# Patient Record
Sex: FEMALE | Race: WHITE | NOT HISPANIC OR LATINO | Employment: OTHER | URBAN - METROPOLITAN AREA
[De-identification: names, ages, dates, MRNs, and addresses within clinical notes are randomized per-mention and may not be internally consistent; named-entity substitution may affect disease eponyms.]

---

## 2017-04-13 ENCOUNTER — TELEPHONE (OUTPATIENT)
Dept: FAMILY MEDICINE | Facility: CLINIC | Age: 67
End: 2017-04-13

## 2017-04-13 RX ORDER — INSULIN GLARGINE 100 [IU]/ML
INJECTION, SOLUTION SUBCUTANEOUS
Qty: 15 ML | Refills: 1
Start: 2017-04-13 | End: 2017-06-30

## 2017-04-13 NOTE — TELEPHONE ENCOUNTER
Please confirm with patient that she is taking 12 units qhs and call pharmacy   Sejal Rutledge MD

## 2017-04-13 NOTE — TELEPHONE ENCOUNTER
RN spoke with patient and states she is on 11 units on Lantus.    RN called Juan with [facility] pharmacy and informed of the units patient reported.  Juan agrees and verbalized understanding.  Medications list has been updated to 11 units as well.      Signed Prescriptions:                        Disp   Refills    LANTUS SOLOSTAR 100 UNIT/ML soln           15 mL  1        Sig: Inject 11 units at bedtime daily.  Authorizing Provider: TUTU WHITTAKER  Ordering User: ARMINDA TABOR, RN, BSN

## 2017-04-13 NOTE — TELEPHONE ENCOUNTER
Reason for Call:  Other call back     Detailed comments: Pharmacist called to clarify the directions of the Lantus.     Phone Number Patient can be reached at: Home number on file [phone] (home)    Best Time: any    Can we leave a detailed message on this number? YES    Call taken on 4/13/2017 at 10:21 AM by Desiree Schmidt

## 2017-06-20 DIAGNOSIS — E03.9 HYPOTHYROIDISM: ICD-10-CM

## 2017-06-20 RX ORDER — LEVOTHYROXINE SODIUM 100 UG/1
TABLET ORAL
Qty: 90 TABLET | Refills: 0 | Status: SHIPPED | OUTPATIENT
Start: 2017-06-20 | End: 2017-06-30

## 2017-06-20 NOTE — TELEPHONE ENCOUNTER
Prescription approved per [facility] Refill Protocol.  Patient due for labs for further refills.  Etta Leos RN - BC

## 2017-06-22 DIAGNOSIS — I10 HYPERTENSION GOAL BP (BLOOD PRESSURE) < 140/90: ICD-10-CM

## 2017-06-22 RX ORDER — LEVOTHYROXINE SODIUM 100 UG/1
TABLET ORAL
Qty: 90 TABLET | Refills: 2
Start: 2017-06-22

## 2017-06-22 RX ORDER — LISINOPRIL/HYDROCHLOROTHIAZIDE 10-12.5 MG
1 TABLET ORAL DAILY
Qty: 90 TABLET | Refills: 0 | Status: SHIPPED | OUTPATIENT
Start: 2017-06-22 | End: 2017-06-30

## 2017-06-22 NOTE — TELEPHONE ENCOUNTER
Medication is being filled for 1 time refill only due to:  labs not current. See below. Needs a 6 month diabetic f/u  Jamel Turner RN

## 2017-06-22 NOTE — TELEPHONE ENCOUNTER
lisinopril-hydrochlorothiazide (PRINZIDE,ZESTORETIC) 10-12.5 MG per tablet      Last Written Prescription Date: 5/11/16  Last Fill Quantity: 90, # refills: 3  Last Office Visit with [facility], [facility] or [facility] prescribing provider: 12/20/16       Potassium   Date Value Ref Range Status   05/06/2016 4.9 3.4 - 5.3 mmol/L Final     Creatinine   Date Value Ref Range Status   05/06/2016 0.73 0.52 - 1.04 mg/dL Final     BP Readings from Last 3 Encounters:   12/20/16 120/56   11/28/16 127/88   05/11/16 132/64

## 2017-06-23 DIAGNOSIS — F33.0 MAJOR DEPRESSIVE DISORDER, RECURRENT, MILD (H): ICD-10-CM

## 2017-06-23 NOTE — TELEPHONE ENCOUNTER
MA - please call and complete a PHQ-9 with patient and reroute to rn refill pool.    Etta Leos RN - BC

## 2017-06-23 NOTE — TELEPHONE ENCOUNTER
Called patient and left VM to call clinic. Team number left.  Gwendolyn HERNANDEZ CMA ([facility])

## 2017-06-23 NOTE — TELEPHONE ENCOUNTER
Citalopram     Last Written Prescription Date: 12/20/16  Last Fill Quantity: 90, # refills: 1  Last Office Visit with G primary care provider:  12/20/16        Last PHQ-9 score on record=   PHQ-9 SCORE 12/20/2016   Total Score -   Total Score 8

## 2017-06-27 ENCOUNTER — RADIANT APPOINTMENT (OUTPATIENT)
Dept: MAMMOGRAPHY | Facility: CLINIC | Age: 67
End: 2017-06-27
Payer: COMMERCIAL

## 2017-06-27 DIAGNOSIS — Z12.31 VISIT FOR SCREENING MAMMOGRAM: ICD-10-CM

## 2017-06-27 PROCEDURE — G0202 SCR MAMMO BI INCL CAD: HCPCS | Mod: TC

## 2017-06-28 RX ORDER — CITALOPRAM HYDROBROMIDE 20 MG/1
TABLET ORAL
Qty: 30 TABLET | Refills: 0 | Status: SHIPPED | OUTPATIENT
Start: 2017-06-28 | End: 2017-06-30

## 2017-06-28 NOTE — TELEPHONE ENCOUNTER
Attempt 2, called patient and left VM to call clinic. Patient does have appointment scheduled 7/11/2017 with provider.  Gwendolyn HERNANDEZ CMA ([facility])

## 2017-06-28 NOTE — TELEPHONE ENCOUNTER
Routing refill request to provider for review/approval because:  Drug interaction warning    High Drug-Drug: citalopram and nortriptyline  Additive QT interval prolongation may occur during coadministration of Citalopram and QT Prolonging Agents 2. Coadministration of Citalopram and QT Prolonging Agents 2 is not recommended.   Details           Etta Leos RN - BC

## 2017-06-28 NOTE — TELEPHONE ENCOUNTER
Signed Prescriptions:                        Disp   Refills    citalopram (CELEXA) 20 MG tablet           30 tab*0        Sig: Take 1 tablet by mouth once daily.  Authorizing Provider: TUTU WHITTAKER    No further refills until follow-up appointment

## 2017-06-29 DIAGNOSIS — I10 HYPERTENSION GOAL BP (BLOOD PRESSURE) < 140/90: ICD-10-CM

## 2017-06-29 DIAGNOSIS — E03.9 ACQUIRED HYPOTHYROIDISM: ICD-10-CM

## 2017-06-29 LAB
ANION GAP SERPL CALCULATED.3IONS-SCNC: 9 MMOL/L (ref 3–14)
BUN SERPL-MCNC: 11 MG/DL (ref 7–30)
CALCIUM SERPL-MCNC: 8.6 MG/DL (ref 8.5–10.1)
CHLORIDE SERPL-SCNC: 102 MMOL/L (ref 94–109)
CO2 SERPL-SCNC: 28 MMOL/L (ref 20–32)
CREAT SERPL-MCNC: 0.91 MG/DL (ref 0.52–1.04)
GFR SERPL CREATININE-BSD FRML MDRD: 61 ML/MIN/1.7M2
GLUCOSE SERPL-MCNC: 283 MG/DL (ref 70–99)
HBA1C MFR BLD: 9.3 % (ref 4.3–6)
POTASSIUM SERPL-SCNC: 4.9 MMOL/L (ref 3.4–5.3)
SODIUM SERPL-SCNC: 139 MMOL/L (ref 133–144)
T4 FREE SERPL-MCNC: 1.2 NG/DL (ref 0.76–1.46)
TSH SERPL DL<=0.005 MIU/L-ACNC: 4.45 MU/L (ref 0.4–4)

## 2017-06-29 PROCEDURE — 80048 BASIC METABOLIC PNL TOTAL CA: CPT | Performed by: FAMILY MEDICINE

## 2017-06-29 PROCEDURE — 84443 ASSAY THYROID STIM HORMONE: CPT | Performed by: FAMILY MEDICINE

## 2017-06-29 PROCEDURE — 83036 HEMOGLOBIN GLYCOSYLATED A1C: CPT | Performed by: FAMILY MEDICINE

## 2017-06-29 PROCEDURE — 36415 COLL VENOUS BLD VENIPUNCTURE: CPT | Performed by: FAMILY MEDICINE

## 2017-06-29 PROCEDURE — 84439 ASSAY OF FREE THYROXINE: CPT | Performed by: FAMILY MEDICINE

## 2017-06-29 ASSESSMENT — PATIENT HEALTH QUESTIONNAIRE - PHQ9: SUM OF ALL RESPONSES TO PHQ QUESTIONS 1-9: 0

## 2017-06-29 NOTE — PROGRESS NOTES
Kelly,    Your diabetes is uncontrolled. Eat healthy foods like fruits, vegetables, chicken, fish, nuts, and low fat yogurt. Drink water and milk instead of pop and juice. Avoid sweets. Exercise regularly. See you at your upcoming appointment.  Let's discuss these results then.    Sejal Rutledge MD

## 2017-06-30 NOTE — PROGRESS NOTES
"  SUBJECTIVE:   Kelly Vegas is a 66 year old female who presents for Preventive Visit.  {PVP to remind patient that this is not necessarily a physical exam; physical exam may or may not be done:896787::\"click delete button to remove this line now\"}  {PVP to inform patient that additional E&M charge may apply, if additional problems addressed:969410::\"click delete button to remove this line now\"}  Are you in the first 12 months of your Medicare Part B coverage?  {No Yes:665590::\"No\"}    Healthy Habits:    Do you get at least three servings of calcium containing foods daily (dairy, green leafy vegetables, etc.)? {YES/NO, DAIRY INTAKE:090092::\"yes\"}    Amount of exercise or daily activities, outside of work: {AMOUNT EXERCISE:503193}    Problems taking medications regularly {Yes /No default:706606::\"No\"}    Medication side effects: {Yes /No default.:871901::\"No\"}    Have you had an eye exam in the past two years? {YESNOBLANK:578945}    Do you see a dentist twice per year? {YESNOBLANK:907790}    Do you have sleep apnea, excessive snoring or daytime drowsiness?{YESNOBLANK:748323}    {AWV Cognitive Screenin}    {Outside tests to abstract? :716741}    {additional problems to add (Optional):932437}    Reviewed and updated as needed this visit by clinical staff         Reviewed and updated as needed this visit by Provider        Social History   Substance Use Topics     Smoking status: Former Smoker     Packs/day: 0.50     Years: 20.00     Types: Cigarettes     Start date: 1967     Quit date: 1989     Smokeless tobacco: Never Used     Alcohol use No       {ETOH AUDIT:297130}    Today's PHQ-2 Score:   PHQ-2 (  [employer]) 2016 12/10/2015   Q1: Little interest or pleasure in doing things 0 1   Q2: Feeling down, depressed or hopeless 3 1   PHQ-2 Score 3 2     {PHQ-2 LOOK IN ASSESSMENTS (Optional) :510663}  Do you feel safe in your environment - {YES/NO/NA:953357}    Do you have a Health Care Directive?: " "{HEALTHCARE DIRECTIVE STATUS:143448}    Current providers sharing in care for this patient include:   Patient Care Team:  Sejal Rutledge MD as PCP - General      Hearing impairment: {NO/YES:093360}    Ability to successfully perform activities of daily living: {YES/NO (MEDICARE):550654::\"Yes, no assistance needed\"}     Fall risk:  {Document Fall Risk in the Assessments Section of the Navigator:219809}    Home safety:  {IPPE SAFETY CONCERNS:796256::\"none identified\"}  {If any of the above assessments are answered yes, consider ordering appropriate referrals (Optional):984716::\"click delete button to remove this line now\"}    The following health maintenance items are reviewed in Epic and correct as of today:  Health Maintenance   Topic Date Due     DEXA SCAN SCREENING (SYSTEM ASSIGNED)  07/08/2014     FALL RISK ASSESSMENT  05/11/2017     DEPRESSION ACTION PLAN Q1 YR  05/11/2017     INFLUENZA VACCINE (SYSTEM ASSIGNED)  09/01/2017     A1C Q3 MO  09/29/2017     EYE EXAM Q1 YEAR  12/13/2017     FOOT EXAM Q1 YEAR  12/20/2017     LIPID MONITORING Q1 YEAR  12/20/2017     MICROALBUMIN Q1 YEAR  12/20/2017     PHQ-9 Q6 MONTHS  12/23/2017     TSH W/ FREE T4 REFLEX Q1 YEAR  06/29/2018     BMP Q1 YR  06/29/2018     MAMMO SCREEN Q2 YR (SYSTEM ASSIGNED)  06/27/2019     ADVANCE DIRECTIVE PLANNING Q5 YRS  05/11/2021     COLONOSCOPY Q5 YR  11/28/2021     TETANUS IMMUNIZATION (SYSTEM ASSIGNED)  08/07/2022     PNEUMOCOCCAL  Completed     HEPATITIS C SCREENING  Completed     {Chronicprobdata (Optional):283377}    {Decision Support (Optional):434958}    ROS:  {ROS COMP:809267}    OBJECTIVE:   There were no vitals taken for this visit. Estimated body mass index is 28.05 kg/(m^2) as calculated from the following:    Height as of 12/20/16: 5' 4.5\" (1.638 m).    Weight as of 12/20/16: 166 lb (75.3 kg).  EXAM:   {Exam :819128}    ASSESSMENT / PLAN:   {Diag Picklist:486140}    End of Life Planning:  Patient currently has an advanced " "directive: { :019678}    COUNSELING:  {Medicare Counselin}    {BP Counseling- Complete if BP >= 120/80  (Optional):557681}    Estimated body mass index is 28.05 kg/(m^2) as calculated from the following:    Height as of 16: 5' 4.5\" (1.638 m).    Weight as of 16: 166 lb (75.3 kg).  {Weight Management Plan -- Complete if patient has an abnormal BMI (Optional):136541}   reports that she quit smoking about 28 years ago. Her smoking use included Cigarettes. She started smoking about 50 years ago. She has a 10.00 pack-year smoking history. She has never used smokeless tobacco.  {Tobacco Cessation -- Complete if patient is a smoker (Optional):821754}    Appropriate preventive services were discussed with this patient, including applicable screening as appropriate for cardiovascular disease, diabetes, osteopenia/osteoporosis, and glaucoma.  As appropriate for age/gender, discussed screening for colorectal cancer, prostate cancer, breast cancer, and cervical cancer. Checklist reviewing preventive services available has been given to the patient.    Reviewed patients plan of care and provided an AVS. The {CarePlan:348191} for Kelly meets the Care Plan requirement. This Care Plan has been established and reviewed with the {PATIENT, FAMILY MEMBER, CAREGIVER:028558}.    Counseling Resources:  ATP IV Guidelines  Pooled Cohorts Equation Calculator  Breast Cancer Risk Calculator  FRAX Risk Assessment  ICSI Preventive Guidelines  Dietary Guidelines for Americans,   USDA's MyPlate  ASA Prophylaxis  Lung CA Screening    Sejal Rutledge MD  [facility]  "

## 2017-06-30 NOTE — PATIENT INSTRUCTIONS
See me in 1 month if your morning blood glucose is not less than 130 or your evening blood glucose is not less than 180.    Schedule a DEXA bone scan for osteoporosis testing at your convenience.  Call our appointment line at [phone] or go to [email or web address].    Sejal Rutledge MD      Preventive Health Recommendations  Female Ages 65 +    Yearly exam:     See your health care provider every year in order to  o Review health changes.   o Discuss preventive care.    o Review your medicines if your doctor has prescribed any.      You no longer need a yearly Pap test unless you've had an abnormal Pap test in the past 10 years. If you have vaginal symptoms, such as bleeding or discharge, be sure to talk with your provider about a Pap test.      Every 1 to 2 years, have a mammogram.  If you are over 69, talk with your health care provider about whether or not you want to continue having screening mammograms.      Every 10 years, have a colonoscopy. Or, have a yearly FIT test (stool test). These exams will check for colon cancer.       Have a cholesterol test every 5 years, or more often if your doctor advises it.       Have a diabetes test (fasting glucose) every three years. If you are at risk for diabetes, you should have this test more often.       At age 65, have a bone density scan (DEXA) to check for osteoporosis (brittle bone disease).    Shots:    Get a flu shot each year.    Get a tetanus shot every 10 years.    Talk to your doctor about your pneumonia vaccines. There are now two you should receive - Pneumovax (PPSV 23) and Prevnar (PCV 13).    Talk to your doctor about the shingles vaccine.    Talk to your doctor about the hepatitis B vaccine.    Nutrition:     Eat at least 5 servings of fruits and vegetables each day.      Eat whole-grain bread, whole-wheat pasta and brown rice instead of white grains and rice.      Talk to your provider about Calcium and Vitamin D.     Lifestyle    Exercise at least 150  minutes a week (30 minutes a day, 5 days a week). This will help you control your weight and prevent disease.      Limit alcohol to one drink per day.      No smoking.       Wear sunscreen to prevent skin cancer.       See your dentist twice a year for an exam and cleaning.      See your eye doctor every 1 to 2 years to screen for conditions such as glaucoma, macular degeneration, cataracts, etc     [facility]    If you have any questions regarding to your visit please contact your care team:       Team Red:   Clinic Hours Telephone Number   Dr. Sejal Perez, NP   7am-7pm  Monday - Thursday   7am-5pm  Fridays  [phone]  (Appointment scheduling available 24/7)    Questions about your visit?   Team Line  [phone]   Urgent Care - [place] and [facility] - 11am-9pm Monday-Friday Saturday-Sunday- 9am-5pm   [place] - 5pm-9pm Monday-Friday Saturday-Sunday- 9am-5pm  [phone] - [facility]  [phone] - [place]       What options do I have for visits at the clinic other than the traditional office visit?  To expand how we care for you, many of our providers are utilizing electronic visits (e-visits) and telephone visits, when medically appropriate, for interactions with their patients rather than a visit in the clinic.   We also offer nurse visits for many medical concerns. Just like any other service, we will bill your insurance company for this type of visit based on time spent on the phone with your provider. Not all insurance companies cover these visits. Please check with your medical insurance if this type of visit is covered. You will be responsible for any charges that are not paid by your insurance.      E-visits via [employer]:  generally incur a $35.00 fee.  Telephone visits:  Time spent on the phone: *charged based on time that is spent on the phone in increments of 10 minutes. Estimated cost:   5-10 mins $30.00    11-20 mins. $59.00   21-30 mins. $85.00     Use [employer] (secure email communication and access to your chart) to send your primary care provider a message or make an appointment. Ask someone on your Team how to sign up for [employer].  For a Price Quote for your services, please call our Consumer Price Line at [phone].      As always, Thank you for trusting us with your health care needs!  Junie PATEL MA

## 2017-07-11 ENCOUNTER — RADIANT APPOINTMENT (OUTPATIENT)
Dept: GENERAL RADIOLOGY | Facility: CLINIC | Age: 67
End: 2017-07-11
Attending: FAMILY MEDICINE
Payer: COMMERCIAL

## 2017-07-11 ENCOUNTER — OFFICE VISIT (OUTPATIENT)
Dept: FAMILY MEDICINE | Facility: CLINIC | Age: 67
End: 2017-07-11
Payer: COMMERCIAL

## 2017-07-11 VITALS
SYSTOLIC BLOOD PRESSURE: 106 MMHG | BODY MASS INDEX: 28.82 KG/M2 | WEIGHT: 173 LBS | OXYGEN SATURATION: 98 % | DIASTOLIC BLOOD PRESSURE: 66 MMHG | HEIGHT: 65 IN | TEMPERATURE: 99.1 F | HEART RATE: 82 BPM

## 2017-07-11 DIAGNOSIS — F33.0 MAJOR DEPRESSIVE DISORDER, RECURRENT, MILD (H): ICD-10-CM

## 2017-07-11 DIAGNOSIS — M25.561 PAIN IN BOTH KNEES, UNSPECIFIED CHRONICITY: ICD-10-CM

## 2017-07-11 DIAGNOSIS — Z12.4 SCREENING FOR MALIGNANT NEOPLASM OF CERVIX: ICD-10-CM

## 2017-07-11 DIAGNOSIS — Z71.89 ADVANCED DIRECTIVES, COUNSELING/DISCUSSION: ICD-10-CM

## 2017-07-11 DIAGNOSIS — E03.9 ACQUIRED HYPOTHYROIDISM: ICD-10-CM

## 2017-07-11 DIAGNOSIS — M25.562 PAIN IN BOTH KNEES, UNSPECIFIED CHRONICITY: ICD-10-CM

## 2017-07-11 DIAGNOSIS — I10 HYPERTENSION GOAL BP (BLOOD PRESSURE) < 140/90: ICD-10-CM

## 2017-07-11 DIAGNOSIS — M79.644 PAIN OF RIGHT THUMB: ICD-10-CM

## 2017-07-11 DIAGNOSIS — M54.50 RIGHT-SIDED LOW BACK PAIN WITHOUT SCIATICA, UNSPECIFIED CHRONICITY: ICD-10-CM

## 2017-07-11 DIAGNOSIS — E78.5 HYPERLIPIDEMIA WITH TARGET LDL LESS THAN 100: ICD-10-CM

## 2017-07-11 DIAGNOSIS — Z00.00 WELLNESS EXAMINATION: Primary | ICD-10-CM

## 2017-07-11 DIAGNOSIS — Z85.41 HISTORY OF CERVICAL CANCER: ICD-10-CM

## 2017-07-11 DIAGNOSIS — Z78.0 ASYMPTOMATIC POSTMENOPAUSAL STATUS: ICD-10-CM

## 2017-07-11 PROCEDURE — 73130 X-RAY EXAM OF HAND: CPT | Mod: RT

## 2017-07-11 PROCEDURE — 73562 X-RAY EXAM OF KNEE 3: CPT | Mod: LT

## 2017-07-11 PROCEDURE — 73562 X-RAY EXAM OF KNEE 3: CPT | Mod: RT

## 2017-07-11 PROCEDURE — G0145 SCR C/V CYTO,THINLAYER,RESCR: HCPCS | Performed by: FAMILY MEDICINE

## 2017-07-11 PROCEDURE — 99397 PER PM REEVAL EST PAT 65+ YR: CPT | Performed by: FAMILY MEDICINE

## 2017-07-11 PROCEDURE — 99213 OFFICE O/P EST LOW 20 MIN: CPT | Mod: 25 | Performed by: FAMILY MEDICINE

## 2017-07-11 PROCEDURE — 72100 X-RAY EXAM L-S SPINE 2/3 VWS: CPT

## 2017-07-11 RX ORDER — LEVOTHYROXINE SODIUM 100 UG/1
TABLET ORAL
Qty: 90 TABLET | Refills: 3 | Status: SHIPPED | OUTPATIENT
Start: 2017-07-11 | End: 2018-04-10

## 2017-07-11 RX ORDER — LISINOPRIL/HYDROCHLOROTHIAZIDE 10-12.5 MG
1 TABLET ORAL DAILY
Qty: 90 TABLET | Refills: 3 | Status: SHIPPED | OUTPATIENT
Start: 2017-07-11 | End: 2018-04-10

## 2017-07-11 RX ORDER — CITALOPRAM HYDROBROMIDE 20 MG/1
TABLET ORAL
Qty: 90 TABLET | Refills: 1 | Status: SHIPPED | OUTPATIENT
Start: 2017-07-11 | End: 2018-02-19

## 2017-07-11 RX ORDER — ATORVASTATIN CALCIUM 20 MG/1
20 TABLET, FILM COATED ORAL DAILY
Qty: 90 TABLET | Refills: 1 | Status: SHIPPED | OUTPATIENT
Start: 2017-07-11 | End: 2018-04-10

## 2017-07-11 NOTE — PROGRESS NOTES
SUBJECTIVE:   Kelly Vegas is a 67 year old female  who presents for Preventive Visit.    Patient also presents to follow-up diabetes. Diabetic Review of Systems - Medication compliance:  noncompliant much of the time, Diabetic diet compliance:  noncompliant much of the time, Home glucose monitoring:  blood glucose record WAS NOT brought in today, are performed sporadically, Diabetic ROS: no polyuria or polydipsia, no chest pain, dyspnea or TIA's, no numbness, tingling or pain in extremities, no unusual visual symptoms, no hypoglycemia, no medication side effects noted, Last eye exam approximately 6 months ago. Patient has depression, mild, with no medication side effects and no anhedonia or low mood, without suicidal ideation.  Hypertension well controlled on current medications without side effects, chest pain, or dyspnea. Hypercholesterolemia well controlled with current treatment plan without side effects. Patient also presents for follow-up of hypothyroidism, controlled on current medication.  Denies symptoms of hypo- or hyperthyroidism.     Patient also complains of mild right sided low back pain pain off and on for months. Patient also complains of bilateral knee pain for months, R > L. Patient also complains of right thumb pain for months, poorly responsive to a splint.     Physical   Annual:     Getting at least 3 servings of Calcium per day::  Yes    Bi-annual eye exam::  Yes    Dental care twice a year::  Yes    Sleep apnea or symptoms of sleep apnea::  None    Diet::  Diabetic    Frequency of exercise::  2-3 days/week    Duration of exercise::  Greater than 60 minutes    Taking medications regularly::  No    Barriers to taking medications::  None    Additional concerns today::  YES (right hip / back pain)      COGNITIVE SCREEN  1) Repeat 3 items (Banana, Sunrise, Chair)    2) Clock draw: NORMAL  3) 3 item recall: Recalls 2 objects   Results: NORMAL clock, 1-2 items recalled: COGNITIVE  IMPAIRMENT LESS LIKELY    Mini-CogTM Copyright KAT [employer]. Licensed by the author for use in [facility]; reprinted with permission ([email or web address]). All rights reserved.        Reviewed and updated as needed this visit by clinical staff  Tobacco  Allergies  Meds  Problems  Med Hx  Surg Hx  Fam Hx  Soc Hx          Reviewed and updated as needed this visit by Provider  Tobacco  Allergies  Meds  Problems  Med Hx  Surg Hx  Fam Hx  Soc Hx         Social History   Substance Use Topics     Smoking status: Former Smoker     Packs/day: 0.50     Years: 20.00     Types: Cigarettes     Start date: 7/8/1967     Quit date: 1/1/1989     Smokeless tobacco: Never Used     Alcohol use No       The patient does not drink >3 drinks per day nor >7 drinks per week.    Today's PHQ-2 Score:   PHQ-2 ( 1999 [employer]) 7/11/2017   Q1: Little interest or pleasure in doing things 0   Q2: Feeling down, depressed or hopeless 0   PHQ-2 Score 0       Do you feel safe in your environment - Yes    Do you have a Health Care Directive?: No: Advance care planning reviewed with patient; information given to patient to review.    Current providers sharing in care for this patient include:   Patient Care Team:  Sejal Rutledge MD as PCP - General      Hearing impairment: No    Ability to successfully perform activities of daily living: Yes, no assistance needed     Fall risk:  Fallen 2 or more times in the past year?: No  Any fall with injury in the past year?: No    Home safety:  none identified      The following health maintenance items are reviewed in Epic and correct as of today:  Health Maintenance   Topic Date Due     DEXA SCAN SCREENING (SYSTEM ASSIGNED)  07/08/2014     FALL RISK ASSESSMENT  05/11/2017     PAP Q1 YR  05/11/2017     INFLUENZA VACCINE (SYSTEM ASSIGNED)  09/01/2017     A1C Q3 MO  09/29/2017     EYE EXAM Q1 YEAR  12/13/2017     FOOT EXAM Q1 YEAR  12/20/2017     LIPID MONITORING Q1 YEAR  12/20/2017     MICROALBUMIN  Q1 YEAR  12/20/2017     PHQ-9 Q6 MONTHS  12/23/2017     TSH W/ FREE T4 REFLEX Q1 YEAR  06/29/2018     BMP Q1 YR  06/29/2018     DEPRESSION ACTION PLAN Q1 YR  07/11/2018     MAMMO SCREEN Q2 YR (SYSTEM ASSIGNED)  06/27/2019     COLONOSCOPY Q5 YR  11/28/2021     ADVANCE DIRECTIVE PLANNING Q5 YRS  07/11/2022     TETANUS IMMUNIZATION (SYSTEM ASSIGNED)  08/07/2022     PNEUMOCOCCAL  Completed     HEPATITIS C SCREENING  Completed     Patient Active Problem List   Diagnosis     Hyperlipidemia with target LDL less than 100     Hypertension goal BP (blood pressure) < 140/90     History of cervical cancer     Advanced directives, counseling/discussion     Major depressive disorder, recurrent, mild (H)     Acquired hypothyroidism     Uncontrolled type 2 diabetes mellitus without complication, with long-term current use of insulin (H)     Past Surgical History:   Procedure Laterality Date     BIOPSY  before thyroid removal     C BSO, OMENTECTOMY W/JUAN DANIEL  2/2006    cervical cancer     C THYROIDECTOMY  12/2004    goiter     COLONOSCOPY  nov 2016     COLONOSCOPY WITH CO2 INSUFFLATION N/A 11/28/2016    Procedure: COLONOSCOPY WITH CO2 INSUFFLATION;  Surgeon: Migue Giraldo MD;  Location: [facility] OR     CYSTECTOMY OVARIAN BENIGN  1974     EYE SURGERY  12-14-16       Social History   Substance Use Topics     Smoking status: Former Smoker     Packs/day: 0.50     Years: 20.00     Types: Cigarettes     Start date: 7/8/1967     Quit date: 1/1/1989     Smokeless tobacco: Never Used     Alcohol use No     Family History   Problem Relation Age of Onset     Thyroid Disease Mother      Dementia Mother      OSTEOPOROSIS Mother      Alcohol/Drug Father      C.A.D. Father      CABG      DIABETES Father      Coronary Artery Disease Father      Hypertension Father      Hyperlipidemia Father      CEREBROVASCULAR DISEASE Father      CANCER Maternal Grandmother      stomach, colon     Breast Cancer Maternal Grandmother      Thyroid Disease Maternal  "Grandmother      Alzheimer Disease Maternal Grandfather      Breast Cancer Paternal Grandmother      Alcohol/Drug Brother      Prostate Cancer Brother      Alcohol/Drug Son      Depression Brother      Depression Brother      d. suicide     Breast Cancer Cousin      Breast Cancer Other          Current Outpatient Prescriptions   Medication Sig Dispense Refill     citalopram (CELEXA) 20 MG tablet Take 1 tablet by mouth once daily 90 tablet 1     lisinopril-hydrochlorothiazide (PRINZIDE/ZESTORETIC) 10-12.5 MG per tablet Take 1 tablet by mouth daily 90 tablet 3     levothyroxine (SYNTHROID/LEVOTHROID) 100 MCG tablet TAKE 1 TABLET (100 MCG) BY MOUTH DAILY 90 tablet 3     insulin glargine (LANTUS SOLOSTAR) 100 UNIT/ML injection Inject 11 units at bedtime daily. 15 mL 0     atorvastatin (LIPITOR) 20 MG tablet Take 1 tablet (20 mg) by mouth daily 90 tablet 1     metFORMIN (GLUCOPHAGE) 1000 MG tablet Take 1 tablet (1,000 mg) by mouth 2 times daily (with meals) 180 tablet 3     insulin lispro (HUMALOG KWIKPEN) 100 UNIT/ML injection 5 units before lunch and 3 units before supper daily 15 mL 0     blood glucose monitoring (NO BRAND SPECIFIED) test strip Use to test blood sugars 3 times daily or as directed 300 strip 3     Insulin Pen Needle (PEN NEEDLES 3/16\") 31G X 5 MM MISC Use three daily with insulin doses 200 each 3     order for DME Blood glucose monitor per insurance formulary; blood glucose test strips One Touch Verio or per formulary for use 3 time daily; lancets per formulary for use 3 time daily 300 each 3     ammonium lactate (LAC-HYDRIN) 12 % cream Apply topically 2 times daily as needed for dry skin 385 g 3     nortriptyline (PAMELOR) 25 MG capsule Take 2-3 capsules (50-75 mg) by mouth nightly as needed 270 capsule 3     B Complex Vitamins (B COMPLEX PO)        Cholecalciferol (VITAMIN D3 PO) Take by mouth daily       ASPIRIN 81 MG OR TABS Take 1 tablet by mouth daily.       Allergies   Allergen Reactions     " "Glipizide      tremulous     Ibuprofen Hives     Penicillins Itching     Percocet [Acetaminophen] Nausea and Vomiting     Vicodin [Hydrocodone-Acetaminophen] Nausea       History of abnormal Pap smear:   YES - other categories - see link Cervical Cytology Screening Guidelines Last 3 Pap Results:   PAP (no units)   Date Value   05/11/2016 NIL   03/24/2015 NIL   02/18/2014 NIL    Status post hysterectomy. Pap still indicated. Cervical cancer.     ROS:  C: NEGATIVE for fever, chills, change in weight  I: NEGATIVE for worrisome rashes, moles or lesions  E: NEGATIVE for vision changes or irritation  E/M: NEGATIVE for ear, mouth and throat problems  R: NEGATIVE for significant cough or SOB  B: NEGATIVE for masses, tenderness or discharge  CV: NEGATIVE for chest pain, palpitations or peripheral edema  GI: NEGATIVE for nausea, abdominal pain, heartburn, or change in bowel habits  : NEGATIVE for frequency, dysuria, or hematuria  MUSCULOSKELETAL:arthralgias    N: NEGATIVE for weakness, dizziness or paresthesias  E: NEGATIVE for temperature intolerance, skin/hair changes  H: NEGATIVE for bleeding problems  P: NEGATIVE for changes in mood or affect    OBJECTIVE:   /66 (BP Location: Left arm, Patient Position: Chair, Cuff Size: Adult Regular)  Pulse 82  Temp 99.1  F (37.3  C) (Oral)  Ht 5' 5.4\" (1.661 m)  Wt 173 lb (78.5 kg)  SpO2 98%  BMI 28.44 kg/m2 Estimated body mass index is 28.44 kg/(m^2) as calculated from the following:    Height as of this encounter: 5' 5.4\" (1.661 m).    Weight as of this encounter: 173 lb (78.5 kg).  EXAM:   GENERAL: healthy, alert and no distress  EYES: Eyes grossly normal to inspection, PERRL and conjunctivae and sclerae normal  HENT: ear canals and TM's normal, nose and mouth without ulcers or lesions  NECK: no adenopathy, no asymmetry, masses, or scars and thyroid normal to palpation  RESP: lungs clear to auscultation - no rales, rhonchi or wheezes  BREAST: normal without masses, " tenderness or nipple discharge and no palpable axillary masses or adenopathy  CV: regular rate and rhythm, normal S1 S2, no S3 or S4, no murmur, click or rub, no peripheral edema and peripheral pulses strong  ABDOMEN: soft, nontender, no hepatosplenomegaly, no masses and bowel sounds normal   (female): normal female external genitalia, normal urethral meatus  and normal post-hysterectomy exam without masses.   MS: no gross musculoskeletal defects noted, no edema  SKIN: no suspicious lesions or rashes  NEURO: Normal strength and tone, mentation intact and speech normal  PSYCH: mentation appears normal, affect normal/bright    ASSESSMENT / PLAN:   (Z00.00) Wellness examination  (primary encounter diagnosis)  Plan: Pap imaged thin layer screen reflex to HPV if         ASCUS - recommend age 25 - 29          (E11.65,  Z79.4) Uncontrolled type 2 diabetes mellitus without complication, with long-term current use of insulin (H)  Comment: medication and dietary non compliance   Plan: insulin glargine (LANTUS SOLOSTAR) 100 UNIT/ML         injection, metFORMIN (GLUCOPHAGE) 1000 MG         tablet, insulin lispro (HUMALOG KWIKPEN) 100         UNIT/ML injection, blood glucose monitoring (NO        BRAND SPECIFIED) test strip        Counseled to make better food choices, exercise as tolerated, and lose weight. Follow up in one month if blood glucose not at goal <130 AM and <180 PM. Follow-up for labs in 3 months.     (F33.0) Major depressive disorder, recurrent, mild (H)  Comment: Well controlled with medications without side effects.   Plan: citalopram (CELEXA) 20 MG tablet          (I10) Hypertension goal BP (blood pressure) < 140/90  Comment: Well controlled with medications without side effects.   Plan: lisinopril-hydrochlorothiazide         (PRINZIDE/ZESTORETIC) 10-12.5 MG per tablet          (E78.5) Hyperlipidemia with target LDL less than 100  Comment: Well controlled with medications without side effects.   Plan:  atorvastatin (LIPITOR) 20 MG tablet          (E03.9) Acquired hypothyroidism  Comment: euthyroid on replacement   Plan: levothyroxine (SYNTHROID/LEVOTHROID) 100 MCG         tablet          (M54.5) Right-sided low back pain without sciatica, unspecified chronicity  Comment: suspect musculoligamentous cause   Plan: XR Lumbar Spine 2/3 Views        Over the counter pain medication as needed, ice or heat as she finds helpful, avoidance of aggravating activities, and return for persistence for consideration of further imaging or referral.     (M25.561,  M25.562) Pain in both knees, unspecified chronicity  Plan: XR Knee Right 3 Views, XR Knee Left 3 Views        Symptomatic care.  Return to clinic for persistence, recurrence or new symptoms.     (M79.644) Pain of right thumb  Comment: Differential diagnoses would include: degenerative joint disease, tendonitis, carpel tunnel syndrome   Plan: XR Hand Right G/E 3 Views        Offered EMG/referral        End of Life Planning:  Patient currently has an advanced directive: No.  I have verified the patient's ablity to prepare an advanced directive/make health care decisions.  Literature was provided to assist patient in preparing an advanced directive.    COUNSELING:  Reviewed preventive health counseling, as reflected in patient instructions  Special attention given to:       Regular exercise       Healthy diet/nutrition       Vision screening       Aspirin Prophylaxsis       Osteoporosis Prevention/Bone Health       Colon cancer screening       Hepatitis C screening       The 10-year ASCVD risk score (Hurricane ANY Jr, et al., 2013) is: 13%    Values used to calculate the score:      Age: 67 years      Sex: Female      Is Non- : No      Diabetic: Yes      Tobacco smoker: No      Systolic Blood Pressure: 106 mmHg      Is BP treated: Yes      HDL Cholesterol: 43 mg/dL      Total Cholesterol: 157 mg/dL       Advanced Planning         Estimated body mass index  "is 28.44 kg/(m^2) as calculated from the following:    Height as of this encounter: 5' 5.4\" (1.661 m).    Weight as of this encounter: 173 lb (78.5 kg).     reports that she quit smoking about 28 years ago. Her smoking use included Cigarettes. She started smoking about 50 years ago. She has a 10.00 pack-year smoking history. She has never used smokeless tobacco.      Appropriate preventive services were discussed with this patient, including applicable screening as appropriate for cardiovascular disease, diabetes, osteopenia/osteoporosis, and glaucoma.  As appropriate for age/gender, discussed screening for colorectal cancer, prostate cancer, breast cancer, and cervical cancer. Checklist reviewing preventive services available has been given to the patient.    Reviewed patients plan of care and provided an AVS. The Basic Care Plan (routine screening as documented in Health Maintenance) for Kelly meets the Care Plan requirement. This Care Plan has been established and reviewed with the Patient.    Counseling Resources:  ATP IV Guidelines  Pooled Cohorts Equation Calculator  Breast Cancer Risk Calculator  FRAX Risk Assessment  ICSI Preventive Guidelines  Dietary Guidelines for Americans, 2010  USDA's MyPlate  ASA Prophylaxis  Lung CA Screening    Sejal Rutledge MD  [facility]  "

## 2017-07-11 NOTE — MR AVS SNAPSHOT
After Visit Summary   7/11/2017    Kelly Vegas    MRN: [id number]           Patient Information     Date Of Birth          1950        Visit Information        Provider Department      7/11/2017 3:20 PM Sejal Rutledge MD [facility]        Today's Diagnoses     Wellness examination    -  1    Uncontrolled type 2 diabetes mellitus without complication, with long-term current use of insulin (H)        Major depressive disorder, recurrent, mild (H)        Hypertension goal BP (blood pressure) < 140/90        Hyperlipidemia with target LDL less than 100        Acquired hypothyroidism        Right-sided low back pain without sciatica, unspecified chronicity        Pain in both knees, unspecified chronicity        Pain of right thumb        History of cervical cancer        Asymptomatic postmenopausal status        Advanced directives, counseling/discussion        Screening for malignant neoplasm of cervix          Care Instructions    See me in 1 month if your morning blood glucose is not less than 130 or your evening blood glucose is not less than 180.    Schedule a DEXA bone scan for osteoporosis testing at your convenience.  Call our appointment line at [phone] or go to [email or web address].    Sejal Rutledge MD      Preventive Health Recommendations  Female Ages 65 +    Yearly exam:     See your health care provider every year in order to  o Review health changes.   o Discuss preventive care.    o Review your medicines if your doctor has prescribed any.      You no longer need a yearly Pap test unless you've had an abnormal Pap test in the past 10 years. If you have vaginal symptoms, such as bleeding or discharge, be sure to talk with your provider about a Pap test.      Every 1 to 2 years, have a mammogram.  If you are over 69, talk with your health care provider about whether or not you want to continue having screening mammograms.      Every 10 years, have a colonoscopy. Or,  have a yearly FIT test (stool test). These exams will check for colon cancer.       Have a cholesterol test every 5 years, or more often if your doctor advises it.       Have a diabetes test (fasting glucose) every three years. If you are at risk for diabetes, you should have this test more often.       At age 65, have a bone density scan (DEXA) to check for osteoporosis (brittle bone disease).    Shots:    Get a flu shot each year.    Get a tetanus shot every 10 years.    Talk to your doctor about your pneumonia vaccines. There are now two you should receive - Pneumovax (PPSV 23) and Prevnar (PCV 13).    Talk to your doctor about the shingles vaccine.    Talk to your doctor about the hepatitis B vaccine.    Nutrition:     Eat at least 5 servings of fruits and vegetables each day.      Eat whole-grain bread, whole-wheat pasta and brown rice instead of white grains and rice.      Talk to your provider about Calcium and Vitamin D.     Lifestyle    Exercise at least 150 minutes a week (30 minutes a day, 5 days a week). This will help you control your weight and prevent disease.      Limit alcohol to one drink per day.      No smoking.       Wear sunscreen to prevent skin cancer.       See your dentist twice a year for an exam and cleaning.      See your eye doctor every 1 to 2 years to screen for conditions such as glaucoma, macular degeneration, cataracts, etc     [facility]    If you have any questions regarding to your visit please contact your care team:       Team Red:   Clinic Hours Telephone Number   Dr. Sejal Perez, NP   7am-7pm  Monday - Thursday   7am-5pm  Fridays  [phone]  (Appointment scheduling available 24/7)    Questions about your visit?   Team Line  [phone]   Urgent [facility] - [facility] - 11am-9pm Monday-Friday Saturday-Sunday- 9am-5pm   [place] - 5pm-9pm Monday-Friday Saturday-Sunday- 9am-5pm   [phone] - [place]   [phone] - [place]       What options do I have for visits at the clinic other than the traditional office visit?  To expand how we care for you, many of our providers are utilizing electronic visits (e-visits) and telephone visits, when medically appropriate, for interactions with their patients rather than a visit in the clinic.   We also offer nurse visits for many medical concerns. Just like any other service, we will bill your insurance company for this type of visit based on time spent on the phone with your provider. Not all insurance companies cover these visits. Please check with your medical insurance if this type of visit is covered. You will be responsible for any charges that are not paid by your insurance.      E-visits via [employer]:  generally incur a $35.00 fee.  Telephone visits:  Time spent on the phone: *charged based on time that is spent on the phone in increments of 10 minutes. Estimated cost:   5-10 mins $30.00   11-20 mins. $59.00   21-30 mins. $85.00     Use [employer] (secure email communication and access to your chart) to send your primary care provider a message or make an appointment. Ask someone on your Team how to sign up for [employer].  For a Price Quote for your services, please call our Consumer Price Line at [phone].      As always, Thank you for trusting us with your health care needs!  Junie PATEL MA            Follow-ups after your visit        Follow-up notes from your care team     Return in about 1 month (around 8/11/2017) for diabetes .      Future tests that were ordered for you today     Open Future Orders        Priority Expected Expires Ordered    XR Knee Right 3 Views Routine 7/11/2017 7/11/2018 7/11/2017    XR Lumbar Spine 2/3 Views Routine 7/11/2017 7/11/2018 7/11/2017    XR Knee Left 3 Views Routine 7/11/2017 7/11/2018 7/11/2017    XR Hand Right G/E 3 Views Routine 7/11/2017 7/11/2018 7/11/2017    DEXA HIP/PELVIS/SPINE - Future Routine   "6/30/2018 7/11/2017            Who to contact     If you have questions or need follow up information about today's clinic visit or your schedule please contact [facility] directly at [phone].  Normal or non-critical lab and imaging results will be communicated to you by MyChart, letter or phone within 4 business days after the clinic has received the results. If you do not hear from us within 7 days, please contact the clinic through [employer] or phone. If you have a critical or abnormal lab result, we will notify you by phone as soon as possible.  Submit refill requests through [facility] or call your pharmacy and they will forward the refill request to us. Please allow 3 business days for your refill to be completed.          Additional Information About Your Visit        [employer] Information     [facility] gives you secure access to your electronic health record. If you see a primary care provider, you can also send messages to your care team and make appointments. If you have questions, please call your primary care clinic.  If you do not have a primary care provider, please call [phone] and they will assist you.        Care EveryWhere ID     This is your Care EveryWhere ID. This could be used by other organizations to access your [place] medical records  [id number]        Your Vitals Were     Pulse Temperature Height Pulse Oximetry BMI (Body Mass Index)       82 99.1  F (37.3  C) (Oral) 5' 5.4\" (1.661 m) 98% 28.44 kg/m2        Blood Pressure from Last 3 Encounters:   07/11/17 106/66   12/20/16 120/56   11/28/16 127/88    Weight from Last 3 Encounters:   07/11/17 173 lb (78.5 kg)   12/20/16 166 lb (75.3 kg)   10/21/16 169 lb (76.7 kg)              We Performed the Following     DEPRESSION ACTION PLAN (DAP)     Pap imaged thin layer screen reflex to HPV if ASCUS - recommend age 25 - 29          Today's Medication Changes          These changes are accurate as of: 7/11/17  4:05 PM.  If you have " any questions, ask your nurse or doctor.               Start taking these medicines.        Dose/Directions    blood glucose monitoring test strip   Commonly known as:  no brand specified   Used for:  Uncontrolled type 2 diabetes mellitus without complication, with long-term current use of insulin (H)   Started by:  Sejal Rutledge MD        Use to test blood sugars 3 times daily or as directed   Quantity:  300 strip   Refills:  3         These medicines have changed or have updated prescriptions.        Dose/Directions    citalopram 20 MG tablet   Commonly known as:  celeXA   This may have changed:  See the new instructions.   Used for:  Major depressive disorder, recurrent, mild (H)   Changed by:  Sejal Rutledge MD        Take 1 tablet by mouth once daily   Quantity:  90 tablet   Refills:  1       levothyroxine 100 MCG tablet   Commonly known as:  SYNTHROID/LEVOTHROID   This may have changed:  See the new instructions.   Used for:  Acquired hypothyroidism   Changed by:  Sejal Rutledge MD        TAKE 1 TABLET (100 MCG) BY MOUTH DAILY   Quantity:  90 tablet   Refills:  3            Where to get your medicines      These medications were sent to [id number] - [place] [[facility] [place] - [address] [place]  [place] [address]     Phone:  [phone]     atorvastatin 20 MG tablet    blood glucose monitoring test strip    citalopram 20 MG tablet    insulin glargine 100 UNIT/ML injection    insulin lispro 100 UNIT/ML injection    levothyroxine 100 MCG tablet    lisinopril-hydrochlorothiazide 10-12.5 MG per tablet    metFORMIN 1000 MG tablet                Primary Care Provider Office Phone # Fax #    Sejal Rutledge [phone]       [address] [address]        Equal Access to Services     KENDAL VELASQUEZ AH: Hadii latasha garnett hadasho Soomaali, waaxda luqadaha, qaybta kaalmajuly andrea, noel espinosa. So  [facility] [phone].    ATENCIÓN: Si steven fritz, tiene a sauceda disposición servicios gratuitos de asistencia lingüística. Christopher barros [phone].    We comply with applicable federal civil rights laws and [place] laws. We do not discriminate on the basis of race, color, national origin, age, disability sex, sexual orientation or gender identity.            Thank you!     Thank you for choosing [facility]  for your care. Our goal is always to provide you with excellent care. Hearing back from our patients is one way we can continue to improve our services. Please take a few minutes to complete the written survey that you may receive in the mail after your visit with us. Thank you!             Your Updated Medication List - Protect others around you: Learn how to safely use, store and throw away your medicines at www.disposemymeds.org.          This list is accurate as of: 7/11/17  4:05 PM.  Always use your most recent med list.                   Brand Name Dispense Instructions for use Diagnosis    ammonium lactate 12 % cream    LAC-HYDRIN    385 g    Apply topically 2 times daily as needed for dry skin    Xerosis cutis       aspirin 81 MG tablet      Take 1 tablet by mouth daily.        atorvastatin 20 MG tablet    LIPITOR    90 tablet    Take 1 tablet (20 mg) by mouth daily    Hyperlipidemia with target LDL less than 100       B COMPLEX PO           blood glucose monitoring test strip    no brand specified    300 strip    Use to test blood sugars 3 times daily or as directed    Uncontrolled type 2 diabetes mellitus without complication, with long-term current use of insulin (H)       citalopram 20 MG tablet    celeXA    90 tablet    Take 1 tablet by mouth once daily    Major depressive disorder, recurrent, mild (H)       insulin glargine 100 UNIT/ML injection    LANTUS SOLOSTAR    15 mL    Inject 11 units at bedtime daily.    Uncontrolled type 2 diabetes mellitus without complication, with long-term  "current use of insulin (H)       insulin lispro 100 UNIT/ML injection    HumaLOG KWIKpen    15 mL    5 units before lunch and 3 units before supper daily    Uncontrolled type 2 diabetes mellitus without complication, with long-term current use of insulin (H)       levothyroxine 100 MCG tablet    SYNTHROID/LEVOTHROID    90 tablet    TAKE 1 TABLET (100 MCG) BY MOUTH DAILY    Acquired hypothyroidism       lisinopril-hydrochlorothiazide 10-12.5 MG per tablet    PRINZIDE/ZESTORETIC    90 tablet    Take 1 tablet by mouth daily    Hypertension goal BP (blood pressure) < 140/90       metFORMIN 1000 MG tablet    GLUCOPHAGE    180 tablet    Take 1 tablet (1,000 mg) by mouth 2 times daily (with meals)    Uncontrolled type 2 diabetes mellitus without complication, with long-term current use of insulin (H)       nortriptyline 25 MG capsule    PAMELOR    270 capsule    Take 2-3 capsules (50-75 mg) by mouth nightly as needed    Major depressive disorder, recurrent, mild (H)       order for DME     300 each    Blood glucose monitor per insurance formulary; blood glucose test strips One Touch Verio or per formulary for use 3 time daily; lancets per formulary for use 3 time daily    Diabetes type 2, uncontrolled (H), Encounter for long-term (current) use of insulin (H)       Pen Needles 3/16\" 31G X 5 MM Misc     200 each    Use three daily with insulin doses    Diabetes type 2, uncontrolled (H), Encounter for long-term (current) use of insulin (H)       VITAMIN D3 PO      Take by mouth daily          "

## 2017-07-11 NOTE — NURSING NOTE
"Chief Complaint   Patient presents with     Physical       Initial /66 (BP Location: Left arm, Patient Position: Chair, Cuff Size: Adult Regular)  Pulse 82  Temp 99.1  F (37.3  C) (Oral)  Ht 5' 5.4\" (1.661 m)  Wt 173 lb (78.5 kg)  SpO2 98%  BMI 28.44 kg/m2 Estimated body mass index is 28.44 kg/(m^2) as calculated from the following:    Height as of this encounter: 5' 5.4\" (1.661 m).    Weight as of this encounter: 173 lb (78.5 kg).  Medication Reconciliation: complete    "

## 2017-07-11 NOTE — LETTER
July 25, 2017      Kelly Vegas  [address] [place] [id number]    Dear ,      I am happy to inform you that your recent Vaginal pap screening test was normal.      Preventative screenings such as this help to ensure your health for years to come. You should repeat a pap smear in 3 years, unless otherwise directed.      You will still need to return to the clinic every year for your annual exam and other preventive tests.     Please contact the clinic at [phone] if you have further questions.       Sincerely,      Sejal Rutledge MD/meera

## 2017-07-11 NOTE — LETTER
My Depression Action Plan  Name: Kelly Vegas   Date of Birth 1950  Date: 7/11/2017    My doctor: Sejal Rutledge   My clinic: [address]  [place] [address]  [phone]          GREEN    ZONE   Good Control    What it looks like:     Things are going generally well. You have normal up s and down s. You may even feel depressed from time to time, but bad moods usually last less than a day.   What you need to do:  1. Continue to care for yourself (see self care plan)  2. Check your depression survival kit and update it as needed  3. Follow your physician s recommendations including any medication.  4. Do not stop taking medication unless you consult with your physician first.           YELLOW         ZONE Getting Worse    What it looks like:     Depression is starting to interfere with your life.     It may be hard to get out of bed; you may be starting to isolate yourself from others.    Symptoms of depression are starting to last most all day and this has happened for several days.     You may have suicidal thoughts but they are not constant.   What you need to do:     1. Call your care team, your response to treatment will improve if you keep your care team informed of your progress. Yellow periods are signs an adjustment may need to be made.     2. Continue your self-care, even if you have to fake it!    3. Talk to someone in your support network    4. Open up your depression survival kit           RED    ZONE Medical Alert - Get Help    What it looks like:     Depression is seriously interfering with your life.     You may experience these or other symptoms: You can t get out of bed most days, can t work or engage in other necessary activities, you have trouble taking care of basic hygiene, or basic responsibilities, thoughts of suicide or death that will not go away, self-injurious behavior.     What you need to do:  1. Call your care team and request  a same-day appointment. If they are not available (weekends or after hours) call your local crisis line, emergency room or 911.      Electronically signed by: Sejal Rutledge, July 11, 2017    Depression Self Care Plan / Survival Kit    Self-Care for Depression  Here s the deal. Your body and mind are really not as separate as most people think.  What you do and think affects how you feel and how you feel influences what you do and think. This means if you do things that people who feel good do, it will help you feel better.  Sometimes this is all it takes.  There is also a place for medication and therapy depending on how severe your depression is, so be sure to consult with your medical provider and/ or Behavioral Health Consultant if your symptoms are worsening or not improving.     In order to better manage my stress, I will:    Exercise  Get some form of exercise, every day. This will help reduce pain and release endorphins, the  feel good  chemicals in your brain. This is almost as good as taking antidepressants!  This is not the same as joining a gym and then never going! (they count on that by the way ) It can be as simple as just going for a walk or doing some gardening, anything that will get you moving.      Hygiene   Maintain good hygiene (Get out of bed in the morning, Make your bed, Brush your teeth, Take a shower, and Get dressed like you were going to work, even if you are unemployed).  If your clothes don't fit try to get ones that do.    Diet  I will strive to eat foods that are good for me, drink plenty of water, and avoid excessive sugar, caffeine, alcohol, and other mood-altering substances.  Some foods that are helpful in depression are: complex carbohydrates, B vitamins, flaxseed, fish or fish oil, fresh fruits and vegetables.    Psychotherapy  I agree to participate in Individual Therapy (if recommended).    Medication  If prescribed medications, I agree to take them.  Missing doses can result  in serious side effects.  I understand that drinking alcohol, or other illicit drug use, may cause potential side effects.  I will not stop my medication abruptly without first discussing it with my provider.    Staying Connected With Others  I will stay in touch with my friends, family members, and my primary care provider/team.    Use your imagination  Be creative.  We all have a creative side; it doesn t matter if it s oil painting, sand castles, or mud pies! This will also kick up the endorphins.    Witness Beauty  (AKA stop and smell the roses) Take a look outside, even in mid-winter. Notice colors, textures. Watch the squirrels and birds.     Service to others  Be of service to others.  There is always someone else in need.  By helping others we can  get out of ourselves  and remember the really important things.  This also provides opportunities for practicing all the other parts of the program.    Humor  Laugh and be silly!  Adjust your TV habits for less news and crime-drama and more comedy.    Control your stress  Try breathing deep, massage therapy, biofeedback, and meditation. Find time to relax each day.     My support system    Clinic Contact:  Phone number:    Contact 1:  Phone number:    Contact 2:  Phone number:    Sikh/:  Phone number:    Therapist:  Phone number:    Local crisis center:    Phone number:    Other community support:  Phone number:

## 2017-07-12 ASSESSMENT — PATIENT HEALTH QUESTIONNAIRE - PHQ9: SUM OF ALL RESPONSES TO PHQ QUESTIONS 1-9: 1

## 2017-07-12 NOTE — PROGRESS NOTES
Kelly,    You have degenerative changes in your spine called degenerative disc disease. You can take tylenol as needed, use a heating or ice pack as needed, and consider physical therapy. Call or return to clinic as needed if these symptoms worsen or are uncontrolled.     Sejal Rutledge MD

## 2017-07-12 NOTE — PROGRESS NOTES
"Kelly,    You do have mild hand degenerative joint disease (arthritis, or \"osteoarthritis\"). You can safely take tylenol as needed for pain. If your pain is uncontrolled, follow-up with me to consider splints or a referral.     Sejal Rutledge MD  "

## 2017-07-12 NOTE — PROGRESS NOTES
Kelly,    You do not have significant knee arthritis. In some cases, leg strengthening with physical therapy can be helpful. Call or return to clinic as needed if these symptoms worsen or fail to improve.    Sejal Rutledge MD

## 2017-07-14 LAB
COPATH REPORT: NORMAL
PAP: NORMAL

## 2017-09-01 ENCOUNTER — TELEPHONE (OUTPATIENT)
Dept: FAMILY MEDICINE | Facility: CLINIC | Age: 67
End: 2017-09-01

## 2017-09-01 DIAGNOSIS — F33.0 MAJOR DEPRESSIVE DISORDER, RECURRENT, MILD (H): ICD-10-CM

## 2017-09-01 RX ORDER — NORTRIPTYLINE HCL 25 MG
CAPSULE ORAL
Qty: 60 CAPSULE | Refills: 0 | Status: SHIPPED | OUTPATIENT
Start: 2017-09-01 | End: 2017-09-26

## 2017-09-01 NOTE — TELEPHONE ENCOUNTER
Please call patient: is she taking this medication? Schedule diabetes follow-up then provide #60 only

## 2017-09-01 NOTE — TELEPHONE ENCOUNTER
Routing refill request to provider for review/approval because:  A break in medication      Etta Leos RN - BC

## 2017-09-01 NOTE — LETTER
[facility]  [address] [place] [address]    September 11, 2017    Kelly Vegas  [address] [address]      Dear Kelly,    We have been unsuccessful reaching you by telephone. You are due for an office visit regarding your diabetes mellitus with me. You can schedule this one of a few ways. First using My Chart Scheduling, second is calling our Main number [phone] this line is open for scheduling 24 hours 7 days a week. Please let us know if you are getting care elsewhere.      Sincerely,        Sejal Rutledge MD/dt

## 2017-09-01 NOTE — TELEPHONE ENCOUNTER
Notriptyline    25mg  Last Written Prescription Date: 12/10/15  Last Fill Quantity: 270; # refills: 3  Last Office Visit with [facility], P or [facility] prescribing provider:  7/11/17        Last PHQ-9 score on record=   PHQ-9 SCORE 7/11/2017   Total Score -   Total Score 1       Lab Results   Component Value Date    AST 38 09/08/2009     Lab Results   Component Value Date    ALT 40 08/01/2011     Roverto Ham. MA

## 2017-09-01 NOTE — TELEPHONE ENCOUNTER
"Per patient, she has still been taking Nortriptyline 25mg, take 2-3 cap (50mg-75mg) nightly PRN.  She stated that sometime in the past, dose was adjusted and she had the supply at home but also got the new prescription filled for the current dose that she is on.  She ended up with extra and has \"been ahead of her refills\" up until now.    She agreed to schedule a f/u but stated that she is currently heading out the door to go out of town and will call back next week to schedule.    Per Dr. Boswell: ok to refill. F/u next week to make sure she gets scheduled for a diabetic check    Refill sent    Jamel Turner RN    "

## 2017-09-06 NOTE — TELEPHONE ENCOUNTER
Left message to call the clinic to schedule a appointment. Please help the patient schedule for Diabetes appointment. Jessica Villagomez,

## 2017-09-11 NOTE — TELEPHONE ENCOUNTER
Left message to call the clinic to schedule a appointment. Please help the patient schedule for Diabetes appointment. Jessica Villagomez,     Letter sent

## 2017-09-20 ENCOUNTER — RADIANT APPOINTMENT (OUTPATIENT)
Dept: BONE DENSITY | Facility: CLINIC | Age: 67
End: 2017-09-20
Attending: FAMILY MEDICINE
Payer: COMMERCIAL

## 2017-09-20 DIAGNOSIS — Z78.0 ASYMPTOMATIC POSTMENOPAUSAL STATUS: ICD-10-CM

## 2017-09-20 PROCEDURE — 77080 DXA BONE DENSITY AXIAL: CPT | Performed by: INTERNAL MEDICINE

## 2017-09-25 NOTE — PROGRESS NOTES
"  SUBJECTIVE:   Kelly Vegas is a 67 year old female with chronic depression who presents to clinic today for the following health issues:      Diabetes Follow-up      Patient is checking blood sugars: rarely.  Results range from varies to varies 158-198    Diabetic concerns: None     Symptoms of hypoglycemia (low blood sugar): none     Paresthesias (numbness or burning in feet) or sores: Yes right leg numbness     Date of last diabetic eye exam: 10/2016    Hyperlipidemia Follow-Up      Rate your low fat/cholesterol diet?: good    Taking statin?  Yes, no muscle aches from statin    Other lipid medications/supplements?:  none    Hypertension Follow-up      Outpatient blood pressures are not being checked.    Low Salt Diet: no added salt        Amount of exercise or physical activity: walking at work    Problems taking medications regularly: No    Medication side effects: see above    Diet: diabetic    She has family stressors as her x-  and he mother's estate is gone.     I have reviewed the patient's medical history in detail and updated the computerized patient record.     ROS:  C: NEGATIVE for fever, chills, change in weight  I: NEGATIVE for worrisome rashes, moles or lesions  E: NEGATIVE for vision changes or irritation  R: NEGATIVE for significant cough or SOB  CV: NEGATIVE for chest pain, palpitations or peripheral edema  M: NEGATIVE for significant arthralgias or myalgia  NEURO: dysesthesias  ENDOCRINE: Hx diabetes  PSYCHIATRIC: stress     OBJECTIVE:     Pulse 88  Temp 98.5  F (36.9  C)  Resp 14  Ht 5' 5.4\" (1.661 m)  Wt 172 lb (78 kg)  SpO2 95%  BMI 28.27 kg/m2  Body mass index is 28.27 kg/(m^2).  GENERAL: alert, no distress and obese  EYES: Eyes grossly normal to inspection, PERRL and conjunctivae and sclerae normal  NECK: no adenopathy, no asymmetry, masses, or scars and thyroid normal to palpation  RESP: lungs clear to auscultation - no rales, rhonchi or wheezes  CV: regular rate and " rhythm, normal S1 S2, no S3 or S4, no murmur, click or rub, no peripheral edema and peripheral pulses strong  MS: no gross musculoskeletal defects noted, no edema  NEURO: Normal strength and tone, mentation intact and speech normal  PSYCH: mentation appears normal, affect normal/bright    Diagnostic Test Results:  Results for orders placed or performed in visit on 09/26/17   HEMOGLOBIN A1C   Result Value Ref Range    Hemoglobin A1C 8.8 (H) 4.3 - 6.0 %       ASSESSMENT/PLAN:   (E11.42,  Z79.4) Type 2 diabetes mellitus with diabetic polyneuropathy, with long-term current use of insulin (H)  (primary encounter diagnosis)  (E11.42) Diabetic polyneuropathy associated with type 2 diabetes mellitus (H)  Plan: insulin lispro (HUMALOG KWIKPEN) 100 UNIT/ML         injection        Increase lantus insulin by 2 units every 3 days until AM blood glucose is less than 120. See result note.     (F33.0) Major depressive disorder, recurrent, mild (H)  Comment: Well controlled with medications without side effects.   Plan: nortriptyline (PAMELOR) 25 MG capsule         (M51.36) DDD (degenerative disc disease), lumbar  Plan: offered physical therapy and/or MRI       See Patient Instructions    Sejal Rutledge MD  [facility]

## 2017-09-25 NOTE — PROGRESS NOTES
Kelly,    Your bone mass is mildly low, called osteopenia. We can continue to follow this by repeating the DEXA test in 3 to 5 years.  Get several servings of dairy daily (or calcium 1000 - 1200 mg daily split into 2 doses), take vitamin D 1000 units daily over-the-counter, exercise regularly, and avoid falls. Let's check your vitamin D level at a future visit.     Sejal Rutledge MD

## 2017-09-26 ENCOUNTER — TELEPHONE (OUTPATIENT)
Dept: FAMILY MEDICINE | Facility: CLINIC | Age: 67
End: 2017-09-26

## 2017-09-26 ENCOUNTER — OFFICE VISIT (OUTPATIENT)
Dept: FAMILY MEDICINE | Facility: CLINIC | Age: 67
End: 2017-09-26
Payer: COMMERCIAL

## 2017-09-26 VITALS
HEART RATE: 88 BPM | TEMPERATURE: 98.5 F | RESPIRATION RATE: 14 BRPM | WEIGHT: 172 LBS | OXYGEN SATURATION: 95 % | BODY MASS INDEX: 28.66 KG/M2 | HEIGHT: 65 IN

## 2017-09-26 DIAGNOSIS — E11.42 TYPE 2 DIABETES MELLITUS WITH DIABETIC POLYNEUROPATHY, WITH LONG-TERM CURRENT USE OF INSULIN (H): Primary | ICD-10-CM

## 2017-09-26 DIAGNOSIS — M51.369 DDD (DEGENERATIVE DISC DISEASE), LUMBAR: ICD-10-CM

## 2017-09-26 DIAGNOSIS — E11.42 DIABETIC POLYNEUROPATHY ASSOCIATED WITH TYPE 2 DIABETES MELLITUS (H): ICD-10-CM

## 2017-09-26 DIAGNOSIS — F33.0 MAJOR DEPRESSIVE DISORDER, RECURRENT, MILD (H): ICD-10-CM

## 2017-09-26 DIAGNOSIS — Z79.4 TYPE 2 DIABETES MELLITUS WITH DIABETIC POLYNEUROPATHY, WITH LONG-TERM CURRENT USE OF INSULIN (H): Primary | ICD-10-CM

## 2017-09-26 DIAGNOSIS — Z79.4 ENCOUNTER FOR LONG-TERM (CURRENT) USE OF INSULIN (H): ICD-10-CM

## 2017-09-26 LAB — HBA1C MFR BLD: 8.8 % (ref 4.3–6)

## 2017-09-26 PROCEDURE — 83036 HEMOGLOBIN GLYCOSYLATED A1C: CPT | Performed by: FAMILY MEDICINE

## 2017-09-26 PROCEDURE — 36415 COLL VENOUS BLD VENIPUNCTURE: CPT | Performed by: FAMILY MEDICINE

## 2017-09-26 PROCEDURE — 99214 OFFICE O/P EST MOD 30 MIN: CPT | Mod: 25 | Performed by: FAMILY MEDICINE

## 2017-09-26 PROCEDURE — 90471 IMMUNIZATION ADMIN: CPT | Performed by: FAMILY MEDICINE

## 2017-09-26 PROCEDURE — 90662 IIV NO PRSV INCREASED AG IM: CPT | Performed by: FAMILY MEDICINE

## 2017-09-26 RX ORDER — FLURBIPROFEN SODIUM 0.3 MG/ML
SOLUTION/ DROPS OPHTHALMIC
Qty: 100 EACH | Refills: 2 | Status: SHIPPED | OUTPATIENT
Start: 2017-09-26 | End: 2018-09-24

## 2017-09-26 RX ORDER — NORTRIPTYLINE HCL 25 MG
CAPSULE ORAL
Qty: 180 CAPSULE | Refills: 3 | Status: SHIPPED | OUTPATIENT
Start: 2017-09-26 | End: 2018-11-27

## 2017-09-26 ASSESSMENT — PATIENT HEALTH QUESTIONNAIRE - PHQ9
SUM OF ALL RESPONSES TO PHQ QUESTIONS 1-9: 7
5. POOR APPETITE OR OVEREATING: NOT AT ALL

## 2017-09-26 ASSESSMENT — ANXIETY QUESTIONNAIRES
2. NOT BEING ABLE TO STOP OR CONTROL WORRYING: SEVERAL DAYS
GAD7 TOTAL SCORE: 6
1. FEELING NERVOUS, ANXIOUS, OR ON EDGE: SEVERAL DAYS
IF YOU CHECKED OFF ANY PROBLEMS ON THIS QUESTIONNAIRE, HOW DIFFICULT HAVE THESE PROBLEMS MADE IT FOR YOU TO DO YOUR WORK, TAKE CARE OF THINGS AT HOME, OR GET ALONG WITH OTHER PEOPLE: SOMEWHAT DIFFICULT
5. BEING SO RESTLESS THAT IT IS HARD TO SIT STILL: SEVERAL DAYS
6. BECOMING EASILY ANNOYED OR IRRITABLE: SEVERAL DAYS
7. FEELING AFRAID AS IF SOMETHING AWFUL MIGHT HAPPEN: SEVERAL DAYS
3. WORRYING TOO MUCH ABOUT DIFFERENT THINGS: SEVERAL DAYS

## 2017-09-26 NOTE — TELEPHONE ENCOUNTER
BD Pen Needle Mini U/F Miscellaneous 31G X 5 MM]  Last Written Prescription Date: 9/9/16  Last Fill Quantity: 200,  # refills: 3   Last Office Visit with [facility], [facility] or [facility] prescribing provider: 7/11/17  CANDACE/MA                                           Next 5 appointments (look out 90 days)     Sep 26, 2017  2:20 PM CDT   Office Visit with Sejal Rutledge MD   [facility] ([facility])    [address]  [place] [id number] [phone]

## 2017-09-26 NOTE — MR AVS SNAPSHOT
After Visit Summary   9/26/2017    Kelly Vegas    MRN: [id number]           Patient Information     Date Of Birth          1950        Visit Information        Provider Department      9/26/2017 2:20 PM Sejal Rutledge MD [facility]        Today's Diagnoses     Type 2 diabetes mellitus with diabetic polyneuropathy, with long-term current use of insulin (H)    -  1    Diabetic polyneuropathy associated with type 2 diabetes mellitus (H)        Major depressive disorder, recurrent, mild (H)        DDD (degenerative disc disease), lumbar          Care Instructions    Your blood glucose is higher. Increase Lantus to 13 units. Increase insulin again by 2 units every 3 days until AM blood glucose is less than 120. Eat healthy foods like fruits, vegetables, chicken, fish, nuts, and low fat yogurt. Drink water and milk instead of pop and juice. Avoid sweets. Exercise regularly. See our diabetes educator within 1 month for help with getting your blood glucose to goal, AM <120 and PM <180.         [facility]    If you have any questions regarding to your visit please contact your care team:       Team Red:   Clinic Hours Telephone Number   Dr. Sejal Perez, NP   7am-7pm  Monday - Thursday   7am-5pm  Fridays  [phone]  (Appointment scheduling available 24/7)    Questions about your visit?   Team Line  [phone]   Urgent Care - [place] and [facility] - 11am-9pm Monday-Friday Saturday-Sunday- 9am-5pm   [place] - 5pm-9pm Monday-Friday Saturday-Sunday- 9am-5pm  [phone] - [place]   [phone] - [place]       What options do I have for visits at the clinic other than the traditional office visit?  To expand how we care for you, many of our providers are utilizing electronic visits (e-visits) and telephone visits, when medically appropriate, for interactions with their patients rather than a visit  in the clinic.   We also offer nurse visits for many medical concerns. Just like any other service, we will bill your insurance company for this type of visit based on time spent on the phone with your provider. Not all insurance companies cover these visits. Please check with your medical insurance if this type of visit is covered. You will be responsible for any charges that are not paid by your insurance.      E-visits via [employer]:  generally incur a $35.00 fee.  Telephone visits:  Time spent on the phone: *charged based on time that is spent on the phone in increments of 10 minutes. Estimated cost:   5-10 mins $30.00   11-20 mins. $59.00   21-30 mins. $85.00     Use [employer] [place] (secure email communication and access to your chart) to send your primary care provider a message or make an appointment. Ask someone on your Team how to sign up for [employer] [place].  For a Price Quote for your services, please call our [email or web address] Line at [phone].      As always, Thank you for trusting us with your health care needs!            Follow-ups after your visit        Additional Services     DIABETES EDUCATOR REFERRAL       DIABETES SELF MANAGEMENT TRAINING (DSMT)      Your provider has referred you to Diabetes Education: [facility]: Diabetes Education - All [facility] [phone]   [email or web address]    Type of training and number of hours: Previous Diagnosis: Follow-up DSMT - 2 hours.    Medicare covers: 10 hours of initial DSMT in 12 month period from the time of first visit, plus 2 hours of follow-up DSMT annually, and additional hours as requested for insulin training.    Diabetes Type: Type 2 - On Insulin             Diabetes Co-Morbidities: none               A1C Goal:  <7.0       A1C is: Lab Results       Component                Value               Date                       A1C                      8.8                 09/26/2017              If an urgent visit is needed  or A1C is above 12, Care Team to call the Diabetes Education Team at [phone] or send an In Basket message to the Diabetes Education Pool (P DIAB ED-PATIENT CARE).    Diabetes Education Topics: Comprehensive Knowledge Assessment and Instruction    Special Educational Needs Requiring Individual DSMT: None       MEDICAL NUTRITION THERAPY (MNT) for Diabetes    Medical Nutrition Therapy with a Registered Dietitian can be provided in coordination with Diabetes Self-Management Training to assist in achieving optimal diabetes management.    MNT Type and Hours: Previous diagnosis: Annual follow-up MNT - 2 hours                       Medicare will cover: 3 hours initial MNT in 12 month period after first visit, plus 2 hours of follow-up MNT annually    Please be aware that coverage of these services is subject to the terms and limitations of your health insurance plan.  Call member services at your health plan to determine Diabetes Self-Management Training benefits and ask which blood glucose monitor brands are covered by your plan.      Please bring the following with you to your appointment:    (1)  List of current medications   (2)  List of Blood Glucose Monitor brands that are covered by your insurance plan  (3)  Blood Glucose Monitor and log book  (4)   Food records for the 3 days prior to your visit    The Certified Diabetes Educator may make diabetes medication adjustments per the CDE Protocol and Collaborative Practice Agreement.                  Follow-up notes from your care team     Return in about 3 months (around 12/26/2017) for diabetes (fasting labs up to one week prior).      Who to contact     If you have questions or need follow up information about today's clinic visit or your schedule please contact [facility] directly at [phone].  Normal or non-critical lab and imaging results will be communicated to you by MyChart, letter or phone within 4 business days after the clinic has  "received the results. If you do not hear from us within 7 days, please contact the clinic through Better[employer] or phone. If you have a critical or abnormal lab result, we will notify you by phone as soon as possible.  Submit refill requests through Better[employer] or call your pharmacy and they will forward the refill request to us. Please allow 3 business days for your refill to be completed.          Additional Information About Your Visit        [facility] Information     Better[employer] gives you secure access to your electronic health record. If you see a primary care provider, you can also send messages to your care team and make appointments. If you have questions, please call your primary care clinic.  If you do not have a primary care provider, please call [phone] and they will assist you.        Care EveryWhere ID     This is your Care EveryWhere ID. This could be used by other organizations to access your [place] medical records  [id number]        Your Vitals Were     Pulse Temperature Respirations Height Pulse Oximetry BMI (Body Mass Index)    88 98.5  F (36.9  C) 14 5' 5.4\" (1.661 m) 95% 28.27 kg/m2       Blood Pressure from Last 3 Encounters:   07/11/17 106/66   12/20/16 120/56   11/28/16 127/88    Weight from Last 3 Encounters:   09/26/17 172 lb (78 kg)   07/11/17 173 lb (78.5 kg)   12/20/16 166 lb (75.3 kg)              We Performed the Following     DIABETES EDUCATOR REFERRAL     FLU VACCINE, INCREASED ANTIGEN, PRESV FREE, AGE 65+ [79128]     HEMOGLOBIN A1C     Vaccine Administration, Initial [97530]          Today's Medication Changes          These changes are accurate as of: 9/26/17  3:11 PM.  If you have any questions, ask your nurse or doctor.               These medicines have changed or have updated prescriptions.        Dose/Directions    B-D U/F 31G X 5 MM   This may have changed:  See the new instructions.   Used for:  Diabetes type 2, uncontrolled (H), Encounter for long-term (current) use of insulin (H) "   Generic drug:  insulin pen needle   Changed by:  Sejal Rutledge MD        USE THREE DAILY WITH INSULIN DOSES   Quantity:  100 each   Refills:  2       insulin glargine 100 UNIT/ML injection   Commonly known as:  LANTUS SOLOSTAR   This may have changed:  additional instructions   Used for:  Uncontrolled type 2 diabetes mellitus without complication, with long-term current use of insulin (H)   Changed by:  Sejal Rutledge MD        Inject 13 units at bedtime daily. Increase insulin by 2 units every 3 days until AM blood glucose is less than 120.   Quantity:  15 mL   Refills:  0       nortriptyline 25 MG capsule   Commonly known as:  PAMELOR   This may have changed:  See the new instructions.   Used for:  Major depressive disorder, recurrent, mild (H)   Changed by:  Sejal Rutledge MD        TAKE 2-3 CAPSULES (50-75 MG) BY MOUTH NIGHTLY AS NEEDED   Quantity:  180 capsule   Refills:  3            Where to get your medicines      These medications were sent to [id number] - [place] [[facility] [place] - [address] [id number] [facility] [address]     Phone:  [phone]     B-D U/F 31G X 5 MM    insulin glargine 100 UNIT/ML injection    insulin lispro 100 UNIT/ML injection    nortriptyline 25 MG capsule                Primary Care Provider Office Phone # Fax #    Sejal Rutledge [phone]       [address] [place] [address] [address]        Equal Access to Services     [facility] AH: Hadii aad ku hadasho Soomaali, waaxda luqadaha, qaybta kaalmada adeegyada, noel kumari hayherman bateman . So [facility] [phone].    ATENCIÓN: Si habla español, tiene a sauceda disposición servicios gratuitos de asistencia lingüística. Llame al [phone].    We comply with applicable federal civil rights laws and [place] laws. We do not discriminate on the basis of race, color, national origin, age, disability sex, sexual orientation or gender identity.            Thank you!      Thank you for choosing [facility]  for your care. Our goal is always to provide you with excellent care. Hearing back from our patients is one way we can continue to improve our services. Please take a few minutes to complete the written survey that you may receive in the mail after your visit with us. Thank you!             Your Updated Medication List - Protect others around you: Learn how to safely use, store and throw away your medicines at www.disposemymeds.org.          This list is accurate as of: 9/26/17  3:11 PM.  Always use your most recent med list.                   Brand Name Dispense Instructions for use Diagnosis    ammonium lactate 12 % cream    LAC-HYDRIN    385 g    Apply topically 2 times daily as needed for dry skin    Xerosis cutis       aspirin 81 MG tablet      Take 1 tablet by mouth daily.        atorvastatin 20 MG tablet    LIPITOR    90 tablet    Take 1 tablet (20 mg) by mouth daily    Hyperlipidemia with target LDL less than 100       B COMPLEX PO           B-D U/F 31G X 5 MM   Generic drug:  insulin pen needle     100 each    USE THREE DAILY WITH INSULIN DOSES    Diabetes type 2, uncontrolled (H), Encounter for long-term (current) use of insulin (H)       blood glucose monitoring test strip    no brand specified    300 strip    Use to test blood sugars 3 times daily or as directed    Uncontrolled type 2 diabetes mellitus without complication, with long-term current use of insulin (H)       citalopram 20 MG tablet    celeXA    90 tablet    Take 1 tablet by mouth once daily    Major depressive disorder, recurrent, mild (H)       insulin glargine 100 UNIT/ML injection    LANTUS SOLOSTAR    15 mL    Inject 13 units at bedtime daily. Increase insulin by 2 units every 3 days until AM blood glucose is less than 120.    Uncontrolled type 2 diabetes mellitus without complication, with long-term current use of insulin (H)       insulin lispro 100 UNIT/ML injection    HumaLOG KWIKpen     15 mL    5 units before lunch and 3 units before supper daily    Type 2 diabetes mellitus with diabetic polyneuropathy, with long-term current use of insulin (H), Diabetic polyneuropathy associated with type 2 diabetes mellitus (H)       levothyroxine 100 MCG tablet    SYNTHROID/LEVOTHROID    90 tablet    TAKE 1 TABLET (100 MCG) BY MOUTH DAILY    Acquired hypothyroidism       lisinopril-hydrochlorothiazide 10-12.5 MG per tablet    PRINZIDE/ZESTORETIC    90 tablet    Take 1 tablet by mouth daily    Hypertension goal BP (blood pressure) < 140/90       metFORMIN 1000 MG tablet    GLUCOPHAGE    180 tablet    Take 1 tablet (1,000 mg) by mouth 2 times daily (with meals)    Uncontrolled type 2 diabetes mellitus without complication, with long-term current use of insulin (H)       nortriptyline 25 MG capsule    PAMELOR    180 capsule    TAKE 2-3 CAPSULES (50-75 MG) BY MOUTH NIGHTLY AS NEEDED    Major depressive disorder, recurrent, mild (H)       order for DME     300 each    Blood glucose monitor per insurance formulary; blood glucose test strips One Touch Verio or per formulary for use 3 time daily; lancets per formulary for use 3 time daily    Diabetes type 2, uncontrolled (H), Encounter for long-term (current) use of insulin (H)       VITAMIN D3 PO      Take by mouth daily

## 2017-09-26 NOTE — PROGRESS NOTES
Injectable Influenza Immunization Documentation    1.  Is the person to be vaccinated sick today?   No    2. Does the person to be vaccinated have an allergy to a component   of the vaccine?   No    3. Has the person to be vaccinated ever had a serious reaction   to influenza vaccine in the past?   No    4. Has the person to be vaccinated ever had Guillain-Barré syndrome?   No    Form completed by Roverto Ham. MA

## 2017-09-26 NOTE — PROGRESS NOTES
Please call patient:  Your blood glucose is higher. Increase Lantus to 13 units. Increase insulin again by 2 units every 3 days until AM blood glucose is less than 120. Eat healthy foods like fruits, vegetables, chicken, fish, nuts, and low fat yogurt. Drink water and milk instead of pop and juice. Avoid sweets. Exercise regularly. See our diabetes educator within 1 month for help with getting your blood glucose to goal, AM <120 and PM <180.     Sejal Rutledge MD

## 2017-09-26 NOTE — TELEPHONE ENCOUNTER
Prescription approved per [facility] Refill Protocol.    Signed Prescriptions:                        Disp   Refills    B-D U/F insulin pen needle                 100 ea*2        Sig: USE THREE DAILY WITH INSULIN DOSES  Authorizing Provider: TUTU WHITTAKER  Ordering User: RIZWANA CRUZ, [place]

## 2017-09-26 NOTE — PATIENT INSTRUCTIONS
Your blood glucose is higher. Increase Lantus to 13 units. Increase insulin again by 2 units every 3 days until AM blood glucose is less than 120. Eat healthy foods like fruits, vegetables, chicken, fish, nuts, and low fat yogurt. Drink water and milk instead of pop and juice. Avoid sweets. Exercise regularly. See our diabetes educator within 1 month for help with getting your blood glucose to goal, AM <120 and PM <180.         [facility]    If you have any questions regarding to your visit please contact your care team:       Team Red:   Clinic Hours Telephone Number   Dr. Sejal Perez, NP   7am-7pm  Monday - Thursday   7am-5pm  Fridays  [phone]  (Appointment scheduling available 24/7)    Questions about your visit?   Team Line  [phone]   Urgent Care - [place] and [facility] - 11am-9pm Monday-Friday Saturday-Sunday- 9am-5pm   [place] - 5pm-9pm Monday-Friday Saturday-Sunday- 9am-5pm  [phone] - [facility]  [phone] - [place]       What options do I have for visits at the clinic other than the traditional office visit?  To expand how we care for you, many of our providers are utilizing electronic visits (e-visits) and telephone visits, when medically appropriate, for interactions with their patients rather than a visit in the clinic.   We also offer nurse visits for many medical concerns. Just like any other service, we will bill your insurance company for this type of visit based on time spent on the phone with your provider. Not all insurance companies cover these visits. Please check with your medical insurance if this type of visit is covered. You will be responsible for any charges that are not paid by your insurance.      E-visits via [employer]:  generally incur a $35.00 fee.  Telephone visits:  Time spent on the phone: *charged based on time that is spent on the phone in increments of 10 minutes. Estimated  cost:   5-10 mins $30.00   11-20 mins. $59.00   21-30 mins. $85.00     Use [employer] (secure email communication and access to your chart) to send your primary care provider a message or make an appointment. Ask someone on your Team how to sign up for "[employer].  For a Price Quote for your services, please call our [employer] Line at [phone].      As always, Thank you for trusting us with your health care needs!

## 2017-09-27 ASSESSMENT — ANXIETY QUESTIONNAIRES: GAD7 TOTAL SCORE: 6

## 2017-10-11 ENCOUNTER — TELEPHONE (OUTPATIENT)
Dept: FAMILY MEDICINE | Facility: CLINIC | Age: 67
End: 2017-10-11

## 2017-10-11 NOTE — TELEPHONE ENCOUNTER
Panel Management Review      Patient has the following on her problem list:     Diabetes    ASA: Passed    Last A1C  Lab Results   Component Value Date    A1C 8.8 09/26/2017    A1C 9.3 06/29/2017    A1C 7.1 12/20/2016    A1C 11.1 05/06/2016    A1C 9.1 12/03/2015     A1C tested: FAILED    Last LDL:    Lab Results   Component Value Date    CHOL 157 12/03/2015     Lab Results   Component Value Date    HDL 43 12/03/2015     Lab Results   Component Value Date    LDL 49 12/20/2016    LDL 76 12/03/2015     Lab Results   Component Value Date    TRIG 191 12/03/2015     Lab Results   Component Value Date    CHOLHDLRATIO 2.8 10/02/2014     Lab Results   Component Value Date    NHDL 114 12/03/2015       Is the patient on a Statin? YES             Is the patient on Aspirin? YES    Medications     HMG CoA Reductase Inhibitors    atorvastatin (LIPITOR) 20 MG tablet    Salicylates    ASPIRIN 81 MG OR TABS          Last three blood pressure readings:  BP Readings from Last 3 Encounters:   07/11/17 106/66   12/20/16 120/56   11/28/16 127/88       Date of last diabetes office visit: 9/26/17     Tobacco History:     History   Smoking Status     Former Smoker     Packs/day: 0.50     Years: 20.00     Types: Cigarettes     Start date: 7/8/1967     Quit date: 1/1/1989   Smokeless Tobacco     Never Used             Composite cancer screening  Chart review shows that this patient is due/due soon for the following None  Summary:    Patient is due/failing the following:   A1C    Action needed:   none    Type of outreach:    none, diabetes outreach is trying to contact patient    Questions for provider review:    None                                                                                                                                    Haily Pelayo CMA ([facility])       Chart routed to none .

## 2017-12-12 ENCOUNTER — TELEPHONE (OUTPATIENT)
Dept: FAMILY MEDICINE | Facility: CLINIC | Age: 67
End: 2017-12-12

## 2017-12-12 DIAGNOSIS — E78.5 HYPERLIPIDEMIA WITH TARGET LDL LESS THAN 100: ICD-10-CM

## 2017-12-12 DIAGNOSIS — Z79.4 TYPE 2 DIABETES MELLITUS WITH DIABETIC POLYNEUROPATHY, WITH LONG-TERM CURRENT USE OF INSULIN (H): Primary | ICD-10-CM

## 2017-12-12 DIAGNOSIS — E11.42 TYPE 2 DIABETES MELLITUS WITH DIABETIC POLYNEUROPATHY, WITH LONG-TERM CURRENT USE OF INSULIN (H): Primary | ICD-10-CM

## 2017-12-12 NOTE — LETTER
[facility]  [address] [place] [address]    December 19, 2017    Kelly Vegas  [address] [place] [id number]      Dear Kelly,      Monitoring and managing your preventative and chronic health conditions are very important to us. Our records indicate that you have not scheduled for appointment with provider and fasting labs which was recommended by Dr.Elise Rutledge.      If you have received your health care elsewhere, please call the clinic so the information can be documented in your chart.    Please call [phone] or message us through your [email or web address] account to schedule an appointment or provide information for your chart.     Feel free to contact us if you have any questions or concerns!    I look forward to seeing you and working with you on your health care needs.     Sincerely,         Sejal Rutledge MD/dt        *If you have already scheduled an appointment, please disregard this reminder

## 2017-12-15 NOTE — TELEPHONE ENCOUNTER
Left message to call the clinic to schedule a appointment. Please help the patient schedule for Diabetes & Fasting lab appointment. Jessica Villagomez,     Lab order are in EPIC can do Lab only prior to appointment with Sejal Rutledge MD

## 2017-12-19 NOTE — TELEPHONE ENCOUNTER
Left message to call the clinic to schedule a appointment. Please help the patient schedule for Diabetes & Fasting lab appointment. Jessica Villagomez,      Lab order are in EPIC can do Lab only prior to appointment with Sejal Rutledge MD      Letter sent.

## 2018-02-19 ENCOUNTER — TELEPHONE (OUTPATIENT)
Dept: FAMILY MEDICINE | Facility: CLINIC | Age: 68
End: 2018-02-19

## 2018-02-19 NOTE — TELEPHONE ENCOUNTER
Reason for Call:  Other prescription    Detailed comments: pharmacy received a prescription for Citalopram today. Patient has Nortriptyline in her chart as well. Pharmacy asking if patient is to discontinue the Nortriptyline . Please contact pharmacy.    Phone Number Patient can be reached at: Other phone number:  [phone]    Best Time: any time    Can we leave a detailed message on this number? YES    Call taken on 2/19/2018 at 4:48 PM by Marialuisa Mohan

## 2018-04-03 ENCOUNTER — TELEPHONE (OUTPATIENT)
Dept: FAMILY MEDICINE | Facility: CLINIC | Age: 68
End: 2018-04-03

## 2018-04-03 NOTE — LETTER
April 3, 2018          Kelly Vegas,  [address] [address]        Dear Kelly Vegas      Monitoring and managing your preventative and chronic health conditions are very important to us. Our records indicate that you have not scheduled for Appointment with your provider  which was recommended by Dr. Rutledge.  At your last appointment on 9/26/17.  Dr Rutledge requested that you return to the clinic in about 3 months (around 12/26/17) for a diabetic check.      If you have received your health care elsewhere, please call the clinic so the information can be documented in your chart.    Please call [phone] or message us through your [facility] account to schedule an appointment or provide information for your chart.     Feel free to contact us if you have any questions or concerns!    I look forward to seeing you and working with you on your health care needs.     Sincerely,         Sejal Rutledge / Nely Norman MA

## 2018-04-03 NOTE — TELEPHONE ENCOUNTER
Panel Management Review      Patient has the following on her problem list:     Depression / Dysthymia review    Measure:  Needs PHQ-9 score of 4 or less during index window.  Administer PHQ-9 and if score is 5 or more, send encounter to provider for next steps.    5 - 7 month window range: 1/26/2018-4/26/18    PHQ-9 SCORE 6/28/2017 7/11/2017 9/26/2017   Total Score - - -   Total Score 0 1 7       If PHQ-9 recheck is 5 or more, route to provider for next steps.    Patient is due for:  PHQ9    Diabetes    ASA: Passed    Last A1C  Lab Results   Component Value Date    A1C 8.8 09/26/2017    A1C 9.3 06/29/2017    A1C 7.1 12/20/2016    A1C 11.1 05/06/2016    A1C 9.1 12/03/2015     A1C tested: FAILED    Last LDL:    Lab Results   Component Value Date    CHOL 157 12/03/2015     Lab Results   Component Value Date    HDL 43 12/03/2015     Lab Results   Component Value Date    LDL 49 12/20/2016    LDL 76 12/03/2015     Lab Results   Component Value Date    TRIG 191 12/03/2015     Lab Results   Component Value Date    CHOLHDLRATIO 2.8 10/02/2014     Lab Results   Component Value Date    NHDL 114 12/03/2015       Is the patient on a Statin? YES             Is the patient on Aspirin? YES    Medications     HMG CoA Reductase Inhibitors    atorvastatin (LIPITOR) 20 MG tablet    Salicylates    ASPIRIN 81 MG OR TABS          Last three blood pressure readings:  BP Readings from Last 3 Encounters:   07/11/17 106/66   12/20/16 120/56   11/28/16 127/88       Date of last diabetes office visit: 9/26/17     Tobacco History:     History   Smoking Status     Former Smoker     Packs/day: 0.50     Years: 20.00     Types: Cigarettes     Start date: 7/8/1967     Quit date: 1/1/1989   Smokeless Tobacco     Never Used         Hypertension   Last three blood pressure readings:  BP Readings from Last 3 Encounters:   07/11/17 106/66   12/20/16 120/56   11/28/16 127/88     Blood pressure: Passed    HTN Guidelines:  Age 18-59 BP range:  Less than  140/90  Age 60-85 with Diabetes:  Less than 140/90  Age 60-85 without Diabetes:  less than 150/90      Composite cancer screening  Chart review shows that this patient is due/due soon for the following None  Summary:    Patient is due/failing the following:   A1C, LDL and PHQ9    Action needed:   Patient needs office visit for Diabetic check  Per office note on 9/26/17 .Return in about 3 months (around 12/26/2017) for diabetes (fasting labs up to one week prior)    Type of outreach: Sent letter.     Questions for provider review:    None                                                                                                                                    Nely Norman MA       Chart routed to  .

## 2018-04-10 NOTE — PROGRESS NOTES
SUBJECTIVE:   Kelly Vegas is a 67 year old female with depression and family stressors who presents to clinic today for the following health issues:    Diabetes Follow-up      Patient is checking blood sugars: not at all    Diabetic concerns: None     Symptoms of hypoglycemia (low blood sugar): none     Paresthesias (numbness or burning in feet) or sores: No     Date of last diabetic eye exam: 10/2017    Hyperlipidemia Follow-Up      Rate your low fat/cholesterol diet?: not monitoring fat    Taking statin?  Yes, no muscle aches from statin    Other lipid medications/supplements?:  none    Hypertension Follow-up      Outpatient blood pressures are not being checked.    Low Salt Diet: not monitoring salt    BP Readings from Last 2 Encounters:   04/11/18 120/60   07/11/17 106/66     Hemoglobin A1C (%)   Date Value   04/11/2018 11.2 (H)   09/26/2017 8.8 (H)     LDL Cholesterol Calculated (mg/dL)   Date Value   12/03/2015 76   10/02/2014 58     LDL Cholesterol Direct (mg/dL)   Date Value   12/20/2016 49       Amount of exercise or physical activity: active    Problems taking medications regularly: yes - went off all insulin 2 months ago     Medication side effects: none    Diet: low salt, low fat/cholesterol and diabetic    Patient also presents for follow-up of hypothyroidism, controlled on current medication.  Denies symptoms of hypo- or hyperthyroidism.     I have reviewed the patient's medical history in detail and updated the computerized patient record.     ROS:  CONSTITUTIONAL: NEGATIVE for fever, chills, change in weight  INTEGUMENTARY/SKIN: NEGATIVE for worrisome rashes, moles or lesions  EYES: NEGATIVE for vision changes or irritation  RESP: NEGATIVE for significant cough or SOB  CV: NEGATIVE for chest pain, palpitations or peripheral edema  MUSCULOSKELETAL: NEGATIVE for significant arthralgias or myalgia  NEURO: NEGATIVE for weakness, dizziness or paresthesias  ENDOCRINE: dry mouth   PSYCHIATRIC: stress  "    OBJECTIVE:     /60  Pulse 95  Temp 98.8  F (37.1  C) (Oral)  Resp 12  Ht 5' 4.75\" (1.645 m)  Wt 170 lb (77.1 kg)  SpO2 97%  Breastfeeding? No  BMI 28.51 kg/m2  Body mass index is 28.51 kg/(m^2).  GENERAL: healthy, alert and no distress  NECK: no adenopathy, no asymmetry, masses, or scars and thyroid normal to palpation  RESP: lungs clear to auscultation - no rales, rhonchi or wheezes  CV: regular rate and rhythm, normal S1 S2, no S3 or S4, no murmur, click or rub, no peripheral edema   MS: no gross musculoskeletal defects noted, no edema  SKIN: no suspicious lesions or rashes  PSYCH: mentation appears normal, affect normal/bright  Diabetic foot exam: no trophic changes or ulcerative lesions and normal monofilament exam    Diagnostic Test Results:  Results for orders placed or performed in visit on 04/11/18   Hemoglobin A1c   Result Value Ref Range    Hemoglobin A1C 11.2 (H) 0 - 6.4 %       ASSESSMENT/PLAN:   (E11.42,  Z79.4) Type 2 diabetes mellitus with diabetic polyneuropathy, with long-term current use of insulin (H)  (primary encounter diagnosis)  Comment: uncontrolled due to medication non compliance   Plan: Albumin Random Urine Quantitative with Creat         Ratio, insulin glargine (LANTUS SOLOSTAR) 100         UNIT/ML injection, metFORMIN (GLUCOPHAGE) 1000         MG tablet, FOOT EXAM  NO CHARGE [07026.114],         Lipid panel reflex to direct LDL Fasting, TSH         with free T4 reflex, DIABETES EDUCATOR         REFERRAL, Comprehensive metabolic panel,         insulin lispro (HUMALOG KWIKPEN) 100 UNIT/ML         injection        Discussed risks of uncontrolled diabetes and need for immediate restart of insulin today. Follow-up in 1 week with blood glucose diary with our educator. See AVS for goal blood glucose.     (F33.0) Major depressive disorder, recurrent, mild (H)  Plan: citalopram (CELEXA) 20 MG tablet         (I10) Hypertension goal BP (blood pressure) < 140/90  Plan: " lisinopril-hydrochlorothiazide         (PRINZIDE/ZESTORETIC) 10-12.5 MG per tablet,         Comprehensive metabolic panel          (E78.5) Hyperlipidemia with target LDL less than 100  Plan: atorvastatin (LIPITOR) 20 MG tablet, Lipid         panel reflex to direct LDL Fasting,         Comprehensive metabolic panel          (E03.9) Acquired hypothyroidism  Plan: levothyroxine (SYNTHROID/LEVOTHROID) 100 MCG         tablet, TSH with free T4 reflex            See Patient Instructions    Sejal Rutledge MD  [facility]

## 2018-04-10 NOTE — PATIENT INSTRUCTIONS
Check your blood glucose in the morning and in the evening. The goal is less than 120 in the morning while fasting and less than 180 in the evening, after a meal.    It is recommended that you get a vaccination for shingles called Shingrix (given as 2 shots, 1 to 6 months apart), even if you have already had the Zostavax vaccine. Discuss getting the Shingix vaccine from your pharmacist, or schedule an ancillary shot visit here. Some insurances do not cover the cost of these vaccines.        [facility]    If you have any questions regarding to your visit please contact your care team:       Team Red:   Clinic Hours Telephone Number   Dr. Sejal Perez, NP   7am-7pm  Monday - Thursday   7am-5pm  Fridays  [phone]  (Appointment scheduling available 24/7)    Questions about your visit?   Team Line  [phone]   Urgent Care - [place] and [facility] - 11am-9pm Monday-Friday Saturday-Sunday- 9am-5pm   [place] - 5pm-9pm Monday-Friday Saturday-Sunday- 9am-5pm  [phone] - [facility]   [phone] - [place]       What options do I have for visits at the clinic other than the traditional office visit?  To expand how we care for you, many of our providers are utilizing electronic visits (e-visits) and telephone visits, when medically appropriate, for interactions with their patients rather than a visit in the clinic.   We also offer nurse visits for many medical concerns. Just like any other service, we will bill your insurance company for this type of visit based on time spent on the phone with your provider. Not all insurance companies cover these visits. Please check with your medical insurance if this type of visit is covered. You will be responsible for any charges that are not paid by your insurance.      E-visits via [employer]:  generally incur a $35.00 fee.  Telephone visits:  Time spent on the phone: *charged based on time that  is spent on the phone in increments of 10 minutes. Estimated cost:   5-10 mins $30.00   11-20 mins. $59.00   21-30 mins. $85.00     Use [employer] (secure email communication and access to your chart) to send your primary care provider a message or make an appointment. Ask someone on your Team how to sign up for [employer].  For a Price Quote for your services, please call our Consumer Price Line at [phone].      As always, Thank you for trusting us with your health care needs!  Junie PATEL MA

## 2018-04-11 ENCOUNTER — TELEPHONE (OUTPATIENT)
Dept: FAMILY MEDICINE | Facility: CLINIC | Age: 68
End: 2018-04-11

## 2018-04-11 ENCOUNTER — OFFICE VISIT (OUTPATIENT)
Dept: FAMILY MEDICINE | Facility: CLINIC | Age: 68
End: 2018-04-11
Payer: COMMERCIAL

## 2018-04-11 VITALS
WEIGHT: 170 LBS | HEART RATE: 95 BPM | RESPIRATION RATE: 12 BRPM | TEMPERATURE: 98.8 F | BODY MASS INDEX: 28.32 KG/M2 | DIASTOLIC BLOOD PRESSURE: 60 MMHG | OXYGEN SATURATION: 97 % | SYSTOLIC BLOOD PRESSURE: 120 MMHG | HEIGHT: 65 IN

## 2018-04-11 DIAGNOSIS — F33.0 MAJOR DEPRESSIVE DISORDER, RECURRENT, MILD (H): ICD-10-CM

## 2018-04-11 DIAGNOSIS — E78.5 HYPERLIPIDEMIA WITH TARGET LDL LESS THAN 100: ICD-10-CM

## 2018-04-11 DIAGNOSIS — E03.9 ACQUIRED HYPOTHYROIDISM: ICD-10-CM

## 2018-04-11 DIAGNOSIS — Z79.4 TYPE 2 DIABETES MELLITUS WITH DIABETIC POLYNEUROPATHY, WITH LONG-TERM CURRENT USE OF INSULIN (H): Primary | ICD-10-CM

## 2018-04-11 DIAGNOSIS — I10 HYPERTENSION GOAL BP (BLOOD PRESSURE) < 140/90: ICD-10-CM

## 2018-04-11 DIAGNOSIS — E11.42 TYPE 2 DIABETES MELLITUS WITH DIABETIC POLYNEUROPATHY, WITH LONG-TERM CURRENT USE OF INSULIN (H): Primary | ICD-10-CM

## 2018-04-11 LAB
ALBUMIN SERPL-MCNC: 3.5 G/DL (ref 3.4–5)
ALP SERPL-CCNC: 92 U/L (ref 40–150)
ALT SERPL W P-5'-P-CCNC: 19 U/L (ref 0–50)
ANION GAP SERPL CALCULATED.3IONS-SCNC: 7 MMOL/L (ref 3–14)
AST SERPL W P-5'-P-CCNC: 17 U/L (ref 0–45)
BILIRUB SERPL-MCNC: 0.6 MG/DL (ref 0.2–1.3)
BUN SERPL-MCNC: 19 MG/DL (ref 7–30)
CALCIUM SERPL-MCNC: 8.7 MG/DL (ref 8.5–10.1)
CHLORIDE SERPL-SCNC: 103 MMOL/L (ref 94–109)
CHOLEST SERPL-MCNC: 85 MG/DL
CO2 SERPL-SCNC: 26 MMOL/L (ref 20–32)
CREAT SERPL-MCNC: 0.79 MG/DL (ref 0.52–1.04)
CREAT UR-MCNC: 68 MG/DL
GFR SERPL CREATININE-BSD FRML MDRD: 72 ML/MIN/1.7M2
GLUCOSE SERPL-MCNC: 357 MG/DL (ref 70–99)
HBA1C MFR BLD: 11.2 % (ref 0–6.4)
HDLC SERPL-MCNC: 41 MG/DL
LDLC SERPL CALC-MCNC: 27 MG/DL
MICROALBUMIN UR-MCNC: 9 MG/L
MICROALBUMIN/CREAT UR: 13.34 MG/G CR (ref 0–25)
NONHDLC SERPL-MCNC: 44 MG/DL
POTASSIUM SERPL-SCNC: 4.4 MMOL/L (ref 3.4–5.3)
PROT SERPL-MCNC: 6.7 G/DL (ref 6.8–8.8)
SODIUM SERPL-SCNC: 136 MMOL/L (ref 133–144)
TRIGL SERPL-MCNC: 87 MG/DL
TSH SERPL DL<=0.005 MIU/L-ACNC: 2.33 MU/L (ref 0.4–4)

## 2018-04-11 PROCEDURE — 36415 COLL VENOUS BLD VENIPUNCTURE: CPT | Performed by: FAMILY MEDICINE

## 2018-04-11 PROCEDURE — 80053 COMPREHEN METABOLIC PANEL: CPT | Performed by: FAMILY MEDICINE

## 2018-04-11 PROCEDURE — 82043 UR ALBUMIN QUANTITATIVE: CPT | Performed by: FAMILY MEDICINE

## 2018-04-11 PROCEDURE — 99214 OFFICE O/P EST MOD 30 MIN: CPT | Performed by: FAMILY MEDICINE

## 2018-04-11 PROCEDURE — 80061 LIPID PANEL: CPT | Performed by: FAMILY MEDICINE

## 2018-04-11 PROCEDURE — 84443 ASSAY THYROID STIM HORMONE: CPT | Performed by: FAMILY MEDICINE

## 2018-04-11 PROCEDURE — 99207 C FOOT EXAM  NO CHARGE: CPT | Performed by: FAMILY MEDICINE

## 2018-04-11 PROCEDURE — 83036 HEMOGLOBIN GLYCOSYLATED A1C: CPT | Performed by: FAMILY MEDICINE

## 2018-04-11 RX ORDER — CITALOPRAM HYDROBROMIDE 20 MG/1
20 TABLET ORAL DAILY
Qty: 90 TABLET | Refills: 1 | Status: SHIPPED | OUTPATIENT
Start: 2018-04-11 | End: 2018-09-24

## 2018-04-11 RX ORDER — LISINOPRIL/HYDROCHLOROTHIAZIDE 10-12.5 MG
1 TABLET ORAL DAILY
Qty: 90 TABLET | Refills: 3 | Status: SHIPPED | OUTPATIENT
Start: 2018-04-11 | End: 2019-03-26

## 2018-04-11 RX ORDER — LEVOTHYROXINE SODIUM 100 UG/1
TABLET ORAL
Qty: 90 TABLET | Refills: 3 | Status: SHIPPED | OUTPATIENT
Start: 2018-04-11 | End: 2019-03-26

## 2018-04-11 RX ORDER — ATORVASTATIN CALCIUM 20 MG/1
20 TABLET, FILM COATED ORAL DAILY
Qty: 90 TABLET | Refills: 3 | Status: SHIPPED | OUTPATIENT
Start: 2018-04-11 | End: 2019-03-26

## 2018-04-11 NOTE — MR AVS SNAPSHOT
After Visit Summary   4/11/2018    Kelly Vegas    MRN: [id number]           Patient Information     Date Of Birth          1950        Visit Information        Provider Department      4/11/2018 7:00 AM Sejal Rutledge MD [facility]        Today's Diagnoses     Type 2 diabetes mellitus with diabetic polyneuropathy, with long-term current use of insulin (H)    -  1    Major depressive disorder, recurrent, mild (H)        Hypertension goal BP (blood pressure) < 140/90        Hyperlipidemia with target LDL less than 100        Acquired hypothyroidism          Care Instructions    Check your blood glucose in the morning and in the evening. The goal is less than 120 in the morning while fasting and less than 180 in the evening, after a meal.    It is recommended that you get a vaccination for shingles called Shingrix (given as 2 shots, 1 to 6 months apart), even if you have already had the Zostavax vaccine. Discuss getting the Shingix vaccine from your pharmacist, or schedule an ancillary shot visit here. Some insurances do not cover the cost of these vaccines.        [facility]    If you have any questions regarding to your visit please contact your care team:       Team Red:   Clinic Hours Telephone Number   Dr. Sejal Perez, NP   7am-7pm  Monday - Thursday   7am-5pm  Fridays  [phone]  (Appointment scheduling available 24/7)    Questions about your visit?   Team Line  [phone]   Urgent Care - [place] - 11am-9pm Monday-Friday Saturday-Sunday- 9am-5pm   [place] - 5pm-9pm Monday-Friday Saturday-Sunday- 9am-5pm  [phone] - [place]   [phone] - [place]       What options do I have for visits at the clinic other than the traditional office visit?  To expand how we care for you, many of our providers are utilizing electronic visits (e-visits) and telephone visits, when  medically appropriate, for interactions with their patients rather than a visit in the clinic.   We also offer nurse visits for many medical concerns. Just like any other service, we will bill your insurance company for this type of visit based on time spent on the phone with your provider. Not all insurance companies cover these visits. Please check with your medical insurance if this type of visit is covered. You will be responsible for any charges that are not paid by your insurance.      E-visits via [employer]:  generally incur a $35.00 fee.  Telephone visits:  Time spent on the phone: *charged based on time that is spent on the phone in increments of 10 minutes. Estimated cost:   5-10 mins $30.00   11-20 mins. $59.00   21-30 mins. $85.00     Use [employer] (secure email communication and access to your chart) to send your primary care provider a message or make an appointment. Ask someone on your Team how to sign up for [employer].  For a Price Quote for your services, please call our [employer] Line at [phone].      As always, Thank you for trusting us with your health care needs!  Junie PATEL MA            Follow-ups after your visit        Additional Services     DIABETES EDUCATOR REFERRAL       DIABETES SELF MANAGEMENT TRAINING (DSMT)      Your provider has referred you to Diabetes Education: FMG: Diabetes Education - All [facility] [phone]   [email or web address]     If an urgent visit is needed or A1C is above 12, Care Team to call the Diabetes  Education Team at [phone] or send an In Basket message to the Diabetes Education Pool (P DIAB ED-PATIENT CARE).    A  will call you to make your appointment. If it has been more than 3 business days since your referral was placed, please call the above phone number to schedule.    Type of training and number of hours: Previous Diagnosis: Follow-up DSMT - 2 hours.    Diabetes Type: Type 2 - On  Insulin   Medicare covers: 10 hours of initial DSMT in 12 month period from the time of first visit, plus 2 hours of follow-up DSMT annually, and additional hours as requested for insulin training.         Diabetes Co-Morbidities: dyslipidemia and hypertension               A1C Goal:  <7.0       A1C is: Lab Results       Component                Value               Date                       A1C                      11.2                04/11/2018              MEDICAL NUTRITION THERAPY (MNT) for Diabetes    Medical Nutrition Therapy with a Registered Dietitian can be provided in coordination with Diabetes Self-Management Training to assist in achieving optimal diabetes management.    MNT Type and Hours: Previous diagnosis: Annual follow-up MNT - 2 hours                       Medicare will cover: 3 hours initial MNT in 12 month period after first visit, plus 2 hours of follow-up MNT annually        Diabetes Education Topics: Comprehensive Knowledge Assessment and Instruction    Special Educational Needs Requiring Individual DSMT: Additional Insulin Training      Please be aware that coverage of these services is subject to the terms and limitations of your health insurance plan.  Call member services at your health plan to determine Diabetes Self-Management Training (Codes  and ) and Medical Nutrition Therapy (Codes 73625 and 51118) benefits and ask which blood glucose monitor brands are covered by your plan.  Please bring the following with you to your appointment:    (1)  List of current medications   (2)  List of Blood Glucose Monitor brands that are covered by your insurance plan  (3)  Blood Glucose Monitor and log book  (4)   Food records for the 3 days prior to your visit    The Certified Diabetes Educator may make diabetes medication adjustments per the CDE Protocol and Collaborative Practice Agreement.                  Follow-up notes from your care team     Return in about 1 week (around 4/18/2018).  "     Who to contact     If you have questions or need follow up information about today's clinic visit or your schedule please contact [facility] directly at [phone].  Normal or non-critical lab and imaging results will be communicated to you by MyChart, letter or phone within 4 business days after the clinic has received the results. If you do not hear from us within 7 days, please contact the clinic through [employer] or phone. If you have a critical or abnormal lab result, we will notify you by phone as soon as possible.  Submit refill requests through [employer] or call your pharmacy and they will forward the refill request to us. Please allow 3 business days for your refill to be completed.          Additional Information About Your Visit        [employer] Information     [employer] gives you secure access to your electronic health record. If you see a primary care provider, you can also send messages to your care team and make appointments. If you have questions, please call your primary care clinic.  If you do not have a primary care provider, please call [phone] and they will assist you.        Care EveryWhere ID     This is your Care EveryWhere ID. This could be used by other organizations to access your [facility] medical records  [id number]        Your Vitals Were     Pulse Temperature Respirations Height Pulse Oximetry Breastfeeding?    95 98.8  F (37.1  C) (Oral) 12 5' 4.75\" (1.645 m) 97% No    BMI (Body Mass Index)                   28.51 kg/m2            Blood Pressure from Last 3 Encounters:   04/11/18 120/60   07/11/17 106/66   12/20/16 120/56    Weight from Last 3 Encounters:   04/11/18 170 lb (77.1 kg)   09/26/17 172 lb (78 kg)   07/11/17 173 lb (78.5 kg)              We Performed the Following     Albumin Random Urine Quantitative with Creat Ratio     Comprehensive metabolic panel     DIABETES EDUCATOR REFERRAL     FOOT EXAM  NO CHARGE [[id number]]     Hemoglobin A1c     Lipid panel " reflex to direct LDL Fasting     TSH with free T4 reflex          Today's Medication Changes          These changes are accurate as of 4/11/18  7:54 AM.  If you have any questions, ask your nurse or doctor.               These medicines have changed or have updated prescriptions.        Dose/Directions    citalopram 20 MG tablet   Commonly known as:  celeXA   This may have changed:  See the new instructions.   Used for:  Major depressive disorder, recurrent, mild (H)   Changed by:  Sejal Rutledge MD        Dose:  20 mg   Take 1 tablet (20 mg) by mouth daily   Quantity:  90 tablet   Refills:  1       insulin lispro 100 UNIT/ML injection   Commonly known as:  HumaLOG KWIKpen   This may have changed:  additional instructions   Used for:  Type 2 diabetes mellitus with diabetic polyneuropathy, with long-term current use of insulin (H)   Changed by:  Sejal Rutledge MD        5 units before lunch   Quantity:  15 mL   Refills:  0            Where to get your medicines      These medications were sent to [id number] - [place] [[facility] [place] - [address]  [address] [facility] [address]     Phone:  [phone]     atorvastatin 20 MG tablet    citalopram 20 MG tablet    insulin glargine 100 UNIT/ML injection    insulin lispro 100 UNIT/ML injection    levothyroxine 100 MCG tablet    lisinopril-hydrochlorothiazide 10-12.5 MG per tablet    metFORMIN 1000 MG tablet                Primary Care Provider Office Phone # Fax #    Sejal Rutledge [phone]       [address] [place] [address]        Equal Access to Services     [facility] EVA AH: Hadii latasha garnett hadasho Soomaali, waaxda luqadaha, qaybta kaalmada adeegyada, noel bateman . So [facility] [place] [phone].    ATENCIÓN: Si habla español, tiene a sauceda disposición servicios gratuitos de asistencia lingüística. Llame al [phone].    We comply with applicable federal civil rights laws and [place] laws.  We do not discriminate on the basis of race, color, national origin, age, disability, sex, sexual orientation, or gender identity.            Thank you!     Thank you for choosing [facility]  for your care. Our goal is always to provide you with excellent care. Hearing back from our patients is one way we can continue to improve our services. Please take a few minutes to complete the written survey that you may receive in the mail after your visit with us. Thank you!             Your Updated Medication List - Protect others around you: Learn how to safely use, store and throw away your medicines at www.disposemymeds.org.          This list is accurate as of 4/11/18  7:54 AM.  Always use your most recent med list.                   Brand Name Dispense Instructions for use Diagnosis    ammonium lactate 12 % cream    LAC-HYDRIN    385 g    Apply topically 2 times daily as needed for dry skin    Xerosis cutis       aspirin 81 MG tablet      Take 1 tablet by mouth daily.        atorvastatin 20 MG tablet    LIPITOR    90 tablet    Take 1 tablet (20 mg) by mouth daily    Hyperlipidemia with target LDL less than 100       B COMPLEX PO           B-D U/F 31G X 5 MM   Generic drug:  insulin pen needle     100 each    USE THREE DAILY WITH INSULIN DOSES    Diabetes type 2, uncontrolled (H), Encounter for long-term (current) use of insulin (H)       blood glucose monitoring test strip    no brand specified    300 strip    Use to test blood sugars 3 times daily or as directed    Uncontrolled type 2 diabetes mellitus without complication, with long-term current use of insulin (H)       citalopram 20 MG tablet    celeXA    90 tablet    Take 1 tablet (20 mg) by mouth daily    Major depressive disorder, recurrent, mild (H)       insulin glargine 100 UNIT/ML injection    LANTUS SOLOSTAR    15 mL    Inject 13 units at bedtime daily. Increase insulin by 2 units every 3 days until AM blood glucose is less than 120.    Type 2  diabetes mellitus with diabetic polyneuropathy, with long-term current use of insulin (H)       insulin lispro 100 UNIT/ML injection    HumaLOG KWIKpen    15 mL    5 units before lunch    Type 2 diabetes mellitus with diabetic polyneuropathy, with long-term current use of insulin (H)       levothyroxine 100 MCG tablet    SYNTHROID/LEVOTHROID    90 tablet    TAKE 1 TABLET (100 MCG) BY MOUTH DAILY    Acquired hypothyroidism       lisinopril-hydrochlorothiazide 10-12.5 MG per tablet    PRINZIDE/ZESTORETIC    90 tablet    Take 1 tablet by mouth daily    Hypertension goal BP (blood pressure) < 140/90       metFORMIN 1000 MG tablet    GLUCOPHAGE    180 tablet    Take 1 tablet (1,000 mg) by mouth 2 times daily (with meals)    Type 2 diabetes mellitus with diabetic polyneuropathy, with long-term current use of insulin (H)       nortriptyline 25 MG capsule    PAMELOR    180 capsule    TAKE 2-3 CAPSULES (50-75 MG) BY MOUTH NIGHTLY AS NEEDED    Major depressive disorder, recurrent, mild (H)       order for DME     300 each    Blood glucose monitor per insurance formulary; blood glucose test strips One Touch Verio or per formulary for use 3 time daily; lancets per formulary for use 3 time daily    Diabetes type 2, uncontrolled (H), Encounter for long-term (current) use of insulin (H)       VITAMIN D3 PO      Take by mouth daily

## 2018-04-11 NOTE — PROGRESS NOTES
Kelly,    Your other results are just fine. Follow-up with the educator soon with your blood glucose diary.     Sejal Rutledge MD

## 2018-04-11 NOTE — TELEPHONE ENCOUNTER
Spoke with pharmacy and confirmed below directions for Lantus.  No questions at this time.     insulin glargine (LANTUS SOLOSTAR) 100   UNIT/ML injection 15 mL 0 4/11/2018  No   Sig: Inject 13 units at bedtime daily. Increase insulin by 2 units every 3 days until AM blood glucose is less than 120.   Class: E-Prescribe   Order: [id number]   E-Prescribing Status: Receipt confirmed by pharmacy (4/11/2018  7:52 AM CDT)     Aruna Rojas RN

## 2018-04-11 NOTE — NURSING NOTE
"Chief Complaint   Patient presents with     Diabetes     Hypertension     Lipids     Depression     PHQ9     Thyroid Disease       Initial /60  Pulse 95  Temp 98.8  F (37.1  C) (Oral)  Resp 12  Ht 5' 4.75\" (1.645 m)  Wt 170 lb (77.1 kg)  SpO2 97%  Breastfeeding? No  BMI 28.51 kg/m2 Estimated body mass index is 28.51 kg/(m^2) as calculated from the following:    Height as of this encounter: 5' 4.75\" (1.645 m).    Weight as of this encounter: 170 lb (77.1 kg).  Medication Reconciliation: complete   Junie PATEL MA      "

## 2018-04-11 NOTE — TELEPHONE ENCOUNTER
Reason for call: med question  Patient called regarding (reason for call): prescription-Lantus solostar-Has questions on the directions. How much is patient is supposed to be taking?  Additional comments: Please call to clarify    Phone number to reach patient: [phone]    Best Time: anytime-9-9    Can we leave a detailed message on this number?  NO

## 2018-04-12 ASSESSMENT — PATIENT HEALTH QUESTIONNAIRE - PHQ9: SUM OF ALL RESPONSES TO PHQ QUESTIONS 1-9: 12

## 2018-04-13 ENCOUNTER — TELEPHONE (OUTPATIENT)
Dept: FAMILY MEDICINE | Facility: CLINIC | Age: 68
End: 2018-04-13

## 2018-04-13 NOTE — TELEPHONE ENCOUNTER
Diabetes Education Scheduling Outreach #1:    Call to patient to schedule. Left message with phone number to call to schedule.    Plan for 2nd outreach attempt within 1 week.    Zulay Cantu OnCall  Diabetes and Nutrition Scheduling

## 2018-04-21 DIAGNOSIS — F33.0 MAJOR DEPRESSIVE DISORDER, RECURRENT, MILD (H): ICD-10-CM

## 2018-04-23 RX ORDER — CITALOPRAM HYDROBROMIDE 20 MG/1
TABLET ORAL
Qty: 90 TABLET | Refills: 0 | Status: SHIPPED | OUTPATIENT
Start: 2018-04-23 | End: 2018-04-24

## 2018-04-23 NOTE — TELEPHONE ENCOUNTER
Signed Prescriptions:                        Disp   Refills    citalopram (CELEXA) 20 MG tablet           90 tab*0        Sig: TAKE ONE TABLET BY MOUTH ONE TIME DAILY  Authorizing Provider: TUTU WHITTAKER  Ordering User: KATIA CELAYA RN refilled medication per [facility] Refill Protocol.     Katia Celaya RN

## 2018-04-23 NOTE — TELEPHONE ENCOUNTER
Called pharmacy they stated they do not have refills on the citalopram (CELEXA).   Please resend refills?   Thank you    Elaine.

## 2018-05-24 NOTE — TELEPHONE ENCOUNTER
Diabetes Education Scheduling Outreach #2:    Call to patient to schedule. Left message with phone number to call to schedule.    Letter sent to patient requesting to call to schedule.    Zulay Cantu OnCall  Diabetes and Nutrition Scheduling

## 2018-05-24 NOTE — TELEPHONE ENCOUNTER
[employer] message sent to patient:  Dr. Aamir Mckee would like you to follow up with the diabetes education team since re-starting the insulin.  We usually review blood sugars reading discuss some lifestyle and activities to see how they are impacting your blood sugars and help get your insulin doses to match your day to day lifestyle.  We're here to help.  Please call the scheduling line to set up an appointment or if you have further questions.    [phone]    Thanks and hope to see you soon!    Chani Miller MS, RD, LD, CDE

## 2018-08-21 ENCOUNTER — TELEPHONE (OUTPATIENT)
Dept: FAMILY MEDICINE | Facility: CLINIC | Age: 68
End: 2018-08-21

## 2018-08-21 NOTE — LETTER
August 22, 2018          Kelly Vegas,  [address] [address]        Dear Kelly Vegas      Monitoring and managing your preventative and chronic health conditions are very important to us. Our records indicate that you have not scheduled for Annual Female Exam and Diabetic Check  which was recommended by Dr. Rutledge      If you have received your health care elsewhere, please call the clinic so the information can be documented in your chart.    Please call [phone] or message us through your [employer] account to schedule an appointment or provide information for your chart.     Feel free to contact us if you have any questions or concerns!    I look forward to seeing you and working with you on your health care needs.     Sincerely,         Sejal Rutledge / michelle

## 2018-09-11 ENCOUNTER — TELEPHONE (OUTPATIENT)
Dept: FAMILY MEDICINE | Facility: CLINIC | Age: 68
End: 2018-09-11

## 2018-09-11 ENCOUNTER — RADIANT APPOINTMENT (OUTPATIENT)
Dept: MAMMOGRAPHY | Facility: CLINIC | Age: 68
End: 2018-09-11
Payer: COMMERCIAL

## 2018-09-11 DIAGNOSIS — Z12.31 VISIT FOR SCREENING MAMMOGRAM: ICD-10-CM

## 2018-09-11 PROCEDURE — 77067 SCR MAMMO BI INCL CAD: CPT | Mod: TC

## 2018-09-11 NOTE — PATIENT INSTRUCTIONS
[facility]    If you have any questions regarding to your visit please contact your care team:       Team Red:   Clinic Hours Telephone Number   Dr. Sejal Perez, NP 7am-7pm  Monday - Thursday   7am-5pm  Fridays  [phone]  (Appointment scheduling available 24/7)   [facility] [place] [facility] - 11am-9pm Monday-Friday Saturday-Sunday- 9am-5pm   [place] - 5pm-9pm Monday-Friday Saturday-Sunday- 9am-5pm  [phone] - [place]  [phone] - [place]       What options do I have for a visit other than an office visit? We offer electronic visits (e-visits) and telephone visits, when medically appropriate.  Please check with your medical insurance to see if these types of visits are covered, as you will be responsible for any charges that are not paid by your insurance.      You can use [employer] (secure electronic communication) to access to your chart, send your primary care provider a message, or make an appointment. Ask a team member how to get started.     For a price quote for your services, please call our Consumer Price Line at [phone] or our Imaging Cost estimation line at [phone] (for imaging tests).    Junie PATEL MA      Preventive Health Recommendations  Female Ages 65 +    Yearly exam:     See your health care provider every year in order to  o Review health changes.   o Discuss preventive care.    o Review your medicines if your doctor has prescribed any.      You no longer need a yearly Pap test unless you've had an abnormal Pap test in the past 10 years. If you have vaginal symptoms, such as bleeding or discharge, be sure to talk with your provider about a Pap test.      Every 1 to 2 years, have a mammogram.  If you are over 69, talk with your health care provider about whether or not you want to continue having screening mammograms.      Every 10 years, have a colonoscopy. Or, have a yearly FIT  test (stool test). These exams will check for colon cancer.       Have a cholesterol test every 5 years, or more often if your doctor advises it.       Have a diabetes test (fasting glucose) every three years. If you are at risk for diabetes, you should have this test more often.       At age 65, have a bone density scan (DEXA) to check for osteoporosis (brittle bone disease).    Shots:    Get a flu shot each year.    Get a tetanus shot every 10 years.    Talk to your doctor about your pneumonia vaccines. There are now two you should receive - Pneumovax (PPSV 23) and Prevnar (PCV 13).    Talk to your pharmacist about the shingles vaccine.    Talk to your doctor about the hepatitis B vaccine.    Nutrition:     Eat at least 5 servings of fruits and vegetables each day.      Eat whole-grain bread, whole-wheat pasta and brown rice instead of white grains and rice.      Get adequate Calcium and Vitamin D.     Lifestyle    Exercise at least 150 minutes a week (30 minutes a day, 5 days a week). This will help you control your weight and prevent disease.      Limit alcohol to one drink per day.      No smoking.       Wear sunscreen to prevent skin cancer.       See your dentist twice a year for an exam and cleaning.      See your eye doctor every year to screen for conditions such as glaucoma, macular degeneration and cataracts.      It is recommended that you get a vaccination for shingles called Shingrix (given as 2 shots, 2 to 6 months apart), even if you have already had the Zostavax vaccine. Discuss getting the Shingix vaccine from your pharmacist, or schedule an ancillary shot visit here. Some insurances do not cover the cost of these vaccines.

## 2018-09-17 ENCOUNTER — HEALTH MAINTENANCE LETTER (OUTPATIENT)
Age: 68
End: 2018-09-17

## 2018-09-24 ENCOUNTER — OFFICE VISIT (OUTPATIENT)
Dept: FAMILY MEDICINE | Facility: CLINIC | Age: 68
End: 2018-09-24
Payer: COMMERCIAL

## 2018-09-24 ENCOUNTER — TELEPHONE (OUTPATIENT)
Dept: FAMILY MEDICINE | Facility: CLINIC | Age: 68
End: 2018-09-24

## 2018-09-24 VITALS
RESPIRATION RATE: 16 BRPM | OXYGEN SATURATION: 99 % | HEIGHT: 65 IN | BODY MASS INDEX: 28.32 KG/M2 | HEART RATE: 85 BPM | WEIGHT: 170 LBS | TEMPERATURE: 98 F | DIASTOLIC BLOOD PRESSURE: 62 MMHG | SYSTOLIC BLOOD PRESSURE: 128 MMHG

## 2018-09-24 DIAGNOSIS — Z00.00 WELLNESS EXAMINATION: Primary | ICD-10-CM

## 2018-09-24 DIAGNOSIS — Z79.4 TYPE 2 DIABETES MELLITUS WITH DIABETIC POLYNEUROPATHY, WITH LONG-TERM CURRENT USE OF INSULIN (H): ICD-10-CM

## 2018-09-24 DIAGNOSIS — D64.9 ANEMIA, UNSPECIFIED TYPE: ICD-10-CM

## 2018-09-24 DIAGNOSIS — F33.0 MAJOR DEPRESSIVE DISORDER, RECURRENT, MILD (H): ICD-10-CM

## 2018-09-24 DIAGNOSIS — D72.828 NEUTROPHILIA: ICD-10-CM

## 2018-09-24 DIAGNOSIS — Z23 NEED FOR PROPHYLACTIC VACCINATION AND INOCULATION AGAINST INFLUENZA: ICD-10-CM

## 2018-09-24 DIAGNOSIS — Z71.89 ADVANCED DIRECTIVES, COUNSELING/DISCUSSION: ICD-10-CM

## 2018-09-24 DIAGNOSIS — E11.42 TYPE 2 DIABETES MELLITUS WITH DIABETIC POLYNEUROPATHY, WITH LONG-TERM CURRENT USE OF INSULIN (H): ICD-10-CM

## 2018-09-24 DIAGNOSIS — R41.3 MEMORY PROBLEM: ICD-10-CM

## 2018-09-24 DIAGNOSIS — Z12.4 SCREENING FOR MALIGNANT NEOPLASM OF CERVIX: ICD-10-CM

## 2018-09-24 LAB
DIFFERENTIAL METHOD BLD: ABNORMAL
EOSINOPHIL # BLD AUTO: 0.4 10E9/L (ref 0–0.7)
EOSINOPHIL NFR BLD AUTO: 2 %
ERYTHROCYTE [DISTWIDTH] IN BLOOD BY AUTOMATED COUNT: 14.3 % (ref 10–15)
HBA1C MFR BLD: 10.1 % (ref 0–5.6)
HCT VFR BLD AUTO: 35.4 % (ref 35–47)
HGB BLD-MCNC: 11.1 G/DL (ref 11.7–15.7)
LYMPHOCYTES # BLD AUTO: 15.2 10E9/L (ref 0.8–5.3)
LYMPHOCYTES NFR BLD AUTO: 70 %
MCH RBC QN AUTO: 28.8 PG (ref 26.5–33)
MCHC RBC AUTO-ENTMCNC: 31.4 G/DL (ref 31.5–36.5)
MCV RBC AUTO: 92 FL (ref 78–100)
MONOCYTES # BLD AUTO: 0.4 10E9/L (ref 0–1.3)
MONOCYTES NFR BLD AUTO: 2 %
NEUTROPHILS # BLD AUTO: 5.6 10E9/L (ref 1.6–8.3)
NEUTROPHILS NFR BLD AUTO: 26 %
PLATELET # BLD AUTO: 303 10E9/L (ref 150–450)
PLATELET # BLD EST: ABNORMAL 10*3/UL
RBC # BLD AUTO: 3.86 10E12/L (ref 3.8–5.2)
TSH SERPL DL<=0.005 MIU/L-ACNC: 0.93 MU/L (ref 0.4–4)
VARIANT LYMPHS BLD QL SMEAR: PRESENT
VIT B12 SERPL-MCNC: 434 PG/ML (ref 193–986)
WBC # BLD AUTO: 21.6 10E9/L (ref 4–11)

## 2018-09-24 PROCEDURE — 85025 COMPLETE CBC W/AUTO DIFF WBC: CPT | Performed by: FAMILY MEDICINE

## 2018-09-24 PROCEDURE — 83036 HEMOGLOBIN GLYCOSYLATED A1C: CPT | Performed by: FAMILY MEDICINE

## 2018-09-24 PROCEDURE — 36415 COLL VENOUS BLD VENIPUNCTURE: CPT | Performed by: FAMILY MEDICINE

## 2018-09-24 PROCEDURE — G0145 SCR C/V CYTO,THINLAYER,RESCR: HCPCS | Performed by: FAMILY MEDICINE

## 2018-09-24 PROCEDURE — 99397 PER PM REEVAL EST PAT 65+ YR: CPT | Mod: 25 | Performed by: FAMILY MEDICINE

## 2018-09-24 PROCEDURE — 82607 VITAMIN B-12: CPT | Performed by: FAMILY MEDICINE

## 2018-09-24 PROCEDURE — 84443 ASSAY THYROID STIM HORMONE: CPT | Performed by: FAMILY MEDICINE

## 2018-09-24 PROCEDURE — G0008 ADMIN INFLUENZA VIRUS VAC: HCPCS | Performed by: FAMILY MEDICINE

## 2018-09-24 PROCEDURE — 90662 IIV NO PRSV INCREASED AG IM: CPT | Performed by: FAMILY MEDICINE

## 2018-09-24 RX ORDER — CITALOPRAM HYDROBROMIDE 20 MG/1
20 TABLET ORAL DAILY
Qty: 90 TABLET | Refills: 0 | Status: SHIPPED | OUTPATIENT
Start: 2018-09-24 | End: 2018-12-17

## 2018-09-24 NOTE — MR AVS SNAPSHOT
After Visit Summary   9/24/2018    Kelly Vegas    MRN: [id number]           Patient Information     Date Of Birth          1950        Visit Information        Provider Department      9/24/2018 2:20 PM Sejal Rutledge MD [facility]        Today's Diagnoses     Wellness examination    -  1    Type 2 diabetes mellitus with diabetic polyneuropathy, with long-term current use of insulin (H)        Major depressive disorder, recurrent, mild (H)        Memory problem        Screening for malignant neoplasm of cervix        Need for prophylactic vaccination and inoculation against influenza        Advanced directives, counseling/discussion          Care Instructions    [facility]    If you have any questions regarding to your visit please contact your care team:       Team Red:   Clinic Hours Telephone Number   Dr. Sejal Perez, NP 7am-7pm  Monday - Thursday   7am-5pm  Fridays  [phone]  (Appointment scheduling available 24/7)   Urgent Care - [place] and Telephone [place] - 11am-9pm Monday-Friday Saturday-Sunday- 9am-5pm   Telephone - 5pm-9pm Monday-Friday Saturday-Sunday- 9am-5pm  [phone] - [place]  [phone] - Telephone       What options do I have for a visit other than an office visit? We offer electronic visits (e-visits) and telephone visits, when medically appropriate.  Please check with your medical insurance to see if these types of visits are covered, as you will be responsible for any charges that are not paid by your insurance.      You can use PÃºbliKo (secure electronic communication) to access to your chart, send your primary care provider a message, or make an appointment. Ask a team member how to get started.     For a price quote for your services, please call our Consumer Price Line at [phone] or our Imaging Cost estimation line at [phone] (for imaging  tests).    Junie PATEL MA      Preventive Health Recommendations  Female Ages 65 +    Yearly exam:     See your health care provider every year in order to  o Review health changes.   o Discuss preventive care.    o Review your medicines if your doctor has prescribed any.      You no longer need a yearly Pap test unless you've had an abnormal Pap test in the past 10 years. If you have vaginal symptoms, such as bleeding or discharge, be sure to talk with your provider about a Pap test.      Every 1 to 2 years, have a mammogram.  If you are over 69, talk with your health care provider about whether or not you want to continue having screening mammograms.      Every 10 years, have a colonoscopy. Or, have a yearly FIT test (stool test). These exams will check for colon cancer.       Have a cholesterol test every 5 years, or more often if your doctor advises it.       Have a diabetes test (fasting glucose) every three years. If you are at risk for diabetes, you should have this test more often.       At age 65, have a bone density scan (DEXA) to check for osteoporosis (brittle bone disease).    Shots:    Get a flu shot each year.    Get a tetanus shot every 10 years.    Talk to your doctor about your pneumonia vaccines. There are now two you should receive - Pneumovax (PPSV 23) and Prevnar (PCV 13).    Talk to your pharmacist about the shingles vaccine.    Talk to your doctor about the hepatitis B vaccine.    Nutrition:     Eat at least 5 servings of fruits and vegetables each day.      Eat whole-grain bread, whole-wheat pasta and brown rice instead of white grains and rice.      Get adequate Calcium and Vitamin D.     Lifestyle    Exercise at least 150 minutes a week (30 minutes a day, 5 days a week). This will help you control your weight and prevent disease.      Limit alcohol to one drink per day.      No smoking.       Wear sunscreen to prevent skin cancer.       See your dentist twice a year for an exam and  cleaning.      See your eye doctor every year to screen for conditions such as glaucoma, macular degeneration and cataracts.      It is recommended that you get a vaccination for shingles called Shingrix (given as 2 shots, 2 to 6 months apart), even if you have already had the Zostavax vaccine. Discuss getting the Shingix vaccine from your pharmacist, or schedule an ancillary shot visit here. Some insurances do not cover the cost of these vaccines.              Follow-ups after your visit        Follow-up notes from your care team     Return in about 3 months (around 12/24/2018) for diabetes (fasting labs up to one week prior).      Who to contact     If you have questions or need follow up information about today's clinic visit or your schedule please contact [facility] directly at [phone].  Normal or non-critical lab and imaging results will be communicated to you by Veeboxhart, letter or phone within 4 business days after the clinic has received the results. If you do not hear from us within 7 days, please contact the clinic through Veeboxhart or phone. If you have a critical or abnormal lab result, we will notify you by phone as soon as possible.  Submit refill requests through [employer] Software or call your pharmacy and they will forward the refill request to us. Please allow 3 business days for your refill to be completed.          Additional Information About Your Visit        VeeboxhareBooks in Motion Information     [employer] Software gives you secure access to your electronic health record. If you see a primary care provider, you can also send messages to your care team and make appointments. If you have questions, please call your primary care clinic.  If you do not have a primary care provider, please call [phone] and they will assist you.        Care EveryWhere ID     This is your Care EveryWhere ID. This could be used by other organizations to access your [place] medical records  [id number]        Your Vitals Were      "Pulse Temperature Respirations Height Pulse Oximetry Breastfeeding?    85 98  F (36.7  C) (Oral) 16 5' 4.75\" (1.645 m) 99% No    BMI (Body Mass Index)                   28.51 kg/m2            Blood Pressure from Last 3 Encounters:   09/24/18 128/62   04/11/18 120/60   07/11/17 106/66    Weight from Last 3 Encounters:   09/24/18 170 lb (77.1 kg)   04/11/18 170 lb (77.1 kg)   09/26/17 172 lb (78 kg)              We Performed the Following     CBC with platelets differential     FLU VACCINE, INCREASED ANTIGEN, PRESV FREE, AGE 65+ [12852]     HEMOGLOBIN A1C     Pap imaged thin layer screen reflex to HPV if ASCUS - recommend age 25 - 29     TSH with free T4 reflex     Vaccine Administration, Initial [59729]     Vitamin B12          Today's Medication Changes          These changes are accurate as of 9/24/18  3:07 PM.  If you have any questions, ask your nurse or doctor.               These medicines have changed or have updated prescriptions.        Dose/Directions    insulin glargine 100 UNIT/ML injection   Commonly known as:  LANTUS SOLOSTAR   This may have changed:  additional instructions   Used for:  Type 2 diabetes mellitus with diabetic polyneuropathy, with long-term current use of insulin (H)   Changed by:  Sejal Rutledge MD        Inject 15 units at bedtime daily. Increase insulin by 2 units every 3 days until AM blood glucose is less than 120.   Quantity:  15 mL   Refills:  0            Where to get your medicines      These medications were sent to [id number] - [place] [[facility] [place] - [address]  [address]  [place] [address]     Phone:  [phone]     citalopram 20 MG tablet    insulin glargine 100 UNIT/ML injection    insulin lispro 100 UNIT/ML injection    insulin pen needle 31G X 5 MM                Primary Care Provider Office Phone # Fax #    Sejal Rutledge [phone] [address] [facility] [place] [address]        Equal Access to Services     " KENDAL VELASQUEZ : Hadii aad ku rekha Stewart, waaxda luqadaha, qaybta kaalmada adelatonya, noel quoc hayherman zaldivarisabelcarla bateman . So [facility] [phone].    ATENCIÓN: Si habla español, tiene a sauceda disposición servicios gratuitos de asistencia lingüística. Llame al [phone].    We comply with applicable federal civil rights laws and [place] laws. We do not discriminate on the basis of race, color, national origin, age, disability, sex, sexual orientation, or gender identity.            Thank you!     Thank you for choosing [facility]  for your care. Our goal is always to provide you with excellent care. Hearing back from our patients is one way we can continue to improve our services. Please take a few minutes to complete the written survey that you may receive in the mail after your visit with us. Thank you!             Your Updated Medication List - Protect others around you: Learn how to safely use, store and throw away your medicines at www.disposemymeds.org.          This list is accurate as of 9/24/18  3:07 PM.  Always use your most recent med list.                   Brand Name Dispense Instructions for use Diagnosis    aspirin 81 MG tablet      Take 1 tablet by mouth daily.        atorvastatin 20 MG tablet    LIPITOR    90 tablet    Take 1 tablet (20 mg) by mouth daily    Hyperlipidemia with target LDL less than 100       B COMPLEX PO           blood glucose monitoring test strip    no brand specified    300 strip    Use to test blood sugars 3 times daily or as directed    Uncontrolled type 2 diabetes mellitus without complication, with long-term current use of insulin (H)       citalopram 20 MG tablet    celeXA    90 tablet    Take 1 tablet (20 mg) by mouth daily    Major depressive disorder, recurrent, mild (H)       GOLD BOND EX           insulin glargine 100 UNIT/ML injection    LANTUS SOLOSTAR    15 mL    Inject 15 units at bedtime daily. Increase insulin by 2 units every 3 days until  AM blood glucose is less than 120.    Type 2 diabetes mellitus with diabetic polyneuropathy, with long-term current use of insulin (H)       insulin lispro 100 UNIT/ML injection    HumaLOG KWIKpen    15 mL    5 units before lunch    Type 2 diabetes mellitus with diabetic polyneuropathy, with long-term current use of insulin (H)       insulin pen needle 31G X 5 MM    B-D U/F    100 each    USE THREE DAILY WITH INSULIN DOSES    Type 2 diabetes mellitus with diabetic polyneuropathy, with long-term current use of insulin (H)       levothyroxine 100 MCG tablet    SYNTHROID/LEVOTHROID    90 tablet    TAKE 1 TABLET (100 MCG) BY MOUTH DAILY    Acquired hypothyroidism       lisinopril-hydrochlorothiazide 10-12.5 MG per tablet    PRINZIDE/ZESTORETIC    90 tablet    Take 1 tablet by mouth daily    Hypertension goal BP (blood pressure) < 140/90       metFORMIN 1000 MG tablet    GLUCOPHAGE    180 tablet    Take 1 tablet (1,000 mg) by mouth 2 times daily (with meals)    Type 2 diabetes mellitus with diabetic polyneuropathy, with long-term current use of insulin (H)       nortriptyline 25 MG capsule    PAMELOR    180 capsule    TAKE 2-3 CAPSULES (50-75 MG) BY MOUTH NIGHTLY AS NEEDED    Major depressive disorder, recurrent, mild (H)       order for DME     300 each    Blood glucose monitor per insurance formulary; blood glucose test strips One Touch Verio or per formulary for use 3 time daily; lancets per formulary for use 3 time daily    Diabetes type 2, uncontrolled (H), Encounter for long-term (current) use of insulin (H)       VITAMIN D3 PO      Take by mouth daily

## 2018-09-24 NOTE — LETTER
My Depression Action Plan  Name: Kelly Vegas   Date of Birth 1950  Date: 9/24/2018    My doctor: Sejal Rutledge   My clinic: [address]  [place] [address]  [phone]          GREEN    ZONE   Good Control    What it looks like:     Things are going generally well. You have normal up s and down s. You may even feel depressed from time to time, but bad moods usually last less than a day.   What you need to do:  1. Continue to care for yourself (see self care plan)  2. Check your depression survival kit and update it as needed  3. Follow your physician s recommendations including any medication.  4. Do not stop taking medication unless you consult with your physician first.           YELLOW         ZONE Getting Worse    What it looks like:     Depression is starting to interfere with your life.     It may be hard to get out of bed; you may be starting to isolate yourself from others.    Symptoms of depression are starting to last most all day and this has happened for several days.     You may have suicidal thoughts but they are not constant.   What you need to do:     1. Call your care team, your response to treatment will improve if you keep your care team informed of your progress. Yellow periods are signs an adjustment may need to be made.     2. Continue your self-care, even if you have to fake it!    3. Talk to someone in your support network    4. Open up your depression survival kit           RED    ZONE Medical Alert - Get Help    What it looks like:     Depression is seriously interfering with your life.     You may experience these or other symptoms: You can t get out of bed most days, can t work or engage in other necessary activities, you have trouble taking care of basic hygiene, or basic responsibilities, thoughts of suicide or death that will not go away, self-injurious behavior.     What you need to do:  1. Call your care team and request  a same-day appointment. If they are not available (weekends or after hours) call your local crisis line, emergency room or 911.            Depression Self Care Plan / Survival Kit    Self-Care for Depression  Here s the deal. Your body and mind are really not as separate as most people think.  What you do and think affects how you feel and how you feel influences what you do and think. This means if you do things that people who feel good do, it will help you feel better.  Sometimes this is all it takes.  There is also a place for medication and therapy depending on how severe your depression is, so be sure to consult with your medical provider and/ or Behavioral Health Consultant if your symptoms are worsening or not improving.     In order to better manage my stress, I will:    Exercise  Get some form of exercise, every day. This will help reduce pain and release endorphins, the  feel good  chemicals in your brain. This is almost as good as taking antidepressants!  This is not the same as joining a gym and then never going! (they count on that by the way ) It can be as simple as just going for a walk or doing some gardening, anything that will get you moving.      Hygiene   Maintain good hygiene (Get out of bed in the morning, Make your bed, Brush your teeth, Take a shower, and Get dressed like you were going to work, even if you are unemployed).  If your clothes don't fit try to get ones that do.    Diet  I will strive to eat foods that are good for me, drink plenty of water, and avoid excessive sugar, caffeine, alcohol, and other mood-altering substances.  Some foods that are helpful in depression are: complex carbohydrates, B vitamins, flaxseed, fish or fish oil, fresh fruits and vegetables.    Psychotherapy  I agree to participate in Individual Therapy (if recommended).    Medication  If prescribed medications, I agree to take them.  Missing doses can result in serious side effects.  I understand that drinking  alcohol, or other illicit drug use, may cause potential side effects.  I will not stop my medication abruptly without first discussing it with my provider.    Staying Connected With Others  I will stay in touch with my friends, family members, and my primary care provider/team.    Use your imagination  Be creative.  We all have a creative side; it doesn t matter if it s oil painting, sand castles, or mud pies! This will also kick up the endorphins.    Witness Beauty  (AKA stop and smell the roses) Take a look outside, even in mid-winter. Notice colors, textures. Watch the squirrels and birds.     Service to others  Be of service to others.  There is always someone else in need.  By helping others we can  get out of ourselves  and remember the really important things.  This also provides opportunities for practicing all the other parts of the program.    Humor  Laugh and be silly!  Adjust your TV habits for less news and crime-drama and more comedy.    Control your stress  Try breathing deep, massage therapy, biofeedback, and meditation. Find time to relax each day.     My support system    Clinic Contact:  Phone number:    Contact 1:  Phone number:    Contact 2:  Phone number:    Denominational/:  Phone number:    Therapist:  Phone number:    Local crisis center:    Phone number:    Other community support:  Phone number:

## 2018-09-24 NOTE — TELEPHONE ENCOUNTER
Reason for Call:  Other prescription    Detailed comments:  Pharmacy calling. They received a Rx for the lantus and pen needles.     lantus- what is the maximum units she will be using in a day.     Pen needles - the 4 mm and 6 mm? Please call and advise.     Phone Number Patient can be reached at: Other phone number:  [phone]    Best Time:  Any     Can we leave a detailed message on this number? YES    Call taken on 9/24/2018 at 3:26 PM by Kelly Espinal

## 2018-09-24 NOTE — TELEPHONE ENCOUNTER
Confirmed prescription faxed to Pharmacy. Jessica Villagomez,     [id number] - [place] [[facility] [place] - [address]

## 2018-09-24 NOTE — TELEPHONE ENCOUNTER
Fax prescription for insulin   Signed Prescriptions:                        Disp   Refills    insulin glargine (LANTUS SOLOSTAR) 100 UNI*15 mL  0        Sig: Inject 15 units at bedtime daily. Increase insulin by           2 units every 3 days until AM blood glucose is           less than 120. Maximum dose 50 units daily.  Authorizing Provider: TUTU WHITTAKER    insulin pen needle 31G X 6 MM              300 ea*3        Sig: Use twice daily or as directed.  Authorizing Provider: TUTU WHITTAKER

## 2018-09-24 NOTE — LETTER
October 2, 2018      Kelly Vegas  [address] [place] [address]    Dear ,      I am happy to inform you that your recent cervical cancer screening test (PAP smear) was normal.      Preventative screenings such as this help to ensure your health for years to come. You should repeat a pap smear in 1 year, unless otherwise directed.      You will still need to return to the clinic every year for your annual exam and other preventive tests.     If you have additional questions regarding this result, please call our registered nurse, Gwendolyn at [phone].      Sincerely,      Sejal Rutledge MD/meera

## 2018-09-24 NOTE — PROGRESS NOTES
SUBJECTIVE:   Kelly Vegas is a 68 year old female who presents for Preventive Visit. Are you in the first 12 months of your Medicare Part B coverage? no      Healthy Habits:    Do you get at least three servings of calcium containing foods daily (dairy, green leafy vegetables, etc.)? yes    Amount of exercise or daily activities, outside of work: 0 day(s) per week    Problems taking medications regularly No    Medication side effects: No    Have you had an eye exam in the past two years? yes    Do you see a dentist twice per year? yes    Do you have sleep apnea, excessive snoring or daytime drowsiness?no      Ability to successfully perform activities of daily living: Yes, no assistance needed    Home safety:  none identified     Hearing impairment: Yes, ringing in ears    Fall risk:  Fallen 2 or more times in the past year?: No  Any fall with injury in the past year?: No        COGNITIVE SCREEN  1) Repeat 3 items (Leader, Season, Table)    2) Clock draw: NORMAL  3) 3 item recall: Recalls 3 objects  Results: NORMAL clock, 1-2 items recalled: COGNITIVE IMPAIRMENT LESS LIKELY    Mini-CogTM Copyright S Yaritza. Licensed by the author for use in [facility]; reprinted with permission (kunal@[facility]). All rights reserved.            Screening for dementia, Balance issues with head movement    Reviewed and updated as needed this visit by clinical staff  Tobacco  Allergies  Meds  Problems  Med Hx  Surg Hx  Fam Hx  Soc Hx          Reviewed and updated as needed this visit by Provider  Allergies  Meds  Problems  Med Hx  Surg Hx  Fam Hx        Social History   Substance Use Topics     Smoking status: Former Smoker     Packs/day: 0.50     Years: 20.00     Types: Cigarettes     Start date: 7/8/1967     Quit date: 1/1/1989     Smokeless tobacco: Never Used     Alcohol use No       If you drink alcohol do you typically have >3 drinks per day or >7 drinks per week? No                        Today's  PHQ-2 Score:   PHQ-2 ( 1999 [employer]) 9/24/2018 4/11/2018   Q1: Little interest or pleasure in doing things 3 1   Q2: Feeling down, depressed or hopeless 1 2   PHQ-2 Score 4 3       Do you feel safe in your environment - Yes    Do you have a Health Care Directive?: No: Advance care planning reviewed with patient; information given to patient to review.    Current providers sharing in care for this patient include:   Patient Care Team:  Sejal Rutledge MD as PCP - General    The following health maintenance items are reviewed in Epic and correct as of today:  Health Maintenance   Topic Date Due     PAP Q1 YR  07/11/2018     A1C Q3 MO  07/11/2018     DEPRESSION ACTION PLAN Q1 YR  07/11/2018     EYE EXAM Q1 YEAR  10/01/2018     PHQ-9 Q6 MONTHS  10/11/2018     FOOT EXAM Q1 YEAR  04/11/2019     TSH W/ FREE T4 REFLEX Q1 YEAR  04/11/2019     BMP Q1 YR  04/11/2019     FALL RISK ASSESSMENT  04/11/2019     LIPID MONITORING Q1 YEAR  04/11/2019     MICROALBUMIN Q1 YEAR  04/11/2019     MEDICARE ANNUAL WELLNESS VISIT  09/24/2019     MAMMO SCREEN Q2 YR (SYSTEM ASSIGNED)  09/11/2020     COLONOSCOPY Q5 YR  11/28/2021     TETANUS IMMUNIZATION (SYSTEM ASSIGNED)  08/07/2022     DEXA Q5 YR  09/20/2022     ADVANCE DIRECTIVE PLANNING Q5 YRS  09/24/2023     PNEUMOCOCCAL  Completed     INFLUENZA VACCINE  Completed     HEPATITIS C SCREENING  Completed     Patient Active Problem List   Diagnosis     Hyperlipidemia with target LDL less than 100     Hypertension goal BP (blood pressure) < 140/90     History of cervical cancer     Advanced directives, counseling/discussion     Major depressive disorder, recurrent, mild (H)     Acquired hypothyroidism     Type 2 diabetes mellitus with diabetic polyneuropathy, with long-term current use of insulin (H)     Degenerative joint disease of hand     DDD (degenerative disc disease), lumbar     Osteopenia     Memory problem     Past Surgical History:   Procedure Laterality Date     BIOPSY  before thyroid  removal     C BSO, OMENTECTOMY W/JUAN DANIEL  2/2006    cervical cancer     C THYROIDECTOMY  12/2004    goiter     COLONOSCOPY  nov 2016     COLONOSCOPY WITH CO2 INSUFFLATION N/A 11/28/2016    Procedure: COLONOSCOPY WITH CO2 INSUFFLATION;  Surgeon: Migue Giraldo MD;  Location: [facility] OR     CYSTECTOMY OVARIAN BENIGN  1974     EYE SURGERY  12-14-16       Social History   Substance Use Topics     Smoking status: Former Smoker     Packs/day: 0.50     Years: 20.00     Types: Cigarettes     Start date: 7/8/1967     Quit date: 1/1/1989     Smokeless tobacco: Never Used     Alcohol use No     Family History   Problem Relation Age of Onset     Thyroid Disease Mother      Dementia Mother      Osteoporosis Mother      Alcohol/Drug Father      C.A.D. Father      CABG      Diabetes Father      Coronary Artery Disease Father      Hypertension Father      Hyperlipidemia Father      Cerebrovascular Disease Father      Cancer Maternal Grandmother      stomach, colon     Breast Cancer Maternal Grandmother      Thyroid Disease Maternal Grandmother      Alzheimer Disease Maternal Grandfather      Breast Cancer Paternal Grandmother      Alcohol/Drug Brother      Prostate Cancer Brother      Alcohol/Drug Son      Depression Brother      Depression Brother      d. suicide     Breast Cancer Cousin      Breast Cancer Other          Current Outpatient Prescriptions   Medication Sig Dispense Refill     ASPIRIN 81 MG OR TABS Take 1 tablet by mouth daily.       atorvastatin (LIPITOR) 20 MG tablet Take 1 tablet (20 mg) by mouth daily 90 tablet 3     B Complex Vitamins (B COMPLEX PO)        blood glucose monitoring (NO BRAND SPECIFIED) test strip Use to test blood sugars 3 times daily or as directed 300 strip 3     Cholecalciferol (VITAMIN D3 PO) Take by mouth daily       citalopram (CELEXA) 20 MG tablet Take 1 tablet (20 mg) by mouth daily 90 tablet 0     insulin glargine (LANTUS SOLOSTAR) 100 UNIT/ML pen Inject 15 units at bedtime daily.  Increase insulin by 2 units every 3 days until AM blood glucose is less than 120. 15 mL 0     insulin lispro (HUMALOG KWIKPEN) 100 UNIT/ML injection 5 units before lunch 15 mL 0     insulin pen needle (B-D U/F) 31G X 5 MM USE THREE DAILY WITH INSULIN DOSES 100 each 2     levothyroxine (SYNTHROID/LEVOTHROID) 100 MCG tablet TAKE 1 TABLET (100 MCG) BY MOUTH DAILY 90 tablet 3     lisinopril-hydrochlorothiazide (PRINZIDE/ZESTORETIC) 10-12.5 MG per tablet Take 1 tablet by mouth daily 90 tablet 3     Menthol-Zinc Oxide (GOLD BOND EX)        metFORMIN (GLUCOPHAGE) 1000 MG tablet Take 1 tablet (1,000 mg) by mouth 2 times daily (with meals) 180 tablet 3     nortriptyline (PAMELOR) 25 MG capsule TAKE 2-3 CAPSULES (50-75 MG) BY MOUTH NIGHTLY AS NEEDED 180 capsule 3     order for DME Blood glucose monitor per insurance formulary; blood glucose test strips One Touch Verio or per formulary for use 3 time daily; lancets per formulary for use 3 time daily 300 each 3     [DISCONTINUED] citalopram (CELEXA) 20 MG tablet Take 1 tablet (20 mg) by mouth daily Need to see MD for further refills 30 tablet 0     [DISCONTINUED] citalopram (CELEXA) 20 MG tablet Take 1 tablet (20 mg) by mouth daily 90 tablet 1     [DISCONTINUED] insulin glargine (LANTUS SOLOSTAR) 100 UNIT/ML injection Inject 13 units at bedtime daily. Increase insulin by 2 units every 3 days until AM blood glucose is less than 120. 15 mL 0     [DISCONTINUED] insulin lispro (HUMALOG KWIKPEN) 100 UNIT/ML injection 5 units before lunch 15 mL 0     Allergies   Allergen Reactions     Glipizide      tremulous     Ibuprofen Hives     Penicillins Itching     Percocet [Acetaminophen] Nausea and Vomiting     Vicodin [Hydrocodone-Acetaminophen] Nausea     ROS:  CONSTITUTIONAL: NEGATIVE for fever, chills, change in weight  INTEGUMENTARY/SKIN: NEGATIVE for worrisome rashes, moles or lesions  EYES: NEGATIVE for vision changes or irritation  ENT/MOUTH: NEGATIVE for ear, mouth and throat  "problems  RESP: NEGATIVE for significant cough or SOB  BREAST: NEGATIVE for masses, tenderness or discharge  CV: NEGATIVE for chest pain, palpitations or peripheral edema  GI: NEGATIVE for nausea, abdominal pain, heartburn, or change in bowel habits  : NEGATIVE for frequency, dysuria, or hematuria  MUSCULOSKELETAL: NEGATIVE for significant arthralgias or myalgia  NEURO: memory concerns; NEGATIVE for weakness, dizziness or paresthesias  ENDOCRINE: diabetes   HEME: NEGATIVE for bleeding problems  PSYCHIATRIC: family and work stressors     OBJECTIVE:   /62  Pulse 85  Temp 98  F (36.7  C) (Oral)  Resp 16  Ht 5' 4.75\" (1.645 m)  Wt 170 lb (77.1 kg)  SpO2 99%  Breastfeeding? No  BMI 28.51 kg/m2 Estimated body mass index is 28.51 kg/(m^2) as calculated from the following:    Height as of this encounter: 5' 4.75\" (1.645 m).    Weight as of this encounter: 170 lb (77.1 kg).  EXAM:   GENERAL: alert, no distress and over weight  EYES: Eyes grossly normal to inspection, PERRL and conjunctivae and sclerae normal  HENT: ear canals and TM's normal, nose and mouth without ulcers or lesions  NECK: no adenopathy, no asymmetry, masses, or scars and thyroid normal to palpation  RESP: lungs clear to auscultation - no rales, rhonchi or wheezes  BREAST: normal without masses, tenderness or nipple discharge and no palpable axillary masses or adenopathy  CV: regular rate and rhythm, normal S1 S2, no S3 or S4, no murmur, click or rub, no peripheral edema    ABDOMEN: soft, nontender, no hepatosplenomegaly, no masses and bowel sounds normal   (female): normal female external genitalia, normal urethral meatus  and normal post-hysterectomy exam without masses.   MS: no gross musculoskeletal defects noted, no edema  SKIN: no suspicious lesions or rashes  NEURO: Normal strength and tone, mentation intact and speech normal  PSYCH: mentation appears normal, affect normal/bright    Diagnostic Test Results:  Results for orders placed " or performed in visit on 09/11/18   MA Screening Digital Bilateral    Narrative    SCREENING MAMMOGRAM, BILATERAL, DIGITAL w/CAD - 9/11/2018 2:48 PM.    BREAST SYMPTOMS: No current breast complaints.     COMPARISON:  06/27/2017, 05/06/2016, 03/25/2015, 03/10/2014.    BREAST DENSITY: Scattered fibroglandular densities.    COMMENTS: No findings of suspicion for malignancy.       Impression    IMPRESSION: BI-RADS CATEGORY: 1 - Negative.    RECOMMENDED FOLLOW-UP: Annual Mammography.  Recommend routine annual screening mammography.    Exam results letter mailed to patient.                MANDIE BENAVIDES MD       ASSESSMENT / PLAN:   (Z00.00) Wellness examination  (primary encounter diagnosis)  Plan: Pap imaged thin layer screen reflex to HPV if         ASCUS - recommend age 25 - 29          (E11.42,  Z79.4) Type 2 diabetes mellitus with diabetic polyneuropathy, with long-term current use of insulin (H)  Plan: HEMOGLOBIN A1C, insulin pen needle (B-D U/F)         31G X 5 MM, insulin lispro (HUMALOG KWIKPEN)         100 UNIT/ML injection, insulin glargine (LANTUS        SOLOSTAR) 100 UNIT/ML pen, TSH with free T4         reflex          (F33.0) Major depressive disorder, recurrent, mild (H)  Plan: citalopram (CELEXA) 20 MG tablet         (R41.3) Memory problem  Plan: CBC with platelets differential, Vitamin B12,         TSH with free T4 reflex          (Z12.4) Screening for malignant neoplasm of cervix  Comment: Hx of cervical Ca   Plan: Pap imaged thin layer screen reflex to HPV if         ASCUS - recommend age 25 - 29            End of Life Planning:  Patient currently has an advanced directive: No.  I have verified the patient's ablity to prepare an advanced directive/make health care decisions.  Literature was provided to assist patient in preparing an advanced directive.    COUNSELING:  Reviewed preventive health counseling, as reflected in patient instructions  Special attention given to:       Regular exercise        "Healthy diet/nutrition       Vision screening       Aspirin Prophylaxsis       Osteoporosis Prevention/Bone Health       Colon cancer screening       Hepatitis C screening       The ASCVD Risk score ([place]ass[place]oxsushant AGARWAL Jr, et al., 2013) failed to calculate for the following reasons:    The valid total cholesterol range is 130 to 320 mg/dL       Advanced Planning     BP Readings from Last 1 Encounters:   09/24/18 128/62     Estimated body mass index is 28.51 kg/(m^2) as calculated from the following:    Height as of this encounter: 5' 4.75\" (1.645 m).    Weight as of this encounter: 170 lb (77.1 kg).      Weight management plan: Discussed healthy diet and exercise guidelines and patient will follow up in 3 months in clinic to re-evaluate.     reports that she quit smoking about 29 years ago. Her smoking use included Cigarettes. She started smoking about 51 years ago. She has a 10.00 pack-year smoking history. She has never used smokeless tobacco.      Appropriate preventive services were discussed with this patient, including applicable screening as appropriate for cardiovascular disease, diabetes, osteopenia/osteoporosis, and glaucoma.  As appropriate for age/gender, discussed screening for colorectal cancer, prostate cancer, breast cancer, and cervical cancer. Checklist reviewing preventive services available has been given to the patient.    Reviewed patients plan of care and provided an AVS. The Basic Care Plan (routine screening as documented in Health Maintenance) for Kelly meets the Care Plan requirement. This Care Plan has been established and reviewed with the Patient.    Counseling Resources:  ATP IV Guidelines  Pooled Cohorts Equation Calculator  Breast Cancer Risk Calculator  FRAX Risk Assessment  ICSI Preventive Guidelines  Dietary Guidelines for Americans, 2010  USDA's MyPlate  ASA Prophylaxis  Lung CA Screening    Sejal Rutledge MD  [facility]    Injectable Influenza Immunization " Documentation    1.  Is the person to be vaccinated sick today?   No    2. Does the person to be vaccinated have an allergy to a component   of the vaccine?   No  Egg Allergy Algorithm Link    3. Has the person to be vaccinated ever had a serious reaction   to influenza vaccine in the past?   No    4. Has the person to be vaccinated ever had Guillain-Barré syndrome?   No    Form completed by Junie PATEL MA

## 2018-09-25 ENCOUNTER — TELEPHONE (OUTPATIENT)
Dept: FAMILY MEDICINE | Facility: CLINIC | Age: 68
End: 2018-09-25

## 2018-09-25 DIAGNOSIS — E11.42 TYPE 2 DIABETES MELLITUS WITH DIABETIC POLYNEUROPATHY, WITH LONG-TERM CURRENT USE OF INSULIN (H): ICD-10-CM

## 2018-09-25 DIAGNOSIS — Z79.4 TYPE 2 DIABETES MELLITUS WITH DIABETIC POLYNEUROPATHY, WITH LONG-TERM CURRENT USE OF INSULIN (H): ICD-10-CM

## 2018-09-25 PROBLEM — D64.9 ANEMIA, UNSPECIFIED TYPE: Status: ACTIVE | Noted: 2018-09-25

## 2018-09-25 PROBLEM — D72.828 NEUTROPHILIA: Status: ACTIVE | Noted: 2018-09-25

## 2018-09-25 ASSESSMENT — PATIENT HEALTH QUESTIONNAIRE - PHQ9: SUM OF ALL RESPONSES TO PHQ QUESTIONS 1-9: 8

## 2018-09-25 NOTE — PROGRESS NOTES
Please call patient:  Your white blood cell count is high and you have mild anemia. Please return for lab only recheck of this within 2 weeks.  Sejal Rutledge MD

## 2018-09-25 NOTE — TELEPHONE ENCOUNTER
Left message on machine to call back RN hotline.  Codie Andre RN    Notes Recorded by Sejal Rutledge MD on 9/25/2018 at 8:05 AM  Please call patient:  Your blood glucose is still high. Increase your Lantus insulin as we discussed by 2 units, and again by 2 units every 3 days up to 50 units, until your blood glucose is less than 120 in the morning. Follow-up with the diabetes educator within 1 to 2 weeks to check this. Please use your mealtime insulin before every meal.    Your white blood cell count is high and you have mild anemia. Return for lab only recheck of this within 2 weeks.     Sejal Rutledge MD

## 2018-09-25 NOTE — PROGRESS NOTES
Please call patient:  Your blood glucose is still high. Increase your Lantus insulin as we discussed by 2 units, and again by 2 units every 3 days up to 50 units, until your blood glucose is less than 120 in the morning. Follow-up with the diabetes educator within 1 to 2 weeks to check this. Please use your mealtime insulin before every meal.    Your white blood cell count is high and you have mild anemia. Return for lab only recheck of this within 2 weeks.     Sejal Rutledge MD

## 2018-09-26 LAB
COPATH REPORT: NORMAL
PAP: NORMAL

## 2018-09-26 NOTE — TELEPHONE ENCOUNTER
Patient is scheduled for labs  On 12-13-18 and physical with Dr. Rutledge on 12-17-18.  Junie PATEL MA

## 2018-09-26 NOTE — TELEPHONE ENCOUNTER
Patient notified of Provider's message as written.  Patient verbalized understanding.    Jamel Turner RN

## 2018-10-09 DIAGNOSIS — D72.9 WHITE BLOOD CELL ABNORMALITY: Primary | ICD-10-CM

## 2018-10-09 DIAGNOSIS — D72.828 NEUTROPHILIA: ICD-10-CM

## 2018-10-09 DIAGNOSIS — D50.8 OTHER IRON DEFICIENCY ANEMIA: ICD-10-CM

## 2018-10-09 DIAGNOSIS — D64.9 ANEMIA, UNSPECIFIED TYPE: ICD-10-CM

## 2018-10-09 LAB
FERRITIN SERPL-MCNC: 7 NG/ML (ref 8–252)
IRON SATN MFR SERPL: 10 % (ref 15–46)
IRON SERPL-MCNC: 40 UG/DL (ref 35–180)
RETICS # AUTO: 81.2 10E9/L (ref 25–95)
RETICS/RBC NFR AUTO: 2.1 % (ref 0.5–2)
TIBC SERPL-MCNC: 402 UG/DL (ref 240–430)

## 2018-10-09 PROCEDURE — 82746 ASSAY OF FOLIC ACID SERUM: CPT | Performed by: FAMILY MEDICINE

## 2018-10-09 PROCEDURE — 85060 BLOOD SMEAR INTERPRETATION: CPT | Performed by: FAMILY MEDICINE

## 2018-10-09 PROCEDURE — 85025 COMPLETE CBC W/AUTO DIFF WBC: CPT | Performed by: FAMILY MEDICINE

## 2018-10-09 PROCEDURE — 83550 IRON BINDING TEST: CPT | Performed by: FAMILY MEDICINE

## 2018-10-09 PROCEDURE — 83540 ASSAY OF IRON: CPT | Performed by: FAMILY MEDICINE

## 2018-10-09 PROCEDURE — 85045 AUTOMATED RETICULOCYTE COUNT: CPT | Performed by: FAMILY MEDICINE

## 2018-10-09 PROCEDURE — 82728 ASSAY OF FERRITIN: CPT | Performed by: FAMILY MEDICINE

## 2018-10-09 PROCEDURE — 36415 COLL VENOUS BLD VENIPUNCTURE: CPT | Performed by: FAMILY MEDICINE

## 2018-10-10 LAB — FOLATE SERPL-MCNC: 76.4 NG/ML

## 2018-10-11 PROBLEM — D72.828 NEUTROPHILIA: Status: RESOLVED | Noted: 2018-09-25 | Resolved: 2018-10-11

## 2018-10-11 PROBLEM — D50.9 ANEMIA, IRON DEFICIENCY: Status: ACTIVE | Noted: 2018-09-25

## 2018-10-11 PROBLEM — D47.9 LYMPHOPROLIFERATIVE DISORDER (H): Status: ACTIVE | Noted: 2018-10-11

## 2018-10-11 LAB — COPATH REPORT: NORMAL

## 2018-10-11 RX ORDER — FERROUS SULFATE 325(65) MG
325 TABLET ORAL 2 TIMES DAILY
Qty: 60 TABLET | Refills: 2 | Status: SHIPPED | OUTPATIENT
Start: 2018-10-11 | End: 2019-02-12

## 2018-10-11 NOTE — PROGRESS NOTES
Please call patient and assist with scheduling (I am unable to reach her by phone):  Your blood counts are still abnormal. This is suspicious for a problem with your bone marrow, such as leukemia. It is important that you see an oncologist within a week for more evaluation. Call [facility]: Cancer Care/Hematology (All Cancer Related Services) - [address] [phone]   [email or web address].    I would also like you to take an iron supplement twice daily, as sent to your pharmacy.     Sejal Rultedge MD

## 2018-10-15 ENCOUNTER — HOSPITAL ENCOUNTER (OUTPATIENT)
Dept: LAB | Facility: CLINIC | Age: 68
Discharge: HOME | End: 2018-10-15
Attending: INTERNAL MEDICINE | Admitting: INTERNAL MEDICINE
Payer: COMMERCIAL

## 2018-10-15 ENCOUNTER — ONCOLOGY VISIT (OUTPATIENT)
Dept: ONCOLOGY | Facility: CLINIC | Age: 68
End: 2018-10-15
Attending: INTERNAL MEDICINE
Payer: COMMERCIAL

## 2018-10-15 VITALS
SYSTOLIC BLOOD PRESSURE: 126 MMHG | OXYGEN SATURATION: 93 % | HEIGHT: 64 IN | TEMPERATURE: 99.1 F | BODY MASS INDEX: 28.48 KG/M2 | DIASTOLIC BLOOD PRESSURE: 57 MMHG | WEIGHT: 166.8 LBS | RESPIRATION RATE: 16 BRPM | HEART RATE: 103 BPM

## 2018-10-15 DIAGNOSIS — D50.9 IRON DEFICIENCY ANEMIA, UNSPECIFIED IRON DEFICIENCY ANEMIA TYPE: Primary | ICD-10-CM

## 2018-10-15 DIAGNOSIS — D72.829 LEUKOCYTOSIS, UNSPECIFIED TYPE: ICD-10-CM

## 2018-10-15 LAB
DIFFERENTIAL METHOD BLD: ABNORMAL
EOSINOPHIL # BLD AUTO: 0.2 10E9/L (ref 0–0.7)
EOSINOPHIL NFR BLD AUTO: 1 %
ERYTHROCYTE [DISTWIDTH] IN BLOOD BY AUTOMATED COUNT: 14.5 % (ref 10–15)
ERYTHROCYTE [SEDIMENTATION RATE] IN BLOOD BY WESTERGREN METHOD: 13 MM/H (ref 0–30)
HCT VFR BLD AUTO: 34.6 % (ref 35–47)
HGB BLD-MCNC: 10.8 G/DL (ref 11.7–15.7)
LDH SERPL L TO P-CCNC: 164 U/L (ref 81–234)
LYMPHOCYTES # BLD AUTO: 16.1 10E9/L (ref 0.8–5.3)
LYMPHOCYTES NFR BLD AUTO: 79 %
MCH RBC QN AUTO: 28.6 PG (ref 26.5–33)
MCHC RBC AUTO-ENTMCNC: 31.2 G/DL (ref 31.5–36.5)
MCV RBC AUTO: 92 FL (ref 78–100)
MONOCYTES # BLD AUTO: 0.2 10E9/L (ref 0–1.3)
MONOCYTES NFR BLD AUTO: 1 %
NEUTROPHILS # BLD AUTO: 3.9 10E9/L (ref 1.6–8.3)
NEUTROPHILS NFR BLD AUTO: 19 %
PLATELET # BLD AUTO: 325 10E9/L (ref 150–450)
PLATELET # BLD EST: ABNORMAL 10*3/UL
RBC # BLD AUTO: 3.77 10E12/L (ref 3.8–5.2)
URATE SERPL-MCNC: 5.3 MG/DL (ref 2.6–6)
WBC # BLD AUTO: 20.4 10E9/L (ref 4–11)

## 2018-10-15 PROCEDURE — 88185 FLOWCYTOMETRY/TC ADD-ON: CPT | Performed by: INTERNAL MEDICINE

## 2018-10-15 PROCEDURE — 40001005 ZZHCL STATISTIC FLOW >15 ABY TC 88189: Performed by: INTERNAL MEDICINE

## 2018-10-15 PROCEDURE — 83615 LACTATE (LD) (LDH) ENZYME: CPT | Performed by: INTERNAL MEDICINE

## 2018-10-15 PROCEDURE — G0463 HOSPITAL OUTPT CLINIC VISIT: HCPCS

## 2018-10-15 PROCEDURE — 88184 FLOWCYTOMETRY/ TC 1 MARKER: CPT | Performed by: INTERNAL MEDICINE

## 2018-10-15 PROCEDURE — 84550 ASSAY OF BLOOD/URIC ACID: CPT | Performed by: INTERNAL MEDICINE

## 2018-10-15 PROCEDURE — 85652 RBC SED RATE AUTOMATED: CPT | Performed by: INTERNAL MEDICINE

## 2018-10-15 PROCEDURE — 81206 BCR/ABL1 GENE MAJOR BP: CPT | Performed by: INTERNAL MEDICINE

## 2018-10-15 PROCEDURE — 36415 COLL VENOUS BLD VENIPUNCTURE: CPT | Performed by: INTERNAL MEDICINE

## 2018-10-15 PROCEDURE — G0463 HOSPITAL OUTPT CLINIC VISIT: HCPCS | Performed by: INTERNAL MEDICINE

## 2018-10-15 ASSESSMENT — PAIN SCALES - GENERAL: PAINLEVEL: MODERATE PAIN (4)

## 2018-10-15 NOTE — LETTER
10/15/2018         RE: Kelly Vegas  [address] [place] [id number]        Dear Colleague,    Thank you for referring your patient, Kelly Vegas, to the [facility]. Please see a copy of my visit note below.    Visit Date:   10/15/2018      HEMATOLOGY CONSULTATION      REQUESTING PHYSICIAN:  Sejal Rutledge MD      CHIEF COMPLAINT AND REASON FOR VISIT:  Normocytic normochromic iron-deficiency anemia and leukocytosis/lymphocytosis new in 09/2018.      HISTORY OF PRESENT ILLNESS:  She presented to us at age 68 due to new onset of moderate lymphocytosis/leukocytosis, white count of 21 and absolute lymphocyte count of 15-16 with new onset of normocytic anemia, hemoglobin around 11, MCV normal.  She has normal B12, folic acid.  Ferritin is low at 7 with iron saturation at 10%.  She was started on oral iron supplementation, iron sulfate 325.  She just took her first dose.      Data review indicating she had touch of leukocytosis no diff back in 2006.  She was not anemic back in 2006 with hemoglobin of 12.5.      In 10/2018, she presented with Hematology office for consultation on new moderate leukocytosis/lymphocytosis and moderate normocytic normochromic anemia, found to have low ferritin and iron saturation, which is suggestive of iron-deficiency anemia.      She claims she is in her usual state of health.  She has her diabetes now well controlled.  She is not a red meat eater.  She denied any bleeding episode.  She has no weight loss.  No swollen glands.  No frequent infections.      PAST MEDICAL HISTORY:  History of cervical cancer, major depression, hypertension, hyperlipidemia, osteopenia, degenerative disk problem, type 2 diabetes, acquired hypothyroidism.      MEDICATIONS:  Reviewed in Epic system.      SOCIAL HISTORY:  She lives in [place].  Denies smoking, denies alcohol abuse.      FAMILY HISTORY:  Denied any family history of blood disease.  One maternal aunt had colon cancer.  "     REVIEW OF SYSTEMS:   GENERAL:  She is in her usual state of health, no B symptoms.  No weight loss.  NEUROLOGIC:   No headache, double vision, or focal weakness.  No neuropathy.   SKIN:  No chronic skin rash or skin infection.   CARDIOVASCULAR:  Hypertension, hyperlipidemia.     PULMONARY:  No shortness of breath, no pleurisy, no cough, no hemoptysis.   GASTROINTESTINAL:  No abdominal pain, nausea, vomiting, constipation.  No diarrhea.  No bright red blood per rectum.   GENITOURINARY:  No urgency, frequency.  No recurrent urinary tract infection.  No kidney problems.   RHEUMATOLOGY AND MUSCULOSKELETAL SYSTEM:  DJD lumbar spine.     ENDOCRINE:  Acquired hypothyroidism, type 2 diabetes, osteopenia.     HEMATOLOGY AND ONCOLOGY:  Leukocytosis/lymphocytosis, new, moderate anemia 09/2018.   IMMUNOLOGY:  No recurrent fever or chills episode.  No recurrent infectious episode.   BREASTS AND GYNECOLOGIC:  No history of vaginal bleeding/discharge/dryness.  No breast pain or nipple discharge.   PSYCHIATRIC:  Major depression disorder, recurrent, mild.        PHYSICAL EXAMINATION:   VITAL SIGNS:  Blood pressure 126/57, pulse 103, temperature 99.1  F (37.3  C), temperature source Tympanic, resp. rate 16, height 1.619 m (5' 3.75\"), weight 75.7 kg (166 lb 12.8 oz), SpO2 93 %, not currently breastfeeding.    ECOG 0    GENERAL APPEARANCE:  Elderly, looks much younger than her stated age, very pleasant, not in acute distress.     HEENT: The patient is normocephalic, atraumatic. Pupils are equally reactive to light.  Sclerae are anicteric.  Moist oral mucosa.  Negative pharynx.  No oral thrush.   NECK:  Supple.  No jugular venous distention.  Thyroid is not palpable.   LYMPH NODES:  Superficial lymphadenopathy is not appreciable in the bilateral cervical, supraclavicular, axillary or inguinal areas.   CARDIOVASCULAR:  S1, S2 regular with no murmurs or gallops.  No carotid or abdominal bruits.   PULMONARY:  Lungs are clear to " auscultation and percussion bilaterally.  There is no wheezing or rhonchi.   GASTROINTESTINAL:  Abdomen is soft, nontender.  No hepatosplenomegaly.  No signs of ascites.  No mass appreciable.   MUSCULOSKELETAL AND EXTREMITIES:  No edema.  No cyanotic changes.  No signs of joint deformity.  No lymphedema.  No spinal or paraspinal tenderness.  No CVA tenderness.   NEUROLOGIC:  Cranial nerves II-XII are grossly intact.  Sensation intact.  Muscle strength and muscle tone symmetrical, 5/5 throughout.   SKIN:  No petechiae.  No rash.  No signs of cellulitis.       CURRENT LAB DATA REVIEWED:      2018 white count of 21 and absolute lymphocyte count of 15-16 with new onset of normocytic anemia, hemoglobin around 11, MCV normal.    Normal B12, folic acid.  Ferritin is low at 7 with iron saturation at 10%.       OLD DATA REVIEWED TODAY WITH SUMMARY:     touch of leukocytosis no diff back in .  She was not anemic back in  with hemoglobin of 12.5.        ASSESSMENT AND PLAN:   1.  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  Recommend further workup with labs and ultrasound of the abdomen.     2.  Relatively new onset of moderate iron-deficiency anemia, normocytic, normochromic. She has DEBORA component.      We will proceed with anemia workup.      Final hematologic recommendation will be based on above further workup.         FIDEL BOLES MD             D: 10/15/2018   T: 10/15/2018   MT: JOSE      Name:     COLLEEN PASCAL   MRN:      [id number]        Account:      [id number]   :      1950           Visit Date:   10/15/2018      Document: [id number]        Again, thank you for allowing me to participate in the care of your patient.        Sincerely,        Ngozi Boles MD, MD

## 2018-10-15 NOTE — NURSING NOTE
"Oncology Rooming Note    October 15, 2018 1:24 PM   Kelly Vegas is a 68 year old female who presents for:    Chief Complaint   Patient presents with     Hematology     NEW, white blood cell abnormality neutrophilia anemia.      Initial Vitals: /57 (BP Location: Right arm, Patient Position: Sitting, Cuff Size: Adult Large)  Pulse 103  Temp 99.1  F (37.3  C) (Tympanic)  Resp 16  Ht 1.619 m (5' 3.75\")  Wt 75.7 kg (166 lb 12.8 oz)  SpO2 93%  Breastfeeding? No  BMI 28.86 kg/m2 Estimated body mass index is 28.86 kg/(m^2) as calculated from the following:    Height as of this encounter: 1.619 m (5' 3.75\").    Weight as of this encounter: 75.7 kg (166 lb 12.8 oz). Body surface area is 1.85 meters squared.  Moderate Pain (4) Comment: left underarm discomfort into breast intermittent.    No LMP recorded. Patient has had a hysterectomy.  Allergies reviewed: Yes  Medications reviewed: Yes    Medications: Medication refills not needed today.  Pharmacy name entered into [facility]: [id number] - [place] [[facility] [place] - [address] [facility]    Clinical concerns: NEW, white blood cell abnormality neutrophilia anemia.     8 minutes for nursing intake (face to face time)     Lexis Jacobs CMA            "

## 2018-10-15 NOTE — MR AVS SNAPSHOT
After Visit Summary   10/15/2018    Kelly Vegas    MRN: [id number]           Patient Information     Date Of Birth          1950        Visit Information        Provider Department      10/15/2018 1:15 PM Ngozi De Los Santos MD [facility]        Today's Diagnoses     Iron deficiency anemia, unspecified iron deficiency anemia type    -  1    Leukocytosis, unspecified type          Care Instructions    Dr. De Los Santos would like you to get blood work today. Occult cards given to Kelly and reviewed instructions on how to collect specimen. Kelly will return occult cards back to the clinic.     We would like to see you back in follow up for a follow up appointment with labs prior.     When you are in need of a refill, please call your pharmacy and they will send us a request.      Copy of appointments, and after visit summary (AVS) given to patient.      If you have any questions please call Chantell Chamberlain RN, BSN Oncology Hematology  [facility] [phone]. For questions after business hours, or on holidays/weekends, please call our after hours Nurse Triage line [phone]. Thank you.   Stool, labs, UA as work up. F/u after.           Follow-ups after your visit        Your next 10 appointments already scheduled     Oct 16, 2018  8:15 AM CDT   US ABDOMEN LIMITED with [address] Ultrasound ([facility])    [address] [facility] [address] [phone]           How do I prepare for my exam? (Food and drink instructions) Adults: No eating, smoking, gum chewing or drinking for 8 hours before the exam. You may take medicine with a small sip of water.  Children: * Infants, breast-fed: may have breast milk up to 2 hours before exam. * Infants, formula: may have bottle until 4 hours before exam. * Children 1-5 years: No food or drink for 4 hours before exam. * Children 6 -12 years: No food or drink for 6 hours before exam. *  Children over 12 years: No food or drink for 8 hours before exam.  * J Tube Fed: No need to stop feedings.  What should I wear: Wear comfortable clothes.  How long does the exam take: Most ultrasounds take 30 to 60 minutes.  What should I bring: Bring a list of your medicines, including vitamins, minerals and over-the-counter drugs. It is safest to leave personal items at home.  Do I need a :  No  is needed.  What do I need to tell my doctor: Tell your doctor about any allergies you may have.  What should I do after the exam: No restrictions, You may resume normal activities.  What is this test: An ultrasound uses sound waves to make pictures of the body. Sound waves do not cause pain. The only discomfort may be the pressure of the wand against your skin or full bladder.  Who should I call with questions: If you have any questions, please call the Imaging Department where you will have your exam. Directions, parking instructions, and other information is available on our website, [email or web address].            Nov 12, 2018  3:30 PM CST   Return Visit with Ngozi De Los Santos MD   [facility] ([facility])    [facility]  [address] [facility] [address]  [facility] [address]   [phone]            Dec 13, 2018  7:00 AM CST   LAB with  LAB   [facility]    [address] [place] [id number] [phone]           Please do not eat 10-12 hours before your appointment if you are coming in fasting for labs on lipids, cholesterol, or glucose (sugar). This does not apply to pregnant women. Water, hot tea and black coffee (with nothing added) are okay. Do not drink other fluids, diet soda or chew gum.            Dec 17, 2018  2:20 PM CST   SHORT with Sejal Rutledge MD   [facility]3][place] ([address] [address] [phone]              Future tests that were  "ordered for you today     Open Future Orders        Priority Expected Expires Ordered    US Abdomen Limited Routine  10/15/2019 10/15/2018    US Abdomen Limited Routine  10/15/2019 10/15/2018    Occult blood stool 1-3 spec Routine  10/15/2019 10/15/2018            Who to contact     If you have questions or need follow up information about today's clinic visit or your schedule please contact [facility] directly at [phone].  Normal or non-critical lab and imaging results will be communicated to you by Handshart, letter or phone within 4 business days after the clinic has received the results. If you do not hear from us within 7 days, please contact the clinic through Mobile System 7 or phone. If you have a critical or abnormal lab result, we will notify you by phone as soon as possible.  Submit refill requests through Mobile System 7 or call your pharmacy and they will forward the refill request to us. Please allow 3 business days for your refill to be completed.          Additional Information About Your Visit        Mobile System 7 Information     Mobile System 7 gives you secure access to your electronic health record. If you see a primary care provider, you can also send messages to your care team and make appointments. If you have questions, please call your primary care clinic.  If you do not have a primary care provider, please call [phone] and they will assist you.        Care EveryWhere ID     This is your Care EveryWhere ID. This could be used by other organizations to access your [place] medical records  [id number]        Your Vitals Were     Pulse Temperature Respirations Height Pulse Oximetry Breastfeeding?    103 99.1  F (37.3  C) (Tympanic) 16 1.619 m (5' 3.75\") 93% No    BMI (Body Mass Index)                   28.86 kg/m2            Blood Pressure from Last 3 Encounters:   10/15/18 126/57   09/24/18 128/62   04/11/18 120/60    Weight from Last 3 Encounters:   10/15/18 75.7 kg (166 lb 12.8 oz)   09/24/18 77.1 kg " (170 lb)   04/11/18 77.1 kg (170 lb)              We Performed the Following     BCR ABL1 Major Breakpoint Quant p210     Erythrocyte sedimentation rate auto     Lactate Dehydrogenase     Leukemia Lymphoma Evaluation (Flow Cytometry)     Uric acid        Primary Care Provider Office Phone # Fax #    Sejal Rutledge [phone] [address] [facility] [place] [address]        Equal Access to Services     [facility]: Hadii aad ku hadasho Soomaali, waaxda luqadaha, qaybta kaalmada adeegyada, waxay idiin hayaan adeeg kharash la'aan . So [facility] [phone].    ATENCIÓN: Si habla español, tiene a sauceda disposición servicios gratuitos de asistencia lingüística. Llame al [phone].    We comply with applicable federal civil rights laws and [place] laws. We do not discriminate on the basis of race, color, national origin, age, disability, sex, sexual orientation, or gender identity.            Thank you!     Thank you for choosing [facility]  for your care. Our goal is always to provide you with excellent care. Hearing back from our patients is one way we can continue to improve our services. Please take a few minutes to complete the written survey that you may receive in the mail after your visit with us. Thank you!             Your Updated Medication List - Protect others around you: Learn how to safely use, store and throw away your medicines at www.disposemymeds.org.          This list is accurate as of 10/15/18  2:09 PM.  Always use your most recent med list.                   Brand Name Dispense Instructions for use Diagnosis    aspirin 81 MG tablet      Take 1 tablet by mouth daily.        atorvastatin 20 MG tablet    LIPITOR    90 tablet    Take 1 tablet (20 mg) by mouth daily    Hyperlipidemia with target LDL less than 100       B COMPLEX PO           blood glucose monitoring test strip    no brand specified    300 strip    Use to test blood sugars 3 times daily or as directed     Uncontrolled type 2 diabetes mellitus without complication, with long-term current use of insulin (H)       citalopram 20 MG tablet    celeXA    90 tablet    Take 1 tablet (20 mg) by mouth daily    Major depressive disorder, recurrent, mild (H)       ferrous sulfate 325 (65 Fe) MG tablet    IRON    60 tablet    Take 1 tablet (325 mg) by mouth 2 times daily    Other iron deficiency anemia       insulin glargine 100 UNIT/ML injection    LANTUS SOLOSTAR    15 mL    Inject 15 units at bedtime daily. Increase insulin by 2 units every 3 days until AM blood glucose is less than 120. Maximum dose 50 units daily.    Type 2 diabetes mellitus with diabetic polyneuropathy, with long-term current use of insulin (H)       insulin lispro 100 UNIT/ML injection    HumaLOG KWIKpen    15 mL    5 units three times daily before meals    Type 2 diabetes mellitus with diabetic polyneuropathy, with long-term current use of insulin (H)       insulin pen needle 31G X 6 MM     300 each    Use twice daily or as directed.    Type 2 diabetes mellitus with diabetic polyneuropathy, with long-term current use of insulin (H)       levothyroxine 100 MCG tablet    SYNTHROID/LEVOTHROID    90 tablet    TAKE 1 TABLET (100 MCG) BY MOUTH DAILY    Acquired hypothyroidism       lisinopril-hydrochlorothiazide 10-12.5 MG per tablet    PRINZIDE/ZESTORETIC    90 tablet    Take 1 tablet by mouth daily    Hypertension goal BP (blood pressure) < 140/90       metFORMIN 1000 MG tablet    GLUCOPHAGE    180 tablet    Take 1 tablet (1,000 mg) by mouth 2 times daily (with meals)    Type 2 diabetes mellitus with diabetic polyneuropathy, with long-term current use of insulin (H)       nortriptyline 25 MG capsule    PAMELOR    180 capsule    TAKE 2-3 CAPSULES (50-75 MG) BY MOUTH NIGHTLY AS NEEDED    Major depressive disorder, recurrent, mild (H)       order for DME     300 each    Blood glucose monitor per insurance formulary; blood glucose test strips One Touch Verio or  per formulary for use 3 time daily; lancets per formulary for use 3 time daily    Diabetes type 2, uncontrolled (H), Encounter for long-term (current) use of insulin (H)       VITAMIN D3 PO      Take by mouth daily

## 2018-10-16 ENCOUNTER — HOSPITAL ENCOUNTER (OUTPATIENT)
Dept: ULTRASOUND IMAGING | Facility: CLINIC | Age: 68
Discharge: HOME | End: 2018-10-16
Attending: INTERNAL MEDICINE | Admitting: INTERNAL MEDICINE
Payer: COMMERCIAL

## 2018-10-16 DIAGNOSIS — D72.829 LEUKOCYTOSIS, UNSPECIFIED TYPE: ICD-10-CM

## 2018-10-16 PROCEDURE — 76700 US EXAM ABDOM COMPLETE: CPT

## 2018-10-16 NOTE — PROGRESS NOTES
Visit Date:   10/15/2018      HEMATOLOGY CONSULTATION      REQUESTING PHYSICIAN:  Sejal Rutledge MD      CHIEF COMPLAINT AND REASON FOR VISIT:  Normocytic normochromic iron-deficiency anemia and leukocytosis/lymphocytosis new in 09/2018.      HISTORY OF PRESENT ILLNESS:  She presented to us at age 68 due to new onset of moderate lymphocytosis/leukocytosis, white count of 21 and absolute lymphocyte count of 15-16 with new onset of normocytic anemia, hemoglobin around 11, MCV normal.  She has normal B12, folic acid.  Ferritin is low at 7 with iron saturation at 10%.  She was started on oral iron supplementation, iron sulfate 325.  She just took her first dose.      Data review indicating she had touch of leukocytosis no diff back in 2006.  She was not anemic back in 2006 with hemoglobin of 12.5.      In 10/2018, she presented with Hematology office for consultation on new moderate leukocytosis/lymphocytosis and moderate normocytic normochromic anemia, found to have low ferritin and iron saturation, which is suggestive of iron-deficiency anemia.      She claims she is in her usual state of health.  She has her diabetes now well controlled.  She is not a red meat eater.  She denied any bleeding episode.  She has no weight loss.  No swollen glands.  No frequent infections.      PAST MEDICAL HISTORY:  History of cervical cancer, major depression, hypertension, hyperlipidemia, osteopenia, degenerative disk problem, type 2 diabetes, acquired hypothyroidism.      MEDICATIONS:  Reviewed in Epic system.      SOCIAL HISTORY:  She lives in [place].  Denies smoking, denies alcohol abuse.      FAMILY HISTORY:  Denied any family history of blood disease.  One maternal aunt had colon cancer.      REVIEW OF SYSTEMS:   GENERAL:  She is in her usual state of health, no B symptoms.  No weight loss.  NEUROLOGIC:   No headache, double vision, or focal weakness.  No neuropathy.   SKIN:  No chronic skin rash or skin infection.  "  CARDIOVASCULAR:  Hypertension, hyperlipidemia.     PULMONARY:  No shortness of breath, no pleurisy, no cough, no hemoptysis.   GASTROINTESTINAL:  No abdominal pain, nausea, vomiting, constipation.  No diarrhea.  No bright red blood per rectum.   GENITOURINARY:  No urgency, frequency.  No recurrent urinary tract infection.  No kidney problems.   RHEUMATOLOGY AND MUSCULOSKELETAL SYSTEM:  DJD lumbar spine.     ENDOCRINE:  Acquired hypothyroidism, type 2 diabetes, osteopenia.     HEMATOLOGY AND ONCOLOGY:  Leukocytosis/lymphocytosis, new, moderate anemia 09/2018.   IMMUNOLOGY:  No recurrent fever or chills episode.  No recurrent infectious episode.   BREASTS AND GYNECOLOGIC:  No history of vaginal bleeding/discharge/dryness.  No breast pain or nipple discharge.   PSYCHIATRIC:  Major depression disorder, recurrent, mild.        PHYSICAL EXAMINATION:   VITAL SIGNS:  Blood pressure 126/57, pulse 103, temperature 99.1  F (37.3  C), temperature source Tympanic, resp. rate 16, height 1.619 m (5' 3.75\"), weight 75.7 kg (166 lb 12.8 oz), SpO2 93 %, not currently breastfeeding.    ECOG 0    GENERAL APPEARANCE:  Elderly, looks much younger than her stated age, very pleasant, not in acute distress.     HEENT: The patient is normocephalic, atraumatic. Pupils are equally reactive to light.  Sclerae are anicteric.  Moist oral mucosa.  Negative pharynx.  No oral thrush.   NECK:  Supple.  No jugular venous distention.  Thyroid is not palpable.   LYMPH NODES:  Superficial lymphadenopathy is not appreciable in the bilateral cervical, supraclavicular, axillary or inguinal areas.   CARDIOVASCULAR:  S1, S2 regular with no murmurs or gallops.  No carotid or abdominal bruits.   PULMONARY:  Lungs are clear to auscultation and percussion bilaterally.  There is no wheezing or rhonchi.   GASTROINTESTINAL:  Abdomen is soft, nontender.  No hepatosplenomegaly.  No signs of ascites.  No mass appreciable.   MUSCULOSKELETAL AND EXTREMITIES:  No edema. "  No cyanotic changes.  No signs of joint deformity.  No lymphedema.  No spinal or paraspinal tenderness.  No CVA tenderness.   NEUROLOGIC:  Cranial nerves II-XII are grossly intact.  Sensation intact.  Muscle strength and muscle tone symmetrical, 5/5 throughout.   SKIN:  No petechiae.  No rash.  No signs of cellulitis.       CURRENT LAB DATA REVIEWED:      2018 white count of 21 and absolute lymphocyte count of 15-16 with new onset of normocytic anemia, hemoglobin around 11, MCV normal.    Normal B12, folic acid.  Ferritin is low at 7 with iron saturation at 10%.       OLD DATA REVIEWED TODAY WITH SUMMARY:     touch of leukocytosis no diff back in .  She was not anemic back in  with hemoglobin of 12.5.        ASSESSMENT AND PLAN:   1.  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  Recommend further workup with labs and ultrasound of the abdomen.     2.  Relatively new onset of moderate iron-deficiency anemia, normocytic, normochromic. She has DEBORA component.      We will proceed with anemia workup.      Final hematologic recommendation will be based on above further workup.         FIDEL BOLES MD             D: 10/15/2018   T: 10/15/2018   MT: JOSE      Name:     COLLEEN PASCAL   MRN:      [id number]        Account:      [id number]   :      1950           Visit Date:   10/15/2018      Document: [id number]

## 2018-10-17 DIAGNOSIS — Z85.41 HISTORY OF CERVICAL CANCER: Primary | ICD-10-CM

## 2018-10-17 LAB — COPATH REPORT: NORMAL

## 2018-10-18 LAB — COPATH REPORT: NORMAL

## 2018-10-23 ENCOUNTER — HOSPITAL ENCOUNTER (OUTPATIENT)
Dept: LAB | Facility: CLINIC | Age: 68
End: 2018-10-23
Attending: INTERNAL MEDICINE
Payer: COMMERCIAL

## 2018-10-23 ENCOUNTER — HOSPITAL ENCOUNTER (OUTPATIENT)
Dept: MRI IMAGING | Facility: CLINIC | Age: 68
Discharge: HOME | End: 2018-10-23
Attending: INTERNAL MEDICINE | Admitting: INTERNAL MEDICINE
Payer: COMMERCIAL

## 2018-10-23 DIAGNOSIS — Z85.41 HISTORY OF CERVICAL CANCER: ICD-10-CM

## 2018-10-23 DIAGNOSIS — D50.9 IRON DEFICIENCY ANEMIA, UNSPECIFIED IRON DEFICIENCY ANEMIA TYPE: ICD-10-CM

## 2018-10-23 LAB
CREAT BLD-MCNC: 0.9 MG/DL (ref 0.52–1.04)
GFR SERPL CREATININE-BSD FRML MDRD: 62 ML/MIN/1.7M2
HEMOCCULT STL QL: NEGATIVE
HEMOCCULT STL QL: NEGATIVE

## 2018-10-23 PROCEDURE — 74183 MRI ABD W/O CNTR FLWD CNTR: CPT

## 2018-10-23 PROCEDURE — 25500064 ZZH RX 255 OP 636: Performed by: INTERNAL MEDICINE

## 2018-10-23 PROCEDURE — 82272 OCCULT BLD FECES 1-3 TESTS: CPT | Performed by: INTERNAL MEDICINE

## 2018-10-23 PROCEDURE — 82565 ASSAY OF CREATININE: CPT

## 2018-10-23 PROCEDURE — A9585 GADOBUTROL INJECTION: HCPCS | Performed by: INTERNAL MEDICINE

## 2018-10-23 RX ORDER — GADOBUTROL 604.72 MG/ML
8 INJECTION INTRAVENOUS ONCE
Status: COMPLETED | OUTPATIENT
Start: 2018-10-23 | End: 2018-10-23

## 2018-10-23 RX ADMIN — GADOBUTROL 8 ML: 604.72 INJECTION INTRAVENOUS at 08:56

## 2018-10-26 ENCOUNTER — TELEPHONE (OUTPATIENT)
Dept: ONCOLOGY | Facility: CLINIC | Age: 68
End: 2018-10-26

## 2018-10-26 DIAGNOSIS — R93.5 ABNORMAL MAGNETIC RESONANCE IMAGING OF ABDOMEN: ICD-10-CM

## 2018-10-26 DIAGNOSIS — D50.8 OTHER IRON DEFICIENCY ANEMIA: Primary | ICD-10-CM

## 2018-10-26 NOTE — TELEPHONE ENCOUNTER
"Patient is a 68 year old female patient of Dr. 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 De Los Santos dictation on 10.15.18.    Abdominal MRI completed 10.23.18. Reviewed by Dr. Rao on Dr. De Los Santos's behalf as she is out of the office. Recommends surgical consult for gall bladder sludge, stones, and wall thickening.    Call placed to patient. Message left requesting call back for results and recommendations. Direct line provided.    Patient returned call to clinic. Reviewed results and recommendations. Denies questions or concerns at this time. Is in agreement with surgical consult. Our 's will call patient with date and time of consult. Advised patient to call back if questions/concerns arise. Direct line provided.    In-basket message sent to scheduling with the above information.  "

## 2018-10-30 ENCOUNTER — OFFICE VISIT (OUTPATIENT)
Dept: SURGERY | Facility: CLINIC | Age: 68
End: 2018-10-30
Payer: COMMERCIAL

## 2018-10-30 VITALS
BODY MASS INDEX: 28.34 KG/M2 | TEMPERATURE: 98.8 F | DIASTOLIC BLOOD PRESSURE: 70 MMHG | HEART RATE: 90 BPM | SYSTOLIC BLOOD PRESSURE: 134 MMHG | WEIGHT: 166 LBS | HEIGHT: 64 IN

## 2018-10-30 DIAGNOSIS — K80.20 CALCULUS OF GALLBLADDER WITHOUT CHOLECYSTITIS WITHOUT OBSTRUCTION: Primary | ICD-10-CM

## 2018-10-30 LAB
BASOPHILS # BLD AUTO: 0.1 10E9/L (ref 0–0.2)
BASOPHILS NFR BLD AUTO: 0.3 %
DIFFERENTIAL METHOD BLD: ABNORMAL
EOSINOPHIL # BLD AUTO: 0.2 10E9/L (ref 0–0.7)
EOSINOPHIL NFR BLD AUTO: 1.3 %
ERYTHROCYTE [DISTWIDTH] IN BLOOD BY AUTOMATED COUNT: 13.9 % (ref 10–15)
HCT VFR BLD AUTO: 35.5 % (ref 35–47)
HGB BLD-MCNC: 10.7 G/DL (ref 11.7–15.7)
IMM GRANULOCYTES # BLD: 0 10E9/L (ref 0–0.4)
IMM GRANULOCYTES NFR BLD: 0.2 %
LYMPHOCYTES # BLD AUTO: 11.7 10E9/L (ref 0.8–5.3)
LYMPHOCYTES NFR BLD AUTO: 71.7 %
MCH RBC QN AUTO: 28.1 PG (ref 26.5–33)
MCHC RBC AUTO-ENTMCNC: 30.1 G/DL (ref 31.5–36.5)
MCV RBC AUTO: 93 FL (ref 78–100)
MONOCYTES # BLD AUTO: 0.4 10E9/L (ref 0–1.3)
MONOCYTES NFR BLD AUTO: 2.2 %
NEUTROPHILS # BLD AUTO: 4 10E9/L (ref 1.6–8.3)
NEUTROPHILS NFR BLD AUTO: 24.3 %
NRBC # BLD AUTO: 0 10*3/UL
NRBC BLD AUTO-RTO: 0 /100
PLATELET # BLD AUTO: 315 10E9/L (ref 150–450)
PLATELET # BLD EST: ABNORMAL 10*3/UL
RBC # BLD AUTO: 3.81 10E12/L (ref 3.8–5.2)
RBC MORPH BLD: NORMAL
WBC # BLD AUTO: 16.3 10E9/L (ref 4–11)

## 2018-10-30 PROCEDURE — 36415 COLL VENOUS BLD VENIPUNCTURE: CPT | Performed by: SURGERY

## 2018-10-30 PROCEDURE — 85025 COMPLETE CBC W/AUTO DIFF WBC: CPT | Performed by: SURGERY

## 2018-10-30 PROCEDURE — 99243 OFF/OP CNSLTJ NEW/EST LOW 30: CPT | Performed by: SURGERY

## 2018-10-30 NOTE — LETTER
10/30/2018         RE: Kelly Vegas  [address] [place] [id number]        Dear Colleague,    Thank you for referring your patient, Kelly Vegas, to the [facility]. Please see a copy of my visit note below.    Surgical Consultation/History and Physical  [facility]    Kelly is seen in consultation for Cholelithiasis, at the request of Sejal Rutledge MD.    Chief Complaint:  Cholelithiasis    History of Present Illness: Kelly Vegas is a 68 year old female presents with ultrasound abnormalities showing cholelithiasis.  Patient was having routine ultrasound to follow her history of cervical cancer.  In process, wall thickening was found within the gallbladder.  Subsequent MRI was performed showing symmetric wall thickening, sludge, and gallstones.     She admits to no abdominal pain, nausea, vomiting, jaundice, scleral icterus, fevers, chills, history of pancreatitis, post-prandial abdominal pain.  She does admit to some occasional abdominal bloating which is transient and resolves spontaneously.  She has had no acholic stools.      Patient Active Problem List   Diagnosis     Hyperlipidemia with target LDL less than 100     Hypertension goal BP (blood pressure) < 140/90     History of cervical cancer     Advanced directives, counseling/discussion     Major depressive disorder, recurrent, mild (H)     Acquired hypothyroidism     Type 2 diabetes mellitus with diabetic polyneuropathy, with long-term current use of insulin (H)     Degenerative joint disease of hand     DDD (degenerative disc disease), lumbar     Osteopenia     Memory problem     Anemia, iron deficiency     Lymphoproliferative disorder (H)       Past Medical History:   Diagnosis Date     Arthritis check it     Cervical cancer (H) 3/2006    [facility], [facility] with gland involvement     Colon adenoma      DDD (degenerative disc disease), lumbar      Degenerative joint disease of hand      Depressive disorder       Diabetes mellitus type II      Diabetic polyneuropathy associated with type 2 diabetes mellitus (H) 9/26/2017     HTN (hypertension)      Hyperlipidemia LDL goal < 100      Hypothyroid 12/2004     Osteopenia      Recurrent major depression (H)        Past Surgical History:   Procedure Laterality Date     BIOPSY  before thyroid removal     C BSO, OMENTECTOMY W/JUAN DANIEL  2/2006    cervical cancer     C THYROIDECTOMY  12/2004    goiter     COLONOSCOPY  nov 2016     COLONOSCOPY WITH CO2 INSUFFLATION N/A 11/28/2016    Procedure: COLONOSCOPY WITH CO2 INSUFFLATION;  Surgeon: Migue Giraldo MD;  Location: [facility] OR     CYSTECTOMY OVARIAN BENIGN  1974     EYE SURGERY  12-14-16       Family History   Problem Relation Age of Onset     Thyroid Disease Mother      Dementia Mother      Osteoporosis Mother      Alcohol/Drug Father      C.A.D. Father      CABG      Diabetes Father      Coronary Artery Disease Father      Hypertension Father      Hyperlipidemia Father      Cerebrovascular Disease Father      Cancer Maternal Grandmother      stomach, colon     Breast Cancer Maternal Grandmother      Thyroid Disease Maternal Grandmother      Alzheimer Disease Maternal Grandfather      Breast Cancer Paternal Grandmother      Alcohol/Drug Brother      Prostate Cancer Brother      Alcohol/Drug Son      Depression Brother      Depression Brother      d. suicide     Breast Cancer Cousin      Breast Cancer Other        Social History   Substance Use Topics     Smoking status: Former Smoker     Packs/day: 0.50     Years: 20.00     Types: Cigarettes     Start date: 7/8/1967     Quit date: 1/1/1989     Smokeless tobacco: Never Used     Alcohol use No        History   Drug Use No       Current Outpatient Prescriptions   Medication Sig Dispense Refill     ASPIRIN 81 MG OR TABS Take 1 tablet by mouth daily.       atorvastatin (LIPITOR) 20 MG tablet Take 1 tablet (20 mg) by mouth daily 90 tablet 3     B Complex Vitamins (B COMPLEX PO)         blood glucose monitoring (NO BRAND SPECIFIED) test strip Use to test blood sugars 3 times daily or as directed 300 strip 3     Cholecalciferol (VITAMIN D3 PO) Take by mouth daily       citalopram (CELEXA) 20 MG tablet Take 1 tablet (20 mg) by mouth daily 90 tablet 0     ferrous sulfate (IRON) 325 (65 Fe) MG tablet Take 1 tablet (325 mg) by mouth 2 times daily 60 tablet 2     insulin glargine (LANTUS SOLOSTAR) 100 UNIT/ML pen Inject 15 units at bedtime daily. Increase insulin by 2 units every 3 days until AM blood glucose is less than 120. Maximum dose 50 units daily. 15 mL 0     insulin lispro (HUMALOG KWIKPEN) 100 UNIT/ML injection 5 units three times daily before meals 15 mL 0     insulin pen needle 31G X 6 MM Use twice daily or as directed. 300 each 3     levothyroxine (SYNTHROID/LEVOTHROID) 100 MCG tablet TAKE 1 TABLET (100 MCG) BY MOUTH DAILY 90 tablet 3     lisinopril-hydrochlorothiazide (PRINZIDE/ZESTORETIC) 10-12.5 MG per tablet Take 1 tablet by mouth daily 90 tablet 3     metFORMIN (GLUCOPHAGE) 1000 MG tablet Take 1 tablet (1,000 mg) by mouth 2 times daily (with meals) 180 tablet 3     nortriptyline (PAMELOR) 25 MG capsule TAKE 2-3 CAPSULES (50-75 MG) BY MOUTH NIGHTLY AS NEEDED 180 capsule 3     order for DME Blood glucose monitor per insurance formulary; blood glucose test strips One Touch Verio or per formulary for use 3 time daily; lancets per formulary for use 3 time daily 300 each 3       Allergies   Allergen Reactions     Glipizide      tremulous     Ibuprofen Hives     Penicillins Itching     Percocet [Acetaminophen] Nausea and Vomiting     Vicodin [Hydrocodone-Acetaminophen] Nausea       Review of Systems:   Constitutional - Denies fevers, weight loss, malaise, lethargy  Neuro - Denies tremors or seizures  Pulmon - Denies SOB, dyspnea, hemoptysis, chronic cough or use of an inhaler  CV - Denies CP, SOB, lower extremity edema, difficulty w/ stairs, has never used NTG  GI - Denies hematemesis, BRBPR,  "melena, chronic diarrhea or epigastric pain   - Denies hematuria, difficulty voiding, + history of cervical cancer  Hematology - Denies blood clotting disorders, + anemia  Dermatology - No melanomas or skin cancers  Rheumatology - No h/o RA  Pysch - Denies depression, bipolar d/o or schizophrenia    Physical Exam:  /70 (BP Location: Right arm, Patient Position: Sitting, Cuff Size: Adult Regular)  Pulse 90  Temp 98.8  F (37.1  C) (Oral)  Ht 1.619 m (5' 3.75\")  Wt 75.3 kg (166 lb)  BMI 28.72 kg/m2    Constitutional- No acute distress, well nourished, non-toxic  Eyes: Anicteric, no injection.  PERRL  ENT:  Normocephalic, atraumatic, Nose midline, moist mucus membranes  Neck - supple, no LAD, Thyroid smooth, symmetric, Carotids without bruits  Respiratory- Clear to auscultation bilaterally, good inspiratory effort  Cardiovascular - Heart RRR, no lift's, thrills, murmurs, rubs, or gallop.  No peripheral edema.  No clubbing.  Abdomen - Soft, non-tender, +BS, no hepatosplenomegaly, no palpable masses.  Well healed low midline laparotomy incision.  No hernia.  Neuro - No focal neuro deficits, Alert and oriented x 3  Psych: Appropriate mood and affect  Musculoskeletal: Normal gait, symmetric strength.  FROM upper and lower extremities.  Skin: Warm, Dry    Lab Results   Component Value Date    WBC 20.4 10/09/2018     Lab Results   Component Value Date    RBC 3.77 10/09/2018     Lab Results   Component Value Date    HGB 10.8 10/09/2018     Lab Results   Component Value Date    HCT 34.6 10/09/2018     Lab Results   Component Value Date    MCV 92 10/09/2018     Lab Results   Component Value Date    MCH 28.6 10/09/2018     Lab Results   Component Value Date    MCHC 31.2 10/09/2018     Lab Results   Component Value Date    RDW 14.5 10/09/2018     Lab Results   Component Value Date     10/09/2018     US Abdomen: IMPRESSION:    1. Liver and spleen sizes appear normal.  2. Indeterminate 2 regions of mildly " increased echogenicity within the  central gallbladder and gallbladder fundus. These could represent  areas of adherent sludge and potentially stones at the fundus region.  However, a gallbladder neoplastic etiology cannot be excluded.  Recommend MRI with contrast for further characterization.    MRI Abdomen: IMPRESSION:   1. No convincing evidence for an enhancing gallbladder mass.   2. There is sludge and stone material within a mildly distended  gallbladder, with mild diffuse gallbladder wall thickening. Please  correlate for clinical evidence of cholecystitis.     Assessment:  1. Calculus of gallbladder without cholecystitis without obstruction      Plan:   Mrs. Vegas presents for evaluation of cholelithiasis and abnormal ultrasound.  She appears asymptomatic from her cholelithiasis.  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  At this point, there is no convincing evidence that she requires cholecystectomy for symptoms and MRCP showed symmetric wall thickening without enhancing lesions or wall.  I discussed options of observation versus operative intervention with Mrs. Vegas.  After extensive discussion, she would prefer to not have surgery unless absolutely required.  Despite her lack of symptoms, she did have leukocytosis of unknown origin at her last visit.  As such I will repeat her CBC and CMP today.  I reviewed her imaging personally and with Dr. Colón.          Jorge Martinez DO on 10/30/2018 at 10:50 AM      Again, thank you for allowing me to participate in the care of your patient.        Sincerely,        Jorge Martinez DO

## 2018-10-30 NOTE — NURSING NOTE
"Initial /70 (BP Location: Right arm, Patient Position: Sitting, Cuff Size: Adult Regular)  Pulse 90  Temp 98.8  F (37.1  C) (Oral)  Ht 1.619 m (5' 3.75\")  Wt 75.3 kg (166 lb)  BMI 28.72 kg/m2 Estimated body mass index is 28.72 kg/(m^2) as calculated from the following:    Height as of this encounter: 1.619 m (5' 3.75\").    Weight as of this encounter: 75.3 kg (166 lb). .    Mary Grace Gonzalez MA    "

## 2018-10-30 NOTE — PROGRESS NOTES
Surgical Consultation/History and Physical  [facility]    Kelly is seen in consultation for Cholelithiasis, at the request of Sejal Rutledge MD.    Chief Complaint:  Cholelithiasis    History of Present Illness: Kelly Vegas is a 68 year old female presents with ultrasound abnormalities showing cholelithiasis.  Patient was having routine ultrasound to follow her history of cervical cancer.  In process, wall thickening was found within the gallbladder.  Subsequent MRI was performed showing symmetric wall thickening, sludge, and gallstones.     She admits to no abdominal pain, nausea, vomiting, jaundice, scleral icterus, fevers, chills, history of pancreatitis, post-prandial abdominal pain.  She does admit to some occasional abdominal bloating which is transient and resolves spontaneously.  She has had no acholic stools.      Patient Active Problem List   Diagnosis     Hyperlipidemia with target LDL less than 100     Hypertension goal BP (blood pressure) < 140/90     History of cervical cancer     Advanced directives, counseling/discussion     Major depressive disorder, recurrent, mild (H)     Acquired hypothyroidism     Type 2 diabetes mellitus with diabetic polyneuropathy, with long-term current use of insulin (H)     Degenerative joint disease of hand     DDD (degenerative disc disease), lumbar     Osteopenia     Memory problem     Anemia, iron deficiency     Lymphoproliferative disorder (H)       Past Medical History:   Diagnosis Date     Arthritis check it     Cervical cancer (H) 3/2006    [facility], [facility] with gland involvement     Colon adenoma      DDD (degenerative disc disease), lumbar      Degenerative joint disease of hand      Depressive disorder      Diabetes mellitus type II      Diabetic polyneuropathy associated with type 2 diabetes mellitus (H) 9/26/2017     HTN (hypertension)      Hyperlipidemia LDL goal < 100      Hypothyroid 12/2004     Osteopenia      Recurrent major  depression (H)        Past Surgical History:   Procedure Laterality Date     BIOPSY  before thyroid removal     C BSO, OMENTECTOMY W/JUAN DANIEL  2/2006    cervical cancer     C THYROIDECTOMY  12/2004    goiter     COLONOSCOPY  nov 2016     COLONOSCOPY WITH CO2 INSUFFLATION N/A 11/28/2016    Procedure: COLONOSCOPY WITH CO2 INSUFFLATION;  Surgeon: Migue Giraldo MD;  Location: [facility] OR     CYSTECTOMY OVARIAN BENIGN  1974     EYE SURGERY  12-14-16       Family History   Problem Relation Age of Onset     Thyroid Disease Mother      Dementia Mother      Osteoporosis Mother      Alcohol/Drug Father      C.A.D. Father      CABG      Diabetes Father      Coronary Artery Disease Father      Hypertension Father      Hyperlipidemia Father      Cerebrovascular Disease Father      Cancer Maternal Grandmother      stomach, colon     Breast Cancer Maternal Grandmother      Thyroid Disease Maternal Grandmother      Alzheimer Disease Maternal Grandfather      Breast Cancer Paternal Grandmother      Alcohol/Drug Brother      Prostate Cancer Brother      Alcohol/Drug Son      Depression Brother      Depression Brother      d. suicide     Breast Cancer Cousin      Breast Cancer Other        Social History   Substance Use Topics     Smoking status: Former Smoker     Packs/day: 0.50     Years: 20.00     Types: Cigarettes     Start date: 7/8/1967     Quit date: 1/1/1989     Smokeless tobacco: Never Used     Alcohol use No        History   Drug Use No       Current Outpatient Prescriptions   Medication Sig Dispense Refill     ASPIRIN 81 MG OR TABS Take 1 tablet by mouth daily.       atorvastatin (LIPITOR) 20 MG tablet Take 1 tablet (20 mg) by mouth daily 90 tablet 3     B Complex Vitamins (B COMPLEX PO)        blood glucose monitoring (NO BRAND SPECIFIED) test strip Use to test blood sugars 3 times daily or as directed 300 strip 3     Cholecalciferol (VITAMIN D3 PO) Take by mouth daily       citalopram (CELEXA) 20 MG tablet Take 1 tablet  (20 mg) by mouth daily 90 tablet 0     ferrous sulfate (IRON) 325 (65 Fe) MG tablet Take 1 tablet (325 mg) by mouth 2 times daily 60 tablet 2     insulin glargine (LANTUS SOLOSTAR) 100 UNIT/ML pen Inject 15 units at bedtime daily. Increase insulin by 2 units every 3 days until AM blood glucose is less than 120. Maximum dose 50 units daily. 15 mL 0     insulin lispro (HUMALOG KWIKPEN) 100 UNIT/ML injection 5 units three times daily before meals 15 mL 0     insulin pen needle 31G X 6 MM Use twice daily or as directed. 300 each 3     levothyroxine (SYNTHROID/LEVOTHROID) 100 MCG tablet TAKE 1 TABLET (100 MCG) BY MOUTH DAILY 90 tablet 3     lisinopril-hydrochlorothiazide (PRINZIDE/ZESTORETIC) 10-12.5 MG per tablet Take 1 tablet by mouth daily 90 tablet 3     metFORMIN (GLUCOPHAGE) 1000 MG tablet Take 1 tablet (1,000 mg) by mouth 2 times daily (with meals) 180 tablet 3     nortriptyline (PAMELOR) 25 MG capsule TAKE 2-3 CAPSULES (50-75 MG) BY MOUTH NIGHTLY AS NEEDED 180 capsule 3     order for DME Blood glucose monitor per insurance formulary; blood glucose test strips One Touch Verio or per formulary for use 3 time daily; lancets per formulary for use 3 time daily 300 each 3       Allergies   Allergen Reactions     Glipizide      tremulous     Ibuprofen Hives     Penicillins Itching     Percocet [Acetaminophen] Nausea and Vomiting     Vicodin [Hydrocodone-Acetaminophen] Nausea       Review of Systems:   Constitutional - Denies fevers, weight loss, malaise, lethargy  Neuro - Denies tremors or seizures  Pulmon - Denies SOB, dyspnea, hemoptysis, chronic cough or use of an inhaler  CV - Denies CP, SOB, lower extremity edema, difficulty w/ stairs, has never used NTG  GI - Denies hematemesis, BRBPR, melena, chronic diarrhea or epigastric pain   - Denies hematuria, difficulty voiding, + history of cervical cancer  Hematology - Denies blood clotting disorders, + anemia  Dermatology - No melanomas or skin cancers  Rheumatology -  "No h/o RA  Pysch - Denies depression, bipolar d/o or schizophrenia    Physical Exam:  /70 (BP Location: Right arm, Patient Position: Sitting, Cuff Size: Adult Regular)  Pulse 90  Temp 98.8  F (37.1  C) (Oral)  Ht 1.619 m (5' 3.75\")  Wt 75.3 kg (166 lb)  BMI 28.72 kg/m2    Constitutional- No acute distress, well nourished, non-toxic  Eyes: Anicteric, no injection.  PERRL  ENT:  Normocephalic, atraumatic, Nose midline, moist mucus membranes  Neck - supple, no LAD, Thyroid smooth, symmetric, Carotids without bruits  Respiratory- Clear to auscultation bilaterally, good inspiratory effort  Cardiovascular - Heart RRR, no lift's, thrills, murmurs, rubs, or gallop.  No peripheral edema.  No clubbing.  Abdomen - Soft, non-tender, +BS, no hepatosplenomegaly, no palpable masses.  Well healed low midline laparotomy incision.  No hernia.  Neuro - No focal neuro deficits, Alert and oriented x 3  Psych: Appropriate mood and affect  Musculoskeletal: Normal gait, symmetric strength.  FROM upper and lower extremities.  Skin: Warm, Dry    Lab Results   Component Value Date    WBC 20.4 10/09/2018     Lab Results   Component Value Date    RBC 3.77 10/09/2018     Lab Results   Component Value Date    HGB 10.8 10/09/2018     Lab Results   Component Value Date    HCT 34.6 10/09/2018     Lab Results   Component Value Date    MCV 92 10/09/2018     Lab Results   Component Value Date    MCH 28.6 10/09/2018     Lab Results   Component Value Date    MCHC 31.2 10/09/2018     Lab Results   Component Value Date    RDW 14.5 10/09/2018     Lab Results   Component Value Date     10/09/2018     US Abdomen: IMPRESSION:    1. Liver and spleen sizes appear normal.  2. Indeterminate 2 regions of mildly increased echogenicity within the  central gallbladder and gallbladder fundus. These could represent  areas of adherent sludge and potentially stones at the fundus region.  However, a gallbladder neoplastic etiology cannot be " excluded.  Recommend MRI with contrast for further characterization.    MRI Abdomen: IMPRESSION:   1. No convincing evidence for an enhancing gallbladder mass.   2. There is sludge and stone material within a mildly distended  gallbladder, with mild diffuse gallbladder wall thickening. Please  correlate for clinical evidence of cholecystitis.     Assessment:  1. Calculus of gallbladder without cholecystitis without obstruction      Plan:   Mrs. Vegas presents for evaluation of cholelithiasis and abnormal ultrasound.  She appears asymptomatic from her cholelithiasis.  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  At this point, there is no convincing evidence that she requires cholecystectomy for symptoms and MRCP showed symmetric wall thickening without enhancing lesions or wall.  I discussed options of observation versus operative intervention with Mrs. Vegas.  After extensive discussion, she would prefer to not have surgery unless absolutely required.  Despite her lack of symptoms, she did have leukocytosis of unknown origin at her last visit.  As such I will repeat her CBC and CMP today.  I reviewed her imaging personally and with Dr. Colón.          Jorge Martinez,  on 10/30/2018 at 10:50 AM

## 2018-10-30 NOTE — MR AVS SNAPSHOT
After Visit Summary   10/30/2018    Kelly Vegas    MRN: [id number]           Patient Information     Date Of Birth          1950        Visit Information        Provider Department      10/30/2018 11:00 AM Jorge Martinez,  [facility]        Today's Diagnoses     Calculus of gallbladder without cholecystitis without obstruction    -  1      Care Instructions    1. Obtain labs  2. Monitor for symptoms  3. Please call with questions or concerns          Follow-ups after your visit        Follow-up notes from your care team     Return if symptoms worsen or fail to improve.      Your next 10 appointments already scheduled     Nov 12, 2018  3:30 PM CST   Return Visit with Ngozi De Los Santos MD   [facility] ([facility])    [facility]  [address] [facility] [address]  [facility] [address]   [phone]            Dec 13, 2018  7:00 AM CST   LAB with [facility]   [facility] ([facility]    [address] [facility] [address]   [phone]           Please do not eat 10-12 hours before your appointment if you are coming in fasting for labs on lipids, cholesterol, or glucose (sugar). This does not apply to pregnant women. Water, hot tea and black coffee (with nothing added) are okay. Do not drink other fluids, diet soda or chew gum.            Dec 17, 2018  2:20 PM CST   SHORT with Sejal Rutledge MD   [facility] ([facility])    [address] [facility] [address]   [phone]              Future tests that were ordered for you today     Open Future Orders        Priority Expected Expires Ordered    **Comprehensive metabolic panel FUTURE anytime Routine 10/30/2018 10/30/2019 10/30/2018            Who to contact     If you have questions or need follow up information about today's clinic visit or your schedule please contact [facility] directly at  "[phone].  Normal or non-critical lab and imaging results will be communicated to you by MyChart, letter or phone within 4 business days after the clinic has received the results. If you do not hear from us within 7 days, please contact the clinic through [employer] or phone. If you have a critical or abnormal lab result, we will notify you by phone as soon as possible.  Submit refill requests through [employer] or call your pharmacy and they will forward the refill request to us. Please allow 3 business days for your refill to be completed.          Additional Information About Your Visit        [employer] Information     [employer] gives you secure access to your electronic health record. If you see a primary care provider, you can also send messages to your care team and make appointments. If you have questions, please call your primary care clinic.  If you do not have a primary care provider, please call [phone] and they will assist you.        Care EveryWhere ID     This is your Care EveryWhere ID. This could be used by other organizations to access your [place] medical records  [id number]        Your Vitals Were     Pulse Temperature Height BMI (Body Mass Index)          90 98.8  F (37.1  C) (Oral) 1.619 m (5' 3.75\") 28.72 kg/m2         Blood Pressure from Last 3 Encounters:   10/30/18 134/70   10/15/18 126/57   09/24/18 128/62    Weight from Last 3 Encounters:   10/30/18 75.3 kg (166 lb)   10/15/18 75.7 kg (166 lb 12.8 oz)   09/24/18 77.1 kg (170 lb)              We Performed the Following     CBC with platelets and differential        Primary Care Provider Office Phone # Fax #    Sejal Rutledge [phone]       [address] [place] [address] [place] [address]        Equal Access to Services     [facility]: Hadii latasha escalanteo Soshea, waaxda luqadaha, qaybta kaalmada emre, noel espinosa. So [facility] [phone].    ATENCIÓN: Si habla español, tiene a sauceda " disposición servicios gratuitos de asistencia lingüística. Christopher barros [phone].    We comply with applicable federal civil rights laws and [place] laws. We do not discriminate on the basis of race, color, national origin, age, disability, sex, sexual orientation, or gender identity.            Thank you!     Thank you for choosing [facility]  for your care. Our goal is always to provide you with excellent care. Hearing back from our patients is one way we can continue to improve our services. Please take a few minutes to complete the written survey that you may receive in the mail after your visit with us. Thank you!             Your Updated Medication List - Protect others around you: Learn how to safely use, store and throw away your medicines at www.disposemymeds.org.          This list is accurate as of 10/30/18 11:20 AM.  Always use your most recent med list.                   Brand Name Dispense Instructions for use Diagnosis    aspirin 81 MG tablet      Take 1 tablet by mouth daily.        atorvastatin 20 MG tablet    LIPITOR    90 tablet    Take 1 tablet (20 mg) by mouth daily    Hyperlipidemia with target LDL less than 100       B COMPLEX PO           blood glucose monitoring test strip    no brand specified    300 strip    Use to test blood sugars 3 times daily or as directed    Uncontrolled type 2 diabetes mellitus without complication, with long-term current use of insulin (H)       citalopram 20 MG tablet    celeXA    90 tablet    Take 1 tablet (20 mg) by mouth daily    Major depressive disorder, recurrent, mild (H)       ferrous sulfate 325 (65 Fe) MG tablet    IRON    60 tablet    Take 1 tablet (325 mg) by mouth 2 times daily    Other iron deficiency anemia       insulin glargine 100 UNIT/ML injection    LANTUS SOLOSTAR    15 mL    Inject 15 units at bedtime daily. Increase insulin by 2 units every 3 days until AM blood glucose is less than 120. Maximum dose 50 units daily.    Type 2  diabetes mellitus with diabetic polyneuropathy, with long-term current use of insulin (H)       insulin lispro 100 UNIT/ML injection    HumaLOG KWIKpen    15 mL    5 units three times daily before meals    Type 2 diabetes mellitus with diabetic polyneuropathy, with long-term current use of insulin (H)       insulin pen needle 31G X 6 MM     300 each    Use twice daily or as directed.    Type 2 diabetes mellitus with diabetic polyneuropathy, with long-term current use of insulin (H)       levothyroxine 100 MCG tablet    SYNTHROID/LEVOTHROID    90 tablet    TAKE 1 TABLET (100 MCG) BY MOUTH DAILY    Acquired hypothyroidism       lisinopril-hydrochlorothiazide 10-12.5 MG per tablet    PRINZIDE/ZESTORETIC    90 tablet    Take 1 tablet by mouth daily    Hypertension goal BP (blood pressure) < 140/90       metFORMIN 1000 MG tablet    GLUCOPHAGE    180 tablet    Take 1 tablet (1,000 mg) by mouth 2 times daily (with meals)    Type 2 diabetes mellitus with diabetic polyneuropathy, with long-term current use of insulin (H)       nortriptyline 25 MG capsule    PAMELOR    180 capsule    TAKE 2-3 CAPSULES (50-75 MG) BY MOUTH NIGHTLY AS NEEDED    Major depressive disorder, recurrent, mild (H)       order for DME     300 each    Blood glucose monitor per insurance formulary; blood glucose test strips One Touch Verio or per formulary for use 3 time daily; lancets per formulary for use 3 time daily    Diabetes type 2, uncontrolled (H), Encounter for long-term (current) use of insulin (H)       VITAMIN D3 PO      Take by mouth daily

## 2018-10-31 NOTE — TELEPHONE ENCOUNTER
"Patient was seen in consult with Dr. Martinez for gallbladder issues. Dr. Martinez recommended   \"Plan: Mrs. Vegas presents for evaluation of cholelithiasis and abnormal ultrasound.  She appears asymptomatic from her cholelithiasis.  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  At this point, there is no convincing evidence that she requires cholecystectomy for symptoms and MRCP showed symmetric wall thickening without enhancing lesions or wall.  I discussed options of observation versus operative intervention with Mrs. Vegas.  After extensive discussion, she would prefer to not have surgery unless absolutely required.  Despite her lack of symptoms, she did have leukocytosis of unknown origin at her last visit.  As such I will repeat her CBC and CMP today.  I reviewed her imaging personally and with Dr. Colón.\"    Patient has opted to not proceed and will monitor her symptoms at this time.  "

## 2018-11-01 ENCOUNTER — TELEPHONE (OUTPATIENT)
Dept: SURGERY | Facility: CLINIC | Age: 68
End: 2018-11-01

## 2018-11-01 DIAGNOSIS — K80.20 CALCULUS OF GALLBLADDER WITHOUT CHOLECYSTITIS WITHOUT OBSTRUCTION: ICD-10-CM

## 2018-11-01 LAB
ALBUMIN SERPL-MCNC: 3.8 G/DL (ref 3.4–5)
ALP SERPL-CCNC: 96 U/L (ref 40–150)
ALT SERPL W P-5'-P-CCNC: 20 U/L (ref 0–50)
ANION GAP SERPL CALCULATED.3IONS-SCNC: 7 MMOL/L (ref 3–14)
AST SERPL W P-5'-P-CCNC: 18 U/L (ref 0–45)
BILIRUB SERPL-MCNC: 0.5 MG/DL (ref 0.2–1.3)
BUN SERPL-MCNC: 22 MG/DL (ref 7–30)
CALCIUM SERPL-MCNC: 8.9 MG/DL (ref 8.5–10.1)
CHLORIDE SERPL-SCNC: 101 MMOL/L (ref 94–109)
CO2 SERPL-SCNC: 27 MMOL/L (ref 20–32)
CREAT SERPL-MCNC: 0.97 MG/DL (ref 0.52–1.04)
GFR SERPL CREATININE-BSD FRML MDRD: 57 ML/MIN/1.7M2
GLUCOSE SERPL-MCNC: 242 MG/DL (ref 70–99)
POTASSIUM SERPL-SCNC: 3.6 MMOL/L (ref 3.4–5.3)
PROT SERPL-MCNC: 7.1 G/DL (ref 6.8–8.8)
SODIUM SERPL-SCNC: 135 MMOL/L (ref 133–144)

## 2018-11-01 PROCEDURE — 36415 COLL VENOUS BLD VENIPUNCTURE: CPT | Performed by: SURGERY

## 2018-11-01 PROCEDURE — 80053 COMPREHEN METABOLIC PANEL: CPT | Performed by: SURGERY

## 2018-11-01 NOTE — TELEPHONE ENCOUNTER
Jorge Martinez, DO  P Fl Ashley Mosher Rn Pool                   Can you please call Mrs. Vegas and have her go to any [facility] lab for blood draw, they did not perform one of the labs I ordered.  Thank you.     Brian              I left a message for Kelly to return our call for a message from Dr Martinez. Lenora MORENO RN

## 2018-11-11 NOTE — PROGRESS NOTES
HEMATOLOGY FOLLOW UP VISIT     PRIMARY PHYSICIAN:  Sejal Rutledge MD      CHIEF COMPLAINT AND REASON FOR VISIT:  Normocytic normochromic iron-deficiency anemia and leukocytosis/lymphocytosis new in 09/2018.      HISTORY OF PRESENT ILLNESS:  She presented to us 10/2018 at age 68 due to new onset of moderate lymphocytosis/leukocytosis, white count of 21 and absolute lymphocyte count of 15-16 with new onset of normocytic anemia, hemoglobin around 11, MCV normal.  She has normal B12, folic acid.  Ferritin is low at 7 with iron saturation at 10%.  She was started on oral iron supplementation iron sulfate 325 prior to seeing us.      Data review indicating she had touch of leukocytosis no diff back in 2006.  She was not anemic back in 2006 with hemoglobin of 12.5.      In 10/2018, she presented with Hematology office for consultation on new moderate leukocytosis/lymphocytosis and moderate normocytic normochromic anemia, found to have low ferritin and iron saturation, which is suggestive of iron-deficiency anemia.      She claims she is in her usual state of health.  She has her diabetes now well controlled.  She is not a red meat eater.  She denied any bleeding episode.  She has no weight loss.  No swollen glands.  No frequent infections.     Hem work up in 10/2018 is most consistent with CLL and DEBORA. She is on oral iron supplement.      PAST MEDICAL HISTORY:  History of cervical cancer, major depression, hypertension, hyperlipidemia, osteopenia, degenerative disk problem, type 2 diabetes, acquired hypothyroidism.      MEDICATIONS:  Reviewed in Epic system.      SOCIAL HISTORY:  She lives in [place].  Denies smoking, denies alcohol abuse.      FAMILY HISTORY:  Denied any family history of blood disease.  One maternal aunt had colon cancer.        REVIEW OF SYSTEMS:   She feels fine, tolerating oral iron fine so far.      PHYSICAL EXAMINATION:   VITAL SIGNS:  Blood pressure 128/40, pulse 84, temperature 97.8  F (36.6  " C), temperature source Tympanic, resp. rate 16, height 1.619 m (5' 3.75\"), weight 76.6 kg (168 lb 12.8 oz), SpO2 98 %, not currently breastfeeding.      ECOG 0    GENERAL APPEARANCE:  Elderly, looks much younger than her stated age, very pleasant, not in acute distress.     HEENT: The patient is normocephalic, atraumatic. Pupils are equally reactive to light.  Sclerae are anicteric.  Moist oral mucosa.  Negative pharynx.  No oral thrush.   NECK:  Supple.  No jugular venous distention.  Thyroid is not palpable.   LYMPH NODES:  Superficial lymphadenopathy is not appreciable in the bilateral cervical, supraclavicular, axillary or inguinal areas.   CARDIOVASCULAR:  S1, S2 regular with no murmurs or gallops.  No carotid or abdominal bruits.   PULMONARY:  Lungs are clear to auscultation and percussion bilaterally.  There is no wheezing or rhonchi.   GASTROINTESTINAL:  Abdomen is soft, nontender.  No hepatosplenomegaly.  No signs of ascites.  No mass appreciable.   MUSCULOSKELETAL AND EXTREMITIES:  No edema.  No cyanotic changes.  No signs of joint deformity.  No lymphedema.  No spinal or paraspinal tenderness.  No CVA tenderness.   NEUROLOGIC:  Cranial nerves II-XII are grossly intact.  Sensation intact.  Muscle strength and muscle tone symmetrical, 5/5 throughout.   SKIN:  No petechiae.  No rash.  No signs of cellulitis.       CURRENT LAB DATA REVIEWED:   Flow 10/2018 - CD5 postiive kappa monotypric B cell 56% with immunophenotype of CLL/SLL  ESR/LDH/Uric acid nl,     09/2018 white count of 21 and absolute lymphocyte count of 15-16 with new onset of normocytic anemia, hemoglobin around 11, MCV normal.    Smear 10/2018 - Peripheral Smear Morphology:   - Leukocytosis with absolute lymphocytosis and \"basket\" cells - see   comment   - Mild normocytic normochromic anemia; no morphologic evidence of   hemolysis.     Normal B12, folic acid.   Ferritin is low at 7 with iron saturation at 10%.     Current Image data reviewed  US " abs 10/16/2018 - 1. Liver and spleen sizes appear normal.  2. Indeterminate 2 regions of mildly increased echogenicity within the  central gallbladder and gallbladder fundus. These could represent  areas of adherent sludge and potentially stones at the fundus region.  MRI was negative later.         OLD DATA REVIEWED TODAY WITH SUMMARY:    touch of leukocytosis no diff back in 2006.  She was not anemic back in 2006 with hemoglobin of 12.5.        ASSESSMENT AND PLAN:   1.  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   Work up is most suggestive of CLL.   Recommend FISH study to subtype it.       We discussed the nature of CLL, and follow up plan.     2.  Relatively new onset of moderate iron-deficiency anemia, normocytic, normochromic. She has DEBORA component.     She has started on oral iron. Will see how she respond to this.     The reason of DEBORA also needs to be investigated.     Will see her in 1 month with further tx and work up plan.     Total time is 25 minutes, more than 15 minutes spent in counseling regarding her work up results, new dx, tx and follow up plan.

## 2018-11-12 ENCOUNTER — ONCOLOGY VISIT (OUTPATIENT)
Dept: ONCOLOGY | Facility: CLINIC | Age: 68
End: 2018-11-12
Attending: INTERNAL MEDICINE
Payer: COMMERCIAL

## 2018-11-12 VITALS
BODY MASS INDEX: 28.82 KG/M2 | DIASTOLIC BLOOD PRESSURE: 40 MMHG | OXYGEN SATURATION: 98 % | WEIGHT: 168.8 LBS | RESPIRATION RATE: 16 BRPM | HEIGHT: 64 IN | TEMPERATURE: 97.8 F | SYSTOLIC BLOOD PRESSURE: 128 MMHG | HEART RATE: 84 BPM

## 2018-11-12 DIAGNOSIS — D64.9 ANEMIA, UNSPECIFIED TYPE: Primary | ICD-10-CM

## 2018-11-12 DIAGNOSIS — C91.10 CLL (CHRONIC LYMPHOCYTIC LEUKEMIA) (H): ICD-10-CM

## 2018-11-12 PROCEDURE — G0463 HOSPITAL OUTPT CLINIC VISIT: HCPCS

## 2018-11-12 PROCEDURE — 99214 OFFICE O/P EST MOD 30 MIN: CPT | Performed by: INTERNAL MEDICINE

## 2018-11-12 ASSESSMENT — PAIN SCALES - GENERAL: PAINLEVEL: NO PAIN (0)

## 2018-11-12 NOTE — PATIENT INSTRUCTIONS
We would like to see you back in clinic with Dr. De Los Santos in 1 month with labs prior. Please continue to take your oral iron.      When you are in need of a refill, please call your pharmacy and they will send us a request.      Copy of appointments, and after visit summary (AVS) given to patient.      If you have any questions during business hours (M-F 8 AM- 4PM), please call Glenys Norman RN, BSN, OCN Oncology Hematology /Breast Cancer Navigator at [facility] [phone].       For questions after business hours, or on holidays/weekends, please call our after hours Nurse Triage line [phone]. Thank you.        Continue oral iron supplement and recheck in 1 month with labs.

## 2018-11-12 NOTE — LETTER
11/12/2018         RE: Kelly Vegas  [address] [place] [id number]        Dear Colleague,    Thank you for referring your patient, Kelly Vegas, to the [facility]. Please see a copy of my visit note below.    HEMATOLOGY FOLLOW UP VISIT     PRIMARY PHYSICIAN:  Sejal Rutledge MD      CHIEF COMPLAINT AND REASON FOR VISIT:  Normocytic normochromic iron-deficiency anemia and leukocytosis/lymphocytosis new in 09/2018.      HISTORY OF PRESENT ILLNESS:  She presented to us 10/2018 at age 68 due to new onset of moderate lymphocytosis/leukocytosis, white count of 21 and absolute lymphocyte count of 15-16 with new onset of normocytic anemia, hemoglobin around 11, MCV normal.  She has normal B12, folic acid.  Ferritin is low at 7 with iron saturation at 10%.  She was started on oral iron supplementation iron sulfate 325 prior to seeing us.      Data review indicating she had touch of leukocytosis no diff back in 2006.  She was not anemic back in 2006 with hemoglobin of 12.5.      In 10/2018, she presented with Hematology office for consultation on new moderate leukocytosis/lymphocytosis and moderate normocytic normochromic anemia, found to have low ferritin and iron saturation, which is suggestive of iron-deficiency anemia.      She claims she is in her usual state of health.  She has her diabetes now well controlled.  She is not a red meat eater.  She denied any bleeding episode.  She has no weight loss.  No swollen glands.  No frequent infections.     Hem work up in 10/2018 is most consistent with CLL and DEBORA. She is on oral iron supplement.      PAST MEDICAL HISTORY:  History of cervical cancer, major depression, hypertension, hyperlipidemia, osteopenia, degenerative disk problem, type 2 diabetes, acquired hypothyroidism.      MEDICATIONS:  Reviewed in Epic system.      SOCIAL HISTORY:  She lives in [place].  Denies smoking, denies alcohol abuse.      FAMILY HISTORY:  Denied any family  "history of blood disease.  One maternal aunt had colon cancer.        REVIEW OF SYSTEMS:   She feels fine, tolerating oral iron fine so far.      PHYSICAL EXAMINATION:   VITAL SIGNS:  Blood pressure 128/40, pulse 84, temperature 97.8  F (36.6  C), temperature source Tympanic, resp. rate 16, height 1.619 m (5' 3.75\"), weight 76.6 kg (168 lb 12.8 oz), SpO2 98 %, not currently breastfeeding.      ECOG 0    GENERAL APPEARANCE:  Elderly, looks much younger than her stated age, very pleasant, not in acute distress.     HEENT: The patient is normocephalic, atraumatic. Pupils are equally reactive to light.  Sclerae are anicteric.  Moist oral mucosa.  Negative pharynx.  No oral thrush.   NECK:  Supple.  No jugular venous distention.  Thyroid is not palpable.   LYMPH NODES:  Superficial lymphadenopathy is not appreciable in the bilateral cervical, supraclavicular, axillary or inguinal areas.   CARDIOVASCULAR:  S1, S2 regular with no murmurs or gallops.  No carotid or abdominal bruits.   PULMONARY:  Lungs are clear to auscultation and percussion bilaterally.  There is no wheezing or rhonchi.   GASTROINTESTINAL:  Abdomen is soft, nontender.  No hepatosplenomegaly.  No signs of ascites.  No mass appreciable.   MUSCULOSKELETAL AND EXTREMITIES:  No edema.  No cyanotic changes.  No signs of joint deformity.  No lymphedema.  No spinal or paraspinal tenderness.  No CVA tenderness.   NEUROLOGIC:  Cranial nerves II-XII are grossly intact.  Sensation intact.  Muscle strength and muscle tone symmetrical, 5/5 throughout.   SKIN:  No petechiae.  No rash.  No signs of cellulitis.       CURRENT LAB DATA REVIEWED:   Flow 10/2018 - CD5 postiive kappa monotypric B cell 56% with immunophenotype of CLL/SLL  ESR/LDH/Uric acid nl,     09/2018 white count of 21 and absolute lymphocyte count of 15-16 with new onset of normocytic anemia, hemoglobin around 11, MCV normal.    Smear 10/2018 - Peripheral Smear Morphology:   - Leukocytosis with absolute " "lymphocytosis and \"basket\" cells - see   comment   - Mild normocytic normochromic anemia; no morphologic evidence of   hemolysis.     Normal B12, folic acid.   Ferritin is low at 7 with iron saturation at 10%.     Current Image data reviewed  US abs 10/16/2018 - 1. Liver and spleen sizes appear normal.  2. Indeterminate 2 regions of mildly increased echogenicity within the  central gallbladder and gallbladder fundus. These could represent  areas of adherent sludge and potentially stones at the fundus region.  MRI was negative later.         OLD DATA REVIEWED TODAY WITH SUMMARY:    touch of leukocytosis no diff back in 2006.  She was not anemic back in 2006 with hemoglobin of 12.5.        ASSESSMENT AND PLAN:   1.  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   Work up is most suggestive of CLL.   Recommend FISH study to subtype it.       We discussed the nature of CLL, and follow up plan.     2.  Relatively new onset of moderate iron-deficiency anemia, normocytic, normochromic. She has DEBORA component.     She has started on oral iron. Will see how she respond to this.     The reason of DEBORA also needs to be investigated.     Will see her in 1 month with further tx and work up plan.     Total time is 25 minutes, more than 15 minutes spent in counseling regarding her work up results, new dx, tx and follow up plan.                   Again, thank you for allowing me to participate in the care of your patient.        Sincerely,        Ngozi De Los Santos MD, MD    "

## 2018-11-12 NOTE — MR AVS SNAPSHOT
After Visit Summary   11/12/2018    Kelly Vegas    MRN: [id number]           Patient Information     Date Of Birth          1950        Visit Information        Provider Department      11/12/2018 3:30 PM Ngozi De Los Santos MD [facility]        Today's Diagnoses     Anemia, unspecified type    -  1    CLL (chronic lymphocytic leukemia) (H)          Care Instructions    Continue oral iron supplement and recheck in 1 month with labs.           Follow-ups after your visit        Your next 10 appointments already scheduled     Dec 05, 2018  3:30 PM CST   LAB with LL LAB   [facility] ([facility])    [address] [address]   [phone]           Please do not eat 10-12 hours before your appointment if you are coming in fasting for labs on lipids, cholesterol, or glucose (sugar). This does not apply to pregnant women. Water, hot tea and black coffee (with nothing added) are okay. Do not drink other fluids, diet soda or chew gum.            Dec 10, 2018  3:15 PM CST   Return Visit with Ngozi De Los Santos MD   [facility] ([facility])    [facility]  [address]  [facility] [place] [address]   [phone]            Dec 13, 2018  7:00 AM CST   LAB with FZ LAB   [facility]    [address] [address]   [phone]           Please do not eat 10-12 hours before your appointment if you are coming in fasting for labs on lipids, cholesterol, or glucose (sugar). This does not apply to pregnant women. Water, hot tea and black coffee (with nothing added) are okay. Do not drink other fluids, diet soda or chew gum.            Dec 17, 2018  2:20 PM CST   SHORT with Sejal Rutledge MD   [facility]    [address] [address]   [phone]              Future tests that were ordered for you  "today     Open Future Orders        Priority Expected Expires Ordered    CBC with platelets differential Routine 12/1/2018 12/31/2018 11/12/2018    FISH With Professional Interpretation Routine 12/1/2018 12/31/2018 11/12/2018    Ferritin Routine 12/1/2018 12/31/2018 11/12/2018            Who to contact     If you have questions or need follow up information about today's clinic visit or your schedule please contact [facility] directly at [phone].  Normal or non-critical lab and imaging results will be communicated to you by Smartsheethart, letter or phone within 4 business days after the clinic has received the results. If you do not hear from us within 7 days, please contact the clinic through TouchBistrot or phone. If you have a critical or abnormal lab result, we will notify you by phone as soon as possible.  Submit refill requests through Splash.FM or call your pharmacy and they will forward the refill request to us. Please allow 3 business days for your refill to be completed.          Additional Information About Your Visit        Splash.FM Information     Splash.FM gives you secure access to your electronic health record. If you see a primary care provider, you can also send messages to your care team and make appointments. If you have questions, please call your primary care clinic.  If you do not have a primary care provider, please call [phone] and they will assist you.        Care EveryWhere ID     This is your Care EveryWhere ID. This could be used by other organizations to access your [place] medical records  [id number]        Your Vitals Were     Pulse Temperature Respirations Height Pulse Oximetry Breastfeeding?    84 97.8  F (36.6  C) (Tympanic) 16 1.619 m (5' 3.75\") 98% No    BMI (Body Mass Index)                   29.2 kg/m2            Blood Pressure from Last 3 Encounters:   11/12/18 128/40   10/30/18 134/70   10/15/18 126/57    Weight from Last 3 Encounters:   11/12/18 76.6 kg (168 lb 12.8 " oz)   10/30/18 75.3 kg (166 lb)   10/15/18 75.7 kg (166 lb 12.8 oz)               Primary Care Provider Office Phone # Fax #    Sejal Rutledge [phone] [address]  [place] [address]        Equal Access to Services     ELISSA VELASQUEZ : Hadii aad ku hadasho Soomaali, waaxda luqadaha, qaybta kaalmada adeegyada, waxay idiin hayaan adeeg kharash la'aan . So [facility] [phone].    ATENCIÓN: Si habla español, tiene a sauceda disposición servicios gratuitos de asistencia lingüística. Llame al [phone].    We comply with applicable federal civil rights laws and [place] laws. We do not discriminate on the basis of race, color, national origin, age, disability, sex, sexual orientation, or gender identity.            Thank you!     Thank you for choosing [facility]  for your care. Our goal is always to provide you with excellent care. Hearing back from our patients is one way we can continue to improve our services. Please take a few minutes to complete the written survey that you may receive in the mail after your visit with us. Thank you!             Your Updated Medication List - Protect others around you: Learn how to safely use, store and throw away your medicines at www.disposemymeds.org.          This list is accurate as of 11/12/18  4:14 PM.  Always use your most recent med list.                   Brand Name Dispense Instructions for use Diagnosis    aspirin 81 MG tablet      Take 1 tablet by mouth daily.        atorvastatin 20 MG tablet    LIPITOR    90 tablet    Take 1 tablet (20 mg) by mouth daily    Hyperlipidemia with target LDL less than 100       B COMPLEX PO           blood glucose monitoring test strip    no brand specified    300 strip    Use to test blood sugars 3 times daily or as directed    Uncontrolled type 2 diabetes mellitus without complication, with long-term current use of insulin (H)       citalopram 20 MG tablet    celeXA    90 tablet    Take 1 tablet  (20 mg) by mouth daily    Major depressive disorder, recurrent, mild (H)       ferrous sulfate 325 (65 Fe) MG tablet    IRON    60 tablet    Take 1 tablet (325 mg) by mouth 2 times daily    Other iron deficiency anemia       insulin glargine 100 UNIT/ML injection    LANTUS SOLOSTAR    15 mL    Inject 15 units at bedtime daily. Increase insulin by 2 units every 3 days until AM blood glucose is less than 120. Maximum dose 50 units daily.    Type 2 diabetes mellitus with diabetic polyneuropathy, with long-term current use of insulin (H)       insulin lispro 100 UNIT/ML injection    HumaLOG KWIKpen    15 mL    5 units three times daily before meals    Type 2 diabetes mellitus with diabetic polyneuropathy, with long-term current use of insulin (H)       insulin pen needle 31G X 6 MM     300 each    Use twice daily or as directed.    Type 2 diabetes mellitus with diabetic polyneuropathy, with long-term current use of insulin (H)       levothyroxine 100 MCG tablet    SYNTHROID/LEVOTHROID    90 tablet    TAKE 1 TABLET (100 MCG) BY MOUTH DAILY    Acquired hypothyroidism       lisinopril-hydrochlorothiazide 10-12.5 MG per tablet    PRINZIDE/ZESTORETIC    90 tablet    Take 1 tablet by mouth daily    Hypertension goal BP (blood pressure) < 140/90       metFORMIN 1000 MG tablet    GLUCOPHAGE    180 tablet    Take 1 tablet (1,000 mg) by mouth 2 times daily (with meals)    Type 2 diabetes mellitus with diabetic polyneuropathy, with long-term current use of insulin (H)       nortriptyline 25 MG capsule    PAMELOR    180 capsule    TAKE 2-3 CAPSULES (50-75 MG) BY MOUTH NIGHTLY AS NEEDED    Major depressive disorder, recurrent, mild (H)       order for [facility]     300 each    Blood glucose monitor per insurance formulary; blood glucose test strips One Touch Verio or per formulary for use 3 time daily; lancets per formulary for use 3 time daily    Diabetes type 2, uncontrolled (H), Encounter for long-term (current) use of insulin (H)        VITAMIN D3 PO      Take by mouth daily

## 2018-11-12 NOTE — NURSING NOTE
"Oncology Rooming Note    November 12, 2018 3:29 PM   Kelly Vegas is a 68 year old female who presents for:    Chief Complaint   Patient presents with     Hematology     WBC abnormality, neutrophilia, anemia.      Initial Vitals: /40 (BP Location: Right arm, Patient Position: Sitting, Cuff Size: Adult Large)  Pulse 84  Temp 97.8  F (36.6  C) (Tympanic)  Resp 16  Ht 1.619 m (5' 3.75\")  Wt 76.6 kg (168 lb 12.8 oz)  SpO2 98%  Breastfeeding? No  BMI 29.2 kg/m2 Estimated body mass index is 29.2 kg/(m^2) as calculated from the following:    Height as of this encounter: 1.619 m (5' 3.75\").    Weight as of this encounter: 76.6 kg (168 lb 12.8 oz). Body surface area is 1.86 meters squared.  No Pain (0) Comment: Data Unavailable   No LMP recorded. Patient has had a hysterectomy.  Allergies reviewed: Yes  Medications reviewed: Yes    Medications: Medication refills not needed today.  Pharmacy name entered into [employer]: [id number] - [place] - [address] [place]    Clinical concerns: Follow up WBC abnormality, neutrophilia, anemia.     8 minutes for nursing intake (face to face time)     Lexis Jacobs CMA            "

## 2018-11-20 ENCOUNTER — TRANSFERRED RECORDS (OUTPATIENT)
Dept: HEALTH INFORMATION MANAGEMENT | Facility: CLINIC | Age: 68
End: 2018-11-20

## 2018-11-27 DIAGNOSIS — F33.0 MAJOR DEPRESSIVE DISORDER, RECURRENT, MILD (H): ICD-10-CM

## 2018-11-28 RX ORDER — NORTRIPTYLINE HCL 25 MG
CAPSULE ORAL
Qty: 90 CAPSULE | Refills: 0 | Status: SHIPPED | OUTPATIENT
Start: 2018-11-28 | End: 2019-03-26

## 2018-11-28 NOTE — TELEPHONE ENCOUNTER
Routing refill request to provider for review/approval because:  Labs out of range:  PHQ-9 was 8 on 9/24/2018    Ceci Teran RN

## 2018-12-05 DIAGNOSIS — C91.10 CLL (CHRONIC LYMPHOCYTIC LEUKEMIA) (H): ICD-10-CM

## 2018-12-05 DIAGNOSIS — D64.9 ANEMIA, UNSPECIFIED TYPE: ICD-10-CM

## 2018-12-05 LAB
DIFFERENTIAL METHOD BLD: ABNORMAL
EOSINOPHIL # BLD AUTO: 0.2 10E9/L (ref 0–0.7)
EOSINOPHIL NFR BLD AUTO: 1 %
ERYTHROCYTE [DISTWIDTH] IN BLOOD BY AUTOMATED COUNT: 52.2 % (ref 10–15)
FERRITIN SERPL-MCNC: 19 NG/ML (ref 8–252)
HCT VFR BLD AUTO: 38.8 % (ref 35–47)
HGB BLD-MCNC: 12 G/DL (ref 11.7–15.7)
LYMPHOCYTES # BLD AUTO: 13.4 10E9/L (ref 0.8–5.3)
LYMPHOCYTES NFR BLD AUTO: 60 %
MCH RBC QN AUTO: 29.1 PG (ref 26.5–33)
MCHC RBC AUTO-ENTMCNC: 30.9 G/DL (ref 31.5–36.5)
MCV RBC AUTO: 93 FL (ref 78–100)
MONOCYTES # BLD AUTO: 0.4 10E9/L (ref 0–1.3)
MONOCYTES NFR BLD AUTO: 2 %
NEUTROPHILS # BLD AUTO: 8.2 10E9/L (ref 1.6–8.3)
NEUTROPHILS NFR BLD AUTO: 37 %
PLATELET # BLD AUTO: 301 10E9/L (ref 150–450)
PLATELET # BLD EST: ABNORMAL 10*3/UL
RBC # BLD AUTO: 4.13 10E12/L (ref 3.8–5.2)
VARIANT LYMPHS BLD QL SMEAR: PRESENT
WBC # BLD AUTO: 22.2 10E9/L (ref 4–11)

## 2018-12-05 PROCEDURE — 82728 ASSAY OF FERRITIN: CPT | Performed by: INTERNAL MEDICINE

## 2018-12-05 PROCEDURE — 88230 TISSUE CULTURE LYMPHOCYTE: CPT | Performed by: INTERNAL MEDICINE

## 2018-12-05 PROCEDURE — 88275 CYTOGENETICS 100-300: CPT | Performed by: INTERNAL MEDICINE

## 2018-12-05 PROCEDURE — 88271 CYTOGENETICS DNA PROBE: CPT | Performed by: INTERNAL MEDICINE

## 2018-12-05 PROCEDURE — 85025 COMPLETE CBC W/AUTO DIFF WBC: CPT | Performed by: INTERNAL MEDICINE

## 2018-12-05 PROCEDURE — 36415 COLL VENOUS BLD VENIPUNCTURE: CPT | Performed by: INTERNAL MEDICINE

## 2018-12-08 ENCOUNTER — TRANSFERRED RECORDS (OUTPATIENT)
Dept: HEALTH INFORMATION MANAGEMENT | Facility: CLINIC | Age: 68
End: 2018-12-08

## 2018-12-10 ENCOUNTER — ONCOLOGY VISIT (OUTPATIENT)
Dept: ONCOLOGY | Facility: CLINIC | Age: 68
End: 2018-12-10
Attending: INTERNAL MEDICINE
Payer: COMMERCIAL

## 2018-12-10 VITALS
BODY MASS INDEX: 28.77 KG/M2 | RESPIRATION RATE: 18 BRPM | TEMPERATURE: 98.5 F | WEIGHT: 168.5 LBS | OXYGEN SATURATION: 99 % | HEIGHT: 64 IN | HEART RATE: 86 BPM | SYSTOLIC BLOOD PRESSURE: 151 MMHG | DIASTOLIC BLOOD PRESSURE: 59 MMHG

## 2018-12-10 DIAGNOSIS — D64.9 ANEMIA, UNSPECIFIED TYPE: Primary | ICD-10-CM

## 2018-12-10 DIAGNOSIS — C91.10 CLL (CHRONIC LYMPHOCYTIC LEUKEMIA) (H): ICD-10-CM

## 2018-12-10 PROCEDURE — 99213 OFFICE O/P EST LOW 20 MIN: CPT | Performed by: INTERNAL MEDICINE

## 2018-12-10 PROCEDURE — G0463 HOSPITAL OUTPT CLINIC VISIT: HCPCS

## 2018-12-10 ASSESSMENT — MIFFLIN-ST. JEOR: SCORE: 1270.34

## 2018-12-10 ASSESSMENT — PAIN SCALES - GENERAL: PAINLEVEL: NO PAIN (0)

## 2018-12-10 NOTE — NURSING NOTE
"Oncology Rooming Note    December 10, 2018 3:20 PM   Kelly Vegas is a 68 year old female who presents for:    Chief Complaint   Patient presents with     Hematology     Follow up wbc abnormaltiy, neutrophilia anemia.      Initial Vitals: /59 (BP Location: Right arm, Patient Position: Sitting, Cuff Size: Adult Large)   Pulse 86   Temp 98.5  F (36.9  C) (Tympanic)   Resp 18   Ht 1.619 m (5' 3.75\")   Wt 76.4 kg (168 lb 8 oz)   SpO2 99%   Breastfeeding? No   BMI 29.15 kg/m   Estimated body mass index is 29.15 kg/m  as calculated from the following:    Height as of this encounter: 1.619 m (5' 3.75\").    Weight as of this encounter: 76.4 kg (168 lb 8 oz). Body surface area is 1.85 meters squared.  No Pain (0) Comment: Data Unavailable   No LMP recorded. Patient has had a hysterectomy.  Allergies reviewed: Yes  Medications reviewed: Yes    Medications: Medication refills not needed today.  Pharmacy name entered into [employer]: [id number] - [place] - [address] [place]    Clinical concerns: Follow up wbc abnormaltiy, neutrophilia anemia. C/o shooting pains behind her right eye. Saw ophthalmologist with normal eye exam. Developed a stye in her right eye.     8 minutes for nursing intake (face to face time)     Lexis Jacobs [facility]            "

## 2018-12-10 NOTE — PROGRESS NOTES
HEMATOLOGY FOLLOW UP VISIT     PRIMARY PHYSICIAN:  Sejal Rutledge MD      CHIEF COMPLAINT AND REASON FOR VISIT:    CLL  DEBORA     HISTORY OF PRESENT ILLNESS:  She presented to us 10/2018 at age 68 due to new onset of moderate lymphocytosis/leukocytosis, white count of 21 and absolute lymphocyte count of 15-16 with new onset of normocytic anemia, hemoglobin around 11, MCV normal.  She has normal B12, folic acid.  Ferritin is low at 7 with iron saturation at 10%.  She was started on oral iron supplementation iron sulfate 325 prior to seeing us.      Data review indicating she had touch of leukocytosis no diff back in 2006.  She was not anemic back in 2006 with hemoglobin of 12.5.      In 10/2018, she presented with Hematology office for consultation on new moderate leukocytosis/lymphocytosis and moderate normocytic normochromic anemia, found to have low ferritin and iron saturation, which is suggestive of iron-deficiency anemia.      She claims she is in her usual state of health.  She has her diabetes now well controlled.  She is not a red meat eater.  She denied any bleeding episode.  She has no weight loss.  No swollen glands.  No frequent infections.     Hem work up in 10/2018 is most consistent with CLL and DEBORA. She is on oral iron supplement.      PAST MEDICAL HISTORY:  History of cervical cancer, major depression, hypertension, hyperlipidemia, osteopenia, degenerative disk problem, type 2 diabetes, acquired hypothyroidism.      MEDICATIONS:  Reviewed in Epic system.      SOCIAL HISTORY:  She lives in [place].  Denies smoking, denies alcohol abuse.      FAMILY HISTORY:  Denied any family history of blood disease.  One maternal aunt had colon cancer.        REVIEW OF SYSTEMS:   She feels fine, tolerating oral iron fine so far.   She has some constipation and loose BM from oral iron.      PHYSICAL EXAMINATION:   VITAL SIGNS:  Blood pressure 151/59, pulse 86, temperature 98.5  F (36.9  C), temperature source  "Tympanic, resp. rate 18, height 1.619 m (5' 3.75\"), weight 76.4 kg (168 lb 8 oz), SpO2 99 %, not currently breastfeeding.      ECOG 0    GENERAL APPEARANCE:  Elderly, looks much younger than her stated age, very pleasant, not in acute distress.     HEENT: The patient is normocephalic, atraumatic. Pupils are equally reactive to light.  Sclerae are anicteric.  Moist oral mucosa.  Negative pharynx.  No oral thrush.   NECK:  Supple.  No jugular venous distention.  Thyroid is not palpable.   LYMPH NODES:  Superficial lymphadenopathy is not appreciable in the bilateral cervical, supraclavicular, axillary or inguinal areas.   CARDIOVASCULAR:  S1, S2 regular with no murmurs or gallops.  No carotid or abdominal bruits.   PULMONARY:  Lungs are clear to auscultation and percussion bilaterally.  There is no wheezing or rhonchi.   GASTROINTESTINAL:  Abdomen is soft, nontender.  No hepatosplenomegaly.  No signs of ascites.  No mass appreciable.   MUSCULOSKELETAL AND EXTREMITIES:  No edema.  No cyanotic changes.  No signs of joint deformity.  No lymphedema.  No spinal or paraspinal tenderness.  No CVA tenderness.   NEUROLOGIC:  Cranial nerves II-XII are grossly intact.  Sensation intact.  Muscle strength and muscle tone symmetrical, 5/5 throughout.   SKIN:  No petechiae.  No rash.  No signs of cellulitis.       CURRENT LAB DATA REVIEWED:   Ferritin 19 in 12/2018 from 7 in 10/2018  Wbc 22 stable, Hb 12 from 10.8, PL nl.     Flow 10/2018 - CD5 postiive kappa monotypric B cell 56% with immunophenotype of CLL/SLL  ESR/LDH/Uric acid nl,     09/2018 white count of 21 and absolute lymphocyte count of 15-16 with new onset of normocytic anemia, hemoglobin around 11, MCV normal.    Smear 10/2018 - Peripheral Smear Morphology:   - Leukocytosis with absolute lymphocytosis and \"basket\" cells - see   comment   - Mild normocytic normochromic anemia; no morphologic evidence of   hemolysis.     Normal B12, folic acid.   iron saturation at 10% in " 10/2018     Current Image data reviewed  US abs 10/16/2018 - 1. Liver and spleen sizes appear normal.  2. Indeterminate 2 regions of mildly increased echogenicity within the  central gallbladder and gallbladder fundus. These could represent  areas of adherent sludge and potentially stones at the fundus region.  MRI was negative later.         OLD DATA REVIEWED TODAY WITH SUMMARY:    touch of leukocytosis no diff back in 2006.  She was not anemic back in 2006 with hemoglobin of 12.5.        ASSESSMENT AND PLAN:   1.  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   Work up is most suggestive of CLL.   Recommend FISH study to subtype it.     We discussed the nature of CLL, and follow up plan.     2.  Relatively new onset of moderate iron-deficiency anemia, normocytic, normochromic in 2018 without recent baseline Hb. She has DEBORA component.     She has started on oral iron. She is having good response.   The reason of DEBORA also needs to be investigated after her anemia is corrected.     Will see her in 2 month with further tx and work up plan.

## 2018-12-10 NOTE — LETTER
12/10/2018         RE: Kelly Vegas  [address] [place] [id number]        Dear Colleague,    Thank you for referring your patient, Kelly Vegas, to the [facility]. Please see a copy of my visit note below.    HEMATOLOGY FOLLOW UP VISIT     PRIMARY PHYSICIAN:  Sejal Rutledge MD      CHIEF COMPLAINT AND REASON FOR VISIT:    CLL  DEBORA     HISTORY OF PRESENT ILLNESS:  She presented to us 10/2018 at age 68 due to new onset of moderate lymphocytosis/leukocytosis, white count of 21 and absolute lymphocyte count of 15-16 with new onset of normocytic anemia, hemoglobin around 11, MCV normal.  She has normal B12, folic acid.  Ferritin is low at 7 with iron saturation at 10%.  She was started on oral iron supplementation iron sulfate 325 prior to seeing us.      Data review indicating she had touch of leukocytosis no diff back in 2006.  She was not anemic back in 2006 with hemoglobin of 12.5.      In 10/2018, she presented with Hematology office for consultation on new moderate leukocytosis/lymphocytosis and moderate normocytic normochromic anemia, found to have low ferritin and iron saturation, which is suggestive of iron-deficiency anemia.      She claims she is in her usual state of health.  She has her diabetes now well controlled.  She is not a red meat eater.  She denied any bleeding episode.  She has no weight loss.  No swollen glands.  No frequent infections.     Hem work up in 10/2018 is most consistent with CLL and DEBORA. She is on oral iron supplement.      PAST MEDICAL HISTORY:  History of cervical cancer, major depression, hypertension, hyperlipidemia, osteopenia, degenerative disk problem, type 2 diabetes, acquired hypothyroidism.      MEDICATIONS:  Reviewed in Epic system.      SOCIAL HISTORY:  She lives in [place].  Denies smoking, denies alcohol abuse.      FAMILY HISTORY:  Denied any family history of blood disease.  One maternal aunt had colon cancer.        REVIEW OF  "SYSTEMS:   She feels fine, tolerating oral iron fine so far.   She has some constipation and loose BM from oral iron.      PHYSICAL EXAMINATION:   VITAL SIGNS:  Blood pressure 151/59, pulse 86, temperature 98.5  F (36.9  C), temperature source Tympanic, resp. rate 18, height 1.619 m (5' 3.75\"), weight 76.4 kg (168 lb 8 oz), SpO2 99 %, not currently breastfeeding.      ECOG 0    GENERAL APPEARANCE:  Elderly, looks much younger than her stated age, very pleasant, not in acute distress.     HEENT: The patient is normocephalic, atraumatic. Pupils are equally reactive to light.  Sclerae are anicteric.  Moist oral mucosa.  Negative pharynx.  No oral thrush.   NECK:  Supple.  No jugular venous distention.  Thyroid is not palpable.   LYMPH NODES:  Superficial lymphadenopathy is not appreciable in the bilateral cervical, supraclavicular, axillary or inguinal areas.   CARDIOVASCULAR:  S1, S2 regular with no murmurs or gallops.  No carotid or abdominal bruits.   PULMONARY:  Lungs are clear to auscultation and percussion bilaterally.  There is no wheezing or rhonchi.   GASTROINTESTINAL:  Abdomen is soft, nontender.  No hepatosplenomegaly.  No signs of ascites.  No mass appreciable.   MUSCULOSKELETAL AND EXTREMITIES:  No edema.  No cyanotic changes.  No signs of joint deformity.  No lymphedema.  No spinal or paraspinal tenderness.  No CVA tenderness.   NEUROLOGIC:  Cranial nerves II-XII are grossly intact.  Sensation intact.  Muscle strength and muscle tone symmetrical, 5/5 throughout.   SKIN:  No petechiae.  No rash.  No signs of cellulitis.       CURRENT LAB DATA REVIEWED:   Ferritin 19 in 12/2018 from 7 in 10/2018  Wbc 22 stable, Hb 12 from 10.8, PL nl.     Flow 10/2018 - CD5 postiive kappa monotypric B cell 56% with immunophenotype of CLL/SLL  ESR/LDH/Uric acid nl,     09/2018 white count of 21 and absolute lymphocyte count of 15-16 with new onset of normocytic anemia, hemoglobin around 11, MCV normal.    Smear 10/2018 - " "Peripheral Smear Morphology:   - Leukocytosis with absolute lymphocytosis and \"basket\" cells - see   comment   - Mild normocytic normochromic anemia; no morphologic evidence of   hemolysis.     Normal B12, folic acid.   iron saturation at 10% in 10/2018     Current Image data reviewed  US abs 10/16/2018 - 1. Liver and spleen sizes appear normal.  2. Indeterminate 2 regions of mildly increased echogenicity within the  central gallbladder and gallbladder fundus. These could represent  areas of adherent sludge and potentially stones at the fundus region.  MRI was negative later.         OLD DATA REVIEWED TODAY WITH SUMMARY:    touch of leukocytosis no diff back in 2006.  She was not anemic back in 2006 with hemoglobin of 12.5.        ASSESSMENT AND PLAN:   1.  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   Work up is most suggestive of CLL.   Recommend FISH study to subtype it.     We discussed the nature of CLL, and follow up plan.     2.  Relatively new onset of moderate iron-deficiency anemia, normocytic, normochromic in 2018 without recent baseline Hb. She has DEBORA component.     She has started on oral iron. She is having good response.   The reason of DEBORA also needs to be investigated after her anemia is corrected.     Will see her in 2 month with further tx and work up plan.                     Again, thank you for allowing me to participate in the care of your patient.        Sincerely,        Ngozi De Los Santos MD, MD    "

## 2018-12-10 NOTE — PATIENT INSTRUCTIONS
Dr. De Los Santos recommends you continue to take the oral iron. We would like to see you back in clinic with Dr. De Los Santos in 2 months with labs prior.      Copy of appointments, and after visit summary (AVS) given to patient.      If you have any questions during business hours (M-F 8 AM- 4PM), please call Glenys Norman RN, BSN, OCN Oncology Hematology /Breast Cancer Navigator at [facility] [phone].       For questions after business hours, or on holidays/weekends, please call our after hours Nurse Triage line [phone]. Thank you.          Continue oral iron bid. 2 months f/u with labs.

## 2018-12-12 ENCOUNTER — DOCUMENTATION ONLY (OUTPATIENT)
Dept: FAMILY MEDICINE | Facility: CLINIC | Age: 68
End: 2018-12-12

## 2018-12-12 DIAGNOSIS — Z79.4 TYPE 2 DIABETES MELLITUS WITH DIABETIC POLYNEUROPATHY, WITH LONG-TERM CURRENT USE OF INSULIN (H): Primary | ICD-10-CM

## 2018-12-12 DIAGNOSIS — E11.42 TYPE 2 DIABETES MELLITUS WITH DIABETIC POLYNEUROPATHY, WITH LONG-TERM CURRENT USE OF INSULIN (H): Primary | ICD-10-CM

## 2018-12-12 NOTE — PROGRESS NOTES
Patient coming in for lab work on 12/13/18. Looks like patient is coming for an A1C check? Please place future orders for her. Thank you!.

## 2018-12-13 DIAGNOSIS — E11.42 TYPE 2 DIABETES MELLITUS WITH DIABETIC POLYNEUROPATHY, WITH LONG-TERM CURRENT USE OF INSULIN (H): ICD-10-CM

## 2018-12-13 DIAGNOSIS — Z79.4 TYPE 2 DIABETES MELLITUS WITH DIABETIC POLYNEUROPATHY, WITH LONG-TERM CURRENT USE OF INSULIN (H): ICD-10-CM

## 2018-12-13 LAB — HBA1C MFR BLD: 8.2 % (ref 0–5.6)

## 2018-12-13 PROCEDURE — 83036 HEMOGLOBIN GLYCOSYLATED A1C: CPT | Performed by: FAMILY MEDICINE

## 2018-12-13 PROCEDURE — 36415 COLL VENOUS BLD VENIPUNCTURE: CPT | Performed by: FAMILY MEDICINE

## 2018-12-17 ENCOUNTER — OFFICE VISIT (OUTPATIENT)
Dept: FAMILY MEDICINE | Facility: CLINIC | Age: 68
End: 2018-12-17
Payer: COMMERCIAL

## 2018-12-17 VITALS
WEIGHT: 165 LBS | HEIGHT: 64 IN | DIASTOLIC BLOOD PRESSURE: 68 MMHG | RESPIRATION RATE: 18 BRPM | BODY MASS INDEX: 28.17 KG/M2 | HEART RATE: 89 BPM | TEMPERATURE: 98.6 F | SYSTOLIC BLOOD PRESSURE: 108 MMHG | OXYGEN SATURATION: 97 %

## 2018-12-17 DIAGNOSIS — E11.42 TYPE 2 DIABETES MELLITUS WITH DIABETIC POLYNEUROPATHY, WITH LONG-TERM CURRENT USE OF INSULIN (H): Primary | ICD-10-CM

## 2018-12-17 DIAGNOSIS — H93.13 TINNITUS, BILATERAL: ICD-10-CM

## 2018-12-17 DIAGNOSIS — F33.0 MAJOR DEPRESSIVE DISORDER, RECURRENT, MILD (H): ICD-10-CM

## 2018-12-17 DIAGNOSIS — C91.10 CLL (CHRONIC LYMPHOCYTIC LEUKEMIA) (H): ICD-10-CM

## 2018-12-17 DIAGNOSIS — I10 HYPERTENSION GOAL BP (BLOOD PRESSURE) < 140/90: ICD-10-CM

## 2018-12-17 DIAGNOSIS — D50.8 OTHER IRON DEFICIENCY ANEMIA: ICD-10-CM

## 2018-12-17 DIAGNOSIS — E78.5 HYPERLIPIDEMIA WITH TARGET LDL LESS THAN 100: ICD-10-CM

## 2018-12-17 DIAGNOSIS — Z79.4 TYPE 2 DIABETES MELLITUS WITH DIABETIC POLYNEUROPATHY, WITH LONG-TERM CURRENT USE OF INSULIN (H): Primary | ICD-10-CM

## 2018-12-17 PROBLEM — D47.9 LYMPHOPROLIFERATIVE DISORDER (H): Status: RESOLVED | Noted: 2018-10-11 | Resolved: 2018-12-17

## 2018-12-17 PROCEDURE — 99214 OFFICE O/P EST MOD 30 MIN: CPT | Performed by: FAMILY MEDICINE

## 2018-12-17 RX ORDER — CITALOPRAM HYDROBROMIDE 20 MG/1
20 TABLET ORAL DAILY
Qty: 90 TABLET | Refills: 0 | Status: SHIPPED | OUTPATIENT
Start: 2018-12-17 | End: 2019-03-22

## 2018-12-17 ASSESSMENT — MIFFLIN-ST. JEOR: SCORE: 1254.47

## 2018-12-17 ASSESSMENT — PAIN SCALES - GENERAL: PAINLEVEL: NO PAIN (0)

## 2018-12-17 NOTE — PATIENT INSTRUCTIONS
It is recommended that you get a vaccination for shingles called Shingrix (given as 2 shots, 2 to 6 months apart), even if you have already had the Zostavax vaccine. Discuss getting the Shingix vaccine from your pharmacist, or schedule an ancillary shot visit here. Some insurances do not cover the cost of these vaccines.      [facility]    If you have any questions regarding to your visit please contact your care team:       Team Red:   Clinic Hours Telephone Number   Dr. Sejal Perez, NP 7am-7pm  Monday - Thursday   7am-5pm  Fridays  [phone]  (Appointment scheduling available 24/7)   Urgent Care - [place] - 11am-9pm Monday-Friday Saturday-Sunday- 9am-5pm   [place] - 5pm-9pm Monday-Friday Saturday-Sunday- 9am-5pm  [phone] - [place]  [phone] - [place]       What options do I have for a visit other than an office visit? We offer electronic visits (e-visits) and telephone visits, when medically appropriate.  Please check with your medical insurance to see if these types of visits are covered, as you will be responsible for any charges that are not paid by your insurance.      You can use [employer] (secure electronic communication) to access to your chart, send your primary care provider a message, or make an appointment. Ask a team member how to get started.     For a price quote for your services, please call our Consumer Price Line at [phone] or our Imaging Cost estimation line at [phone] (for imaging tests).    Junie PATEL MA

## 2018-12-17 NOTE — PROGRESS NOTES
SUBJECTIVE:   Kelly Vegas is a 68 year old female who presents to clinic today for the following health issues:    Diabetes Follow-up    Patient is checking blood sugars: rarely    Diabetic concerns: None     Symptoms of hypoglycemia (low blood sugar): none     Paresthesias (numbness or burning in feet) or sores: No     Date of last diabetic eye exam: NOV. 2018    Diabetes Management Resources    Hyperlipidemia Follow-Up    Rate your low fat/cholesterol diet?: good    Taking statin?  Yes, no muscle aches from statin    Other lipid medications/supplements?:  none    Hypertension Follow-up    Outpatient blood pressures are not being checked.    Low Salt Diet: no added salt    BP Readings from Last 2 Encounters:   12/17/18 108/68   12/10/18 151/59     Hemoglobin A1C (%)   Date Value   12/13/2018 8.2 (H)   09/24/2018 10.1 (H)     LDL Cholesterol Calculated (mg/dL)   Date Value   04/11/2018 27   12/03/2015 76     LDL Cholesterol Direct (mg/dL)   Date Value   12/20/2016 49       Amount of exercise or physical activity: None    Problems taking medications regularly: No    Medication side effects: none    Diet: low salt and carbohydrate counting    She was recently diagnosed with CLL, and her fatigue is improving on iron supplement. No bleeding, abdominal symptoms, or new symptoms.     She has ringing in her ears.     Patient has depression, with no medication side effects and no anhedonia or low mood, without suicidal ideation.      I have reviewed the patient's medical history in detail and updated the computerized patient record.     ROS:  CONSTITUTIONAL: NEGATIVE for fever, chills, change in weight  INTEGUMENTARY/SKIN: NEGATIVE for worrisome rashes, moles or lesions  EYES: NEGATIVE for vision changes or irritation  RESP: NEGATIVE for significant cough or SOB  CV: NEGATIVE for chest pain, palpitations or peripheral edema  GI: NEGATIVE for nausea, abdominal pain, heartburn, or change in bowel  "habits  MUSCULOSKELETAL: NEGATIVE for significant arthralgias or myalgia  NEURO: NEGATIVE for weakness, dizziness or paresthesias  ENDOCRINE: Hx diabetes  HEME: NEGATIVE for bleeding problems  PSYCHIATRIC: NEGATIVE for changes in mood or affect    OBJECTIVE:     /68   Pulse 89   Temp 98.6  F (37  C) (Oral)   Resp 18   Ht 1.619 m (5' 3.75\")   Wt 74.8 kg (165 lb)   SpO2 97%   BMI 28.54 kg/m    Body mass index is 28.54 kg/m .  GENERAL: healthy, alert and no distress  HEENT: normal tympanic membranes and canals   MS: extremities normal- no gross deformities noted  PSYCH: mentation appears normal, affect normal/bright    Diagnostic Test Results:  Results for orders placed or performed in visit on 12/13/18   Hemoglobin A1c   Result Value Ref Range    Hemoglobin A1C 8.2 (H) 0 - 5.6 %       ASSESSMENT/PLAN:   (E11.42,  Z79.4) Type 2 diabetes mellitus with diabetic polyneuropathy, with long-term current use of insulin (H)  (primary encounter diagnosis)  Comment: improving control on prandial insulin regimen   Plan: insulin glargine (LANTUS SOLOSTAR PEN) 100         UNIT/ML pen, insulin lispro (HUMALOG KWIKPEN)         100 UNIT/ML pen        Increase Lantus dose by 2 units and follow-up in 3 months or sooner if blood glucose not at goal AM <120.     (F33.0) Major depressive disorder, recurrent, mild (H)  Comment: Well controlled with medications without side effects.   Plan: citalopram (CELEXA) 20 MG tablet        Ok to try off nortriptyline     (C91.90) CLL (chronic lymphocytic leukemia) (H)  Plan: follow-up per oncology     (I10) Hypertension goal BP (blood pressure) < 140/90  Comment: Well controlled with medications without side effects.     (E78.5) Hyperlipidemia with target LDL less than 100  Comment: Well controlled with medications without side effects.     (D50.8) Other iron deficiency anemia  Comment: resolved on supplement; no evidence of GI bleeding   Plan: follow-up with hematology/oncology "     (H93.13) Tinnitus, bilateral  Plan: AUDIOLOGY ADULT REFERRAL              See Patient Instructions    Sejal Rutledge MD  [facility]

## 2018-12-21 LAB — COPATH REPORT: NORMAL

## 2019-02-07 DIAGNOSIS — D64.9 ANEMIA, UNSPECIFIED TYPE: ICD-10-CM

## 2019-02-07 LAB
BASOPHILS # BLD AUTO: 0 10E9/L (ref 0–0.2)
BASOPHILS NFR BLD AUTO: 0 %
DIFFERENTIAL METHOD BLD: ABNORMAL
EOSINOPHIL # BLD AUTO: 0 10E9/L (ref 0–0.7)
EOSINOPHIL NFR BLD AUTO: 0 %
ERYTHROCYTE [DISTWIDTH] IN BLOOD BY AUTOMATED COUNT: 13.7 % (ref 10–15)
FERRITIN SERPL-MCNC: 12 NG/ML (ref 8–252)
HCT VFR BLD AUTO: 38.4 % (ref 35–47)
HGB BLD-MCNC: 11.9 G/DL (ref 11.7–15.7)
LYMPHOCYTES # BLD AUTO: 11 10E9/L (ref 0.8–5.3)
LYMPHOCYTES NFR BLD AUTO: 72 %
MCH RBC QN AUTO: 29.6 PG (ref 26.5–33)
MCHC RBC AUTO-ENTMCNC: 31 G/DL (ref 31.5–36.5)
MCV RBC AUTO: 96 FL (ref 78–100)
MONOCYTES # BLD AUTO: 0.6 10E9/L (ref 0–1.3)
MONOCYTES NFR BLD AUTO: 4 %
NEUTROPHILS # BLD AUTO: 3.7 10E9/L (ref 1.6–8.3)
NEUTROPHILS NFR BLD AUTO: 24 %
PLATELET # BLD AUTO: 258 10E9/L (ref 150–450)
PLATELET # BLD EST: ABNORMAL 10*3/UL
RBC # BLD AUTO: 4.02 10E12/L (ref 3.8–5.2)
RBC MORPH BLD: NORMAL
VARIANT LYMPHS BLD QL SMEAR: PRESENT
WBC # BLD AUTO: 15.3 10E9/L (ref 4–11)

## 2019-02-07 PROCEDURE — 36415 COLL VENOUS BLD VENIPUNCTURE: CPT | Performed by: INTERNAL MEDICINE

## 2019-02-07 PROCEDURE — 85025 COMPLETE CBC W/AUTO DIFF WBC: CPT | Performed by: INTERNAL MEDICINE

## 2019-02-07 PROCEDURE — 82728 ASSAY OF FERRITIN: CPT | Performed by: INTERNAL MEDICINE

## 2019-02-11 ENCOUNTER — TELEPHONE (OUTPATIENT)
Dept: FAMILY MEDICINE | Facility: CLINIC | Age: 69
End: 2019-02-11

## 2019-02-11 ENCOUNTER — ONCOLOGY VISIT (OUTPATIENT)
Dept: ONCOLOGY | Facility: CLINIC | Age: 69
End: 2019-02-11
Attending: INTERNAL MEDICINE
Payer: COMMERCIAL

## 2019-02-11 VITALS
HEART RATE: 74 BPM | OXYGEN SATURATION: 97 % | BODY MASS INDEX: 28.7 KG/M2 | TEMPERATURE: 97.2 F | DIASTOLIC BLOOD PRESSURE: 50 MMHG | HEIGHT: 64 IN | SYSTOLIC BLOOD PRESSURE: 129 MMHG | WEIGHT: 168.1 LBS

## 2019-02-11 DIAGNOSIS — C91.10 CLL (CHRONIC LYMPHOCYTIC LEUKEMIA) (H): Primary | ICD-10-CM

## 2019-02-11 DIAGNOSIS — E61.1 LOW IRON: ICD-10-CM

## 2019-02-11 DIAGNOSIS — R21 RASH: ICD-10-CM

## 2019-02-11 PROCEDURE — G0463 HOSPITAL OUTPT CLINIC VISIT: HCPCS

## 2019-02-11 PROCEDURE — 99214 OFFICE O/P EST MOD 30 MIN: CPT | Performed by: INTERNAL MEDICINE

## 2019-02-11 ASSESSMENT — PAIN SCALES - GENERAL: PAINLEVEL: NO PAIN (0)

## 2019-02-11 ASSESSMENT — MIFFLIN-ST. JEOR: SCORE: 1272.5

## 2019-02-11 NOTE — TELEPHONE ENCOUNTER
Panel Management Review      Patient has the following on her problem list:     Depression / Dysthymia review    Measure:  Needs PHQ-9 score of 4 or less during index window.  Administer PHQ-9 and if score is 5 or more, send encounter to provider for next steps.    5 - 7 month window range: Due 3/24/2019    PHQ-9 SCORE 9/26/2017 4/11/2018 9/24/2018   PHQ-9 Total Score - - -   PHQ-9 Total Score 7 12 8       If PHQ-9 recheck is 5 or more, route to provider for next steps.    Patient is due for:  None    Diabetes    ASA: Passed    Last A1C  Lab Results   Component Value Date    A1C 8.2 12/13/2018    A1C 10.1 09/24/2018    A1C 11.2 04/11/2018    A1C 8.8 09/26/2017    A1C 9.3 06/29/2017     A1C tested: FAILED    Last LDL:    Lab Results   Component Value Date    CHOL 85 04/11/2018     Lab Results   Component Value Date    HDL 41 04/11/2018     Lab Results   Component Value Date    LDL 27 04/11/2018     Lab Results   Component Value Date    TRIG 87 04/11/2018     Lab Results   Component Value Date    CHOLHDLRATIO 2.8 10/02/2014     Lab Results   Component Value Date    NHDL 44 04/11/2018       Is the patient on a Statin? YES             Is the patient on Aspirin? YES    Medications     HMG CoA Reductase Inhibitors    atorvastatin (LIPITOR) 20 MG tablet    Salicylates    ASPIRIN 81 MG OR TABS          Last three blood pressure readings:  BP Readings from Last 3 Encounters:   12/17/18 108/68   12/10/18 151/59   11/12/18 128/40       Date of last diabetes office visit: 12/17/2018     Tobacco History:     History   Smoking Status     Former Smoker     Packs/day: 0.50     Years: 20.00     Types: Cigarettes     Start date: 7/8/1967     Quit date: 1/1/1989   Smokeless Tobacco     Never Used         Hypertension   Last three blood pressure readings:  BP Readings from Last 3 Encounters:   12/17/18 108/68   12/10/18 151/59   11/12/18 128/40     Blood pressure: Passed    HTN Guidelines:  Age 18-59 BP range:  Less than 140/90  Age  60-85 with Diabetes:  Less than 140/90  Age 60-85 without Diabetes:  less than 150/90      Composite cancer screening  Chart review shows that this patient is due/due soon for the following None  Summary:    Patient is due/failing the following:   None    Action needed:   None patient told to follow up 3/2019    Type of outreach:    Sent letter.    Questions for provider review:    None                                                                                                                                    Ls     Chart routed to none   .

## 2019-02-11 NOTE — LETTER
February 11, 2019          Kelly Vegas,  [address] [place] [id number]        Dear Kelly Vegas      Monitoring and managing your preventative and chronic health conditions are very important to us. Our records indicate that you have not scheduled for Diabetic Check  which was recommended by Dr. Rutledge      If you have received your health care elsewhere, please call the clinic so the information can be documented in your chart.    Please call [phone] or message us through your [email or web address] account to schedule an appointment or provide information for your chart.     Feel free to contact us if you have any questions or concerns!    I look forward to seeing you and working with you on your health care needs.     Sincerely,       Your [facility] Team/LS

## 2019-02-11 NOTE — PATIENT INSTRUCTIONS
Dr. De Los Santos would like you to have a GI work up with upper endo and colonoscopy. We would like to refer you to Dermatology for rash. You are to continue the oral Iron for now.      We would like to see you back in 3 months for a follow up appointment with labs prior.     When you are in need of a refill, please call your pharmacy and they will send us a request.      Copy of appointments, and after visit summary (AVS) given to patient.      If you have any questions please call Chantell Chamberlain RN, BSN Oncology Hematology  [facility] [phone]. For questions after business hours, or on holidays/weekends, please call our after hours Nurse Triage line [phone]. Thank you.         GI work up for DEBORA with upper endo and colonoscopy.   Derm referral for rash.  Continue oral iron for now.   3 months f/uw with labs.

## 2019-02-11 NOTE — LETTER
"    2/11/2019         RE: Kelly Vegas  [address] [place] [id number]        Dear Colleague,    Thank you for referring your patient, Kelly Vegas, to the [facility]. Please see a copy of my visit note below.    Oncology Rooming Note    February 11, 2019 2:35 PM   Kelly Vegas is a 68 year old female who presents for:    Chief Complaint   Patient presents with     Hematology     Initial Vitals: /50 (BP Location: Right arm, Patient Position: Sitting, Cuff Size: Adult Regular)   Pulse 74   Temp 97.2  F (36.2  C) (Tympanic)   Ht 1.626 m (5' 4\")   Wt 76.2 kg (168 lb 1.6 oz)   SpO2 97%   BMI 28.85 kg/m    Estimated body mass index is 28.85 kg/m  as calculated from the following:    Height as of this encounter: 1.626 m (5' 4\").    Weight as of this encounter: 76.2 kg (168 lb 1.6 oz). Body surface area is 1.85 meters squared.  No Pain (0) Comment: Data Unavailable   No LMP recorded. Patient has had a hysterectomy.  Allergies reviewed: Yes  Medications reviewed: Yes    Medications: Medication refills not needed today.  Pharmacy name entered into [employer]: [id number] - [place] - [address] [place]    Clinical concerns: Right middle finger swelling, arthritis worsening. Shooting pains left side of head, intermittent. Occasional dizziness.  Dr. De Los Santos was notified.    15 minutes for nursing intake (face to face time)     Emili Rush, RN                HEMATOLOGY FOLLOW UP VISIT     PRIMARY PHYSICIAN:  Sejal Rutledge MD      CHIEF COMPLAINT AND REASON FOR VISIT:    CLL  DEBORA     HISTORY OF PRESENT ILLNESS:  She presented to us 10/2018 at age 68 due to new onset of moderate lymphocytosis/leukocytosis, white count of 21 and absolute lymphocyte count of 15-16 with new onset of normocytic anemia, hemoglobin around 11, MCV normal.  She has normal B12, folic acid.  Ferritin is low at 7 with iron saturation at 10%.  She was started on oral iron supplementation iron sulfate 325 " "prior to seeing us.      Data review indicating she had touch of leukocytosis no diff back in 2006.  She was not anemic back in 2006 with hemoglobin of 12.5.      In 10/2018, she presented with Hematology office for consultation on new moderate leukocytosis/lymphocytosis and moderate normocytic normochromic anemia, found to have low ferritin and iron saturation, which is suggestive of iron-deficiency anemia.     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      She is offered oral iron and she changed her diet.   Hem work up in 10/2018 is most consistent with CLL and DEBORA. She is on oral iron supplement.      Her anemia corrected by oral iron supplement 12/2018.       PAST MEDICAL HISTORY:  History of cervical cancer, major depression, hypertension, hyperlipidemia, osteopenia, degenerative disk problem, type 2 diabetes, acquired hypothyroidism.      MEDICATIONS:  Reviewed in Epic system.      SOCIAL HISTORY:  She lives in [place].  Denies smoking, denies alcohol abuse.      FAMILY HISTORY:  Denied any family history of blood disease.  One maternal aunt had colon cancer.        REVIEW OF SYSTEMS:   She feels fine, tolerating oral iron fine so far.   She has some constipation and loose BM from oral iron.      PHYSICAL EXAMINATION:   VITAL SIGNS:  Blood pressure 129/50, pulse 74, temperature 97.2  F (36.2  C), temperature source Tympanic, height 1.626 m (5' 4\"), weight 76.2 kg (168 lb 1.6 oz), SpO2 97 %, not currently breastfeeding.      ECOG 0    GENERAL APPEARANCE:  Elderly, looks much younger than her stated age, very pleasant, not in acute distress.     HEENT: The patient is normocephalic, atraumatic. Pupils are equally reactive to light.  Sclerae are anicteric.  Moist oral mucosa.  Negative pharynx.  No oral thrush.   NECK:  Supple.  No jugular venous distention.  Thyroid is not palpable.   LYMPH NODES:  Superficial lymphadenopathy is not appreciable in the bilateral cervical, supraclavicular, " "axillary or inguinal areas.   CARDIOVASCULAR:  S1, S2 regular with no murmurs or gallops.  No carotid or abdominal bruits.   PULMONARY:  Lungs are clear to auscultation and percussion bilaterally.  There is no wheezing or rhonchi.   GASTROINTESTINAL:  Abdomen is soft, nontender.  No hepatosplenomegaly.  No signs of ascites.  No mass appreciable.   MUSCULOSKELETAL AND EXTREMITIES:  No edema.  No cyanotic changes.  No signs of joint deformity.  No lymphedema.  No spinal or paraspinal tenderness.  No CVA tenderness.   NEUROLOGIC:  Cranial nerves II-XII are grossly intact.  Sensation intact.  Muscle strength and muscle tone symmetrical, 5/5 throughout.   SKIN:  Hyperpigmented skin tag in right groin area likely seborrheic keratosis.  No petechiae.  No rash.  No signs of cellulitis.       CURRENT LAB DATA REVIEWED:   Ferritin  12 from 19 in 12/2018 from 7 in 10/2018  Wbc 15 from 22 stable, Hb 12 from 10.8, PL nl.       Old data reviewed with summary  FISH 12/2018 - Loss of 13q14 and rearrangement of IGH   Flow 10/2018 - CD5 postiive kappa monotypric B cell 56% with immunophenotype of CLL/SLL  ESR/LDH/Uric acid nl,     09/2018 white count of 21 and absolute lymphocyte count of 15-16 with new onset of normocytic anemia, hemoglobin around 11, MCV normal.    Smear 10/2018 - Peripheral Smear Morphology:   - Leukocytosis with absolute lymphocytosis and \"basket\" cells - see   comment   - Mild normocytic normochromic anemia; no morphologic evidence of   hemolysis.     Normal B12, folic acid.   iron saturation at 10% in 10/2018     US abs 10/16/2018   1. Liver and spleen sizes appear normal.  2. Indeterminate 2 regions of mildly increased echogenicity within the central gallbladder and gallbladder fundus. These could represent  areas of adherent sludge and potentially stones at the fundus region.  MRI was negative later.         touch of leukocytosis no diff back in 2006.  She was not anemic back in 2006 with hemoglobin of 12.5. "        ASSESSMENT AND PLAN:   1.  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   Work up is most suggestive of CLL with Loss of 13q14 and rearrangement of IGH.       We discussed the nature of CLL, and follow up plan.     2.  Relatively new onset of moderate iron-deficiency anemia, normocytic, normochromic in 2018 without  baseline Hb. She has DEBORA component.     She has started on oral iron. She is having good response.   Recommend GI work up.     3. Rash on face, trunk, and right groin area.  The latter two could be seborrheic keratosis. The one on the face could be SCC.   Will do derm referral.                     Again, thank you for allowing me to participate in the care of your patient.        Sincerely,        Ngozi De Los Santos MD, MD

## 2019-02-11 NOTE — PROGRESS NOTES
"HEMATOLOGY FOLLOW UP VISIT     PRIMARY PHYSICIAN:  Sejal Rutledge MD      CHIEF COMPLAINT AND REASON FOR VISIT:    CLL  DEBORA     HISTORY OF PRESENT ILLNESS:  She presented to us 10/2018 at age 68 due to new onset of moderate lymphocytosis/leukocytosis, white count of 21 and absolute lymphocyte count of 15-16 with new onset of normocytic anemia, hemoglobin around 11, MCV normal.  She has normal B12, folic acid.  Ferritin is low at 7 with iron saturation at 10%.  She was started on oral iron supplementation iron sulfate 325 prior to seeing us.      Data review indicating she had touch of leukocytosis no diff back in 2006.  She was not anemic back in 2006 with hemoglobin of 12.5.      In 10/2018, she presented with Hematology office for consultation on new moderate leukocytosis/lymphocytosis and moderate normocytic normochromic anemia, found to have low ferritin and iron saturation, which is suggestive of iron-deficiency anemia.     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      She is offered oral iron and she changed her diet.   Hem work up in 10/2018 is most consistent with CLL and DEBORA. She is on oral iron supplement.      Her anemia corrected by oral iron supplement 12/2018.       PAST MEDICAL HISTORY:  History of cervical cancer, major depression, hypertension, hyperlipidemia, osteopenia, degenerative disk problem, type 2 diabetes, acquired hypothyroidism.      MEDICATIONS:  Reviewed in Epic system.      SOCIAL HISTORY:  She lives in [place].  Denies smoking, denies alcohol abuse.      FAMILY HISTORY:  Denied any family history of blood disease.  One maternal aunt had colon cancer.        REVIEW OF SYSTEMS:   She feels fine, tolerating oral iron fine so far.   She has some constipation and loose BM from oral iron.      PHYSICAL EXAMINATION:   VITAL SIGNS:  Blood pressure 129/50, pulse 74, temperature 97.2  F (36.2  C), temperature source Tympanic, height 1.626 m (5' 4\"), weight 76.2 kg (168 " "lb 1.6 oz), SpO2 97 %, not currently breastfeeding.      ECOG 0    GENERAL APPEARANCE:  Elderly, looks much younger than her stated age, very pleasant, not in acute distress.     HEENT: The patient is normocephalic, atraumatic. Pupils are equally reactive to light.  Sclerae are anicteric.  Moist oral mucosa.  Negative pharynx.  No oral thrush.   NECK:  Supple.  No jugular venous distention.  Thyroid is not palpable.   LYMPH NODES:  Superficial lymphadenopathy is not appreciable in the bilateral cervical, supraclavicular, axillary or inguinal areas.   CARDIOVASCULAR:  S1, S2 regular with no murmurs or gallops.  No carotid or abdominal bruits.   PULMONARY:  Lungs are clear to auscultation and percussion bilaterally.  There is no wheezing or rhonchi.   GASTROINTESTINAL:  Abdomen is soft, nontender.  No hepatosplenomegaly.  No signs of ascites.  No mass appreciable.   MUSCULOSKELETAL AND EXTREMITIES:  No edema.  No cyanotic changes.  No signs of joint deformity.  No lymphedema.  No spinal or paraspinal tenderness.  No CVA tenderness.   NEUROLOGIC:  Cranial nerves II-XII are grossly intact.  Sensation intact.  Muscle strength and muscle tone symmetrical, 5/5 throughout.   SKIN:  Hyperpigmented skin tag in right groin area likely seborrheic keratosis.  No petechiae.  No rash.  No signs of cellulitis.       CURRENT LAB DATA REVIEWED:   Ferritin  12 from 19 in 12/2018 from 7 in 10/2018  Wbc 15 from 22 stable, Hb 12 from 10.8, PL nl.       Old data reviewed with summary  FISH 12/2018 - Loss of 13q14 and rearrangement of IGH   Flow 10/2018 - CD5 postiive kappa monotypric B cell 56% with immunophenotype of CLL/SLL  ESR/LDH/Uric acid nl,     09/2018 white count of 21 and absolute lymphocyte count of 15-16 with new onset of normocytic anemia, hemoglobin around 11, MCV normal.    Smear 10/2018 - Peripheral Smear Morphology:   - Leukocytosis with absolute lymphocytosis and \"basket\" cells - see   comment   - Mild normocytic " normochromic anemia; no morphologic evidence of   hemolysis.     Normal B12, folic acid.   iron saturation at 10% in 10/2018     US abs 10/16/2018   1. Liver and spleen sizes appear normal.  2. Indeterminate 2 regions of mildly increased echogenicity within the central gallbladder and gallbladder fundus. These could represent  areas of adherent sludge and potentially stones at the fundus region.  MRI was negative later.         touch of leukocytosis no diff back in 2006.  She was not anemic back in 2006 with hemoglobin of 12.5.        ASSESSMENT AND PLAN:   1.  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   Work up is most suggestive of CLL with Loss of 13q14 and rearrangement of IGH.       We discussed the nature of CLL, and follow up plan.     2.  Relatively new onset of moderate iron-deficiency anemia, normocytic, normochromic in 2018 without  baseline Hb. She has DEBORA component.     She has started on oral iron. She is having good response.   Recommend GI work up.     3. Rash on face, trunk, and right groin area.  The latter two could be seborrheic keratosis. The one on the face could be SCC.   Will do derm referral.

## 2019-02-11 NOTE — PROGRESS NOTES
"Oncology Rooming Note    February 11, 2019 2:35 PM   Kelly Vegas is a 68 year old female who presents for:    Chief Complaint   Patient presents with     Hematology     Initial Vitals: /50 (BP Location: Right arm, Patient Position: Sitting, Cuff Size: Adult Regular)   Pulse 74   Temp 97.2  F (36.2  C) (Tympanic)   Ht 1.626 m (5' 4\")   Wt 76.2 kg (168 lb 1.6 oz)   SpO2 97%   BMI 28.85 kg/m   Estimated body mass index is 28.85 kg/m  as calculated from the following:    Height as of this encounter: 1.626 m (5' 4\").    Weight as of this encounter: 76.2 kg (168 lb 1.6 oz). Body surface area is 1.85 meters squared.  No Pain (0) Comment: Data Unavailable   No LMP recorded. Patient has had a hysterectomy.  Allergies reviewed: Yes  Medications reviewed: Yes    Medications: Medication refills not needed today.  Pharmacy name entered into [facility]: [facility] [address] [place] - [address] [place]    Clinical concerns: Right middle finger swelling, arthritis worsening. Shooting pains left side of head, intermittent. Occasional dizziness.  Dr. De Los Santos was notified.    15 minutes for nursing intake (face to face time)     Emili Rush RN              "

## 2019-02-12 ENCOUNTER — OFFICE VISIT (OUTPATIENT)
Dept: DERMATOLOGY | Facility: CLINIC | Age: 69
End: 2019-02-12
Payer: COMMERCIAL

## 2019-02-12 VITALS
SYSTOLIC BLOOD PRESSURE: 149 MMHG | HEIGHT: 64 IN | HEART RATE: 74 BPM | BODY MASS INDEX: 28.85 KG/M2 | DIASTOLIC BLOOD PRESSURE: 84 MMHG

## 2019-02-12 DIAGNOSIS — L82.1 SEBORRHEIC KERATOSIS: ICD-10-CM

## 2019-02-12 DIAGNOSIS — L81.4 LENTIGO: ICD-10-CM

## 2019-02-12 DIAGNOSIS — D18.01 CHERRY ANGIOMA: ICD-10-CM

## 2019-02-12 DIAGNOSIS — D48.5 NEOPLASM OF UNCERTAIN BEHAVIOR OF SKIN: Primary | ICD-10-CM

## 2019-02-12 DIAGNOSIS — D50.8 OTHER IRON DEFICIENCY ANEMIA: ICD-10-CM

## 2019-02-12 DIAGNOSIS — L82.0 INFLAMED SEBORRHEIC KERATOSIS: ICD-10-CM

## 2019-02-12 PROCEDURE — 11102 TANGNTL BX SKIN SINGLE LES: CPT | Mod: 59 | Performed by: PHYSICIAN ASSISTANT

## 2019-02-12 PROCEDURE — 17110 DESTRUCTION B9 LES UP TO 14: CPT | Performed by: PHYSICIAN ASSISTANT

## 2019-02-12 PROCEDURE — 88305 TISSUE EXAM BY PATHOLOGIST: CPT | Mod: TC | Performed by: PHYSICIAN ASSISTANT

## 2019-02-12 PROCEDURE — 11103 TANGNTL BX SKIN EA SEP/ADDL: CPT | Performed by: PHYSICIAN ASSISTANT

## 2019-02-12 PROCEDURE — 99203 OFFICE O/P NEW LOW 30 MIN: CPT | Mod: 25 | Performed by: PHYSICIAN ASSISTANT

## 2019-02-12 RX ORDER — PYRITHIONE ZINC 1 ML/100ML
SHAMPOO TOPICAL
Qty: 60 TABLET | Refills: 5 | Status: SHIPPED | OUTPATIENT
Start: 2019-02-12 | End: 2019-08-08

## 2019-02-12 NOTE — LETTER
2/12/2019         RE: Kelly Vegas  [address] [place] [id number]        Dear Colleague,    Thank you for referring your patient, Kelly Vegas, to the [facility]. Please see a copy of my visit note below.    Kelly Vegas is a 68 year old year old female patient here today for spot on left cheek. She reports she noticed area a few months ago, growing in size. She also notes brown spot on left lateral cheek that was treated (?) went away and recently starting to come back. She denies blistering sunburns.  Patient has no other skin complaints today.  Remainder of the HPI, Meds, PMH, Allergies, FH, and SH was reviewed in chart.    Pertinent Hx:  No personal history of skin cancer.   Past Medical History:   Diagnosis Date     Arthritis check it     Asymptomatic gallstones      Cervical cancer (H) 3/2006    [facility], [facility] with gland involvement     CLL (chronic lymphocytic leukemia) (H)      Colon adenoma      DDD (degenerative disc disease), lumbar      Degenerative joint disease of hand      Depressive disorder      Diabetes mellitus type II      Diabetic polyneuropathy associated with type 2 diabetes mellitus (H) 9/26/2017     HTN (hypertension)      Hyperlipidemia LDL goal < 100      Hypothyroid 12/2004     Osteopenia      Recurrent major depression (H)        Past Surgical History:   Procedure Laterality Date     BIOPSY  before thyroid removal     C BSO, OMENTECTOMY W/JUAN DANIEL  2/2006    cervical cancer     C THYROIDECTOMY  12/2004    goiter     COLONOSCOPY  nov 2016     COLONOSCOPY WITH CO2 INSUFFLATION N/A 11/28/2016    Procedure: COLONOSCOPY WITH CO2 INSUFFLATION;  Surgeon: Migue Giraldo MD;  Location: [facility] OR     CYSTECTOMY OVARIAN BENIGN  1974     EYE SURGERY  12-14-16        Family History   Problem Relation Age of Onset     Thyroid Disease Mother      Dementia Mother      Osteoporosis Mother      Alcohol/Drug Father      C.A.D. Father         CABG      Diabetes Father       Coronary Artery Disease Father      Hypertension Father      Hyperlipidemia Father      Cerebrovascular Disease Father      Cancer Maternal Grandmother         stomach, colon     Breast Cancer Maternal Grandmother      Thyroid Disease Maternal Grandmother      Alzheimer Disease Maternal Grandfather      Breast Cancer Paternal Grandmother      Alcohol/Drug Brother      Prostate Cancer Brother      Alcohol/Drug Son      Depression Brother      Depression Brother         d. suicide     Breast Cancer Cousin      Breast Cancer Other        Social History     Socioeconomic History     Marital status:      Spouse name: Lauri     Number of children: 4     Years of education: 12     Highest education level: Not on file   Social Needs     Financial resource strain: Not on file     Food insecurity - worry: Not on file     Food insecurity - inability: Not on file     Transportation needs - medical: Not on file     Transportation needs - non-medical: Not on file   Occupational History     Occupation:      Employer: [employer],[address]    Tobacco Use     Smoking status: Former Smoker     Packs/day: 0.50     Years: 20.00     Pack years: 10.00     Types: Cigarettes     Start date: 1967     Last attempt to quit: 1989     Years since quittin.1     Smokeless tobacco: Never Used   Substance and Sexual Activity     Alcohol use: No     Drug use: No     Sexual activity: Not Currently     Partners: Male     Birth control/protection: Post-menopausal   Other Topics Concern     Parent/sibling w/ CABG, MI or angioplasty before 65F 55M? No      Service No     Blood Transfusions No     Caffeine Concern No     Occupational Exposure No     Hobby Hazards No     Sleep Concern No     Stress Concern No     Weight Concern Yes     Special Diet No     Back Care No     Exercise Yes     Bike Helmet No     Seat Belt Yes     Self-Exams Yes     Comment: When remember   Social History Narrative      Not on file       Outpatient Encounter Medications as of 2/12/2019   Medication Sig Dispense Refill     ASPIRIN 81 MG OR TABS Take 1 tablet by mouth daily.       atorvastatin (LIPITOR) 20 MG tablet Take 1 tablet (20 mg) by mouth daily 90 tablet 3     blood glucose monitoring (NO BRAND SPECIFIED) test strip Use to test blood sugars 3 times daily or as directed 300 strip 3     Cholecalciferol (VITAMIN D3) 1000 units CAPS Take by mouth daily       citalopram (CELEXA) 20 MG tablet Take 1 tablet (20 mg) by mouth daily 90 tablet 0     EQL IRON SUPPLEMENT THERAPY 325 MG tablet Take 1 tablet (325 mg) by mouth 2 times daily 60 tablet 5     insulin glargine (LANTUS SOLOSTAR PEN) 100 UNIT/ML pen Inject 18 units at bedtime daily. Increase insulin by 2 units every 3 days until AM blood glucose is less than 120. Maximum dose 50 units daily. 15 mL 0     insulin lispro (HUMALOG KWIKPEN) 100 UNIT/ML pen 6 units three times daily before meals 15 mL 0     insulin pen needle 31G X 6 MM Use twice daily or as directed. 300 each 3     levothyroxine (SYNTHROID/LEVOTHROID) 100 MCG tablet TAKE 1 TABLET (100 MCG) BY MOUTH DAILY 90 tablet 3     lisinopril-hydrochlorothiazide (PRINZIDE/ZESTORETIC) 10-12.5 MG per tablet Take 1 tablet by mouth daily 90 tablet 3     metFORMIN (GLUCOPHAGE) 1000 MG tablet Take 1 tablet (1,000 mg) by mouth 2 times daily (with meals) 180 tablet 3     nortriptyline (PAMELOR) 25 MG capsule TAKE 2-3 CAPSULES (50-75 MG) BY MOUTH NIGHTLY AS NEEDED (Patient not taking: Reported on 2/11/2019) 90 capsule 0     order for DME Blood glucose monitor per insurance formulary; blood glucose test strips One Touch Verio or per formulary for use 3 time daily; lancets per formulary for use 3 time daily 300 each 3     No facility-administered encounter medications on file as of 2/12/2019.              Review Of Systems  Skin: As above  Eyes: negative  Ears/Nose/Throat: negative  Respiratory: No shortness of breath, dyspnea on exertion,  "cough, or hemoptysis  Cardiovascular: negative  Gastrointestinal: negative  Genitourinary: negative  Musculoskeletal: negative  Neurologic: negative  Psychiatric: negative  Hematologic/Lymphatic/Immunologic: negative  Endocrine: negative      O:   NAD, WDWN, Alert & Oriented, Mood & Affect wnl, Vitals stable   Here today alone   /84   Pulse 74   Ht 1.626 m (5' 4\")   BMI 28.85 kg/m      General appearance normal   Vitals stable   Alert, oriented and in no acute distress     1.5 cm brown patch on left lateral cheek   1.0 cm pinkish brown macule on left zygoma   Stuck on papules and brown macules on trunk and ext   Red papules on trunk    The remainder of the detailed exam was unremarkable; the following areas were examined:  scalp/hair, conjunctiva/lids, face, neck, lips/teeth, oral mucosa/gingiva, chest, back, abdomen, buttocks, digits/nails, RUE, LUE, RLE, LLE.      Eyes: Conjunctivae/lids:Normal     ENT: Lips: normal    MSK:Normal    Cardiovascular: peripheral edema none    Pulm: Breathing Normal    Neuro/Psych: Orientation:Normal; Mood/Affect:Normal  A/P:  1. R/O lentigo vs mis on left lateral cheek  TANGENTIAL BIOPSY SENT OUT:  After consent, anesthesia with LEC and prep, tangential excision performed and specimen sent out for permanent section histology.  No complications and routine wound care. Patient told to call our office in 1-2 weeks for result.      2. R/O pigmented ak vs lentigo on left zygoma  TANGENTIAL BIOPSY SENT OUT:  After consent, anesthesia with LEC and prep, tangential excision performed and specimen sent out for permanent section histology.  No complications and routine wound care. Patient told to call our office in 1-2 weeks for result.      3. Inflamed seborrheic keratosis x 1 right groin  LN2:  Treated with LN2 for 5s for 1-2 cycles. Warned risks of blistering, pain, pigment change, scarring, and incomplete resolution.  Advised patient to return if lesions do not completely resolve. "  Wound care sheet given.  3. Seborrheic keratosis, lentigo, angioma  BENIGN LESIONS DISCUSSED WITH PATIENT:  I discussed the specifics of tumor, prognosis, and genetics of benign lesions.  I explained that treatment of these lesions would be purely cosmetic and not medically neccessary.  I discussed with patient different removal options including excision, cautery and /or laser.      Nature and genetics of benign skin lesions dicussed with patient.  Signs and Symptoms of skin cancer discussed with patient.  ABCDEs of melanoma reviewed with patient.  Patient encouraged to perform monthly skin exams.  UV precautions reviewed with patient.  Risks of non-melanoma skin cancer discussed with patient   Return to clinic pending biopsy results.     Kelly to follow up with Primary Care provider regarding elevated blood pressure.      Again, thank you for allowing me to participate in the care of your patient.        Sincerely,        Bernarda Vivas PA-C

## 2019-02-12 NOTE — PATIENT INSTRUCTIONS
Wound Care Instructions     FOR SUPERFICIAL WOUNDS     [facility] [phone]    [facility] [phone]    Face x 2                   AFTER 24 HOURS YOU SHOULD REMOVE THE BANDAGE AND BEGIN DAILY DRESSING CHANGES AS FOLLOWS:     1) Remove Dressing.     2) Clean and dry the area with tap water using a Q-tip or sterile gauze pad.     3) Apply Vaseline, Aquaphor, Polysporin ointment or Bacitracin ointment over entire wound.  Do NOT use Neosporin ointment.     4) Cover the wound with a band-aid, or a sterile non-stick gauze pad and micropore paper tape      REPEAT THESE INSTRUCTIONS AT LEAST ONCE A DAY UNTIL THE WOUND HAS COMPLETELY HEALED.    It is an old wives tale that a wound heals better when it is exposed to air and allowed to dry out. The wound will heal faster with a better cosmetic result if it is kept moist with ointment and covered with a bandage.    **Do not let the wound dry out.**      Supplies Needed:      *Cotton tipped applicators (Q-tips)    *Polysporin Ointment or Bacitracin Ointment (NOT NEOSPORIN)    *Band-aids or non-stick gauze pads and micropore paper tape.      PATIENT INFORMATION:    During the healing process you will notice a number of changes. All wounds develop a small halo of redness surrounding the wound.  This means healing is occurring. Severe itching with extensive redness usually indicates sensitivity to the ointment or bandage tape used to dress the wound.  You should call our office if this develops.      Swelling  and/or discoloration around your surgical site is common, particularly when performed around the eye.    All wounds normally drain.  The larger the wound the more drainage there will be.  After 7-10 days, you will notice the wound beginning to shrink and new skin will begin to grow.  The wound is healed when you can see skin has formed over the entire area.  A healed wound has a healthy, shiny look to the surface and is red to dark pink  in color to normalize.  Wounds may take approximately 4-6 weeks to heal.  Larger wounds may take 6-8 weeks.  After the wound is healed you may discontinue dressing changes.    You may experience a sensation of tightness as your wound heals. This is normal and will gradually subside.    Your healed wound may be sensitive to temperature changes. This sensitivity improves with time, but if you re having a lot of discomfort, try to avoid temperature extremes.    Patients frequently experience itching after their wound appears to have healed because of the continue healing under the skin.  Plain Vaseline will help relieve the itching.        POSSIBLE COMPLICATIONS    BLEEDIN. Leave the bandage in place.  2. Use tightly rolled up gauze or a cloth to apply direct pressure over the bandage for 30  minutes.  3. Reapply pressure for an additional 30 minutes if necessary  4. Use additional gauze and tape to maintain pressure once the bleeding has stopped.    WOUND CARE INSTRUCTIONS   FOR CRYOSURGERY   This area treated with liquid nitrogen should form a blister (areas treated may or may not blister-skin may just turn dark and slough off). You do not need to bandage the area unless a blister forms and breaks (which may be a few days). When the blister breaks, begin daily dressing changes as follows:  1) Clean and dry the area with tap water using clean Q-tip or sterile gauze pad.   2) Apply Polysporin ointment or Bacitracin ointment over entire wound. Do NOT use Neosporin ointment.   3) Cover the wound with a band-aid or sterile non-stick gauze pad and micropore paper tape.   REPEAT THESE INSTRUCTIONS AT LEAST ONCE A DAY UNTIL THE WOUND HAS COMPLETELY HEALED.   It is an old wives tale that a wound heals better when it is exposed to air and allowed to dry out. The wound will heal faster with a better cosmetic result if it is kept moist with ointment and covered with a bandage.   Do not let the wound dry out.   IMPORTANT  INFORMATION ON REVERSE SIDE   Supplies Needed:   *Cotton tipped applicators (Q-tips)   *Polysporin ointment or Bacitracin ointment (NOT NEOSPORIN)   *Band-aids, or non stick gauze pads and micropore paper tape   PATIENT INFORMATION   During the healing process you will notice a number of changes. All wounds develop a small halo of redness surrounding the wound. This means healing is occurring. Severe itching with extensive redness usually indicates sensitivity to the ointment or bandage tape used to dress the wound. You should call our office if this develops.   Swelling and/or discoloration around your surgical site is common, particularly when performed around the eye.   All wounds normally drain. The larger the wound the more drainage there will be. After 7-10 days, you will notice the wound beginning to shrink and new skin will begin to grow. The wound is healed when you can see skin has formed over the entire area. A healed wound has a healthy, shiny look to the surface and is red to dark pink in color to normalize. Wounds may take approximately 4-6 weeks to heal. Larger wounds may take 6-8 weeks. After the wound is healed you may discontinue dressing changes.   You may experience a sensation of tightness as your wound heals. This is normal and will gradually subside.   Your healed wound may be sensitive to temperature changes. This sensitivity improves with time, but if you re having a lot of discomfort, try to avoid temperature extremes.   Patients frequently experience itching after their wound appears to have healed because of the continue healing under the skin. Plain Vaseline will help relieve the itching.

## 2019-02-12 NOTE — NURSING NOTE
"Initial /84   Pulse 74   Ht 1.626 m (5' 4\")   BMI 28.85 kg/m   Estimated body mass index is 28.85 kg/m  as calculated from the following:    Height as of this encounter: 1.626 m (5' 4\").    Weight as of 2/11/19: 76.2 kg (168 lb 1.6 oz). .      "

## 2019-02-12 NOTE — PROGRESS NOTES
Kelly Vegas is a 68 year old year old female patient here today for spot on left cheek. She reports she noticed area a few months ago, growing in size. She also notes brown spot on left lateral cheek that was treated (?) went away and recently starting to come back. She denies blistering sunburns.  Patient has no other skin complaints today.  Remainder of the HPI, Meds, PMH, Allergies, FH, and SH was reviewed in chart.    Pertinent Hx:  No personal history of skin cancer.   Past Medical History:   Diagnosis Date     Arthritis check it     Asymptomatic gallstones      Cervical cancer (H) 3/2006    [facility], [facility] with gland involvement     CLL (chronic lymphocytic leukemia) (H)      Colon adenoma      DDD (degenerative disc disease), lumbar      Degenerative joint disease of hand      Depressive disorder      Diabetes mellitus type II      Diabetic polyneuropathy associated with type 2 diabetes mellitus (H) 9/26/2017     HTN (hypertension)      Hyperlipidemia LDL goal < 100      Hypothyroid 12/2004     Osteopenia      Recurrent major depression (H)        Past Surgical History:   Procedure Laterality Date     BIOPSY  before thyroid removal     C BSO, OMENTECTOMY W/JUAN DANIEL  2/2006    cervical cancer     C THYROIDECTOMY  12/2004    goiter     COLONOSCOPY  nov 2016     COLONOSCOPY WITH CO2 INSUFFLATION N/A 11/28/2016    Procedure: COLONOSCOPY WITH CO2 INSUFFLATION;  Surgeon: Migue Giraldo MD;  Location: [facility] OR     CYSTECTOMY OVARIAN BENIGN  1974     EYE SURGERY  12-14-16        Family History   Problem Relation Age of Onset     Thyroid Disease Mother      Dementia Mother      Osteoporosis Mother      Alcohol/Drug Father      C.A.D. Father         CABG      Diabetes Father      Coronary Artery Disease Father      Hypertension Father      Hyperlipidemia Father      Cerebrovascular Disease Father      Cancer Maternal Grandmother         stomach, colon     Breast Cancer Maternal Grandmother      Thyroid Disease  Maternal Grandmother      Alzheimer Disease Maternal Grandfather      Breast Cancer Paternal Grandmother      Alcohol/Drug Brother      Prostate Cancer Brother      Alcohol/Drug Son      Depression Brother      Depression Brother         d. suicide     Breast Cancer Cousin      Breast Cancer Other        Social History     Socioeconomic History     Marital status:      Spouse name: Lauri     Number of children: 4     Years of education: 12     Highest education level: Not on file   Social Needs     Financial resource strain: Not on file     Food insecurity - worry: Not on file     Food insecurity - inability: Not on file     Transportation needs - medical: Not on file     Transportation needs - non-medical: Not on file   Occupational History     Occupation:      Employer: [employer],[address]    Tobacco Use     Smoking status: Former Smoker     Packs/day: 0.50     Years: 20.00     Pack years: 10.00     Types: Cigarettes     Start date: 1967     Last attempt to quit: 1989     Years since quittin.1     Smokeless tobacco: Never Used   Substance and Sexual Activity     Alcohol use: No     Drug use: No     Sexual activity: Not Currently     Partners: Male     Birth control/protection: Post-menopausal   Other Topics Concern     Parent/sibling w/ CABG, MI or angioplasty before 65F 55M? No      Service No     Blood Transfusions No     Caffeine Concern No     Occupational Exposure No     Hobby Hazards No     Sleep Concern No     Stress Concern No     Weight Concern Yes     Special Diet No     Back Care No     Exercise Yes     Bike Helmet No     Seat Belt Yes     Self-Exams Yes     Comment: When remember   Social History Narrative     Not on file       Outpatient Encounter Medications as of 2019   Medication Sig Dispense Refill     ASPIRIN 81 MG OR TABS Take 1 tablet by mouth daily.       atorvastatin (LIPITOR) 20 MG tablet Take 1 tablet (20 mg) by mouth daily 90  tablet 3     blood glucose monitoring (NO BRAND SPECIFIED) test strip Use to test blood sugars 3 times daily or as directed 300 strip 3     Cholecalciferol (VITAMIN D3) 1000 units CAPS Take by mouth daily       citalopram (CELEXA) 20 MG tablet Take 1 tablet (20 mg) by mouth daily 90 tablet 0     EQL IRON SUPPLEMENT THERAPY 325 MG tablet Take 1 tablet (325 mg) by mouth 2 times daily 60 tablet 5     insulin glargine (LANTUS SOLOSTAR PEN) 100 UNIT/ML pen Inject 18 units at bedtime daily. Increase insulin by 2 units every 3 days until AM blood glucose is less than 120. Maximum dose 50 units daily. 15 mL 0     insulin lispro (HUMALOG KWIKPEN) 100 UNIT/ML pen 6 units three times daily before meals 15 mL 0     insulin pen needle 31G X 6 MM Use twice daily or as directed. 300 each 3     levothyroxine (SYNTHROID/LEVOTHROID) 100 MCG tablet TAKE 1 TABLET (100 MCG) BY MOUTH DAILY 90 tablet 3     lisinopril-hydrochlorothiazide (PRINZIDE/ZESTORETIC) 10-12.5 MG per tablet Take 1 tablet by mouth daily 90 tablet 3     metFORMIN (GLUCOPHAGE) 1000 MG tablet Take 1 tablet (1,000 mg) by mouth 2 times daily (with meals) 180 tablet 3     nortriptyline (PAMELOR) 25 MG capsule TAKE 2-3 CAPSULES (50-75 MG) BY MOUTH NIGHTLY AS NEEDED (Patient not taking: Reported on 2/11/2019) 90 capsule 0     order for DME Blood glucose monitor per insurance formulary; blood glucose test strips One Touch Verio or per formulary for use 3 time daily; lancets per formulary for use 3 time daily 300 each 3     No facility-administered encounter medications on file as of 2/12/2019.              Review Of Systems  Skin: As above  Eyes: negative  Ears/Nose/Throat: negative  Respiratory: No shortness of breath, dyspnea on exertion, cough, or hemoptysis  Cardiovascular: negative  Gastrointestinal: negative  Genitourinary: negative  Musculoskeletal: negative  Neurologic: negative  Psychiatric: negative  Hematologic/Lymphatic/Immunologic: negative  Endocrine:  "negative      O:   NAD, WDWN, Alert & Oriented, Mood & Affect wnl, Vitals stable   Here today alone   /84   Pulse 74   Ht 1.626 m (5' 4\")   BMI 28.85 kg/m     General appearance normal   Vitals stable   Alert, oriented and in no acute distress     1.5 cm brown patch on left lateral cheek   1.0 cm pinkish brown macule on left zygoma   Stuck on papules and brown macules on trunk and ext   Red papules on trunk    The remainder of the detailed exam was unremarkable; the following areas were examined:  scalp/hair, conjunctiva/lids, face, neck, lips/teeth, oral mucosa/gingiva, chest, back, abdomen, buttocks, digits/nails, RUE, LUE, RLE, LLE.      Eyes: Conjunctivae/lids:Normal     ENT: Lips: normal    MSK:Normal    Cardiovascular: peripheral edema none    Pulm: Breathing Normal    Neuro/Psych: Orientation:Normal; Mood/Affect:Normal  A/P:  1. R/O lentigo vs mis on left lateral cheek  TANGENTIAL BIOPSY SENT OUT:  After consent, anesthesia with LEC and prep, tangential excision performed and specimen sent out for permanent section histology.  No complications and routine wound care. Patient told to call our office in 1-2 weeks for result.      2. R/O pigmented ak vs lentigo on left zygoma  TANGENTIAL BIOPSY SENT OUT:  After consent, anesthesia with LEC and prep, tangential excision performed and specimen sent out for permanent section histology.  No complications and routine wound care. Patient told to call our office in 1-2 weeks for result.      3. Inflamed seborrheic keratosis x 1 right groin  LN2:  Treated with LN2 for 5s for 1-2 cycles. Warned risks of blistering, pain, pigment change, scarring, and incomplete resolution.  Advised patient to return if lesions do not completely resolve.  Wound care sheet given.  3. Seborrheic keratosis, lentigo, angioma  BENIGN LESIONS DISCUSSED WITH PATIENT:  I discussed the specifics of tumor, prognosis, and genetics of benign lesions.  I explained that treatment of these " lesions would be purely cosmetic and not medically neccessary.  I discussed with patient different removal options including excision, cautery and /or laser.      Nature and genetics of benign skin lesions dicussed with patient.  Signs and Symptoms of skin cancer discussed with patient.  ABCDEs of melanoma reviewed with patient.  Patient encouraged to perform monthly skin exams.  UV precautions reviewed with patient.  Risks of non-melanoma skin cancer discussed with patient   Return to clinic pending biopsy results.     Kelly to follow up with Primary Care provider regarding elevated blood pressure.

## 2019-02-18 LAB — COPATH REPORT: NORMAL

## 2019-02-21 ENCOUNTER — ANESTHESIA EVENT (OUTPATIENT)
Dept: GASTROENTEROLOGY | Facility: CLINIC | Age: 69
End: 2019-02-21
Payer: COMMERCIAL

## 2019-02-21 ASSESSMENT — LIFESTYLE VARIABLES: TOBACCO_USE: 1

## 2019-02-21 NOTE — ANESTHESIA PREPROCEDURE EVALUATION
Anesthesia Pre-Procedure Evaluation    Patient: Kelly Vegas   MRN: [id number] : 1950          Preoperative Diagnosis: chronic lymphocytic leukemia; low iron  diagnostic    Procedure(s):  COLONOSCOPY  COMBINED ESOPHAGOSCOPY, GASTROSCOPY, DUODENOSCOPY (EGD)    Past Medical History:   Diagnosis Date     Arthritis check it     Asymptomatic gallstones      Cervical cancer (H) 3/2006    [facility], [facility] with gland involvement     CLL (chronic lymphocytic leukemia) (H)      Colon adenoma      DDD (degenerative disc disease), lumbar      Degenerative joint disease of hand      Depressive disorder      Diabetes mellitus type II      Diabetic polyneuropathy associated with type 2 diabetes mellitus (H) 2017     HTN (hypertension)      Hyperlipidemia LDL goal < 100      Hypothyroid 2004     Osteopenia      Recurrent major depression (H)      Past Surgical History:   Procedure Laterality Date     BIOPSY  before thyroid removal     C BSO, OMENTECTOMY W/JUAN DANIEL  2006    cervical cancer     C THYROIDECTOMY  2004    goiter     COLONOSCOPY  2016     COLONOSCOPY WITH CO2 INSUFFLATION N/A 2016    Procedure: COLONOSCOPY WITH CO2 INSUFFLATION;  Surgeon: Migue Giradlo MD;  Location: [facility]     CYSTECTOMY OVARIAN BENIGN  1974     EYE SURGERY  16       Anesthesia Evaluation     . Pt has had prior anesthetic. Type: General and MAC           ROS/MED HX    ENT/Pulmonary:     (+)tobacco use, Past use , . .    Neurologic:     (+)other neuro Memory impairment    Cardiovascular:     (+) Dyslipidemia, hypertension----. : . . . :. .       METS/Exercise Tolerance:     Hematologic:     (+) Anemia, -      Musculoskeletal:   (+) arthritis, , , other musculoskeletal- DDD; Osteopenia; DJD      GI/Hepatic:  - neg GI/hepatic ROS       Renal/Genitourinary:  - ROS Renal section negative       Endo:     (+) type II DM Last HgA1c: 8.3 date: 18 Diabetic complications: neuropathy, thyroid problem hypothyroidism, .     "  Psychiatric:     (+) psychiatric history anxiety and depression      Infectious Disease:  - neg infectious disease ROS       Malignancy:   (+) Malignancy History of Other and Lymphoma/Leukemia  Other CA status post Surgery         Other:    - neg other ROS                      Physical Exam  Normal systems: cardiovascular, pulmonary and dental    Airway   Mallampati: II  TM distance: >3 FB  Neck ROM: full    Dental     Cardiovascular       Pulmonary             Lab Results   Component Value Date    WBC 15.3 (H) 02/07/2019    HGB 11.9 02/07/2019    HCT 38.4 02/07/2019     02/07/2019    SED 13 10/15/2018     11/01/2018    POTASSIUM 3.6 11/01/2018    CHLORIDE 101 11/01/2018    CO2 27 11/01/2018    BUN 22 11/01/2018    CR 0.97 11/01/2018     (H) 11/01/2018    KARTHIK 8.9 11/01/2018    ALBUMIN 3.8 11/01/2018    PROTTOTAL 7.1 11/01/2018    ALT 20 11/01/2018    AST 18 11/01/2018    ALKPHOS 96 11/01/2018    BILITOTAL 0.5 11/01/2018    PTT 25 01/31/2006    INR 1.04 01/31/2006    TSH 0.93 09/24/2018    T4 1.20 06/29/2017    T3 175 01/31/2006       Preop Vitals  BP Readings from Last 3 Encounters:   02/12/19 149/84   02/11/19 129/50   12/17/18 108/68    Pulse Readings from Last 3 Encounters:   02/12/19 74   02/11/19 74   12/17/18 89      Resp Readings from Last 3 Encounters:   12/17/18 18   12/10/18 18   11/12/18 16    SpO2 Readings from Last 3 Encounters:   02/11/19 97%   12/17/18 97%   12/10/18 99%      Temp Readings from Last 1 Encounters:   02/11/19 36.2  C (97.2  F) (Tympanic)    Ht Readings from Last 1 Encounters:   02/12/19 1.626 m (5' 4\")      Wt Readings from Last 1 Encounters:   02/11/19 76.2 kg (168 lb 1.6 oz)    Estimated body mass index is 28.85 kg/m  as calculated from the following:    Height as of 2/12/19: 1.626 m (5' 4\").    Weight as of 2/11/19: 76.2 kg (168 lb 1.6 oz).       Anesthesia Plan      History & Physical Review  History and physical reviewed and following examination; no interval " change.    ASA Status:  2 .    NPO Status:  > 6 hours    Plan for MAC and General with Propofol induction. Maintenance will be TIVA.  Reason for MAC:  Deep or markedly invasive procedure (G8)  PONV prophylaxis:  Ondansetron (or other 5HT-3) and Dexamethasone or Solumedrol       Postoperative Care  Postoperative pain management:  Multi-modal analgesia.      Consents  Anesthetic plan, risks, benefits and alternatives discussed with:  Patient..                 BIA Palomares CRNA

## 2019-02-25 ENCOUNTER — TELEPHONE (OUTPATIENT)
Dept: FAMILY MEDICINE | Facility: CLINIC | Age: 69
End: 2019-02-25

## 2019-02-26 NOTE — TELEPHONE ENCOUNTER
Detailed message left for patient with providers message as written, advised to call back RN hotline with questions.   Chelsy Snyder RN    
Ok to hold ASA. Do not take metformin or humalog while fasting. Take 14 units of Lantus night before procedure (or 80% of current dose if changed from 18 units)  Sejal Rutledge MD    
Ok to hold iron for procedure   Sejal Rutledge MD    
Patient called and was wondering what she needs to do before her colonoscopy on 03/01/2019 shes wondering if she needs to stop of decrease the amount of the medications she takes before procedure. Huddled with nayana sanchez to talk about it was told to route to her.   
Patient notified of providers message as written.  Patient verbalized understanding.  She was reading that she shouldn't take supplements with iron is it okay to hold Iron-she takes this every day.   Chelsy Snyder RN    
Patient returned call and wanted to clarify holding iron. Advised her to hold iron for the procedure per Dr. Rutledge. Pt verbalized understanding & no further questions.     Ceci Teran RN  
10

## 2019-03-01 ENCOUNTER — HOSPITAL ENCOUNTER (OUTPATIENT)
Facility: CLINIC | Age: 69
Discharge: HOME | End: 2019-03-01
Attending: SURGERY | Admitting: SURGERY
Payer: COMMERCIAL

## 2019-03-01 ENCOUNTER — ANESTHESIA (OUTPATIENT)
Dept: GASTROENTEROLOGY | Facility: CLINIC | Age: 69
End: 2019-03-01
Payer: COMMERCIAL

## 2019-03-01 VITALS
DIASTOLIC BLOOD PRESSURE: 77 MMHG | RESPIRATION RATE: 16 BRPM | BODY MASS INDEX: 27.49 KG/M2 | HEART RATE: 72 BPM | TEMPERATURE: 98.9 F | OXYGEN SATURATION: 99 % | HEIGHT: 65 IN | WEIGHT: 165 LBS | SYSTOLIC BLOOD PRESSURE: 115 MMHG

## 2019-03-01 LAB
COLONOSCOPY: NORMAL
GLUCOSE BLDC GLUCOMTR-MCNC: 199 MG/DL (ref 70–99)
UPPER GI ENDOSCOPY: NORMAL

## 2019-03-01 PROCEDURE — 25000125 ZZHC RX 250: Performed by: SURGERY

## 2019-03-01 PROCEDURE — 88305 TISSUE EXAM BY PATHOLOGIST: CPT | Performed by: SURGERY

## 2019-03-01 PROCEDURE — 82962 GLUCOSE BLOOD TEST: CPT

## 2019-03-01 PROCEDURE — 37000009 ZZH ANESTHESIA TECHNICAL FEE, EACH ADDTL 15 MIN: Performed by: SURGERY

## 2019-03-01 PROCEDURE — 25000125 ZZHC RX 250: Performed by: NURSE ANESTHETIST, CERTIFIED REGISTERED

## 2019-03-01 PROCEDURE — 25000128 H RX IP 250 OP 636: Performed by: NURSE ANESTHETIST, CERTIFIED REGISTERED

## 2019-03-01 PROCEDURE — 43239 EGD BIOPSY SINGLE/MULTIPLE: CPT | Performed by: SURGERY

## 2019-03-01 PROCEDURE — 45378 DIAGNOSTIC COLONOSCOPY: CPT | Performed by: SURGERY

## 2019-03-01 PROCEDURE — 88305 TISSUE EXAM BY PATHOLOGIST: CPT | Mod: 26,76 | Performed by: SURGERY

## 2019-03-01 PROCEDURE — 43239 EGD BIOPSY SINGLE/MULTIPLE: CPT | Mod: 51 | Performed by: SURGERY

## 2019-03-01 PROCEDURE — 25800030 ZZH RX IP 258 OP 636: Performed by: SURGERY

## 2019-03-01 PROCEDURE — 25800030 ZZH RX IP 258 OP 636: Performed by: NURSE ANESTHETIST, CERTIFIED REGISTERED

## 2019-03-01 PROCEDURE — 37000008 ZZH ANESTHESIA TECHNICAL FEE, 1ST 30 MIN: Performed by: SURGERY

## 2019-03-01 RX ORDER — PROPOFOL 10 MG/ML
INJECTION, EMULSION INTRAVENOUS PRN
Status: DISCONTINUED | OUTPATIENT
Start: 2019-03-01 | End: 2019-03-01

## 2019-03-01 RX ORDER — PROPOFOL 10 MG/ML
INJECTION, EMULSION INTRAVENOUS CONTINUOUS PRN
Status: DISCONTINUED | OUTPATIENT
Start: 2019-03-01 | End: 2019-03-01

## 2019-03-01 RX ORDER — SODIUM CHLORIDE, SODIUM LACTATE, POTASSIUM CHLORIDE, CALCIUM CHLORIDE 600; 310; 30; 20 MG/100ML; MG/100ML; MG/100ML; MG/100ML
INJECTION, SOLUTION INTRAVENOUS CONTINUOUS PRN
Status: DISCONTINUED | OUTPATIENT
Start: 2019-03-01 | End: 2019-03-01

## 2019-03-01 RX ORDER — SODIUM CHLORIDE, SODIUM LACTATE, POTASSIUM CHLORIDE, CALCIUM CHLORIDE 600; 310; 30; 20 MG/100ML; MG/100ML; MG/100ML; MG/100ML
INJECTION, SOLUTION INTRAVENOUS ONCE
Status: COMPLETED | OUTPATIENT
Start: 2019-03-01 | End: 2019-03-01

## 2019-03-01 RX ORDER — GLYCOPYRROLATE 0.2 MG/ML
INJECTION, SOLUTION INTRAMUSCULAR; INTRAVENOUS PRN
Status: DISCONTINUED | OUTPATIENT
Start: 2019-03-01 | End: 2019-03-01

## 2019-03-01 RX ORDER — ONDANSETRON 2 MG/ML
4 INJECTION INTRAMUSCULAR; INTRAVENOUS
Status: DISCONTINUED | OUTPATIENT
Start: 2019-03-01 | End: 2019-03-01 | Stop reason: HOSPADM

## 2019-03-01 RX ORDER — LIDOCAINE 40 MG/G
CREAM TOPICAL
Status: DISCONTINUED | OUTPATIENT
Start: 2019-03-01 | End: 2019-03-01 | Stop reason: HOSPADM

## 2019-03-01 RX ORDER — LIDOCAINE HYDROCHLORIDE 10 MG/ML
INJECTION, SOLUTION INFILTRATION; PERINEURAL PRN
Status: DISCONTINUED | OUTPATIENT
Start: 2019-03-01 | End: 2019-03-01

## 2019-03-01 RX ADMIN — SODIUM CHLORIDE, POTASSIUM CHLORIDE, SODIUM LACTATE AND CALCIUM CHLORIDE: 600; 310; 30; 20 INJECTION, SOLUTION INTRAVENOUS at 10:17

## 2019-03-01 RX ADMIN — LIDOCAINE HYDROCHLORIDE 50 MG: 10 INJECTION, SOLUTION INFILTRATION; PERINEURAL at 11:02

## 2019-03-01 RX ADMIN — SODIUM CHLORIDE, POTASSIUM CHLORIDE, SODIUM LACTATE AND CALCIUM CHLORIDE: 600; 310; 30; 20 INJECTION, SOLUTION INTRAVENOUS at 10:21

## 2019-03-01 RX ADMIN — GLYCOPYRROLATE 0.2 MG: 0.2 INJECTION, SOLUTION INTRAMUSCULAR; INTRAVENOUS at 11:02

## 2019-03-01 RX ADMIN — LIDOCAINE HYDROCHLORIDE 1 ML: 10 INJECTION, SOLUTION EPIDURAL; INFILTRATION; INTRACAUDAL; PERINEURAL at 10:21

## 2019-03-01 RX ADMIN — PROPOFOL 200 MCG/KG/MIN: 10 INJECTION, EMULSION INTRAVENOUS at 11:02

## 2019-03-01 RX ADMIN — PROPOFOL 100 MG: 10 INJECTION, EMULSION INTRAVENOUS at 11:02

## 2019-03-01 ASSESSMENT — MIFFLIN-ST. JEOR: SCORE: 1274.32

## 2019-03-01 NOTE — BRIEF OP NOTE
[facility]   Brief Operative Note    Pre-operative diagnosis: chronic lymphocytic leukemia; low iron  diagnostic   Post-operative diagnosis mild gastritis, normal colon.       Procedure: Procedure(s):  COLONOSCOPY  COMBINED ESOPHAGOSCOPY, GASTROSCOPY, DUODENOSCOPY (EGD), BIOPSY SINGLE OR MULTIPLE   Surgeon(s): Surgeon(s) and Role:     * Romulo Hayes MD - Primary   Estimated blood loss: * No values recorded between 3/1/2019 12:00 AM and 3/1/2019 11:33 AM *    Specimens: ID Type Source Tests Collected by Time Destination   A :  Tissue Stomach, Antrum SURGICAL PATHOLOGY EXAM Romulo Hayes MD 3/1/2019 11:11 AM    B :  Biopsy Small Intestine, Duodenum SURGICAL PATHOLOGY EXAM Romulo Hayes MD 3/1/2019 11:11 AM       Findings: 1. Normal esophagus. z-line regular at 40 cm  2. Mild gastritis - biopsied for h. Pylori  3. Nodular duodenal mucosa biopsied  4. Colon normal

## 2019-03-01 NOTE — H&P
68 year old year old female here for EGD and colonoscopy for evaluation of iron-deficiency anemia.    Patient Active Problem List   Diagnosis     Hyperlipidemia with target LDL less than 100     Hypertension goal BP (blood pressure) < 140/90     History of cervical cancer     Advanced directives, counseling/discussion     Major depressive disorder, recurrent, mild (H)     Acquired hypothyroidism     Type 2 diabetes mellitus with diabetic polyneuropathy, with long-term current use of insulin (H)     Degenerative joint disease of hand     DDD (degenerative disc disease), lumbar     Osteopenia     Memory problem     Anemia, iron deficiency     Asymptomatic gallstones     CLL (chronic lymphocytic leukemia) (H)       Past Medical History:   Diagnosis Date     Arthritis check it     Asymptomatic gallstones      Cervical cancer (H) 3/2006    [facility], [facility] with gland involvement     CLL (chronic lymphocytic leukemia) (H)      Colon adenoma      DDD (degenerative disc disease), lumbar      Degenerative joint disease of hand      Depressive disorder      Diabetes mellitus type II      Diabetic polyneuropathy associated with type 2 diabetes mellitus (H) 9/26/2017     HTN (hypertension)      Hyperlipidemia LDL goal < 100      Hypothyroid 12/2004     Osteopenia      Recurrent major depression (H)        Past Surgical History:   Procedure Laterality Date     BIOPSY  before thyroid removal     C BSO, OMENTECTOMY W/JUAN DANIEL  2/2006    cervical cancer     C THYROIDECTOMY  12/2004    goiter     COLONOSCOPY  nov 2016     COLONOSCOPY WITH CO2 INSUFFLATION N/A 11/28/2016    Procedure: COLONOSCOPY WITH CO2 INSUFFLATION;  Surgeon: Migue Giraldo MD;  Location: [facility]     CYSTECTOMY OVARIAN BENIGN  1974     EYE SURGERY  12-14-16       [facility]    No current outpatient medications on file.       Allergies   Allergen Reactions     Glipizide      tremulous     Ibuprofen Hives     Penicillins Itching     Percocet [Acetaminophen] Nausea and  "Vomiting     Vicodin [Hydrocodone-Acetaminophen] Nausea       Pt reports that she quit smoking about 30 years ago. Her smoking use included cigarettes. She started smoking about 51 years ago. She has a 10.00 pack-year smoking history. she has never used smokeless tobacco. She reports that she does not drink alcohol or use drugs.    Exam:  /66 (BP Location: Right arm)   Pulse 72   Temp 98.9  F (37.2  C) (Oral)   Resp 16   Ht 1.651 m (5' 5\")   Wt 74.8 kg (165 lb)   SpO2 96%   BMI 27.46 kg/m      Awake, Alert OX3  Lungs - CTA bilaterally  CV - RRR, no murmurs, distal pulses intact  Abd - soft, non-distended, non-tender, +BS  Extr - No cyanosis or edema    A/P 68 year old year old female in need of EGD and colonoscopy for evaluation of iron-deficiency anemia. Risks, benefits, alternatives, and complications were discussed including the possibility of perforation and the patient agreed to proceed.    Romulo Hayes MD  "

## 2019-03-01 NOTE — ANESTHESIA CARE TRANSFER NOTE
Patient: Kelly Vegas    Procedure(s):  COLONOSCOPY  COMBINED ESOPHAGOSCOPY, GASTROSCOPY, DUODENOSCOPY (EGD), BIOPSY SINGLE OR MULTIPLE    Diagnosis: chronic lymphocytic leukemia; low iron  diagnostic  Diagnosis Additional Information: No value filed.    Anesthesia Type:   MAC, General     Note:  Airway :Room Air  Patient transferred to:Phase II  Handoff Report: Identifed the Patient, Identified the Reponsible Provider, Reviewed the pertinent medical history, Discussed the surgical course, Reviewed Intra-OP anesthesia mangement and issues during anesthesia, Set expectations for post-procedure period and Allowed opportunity for questions and acknowledgement of understanding      Vitals: (Last set prior to Anesthesia Care Transfer)    CRNA VITALS  3/1/2019 1101 - 3/1/2019 1131      3/1/2019             Pulse:  68    Ht Rate:  69    SpO2:  95 %                Electronically Signed By: BIA You CRNA  March 1, 2019  11:31 AM

## 2019-03-01 NOTE — ANESTHESIA POSTPROCEDURE EVALUATION
Patient: Kelly Vegas    Procedure(s):  COLONOSCOPY  COMBINED ESOPHAGOSCOPY, GASTROSCOPY, DUODENOSCOPY (EGD), BIOPSY SINGLE OR MULTIPLE    Diagnosis:chronic lymphocytic leukemia; low iron  diagnostic  Diagnosis Additional Information: No value filed.    Anesthesia Type:  MAC, General    Note:  Anesthesia Post Evaluation    Patient location during evaluation: Phase 2 and Bedside  Patient participation: Able to fully participate in evaluation  Level of consciousness: awake and alert  Pain management: adequate  Airway patency: patent  Cardiovascular status: acceptable and hemodynamically stable  Respiratory status: acceptable and room air  Hydration status: acceptable  PONV: none     Anesthetic complications: None          Last vitals:  Vitals:    03/01/19 0956   BP: 116/66   Pulse: 72   Resp: 16   Temp: 37.2  C (98.9  F)   SpO2: 96%         Electronically Signed By: BIA You CRNA  March 1, 2019  11:32 AM

## 2019-03-05 LAB — COPATH REPORT: NORMAL

## 2019-03-22 DIAGNOSIS — F33.0 MAJOR DEPRESSIVE DISORDER, RECURRENT, MILD (H): ICD-10-CM

## 2019-03-22 RX ORDER — CITALOPRAM HYDROBROMIDE 20 MG/1
20 TABLET ORAL DAILY
Qty: 30 TABLET | Refills: 0 | Status: SHIPPED | OUTPATIENT
Start: 2019-03-22 | End: 2019-03-26

## 2019-03-25 NOTE — PROGRESS NOTES
SUBJECTIVE:   Kelly Vegas is a 68 year old female who presents to clinic today for the following health issues:    Diabetes Follow-up    Patient is checking blood sugars: once daily.  Results are as follows:         Average 175    Diabetic concerns: None     Symptoms of hypoglycemia (low blood sugar): shaky     Paresthesias (numbness or burning in feet) or sores: No     Date of last diabetic eye exam: 10/2018    Diabetes Management Resources    Hyperlipidemia Follow-Up      Rate your low fat/cholesterol diet?: good    Taking statin?  Yes, no muscle aches from statin    Other lipid medications/supplements?:  none    Hypertension Follow-up      Outpatient blood pressures are not being checked.    Low Salt Diet: low salt    BP Readings from Last 2 Encounters:   03/01/19 115/77   02/12/19 149/84     Hemoglobin A1C (%)   Date Value   12/13/2018 8.2 (H)   09/24/2018 10.1 (H)     LDL Cholesterol Calculated (mg/dL)   Date Value   04/11/2018 27   12/03/2015 76     LDL Cholesterol Direct (mg/dL)   Date Value   12/20/2016 49       Amount of exercise or physical activity: None    Problems taking medications regularly: No    Medication side effects: slight constipation    Diet: low salt, low fat/cholesterol and diabetic    Patient also presents for follow-up of hypothyroidism, controlled on current medication.  Denies symptoms of hypo- or hyperthyroidism.     Patient has depression, mild recurrent, with no medication side effects and no anhedonia or low mood, without suicidal ideation.      I have reviewed the patient's medical history in detail and updated the computerized patient record.    ROS:  CONSTITUTIONAL: NEGATIVE for fever, chills, change in weight  INTEGUMENTARY/SKIN: NEGATIVE for worrisome rashes, moles or lesions  EYES: NEGATIVE for vision changes or irritation  RESP: NEGATIVE for significant cough or SOB  CV: NEGATIVE for chest pain, palpitations or peripheral edema  MUSCULOSKELETAL: lump on finger   NEURO:  "dysesthesias  ENDOCRINE: Hx diabetes  HEME/ALLERGY/IMMUNE: CLL, anemia   PSYCHIATRIC: HX depression    OBJECTIVE:     /58   Pulse 79   Temp 98.2  F (36.8  C) (Oral)   Ht 1.651 m (5' 5\")   Wt 75.3 kg (166 lb)   SpO2 99%   Breastfeeding? No   BMI 27.62 kg/m    Body mass index is 27.62 kg/m .  GENERAL: healthy, alert and no distress  NECK: no adenopathy, no asymmetry, masses, or scars and thyroid normal to palpation  RESP: lungs clear to auscultation - no rales, rhonchi or wheezes  CV: regular rate and rhythm, normal S1 S2, no S3 or S4, no murmur, click or rub, no peripheral edema    MS: soft mass near joint of left finger   SKIN: no suspicious lesions or rashes  NEURO: Normal strength and tone, sensory exam grossly normal and mentation intact  PSYCH: mentation appears normal, affect normal/bright  Diabetic foot exam: no trophic changes or ulcerative lesions and normal monofilament exam    Diagnostic Test Results:  Results for orders placed or performed in visit on 03/26/19   HEMOGLOBIN A1C   Result Value Ref Range    Hemoglobin A1C 7.4 (H) 0 - 5.6 %       ASSESSMENT/PLAN:   (E11.42,  Z79.4) Type 2 diabetes mellitus with diabetic polyneuropathy, with long-term current use of insulin (H)  (primary encounter diagnosis)  Comment: improved controlled with medications without side effects.   Plan: FOOT EXAM  NO CHARGE [30585.114], HEMOGLOBIN         A1C, Lipid panel reflex to direct LDL         Non-fasting, Albumin Random Urine Quantitative         with Creat Ratio, insulin glargine (LANTUS         SOLOSTAR PEN) 100 UNIT/ML pen, metFORMIN         (GLUCOPHAGE) 1000 MG tablet, insulin lispro         (HUMALOG KWIKPEN) 100 UNIT/ML pen        Follow-up 3 - 6 months     (I10) Hypertension goal BP (blood pressure) < 140/90  Comment: Well controlled with medications without side effects.   Plan: Albumin Random Urine Quantitative with Creat         Ratio, lisinopril-hydrochlorothiazide         (PRINZIDE/ZESTORETIC) " 10-12.5 MG tablet          (E78.5) Hyperlipidemia with target LDL less than 100  Comment: Well controlled with medications without side effects.   Plan: atorvastatin (LIPITOR) 20 MG tablet          (E03.9) Acquired hypothyroidism  Comment: euthyroid on replacement   Plan: levothyroxine (SYNTHROID/LEVOTHROID) 100 MCG         tablet          (F33.0) Major depressive disorder, recurrent, mild (H)  Comment: Well controlled with medications without side effects.   Plan: citalopram (CELEXA) 20 MG tablet          (D50.8) Other iron deficiency anemia  Plan: Hemoglobin        Follow-up per oncology     (R20.8) Dysesthesia  Comment: Differential diagnoses would include: carpel tunnel syndrome, neuropathy   Plan: EMG          (M71.30) Synovial cyst  Comment: finger   Plan: ORTHO  REFERRAL            See Patient Instructions    Sejal Rutledge MD  [facility]

## 2019-03-26 ENCOUNTER — OFFICE VISIT (OUTPATIENT)
Dept: FAMILY MEDICINE | Facility: CLINIC | Age: 69
End: 2019-03-26
Payer: COMMERCIAL

## 2019-03-26 VITALS
WEIGHT: 166 LBS | HEART RATE: 79 BPM | BODY MASS INDEX: 27.66 KG/M2 | DIASTOLIC BLOOD PRESSURE: 58 MMHG | HEIGHT: 65 IN | TEMPERATURE: 98.2 F | SYSTOLIC BLOOD PRESSURE: 124 MMHG | OXYGEN SATURATION: 99 %

## 2019-03-26 DIAGNOSIS — E78.5 HYPERLIPIDEMIA WITH TARGET LDL LESS THAN 100: ICD-10-CM

## 2019-03-26 DIAGNOSIS — D50.8 OTHER IRON DEFICIENCY ANEMIA: ICD-10-CM

## 2019-03-26 DIAGNOSIS — R20.8 DYSESTHESIA: ICD-10-CM

## 2019-03-26 DIAGNOSIS — Z79.4 TYPE 2 DIABETES MELLITUS WITH DIABETIC POLYNEUROPATHY, WITH LONG-TERM CURRENT USE OF INSULIN (H): Primary | ICD-10-CM

## 2019-03-26 DIAGNOSIS — C91.10 CLL (CHRONIC LYMPHOCYTIC LEUKEMIA) (H): ICD-10-CM

## 2019-03-26 DIAGNOSIS — E03.9 ACQUIRED HYPOTHYROIDISM: ICD-10-CM

## 2019-03-26 DIAGNOSIS — E11.42 TYPE 2 DIABETES MELLITUS WITH DIABETIC POLYNEUROPATHY, WITH LONG-TERM CURRENT USE OF INSULIN (H): Primary | ICD-10-CM

## 2019-03-26 DIAGNOSIS — I10 HYPERTENSION GOAL BP (BLOOD PRESSURE) < 140/90: ICD-10-CM

## 2019-03-26 DIAGNOSIS — M71.30 SYNOVIAL CYST: ICD-10-CM

## 2019-03-26 DIAGNOSIS — F33.0 MAJOR DEPRESSIVE DISORDER, RECURRENT, MILD (H): ICD-10-CM

## 2019-03-26 LAB
DIFFERENTIAL METHOD BLD: ABNORMAL
EOSINOPHIL # BLD AUTO: 0.2 10E9/L (ref 0–0.7)
EOSINOPHIL NFR BLD AUTO: 1 %
ERYTHROCYTE [DISTWIDTH] IN BLOOD BY AUTOMATED COUNT: 13.6 % (ref 10–15)
HBA1C MFR BLD: 7.4 % (ref 0–5.6)
HCT VFR BLD AUTO: 38.1 % (ref 35–47)
HGB BLD-MCNC: 12 G/DL (ref 11.7–15.7)
HYPOCHROMIA BLD QL: PRESENT
LYMPHOCYTES # BLD AUTO: 12.1 10E9/L (ref 0.8–5.3)
LYMPHOCYTES NFR BLD AUTO: 70 %
MCH RBC QN AUTO: 30 PG (ref 26.5–33)
MCHC RBC AUTO-ENTMCNC: 31.5 G/DL (ref 31.5–36.5)
MCV RBC AUTO: 95 FL (ref 78–100)
MONOCYTES # BLD AUTO: 0.2 10E9/L (ref 0–1.3)
MONOCYTES NFR BLD AUTO: 1 %
NEUTROPHILS # BLD AUTO: 4.9 10E9/L (ref 1.6–8.3)
NEUTROPHILS NFR BLD AUTO: 28 %
PLATELET # BLD AUTO: 265 10E9/L (ref 150–450)
PLATELET # BLD EST: ABNORMAL 10*3/UL
RBC # BLD AUTO: 4 10E12/L (ref 3.8–5.2)
VARIANT LYMPHS BLD QL SMEAR: PRESENT
WBC # BLD AUTO: 17.4 10E9/L (ref 4–11)

## 2019-03-26 PROCEDURE — 99207 C FOOT EXAM  NO CHARGE: CPT | Performed by: FAMILY MEDICINE

## 2019-03-26 PROCEDURE — 82043 UR ALBUMIN QUANTITATIVE: CPT | Performed by: FAMILY MEDICINE

## 2019-03-26 PROCEDURE — 36415 COLL VENOUS BLD VENIPUNCTURE: CPT | Performed by: FAMILY MEDICINE

## 2019-03-26 PROCEDURE — 83036 HEMOGLOBIN GLYCOSYLATED A1C: CPT | Performed by: FAMILY MEDICINE

## 2019-03-26 PROCEDURE — 99214 OFFICE O/P EST MOD 30 MIN: CPT | Performed by: FAMILY MEDICINE

## 2019-03-26 PROCEDURE — 85025 COMPLETE CBC W/AUTO DIFF WBC: CPT | Performed by: FAMILY MEDICINE

## 2019-03-26 PROCEDURE — 80061 LIPID PANEL: CPT | Performed by: FAMILY MEDICINE

## 2019-03-26 RX ORDER — ATORVASTATIN CALCIUM 20 MG/1
20 TABLET, FILM COATED ORAL DAILY
Qty: 90 TABLET | Refills: 3 | Status: SHIPPED | OUTPATIENT
Start: 2019-03-26 | End: 2020-02-11

## 2019-03-26 RX ORDER — LEVOTHYROXINE SODIUM 100 UG/1
TABLET ORAL
Qty: 90 TABLET | Refills: 3 | Status: SHIPPED | OUTPATIENT
Start: 2019-03-26 | End: 2020-02-11

## 2019-03-26 RX ORDER — CITALOPRAM HYDROBROMIDE 20 MG/1
20 TABLET ORAL DAILY
Qty: 90 TABLET | Refills: 1 | Status: SHIPPED | OUTPATIENT
Start: 2019-03-26 | End: 2019-10-22

## 2019-03-26 RX ORDER — NORTRIPTYLINE HCL 25 MG
CAPSULE ORAL
Qty: 90 CAPSULE | Refills: 3 | Status: CANCELLED | OUTPATIENT
Start: 2019-03-26

## 2019-03-26 RX ORDER — LISINOPRIL/HYDROCHLOROTHIAZIDE 10-12.5 MG
1 TABLET ORAL DAILY
Qty: 90 TABLET | Refills: 3 | Status: SHIPPED | OUTPATIENT
Start: 2019-03-26 | End: 2020-02-11

## 2019-03-26 ASSESSMENT — MIFFLIN-ST. JEOR: SCORE: 1278.85

## 2019-03-26 ASSESSMENT — PATIENT HEALTH QUESTIONNAIRE - PHQ9: SUM OF ALL RESPONSES TO PHQ QUESTIONS 1-9: 3

## 2019-03-26 NOTE — PATIENT INSTRUCTIONS
It is recommended that you get a vaccination for shingles called Shingrix (given as 2 shots, 2 to 6 months apart), even if you have already had the Zostavax vaccine. Discuss getting the Shingix vaccine from your pharmacist, or schedule an ancillary shot visit here. Some insurances do not cover the cost of these vaccines.      You may call the [facility] [place] at [phone] to schedule a EMG.     You can get assistance with [employer] and password help by calling the central [email or web address] line at [phone], with support 24 hours a day/7 days a week.

## 2019-03-27 PROBLEM — D50.9 ANEMIA, IRON DEFICIENCY: Status: RESOLVED | Noted: 2018-09-25 | Resolved: 2019-03-27

## 2019-03-27 LAB
CHOLEST SERPL-MCNC: 114 MG/DL
CREAT UR-MCNC: 96 MG/DL
HDLC SERPL-MCNC: 47 MG/DL
LDLC SERPL CALC-MCNC: 56 MG/DL
MICROALBUMIN UR-MCNC: 5 MG/L
MICROALBUMIN/CREAT UR: 5.37 MG/G CR (ref 0–25)
NONHDLC SERPL-MCNC: 67 MG/DL
TRIGL SERPL-MCNC: 53 MG/DL

## 2019-03-27 NOTE — RESULT ENCOUNTER NOTE
Kelly,    Your urine test is normal. Your cholesterol is controlled. Your anemia has resolved; follow-up with your oncologist as planned.     Sejal Rutledge MD

## 2019-03-28 ENCOUNTER — OFFICE VISIT (OUTPATIENT)
Dept: ORTHOPEDICS | Facility: CLINIC | Age: 69
End: 2019-03-28
Payer: COMMERCIAL

## 2019-03-28 VITALS
SYSTOLIC BLOOD PRESSURE: 129 MMHG | HEIGHT: 65 IN | HEART RATE: 90 BPM | BODY MASS INDEX: 27.66 KG/M2 | DIASTOLIC BLOOD PRESSURE: 65 MMHG | WEIGHT: 166 LBS | RESPIRATION RATE: 13 BRPM | OXYGEN SATURATION: 96 %

## 2019-03-28 DIAGNOSIS — M67.441 GANGLION CYST OF JOINT OF FINGER OF RIGHT HAND: ICD-10-CM

## 2019-03-28 PROCEDURE — 99243 OFF/OP CNSLTJ NEW/EST LOW 30: CPT | Performed by: ORTHOPAEDIC SURGERY

## 2019-03-28 ASSESSMENT — MIFFLIN-ST. JEOR: SCORE: 1278.85

## 2019-03-28 NOTE — LETTER
3/28/2019         RE: Kelly Vegas  [address] [place] [id number]        Dear Colleague,    Thank you for referring your patient, Kelly Vegas, to the [facility]. Please see a copy of my visit note below.    Kelly Vegas is a 68 year old female who is seen in consultation at the request of Dr. Sejal Rutledge  for evaluation of a mass located dorsal right long finger PIP joint    It has been present for 3 months.    Symptoms include swelling.  She reports no tenderness.  Previous treatment:  none    Past Medical History:   Diagnosis Date     Arthritis check it     Asymptomatic gallstones      Cervical cancer (H) 3/2006    [facility], [facility] with gland involvement     CLL (chronic lymphocytic leukemia) (H)      Colon adenoma      DDD (degenerative disc disease), lumbar      Degenerative joint disease of hand      Depressive disorder      Diabetes mellitus type II      Diabetic polyneuropathy associated with type 2 diabetes mellitus (H) 9/26/2017     Gastritis      HTN (hypertension)      Hyperlipidemia LDL goal < 100      Hypothyroid 12/2004     Osteopenia      Recurrent major depression (H)        Past Surgical History:   Procedure Laterality Date     BIOPSY  before thyroid removal     C BSO, OMENTECTOMY W/JUAN DANIEL  2/2006    cervical cancer     C THYROIDECTOMY  12/2004    goiter     COLONOSCOPY  nov 2016     COLONOSCOPY N/A 3/1/2019    Procedure: COLONOSCOPY;  Surgeon: Romulo Hayes MD;  Location: [facility]     COLONOSCOPY WITH CO2 INSUFFLATION N/A 11/28/2016    Procedure: COLONOSCOPY WITH CO2 INSUFFLATION;  Surgeon: Migue Giraldo MD;  Location: [facility]     CYSTECTOMY OVARIAN BENIGN  1974     ESOPHAGOSCOPY, GASTROSCOPY, DUODENOSCOPY (EGD), COMBINED N/A 3/1/2019    Procedure: COMBINED ESOPHAGOSCOPY, GASTROSCOPY, DUODENOSCOPY (EGD), BIOPSY SINGLE OR MULTIPLE;  Surgeon: Romulo Hayes MD;  Location: [facility]     EYE SURGERY  12-14-16       Family History   Problem Relation Age of Onset      Thyroid Disease Mother      Dementia Mother      Osteoporosis Mother      Alcohol/Drug Father      C.A.D. Father         CABG      Diabetes Father      Coronary Artery Disease Father      Hypertension Father      Hyperlipidemia Father      Cerebrovascular Disease Father      Cancer Maternal Grandmother         stomach, colon     Breast Cancer Maternal Grandmother      Thyroid Disease Maternal Grandmother      Alzheimer Disease Maternal Grandfather      Breast Cancer Paternal Grandmother      Alcohol/Drug Brother      Prostate Cancer Brother      Alcohol/Drug Son      Depression Brother      Depression Brother         d. suicide     Breast Cancer Cousin      Breast Cancer Other        Social History     Socioeconomic History     Marital status:      Spouse name: Lauri     Number of children: 4     Years of education: 12     Highest education level: Not on file   Occupational History     Occupation:      Employer: [employer]    Social Needs     Financial resource strain: Not on file     Food insecurity:     Worry: Not on file     Inability: Not on file     Transportation needs:     Medical: Not on file     Non-medical: Not on file   Tobacco Use     Smoking status: Former Smoker     Packs/day: 0.50     Years: 20.00     Pack years: 10.00     Types: Cigarettes     Start date: 1967     Last attempt to quit: 1989     Years since quittin.2     Smokeless tobacco: Never Used   Substance and Sexual Activity     Alcohol use: No     Drug use: No     Sexual activity: Not Currently     Partners: Male     Birth control/protection: Post-menopausal   Lifestyle     Physical activity:     Days per week: Not on file     Minutes per session: Not on file     Stress: Not on file   Relationships     Social connections:     Talks on phone: Not on file     Gets together: Not on file     Attends Mu-ism service: Not on file     Active member of club or organization: Not on file      Attends meetings of clubs or organizations: Not on file     Relationship status: Not on file     Intimate partner violence:     Fear of current or ex partner: Not on file     Emotionally abused: Not on file     Physically abused: Not on file     Forced sexual activity: Not on file   Other Topics Concern     Parent/sibling w/ CABG, MI or angioplasty before 65F 55M? No      Service No     Blood Transfusions No     Caffeine Concern No     Occupational Exposure No     Hobby Hazards No     Sleep Concern No     Stress Concern No     Weight Concern Yes     Special Diet No     Back Care No     Exercise Yes     Bike Helmet No     Seat Belt Yes     Self-Exams Yes     Comment: When remember   Social History Narrative     Not on file       Current Outpatient Medications   Medication Sig Dispense Refill     ASPIRIN 81 MG OR TABS Take 1 tablet by mouth daily.       atorvastatin (LIPITOR) 20 MG tablet Take 1 tablet (20 mg) by mouth daily 90 tablet 3     blood glucose monitoring (NO BRAND SPECIFIED) test strip Use to test blood sugars 3 times daily or as directed 300 strip 3     Cholecalciferol (VITAMIN D3) 1000 units CAPS Take by mouth daily       citalopram (CELEXA) 20 MG tablet Take 1 tablet (20 mg) by mouth daily 90 tablet 1     EQL IRON SUPPLEMENT THERAPY 325 MG tablet Take 1 tablet (325 mg) by mouth 2 times daily 60 tablet 5     insulin glargine (LANTUS SOLOSTAR PEN) 100 UNIT/ML pen Inject 18 units at bedtime daily. 45 mL 3     insulin lispro (HUMALOG KWIKPEN) 100 UNIT/ML pen 6 units daily before largest meal of the day 15 mL 1     insulin pen needle 31G X 6 MM Use twice daily or as directed. 300 each 3     levothyroxine (SYNTHROID/LEVOTHROID) 100 MCG tablet TAKE 1 TABLET (100 MCG) BY MOUTH DAILY 90 tablet 3     lisinopril-hydrochlorothiazide (PRINZIDE/ZESTORETIC) 10-12.5 MG tablet Take 1 tablet by mouth daily 90 tablet 3     metFORMIN (GLUCOPHAGE) 1000 MG tablet Take 1 tablet (1,000 mg) by mouth 2 times daily  "(with meals) 180 tablet 3     order for DME Blood glucose monitor per insurance formulary; blood glucose test strips One Touch Verio or per formulary for use 3 time daily; lancets per formulary for use 3 time daily 300 each 3       Allergies   Allergen Reactions     Glipizide      tremulous     Ibuprofen Hives     Penicillins Itching     Percocet [Acetaminophen] Nausea and Vomiting     Vicodin [Hydrocodone-Acetaminophen] Nausea       REVIEW OF SYSTEMS:  CONSTITUTIONAL:  NEGATIVE for fever, chills, change in weight, not feeling tired  SKIN:  NEGATIVE for worrisome rashes, no skin lumps, no skin ulcers and no non-healing wounds  EYES:  NEGATIVE for vision changes or irritation.  ENT/MOUTH:  NEGATIVE.  No hearing loss, no hoarseness, no difficulty swallowing.  RESP:  NEGATIVE. No cough or shortness of breath.  BREAST:  NEGATIVE for masses, tenderness or discharge  CV:  NEGATIVE for chest pain, palpitations or peripheral edema  GI:  NEGATIVE for nausea, abdominal pain, heartburn, or change in bowel habits  :  Negative. No dysuria, no hematuria  MUSCULOSKELETAL:  See HPI above  NEURO:  NEGATIVE . No headaches, no dizziness,  no numbness  ENDOCRINE:  NEGATIVE for temperature intolerance, skin/hair changes  HEME/ALLERGY/IMMUNE:  NEGATIVE for bleeding problems  PSYCHIATRIC:  NEGATIVE. no anxiety, no depression.         OBJECTIVE:    Vitals: /65   Pulse 90   Resp 13   Ht 1.651 m (5' 5\")   Wt 75.3 kg (166 lb)   SpO2 96%   BMI 27.62 kg/m     BMI= Body mass index is 27.62 kg/m .       Exam:  Small mass is seen located on the ulnar dorsal side of the PIP joint right long finger.  Not tender at cyst.  Appears to be fluid filled consistent with ganglion cyst.  No warmth or erythema.  Full range of motion of hand.  Sensation, motor, and circulation intact    ASSESSMENT:  Right long finger ganglion cyst at PIP.    PLAN:  Risks, benefits, potential complications and alternatives were discussed.  I explained that this is " not dangerous, but could be removed if it is painful.  She elects to watch it for now.         Again, thank you for allowing me to participate in the care of your patient.        Sincerely,        Romulo Nevarez MD

## 2019-03-29 PROBLEM — M67.441 GANGLION CYST OF JOINT OF FINGER OF RIGHT HAND: Status: ACTIVE | Noted: 2019-03-29

## 2019-03-29 NOTE — PROGRESS NOTES
Kelly Vegas is a 68 year old female who is seen in consultation at the request of Dr. Sejal Rutledge  for evaluation of a mass located dorsal right long finger PIP joint    It has been present for 3 months.    Symptoms include swelling.  She reports no tenderness.  Previous treatment:  none    Past Medical History:   Diagnosis Date     Arthritis check it     Asymptomatic gallstones      Cervical cancer (H) 3/2006    [facility] with gland involvement     CLL (chronic lymphocytic leukemia) (H)      Colon adenoma      DDD (degenerative disc disease), lumbar      Degenerative joint disease of hand      Depressive disorder      Diabetes mellitus type II      Diabetic polyneuropathy associated with type 2 diabetes mellitus (H) 9/26/2017     Gastritis      HTN (hypertension)      Hyperlipidemia LDL goal < 100      Hypothyroid 12/2004     Osteopenia      Recurrent major depression (H)        Past Surgical History:   Procedure Laterality Date     BIOPSY  before thyroid removal     C BSO, OMENTECTOMY W/JUAN DANIEL  2/2006    cervical cancer     C THYROIDECTOMY  12/2004    goiter     COLONOSCOPY  nov 2016     COLONOSCOPY N/A 3/1/2019    Procedure: COLONOSCOPY;  Surgeon: Romulo Hayes MD;  Location: [facility]     COLONOSCOPY WITH CO2 INSUFFLATION N/A 11/28/2016    Procedure: COLONOSCOPY WITH CO2 INSUFFLATION;  Surgeon: Migue Giraldo MD;  [facility]     CYSTECTOMY OVARIAN BENIGN  1974     ESOPHAGOSCOPY, GASTROSCOPY, DUODENOSCOPY (EGD), COMBINED N/A 3/1/2019    Procedure: COMBINED ESOPHAGOSCOPY, GASTROSCOPY, DUODENOSCOPY (EGD), BIOPSY SINGLE OR MULTIPLE;  Surgeon: Romulo Hayes MD;  Location: [facility]     EYE SURGERY  12-14-16       Family History   Problem Relation Age of Onset     Thyroid Disease Mother      Dementia Mother      Osteoporosis Mother      Alcohol/Drug Father      C.A.D. Father         CABG      Diabetes Father      Coronary Artery Disease Father      Hypertension Father      Hyperlipidemia Father       Cerebrovascular Disease Father      Cancer Maternal Grandmother         stomach, colon     Breast Cancer Maternal Grandmother      Thyroid Disease Maternal Grandmother      Alzheimer Disease Maternal Grandfather      Breast Cancer Paternal Grandmother      Alcohol/Drug Brother      Prostate Cancer Brother      Alcohol/Drug Son      Depression Brother      Depression Brother         d. suicide     Breast Cancer Cousin      Breast Cancer Other        Social History     Socioeconomic History     Marital status:      Spouse name: Lauri     Number of children: 4     Years of education: 12     Highest education level: Not on file   Occupational History     Occupation:      Employer: [id number]    Social Needs     Financial resource strain: Not on file     Food insecurity:     Worry: Not on file     Inability: Not on file     Transportation needs:     Medical: Not on file     Non-medical: Not on file   Tobacco Use     Smoking status: Former Smoker     Packs/day: 0.50     Years: 20.00     Pack years: 10.00     Types: Cigarettes     Start date: 1967     Last attempt to quit: 1989     Years since quittin.2     Smokeless tobacco: Never Used   Substance and Sexual Activity     Alcohol use: No     Drug use: No     Sexual activity: Not Currently     Partners: Male     Birth control/protection: Post-menopausal   Lifestyle     Physical activity:     Days per week: Not on file     Minutes per session: Not on file     Stress: Not on file   Relationships     Social connections:     Talks on phone: Not on file     Gets together: Not on file     Attends Sabianist service: Not on file     Active member of club or organization: Not on file     Attends meetings of clubs or organizations: Not on file     Relationship status: Not on file     Intimate partner violence:     Fear of current or ex partner: Not on file     Emotionally abused: Not on file     Physically abused: Not on  file     Forced sexual activity: Not on file   Other Topics Concern     Parent/sibling w/ CABG, MI or angioplasty before 65F 55M? No      Service No     Blood Transfusions No     Caffeine Concern No     Occupational Exposure No     Hobby Hazards No     Sleep Concern No     Stress Concern No     Weight Concern Yes     Special Diet No     Back Care No     Exercise Yes     Bike Helmet No     Seat Belt Yes     Self-Exams Yes     Comment: When remember   Social History Narrative     Not on file       Current Outpatient Medications   Medication Sig Dispense Refill     ASPIRIN 81 MG OR TABS Take 1 tablet by mouth daily.       atorvastatin (LIPITOR) 20 MG tablet Take 1 tablet (20 mg) by mouth daily 90 tablet 3     blood glucose monitoring (NO BRAND SPECIFIED) test strip Use to test blood sugars 3 times daily or as directed 300 strip 3     Cholecalciferol (VITAMIN D3) 1000 units CAPS Take by mouth daily       citalopram (CELEXA) 20 MG tablet Take 1 tablet (20 mg) by mouth daily 90 tablet 1     EQL IRON SUPPLEMENT THERAPY 325 MG tablet Take 1 tablet (325 mg) by mouth 2 times daily 60 tablet 5     insulin glargine (LANTUS SOLOSTAR PEN) 100 UNIT/ML pen Inject 18 units at bedtime daily. 45 mL 3     insulin lispro (HUMALOG KWIKPEN) 100 UNIT/ML pen 6 units daily before largest meal of the day 15 mL 1     insulin pen needle 31G X 6 MM Use twice daily or as directed. 300 each 3     levothyroxine (SYNTHROID/LEVOTHROID) 100 MCG tablet TAKE 1 TABLET (100 MCG) BY MOUTH DAILY 90 tablet 3     lisinopril-hydrochlorothiazide (PRINZIDE/ZESTORETIC) 10-12.5 MG tablet Take 1 tablet by mouth daily 90 tablet 3     metFORMIN (GLUCOPHAGE) 1000 MG tablet Take 1 tablet (1,000 mg) by mouth 2 times daily (with meals) 180 tablet 3     order for DME Blood glucose monitor per insurance formulary; blood glucose test strips One Touch Verio or per formulary for use 3 time daily; lancets per formulary for use 3 time daily 300 each 3       Allergies  "  Allergen Reactions     Glipizide      tremulous     Ibuprofen Hives     Penicillins Itching     Percocet [Acetaminophen] Nausea and Vomiting     Vicodin [Hydrocodone-Acetaminophen] Nausea       REVIEW OF SYSTEMS:  CONSTITUTIONAL:  NEGATIVE for fever, chills, change in weight, not feeling tired  SKIN:  NEGATIVE for worrisome rashes, no skin lumps, no skin ulcers and no non-healing wounds  EYES:  NEGATIVE for vision changes or irritation.  ENT/MOUTH:  NEGATIVE.  No hearing loss, no hoarseness, no difficulty swallowing.  RESP:  NEGATIVE. No cough or shortness of breath.  BREAST:  NEGATIVE for masses, tenderness or discharge  CV:  NEGATIVE for chest pain, palpitations or peripheral edema  GI:  NEGATIVE for nausea, abdominal pain, heartburn, or change in bowel habits  :  Negative. No dysuria, no hematuria  MUSCULOSKELETAL:  See HPI above  NEURO:  NEGATIVE . No headaches, no dizziness,  no numbness  ENDOCRINE:  NEGATIVE for temperature intolerance, skin/hair changes  HEME/ALLERGY/IMMUNE:  NEGATIVE for bleeding problems  PSYCHIATRIC:  NEGATIVE. no anxiety, no depression.         OBJECTIVE:    Vitals: /65   Pulse 90   Resp 13   Ht 1.651 m (5' 5\")   Wt 75.3 kg (166 lb)   SpO2 96%   BMI 27.62 kg/m    BMI= Body mass index is 27.62 kg/m .       Exam:  Small mass is seen located on the ulnar dorsal side of the PIP joint right long finger.  Not tender at cyst.  Appears to be fluid filled consistent with ganglion cyst.  No warmth or erythema.  Full range of motion of hand.  Sensation, motor, and circulation intact    ASSESSMENT:  Right long finger ganglion cyst at PIP.    PLAN:  Risks, benefits, potential complications and alternatives were discussed.  I explained that this is not dangerous, but could be removed if it is painful.  She elects to watch it for now.     "

## 2019-05-09 DIAGNOSIS — C91.10 CLL (CHRONIC LYMPHOCYTIC LEUKEMIA) (H): ICD-10-CM

## 2019-05-09 DIAGNOSIS — E61.1 LOW IRON: ICD-10-CM

## 2019-05-09 LAB
BASOPHILS # BLD AUTO: 0 10E9/L (ref 0–0.2)
BASOPHILS NFR BLD AUTO: 0 %
DIFFERENTIAL METHOD BLD: ABNORMAL
EOSINOPHIL # BLD AUTO: 0 10E9/L (ref 0–0.7)
EOSINOPHIL NFR BLD AUTO: 0 %
ERYTHROCYTE [DISTWIDTH] IN BLOOD BY AUTOMATED COUNT: 12.9 % (ref 10–15)
FERRITIN SERPL-MCNC: 15 NG/ML (ref 8–252)
HCT VFR BLD AUTO: 39.7 % (ref 35–47)
HGB BLD-MCNC: 12 G/DL (ref 11.7–15.7)
LYMPHOCYTES # BLD AUTO: 12.6 10E9/L (ref 0.8–5.3)
LYMPHOCYTES NFR BLD AUTO: 70 %
MCH RBC QN AUTO: 29.1 PG (ref 26.5–33)
MCHC RBC AUTO-ENTMCNC: 30.2 G/DL (ref 31.5–36.5)
MCV RBC AUTO: 96 FL (ref 78–100)
MONOCYTES # BLD AUTO: 0.4 10E9/L (ref 0–1.3)
MONOCYTES NFR BLD AUTO: 2 %
NEUTROPHILS # BLD AUTO: 5 10E9/L (ref 1.6–8.3)
NEUTROPHILS NFR BLD AUTO: 28 %
PLATELET # BLD AUTO: 299 10E9/L (ref 150–450)
PLATELET # BLD EST: ABNORMAL 10*3/UL
RBC # BLD AUTO: 4.13 10E12/L (ref 3.8–5.2)
RBC MORPH BLD: ABNORMAL
WBC # BLD AUTO: 18 10E9/L (ref 4–11)

## 2019-05-09 PROCEDURE — 36415 COLL VENOUS BLD VENIPUNCTURE: CPT | Performed by: INTERNAL MEDICINE

## 2019-05-09 PROCEDURE — 85025 COMPLETE CBC W/AUTO DIFF WBC: CPT | Performed by: INTERNAL MEDICINE

## 2019-05-09 PROCEDURE — 82728 ASSAY OF FERRITIN: CPT | Performed by: INTERNAL MEDICINE

## 2019-05-13 ENCOUNTER — ONCOLOGY VISIT (OUTPATIENT)
Dept: ONCOLOGY | Facility: CLINIC | Age: 69
End: 2019-05-13
Attending: INTERNAL MEDICINE
Payer: COMMERCIAL

## 2019-05-13 VITALS
BODY MASS INDEX: 27.76 KG/M2 | RESPIRATION RATE: 16 BRPM | TEMPERATURE: 98.7 F | HEART RATE: 79 BPM | WEIGHT: 166.8 LBS | DIASTOLIC BLOOD PRESSURE: 67 MMHG | SYSTOLIC BLOOD PRESSURE: 130 MMHG

## 2019-05-13 DIAGNOSIS — Z86.2 HISTORY OF ANEMIA: ICD-10-CM

## 2019-05-13 DIAGNOSIS — K29.70 GASTRITIS WITHOUT BLEEDING, UNSPECIFIED CHRONICITY, UNSPECIFIED GASTRITIS TYPE: ICD-10-CM

## 2019-05-13 DIAGNOSIS — C91.10 CLL (CHRONIC LYMPHOCYTIC LEUKEMIA) (H): Primary | ICD-10-CM

## 2019-05-13 PROCEDURE — 99214 OFFICE O/P EST MOD 30 MIN: CPT | Performed by: INTERNAL MEDICINE

## 2019-05-13 PROCEDURE — G0463 HOSPITAL OUTPT CLINIC VISIT: HCPCS

## 2019-05-13 ASSESSMENT — PAIN SCALES - GENERAL: PAINLEVEL: NO PAIN (0)

## 2019-05-13 NOTE — NURSING NOTE
"Oncology Rooming Note    May 13, 2019 2:28 PM   Kelly Vegas is a 68 year old female who presents for:    Chief Complaint   Patient presents with     Hematology     review lab results; CLL     Initial Vitals: /67 (BP Location: Right arm, Patient Position: Chair, Cuff Size: Adult Regular)   Pulse 79   Temp 98.7  F (37.1  C) (Tympanic)   Resp 16   Wt 75.7 kg (166 lb 12.8 oz)   BMI 27.76 kg/m   Estimated body mass index is 27.76 kg/m  as calculated from the following:    Height as of 3/28/19: 1.651 m (5' 5\").    Weight as of this encounter: 75.7 kg (166 lb 12.8 oz). Body surface area is 1.86 meters squared.  No Pain (0) Comment: Data Unavailable   No LMP recorded. Patient has had a hysterectomy.  Allergies reviewed: Yes  Medications reviewed: Yes    Medications: Medication refills not needed today.  Pharmacy name entered into [facility]: [facility], [place] - [address] [place]    Clinical concerns: Patient presents today in f/u of CLLand to review lab results. Has c/o bowel changes; intermittent constipation and diarrhea with bloating, fullness, and upset or queasy stomach. Per patient, has had a colonoscopy and endoscopy in March, \"2 in 5 years now\". She feels it is the iron tablets. Dr. BORA De Los Santos was notified.      Glenys Norman RN              "

## 2019-05-13 NOTE — PROGRESS NOTES
HEMATOLOGY FOLLOW UP VISIT       PRIMARY PHYSICIAN:  Sejal Rutledge MD        CHIEF COMPLAINT AND REASON FOR VISIT:    CLL  DEBORA hx     HISTORY OF PRESENT ILLNESS:  She presented to us 10/2018 at age 68 due to new onset of moderate lymphocytosis/leukocytosis, white count of 21 and absolute lymphocyte count of 15-16 with new onset of normocytic anemia, hemoglobin around 11, MCV normal.  She has normal B12, folic acid.  Ferritin is low at 7 with iron saturation at 10%.  She was started on oral iron supplementation iron sulfate 325 prior to seeing us.      Data review indicating she had touch of leukocytosis no diff back in 2006.  She was not anemic back in 2006 with hemoglobin of 12.5.      In 10/2018, she presented with Hematology office for consultation on new moderate leukocytosis/lymphocytosis and moderate normocytic normochromic anemia, found to have low ferritin and iron saturation, which was suggestive of iron-deficiency anemia.       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      She was offered oral iron and she changed her diet.   Hem work up in 10/2018 was most consistent with CLL and DEBORA. She was on oral iron supplement. Hb is corrected 12/2018. GI work up with upper endo and colonoscopy did not find active bleeding, but + for inflammation.       PAST MEDICAL HISTORY:  History of cervical cancer, major depression, hypertension, hyperlipidemia, osteopenia, degenerative disk problem, type 2 diabetes, acquired hypothyroidism.      MEDICATIONS:  Reviewed in Epic system.      SOCIAL HISTORY:  She lives in [place].  Denies smoking, denies alcohol abuse.      FAMILY HISTORY:  Denied any family history of blood disease.  One maternal aunt had colon cancer.        REVIEW OF SYSTEMS:   She has some constipation and loose BM from oral iron.      PHYSICAL EXAMINATION:   VITAL SIGNS:  Blood pressure 130/67, pulse 79, temperature 98.7  F (37.1  C), temperature source Tympanic, resp. rate 16, weight  75.7 kg (166 lb 12.8 oz), not currently breastfeeding.      ECOG 0    GENERAL APPEARANCE:  Elderly, looks much younger than her stated age, very pleasant, not in acute distress.     HEENT: The patient is normocephalic, atraumatic. Pupils are equally reactive to light.  Sclerae are anicteric.  Moist oral mucosa.  Negative pharynx.  No oral thrush.   NECK:  Supple.  No jugular venous distention.  Thyroid is not palpable.   LYMPH NODES:  Superficial lymphadenopathy is not appreciable in the bilateral cervical, supraclavicular, axillary or inguinal areas.   CARDIOVASCULAR:  S1, S2 regular with no murmurs or gallops.  No carotid or abdominal bruits.   PULMONARY:  Lungs are clear to auscultation and percussion bilaterally.  There is no wheezing or rhonchi.   GASTROINTESTINAL:  Abdomen is soft, nontender.  No hepatosplenomegaly.  No signs of ascites.  No mass appreciable.   MUSCULOSKELETAL AND EXTREMITIES:  No edema.  No cyanotic changes.  No signs of joint deformity.  No lymphedema.  No spinal or paraspinal tenderness.  No CVA tenderness.   NEUROLOGIC:  Cranial nerves II-XII are grossly intact.  Sensation intact.  Muscle strength and muscle tone symmetrical, 5/5 throughout.   SKIN:  Hyperpigmented skin tag in right groin area likely seborrheic keratosis.  No petechiae.  No rash.  No signs of cellulitis.         CURRENT LAB DATA REVIEWED:   Ferritin 15 from 12 from 19 in 12/2018 from 7 in 10/2018    Wbc 18 from 15 from 22 stable, Hb 12 from 10.8, PL nl.     3/2019 gastric and duodenal biopsy - Gastric antral-type mucosa with reactive/erosive gastropathy, peptic duodenitis.      Old data reviewed with summary  FISH 12/2018 - Loss of 13q14 and rearrangement of IGH   Flow 10/2018 - CD5 postiive kappa monotypric B cell 56% with immunophenotype of CLL/SLL  ESR/LDH/Uric acid nl,     09/2018 white count of 21 and absolute lymphocyte count of 15-16 with new onset of normocytic anemia, hemoglobin around 11, MCV normal.    Smear  "10/2018 - Peripheral Smear Morphology:   - Leukocytosis with absolute lymphocytosis and \"basket\" cells - see   comment   - Mild normocytic normochromic anemia; no morphologic evidence of   hemolysis.     Normal B12, folic acid.   iron saturation at 10% in 10/2018     US abs 10/16/2018   1. Liver and spleen sizes appear normal.  2. Indeterminate 2 regions of mildly increased echogenicity within the central gallbladder and gallbladder fundus. These could represent  areas of adherent sludge and potentially stones at the fundus region.  MRI was negative later.         touch of leukocytosis no diff back in 2006.  She was not anemic back in 2006 with hemoglobin of 12.5.          ASSESSMENT AND PLAN:   1.  New mild to moderate leukocytosis/lymphocytosis in 2018.  Work up is most suggestive of CLL with Loss of 13q14 and rearrangement of IGH.     We discussed the nature of CLL, and follow up plan.       2.  Relatively new onset of moderate iron-deficiency anemia, normocytic, normochromic in 2018 without  baseline Hb. She has DEBORA component.   She was on oral iron supplement. Hb was corrected 12/2018.   She can stop iron supplement and start iron rich diet.       3. GI work up with upper endo and colonoscopy did not find active bleeding, but + for inflammation.   Advice OCT pepcid.                 "

## 2019-05-13 NOTE — LETTER
5/13/2019         RE: Kelly Vegas  [address] [place] [id number]        Dear Colleague,    Thank you for referring your patient, Kelly Vegas, to the [facility]. Please see a copy of my visit note below.    HEMATOLOGY FOLLOW UP VISIT       PRIMARY PHYSICIAN:  Sejal Rutledge MD        CHIEF COMPLAINT AND REASON FOR VISIT:    CLL  DEBORA hx     HISTORY OF PRESENT ILLNESS:  She presented to us 10/2018 at age 68 due to new onset of moderate lymphocytosis/leukocytosis, white count of 21 and absolute lymphocyte count of 15-16 with new onset of normocytic anemia, hemoglobin around 11, MCV normal.  She has normal B12, folic acid.  Ferritin is low at 7 with iron saturation at 10%.  She was started on oral iron supplementation iron sulfate 325 prior to seeing us.      Data review indicating she had touch of leukocytosis no diff back in 2006.  She was not anemic back in 2006 with hemoglobin of 12.5.      In 10/2018, she presented with Hematology office for consultation on new moderate leukocytosis/lymphocytosis and moderate normocytic normochromic anemia, found to have low ferritin and iron saturation, which was suggestive of iron-deficiency anemia.       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      She was offered oral iron and she changed her diet.   Hem work up in 10/2018 was most consistent with CLL and DEBORA. She was on oral iron supplement. Hb is corrected 12/2018. GI work up with upper endo and colonoscopy did not find active bleeding, but + for inflammation.       PAST MEDICAL HISTORY:  History of cervical cancer, major depression, hypertension, hyperlipidemia, osteopenia, degenerative disk problem, type 2 diabetes, acquired hypothyroidism.      MEDICATIONS:  Reviewed in Epic system.      SOCIAL HISTORY:  She lives in [place].  Denies smoking, denies alcohol abuse.      FAMILY HISTORY:  Denied any family history of blood disease.  One maternal aunt had colon cancer.         REVIEW OF SYSTEMS:   She has some constipation and loose BM from oral iron.      PHYSICAL EXAMINATION:   VITAL SIGNS:  Blood pressure 130/67, pulse 79, temperature 98.7  F (37.1  C), temperature source Tympanic, resp. rate 16, weight 75.7 kg (166 lb 12.8 oz), not currently breastfeeding.      ECOG 0    GENERAL APPEARANCE:  Elderly, looks much younger than her stated age, very pleasant, not in acute distress.     HEENT: The patient is normocephalic, atraumatic. Pupils are equally reactive to light.  Sclerae are anicteric.  Moist oral mucosa.  Negative pharynx.  No oral thrush.   NECK:  Supple.  No jugular venous distention.  Thyroid is not palpable.   LYMPH NODES:  Superficial lymphadenopathy is not appreciable in the bilateral cervical, supraclavicular, axillary or inguinal areas.   CARDIOVASCULAR:  S1, S2 regular with no murmurs or gallops.  No carotid or abdominal bruits.   PULMONARY:  Lungs are clear to auscultation and percussion bilaterally.  There is no wheezing or rhonchi.   GASTROINTESTINAL:  Abdomen is soft, nontender.  No hepatosplenomegaly.  No signs of ascites.  No mass appreciable.   MUSCULOSKELETAL AND EXTREMITIES:  No edema.  No cyanotic changes.  No signs of joint deformity.  No lymphedema.  No spinal or paraspinal tenderness.  No CVA tenderness.   NEUROLOGIC:  Cranial nerves II-XII are grossly intact.  Sensation intact.  Muscle strength and muscle tone symmetrical, 5/5 throughout.   SKIN:  Hyperpigmented skin tag in right groin area likely seborrheic keratosis.  No petechiae.  No rash.  No signs of cellulitis.         CURRENT LAB DATA REVIEWED:   Ferritin 15 from 12 from 19 in 12/2018 from 7 in 10/2018    Wbc 18 from 15 from 22 stable, Hb 12 from 10.8, PL nl.     3/2019 gastric and duodenal biopsy - Gastric antral-type mucosa with reactive/erosive gastropathy, peptic duodenitis.      Old data reviewed with summary  FISH 12/2018 - Loss of 13q14 and rearrangement of IGH   Flow 10/2018 - CD5  "postiive kappa monotypric B cell 56% with immunophenotype of CLL/SLL  ESR/LDH/Uric acid nl,     09/2018 white count of 21 and absolute lymphocyte count of 15-16 with new onset of normocytic anemia, hemoglobin around 11, MCV normal.    Smear 10/2018 - Peripheral Smear Morphology:   - Leukocytosis with absolute lymphocytosis and \"basket\" cells - see   comment   - Mild normocytic normochromic anemia; no morphologic evidence of   hemolysis.     Normal B12, folic acid.   iron saturation at 10% in 10/2018     US abs 10/16/2018   1. Liver and spleen sizes appear normal.  2. Indeterminate 2 regions of mildly increased echogenicity within the central gallbladder and gallbladder fundus. These could represent  areas of adherent sludge and potentially stones at the fundus region.  MRI was negative later.         touch of leukocytosis no diff back in 2006.  She was not anemic back in 2006 with hemoglobin of 12.5.          ASSESSMENT AND PLAN:   1.  New mild to moderate leukocytosis/lymphocytosis in 2018.  Work up is most suggestive of CLL with Loss of 13q14 and rearrangement of IGH.     We discussed the nature of CLL, and follow up plan.       2.  Relatively new onset of moderate iron-deficiency anemia, normocytic, normochromic in 2018 without  baseline Hb. She has DEBORA component.   She was on oral iron supplement. Hb was corrected 12/2018.   She can stop iron supplement and start iron rich diet.       3. GI work up with upper endo and colonoscopy did not find active bleeding, but + for inflammation.   Advice OCT pepcid.                   Again, thank you for allowing me to participate in the care of your patient.        Sincerely,        Ngozi De Los Santos MD, MD    "

## 2019-05-13 NOTE — PATIENT INSTRUCTIONS
We would like to see you back in 4 months for a follow up appointment with labs prior.     When you are in need of a refill, please call your pharmacy and they will send us a request.      Copy of appointments, and after visit summary (AVS) given to patient.      If you have any questions please call Chantell Chamberlain RN, BSN Oncology Hematology  [facility] [phone]. For questions after business hours, or on holidays/weekends, please call our after hours Nurse Triage line [phone]. Thank you.         4 months f/u with labs.

## 2019-06-17 ENCOUNTER — TELEPHONE (OUTPATIENT)
Dept: SURGERY | Facility: CLINIC | Age: 69
End: 2019-06-17

## 2019-06-17 NOTE — TELEPHONE ENCOUNTER
Reason for Call:  Other     Detailed comments: Pt was seen 10/30/2018 for gallbladder issues. Surgery was discussed at that time, but pt decided to wait. States now she is experiencing gas, diarrhea, fever, nausea since shortly after having a colonoscopy/endoscopy in 03/2019. Symptoms do not seem to be getting worse, but not getting any better (having to go to the bathroom all the time). She is questioning if this could be related to her gallbladder issues - Please advise    Phone Number Patient can be reached at: Home number on file [phone] (home)    Best Time: Any    Can we leave a detailed message on this number? YES    Call taken on 6/17/2019 at 10:32 AM by Denise Behrendt

## 2019-06-17 NOTE — TELEPHONE ENCOUNTER
Reviewed previous office visit notes and called patient. She states symptoms are similar to last time seen in clinic. Appointment scheduled, reviewed with patient signs and symptoms of when to be seen sooner.     Lobito MONTES RN   Specialty Clinics

## 2019-07-01 ENCOUNTER — TRANSFERRED RECORDS (OUTPATIENT)
Dept: MULTI SPECIALTY CLINIC | Facility: CLINIC | Age: 69
End: 2019-07-01

## 2019-07-09 ENCOUNTER — OFFICE VISIT (OUTPATIENT)
Dept: SURGERY | Facility: CLINIC | Age: 69
End: 2019-07-09
Payer: COMMERCIAL

## 2019-07-09 VITALS
HEART RATE: 68 BPM | DIASTOLIC BLOOD PRESSURE: 66 MMHG | RESPIRATION RATE: 18 BRPM | TEMPERATURE: 98 F | SYSTOLIC BLOOD PRESSURE: 123 MMHG

## 2019-07-09 DIAGNOSIS — K80.20 CALCULUS OF GALLBLADDER WITHOUT CHOLECYSTITIS WITHOUT OBSTRUCTION: Primary | ICD-10-CM

## 2019-07-09 DIAGNOSIS — K59.00 CONSTIPATION, UNSPECIFIED CONSTIPATION TYPE: ICD-10-CM

## 2019-07-09 PROCEDURE — 99213 OFFICE O/P EST LOW 20 MIN: CPT | Performed by: SURGERY

## 2019-07-09 NOTE — LETTER
7/9/2019         RE: Kelly Vegas  [address] [place] [id number]        Dear Colleague,    Thank you for referring your patient, Kelly Vegas, to the [facility]. Please see a copy of my visit note below.    Surgical Follow-Up  [facility] Surgery    Chief Complaint:  Bloating/Constipation/Diarrhea    History of Present Illness: Kelly Vegas is a 69 year old female presents with abdominal bloating in the setting of alternating constipation and diarrhea.  Patient was previously seen for gallstones and questionable wall thickening of her gallbladder which on follow-up MRCP was found to be symmetric, sludge and small stones.  Patient denies any abdominal pain, nausea, vomiting, acholic stools, melena, post prandial bloating/discomfort.  Her symptoms are more daily and unrelenting over the past several months.  She has had upper and lower endoscopy which only showed gastritis which she is taking OTC zantac for.  She has liquid stools for 2-3 days followed by extremely hard, large formed stools after.  She has not attempted any interventions at this time.  Denies blood in her stool or weight loss.    Patient Active Problem List   Diagnosis     Hyperlipidemia with target LDL less than 100     Hypertension goal BP (blood pressure) < 140/90     History of cervical cancer     Advanced directives, counseling/discussion     Major depressive disorder, recurrent, mild (H)     Acquired hypothyroidism     Type 2 diabetes mellitus with diabetic polyneuropathy, with long-term current use of insulin (H)     Degenerative joint disease of hand     DDD (degenerative disc disease), lumbar     Osteopenia     Memory problem     Asymptomatic gallstones     CLL (chronic lymphocytic leukemia) (H)     Dysesthesia     Ganglion cyst of joint of finger of right hand       Past Medical History:   Diagnosis Date     Arthritis check it     Asymptomatic gallstones      Cervical cancer (H) 3/2006    [facility], [facility] with gland involvement     CLL (chronic lymphocytic leukemia) (H)      Colon adenoma      DDD (degenerative disc disease), lumbar      Degenerative joint disease of hand      Depressive disorder      Diabetes mellitus type II      Diabetic polyneuropathy associated with type 2 diabetes mellitus (H) 9/26/2017     Gastritis      HTN (hypertension)      Hyperlipidemia LDL goal < 100      Hypothyroid 12/2004     Osteopenia      Recurrent major depression (H)        Past Surgical History:   Procedure Laterality Date     BIOPSY  before thyroid removal     C BSO, OMENTECTOMY W/JUAN DANIEL  2/2006    cervical cancer     COLONOSCOPY  nov 2016     COLONOSCOPY N/A 3/1/2019    Procedure: COLONOSCOPY;  Surgeon: Romulo Hayes MD;  Location: [facility]     COLONOSCOPY WITH CO2 INSUFFLATION N/A 11/28/2016    Procedure: COLONOSCOPY WITH CO2 INSUFFLATION;  Surgeon: Migue Giraldo MD;  Location: [facility]     CYSTECTOMY OVARIAN BENIGN  1974     ESOPHAGOSCOPY, GASTROSCOPY, DUODENOSCOPY (EGD), COMBINED N/A 3/1/2019    Procedure: COMBINED ESOPHAGOSCOPY, GASTROSCOPY, DUODENOSCOPY (EGD), BIOPSY SINGLE OR MULTIPLE;  Surgeon: Romulo Hayes MD;  Location: [facility]     EYE SURGERY  12-14-16     HC THYROIDECTOMY  12/2004    goiter       Family History   Problem Relation Age of Onset     Thyroid Disease Mother      Dementia Mother      Osteoporosis Mother      Alcohol/Drug Father      C.A.D. Father         CABG      Diabetes Father      Coronary Artery Disease Father      Hypertension Father      Hyperlipidemia Father      Cerebrovascular Disease Father      Cancer Maternal Grandmother         stomach, colon     Breast Cancer Maternal Grandmother      Thyroid Disease Maternal Grandmother      Alzheimer Disease Maternal Grandfather      Breast Cancer Paternal Grandmother      Alcohol/Drug Brother      Prostate Cancer Brother      Alcohol/Drug Son      Depression Brother      Depression Brother         d. suicide     Breast Cancer Cousin       Breast Cancer Other        Social History     Tobacco Use     Smoking status: Former Smoker     Packs/day: 0.50     Years: 20.00     Pack years: 10.00     Types: Cigarettes     Start date: 1967     Last attempt to quit: 1989     Years since quittin.5     Smokeless tobacco: Never Used   Substance Use Topics     Alcohol use: No        History   Drug Use No       Current Outpatient Medications   Medication Sig Dispense Refill     ASPIRIN 81 MG OR TABS Take 1 tablet by mouth daily.       atorvastatin (LIPITOR) 20 MG tablet Take 1 tablet (20 mg) by mouth daily 90 tablet 3     blood glucose monitoring (NO BRAND SPECIFIED) test strip Use to test blood sugars 3 times daily or as directed 300 strip 3     Cholecalciferol (VITAMIN D3) 1000 units CAPS Take by mouth daily       citalopram (CELEXA) 20 MG tablet Take 1 tablet (20 mg) by mouth daily 90 tablet 1     EQL IRON SUPPLEMENT THERAPY 325 MG tablet Take 1 tablet (325 mg) by mouth 2 times daily 60 tablet 5     insulin glargine (LANTUS SOLOSTAR PEN) 100 UNIT/ML pen Inject 18 units at bedtime daily. 45 mL 3     insulin lispro (HUMALOG KWIKPEN) 100 UNIT/ML pen 6 units daily before largest meal of the day 15 mL 1     insulin pen needle 31G X 6 MM Use twice daily or as directed. 300 each 3     levothyroxine (SYNTHROID/LEVOTHROID) 100 MCG tablet TAKE 1 TABLET (100 MCG) BY MOUTH DAILY 90 tablet 3     lisinopril-hydrochlorothiazide (PRINZIDE/ZESTORETIC) 10-12.5 MG tablet Take 1 tablet by mouth daily 90 tablet 3     metFORMIN (GLUCOPHAGE) 1000 MG tablet Take 1 tablet (1,000 mg) by mouth 2 times daily (with meals) 180 tablet 3     order for DME Blood glucose monitor per insurance formulary; blood glucose test strips One Touch Verio or per formulary for use 3 time daily; lancets per formulary for use 3 time daily 300 each 3       Allergies   Allergen Reactions     Glipizide      tremulous     Ibuprofen Hives     Penicillins Itching     Percocet [Acetaminophen] Nausea and  Vomiting     Vicodin [Hydrocodone-Acetaminophen] Nausea       Review of Systems:   10 point ROS negative except aforementioned positives    Physical Exam:  /66 (BP Location: Right arm, Patient Position: Chair, Cuff Size: Adult Regular)   Pulse 68   Temp 98  F (36.7  C) (Tympanic)   Resp 18     Constitutional- No acute distress, well nourished, non-toxic  Eyes: Anicteric, no injection.  PERRL  ENT:  Normocephalic, atraumatic, Nose midline, moist mucus membranes  Neck - supple, no LAD, thyroidectomy scar  Respiratory- Clear to auscultation bilaterally, good inspiratory effort  Cardiovascular - Heart RRR, no lift's, thrills, murmurs, rubs, or gallop.  No peripheral edema.  No clubbing.  Abdomen - Soft, non-tender, +BS, no hepatosplenomegaly, no palpable masses, non distended  Neuro - No focal neuro deficits, Alert and oriented x 3  Psych: Appropriate mood and affect  Musculoskeletal: Normal gait, symmetric strength.  FROM upper and lower extremities.  Skin: Warm, Dry    Assessment:  1. Calculus of gallbladder without cholecystitis without obstruction    2. Constipation, unspecified constipation type      Plan:   Mrs. Vegas returns for abdominal bloating and alternating constipation/diarrhea.  I discussed her symptoms and suspect she is chronically constipated.  We discussed bowel hygiene, increasing fiber, water consumption.  She will increase fiber to 25-30 grams daily, 8 cups of water, with supplementation of metamucil as needed.  I will plan on seeing her back in 1 month.  If no improvement, consideration for cholecystectomy will be given, though as I discussed with patient, her symptoms would be highly atypical given her symptoms.  She expressed understanding and will follow-up in office.    Jorge Martinez,  on 7/9/2019 at 10:10 AM      Again, thank you for allowing me to participate in the care of your patient.        Sincerely,        Jorge Martinez DO

## 2019-07-09 NOTE — PROGRESS NOTES
Surgical Follow-Up  [facility] General Surgery    Chief Complaint:  Bloating/Constipation/Diarrhea    History of Present Illness: Kelly Vegas is a 69 year old female presents with abdominal bloating in the setting of alternating constipation and diarrhea.  Patient was previously seen for gallstones and questionable wall thickening of her gallbladder which on follow-up MRCP was found to be symmetric, sludge and small stones.  Patient denies any abdominal pain, nausea, vomiting, acholic stools, melena, post prandial bloating/discomfort.  Her symptoms are more daily and unrelenting over the past several months.  She has had upper and lower endoscopy which only showed gastritis which she is taking OTC zantac for.  She has liquid stools for 2-3 days followed by extremely hard, large formed stools after.  She has not attempted any interventions at this time.  Denies blood in her stool or weight loss.    Patient Active Problem List   Diagnosis     Hyperlipidemia with target LDL less than 100     Hypertension goal BP (blood pressure) < 140/90     History of cervical cancer     Advanced directives, counseling/discussion     Major depressive disorder, recurrent, mild (H)     Acquired hypothyroidism     Type 2 diabetes mellitus with diabetic polyneuropathy, with long-term current use of insulin (H)     Degenerative joint disease of hand     DDD (degenerative disc disease), lumbar     Osteopenia     Memory problem     Asymptomatic gallstones     CLL (chronic lymphocytic leukemia) (H)     Dysesthesia     Ganglion cyst of joint of finger of right hand       Past Medical History:   Diagnosis Date     Arthritis check it     Asymptomatic gallstones      Cervical cancer (H) 3/2006    [facility], [facility] with gland involvement     CLL (chronic lymphocytic leukemia) (H)      Colon adenoma      DDD (degenerative disc disease), lumbar      Degenerative joint disease of hand      Depressive disorder      Diabetes mellitus type II       Diabetic polyneuropathy associated with type 2 diabetes mellitus (H) 9/26/2017     Gastritis      HTN (hypertension)      Hyperlipidemia LDL goal < 100      Hypothyroid 12/2004     Osteopenia      Recurrent major depression (H)        Past Surgical History:   Procedure Laterality Date     BIOPSY  before thyroid removal     C BSO, OMENTECTOMY W/JUAN DANIEL  2/2006    cervical cancer     COLONOSCOPY  nov 2016     COLONOSCOPY N/A 3/1/2019    Procedure: COLONOSCOPY;  Surgeon: Romulo Hayes MD;  Location: [facility]     COLONOSCOPY WITH CO2 INSUFFLATION N/A 11/28/2016    Procedure: COLONOSCOPY WITH CO2 INSUFFLATION;  Surgeon: Migue Giraldo MD;  Location: [facility]     CYSTECTOMY OVARIAN BENIGN  1974     ESOPHAGOSCOPY, GASTROSCOPY, DUODENOSCOPY (EGD), COMBINED N/A 3/1/2019    Procedure: COMBINED ESOPHAGOSCOPY, GASTROSCOPY, DUODENOSCOPY (EGD), BIOPSY SINGLE OR MULTIPLE;  Surgeon: Romulo Hayes MD;  Location: [facility]     EYE SURGERY  12-14-16     HC THYROIDECTOMY  12/2004    goiter       Family History   Problem Relation Age of Onset     Thyroid Disease Mother      Dementia Mother      Osteoporosis Mother      Alcohol/Drug Father      C.A.D. Father         CABG      Diabetes Father      Coronary Artery Disease Father      Hypertension Father      Hyperlipidemia Father      Cerebrovascular Disease Father      Cancer Maternal Grandmother         stomach, colon     Breast Cancer Maternal Grandmother      Thyroid Disease Maternal Grandmother      Alzheimer Disease Maternal Grandfather      Breast Cancer Paternal Grandmother      Alcohol/Drug Brother      Prostate Cancer Brother      Alcohol/Drug Son      Depression Brother      Depression Brother         d. suicide     Breast Cancer Cousin      Breast Cancer Other        Social History     Tobacco Use     Smoking status: Former Smoker     Packs/day: 0.50     Years: 20.00     Pack years: 10.00     Types: Cigarettes     Start date: 7/8/1967     Last attempt to quit: 1/1/1989     Years  since quittin.5     Smokeless tobacco: Never Used   Substance Use Topics     Alcohol use: No        History   Drug Use No       Current Outpatient Medications   Medication Sig Dispense Refill     ASPIRIN 81 MG OR TABS Take 1 tablet by mouth daily.       atorvastatin (LIPITOR) 20 MG tablet Take 1 tablet (20 mg) by mouth daily 90 tablet 3     blood glucose monitoring (NO BRAND SPECIFIED) test strip Use to test blood sugars 3 times daily or as directed 300 strip 3     Cholecalciferol (VITAMIN D3) 1000 units CAPS Take by mouth daily       citalopram (CELEXA) 20 MG tablet Take 1 tablet (20 mg) by mouth daily 90 tablet 1     EQL IRON SUPPLEMENT THERAPY 325 MG tablet Take 1 tablet (325 mg) by mouth 2 times daily 60 tablet 5     insulin glargine (LANTUS SOLOSTAR PEN) 100 UNIT/ML pen Inject 18 units at bedtime daily. 45 mL 3     insulin lispro (HUMALOG KWIKPEN) 100 UNIT/ML pen 6 units daily before largest meal of the day 15 mL 1     insulin pen needle 31G X 6 MM Use twice daily or as directed. 300 each 3     levothyroxine (SYNTHROID/LEVOTHROID) 100 MCG tablet TAKE 1 TABLET (100 MCG) BY MOUTH DAILY 90 tablet 3     lisinopril-hydrochlorothiazide (PRINZIDE/ZESTORETIC) 10-12.5 MG tablet Take 1 tablet by mouth daily 90 tablet 3     metFORMIN (GLUCOPHAGE) 1000 MG tablet Take 1 tablet (1,000 mg) by mouth 2 times daily (with meals) 180 tablet 3     order for DME Blood glucose monitor per insurance formulary; blood glucose test strips One Touch Verio or per formulary for use 3 time daily; lancets per formulary for use 3 time daily 300 each 3       Allergies   Allergen Reactions     Glipizide      tremulous     Ibuprofen Hives     Penicillins Itching     Percocet [Acetaminophen] Nausea and Vomiting     Vicodin [Hydrocodone-Acetaminophen] Nausea       Review of Systems:   10 point ROS negative except aforementioned positives    Physical Exam:  /66 (BP Location: Right arm, Patient Position: Chair, Cuff Size: Adult Regular)    Pulse 68   Temp 98  F (36.7  C) (Tympanic)   Resp 18     Constitutional- No acute distress, well nourished, non-toxic  Eyes: Anicteric, no injection.  PERRL  ENT:  Normocephalic, atraumatic, Nose midline, moist mucus membranes  Neck - supple, no LAD, thyroidectomy scar  Respiratory- Clear to auscultation bilaterally, good inspiratory effort  Cardiovascular - Heart RRR, no lift's, thrills, murmurs, rubs, or gallop.  No peripheral edema.  No clubbing.  Abdomen - Soft, non-tender, +BS, no hepatosplenomegaly, no palpable masses, non distended  Neuro - No focal neuro deficits, Alert and oriented x 3  Psych: Appropriate mood and affect  Musculoskeletal: Normal gait, symmetric strength.  FROM upper and lower extremities.  Skin: Warm, Dry    Assessment:  1. Calculus of gallbladder without cholecystitis without obstruction    2. Constipation, unspecified constipation type      Plan:   Mrs. Vegas returns for abdominal bloating and alternating constipation/diarrhea.  I discussed her symptoms and suspect she is chronically constipated.  We discussed bowel hygiene, increasing fiber, water consumption.  She will increase fiber to 25-30 grams daily, 8 cups of water, with supplementation of metamucil as needed.  I will plan on seeing her back in 1 month.  If no improvement, consideration for cholecystectomy will be given, though as I discussed with patient, her symptoms would be highly atypical given her symptoms.  She expressed understanding and will follow-up in office.    Jorge Martinez, DO on 7/9/2019 at 10:10 AM

## 2019-07-09 NOTE — NURSING NOTE
"Chief Complaint   Patient presents with     RECHECK     cholelithiasis     Abdominal Pain     gas, diarrhea, nausea, constipation         Initial /66 (BP Location: Right arm, Patient Position: Chair, Cuff Size: Adult Regular)   Pulse 68   Temp 98  F (36.7  C) (Tympanic)   Resp 18  Estimated body mass index is 27.76 kg/m  as calculated from the following:    Height as of 3/28/19: 1.651 m (5' 5\").    Weight as of 5/13/19: 75.7 kg (166 lb 12.8 oz).  BP completed using cuff size: regular   Medications and allergies reviewed.      Thea CABALLERO CMA     "

## 2019-07-09 NOTE — PATIENT INSTRUCTIONS
Per physician instructions.    If you have questions or concerns on any instructions given to you by your provider today or if you need to schedule an appointment, you can reach us at [phone].    Thank you!

## 2019-08-08 ENCOUNTER — OFFICE VISIT (OUTPATIENT)
Dept: SURGERY | Facility: CLINIC | Age: 69
End: 2019-08-08
Payer: COMMERCIAL

## 2019-08-08 VITALS
WEIGHT: 168.6 LBS | HEART RATE: 68 BPM | DIASTOLIC BLOOD PRESSURE: 65 MMHG | TEMPERATURE: 97.9 F | HEIGHT: 65 IN | RESPIRATION RATE: 18 BRPM | SYSTOLIC BLOOD PRESSURE: 129 MMHG | BODY MASS INDEX: 28.09 KG/M2

## 2019-08-08 DIAGNOSIS — K80.20 SYMPTOMATIC CHOLELITHIASIS: Primary | ICD-10-CM

## 2019-08-08 PROCEDURE — 99213 OFFICE O/P EST LOW 20 MIN: CPT | Performed by: SURGERY

## 2019-08-08 ASSESSMENT — MIFFLIN-ST. JEOR: SCORE: 1285.64

## 2019-08-08 NOTE — NURSING NOTE
"Chief Complaint   Patient presents with     RECHECK     fiber has helped, but there is constant pain in the right abdominal area - bowel movements have been floaters in the toilet       Initial /65 (BP Location: Right arm, Patient Position: Chair, Cuff Size: Adult Regular)   Pulse 68   Temp 97.9  F (36.6  C) (Tympanic)   Resp 18   Ht 1.651 m (5' 5\")   Wt 76.5 kg (168 lb 9.6 oz)   BMI 28.06 kg/m   Estimated body mass index is 28.06 kg/m  as calculated from the following:    Height as of this encounter: 1.651 m (5' 5\").    Weight as of this encounter: 76.5 kg (168 lb 9.6 oz).  Medications and allergies reviewed.    Rosalia BROWN CMA (AAMA)    "

## 2019-08-08 NOTE — PATIENT INSTRUCTIONS
Per physician instructions.    If you have questions or concerns on any instructions given to you by your provider today or if you need to schedule an appointment, you can reach us at [phone].    Thank you!

## 2019-08-08 NOTE — LETTER
"    8/8/2019         RE: Kelly Vegas  [address] [place] [id number]        Dear Colleague,    Thank you for referring your patient, Kelly Vegas, to the [facility]. Please see a copy of my visit note below.    General Surgery Progress Note    Patient has had continued RUQ abdominal pain.  Denies nausea or vomiting. Pain is unimproved with any interventions.  Her constipation has improved dramatically and she no longer has lower abdominal pain, has to strain with bowel movements, or has hard stools.      Physical exam: Vitals: /65 (BP Location: Right arm, Patient Position: Chair, Cuff Size: Adult Regular)   Pulse 68   Temp 97.9  F (36.6  C) (Tympanic)   Resp 18   Ht 1.651 m (5' 5\")   Wt 76.5 kg (168 lb 9.6 oz)   BMI 28.06 kg/m     BMI= Body mass index is 28.06 kg/m .    Exam:  Constitutional: healthy, alert and no distress  Cardiovascular: PMI normal. No lifts, heaves, or thrills. RRR. No murmurs, clicks gallops or rub  Respiratory:  normal. Good diaphragmatic excursion. Lungs clear  Gastrointestinal: Abdomen soft, non-tender. BS normal. No masses, organomegaly, negative Martinez's sign    Assessment:     ICD-10-CM    1. Symptomatic cholelithiasis K80.20      Plan: Ms. Vegas presents for follow-up of abdominal pain.  This has been ongoing, waxing and waning over the last several months.  Her constipation and lower abdominal pain has resolved with conservative measures, however has continued right upper abdominal pain.  She has undergone EGD which was negative, had an ultrasound and MRCP showing small stones/sludge.  We discussed that the only way to determine if her upper abdominal pain is caused by her gallstones is to remove her gallbladder and she states she wishes to have her gallbladder removed at this time.  I believe this is a reasonable course given the duration of her symptoms and otherwise unremarkable work-up.      She was counseled on indications, risks, " benefits alternatives to laparoscopic cholecystectomy possible open cholecystectomy and intraoperative cholangiogram and she wishes to proceed.  Risks discussed include but are not limited to: bleeding, infection, hernia, need for additional treatment, nontherapeutic intervention, wound complication (such as dehiscence), retained bile duct stone, conversion to open surgery, chronic diarrhea, bile duct injury, bile leak, and rare complications related to surgery and/or anesthesia such as venous thromboembolism and cardiorespiratory complications.    She requires pre-op evaluation prior to OR.      Jorge Martinez DO on 8/11/2019 at 9:38 AM        Again, thank you for allowing me to participate in the care of your patient.        Sincerely,        Jorge Martinez DO

## 2019-08-11 NOTE — PROGRESS NOTES
"General Surgery Progress Note    Patient has had continued RUQ abdominal pain.  Denies nausea or vomiting. Pain is unimproved with any interventions.  Her constipation has improved dramatically and she no longer has lower abdominal pain, has to strain with bowel movements, or has hard stools.      Physical exam: Vitals: /65 (BP Location: Right arm, Patient Position: Chair, Cuff Size: Adult Regular)   Pulse 68   Temp 97.9  F (36.6  C) (Tympanic)   Resp 18   Ht 1.651 m (5' 5\")   Wt 76.5 kg (168 lb 9.6 oz)   BMI 28.06 kg/m    BMI= Body mass index is 28.06 kg/m .    Exam:  Constitutional: healthy, alert and no distress  Cardiovascular: PMI normal. No lifts, heaves, or thrills. RRR. No murmurs, clicks gallops or rub  Respiratory:  normal. Good diaphragmatic excursion. Lungs clear  Gastrointestinal: Abdomen soft, non-tender. BS normal. No masses, organomegaly, negative Martinez's sign    Assessment:     ICD-10-CM    1. Symptomatic cholelithiasis K80.20      Plan: Ms. Vegas presents for follow-up of abdominal pain.  This has been ongoing, waxing and waning over the last several months.  Her constipation and lower abdominal pain has resolved with conservative measures, however has continued right upper abdominal pain.  She has undergone EGD which was negative, had an ultrasound and MRCP showing small stones/sludge.  We discussed that the only way to determine if her upper abdominal pain is caused by her gallstones is to remove her gallbladder and she states she wishes to have her gallbladder removed at this time.  I believe this is a reasonable course given the duration of her symptoms and otherwise unremarkable work-up.      She was counseled on indications, risks, benefits alternatives to laparoscopic cholecystectomy possible open cholecystectomy and intraoperative cholangiogram and she wishes to proceed.  Risks discussed include but are not limited to: bleeding, infection, hernia, need for additional " treatment, nontherapeutic intervention, wound complication (such as dehiscence), retained bile duct stone, conversion to open surgery, chronic diarrhea, bile duct injury, bile leak, and rare complications related to surgery and/or anesthesia such as venous thromboembolism and cardiorespiratory complications.    She requires pre-op evaluation prior to OR.      Jorge Martinez DO on 8/11/2019 at 9:38 AM

## 2019-08-20 ENCOUNTER — OFFICE VISIT (OUTPATIENT)
Dept: FAMILY MEDICINE | Facility: CLINIC | Age: 69
End: 2019-08-20
Payer: COMMERCIAL

## 2019-08-20 VITALS
BODY MASS INDEX: 27.99 KG/M2 | HEART RATE: 97 BPM | OXYGEN SATURATION: 96 % | DIASTOLIC BLOOD PRESSURE: 56 MMHG | WEIGHT: 168 LBS | HEIGHT: 65 IN | RESPIRATION RATE: 12 BRPM | SYSTOLIC BLOOD PRESSURE: 118 MMHG | TEMPERATURE: 98.4 F

## 2019-08-20 DIAGNOSIS — K80.00 CALCULUS OF GALLBLADDER WITH ACUTE CHOLECYSTITIS WITHOUT OBSTRUCTION: ICD-10-CM

## 2019-08-20 DIAGNOSIS — Z01.818 PREOP GENERAL PHYSICAL EXAM: Primary | ICD-10-CM

## 2019-08-20 PROCEDURE — 99214 OFFICE O/P EST MOD 30 MIN: CPT | Performed by: PHYSICIAN ASSISTANT

## 2019-08-20 PROCEDURE — 93000 ELECTROCARDIOGRAM COMPLETE: CPT | Performed by: PHYSICIAN ASSISTANT

## 2019-08-20 ASSESSMENT — MIFFLIN-ST. JEOR: SCORE: 1282.92

## 2019-08-20 NOTE — H&P (VIEW-ONLY)
[facility]  [address] [place] [id number]  [phone]  Dept: [phone]    PRE-OP EVALUATION:  Today's date: 2019    Kelly Vegas (: 1950) presents for pre-operative evaluation assessment as requested by Dr. Piña.  She requires evaluation and anesthesia risk assessment prior to undergoing surgery/procedure for treatment of gallbladder removal .    Fax number for surgical facility: Primary Physician: Sejal Rutledge  Type of Anesthesia Anticipated: General    Patient has a Health Care Directive or Living Will:  NO    Preop Questions 2019   Who is doing your surgery? dr piña [facility]   What are you having done? gall bladder removed   Date of Surgery/Procedure: 19   Facility or Hospital where procedure/surgery will be performed: [facility]   1.  Do you have a history of Heart attack, stroke, stent, coronary bypass surgery, or other heart surgery? No   2.  Do you ever have any pain or discomfort in your chest? No   3.  Do you have a history of  Heart Failure? No   4.   Are you troubled by shortness of breath when:  walking on a level surface, or up a slight hill, or at night? No   5.  Do you currently have a cold, bronchitis or other respiratory infection? No   6.  Do you have a cough, shortness of breath, or wheezing? No   7.  Do you sometimes get pains in the calves of your legs when you walk? YES - muscle aches   8. Do you or anyone in your family have previous history of blood clots? YES - patient had them years ago   9.  Do you or does anyone in your family have a serious bleeding problem such as prolonged bleeding following surgeries or cuts? No   10. Have you ever had problems with anemia or been told to take iron pills? YES - 2018 CLL (chronic lymphocytic leukemia)   11. Have you had any abnormal blood loss such as black, tarry or bloody stools, or abnormal vaginal bleeding? No   12. Have you ever had a blood transfusion? No    13. Have you or any of your relatives ever had problems with anesthesia? No   14. Do you have sleep apnea, excessive snoring or daytime drowsiness? No   15. Do you have any prosthetic heart valves? No   16. Do you have prosthetic joints? No   17. Is there any chance that you may be pregnant? No         HPI:     HPI related to upcoming procedure: H/o gallstones with right upper quadrant pain      See problem list for active medical problems.  Problems all longstanding and stable, except as noted/documented.  See ROS for pertinent symptoms related to these conditions.      MEDICAL HISTORY:     Patient Active Problem List    Diagnosis Date Noted     Major depressive disorder, recurrent, mild (H) 02/07/2012     Priority: High     Hypertension goal BP (blood pressure) < 140/90 11/08/2010     Priority: High     Hyperlipidemia with target LDL less than 100      Priority: High             Ganglion cyst of joint of finger of right hand 03/29/2019     Priority: Medium     Dysesthesia 03/26/2019     Priority: Medium     CLL (chronic lymphocytic leukemia) (H)      Priority: Medium     Asymptomatic gallstones      Priority: Medium     Memory problem 09/24/2018     Priority: Medium     Osteopenia      Priority: Medium     DDD (degenerative disc disease), lumbar      Priority: Medium     Degenerative joint disease of hand      Priority: Medium     Type 2 diabetes mellitus with diabetic polyneuropathy, with long-term current use of insulin (H) 12/20/2016     Priority: Medium     Acquired hypothyroidism 05/04/2016     Priority: Medium     History of cervical cancer 01/24/2011     Priority: Medium     3/2006 [facility]       Advanced directives, counseling/discussion 05/02/2011     Priority: Low     Patient states has Advance Directive and will bring in a copy to clinic.        Past Medical History:   Diagnosis Date     Arthritis check it     Asymptomatic gallstones      Cervical cancer (H) 3/2006    [facility], CIS with gland involvement      CLL (chronic lymphocytic leukemia) (H)      Colon adenoma      DDD (degenerative disc disease), lumbar      Degenerative joint disease of hand      Depressive disorder      Diabetes mellitus type II      Diabetic polyneuropathy associated with type 2 diabetes mellitus (H) 9/26/2017     Gastritis      HTN (hypertension)      Hyperlipidemia LDL goal < 100      Hypothyroid 12/2004     Osteopenia      Recurrent major depression (H)      Past Surgical History:   Procedure Laterality Date     BIOPSY  before thyroid removal     C BSO, OMENTECTOMY W/JUAN DANIEL  2/2006    cervical cancer     COLONOSCOPY  nov 2016     COLONOSCOPY N/A 3/1/2019    Procedure: COLONOSCOPY;  Surgeon: Romulo Hayes MD;  Location: [facility]     COLONOSCOPY WITH CO2 INSUFFLATION N/A 11/28/2016    Procedure: COLONOSCOPY WITH CO2 INSUFFLATION;  Surgeon: Migue Giraldo MD;  Location: [facility]     CYSTECTOMY OVARIAN BENIGN  1974     ESOPHAGOSCOPY, GASTROSCOPY, DUODENOSCOPY (EGD), COMBINED N/A 3/1/2019    Procedure: COMBINED ESOPHAGOSCOPY, GASTROSCOPY, DUODENOSCOPY (EGD), BIOPSY SINGLE OR MULTIPLE;  Surgeon: Romulo Hayes MD;  Location: [facility]     EYE SURGERY  12-14-16     HC THYROIDECTOMY  12/2004    goiter     Current Outpatient Medications   Medication Sig Dispense Refill     ASPIRIN 81 MG OR TABS Take 1 tablet by mouth daily.       atorvastatin (LIPITOR) 20 MG tablet Take 1 tablet (20 mg) by mouth daily 90 tablet 3     blood glucose monitoring (NO BRAND SPECIFIED) test strip Use to test blood sugars 3 times daily or as directed 300 strip 3     Cholecalciferol (VITAMIN D3) 1000 units CAPS Take by mouth daily       citalopram (CELEXA) 20 MG tablet Take 1 tablet (20 mg) by mouth daily 90 tablet 1     insulin glargine (LANTUS SOLOSTAR PEN) 100 UNIT/ML pen Inject 18 units at bedtime daily. 45 mL 3     insulin lispro (HUMALOG KWIKPEN) 100 UNIT/ML pen 6 units daily before largest meal of the day 15 mL 1     insulin pen needle 31G X 6 MM Use twice daily or  as directed. 300 each 3     levothyroxine (SYNTHROID/LEVOTHROID) 100 MCG tablet TAKE 1 TABLET (100 MCG) BY MOUTH DAILY 90 tablet 3     lisinopril-hydrochlorothiazide (PRINZIDE/ZESTORETIC) 10-12.5 MG tablet Take 1 tablet by mouth daily 90 tablet 3     metFORMIN (GLUCOPHAGE) 1000 MG tablet Take 1 tablet (1,000 mg) by mouth 2 times daily (with meals) 180 tablet 3     order for DME Blood glucose monitor per insurance formulary; blood glucose test strips One Touch Verio or per formulary for use 3 time daily; lancets per formulary for use 3 time daily 300 each 3     OTC products: None, except as noted above    Allergies   Allergen Reactions     Glipizide      tremulous     Ibuprofen Hives     Penicillins Itching     Percocet [Acetaminophen] Nausea and Vomiting     Vicodin [Hydrocodone-Acetaminophen] Nausea      Latex Allergy: NO    Social History     Tobacco Use     Smoking status: Former Smoker     Packs/day: 0.50     Years: 20.00     Pack years: 10.00     Types: Cigarettes     Start date: 1967     Last attempt to quit: 1989     Years since quittin.6     Smokeless tobacco: Never Used   Substance Use Topics     Alcohol use: No     History   Drug Use No       REVIEW OF SYSTEMS:   CONSTITUTIONAL: NEGATIVE for fever, chills, change in weight  INTEGUMENTARY/SKIN: NEGATIVE for worrisome rashes, moles or lesions  EYES: NEGATIVE for vision changes or irritation  ENT/MOUTH: NEGATIVE for ear, mouth and throat problems  RESP: NEGATIVE for significant cough or SOB  BREAST: NEGATIVE for masses, tenderness or discharge  CV: NEGATIVE for chest pain, palpitations or peripheral edema  GI: NEGATIVE for nausea, abdominal pain, heartburn, or change in bowel habits  : NEGATIVE for frequency, dysuria, or hematuria  MUSCULOSKELETAL: NEGATIVE for significant arthralgias or myalgia  NEURO: NEGATIVE for weakness, dizziness or paresthesias  ENDOCRINE: NEGATIVE for temperature intolerance, skin/hair changes  HEME: NEGATIVE for  "bleeding problems  PSYCHIATRIC: NEGATIVE for changes in mood or affect    EXAM:   /56   Pulse 97   Temp 98.4  F (36.9  C) (Oral)   Resp 12   Ht 1.651 m (5' 5\")   Wt 76.2 kg (168 lb)   SpO2 96%   BMI 27.96 kg/m      GENERAL APPEARANCE: healthy, alert and no distress     EYES: EOMI, PERRL     HENT: ear canals and TM's normal and nose and mouth without ulcers or lesions     NECK: no adenopathy, no asymmetry, masses, or scars and thyroid normal to palpation     RESP: lungs clear to auscultation - no rales, rhonchi or wheezes     CV: regular rates and rhythm, normal S1 S2, no S3 or S4 and no murmur, click or rub     ABDOMEN:  soft, nontender, no HSM or masses and bowel sounds normal     MS: extremities normal- no gross deformities noted, no evidence of inflammation in joints, FROM in all extremities.     SKIN: no suspicious lesions or rashes     NEURO: Normal strength and tone, sensory exam grossly normal, mentation intact and speech normal     PSYCH: mentation appears normal. and affect normal/bright     LYMPHATICS: No cervical adenopathy    DIAGNOSTICS:   EKG: appears normal, NSR, normal axis, normal intervals, no acute ST/T changes c/w ischemia, no LVH by voltage criteria, unchanged from previous tracings    Recent Labs   Lab Test 05/09/19  1320 03/26/19  1705  12/13/18  1331  11/01/18  1310  04/11/18  0720   HGB 12.0 12.0   < >  --    < >  --    < >  --     265   < >  --    < >  --    < >  --    NA  --   --   --   --   --  135  --  136   POTASSIUM  --   --   --   --   --  3.6  --  4.4   CR  --   --   --   --   --  0.97  --  0.79   A1C  --  7.4*  --  8.2*  --   --    < > 11.2*    < > = values in this interval not displayed.        IMPRESSION:   The proposed surgical procedure is considered INTERMEDIATE risk.    REVISED CARDIAC RISK INDEX  The patient has the following serious cardiovascular risks for perioperative complications such as (MI, PE, VFib and 3  AV Block):  Diabetes Mellitus (on " Insulin)  INTERPRETATION: 2 risks: Class III (moderate risk - 6.6% complication rate)    The patient has the following additional risks for perioperative complications:  No identified additional risks      ICD-10-CM    1. Preop general physical exam Z01.818 EKG 12-lead complete w/read - Clinics   2. Calculus of gallbladder with acute cholecystitis without obstruction K80.00        RECOMMENDATIONS:       Diabetes Medication Use  -----Hold usual oral and non-insulin diabetic meds (e.g. Metformin, Actos, Glipizide) while NPO.       APPROVAL GIVEN to proceed with proposed procedure, without further diagnostic evaluation       Signed Electronically by: John Bailey PA-C    Copy of this evaluation report is provided to requesting physician.    [facility] Preop Guidelines    Revised Cardiac Risk Index

## 2019-08-20 NOTE — PATIENT INSTRUCTIONS
Hold medications the morning of the surgery  Before Your Surgery      Call your surgeon if there is any change in your health. This includes signs of a cold or flu (such as a sore throat, runny nose, cough, rash or fever).    Do not smoke, drink alcohol or take over the counter medicine (unless your surgeon or primary care doctor tells you to) for the 24 hours before and after surgery.    If you take prescribed drugs: Follow your doctor s orders about which medicines to take and which to stop until after surgery.    Eating and drinking prior to surgery: follow the instructions from your surgeon    Take a shower or bath the night before surgery. Use the soap your surgeon gave you to gently clean your skin. If you do not have soap from your surgeon, use your regular soap. Do not shave or scrub the surgery site.  Wear clean pajamas and have clean sheets on your bed.

## 2019-08-20 NOTE — PROGRESS NOTES
[facility]  [address] [place] [id number]  [phone]  Dept: [phone]    PRE-OP EVALUATION:  Today's date: 2019    Kelly Vegas (: 1950) presents for pre-operative evaluation assessment as requested by Dr. Piña.  She requires evaluation and anesthesia risk assessment prior to undergoing surgery/procedure for treatment of gallbladder removal .    Fax number for surgical facility: Primary Physician: Sejal Rutledge  Type of Anesthesia Anticipated: General    Patient has a Health Care Directive or Living Will:  NO    Preop Questions 2019   Who is doing your surgery? dr piña [facility]   What are you having done? gall bladder removed   Date of Surgery/Procedure: 19   Facility or Hospital where procedure/surgery will be performed: [facility]   1.  Do you have a history of Heart attack, stroke, stent, coronary bypass surgery, or other heart surgery? No   2.  Do you ever have any pain or discomfort in your chest? No   3.  Do you have a history of  Heart Failure? No   4.   Are you troubled by shortness of breath when:  walking on a level surface, or up a slight hill, or at night? No   5.  Do you currently have a cold, bronchitis or other respiratory infection? No   6.  Do you have a cough, shortness of breath, or wheezing? No   7.  Do you sometimes get pains in the calves of your legs when you walk? YES - muscle aches   8. Do you or anyone in your family have previous history of blood clots? YES - patient had them years ago   9.  Do you or does anyone in your family have a serious bleeding problem such as prolonged bleeding following surgeries or cuts? No   10. Have you ever had problems with anemia or been told to take iron pills? YES - 2018 CLL (chronic lymphocytic leukemia)   11. Have you had any abnormal blood loss such as black, tarry or bloody stools, or abnormal vaginal bleeding? No   12. Have you ever had a blood transfusion? No    13. Have you or any of your relatives ever had problems with anesthesia? No   14. Do you have sleep apnea, excessive snoring or daytime drowsiness? No   15. Do you have any prosthetic heart valves? No   16. Do you have prosthetic joints? No   17. Is there any chance that you may be pregnant? No         HPI:     HPI related to upcoming procedure: H/o gallstones with right upper quadrant pain      See problem list for active medical problems.  Problems all longstanding and stable, except as noted/documented.  See ROS for pertinent symptoms related to these conditions.      MEDICAL HISTORY:     Patient Active Problem List    Diagnosis Date Noted     Major depressive disorder, recurrent, mild (H) 02/07/2012     Priority: High     Hypertension goal BP (blood pressure) < 140/90 11/08/2010     Priority: High     Hyperlipidemia with target LDL less than 100      Priority: High             Ganglion cyst of joint of finger of right hand 03/29/2019     Priority: Medium     Dysesthesia 03/26/2019     Priority: Medium     CLL (chronic lymphocytic leukemia) (H)      Priority: Medium     Asymptomatic gallstones      Priority: Medium     Memory problem 09/24/2018     Priority: Medium     Osteopenia      Priority: Medium     DDD (degenerative disc disease), lumbar      Priority: Medium     Degenerative joint disease of hand      Priority: Medium     Type 2 diabetes mellitus with diabetic polyneuropathy, with long-term current use of insulin (H) 12/20/2016     Priority: Medium     Acquired hypothyroidism 05/04/2016     Priority: Medium     History of cervical cancer 01/24/2011     Priority: Medium     3/2006 [facility]       Advanced directives, counseling/discussion 05/02/2011     Priority: Low     Patient states has Advance Directive and will bring in a copy to clinic.        Past Medical History:   Diagnosis Date     Arthritis check it     Asymptomatic gallstones      Cervical cancer (H) 3/2006    [facility], CIS with gland involvement      CLL (chronic lymphocytic leukemia) (H)      Colon adenoma      DDD (degenerative disc disease), lumbar      Degenerative joint disease of hand      Depressive disorder      Diabetes mellitus type II      Diabetic polyneuropathy associated with type 2 diabetes mellitus (H) 9/26/2017     Gastritis      HTN (hypertension)      Hyperlipidemia LDL goal < 100      Hypothyroid 12/2004     Osteopenia      Recurrent major depression (H)      Past Surgical History:   Procedure Laterality Date     BIOPSY  before thyroid removal     C BSO, OMENTECTOMY W/JUAN DNAIEL  2/2006    cervical cancer     COLONOSCOPY  nov 2016     COLONOSCOPY N/A 3/1/2019    Procedure: COLONOSCOPY;  Surgeon: Romulo Hayes MD;  Location: [facility]     COLONOSCOPY WITH CO2 INSUFFLATION N/A 11/28/2016    Procedure: COLONOSCOPY WITH CO2 INSUFFLATION;  Surgeon: Migue Giraldo MD;  Location: [facility]     CYSTECTOMY OVARIAN BENIGN  1974     ESOPHAGOSCOPY, GASTROSCOPY, DUODENOSCOPY (EGD), COMBINED N/A 3/1/2019    Procedure: COMBINED ESOPHAGOSCOPY, GASTROSCOPY, DUODENOSCOPY (EGD), BIOPSY SINGLE OR MULTIPLE;  Surgeon: Romulo Hayes MD;  Location: [facility]     EYE SURGERY  12-14-16     HC THYROIDECTOMY  12/2004    goiter     Current Outpatient Medications   Medication Sig Dispense Refill     ASPIRIN 81 MG OR TABS Take 1 tablet by mouth daily.       atorvastatin (LIPITOR) 20 MG tablet Take 1 tablet (20 mg) by mouth daily 90 tablet 3     blood glucose monitoring (NO BRAND SPECIFIED) test strip Use to test blood sugars 3 times daily or as directed 300 strip 3     Cholecalciferol (VITAMIN D3) 1000 units CAPS Take by mouth daily       citalopram (CELEXA) 20 MG tablet Take 1 tablet (20 mg) by mouth daily 90 tablet 1     insulin glargine (LANTUS SOLOSTAR PEN) 100 UNIT/ML pen Inject 18 units at bedtime daily. 45 mL 3     insulin lispro (HUMALOG KWIKPEN) 100 UNIT/ML pen 6 units daily before largest meal of the day 15 mL 1     insulin pen needle 31G X 6 MM Use twice daily or  as directed. 300 each 3     levothyroxine (SYNTHROID/LEVOTHROID) 100 MCG tablet TAKE 1 TABLET (100 MCG) BY MOUTH DAILY 90 tablet 3     lisinopril-hydrochlorothiazide (PRINZIDE/ZESTORETIC) 10-12.5 MG tablet Take 1 tablet by mouth daily 90 tablet 3     metFORMIN (GLUCOPHAGE) 1000 MG tablet Take 1 tablet (1,000 mg) by mouth 2 times daily (with meals) 180 tablet 3     order for DME Blood glucose monitor per insurance formulary; blood glucose test strips One Touch Verio or per formulary for use 3 time daily; lancets per formulary for use 3 time daily 300 each 3     OTC products: None, except as noted above    Allergies   Allergen Reactions     Glipizide      tremulous     Ibuprofen Hives     Penicillins Itching     Percocet [Acetaminophen] Nausea and Vomiting     Vicodin [Hydrocodone-Acetaminophen] Nausea      Latex Allergy: NO    Social History     Tobacco Use     Smoking status: Former Smoker     Packs/day: 0.50     Years: 20.00     Pack years: 10.00     Types: Cigarettes     Start date: 1967     Last attempt to quit: 1989     Years since quittin.6     Smokeless tobacco: Never Used   Substance Use Topics     Alcohol use: No     History   Drug Use No       REVIEW OF SYSTEMS:   CONSTITUTIONAL: NEGATIVE for fever, chills, change in weight  INTEGUMENTARY/SKIN: NEGATIVE for worrisome rashes, moles or lesions  EYES: NEGATIVE for vision changes or irritation  ENT/MOUTH: NEGATIVE for ear, mouth and throat problems  RESP: NEGATIVE for significant cough or SOB  BREAST: NEGATIVE for masses, tenderness or discharge  CV: NEGATIVE for chest pain, palpitations or peripheral edema  GI: NEGATIVE for nausea, abdominal pain, heartburn, or change in bowel habits  : NEGATIVE for frequency, dysuria, or hematuria  MUSCULOSKELETAL: NEGATIVE for significant arthralgias or myalgia  NEURO: NEGATIVE for weakness, dizziness or paresthesias  ENDOCRINE: NEGATIVE for temperature intolerance, skin/hair changes  HEME: NEGATIVE for  "bleeding problems  PSYCHIATRIC: NEGATIVE for changes in mood or affect    EXAM:   /56   Pulse 97   Temp 98.4  F (36.9  C) (Oral)   Resp 12   Ht 1.651 m (5' 5\")   Wt 76.2 kg (168 lb)   SpO2 96%   BMI 27.96 kg/m      GENERAL APPEARANCE: healthy, alert and no distress     EYES: EOMI, PERRL     HENT: ear canals and TM's normal and nose and mouth without ulcers or lesions     NECK: no adenopathy, no asymmetry, masses, or scars and thyroid normal to palpation     RESP: lungs clear to auscultation - no rales, rhonchi or wheezes     CV: regular rates and rhythm, normal S1 S2, no S3 or S4 and no murmur, click or rub     ABDOMEN:  soft, nontender, no HSM or masses and bowel sounds normal     MS: extremities normal- no gross deformities noted, no evidence of inflammation in joints, FROM in all extremities.     SKIN: no suspicious lesions or rashes     NEURO: Normal strength and tone, sensory exam grossly normal, mentation intact and speech normal     PSYCH: mentation appears normal. and affect normal/bright     LYMPHATICS: No cervical adenopathy    DIAGNOSTICS:   EKG: appears normal, NSR, normal axis, normal intervals, no acute ST/T changes c/w ischemia, no LVH by voltage criteria, unchanged from previous tracings    Recent Labs   Lab Test 05/09/19  1320 03/26/19  1705  12/13/18  1331  11/01/18  1310  04/11/18  0720   HGB 12.0 12.0   < >  --    < >  --    < >  --     265   < >  --    < >  --    < >  --    NA  --   --   --   --   --  135  --  136   POTASSIUM  --   --   --   --   --  3.6  --  4.4   CR  --   --   --   --   --  0.97  --  0.79   A1C  --  7.4*  --  8.2*  --   --    < > 11.2*    < > = values in this interval not displayed.        IMPRESSION:   The proposed surgical procedure is considered INTERMEDIATE risk.    REVISED CARDIAC RISK INDEX  The patient has the following serious cardiovascular risks for perioperative complications such as (MI, PE, VFib and 3  AV Block):  Diabetes Mellitus (on " Insulin)  INTERPRETATION: 2 risks: Class III (moderate risk - 6.6% complication rate)    The patient has the following additional risks for perioperative complications:  No identified additional risks      ICD-10-CM    1. Preop general physical exam Z01.818 EKG 12-lead complete w/read - Clinics   2. Calculus of gallbladder with acute cholecystitis without obstruction K80.00        RECOMMENDATIONS:       Diabetes Medication Use  -----Hold usual oral and non-insulin diabetic meds (e.g. Metformin, Actos, Glipizide) while NPO.       APPROVAL GIVEN to proceed with proposed procedure, without further diagnostic evaluation       Signed Electronically by: John Bailey PA-C    Copy of this evaluation report is provided to requesting physician.    [facility] Preop Guidelines    Revised Cardiac Risk Index

## 2019-08-22 ENCOUNTER — TRANSFERRED RECORDS (OUTPATIENT)
Dept: HEALTH INFORMATION MANAGEMENT | Facility: CLINIC | Age: 69
End: 2019-08-22

## 2019-09-06 ENCOUNTER — ANESTHESIA EVENT (OUTPATIENT)
Dept: SURGERY | Facility: CLINIC | Age: 69
End: 2019-09-06
Payer: COMMERCIAL

## 2019-09-06 ASSESSMENT — LIFESTYLE VARIABLES: TOBACCO_USE: 1

## 2019-09-06 NOTE — ANESTHESIA PREPROCEDURE EVALUATION
Anesthesia Pre-Procedure Evaluation    Patient: Kelly Vegas   MRN: [id number] : 1950          Preoperative Diagnosis: Symptomatic cholelithiasis    Procedure(s):  CHOLECYSTECTOMY, LAPAROSCOPIC    Past Medical History:   Diagnosis Date     Arthritis check it     Asymptomatic gallstones      Cervical cancer (H) 3/2006    [facility], [facility] with gland involvement     CLL (chronic lymphocytic leukemia) (H)      Colon adenoma      DDD (degenerative disc disease), lumbar      Degenerative joint disease of hand      Depressive disorder      Diabetes mellitus type II      Diabetic polyneuropathy associated with type 2 diabetes mellitus (H) 2017     Gastritis      HTN (hypertension)      Hyperlipidemia LDL goal < 100      Hypothyroid 2004     Osteopenia      Recurrent major depression (H)      Past Surgical History:   Procedure Laterality Date     BIOPSY  before thyroid removal     C BSO, OMENTECTOMY W/JUAN DANIEL  2006    cervical cancer     COLONOSCOPY  2016     COLONOSCOPY N/A 3/1/2019    Procedure: COLONOSCOPY;  Surgeon: Romulo Hayes MD;  Location: [facility]     COLONOSCOPY WITH CO2 INSUFFLATION N/A 2016    Procedure: COLONOSCOPY WITH CO2 INSUFFLATION;  Surgeon: Migue Giraldo MD;  Location: [facility]     CYSTECTOMY OVARIAN BENIGN  1974     ESOPHAGOSCOPY, GASTROSCOPY, DUODENOSCOPY (EGD), COMBINED N/A 3/1/2019    Procedure: COMBINED ESOPHAGOSCOPY, GASTROSCOPY, DUODENOSCOPY (EGD), BIOPSY SINGLE OR MULTIPLE;  Surgeon: Romulo Hayes MD;  Location: [facility]     EYE SURGERY  16     HC THYROIDECTOMY  2004    goiter       Anesthesia Evaluation     . Pt has had prior anesthetic. Type: General and MAC           ROS/MED HX    ENT/Pulmonary:     (+)tobacco use, Past use , . .    Neurologic:     (+)other neuro Memory problem    Cardiovascular:     (+) Dyslipidemia, hypertension----. : . . . :. . Previous cardiac testing date:results:date: results:ECG reviewed date:19 results:Sinus Rhythm   WITHIN  NORMAL LIMITS date: results:          METS/Exercise Tolerance:     Hematologic:  - neg hematologic  ROS       Musculoskeletal:   (+) arthritis,  other musculoskeletal- DDD      GI/Hepatic:  - neg GI/hepatic ROS       Renal/Genitourinary:  - ROS Renal section negative       Endo:     (+) type II DM Last HgA1c: 7.4 date: 3/26/19 Diabetic complications: nephropathy, thyroid problem hypothyroidism Thyroid disease - Other S/P thyroidectomy, .      Psychiatric:     (+) psychiatric history depression      Infectious Disease:         Malignancy:   (+) Malignancy History of Lymphoma/Leukemia and Other  Other CA Cervical status post Surgery         Other:    - neg other ROS                      Physical Exam  Normal systems: cardiovascular and pulmonary    Airway   Mallampati: II  TM distance: >3 FB  Neck ROM: full    Dental   (+) upper dentures    Cardiovascular       Pulmonary             Lab Results   Component Value Date    WBC 18.0 (H) 05/09/2019    HGB 12.0 05/09/2019    HCT 39.7 05/09/2019     05/09/2019    SED 13 10/15/2018     11/01/2018    POTASSIUM 3.6 11/01/2018    CHLORIDE 101 11/01/2018    CO2 27 11/01/2018    BUN 22 11/01/2018    CR 0.97 11/01/2018     (H) 11/01/2018    KARTHIK 8.9 11/01/2018    ALBUMIN 3.8 11/01/2018    PROTTOTAL 7.1 11/01/2018    ALT 20 11/01/2018    AST 18 11/01/2018    ALKPHOS 96 11/01/2018    BILITOTAL 0.5 11/01/2018    PTT 25 01/31/2006    INR 1.04 01/31/2006    TSH 0.93 09/24/2018    T4 1.20 06/29/2017    T3 175 01/31/2006       Preop Vitals  BP Readings from Last 3 Encounters:   08/20/19 118/56   08/08/19 129/65   07/09/19 123/66    Pulse Readings from Last 3 Encounters:   08/20/19 97   08/08/19 68   07/09/19 68      Resp Readings from Last 3 Encounters:   08/20/19 12   08/08/19 18   07/09/19 18    SpO2 Readings from Last 3 Encounters:   08/20/19 96%   03/28/19 96%   03/26/19 99%      Temp Readings from Last 1 Encounters:   08/20/19 36.9  C (98.4  F) (Oral)    Ht  "Readings from Last 1 Encounters:   08/20/19 1.651 m (5' 5\")      Wt Readings from Last 1 Encounters:   08/20/19 76.2 kg (168 lb)    Estimated body mass index is 27.96 kg/m  as calculated from the following:    Height as of 8/20/19: 1.651 m (5' 5\").    Weight as of 8/20/19: 76.2 kg (168 lb).       Anesthesia Plan      History & Physical Review  History and physical reviewed and following examination; no interval change.    ASA Status:  3 .    NPO Status:  > 6 hours    Plan for General and ETT with Propofol and Intravenous induction. Maintenance will be Balanced.    PONV prophylaxis:  Ondansetron (or other 5HT-3) and Dexamethasone or Solumedrol  Additional equipment: Videolaryngoscope      Postoperative Care  Postoperative pain management:  IV analgesics, Oral pain medications and Multi-modal analgesia.      Consents  Anesthetic plan, risks, benefits and alternatives discussed with:  Patient..                 BIA You CRNA  "

## 2019-09-09 ENCOUNTER — ANESTHESIA (OUTPATIENT)
Dept: SURGERY | Facility: CLINIC | Age: 69
End: 2019-09-09
Payer: COMMERCIAL

## 2019-09-09 ENCOUNTER — HOSPITAL ENCOUNTER (OUTPATIENT)
Facility: CLINIC | Age: 69
Discharge: HOME | End: 2019-09-09
Attending: SURGERY | Admitting: SURGERY
Payer: COMMERCIAL

## 2019-09-09 VITALS
RESPIRATION RATE: 16 BRPM | WEIGHT: 165 LBS | BODY MASS INDEX: 27.49 KG/M2 | DIASTOLIC BLOOD PRESSURE: 57 MMHG | HEIGHT: 65 IN | HEART RATE: 70 BPM | SYSTOLIC BLOOD PRESSURE: 153 MMHG | OXYGEN SATURATION: 94 % | TEMPERATURE: 97.7 F

## 2019-09-09 DIAGNOSIS — K80.20 SYMPTOMATIC CHOLELITHIASIS: Primary | ICD-10-CM

## 2019-09-09 LAB
GLUCOSE BLDC GLUCOMTR-MCNC: 155 MG/DL (ref 70–99)
GLUCOSE BLDC GLUCOMTR-MCNC: 228 MG/DL (ref 70–99)

## 2019-09-09 PROCEDURE — 82962 GLUCOSE BLOOD TEST: CPT

## 2019-09-09 PROCEDURE — 27210794 ZZH OR GENERAL SUPPLY STERILE: Performed by: SURGERY

## 2019-09-09 PROCEDURE — 47562 LAPAROSCOPIC CHOLECYSTECTOMY: CPT | Performed by: SURGERY

## 2019-09-09 PROCEDURE — 25800030 ZZH RX IP 258 OP 636: Performed by: NURSE ANESTHETIST, CERTIFIED REGISTERED

## 2019-09-09 PROCEDURE — 47562 LAPAROSCOPIC CHOLECYSTECTOMY: CPT | Mod: AS | Performed by: PHYSICIAN ASSISTANT

## 2019-09-09 PROCEDURE — 37000009 ZZH ANESTHESIA TECHNICAL FEE, EACH ADDTL 15 MIN: Performed by: SURGERY

## 2019-09-09 PROCEDURE — 25000132 ZZH RX MED GY IP 250 OP 250 PS 637: Performed by: NURSE ANESTHETIST, CERTIFIED REGISTERED

## 2019-09-09 PROCEDURE — 88304 TISSUE EXAM BY PATHOLOGIST: CPT | Mod: 26 | Performed by: SURGERY

## 2019-09-09 PROCEDURE — 88304 TISSUE EXAM BY PATHOLOGIST: CPT | Performed by: SURGERY

## 2019-09-09 PROCEDURE — 25000125 ZZHC RX 250: Performed by: NURSE ANESTHETIST, CERTIFIED REGISTERED

## 2019-09-09 PROCEDURE — 36000058 ZZH SURGERY LEVEL 3 EA 15 ADDTL MIN: Performed by: SURGERY

## 2019-09-09 PROCEDURE — 25000128 H RX IP 250 OP 636: Performed by: NURSE ANESTHETIST, CERTIFIED REGISTERED

## 2019-09-09 PROCEDURE — 71000014 ZZH RECOVERY PHASE 1 LEVEL 2 FIRST HR: Performed by: SURGERY

## 2019-09-09 PROCEDURE — 25000125 ZZHC RX 250: Performed by: SURGERY

## 2019-09-09 PROCEDURE — 37000008 ZZH ANESTHESIA TECHNICAL FEE, 1ST 30 MIN: Performed by: SURGERY

## 2019-09-09 PROCEDURE — 40000305 ZZH STATISTIC PRE PROC ASSESS I: Performed by: SURGERY

## 2019-09-09 PROCEDURE — 25000566 ZZH SEVOFLURANE, EA 15 MIN: Performed by: SURGERY

## 2019-09-09 PROCEDURE — 71000015 ZZH RECOVERY PHASE 1 LEVEL 2 EA ADDTL HR: Performed by: SURGERY

## 2019-09-09 PROCEDURE — 27110028 ZZH OR GENERAL SUPPLY NON-STERILE: Performed by: SURGERY

## 2019-09-09 PROCEDURE — 71000027 ZZH RECOVERY PHASE 2 EACH 15 MINS: Performed by: SURGERY

## 2019-09-09 PROCEDURE — 27210995 ZZH RX 272: Performed by: SURGERY

## 2019-09-09 PROCEDURE — 25000128 H RX IP 250 OP 636: Performed by: SURGERY

## 2019-09-09 PROCEDURE — 36000056 ZZH SURGERY LEVEL 3 1ST 30 MIN: Performed by: SURGERY

## 2019-09-09 RX ORDER — CLINDAMYCIN PHOSPHATE 900 MG/50ML
900 INJECTION, SOLUTION INTRAVENOUS SEE ADMIN INSTRUCTIONS
Status: DISCONTINUED | OUTPATIENT
Start: 2019-09-09 | End: 2019-09-09 | Stop reason: HOSPADM

## 2019-09-09 RX ORDER — BUPIVACAINE HYDROCHLORIDE 5 MG/ML
INJECTION, SOLUTION PERINEURAL PRN
Status: DISCONTINUED | OUTPATIENT
Start: 2019-09-09 | End: 2019-09-09 | Stop reason: HOSPADM

## 2019-09-09 RX ORDER — ONDANSETRON 2 MG/ML
INJECTION INTRAMUSCULAR; INTRAVENOUS PRN
Status: DISCONTINUED | OUTPATIENT
Start: 2019-09-09 | End: 2019-09-09

## 2019-09-09 RX ORDER — HYDROMORPHONE HYDROCHLORIDE 1 MG/ML
.3-.5 INJECTION, SOLUTION INTRAMUSCULAR; INTRAVENOUS; SUBCUTANEOUS EVERY 10 MIN PRN
Status: DISCONTINUED | OUTPATIENT
Start: 2019-09-09 | End: 2019-09-09 | Stop reason: HOSPADM

## 2019-09-09 RX ORDER — GABAPENTIN 300 MG/1
300 CAPSULE ORAL ONCE
Status: COMPLETED | OUTPATIENT
Start: 2019-09-09 | End: 2019-09-09

## 2019-09-09 RX ORDER — FENTANYL CITRATE 50 UG/ML
25-50 INJECTION, SOLUTION INTRAMUSCULAR; INTRAVENOUS
Status: DISCONTINUED | OUTPATIENT
Start: 2019-09-09 | End: 2019-09-09 | Stop reason: HOSPADM

## 2019-09-09 RX ORDER — NEOSTIGMINE METHYLSULFATE 1 MG/ML
VIAL (ML) INJECTION PRN
Status: DISCONTINUED | OUTPATIENT
Start: 2019-09-09 | End: 2019-09-09

## 2019-09-09 RX ORDER — INDOCYANINE GREEN AND WATER 25 MG
KIT INJECTION PRN
Status: DISCONTINUED | OUTPATIENT
Start: 2019-09-09 | End: 2019-09-09

## 2019-09-09 RX ORDER — EPHEDRINE SULFATE 50 MG/ML
INJECTION, SOLUTION INTRAVENOUS PRN
Status: DISCONTINUED | OUTPATIENT
Start: 2019-09-09 | End: 2019-09-09

## 2019-09-09 RX ORDER — TRAMADOL HYDROCHLORIDE 50 MG/1
50 TABLET ORAL EVERY 6 HOURS PRN
Qty: 10 TABLET | Refills: 0 | Status: SHIPPED | OUTPATIENT
Start: 2019-09-09 | End: 2019-11-04

## 2019-09-09 RX ORDER — KETAMINE HYDROCHLORIDE 10 MG/ML
INJECTION, SOLUTION INTRAMUSCULAR; INTRAVENOUS PRN
Status: DISCONTINUED | OUTPATIENT
Start: 2019-09-09 | End: 2019-09-09

## 2019-09-09 RX ORDER — HYDROXYZINE HYDROCHLORIDE 25 MG/1
25 TABLET, FILM COATED ORAL EVERY 6 HOURS PRN
Status: DISCONTINUED | OUTPATIENT
Start: 2019-09-09 | End: 2019-09-09 | Stop reason: HOSPADM

## 2019-09-09 RX ORDER — SODIUM CHLORIDE, SODIUM LACTATE, POTASSIUM CHLORIDE, CALCIUM CHLORIDE 600; 310; 30; 20 MG/100ML; MG/100ML; MG/100ML; MG/100ML
INJECTION, SOLUTION INTRAVENOUS CONTINUOUS
Status: DISCONTINUED | OUTPATIENT
Start: 2019-09-09 | End: 2019-09-09 | Stop reason: HOSPADM

## 2019-09-09 RX ORDER — ACETAMINOPHEN 325 MG/1
975 TABLET ORAL ONCE
Status: COMPLETED | OUTPATIENT
Start: 2019-09-09 | End: 2019-09-09

## 2019-09-09 RX ORDER — ONDANSETRON 2 MG/ML
4 INJECTION INTRAMUSCULAR; INTRAVENOUS EVERY 30 MIN PRN
Status: DISCONTINUED | OUTPATIENT
Start: 2019-09-09 | End: 2019-09-09 | Stop reason: HOSPADM

## 2019-09-09 RX ORDER — ONDANSETRON 4 MG/1
4 TABLET, ORALLY DISINTEGRATING ORAL EVERY 30 MIN PRN
Status: DISCONTINUED | OUTPATIENT
Start: 2019-09-09 | End: 2019-09-09 | Stop reason: HOSPADM

## 2019-09-09 RX ORDER — HYDROXYZINE HYDROCHLORIDE 50 MG/1
50 TABLET, FILM COATED ORAL EVERY 6 HOURS PRN
Status: DISCONTINUED | OUTPATIENT
Start: 2019-09-09 | End: 2019-09-09 | Stop reason: HOSPADM

## 2019-09-09 RX ORDER — ACETAMINOPHEN 325 MG/1
975 TABLET ORAL ONCE
Status: DISCONTINUED | OUTPATIENT
Start: 2019-09-09 | End: 2019-09-09 | Stop reason: HOSPADM

## 2019-09-09 RX ORDER — NALOXONE HYDROCHLORIDE 0.4 MG/ML
.1-.4 INJECTION, SOLUTION INTRAMUSCULAR; INTRAVENOUS; SUBCUTANEOUS
Status: DISCONTINUED | OUTPATIENT
Start: 2019-09-09 | End: 2019-09-09 | Stop reason: HOSPADM

## 2019-09-09 RX ORDER — LIDOCAINE HYDROCHLORIDE 10 MG/ML
INJECTION, SOLUTION INFILTRATION; PERINEURAL PRN
Status: DISCONTINUED | OUTPATIENT
Start: 2019-09-09 | End: 2019-09-09

## 2019-09-09 RX ORDER — FENTANYL CITRATE 50 UG/ML
INJECTION, SOLUTION INTRAMUSCULAR; INTRAVENOUS PRN
Status: DISCONTINUED | OUTPATIENT
Start: 2019-09-09 | End: 2019-09-09

## 2019-09-09 RX ORDER — CLINDAMYCIN PHOSPHATE 900 MG/50ML
900 INJECTION, SOLUTION INTRAVENOUS
Status: COMPLETED | OUTPATIENT
Start: 2019-09-09 | End: 2019-09-09

## 2019-09-09 RX ORDER — LIDOCAINE 40 MG/G
CREAM TOPICAL
Status: DISCONTINUED | OUTPATIENT
Start: 2019-09-09 | End: 2019-09-09 | Stop reason: HOSPADM

## 2019-09-09 RX ORDER — MEPERIDINE HYDROCHLORIDE 25 MG/ML
12.5 INJECTION INTRAMUSCULAR; INTRAVENOUS; SUBCUTANEOUS
Status: DISCONTINUED | OUTPATIENT
Start: 2019-09-09 | End: 2019-09-09 | Stop reason: HOSPADM

## 2019-09-09 RX ORDER — KETOROLAC TROMETHAMINE 30 MG/ML
INJECTION, SOLUTION INTRAMUSCULAR; INTRAVENOUS PRN
Status: DISCONTINUED | OUTPATIENT
Start: 2019-09-09 | End: 2019-09-09

## 2019-09-09 RX ORDER — GLYCOPYRROLATE 0.2 MG/ML
INJECTION, SOLUTION INTRAMUSCULAR; INTRAVENOUS PRN
Status: DISCONTINUED | OUTPATIENT
Start: 2019-09-09 | End: 2019-09-09

## 2019-09-09 RX ORDER — LIDOCAINE HYDROCHLORIDE AND EPINEPHRINE 10; 10 MG/ML; UG/ML
INJECTION, SOLUTION INFILTRATION; PERINEURAL PRN
Status: DISCONTINUED | OUTPATIENT
Start: 2019-09-09 | End: 2019-09-09 | Stop reason: HOSPADM

## 2019-09-09 RX ORDER — DEXAMETHASONE SODIUM PHOSPHATE 4 MG/ML
INJECTION, SOLUTION INTRA-ARTICULAR; INTRALESIONAL; INTRAMUSCULAR; INTRAVENOUS; SOFT TISSUE PRN
Status: DISCONTINUED | OUTPATIENT
Start: 2019-09-09 | End: 2019-09-09

## 2019-09-09 RX ORDER — PROPOFOL 10 MG/ML
INJECTION, EMULSION INTRAVENOUS PRN
Status: DISCONTINUED | OUTPATIENT
Start: 2019-09-09 | End: 2019-09-09

## 2019-09-09 RX ADMIN — GLYCOPYRROLATE 0.6 MG: 0.2 INJECTION, SOLUTION INTRAMUSCULAR; INTRAVENOUS at 11:03

## 2019-09-09 RX ADMIN — LIDOCAINE HYDROCHLORIDE 50 MG: 10 INJECTION, SOLUTION INFILTRATION; PERINEURAL at 08:50

## 2019-09-09 RX ADMIN — FENTANYL CITRATE 100 MCG: 50 INJECTION, SOLUTION INTRAMUSCULAR; INTRAVENOUS at 09:14

## 2019-09-09 RX ADMIN — INDOCYANINE GREEN 2.5 MG: KIT INTRAVENOUS at 09:29

## 2019-09-09 RX ADMIN — ONDANSETRON 4 MG: 2 INJECTION INTRAMUSCULAR; INTRAVENOUS at 10:54

## 2019-09-09 RX ADMIN — PROPOFOL 150 MG: 10 INJECTION, EMULSION INTRAVENOUS at 08:50

## 2019-09-09 RX ADMIN — LIDOCAINE HYDROCHLORIDE 1 ML: 10 INJECTION, SOLUTION EPIDURAL; INFILTRATION; INTRACAUDAL; PERINEURAL at 08:31

## 2019-09-09 RX ADMIN — FENTANYL CITRATE 100 MCG: 50 INJECTION, SOLUTION INTRAMUSCULAR; INTRAVENOUS at 10:29

## 2019-09-09 RX ADMIN — DEXAMETHASONE SODIUM PHOSPHATE 4 MG: 4 INJECTION, SOLUTION INTRA-ARTICULAR; INTRALESIONAL; INTRAMUSCULAR; INTRAVENOUS; SOFT TISSUE at 08:50

## 2019-09-09 RX ADMIN — MIDAZOLAM 2 MG: 1 INJECTION INTRAMUSCULAR; INTRAVENOUS at 08:41

## 2019-09-09 RX ADMIN — GABAPENTIN 300 MG: 300 CAPSULE ORAL at 08:36

## 2019-09-09 RX ADMIN — HYDROMORPHONE HYDROCHLORIDE 0.5 MG: 1 INJECTION, SOLUTION INTRAMUSCULAR; INTRAVENOUS; SUBCUTANEOUS at 10:56

## 2019-09-09 RX ADMIN — EPHEDRINE SULFATE 5 MG: 50 INJECTION, SOLUTION INTRAVENOUS at 09:02

## 2019-09-09 RX ADMIN — CLINDAMYCIN PHOSPHATE 900 MG: 18 INJECTION, SOLUTION INTRAVENOUS at 08:41

## 2019-09-09 RX ADMIN — FENTANYL CITRATE 100 MCG: 50 INJECTION, SOLUTION INTRAMUSCULAR; INTRAVENOUS at 08:50

## 2019-09-09 RX ADMIN — ONDANSETRON 4 MG: 2 INJECTION INTRAMUSCULAR; INTRAVENOUS at 15:43

## 2019-09-09 RX ADMIN — ROCURONIUM BROMIDE 50 MG: 10 INJECTION INTRAVENOUS at 08:50

## 2019-09-09 RX ADMIN — SODIUM CHLORIDE, POTASSIUM CHLORIDE, SODIUM LACTATE AND CALCIUM CHLORIDE 1000 ML: 600; 310; 30; 20 INJECTION, SOLUTION INTRAVENOUS at 08:30

## 2019-09-09 RX ADMIN — KETAMINE HYDROCHLORIDE 20 MG: 10 INJECTION INTRAMUSCULAR; INTRAVENOUS at 10:13

## 2019-09-09 RX ADMIN — HYDROMORPHONE HYDROCHLORIDE 0.5 MG: 1 INJECTION, SOLUTION INTRAMUSCULAR; INTRAVENOUS; SUBCUTANEOUS at 10:34

## 2019-09-09 RX ADMIN — ACETAMINOPHEN 975 MG: 325 TABLET ORAL at 08:36

## 2019-09-09 RX ADMIN — Medication 4 MG: at 11:03

## 2019-09-09 RX ADMIN — FENTANYL CITRATE 50 MCG: 50 INJECTION, SOLUTION INTRAMUSCULAR; INTRAVENOUS at 09:19

## 2019-09-09 RX ADMIN — KETAMINE HYDROCHLORIDE 30 MG: 10 INJECTION INTRAMUSCULAR; INTRAVENOUS at 08:50

## 2019-09-09 RX ADMIN — KETOROLAC TROMETHAMINE 15 MG: 30 INJECTION, SOLUTION INTRAMUSCULAR at 10:54

## 2019-09-09 RX ADMIN — SODIUM CHLORIDE, POTASSIUM CHLORIDE, SODIUM LACTATE AND CALCIUM CHLORIDE: 600; 310; 30; 20 INJECTION, SOLUTION INTRAVENOUS at 09:20

## 2019-09-09 ASSESSMENT — MIFFLIN-ST. JEOR: SCORE: 1269.32

## 2019-09-09 NOTE — ANESTHESIA POSTPROCEDURE EVALUATION
Patient: Kelly Vegas    Procedure(s):  CHOLECYSTECTOMY, LAPAROSCOPIC    Diagnosis:Symptomatic cholelithiasis  Diagnosis Additional Information: No value filed.    Anesthesia Type:  General, ETT    Note:  Anesthesia Post Evaluation    Patient location during evaluation: PACU and Bedside  Patient participation: Able to fully participate in evaluation  Level of consciousness: awake and alert  Pain management: adequate  Airway patency: patent  Cardiovascular status: acceptable and hemodynamically stable  Respiratory status: acceptable and nasal cannula  Hydration status: acceptable  PONV: none     Anesthetic complications: None          Last vitals:  Vitals:    09/09/19 1215 09/09/19 1230 09/09/19 1245   BP: (!) 166/72 (!) 163/68 (!) 152/66   Pulse: 74 66 63   Resp: 16 18 16   Temp: 36.5  C (97.7  F)     SpO2: 99% 94% 95%         Electronically Signed By: BIA You CRNA  September 9, 2019  12:53 PM

## 2019-09-09 NOTE — OR NURSING
"Pt to PACU, eyes following me, follows simple commands but will not speak. Hand grasps weak to later strong, dorsiflexion/extension weak to later strong, shoulder shrug even and mod strong, turns head side to side, raises eyebrows all to command.    JAKY and MD in to see pt. She still follows commands then then finally would say the word \"YES\" upon command (after dentures put in).   "

## 2019-09-09 NOTE — ANESTHESIA CARE TRANSFER NOTE
Patient: Kelly Vegas    Procedure(s):  CHOLECYSTECTOMY, LAPAROSCOPIC    Diagnosis: Symptomatic cholelithiasis  Diagnosis Additional Information: No value filed.    Anesthesia Type:   General, ETT     Note:  Airway :Face Mask  Patient transferred to:PACU  Handoff Report: Identifed the Patient, Identified the Reponsible Provider, Reviewed the pertinent medical history, Discussed the surgical course, Reviewed Intra-OP anesthesia mangement and issues during anesthesia, Set expectations for post-procedure period and Allowed opportunity for questions and acknowledgement of understanding      Vitals: (Last set prior to Anesthesia Care Transfer)    CRNA VITALS  9/9/2019 1059 - 9/9/2019 1133      9/9/2019             Resp Rate (observed):  10                Electronically Signed By: BIA You CRNA  September 9, 2019  11:33 AM

## 2019-09-09 NOTE — OR NURSING
Pt awake and alert. States feels more alert.verbalizes approp.  Vss. Iv patent. Weaning o2 and pt is more awake.

## 2019-09-09 NOTE — OR NURSING
Dr piña in to see pt in pacu, pt alert talking and oriented. Denies pain. Will cont to reassess and take to sds when ready.

## 2019-09-09 NOTE — DISCHARGE INSTRUCTIONS
Same Day Surgery Discharge Instructions  Special Precautions After Surgery - Adult    1. It is not unusual to feel lightheaded or faint, up to 24 hours after surgery or while taking pain medication.  If you have these symptoms; sit for a few minutes before standing and have someone assist you when getting up.  2. You should rest and relax for the next 24 hours and must have someone stay with you for at least 24 hours after your discharge.  3. DO NOT DRIVE any vehicle or operate mechanical equipment for 24 hours following the end of your surgery.  DO NOT DRIVE while taking narcotic pain medications that have been prescribed by your physician.  If you had a limb operated on, you must be able to use it fully to drive.  4. DO NOT drink alcoholic beverages for 24 hours following surgery or while taking prescription pain medication.  5. Drink clear liquids (apple juice, ginger ale, broth, 7-Up, etc.).  Progress to your regular diet as you feel able.  6. Any questions call your physician and do not make important decisions for 24 hours.  __________________________________________________________________________________________________________________________________  IMPORTANT NUMBERS:    [facility] Main Number:  [phone], [phone]  Pharmacy:  [phone]  Same Day Surgery:  [phone], Monday - Friday until 8:30 p.m.  Urgent Care:  [phone]  Emergency Room:  [phone]      [facility]:  [phone]                                                                             [facility]:  [phone] option 1  [facility]:  [phone]     [facility] Clinic:  [phone]   [facility]:  [phone]   Home Medical Equipment: [phone]  [facility]:  [phone]    HOME CARE FOLLOWING ABDOMINAL SURGERY    INCISIONAL CARE:  Replace the bandage over your incision (or incisions) until all drainage stops, or if more comfortable to  have in place.  If present, leave the steri-strips (white paper tapes) in place for 14 days after surgery.  If you have staples in your incision at the time of discharge, they will be removed at your follow-up appointment.  If Dermabond (a type of skin glue) is present, leave in place until it wears/flakes off.     BATHING:  Avoid baths for 1 week after surgery.  Showers are okay.  You may wash your hair at any time.  Gently pat your incision dry after bathing.    ACTIVITY:  Light Activity -- you may immediately be up and about as tolerated.  Driving -- you may drive when comfortable and off narcotic pain medications (example: Norco, Percocet, Hydrocodone).  Light Work -- resume when comfortable off pain medications.  (If you can drive, you probably can work.)  Strenuous Work/Activity -- limit lifting to 20 pounds for 4 weeks.  Then, progressively increase with time.  Active Sports (running, biking, etc.) -- cautiously resume after 6 weeks.  Most importantly listen to your body.  If an activity causes pain or discomfort please stop and try in a few days or decrease your intensity.    DISCOMFORT:  Use pain medications as prescribed by your surgeon.  Take the pain medication with some food, when possible, to minimize side effects.  Expect gradual improvement.      Narcotic Pain Medications:  Do not drive, make important decisions or operate heavy machinery while on narcotic pain medications.  Narcotic pain medications can be associated with nausea, sleep disturbance, and constipation.  Unless directed otherwise, please take an over the counter stool softener (Colace 100mg twice a day) unless directed otherwise.  As soon as you are able to stop narcotic pain medications the better. Long term use of narcotics is associated with tolerance (the need for more medication for effect) and potential addiction.    DIET:  Return to diet you were on before surgery, unless you are given specific diet instructions.  Drink plenty of  fluids.  While taking pain medications, increase dietary fiber or add a fiber supplementation like Metamucil or Citrucel to help prevent constipation - a possible side effect of pain medications.    NAUSEA:  If nauseated from the anesthetic/pain meds; rest in bed, get up cautiously with assistance, and drink clear liquids (juice, tea, broth).    RETURN APPOINTMENT:  Schedule a follow-up visit 2-3 weeks after discharge from the hospital.  Office Phone: [phone]     CONTACT US IF THE FOLLOWING DEVELOPS:   1. A fever that is above 101     2. If there is a large amount of drainage, bleeding, or swelling.   3. Severe pain that is not relieved by your prescription.   4. Drainage that is thick, cloudy, yellow, green or white.   5. Any other questions not answered by  Frequently Asked Questions  sheet.      FREQUENTLY ASKED QUESTIONS:    Q:  How should my incision look?    A:  Normally your incision will appear slightly swollen with light redness directly along the incision itself as it heals.  It may feel like a bump or ridge as the healing/scarring happens, and over time (3-4 months) this bump or ridge feeling should slowly go away.  In general, clear or pink watery drainage can be normal at first as your incision heals, but should decrease over time.    Q:  How do I know if my incision is infected?  A:  Look at your incision for signs of infection, like redness around the incision spreading to surrounding skin, or drainage of cloudy or foul-smelling drainage.  If you feel warm, check your temperature to see if you are running a fever.    **If any of these things occur, please notify the nurse at our office.  We may need you to come into the office for an incision check.      Q:  How do I take care of my incision?  A:  If you have a dressing in place - Starting the day after surgery, replace the dressing 1-2 times a day until there is no further drainage from the incision.  At that time, a dressing is no longer  needed.  Try to minimize tape on the skin if irritation is occurring at the tape sites.  If you have significant irritation from tape on the skin, please call the office to discuss other method of dressing your incision.    Small pieces of tape called  steri-strips  may be present directly overlying your incision; these may be removed 10 days after surgery unless otherwise specified by your surgeon.  If these tapes start to loosen at the ends, you may trim them back until they fall off or are removed.    A:  If you had  Dermabond  tissue glue used as a dressing (this causes your incision to look shiny with a clear covering over it) - This type of dressing wears off with time and does not require more dressings over the top unless it is draining around the glue as it wears off.  Do not apply ointments or lotions over the incisions until the glue has completely worn off.    Q:  There is a piece of tape or a sticky  lead  still on my skin.  Can I remove this?  A:  Sometimes the sticky  leads  used for monitoring during surgery or for evaluation in the emergency department are not all removed while you are in the hospital.  These sometimes have a tab or metal dot on them.  You can easily remove these on your own, like taking off a band-aid.  If there is a gel substance under the  lead , simply wipe/clean it off with a washcloth or paper towel.      Q:  What can I do to minimize constipation (very hard stools, or lack of stools)?  A:  Stay well hydrated.  Increase your dietary fiber intake or take a fiber supplement -with plenty of water.  Walk around frequently.  You may consider an over-the-counter stool-softener.  Your Pharmacist can assist you with choosing one that is stocked at your pharmacy.  Constipation is also one of the most common side effects of pain medication.  If you are using pain medication, be pro-active and try to PREVENT problems with constipation by taking the steps above BEFORE constipation becomes  a problem.    Q:  What do I do if I need more pain medications?  A:  Call the office to receive refills.  Be aware that certain pain meds cannot be called into a pharmacy and actually require a paper prescription.  A change may be made in your pain med as you progress thru your recovery period or if you have side effects to certain meds.    --Pain meds are NOT refilled after 5pm on weekdays, and NOT AT ALL on the weekends, so please look ahead to prevent problems.      Q:  Why am I having a hard time sleeping now that I am at home?  A:  Many medications you receive while you are in the hospital can impact your sleep for a number of days after your surgery/hospitalization.  Decreased level of activity and naps during the day may also make sleeping at night difficult.  Try to minimize day-time naps, and get up frequently during the day to walk around your home during your recovery time.  Sleep aides may be of some help, but are not recommended for long-term use.      Q:  I am having some back discomfort.  What should I do?  A:  This may be related to certain positioning that was required for your surgery, extended periods of time in bed, or other changes in your overall activity level.  You may try ice, heat, acetaminophen, or ibuprofen to treat this temporarily.  Note that many pain medications have acetaminophen in them and would state this on the prescription bottle.  Be sure not to exceed the maximum of 4000mg per day of acetaminophen.     **If the pain you are having does not resolve, is severe, or is a flare of back pain you have had on other occasions prior to surgery, please contact your primary physician for further recommendations or for an appointment to be examined at their office.    Q:  Why am I having headaches?  A:  Headaches can be caused by many things:  caffeine withdrawal, use of pain meds, dehydration, high blood pressure, lack of sleep, over-activity/exhaustion, flare-up of usual migraine  headaches.  If you feel this is related to muscle tension (a band-like feeling around the head, or a pressure at the low-back of the head) you may try ice or heat to this area.  You may need to drink more fluids (try electrolyte drink like Gatorade), rest, or take your usual migraine medications.   **If your headaches do not resolve, worsen, are accompanied by other symptoms, or if your blood pressure is high, please call your primary physician for recommendation and/or examination.    Q:  I am unable to urinate.  What do I do?  A:  A small percentage of people can have difficulty urinating initially after surgery.  This includes being able to urinate only a very small amount at a time and feeling discomfort or pressure in the very low abdomen.  This is called  urinary retention , and is actually an urgent situation.  Proceed to your nearest Emergency department for evaluation (not an Urgent Care Center).  Sometimes the bladder does not work correctly after certain medications you receive during surgery, or related to certain procedures.  You may need to have a catheter placed until your bladder recovers.  When planning to go to an Emergency department, it may help to call the ER to let them know you are coming in for this problem after a surgery.  This may help you get in quicker to be evaluated.  **If you have symptoms of a urinary tract infection, please contact your primary physician for the proper evaluation and treatment.          If you have other questions, please call the office Monday thru Friday between 8am and 5pm to discuss with the nurse.  # [phone]    There is a surgeon ON CALL on weekday evenings and over the weekend in case of urgent need only, and may be contacted at the same number.    If you are having an emergency, call 911 or proceed to your nearest emergency department.

## 2019-09-09 NOTE — OP NOTE
Procedure Date: September 9, 2019    Pre-operative Diagnosis: Symptomatic Cholelithiasis  Post-operative Diagnosis:  Symptomatic Cholelithiasis    Procedure Performed: Laparoscopic Cholecystectomy    Surgeon: Jorge Martinez DO  Assistants: Juan Lyon PA-C (needed for retraction and running laparoscope)    Anesthesia Type: General plus local    Findings: Critical view of safety, hydrops gallbladder, significant chronic inflammation    Estimated Blood Loss: 90 mL           Condition: Stable    Indications:   Ms. Kelly Vegas presents with right upper abdominal pain after meals, and characteristics of cholelithiasis on ultrasound with questionable mass.  She had underwent an MRCP which showed sludge and stones within the neck the gallbladder which was discrete from the gallbladder wall.  Initially she had a trial of observation and had waxing and waning symptoms, eventually her symptoms became so severe and frequent that she opted for surgery. She may benefit from cholecystectomy, and was advised of the indications, alternatives, benefits, and risks (including, but not limited to, bleeding, infection, conversion to open surgery, potential for bile leak or bile duct injury, MI, DVT/PE, or death). She understood the information and consented to the aforementioned operative procedure      Procedure: The patient was brought to the Operating Room and placed on the operating table in a supine position. After induction of general endotracheal anesthesia, the abdomen was prepped and draped in the usual sterile fashion. The abdomen was entered through a right upper quadrant mid clavicular line cut down due to her previous midline laparotomy, the 5mm port was delivered under direct visualization, and the abdomen was insufflated with CO2 gas to a pressure of 15mmHg. Standard laparoscopic cholecystectomy ports were placed in the epigastrium, infraumbilical and right anterior axillary line.   The gallbladder had the omentum  densely adherent above, indocyanine green was delivered at this time.  Tedious dissection was carried out removing dense adhesions from the gallbladder.  The gallbladder was distended, firm and difficult to grasp.  An attempt was made to decompress the gallbladder with the aspiration needle however no aspirate was returned. The gallbladder was identified and the fundus retracted superiorly.  Tedious dissection was carried out with a combination of blunt, maryland, suction, and electorcautery dissection.  Dissection was aided by indocyanine green.  The cystic duct was short and the confluence with the common hepatic duct was visualized with indocyanine green.  The cystic duct and a small artery were evident, doubly clipped proximally and once distally.  Both were incised.  Care was taken to begin dissection, the gallbladder was densely adherent to the liver with minimal plane evident.  A small artery was encountered, doubly clipped and incised, likely a posterior branch.  This was again confirmed with lack of indocyanine green contained within. The gallbladder was then dissected from the liver bed using electrocautery.  During dissection the clip was removed from the gallbladder inadvertently and hydrops gallbladder was confirmed with clear bile. The gallbladder was retrieved through the infraumbilical port, and the trochar replaced. Once pneumoperitoneum was reestablished, the gallbladder bed was inspected for bleeding and bile leak and neither were found.  Oozing was noted from the liver bed, cauterized, irrigated.  Surgicel was applied and abdomen desufflated.  The abdomen was reinsufflated after 5 minutes and no further bleeding observed.  Floseal was placed within the gallbladder fossa. The patient was positioned flat, irrigant solution was suctioned free, and pneumoperitoneum was relieved before removing the trocars. The fascia of the infraumbilical port was closed with interrupted 0-Vicryl, and subcutaneous  closure was accomplished using 3-0 Vicryl. Wound closure at all incision was accomplished with 4-0 Monocryl, and the sites cleansed and dried prior to application of sterile glue. The patient tolerated the procedure well, was extubated in the Operating Room without incident, and transferred to the recovery room in stable condition.     Jorge Martinez DO on 9/9/2019 at 11:26 AM

## 2019-09-11 DIAGNOSIS — C91.10 CLL (CHRONIC LYMPHOCYTIC LEUKEMIA) (H): ICD-10-CM

## 2019-09-11 LAB
BASOPHILS # BLD AUTO: 0 10E9/L (ref 0–0.2)
BASOPHILS NFR BLD AUTO: 0 %
COPATH REPORT: NORMAL
DIFFERENTIAL METHOD BLD: ABNORMAL
EOSINOPHIL # BLD AUTO: 0.4 10E9/L (ref 0–0.7)
EOSINOPHIL NFR BLD AUTO: 2 %
ERYTHROCYTE [DISTWIDTH] IN BLOOD BY AUTOMATED COUNT: 14 % (ref 10–15)
ERYTHROCYTE [SEDIMENTATION RATE] IN BLOOD BY WESTERGREN METHOD: 26 MM/H (ref 0–30)
HCT VFR BLD AUTO: 35.5 % (ref 35–47)
HGB BLD-MCNC: 10.7 G/DL (ref 11.7–15.7)
LDH SERPL L TO P-CCNC: 284 U/L (ref 81–234)
LYMPHOCYTES # BLD AUTO: 11.8 10E9/L (ref 0.8–5.3)
LYMPHOCYTES NFR BLD AUTO: 63 %
MCH RBC QN AUTO: 27 PG (ref 26.5–33)
MCHC RBC AUTO-ENTMCNC: 30.1 G/DL (ref 31.5–36.5)
MCV RBC AUTO: 89 FL (ref 78–100)
MONOCYTES # BLD AUTO: 1.1 10E9/L (ref 0–1.3)
MONOCYTES NFR BLD AUTO: 6 %
NEUTROPHILS # BLD AUTO: 5.4 10E9/L (ref 1.6–8.3)
NEUTROPHILS NFR BLD AUTO: 29 %
PLATELET # BLD AUTO: 309 10E9/L (ref 150–450)
RBC # BLD AUTO: 3.97 10E12/L (ref 3.8–5.2)
VARIANT LYMPHS BLD QL SMEAR: PRESENT
WBC # BLD AUTO: 18.7 10E9/L (ref 4–11)

## 2019-09-11 PROCEDURE — 83615 LACTATE (LD) (LDH) ENZYME: CPT | Performed by: INTERNAL MEDICINE

## 2019-09-11 PROCEDURE — 85652 RBC SED RATE AUTOMATED: CPT | Performed by: INTERNAL MEDICINE

## 2019-09-11 PROCEDURE — 36415 COLL VENOUS BLD VENIPUNCTURE: CPT | Performed by: INTERNAL MEDICINE

## 2019-09-11 PROCEDURE — 85025 COMPLETE CBC W/AUTO DIFF WBC: CPT | Performed by: INTERNAL MEDICINE

## 2019-09-16 ENCOUNTER — ONCOLOGY VISIT (OUTPATIENT)
Dept: ONCOLOGY | Facility: CLINIC | Age: 69
End: 2019-09-16
Attending: INTERNAL MEDICINE
Payer: COMMERCIAL

## 2019-09-16 VITALS
WEIGHT: 163.8 LBS | HEART RATE: 74 BPM | DIASTOLIC BLOOD PRESSURE: 54 MMHG | SYSTOLIC BLOOD PRESSURE: 141 MMHG | RESPIRATION RATE: 18 BRPM | HEIGHT: 64 IN | OXYGEN SATURATION: 98 % | BODY MASS INDEX: 27.96 KG/M2 | TEMPERATURE: 97.5 F

## 2019-09-16 DIAGNOSIS — C91.10 CLL (CHRONIC LYMPHOCYTIC LEUKEMIA) (H): Primary | ICD-10-CM

## 2019-09-16 DIAGNOSIS — D64.9 ANEMIA, UNSPECIFIED TYPE: ICD-10-CM

## 2019-09-16 PROCEDURE — G0463 HOSPITAL OUTPT CLINIC VISIT: HCPCS

## 2019-09-16 PROCEDURE — 99214 OFFICE O/P EST MOD 30 MIN: CPT | Performed by: INTERNAL MEDICINE

## 2019-09-16 ASSESSMENT — MIFFLIN-ST. JEOR: SCORE: 1247.99

## 2019-09-16 NOTE — PATIENT INSTRUCTIONS
Dr. De Los Santos would like you to have labs in 1 month and we will call you with results.         When you are in need of a refill, please call your pharmacy and they will send us a request.      Copy of appointments, and after visit summary (AVS) given to patient.      If you have any questions please call Chantell Chamberlain RN, BSN Oncology Hematology  [facility] [phone]. For questions after business hours, or on holidays/weekends, please call our after hours Nurse Triage line [phone]. Thank you.       Cbc diff/ferrtin in 1 month. Will call her on result.

## 2019-09-16 NOTE — LETTER
"    9/16/2019         RE: Kelly Vegas  [address] [place] [id number]        Dear Colleague,    Thank you for referring your patient, Kelly Vegas, to the [facility]. Please see a copy of my visit note below.    Oncology Rooming Note    September 16, 2019 2:22 PM   Kelly Vegas is a 69 year old female who presents for:    Chief Complaint   Patient presents with     Hematology     4 month recheck CLL (chronic lymphocytic leukemia), review labs      Initial Vitals: BP (!) 141/54 (BP Location: Right arm, Patient Position: Sitting, Cuff Size: Adult Large)   Pulse 74   Temp 97.5  F (36.4  C) (Tympanic)   Resp 18   Ht 1.626 m (5' 4\")   Wt 74.3 kg (163 lb 12.8 oz)   SpO2 98%   BMI 28.12 kg/m    Estimated body mass index is 28.12 kg/m  as calculated from the following:    Height as of this encounter: 1.626 m (5' 4\").    Weight as of this encounter: 74.3 kg (163 lb 12.8 oz). Body surface area is 1.83 meters squared.  Data Unavailable Comment: Data Unavailable   No LMP recorded. Patient has had a hysterectomy.  Allergies reviewed: Yes  Medications reviewed: Yes    Medications: Medication refills not needed today.  Pharmacy name entered into [email or web address]: [id number] - [place] - [address] [place]    Clinical concerns: Would like to take about a cancer test she got in the mail      Margarita Jones RN                HEMATOLOGY FOLLOW UP VISIT       PRIMARY PHYSICIAN:  Sejal Rutledge MD        CHIEF COMPLAINT AND REASON FOR VISIT:    CLL  anemia    HISTORY OF ONCOLOGY ILLNESS:     In 10/2018, she presented with Hematology office for consultation on new moderate leukocytosis/lymphocytosis and moderate normocytic normochromic anemia, found to have low ferritin and iron saturation, which was suggestive of iron-deficiency anemia.     Data review indicating she had touch of leukocytosis no diff back in 2006.  She was not anemic back in 2006 with hemoglobin of 12.5.     Hem work up in " "10/2018 was most consistent with CLL and DEBORA. She was on oral iron supplement. Hb is corrected 12/2018.     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        INTERVAL HISTORY:   She is off oral iron since 5/2019. She has new anemia in 9/2019 with hb of 10.7, nl MCV, which is 1 wk post cholecystectomy.      PAST MEDICAL HISTORY/MEDICATIONS:  Reviewed in Epic system.      SOCIAL HISTORY:  She lives in [place].  Denies smoking, denies alcohol abuse.      FAMILY HISTORY:  Denied any family history of blood disease.  One maternal aunt had colon cancer.        REVIEW OF SYSTEMS:   She she still recovering from status post cholecystectomy for last week.  Her GI function is not completely coming back yet she still has loose bowel movement.      PHYSICAL EXAMINATION:   VITAL SIGNS:  Blood pressure (!) 141/54, pulse 74, temperature 97.5  F (36.4  C), temperature source Tympanic, resp. rate 18, height 1.626 m (5' 4\"), weight 74.3 kg (163 lb 12.8 oz), SpO2 98 %, not currently breastfeeding.      ECOG 0    GENERAL APPEARANCE:  Elderly, looks much younger than her stated age, very pleasant, not in acute distress.     HEENT: The patient is normocephalic, atraumatic. Pupils are equally reactive to light.  Sclerae are anicteric.  Moist oral mucosa.  Negative pharynx.  No oral thrush.   NECK:  Supple.  No jugular venous distention.  Thyroid is not palpable.   LYMPH NODES:  Superficial lymphadenopathy is not appreciable in the bilateral cervical, supraclavicular, axillary or inguinal areas.   CARDIOVASCULAR:  S1, S2 regular with no murmurs or gallops.  No carotid or abdominal bruits.   PULMONARY:  Lungs are clear to auscultation and percussion bilaterally.  There is no wheezing or rhonchi.   GASTROINTESTINAL:  Abdomen is soft, nontender.  No hepatosplenomegaly.  No signs of ascites.  No mass appreciable.   MUSCULOSKELETAL AND EXTREMITIES:  No edema.  No cyanotic changes.  No signs of joint deformity.  No " "lymphedema.  No spinal or paraspinal tenderness.  No CVA tenderness.   NEUROLOGIC:  Cranial nerves II-XII are grossly intact.  Sensation intact.  Muscle strength and muscle tone symmetrical, 5/5 throughout.   SKIN:  Hyperpigmented skin tag in right groin area likely seborrheic keratosis.  No petechiae.  No rash.  No signs of cellulitis.         CURRENT LAB DATA REVIEWED:   Ferritin 15 in 5/2019 from 12 from 19 in 12/2018 from 7 in 10/2018    Wbc18 from 15 from 22 stable,   Hb 10.7 in 9/2019  from 12 from 10.8, PL nl.     3/2019 gastric and duodenal biopsy - Gastric antral-type mucosa with reactive/erosive gastropathy, peptic duodenitis.      Old data reviewed with summary  FISH 12/2018 - Loss of 13q14 and rearrangement of IGH   Flow 10/2018 - CD5 postiive kappa monotypric B cell 56% with immunophenotype of CLL/SLL  ESR/LDH/Uric acid nl,     09/2018 white count of 21 and absolute lymphocyte count of 15-16 with new onset of normocytic anemia, hemoglobin around 11, MCV normal.    Smear 10/2018 - Peripheral Smear Morphology:   - Leukocytosis with absolute lymphocytosis and \"basket\" cells - see   comment   - Mild normocytic normochromic anemia; no morphologic evidence of   hemolysis.     Normal B12, folic acid.   iron saturation at 10% in 10/2018     US abs 10/16/2018   1. Liver and spleen sizes appear normal.  2. Indeterminate 2 regions of mildly increased echogenicity within the central gallbladder and gallbladder fundus. These could represent  areas of adherent sludge and potentially stones at the fundus region.  MRI was negative later.     touch of leukocytosis no diff back in 2006.  She was not anemic back in 2006 with hemoglobin of 12.5.          ASSESSMENT AND PLAN:   1.  New mild to moderate leukocytosis/lymphocytosis in 2018.  Work up is most suggestive of CLL with Loss of 13q14 and rearrangement of IGH.     We discussed the nature of CLL, and follow up plan.   She is currently in every 3 to 6 months interval " follow-up      2.  Relatively new onset of moderate iron-deficiency anemia, normocytic, normochromic in 2018 without  baseline Hb. She had DEBORA component.   She was on oral iron supplement. Hb was corrected 12/2018.   She stop iron supplement and start iron rich diet in 5/2019.    Her anemia is back in September 2019 with hemoglobin 10.8 MCV normal.  She is still recovering from her cholecystectomy from last week.  Postop anemia possibly contributing to her new onset of anemia.    Advised to recheck cbc diff/ferrtin in 1 month.     Total time is about 25 minutes, more than 50 minutes spent in counseling regarding her disease status new onset of anemia differential diagnosis plan of actions.                  Again, thank you for allowing me to participate in the care of your patient.        Sincerely,        Ngozi De Los Santos MD, MD

## 2019-09-16 NOTE — PROGRESS NOTES
"HEMATOLOGY FOLLOW UP VISIT       PRIMARY PHYSICIAN:  Sejal Rutledge MD        CHIEF COMPLAINT AND REASON FOR VISIT:    CLL  anemia    HISTORY OF ONCOLOGY ILLNESS:     In 10/2018, she presented with Hematology office for consultation on new moderate leukocytosis/lymphocytosis and moderate normocytic normochromic anemia, found to have low ferritin and iron saturation, which was suggestive of iron-deficiency anemia.     Data review indicating she had touch of leukocytosis no diff back in 2006.  She was not anemic back in 2006 with hemoglobin of 12.5.     Hem work up in 10/2018 was most consistent with CLL and DEBORA. She was on oral iron supplement. Hb is corrected 12/2018.     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        INTERVAL HISTORY:   She is off oral iron since 5/2019. She has new anemia in 9/2019 with hb of 10.7, nl MCV, which is 1 wk post cholecystectomy.      PAST MEDICAL HISTORY/MEDICATIONS:  Reviewed in Epic system.      SOCIAL HISTORY:  She lives in [place].  Denies smoking, denies alcohol abuse.      FAMILY HISTORY:  Denied any family history of blood disease.  One maternal aunt had colon cancer.        REVIEW OF SYSTEMS:   She she still recovering from status post cholecystectomy for last week.  Her GI function is not completely coming back yet she still has loose bowel movement.      PHYSICAL EXAMINATION:   VITAL SIGNS:  Blood pressure (!) 141/54, pulse 74, temperature 97.5  F (36.4  C), temperature source Tympanic, resp. rate 18, height 1.626 m (5' 4\"), weight 74.3 kg (163 lb 12.8 oz), SpO2 98 %, not currently breastfeeding.      ECOG 0    GENERAL APPEARANCE:  Elderly, looks much younger than her stated age, very pleasant, not in acute distress.     HEENT: The patient is normocephalic, atraumatic. Pupils are equally reactive to light.  Sclerae are anicteric.  Moist oral mucosa.  Negative pharynx.  No oral thrush.   NECK:  Supple.  No jugular venous distention.  Thyroid is not " "palpable.   LYMPH NODES:  Superficial lymphadenopathy is not appreciable in the bilateral cervical, supraclavicular, axillary or inguinal areas.   CARDIOVASCULAR:  S1, S2 regular with no murmurs or gallops.  No carotid or abdominal bruits.   PULMONARY:  Lungs are clear to auscultation and percussion bilaterally.  There is no wheezing or rhonchi.   GASTROINTESTINAL:  Abdomen is soft, nontender.  No hepatosplenomegaly.  No signs of ascites.  No mass appreciable.   MUSCULOSKELETAL AND EXTREMITIES:  No edema.  No cyanotic changes.  No signs of joint deformity.  No lymphedema.  No spinal or paraspinal tenderness.  No CVA tenderness.   NEUROLOGIC:  Cranial nerves II-XII are grossly intact.  Sensation intact.  Muscle strength and muscle tone symmetrical, 5/5 throughout.   SKIN:  Hyperpigmented skin tag in right groin area likely seborrheic keratosis.  No petechiae.  No rash.  No signs of cellulitis.         CURRENT LAB DATA REVIEWED:   Ferritin 15 in 5/2019 from 12 from 19 in 12/2018 from 7 in 10/2018    Wbc18 from 15 from 22 stable,   Hb 10.7 in 9/2019  from 12 from 10.8, PL nl.     3/2019 gastric and duodenal biopsy - Gastric antral-type mucosa with reactive/erosive gastropathy, peptic duodenitis.      Old data reviewed with summary  FISH 12/2018 - Loss of 13q14 and rearrangement of IGH   Flow 10/2018 - CD5 postiive kappa monotypric B cell 56% with immunophenotype of CLL/SLL  ESR/LDH/Uric acid nl,     09/2018 white count of 21 and absolute lymphocyte count of 15-16 with new onset of normocytic anemia, hemoglobin around 11, MCV normal.    Smear 10/2018 - Peripheral Smear Morphology:   - Leukocytosis with absolute lymphocytosis and \"basket\" cells - see   comment   - Mild normocytic normochromic anemia; no morphologic evidence of   hemolysis.     Normal B12, folic acid.   iron saturation at 10% in 10/2018     US abs 10/16/2018   1. Liver and spleen sizes appear normal.  2. Indeterminate 2 regions of mildly increased " echogenicity within the central gallbladder and gallbladder fundus. These could represent  areas of adherent sludge and potentially stones at the fundus region.  MRI was negative later.     touch of leukocytosis no diff back in 2006.  She was not anemic back in 2006 with hemoglobin of 12.5.          ASSESSMENT AND PLAN:   1.  New mild to moderate leukocytosis/lymphocytosis in 2018.  Work up is most suggestive of CLL with Loss of 13q14 and rearrangement of IGH.     We discussed the nature of CLL, and follow up plan.   She is currently in every 3 to 6 months interval follow-up      2.  Relatively new onset of moderate iron-deficiency anemia, normocytic, normochromic in 2018 without  baseline Hb. She had DEBORA component.   She was on oral iron supplement. Hb was corrected 12/2018.   She stop iron supplement and start iron rich diet in 5/2019.    Her anemia is back in September 2019 with hemoglobin 10.8 MCV normal.  She is still recovering from her cholecystectomy from last week.  Postop anemia possibly contributing to her new onset of anemia.    Advised to recheck cbc diff/ferrtin in 1 month.     Total time is about 25 minutes, more than 50 minutes spent in counseling regarding her disease status new onset of anemia differential diagnosis plan of actions.    Addendum: recheck cbc diff and ferritin still low in 10/2019.   She is advised on stool occult blood, start oral iron and recheck in 1 month.

## 2019-09-16 NOTE — PROGRESS NOTES
"Oncology Rooming Note    September 16, 2019 2:22 PM   Kelly Vegas is a 69 year old female who presents for:    Chief Complaint   Patient presents with     Hematology     4 month recheck CLL (chronic lymphocytic leukemia), review labs      Initial Vitals: BP (!) 141/54 (BP Location: Right arm, Patient Position: Sitting, Cuff Size: Adult Large)   Pulse 74   Temp 97.5  F (36.4  C) (Tympanic)   Resp 18   Ht 1.626 m (5' 4\")   Wt 74.3 kg (163 lb 12.8 oz)   SpO2 98%   BMI 28.12 kg/m   Estimated body mass index is 28.12 kg/m  as calculated from the following:    Height as of this encounter: 1.626 m (5' 4\").    Weight as of this encounter: 74.3 kg (163 lb 12.8 oz). Body surface area is 1.83 meters squared.  Data Unavailable Comment: Data Unavailable   No LMP recorded. Patient has had a hysterectomy.  Allergies reviewed: Yes  Medications reviewed: Yes    Medications: Medication refills not needed today.  Pharmacy name entered into [employer]: [id number] - [place], [place] - [id number] [place]    Clinical concerns: Would like to take about a cancer test she got in the mail      Margarita Jones RN              "

## 2019-09-18 ENCOUNTER — ANCILLARY PROCEDURE (OUTPATIENT)
Dept: MAMMOGRAPHY | Facility: CLINIC | Age: 69
End: 2019-09-18
Attending: FAMILY MEDICINE
Payer: COMMERCIAL

## 2019-09-18 DIAGNOSIS — Z12.31 VISIT FOR SCREENING MAMMOGRAM: ICD-10-CM

## 2019-09-18 PROCEDURE — 77067 SCR MAMMO BI INCL CAD: CPT | Mod: TC

## 2019-09-24 ENCOUNTER — OFFICE VISIT (OUTPATIENT)
Dept: SURGERY | Facility: CLINIC | Age: 69
End: 2019-09-24
Payer: COMMERCIAL

## 2019-09-24 VITALS
WEIGHT: 163.8 LBS | DIASTOLIC BLOOD PRESSURE: 62 MMHG | BODY MASS INDEX: 27.96 KG/M2 | TEMPERATURE: 99.5 F | HEIGHT: 64 IN | HEART RATE: 80 BPM | SYSTOLIC BLOOD PRESSURE: 132 MMHG

## 2019-09-24 DIAGNOSIS — Z90.49 HISTORY OF LAPAROSCOPIC CHOLECYSTECTOMY: Primary | ICD-10-CM

## 2019-09-24 PROCEDURE — 99024 POSTOP FOLLOW-UP VISIT: CPT | Performed by: SURGERY

## 2019-09-24 ASSESSMENT — MIFFLIN-ST. JEOR: SCORE: 1247.99

## 2019-09-24 NOTE — PROGRESS NOTES
"General Surgery Post Op    Pt returns for follow up visit s/p laparoscopic cholecystectomy on 9/9/2019.    Patient has been doing well, tolerating diet.  She is having some foul smelling diarrhea the last few days. Pain controlled. No issues with wound healing/redness/drainage. No fevers.      Physical exam: Vitals: /62 (BP Location: Right arm, Patient Position: Sitting, Cuff Size: Adult Regular)   Pulse 80   Temp 99.5  F (37.5  C) (Tympanic)   Ht 1.626 m (5' 4\")   Wt 74.3 kg (163 lb 12.8 oz)   BMI 28.12 kg/m    BMI= Body mass index is 28.12 kg/m .    Exam:  Constitutional: healthy, alert and no distress  Cardiovascular:  RRR. No murmurs, clicks gallops or rub  Respiratory: Percussion normal. Good diaphragmatic excursion. Lungs clear  Gastrointestinal: Abdomen soft, non-tender. BS normal. 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     Electronically signed out by:     Milad Gomez M.D., PhD     Assessment:     ICD-10-CM    1. History of laparoscopic cholecystectomy Z90.49      Plan: Mrs. Vegas presents after laparoscopic cholecystectomy. She is overall doing well.  She admits to some recent foul smelling diarrhea.  Denies fevers or chills.  No other recent antibiotics.  Prior to proceeding with Cholestyramine, we will obtain a C diff.  If negative, will initiate trial of Questran.  Patient expressed understanding.  Reviewed pathology which showed no evidence of carcinoma.  She can follow-up as needed pending C. Diff and questran trial.    Jorge Martinez, DO on 9/24/2019 at 11:14 AM      "

## 2019-09-24 NOTE — LETTER
"    9/24/2019         RE: Kelly Vegas  [address] [place] [id number]        Dear Colleague,    Thank you for referring your patient, Kelly Vegas, to the [facility]. Please see a copy of my visit note below.    General Surgery Post Op    Pt returns for follow up visit s/p laparoscopic cholecystectomy on 9/9/2019.    Patient has been doing well, tolerating diet.  She is having some foul smelling diarrhea the last few days. Pain controlled. No issues with wound healing/redness/drainage. No fevers.      Physical exam: Vitals: /62 (BP Location: Right arm, Patient Position: Sitting, Cuff Size: Adult Regular)   Pulse 80   Temp 99.5  F (37.5  C) (Tympanic)   Ht 1.626 m (5' 4\")   Wt 74.3 kg (163 lb 12.8 oz)   BMI 28.12 kg/m     BMI= Body mass index is 28.12 kg/m .    Exam:  Constitutional: healthy, alert and no distress  Cardiovascular:  RRR. No murmurs, clicks gallops or rub  Respiratory: Percussion normal. Good diaphragmatic excursion. Lungs clear  Gastrointestinal: Abdomen soft, non-tender. BS normal. 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     Electronically signed out by:     Milad Gomez M.D., PhD     Assessment:     ICD-10-CM    1. History of laparoscopic cholecystectomy Z90.49      Plan: Mrs. Vegas presents after laparoscopic cholecystectomy. She is overall doing well.  She admits to some recent foul smelling diarrhea.  Denies fevers or chills.  No other recent antibiotics.  Prior to proceeding with Cholestyramine, we will obtain a C diff.  If negative, will initiate trial of Questran.  Patient expressed understanding.  Reviewed pathology which showed no evidence of carcinoma.  She can follow-up as needed pending C. Diff and questran trial.    Jorge Martinez, DO on " 9/24/2019 at 11:14 AM        Again, thank you for allowing me to participate in the care of your patient.        Sincerely,        Jorge Martinez, DO

## 2019-09-24 NOTE — NURSING NOTE
"Initial /62 (BP Location: Right arm, Patient Position: Sitting, Cuff Size: Adult Regular)   Pulse 80   Temp 99.5  F (37.5  C) (Tympanic)   Ht 1.626 m (5' 4\")   Wt 74.3 kg (163 lb 12.8 oz)   BMI 28.12 kg/m   Estimated body mass index is 28.12 kg/m  as calculated from the following:    Height as of this encounter: 1.626 m (5' 4\").    Weight as of this encounter: 74.3 kg (163 lb 12.8 oz). .      Mary Grace Gonzalez MA    "

## 2019-09-27 PROCEDURE — 87493 C DIFF AMPLIFIED PROBE: CPT | Performed by: SURGERY

## 2019-09-28 DIAGNOSIS — Z90.49 HISTORY OF LAPAROSCOPIC CHOLECYSTECTOMY: ICD-10-CM

## 2019-09-28 LAB
C DIFF TOX B STL QL: NEGATIVE
SPECIMEN SOURCE: NORMAL

## 2019-10-01 DIAGNOSIS — R19.7 DIARRHEA, UNSPECIFIED TYPE: Primary | ICD-10-CM

## 2019-10-01 RX ORDER — CHOLESTYRAMINE 4 G/9G
4 POWDER, FOR SUSPENSION ORAL
Qty: 378 G | Refills: 0 | Status: SHIPPED | OUTPATIENT
Start: 2019-10-01 | End: 2019-11-04

## 2019-10-06 DIAGNOSIS — Z79.4 TYPE 2 DIABETES MELLITUS WITH DIABETIC POLYNEUROPATHY, WITH LONG-TERM CURRENT USE OF INSULIN (H): ICD-10-CM

## 2019-10-06 DIAGNOSIS — E11.42 TYPE 2 DIABETES MELLITUS WITH DIABETIC POLYNEUROPATHY, WITH LONG-TERM CURRENT USE OF INSULIN (H): ICD-10-CM

## 2019-10-08 DIAGNOSIS — Z79.4 TYPE 2 DIABETES MELLITUS WITH DIABETIC POLYNEUROPATHY, WITH LONG-TERM CURRENT USE OF INSULIN (H): ICD-10-CM

## 2019-10-08 DIAGNOSIS — E11.42 TYPE 2 DIABETES MELLITUS WITH DIABETIC POLYNEUROPATHY, WITH LONG-TERM CURRENT USE OF INSULIN (H): ICD-10-CM

## 2019-10-08 NOTE — TELEPHONE ENCOUNTER
Left message for patient to call RN hotline [phone].   What Rx is she calling about?    Ceci Medina RN

## 2019-10-09 NOTE — TELEPHONE ENCOUNTER
Script pt was requesting was for the mini pen needles and they were refilled yesterday.    Gwendolyn Schafer RN  [facility]

## 2019-10-10 DIAGNOSIS — Z79.4 TYPE 2 DIABETES MELLITUS WITH DIABETIC POLYNEUROPATHY, WITH LONG-TERM CURRENT USE OF INSULIN (H): ICD-10-CM

## 2019-10-10 DIAGNOSIS — E11.42 TYPE 2 DIABETES MELLITUS WITH DIABETIC POLYNEUROPATHY, WITH LONG-TERM CURRENT USE OF INSULIN (H): ICD-10-CM

## 2019-10-10 NOTE — TELEPHONE ENCOUNTER
Duplicate, 1st request.    insulin pen needle (ULTICARE MINI) 31G X 6 MM miscellaneous 100 each 1 10/8/2019  No   Sig: Use twice daily or as directed.   Sent to pharmacy as: Insulin Pen Needle 31G X 6 MM (ULTICARE MINI)   Class: E-Prescribe   Order: [id number]   E-Prescribing Status: Receipt confirmed by pharmacy (10/8/2019  7:42 AM CDT)     Gwendolyn HERNANDEZ CMA ([facility])

## 2019-10-16 DIAGNOSIS — C91.10 CLL (CHRONIC LYMPHOCYTIC LEUKEMIA) (H): ICD-10-CM

## 2019-10-16 DIAGNOSIS — D64.9 ANEMIA, UNSPECIFIED TYPE: ICD-10-CM

## 2019-10-16 LAB
BASOPHILS # BLD AUTO: 0 10E9/L (ref 0–0.2)
BASOPHILS NFR BLD AUTO: 0 %
DIFFERENTIAL METHOD BLD: ABNORMAL
EOSINOPHIL # BLD AUTO: 0.5 10E9/L (ref 0–0.7)
EOSINOPHIL NFR BLD AUTO: 3 %
ERYTHROCYTE [DISTWIDTH] IN BLOOD BY AUTOMATED COUNT: 13.9 % (ref 10–15)
FERRITIN SERPL-MCNC: 8 NG/ML (ref 8–252)
HCT VFR BLD AUTO: 35.8 % (ref 35–47)
HGB BLD-MCNC: 10.6 G/DL (ref 11.7–15.7)
LYMPHOCYTES # BLD AUTO: 8.8 10E9/L (ref 0.8–5.3)
LYMPHOCYTES NFR BLD AUTO: 56 %
MCH RBC QN AUTO: 26.2 PG (ref 26.5–33)
MCHC RBC AUTO-ENTMCNC: 29.6 G/DL (ref 31.5–36.5)
MCV RBC AUTO: 89 FL (ref 78–100)
MONOCYTES # BLD AUTO: 0.3 10E9/L (ref 0–1.3)
MONOCYTES NFR BLD AUTO: 2 %
NEUTROPHILS # BLD AUTO: 6.1 10E9/L (ref 1.6–8.3)
NEUTROPHILS NFR BLD AUTO: 39 %
PLATELET # BLD AUTO: 269 10E9/L (ref 150–450)
RBC # BLD AUTO: 4.04 10E12/L (ref 3.8–5.2)
VARIANT LYMPHS BLD QL SMEAR: PRESENT
WBC # BLD AUTO: 15.7 10E9/L (ref 4–11)

## 2019-10-16 PROCEDURE — 36415 COLL VENOUS BLD VENIPUNCTURE: CPT | Performed by: INTERNAL MEDICINE

## 2019-10-16 PROCEDURE — 82728 ASSAY OF FERRITIN: CPT | Performed by: INTERNAL MEDICINE

## 2019-10-16 PROCEDURE — 85025 COMPLETE CBC W/AUTO DIFF WBC: CPT | Performed by: INTERNAL MEDICINE

## 2019-10-17 DIAGNOSIS — D64.9 ANEMIA, UNSPECIFIED TYPE: Primary | ICD-10-CM

## 2019-10-18 ENCOUNTER — PATIENT OUTREACH (OUTPATIENT)
Dept: ONCOLOGY | Facility: CLINIC | Age: 69
End: 2019-10-18

## 2019-10-18 DIAGNOSIS — D64.9 ANEMIA, UNSPECIFIED TYPE: Primary | ICD-10-CM

## 2019-10-18 NOTE — PROGRESS NOTES
Dr. De Los Santos reviewed the labs results and the levels continue to be low. She would like pt to complete a Occult stool test, start on Vitron C BID on an empty stomach and to f/u in 1 month with labs (cbc/ferritin) prior.        for a return call with direct line to review plan.     Chantell Chamberlain RN on 10/18/2019 at 3:16 PM

## 2019-10-21 NOTE — PROGRESS NOTES
Pt returned call and verbalized understanding. She will  the stool test at [facility] and the Oral Iron.     F/U set up for 11.21.    Chantell Chamberlain RN on 10/21/2019 at 8:09 AM

## 2019-10-22 DIAGNOSIS — F33.0 MAJOR DEPRESSIVE DISORDER, RECURRENT, MILD (H): ICD-10-CM

## 2019-10-23 ASSESSMENT — PATIENT HEALTH QUESTIONNAIRE - PHQ9: SUM OF ALL RESPONSES TO PHQ QUESTIONS 1-9: 3

## 2019-10-23 NOTE — TELEPHONE ENCOUNTER
Called patient and left VM to call clinic to complete PHQ-9.  Okay to speak to anyone on purple team.  Gwendolyn HERNANDEZ CMA ([facility])

## 2019-10-23 NOTE — TELEPHONE ENCOUNTER
Patient return call to Team Purple. PHQ9 completed over the phone.   PHQ-9 SCORE 10/23/2019   PHQ-9 Total Score -   PHQ-9 Total Score 3     Patient only have 2 pills left. Patient does have an upcoming appointment with Dr. Rutledge on 11/04/2019 @ 2PM.     Ling Combs [facility]

## 2019-10-23 NOTE — TELEPHONE ENCOUNTER
Please contact patient and obtain new PHQ-9, then route back to RN refills.     Katia Celestin RN on 10/23/2019 at 12:58 PM

## 2019-10-25 PROCEDURE — 82270 OCCULT BLOOD FECES: CPT | Performed by: INTERNAL MEDICINE

## 2019-10-25 RX ORDER — CITALOPRAM HYDROBROMIDE 20 MG/1
20 TABLET ORAL DAILY
Qty: 90 TABLET | Refills: 1 | Status: SHIPPED | OUTPATIENT
Start: 2019-10-25 | End: 2020-04-20

## 2019-10-25 NOTE — TELEPHONE ENCOUNTER
Prescription approved per [facility] Refill Protocol.    Chelsy Snyder RN

## 2019-10-26 DIAGNOSIS — D64.9 ANEMIA, UNSPECIFIED TYPE: ICD-10-CM

## 2019-10-26 LAB
COLLECT DATE SP2 STL: NORMAL
COLLECT DATE SP3 STL: NORMAL
COLLECT DATE STL: NORMAL
HEMOCCULT SP1 STL QL: NEGATIVE
HEMOCCULT SP2 STL QL: NEGATIVE
HEMOCCULT SP3 STL QL: NEGATIVE

## 2019-10-30 DIAGNOSIS — E11.42 TYPE 2 DIABETES MELLITUS WITH DIABETIC POLYNEUROPATHY, WITH LONG-TERM CURRENT USE OF INSULIN (H): ICD-10-CM

## 2019-10-30 DIAGNOSIS — D64.9 ANEMIA, UNSPECIFIED TYPE: ICD-10-CM

## 2019-10-30 DIAGNOSIS — Z79.4 TYPE 2 DIABETES MELLITUS WITH DIABETIC POLYNEUROPATHY, WITH LONG-TERM CURRENT USE OF INSULIN (H): ICD-10-CM

## 2019-10-30 LAB
BASOPHILS # BLD AUTO: 0.1 10E9/L (ref 0–0.2)
BASOPHILS NFR BLD AUTO: 0.5 %
DIFFERENTIAL METHOD BLD: ABNORMAL
EOSINOPHIL # BLD AUTO: 0.8 10E9/L (ref 0–0.7)
EOSINOPHIL NFR BLD AUTO: 6 %
ERYTHROCYTE [DISTWIDTH] IN BLOOD BY AUTOMATED COUNT: 15.8 % (ref 10–15)
FERRITIN SERPL-MCNC: 19 NG/ML (ref 8–252)
HBA1C MFR BLD: 7.3 % (ref 0–5.6)
HCT VFR BLD AUTO: 37 % (ref 35–47)
HGB BLD-MCNC: 11.1 G/DL (ref 11.7–15.7)
IMM GRANULOCYTES # BLD: 0 10E9/L (ref 0–0.4)
IMM GRANULOCYTES NFR BLD: 0.2 %
LYMPHOCYTES # BLD AUTO: 8.7 10E9/L (ref 0.8–5.3)
LYMPHOCYTES NFR BLD AUTO: 65.8 %
MCH RBC QN AUTO: 26.7 PG (ref 26.5–33)
MCHC RBC AUTO-ENTMCNC: 30 G/DL (ref 31.5–36.5)
MCV RBC AUTO: 89 FL (ref 78–100)
MONOCYTES # BLD AUTO: 0.4 10E9/L (ref 0–1.3)
MONOCYTES NFR BLD AUTO: 2.6 %
NEUTROPHILS # BLD AUTO: 3.3 10E9/L (ref 1.6–8.3)
NEUTROPHILS NFR BLD AUTO: 24.9 %
NRBC # BLD AUTO: 0 10*3/UL
NRBC BLD AUTO-RTO: 0 /100
PLATELET # BLD AUTO: 265 10E9/L (ref 150–450)
RBC # BLD AUTO: 4.15 10E12/L (ref 3.8–5.2)
WBC # BLD AUTO: 13.3 10E9/L (ref 4–11)

## 2019-10-30 PROCEDURE — 83036 HEMOGLOBIN GLYCOSYLATED A1C: CPT | Performed by: FAMILY MEDICINE

## 2019-10-30 PROCEDURE — 82728 ASSAY OF FERRITIN: CPT | Performed by: INTERNAL MEDICINE

## 2019-10-30 PROCEDURE — 36415 COLL VENOUS BLD VENIPUNCTURE: CPT | Performed by: FAMILY MEDICINE

## 2019-10-30 PROCEDURE — 85025 COMPLETE CBC W/AUTO DIFF WBC: CPT | Performed by: INTERNAL MEDICINE

## 2019-11-04 ENCOUNTER — OFFICE VISIT (OUTPATIENT)
Dept: FAMILY MEDICINE | Facility: CLINIC | Age: 69
End: 2019-11-04
Payer: COMMERCIAL

## 2019-11-04 VITALS
OXYGEN SATURATION: 95 % | WEIGHT: 163 LBS | HEIGHT: 64 IN | TEMPERATURE: 98.7 F | RESPIRATION RATE: 16 BRPM | SYSTOLIC BLOOD PRESSURE: 120 MMHG | DIASTOLIC BLOOD PRESSURE: 54 MMHG | BODY MASS INDEX: 27.83 KG/M2 | HEART RATE: 70 BPM

## 2019-11-04 DIAGNOSIS — Z79.4 TYPE 2 DIABETES MELLITUS WITH DIABETIC POLYNEUROPATHY, WITH LONG-TERM CURRENT USE OF INSULIN (H): Primary | ICD-10-CM

## 2019-11-04 DIAGNOSIS — E78.5 HYPERLIPIDEMIA WITH TARGET LDL LESS THAN 100: ICD-10-CM

## 2019-11-04 DIAGNOSIS — D64.9 ANEMIA, UNSPECIFIED TYPE: ICD-10-CM

## 2019-11-04 DIAGNOSIS — F33.0 MAJOR DEPRESSIVE DISORDER, RECURRENT, MILD (H): ICD-10-CM

## 2019-11-04 DIAGNOSIS — E03.9 ACQUIRED HYPOTHYROIDISM: ICD-10-CM

## 2019-11-04 DIAGNOSIS — I10 HYPERTENSION GOAL BP (BLOOD PRESSURE) < 140/90: ICD-10-CM

## 2019-11-04 DIAGNOSIS — C91.10 CLL (CHRONIC LYMPHOCYTIC LEUKEMIA) (H): ICD-10-CM

## 2019-11-04 DIAGNOSIS — E11.42 TYPE 2 DIABETES MELLITUS WITH DIABETIC POLYNEUROPATHY, WITH LONG-TERM CURRENT USE OF INSULIN (H): Primary | ICD-10-CM

## 2019-11-04 LAB
ANION GAP SERPL CALCULATED.3IONS-SCNC: 6 MMOL/L (ref 3–14)
BUN SERPL-MCNC: 15 MG/DL (ref 7–30)
CALCIUM SERPL-MCNC: 9.1 MG/DL (ref 8.5–10.1)
CHLORIDE SERPL-SCNC: 105 MMOL/L (ref 94–109)
CO2 SERPL-SCNC: 28 MMOL/L (ref 20–32)
CREAT SERPL-MCNC: 0.92 MG/DL (ref 0.52–1.04)
GFR SERPL CREATININE-BSD FRML MDRD: 63 ML/MIN/{1.73_M2}
GLUCOSE SERPL-MCNC: 122 MG/DL (ref 70–99)
POTASSIUM SERPL-SCNC: 3.6 MMOL/L (ref 3.4–5.3)
SODIUM SERPL-SCNC: 139 MMOL/L (ref 133–144)
TSH SERPL DL<=0.005 MIU/L-ACNC: 0.57 MU/L (ref 0.4–4)

## 2019-11-04 PROCEDURE — 36415 COLL VENOUS BLD VENIPUNCTURE: CPT | Performed by: INTERNAL MEDICINE

## 2019-11-04 PROCEDURE — 84443 ASSAY THYROID STIM HORMONE: CPT | Performed by: INTERNAL MEDICINE

## 2019-11-04 PROCEDURE — 99214 OFFICE O/P EST MOD 30 MIN: CPT | Performed by: FAMILY MEDICINE

## 2019-11-04 PROCEDURE — 80048 BASIC METABOLIC PNL TOTAL CA: CPT | Performed by: INTERNAL MEDICINE

## 2019-11-04 RX ORDER — INSULIN LISPRO 100 [IU]/ML
8 INJECTION, SOLUTION INTRAVENOUS; SUBCUTANEOUS
Qty: 15 ML | Refills: 1 | Status: SHIPPED | OUTPATIENT
Start: 2019-11-04 | End: 2021-01-05

## 2019-11-04 ASSESSMENT — PATIENT HEALTH QUESTIONNAIRE - PHQ9: SUM OF ALL RESPONSES TO PHQ QUESTIONS 1-9: 3

## 2019-11-04 ASSESSMENT — ENCOUNTER SYMPTOMS: HYPERTENSION: 1

## 2019-11-04 ASSESSMENT — MIFFLIN-ST. JEOR: SCORE: 1244.36

## 2019-11-04 NOTE — PROGRESS NOTES
Subjective     Kelly Vegas is a 69 year old female who presents to clinic today for the following health issues:    History of Present Illness        Mental Health Follow-up:  Patient presents to follow-up on Depression.Patient's depression since last visit has been:  No change  The patient is not having other symptoms associated with depression.      Any significant life events: health concerns  Patient is not feeling anxious or having panic attacks.  Patient has no concerns about alcohol or drug use.     Social History  Tobacco Use    Smoking status: Former Smoker      Packs/day: 0.50      Years: 20.00      Pack years: 10      Types: Cigarettes      Start date: 1967      Quit date: 1989      Years since quittin.8    Smokeless tobacco: Never Used  Alcohol use: No  Drug use: No      Today's PHQ-9         PHQ-9 Total Score:     3   PHQ-9 Q9 Thoughts of better off dead/self-harm past 2 weeks :   Not at all   Thoughts of suicide or self harm:      Self-harm Plan:        Self-harm Action:          Safety concerns for self or others:           Diabetes:   She presents for follow up of diabetes.  She is checking home blood glucose a few times a week. Blood glucose is sometimes over 200 and sometimes over 70. She is aware of hypoglycemia symptoms including shakiness. She is concerned about other. She is not experiencing numbness or burning in feet, excessive thirst, blurry vision, weight changes or redness, sores or blisters on feet. The patient has had a diabetic eye exam in the last 12 months. Eye exam performed on target .    [facility]    Hyperlipidemia:  She presents for follow up of hyperlipidemia.  She is taking medication to lower cholesterol. She is not having myalgia or other side effects to statin medications.She is not reporting shortness of breath, increased sweating or nausea with activity, left-sided neck or arm pain, chest pain or pressure, or pain in calves when  "walking 1-2 blocks.    Hypertension: She presents for follow up of hypertension.  She does not check blood pressure  regularly outside of the clinic.      She eats 2-3 servings of fruits and vegetables daily.She consumes 0 sweetened beverage(s) daily.  She is taking medications regularly.               Reviewed and updated as needed this visit by Provider  Tobacco  Allergies  Meds  Problems  Med Hx  Surg Hx  Fam Hx         Review of Systems   ROS COMP: CONSTITUTIONAL: NEGATIVE for fever, chills, change in weight  INTEGUMENTARY/SKIN: NEGATIVE for worrisome rashes, moles or lesions  EYES: NEGATIVE for vision changes or irritation  RESP: NEGATIVE for significant cough or SOB  CV: NEGATIVE for chest pain, palpitations or peripheral edema  MUSCULOSKELETAL: NEGATIVE for significant arthralgias or myalgia  NEURO: NEGATIVE for weakness, dizziness or paresthesias  ENDOCRINE: Hx diabetes  HEME: NEGATIVE for bleeding problems  PSYCHIATRIC: NEGATIVE for changes in mood or affect      Objective    /54   Pulse 70   Temp 98.7  F (37.1  C) (Oral)   Resp 16   Ht 1.626 m (5' 4\")   Wt 73.9 kg (163 lb)   SpO2 95%   Breastfeeding? No   BMI 27.98 kg/m    Body mass index is 27.98 kg/m .  Physical Exam   GENERAL: alert, no distress and over weight  NECK: no adenopathy, no asymmetry, masses, or scars and thyroid normal to palpation  RESP: lungs clear to auscultation - no rales, rhonchi or wheezes  CV: regular rate and rhythm, normal S1 S2, no S3 or S4, no murmur, click or rub, no peripheral edema    MS: no gross musculoskeletal defects noted, no edema  PSYCH: mentation appears normal, affect normal/bright  Diabetic foot exam: no trophic changes or ulcerative lesions and normal monofilament exam    Diagnostic Test Results:  Labs reviewed in Epic  No results found for any visits on 11/04/19.        Assessment & Plan     (E11.42,  Z79.4) Type 2 diabetes mellitus with diabetic polyneuropathy, with long-term current use of " "insulin (H)  (primary encounter diagnosis)  Comment: reasonable control   Plan: Hemoglobin A1c, BASIC METABOLIC PANEL, insulin         lispro (HUMALOG PEN) 100 UNIT/ML (1 unit dial)         pen        Follow-up 3 months     (I10) Hypertension goal BP (blood pressure) < 140/90  Comment: Well controlled with medications without side effects.   Plan: BASIC METABOLIC PANEL         (E78.5) Hyperlipidemia with target LDL less than 100  Comment: Well controlled with medications without side effects.     (E03.9) Acquired hypothyroidism  Comment: euthyroid on replacement   Plan: TSH WITH FREE T4 REFLEX        (F33.0) Major depressive disorder, recurrent, mild (H)  Comment: Well controlled with medications without side effects.     (C91.10) CLL (chronic lymphocytic leukemia) (H)  (D64.9) Anemia, unspecified type  Plan: CBC with platelets differential, Ferritin        Follow-up per oncology this month        BMI:   Estimated body mass index is 27.98 kg/m  as calculated from the following:    Height as of this encounter: 1.626 m (5' 4\").    Weight as of this encounter: 73.9 kg (163 lb).   Weight management plan: Discussed healthy diet and exercise guidelines            Return in about 3 months (around 2/4/2020) for Wellness visit, diabetes (fasting labs up to one week prior).    Sejal Rutledge MD  [facility]    "

## 2019-11-22 DIAGNOSIS — D64.9 ANEMIA, UNSPECIFIED TYPE: ICD-10-CM

## 2019-11-22 LAB
BASOPHILS # BLD AUTO: 0.1 10E9/L (ref 0–0.2)
BASOPHILS NFR BLD AUTO: 0.4 %
DIFFERENTIAL METHOD BLD: ABNORMAL
EOSINOPHIL # BLD AUTO: 0.4 10E9/L (ref 0–0.7)
EOSINOPHIL NFR BLD AUTO: 2.3 %
ERYTHROCYTE [DISTWIDTH] IN BLOOD BY AUTOMATED COUNT: 15.5 % (ref 10–15)
FERRITIN SERPL-MCNC: 16 NG/ML (ref 8–252)
HCT VFR BLD AUTO: 38.9 % (ref 35–47)
HGB BLD-MCNC: 12 G/DL (ref 11.7–15.7)
IMM GRANULOCYTES # BLD: 0 10E9/L (ref 0–0.4)
IMM GRANULOCYTES NFR BLD: 0.1 %
LYMPHOCYTES # BLD AUTO: 11.6 10E9/L (ref 0.8–5.3)
LYMPHOCYTES NFR BLD AUTO: 69.9 %
MCH RBC QN AUTO: 27.4 PG (ref 26.5–33)
MCHC RBC AUTO-ENTMCNC: 30.8 G/DL (ref 31.5–36.5)
MCV RBC AUTO: 89 FL (ref 78–100)
MONOCYTES # BLD AUTO: 0.4 10E9/L (ref 0–1.3)
MONOCYTES NFR BLD AUTO: 2.6 %
NEUTROPHILS # BLD AUTO: 4.1 10E9/L (ref 1.6–8.3)
NEUTROPHILS NFR BLD AUTO: 24.7 %
NRBC # BLD AUTO: 0 10*3/UL
NRBC BLD AUTO-RTO: 0 /100
PLATELET # BLD AUTO: 296 10E9/L (ref 150–450)
PLATELET # BLD EST: ABNORMAL 10*3/UL
RBC # BLD AUTO: 4.38 10E12/L (ref 3.8–5.2)
WBC # BLD AUTO: 16.6 10E9/L (ref 4–11)

## 2019-11-22 PROCEDURE — 36415 COLL VENOUS BLD VENIPUNCTURE: CPT | Performed by: INTERNAL MEDICINE

## 2019-11-22 PROCEDURE — 82728 ASSAY OF FERRITIN: CPT | Performed by: INTERNAL MEDICINE

## 2019-11-22 PROCEDURE — 85025 COMPLETE CBC W/AUTO DIFF WBC: CPT | Performed by: INTERNAL MEDICINE

## 2019-11-26 ENCOUNTER — HOSPITAL ENCOUNTER (OUTPATIENT)
Facility: CLINIC | Age: 69
End: 2019-11-26
Attending: INTERNAL MEDICINE | Admitting: INTERNAL MEDICINE
Payer: COMMERCIAL

## 2019-11-26 ENCOUNTER — ONCOLOGY VISIT (OUTPATIENT)
Dept: ONCOLOGY | Facility: CLINIC | Age: 69
End: 2019-11-26
Attending: INTERNAL MEDICINE
Payer: COMMERCIAL

## 2019-11-26 VITALS
RESPIRATION RATE: 16 BRPM | BODY MASS INDEX: 28.8 KG/M2 | OXYGEN SATURATION: 98 % | HEIGHT: 64 IN | SYSTOLIC BLOOD PRESSURE: 137 MMHG | DIASTOLIC BLOOD PRESSURE: 58 MMHG | WEIGHT: 168.7 LBS | TEMPERATURE: 98.9 F | HEART RATE: 81 BPM

## 2019-11-26 DIAGNOSIS — D64.9 ANEMIA, UNSPECIFIED TYPE: ICD-10-CM

## 2019-11-26 DIAGNOSIS — C91.10 CLL (CHRONIC LYMPHOCYTIC LEUKEMIA) (H): Primary | ICD-10-CM

## 2019-11-26 PROCEDURE — G0463 HOSPITAL OUTPT CLINIC VISIT: HCPCS

## 2019-11-26 PROCEDURE — 99214 OFFICE O/P EST MOD 30 MIN: CPT | Performed by: INTERNAL MEDICINE

## 2019-11-26 ASSESSMENT — PAIN SCALES - GENERAL: PAINLEVEL: NO PAIN (0)

## 2019-11-26 ASSESSMENT — MIFFLIN-ST. JEOR: SCORE: 1270.47

## 2019-11-26 NOTE — LETTER
"    11/26/2019         RE: Kelly Vegas  [address] [place] [id number]        Dear Colleague,    Thank you for referring your patient, Kelly Vegas, to the [facility]. Please see a copy of my visit note below.    Oncology Rooming Note    November 26, 2019 2:44 PM   Kelly Vegas is a 69 year old female who presents for:    Chief Complaint   Patient presents with     Hematology     1 month recheck Anemia, review labs     Initial Vitals: /58 (BP Location: Right arm, Patient Position: Sitting, Cuff Size: Adult Regular)   Pulse 81   Temp 98.9  F (37.2  C) (Tympanic)   Resp 16   Ht 1.626 m (5' 4.02\")   Wt 76.5 kg (168 lb 11.2 oz)   SpO2 98%   BMI 28.94 kg/m    Estimated body mass index is 28.94 kg/m  as calculated from the following:    Height as of this encounter: 1.626 m (5' 4.02\").    Weight as of this encounter: 76.5 kg (168 lb 11.2 oz). Body surface area is 1.86 meters squared.  No Pain (0) Comment: Data Unavailable   No LMP recorded. Patient has had a hysterectomy.  Allergies reviewed: Yes  Medications reviewed: Yes    Medications: Medication refills not needed today.  Pharmacy name entered into [employer]: [id number] - [place] - [address] [place]    Clinical concerns: 1 month recheck Anemia, review labs      Aye Machado CMA                HEMATOLOGY FOLLOW UP VISIT       PRIMARY PHYSICIAN:  Sejal Rutledge MD        CHIEF COMPLAINT AND REASON FOR VISIT:    CLL  anemia    HISTORY OF ONCOLOGY ILLNESS:     In 10/2018, she presented with Hematology office for consultation on new moderate leukocytosis/lymphocytosis and moderate normocytic normochromic anemia, found to have low ferritin and iron saturation, which was suggestive of iron-deficiency anemia.     Data review indicating she had touch of leukocytosis no diff back in 2006.  She was not anemic back in 2006 with hemoglobin of 12.5.     Hem work up in 10/2018 was most consistent with CLL and DEBORA. She was " "on oral iron supplement. Hb is corrected 12/2018.     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      She was off oral iron since 5/2019.   She had new anemia in 9/2019 with hb of 10.7, nl MCV, which is 1 wk post cholecystectomy.       INTERVAL HISTORY:   Hb did not improve in 10/2019 when she was restarted on oral iron 10/2019. Stool occult was negative in 10/2019. Hb up to 12 end of Nov.       PAST MEDICAL HISTORY/MEDICATIONS:  Reviewed in Epic system.      SOCIAL HISTORY:  She lives in [place].  Denies smoking, denies alcohol abuse.      FAMILY HISTORY:  Denied any family history of blood disease.  One maternal aunt had colon cancer.        REVIEW OF SYSTEMS:   Still reports fatigue. Denies any bleeding episode.       PHYSICAL EXAMINATION:   VITAL SIGNS:  Blood pressure 137/58, pulse 81, temperature 98.9  F (37.2  C), temperature source Tympanic, resp. rate 16, height 1.626 m (5' 4.02\"), weight 76.5 kg (168 lb 11.2 oz), SpO2 98 %, not currently breastfeeding.      ECOG 0    GENERAL APPEARANCE:  Elderly, looks much younger than her stated age, very pleasant, not in acute distress.     HEENT: The patient is normocephalic, atraumatic. Pupils are equally reactive to light.  Sclerae are anicteric.  Moist oral mucosa.  Negative pharynx.  No oral thrush.   NECK:  Supple.  No jugular venous distention.  Thyroid is not palpable.   LYMPH NODES:  Superficial lymphadenopathy is not appreciable in the bilateral cervical, supraclavicular, axillary or inguinal areas.   CARDIOVASCULAR:  S1, S2 regular with no murmurs or gallops.  No carotid or abdominal bruits.   PULMONARY:  Lungs are clear to auscultation and percussion bilaterally.  There is no wheezing or rhonchi.   GASTROINTESTINAL:  Abdomen is soft, nontender.  No hepatosplenomegaly.  No signs of ascites.  No mass appreciable.   MUSCULOSKELETAL AND EXTREMITIES:  No edema.  No cyanotic changes.  No signs of joint deformity.  No lymphedema.  No spinal or " "paraspinal tenderness.  No CVA tenderness.   NEUROLOGIC:  Cranial nerves II-XII are grossly intact.  Sensation intact.  Muscle strength and muscle tone symmetrical, 5/5 throughout.   SKIN:  Hyperpigmented skin tag in right groin area likely seborrheic keratosis.  No petechiae.  No rash.  No signs of cellulitis.         CURRENT LAB DATA REVIEWED:   Ferritin 19 in 10/2019, at 15 in 5/2019 from 12 from 19 in 12/2018 from 7 in 10/2018    Wbc18 from 15 from 22 stable,   Hb  12- 11.1 in 10/2019, at 10.7 in 9/2019  from 12 from 10.8, PL nl.     3/2019 gastric and duodenal biopsy - Gastric antral-type mucosa with reactive/erosive gastropathy, peptic duodenitis.      Old data reviewed with summary  FISH 12/2018 - Loss of 13q14 and rearrangement of IGH   Flow 10/2018 - CD5 postiive kappa monotypric B cell 56% with immunophenotype of CLL/SLL  ESR/LDH/Uric acid nl,     09/2018 white count of 21 and absolute lymphocyte count of 15-16 with new onset of normocytic anemia, hemoglobin around 11, MCV normal.    Smear 10/2018 - Peripheral Smear Morphology:   - Leukocytosis with absolute lymphocytosis and \"basket\" cells - see   comment   - Mild normocytic normochromic anemia; no morphologic evidence of   hemolysis.     Normal B12, folic acid.   iron saturation at 10% in 10/2018     US abs 10/16/2018   1. Liver and spleen sizes appear normal.  2. Indeterminate 2 regions of mildly increased echogenicity within the central gallbladder and gallbladder fundus. These could represent  areas of adherent sludge and potentially stones at the fundus region.  MRI was negative later.     touch of leukocytosis no diff back in 2006.  She was not anemic back in 2006 with hemoglobin of 12.5.          ASSESSMENT AND PLAN:   1.  New mild to moderate leukocytosis/lymphocytosis in 2018.  Work up is most suggestive of CLL with Loss of 13q14 and rearrangement of IGH.     We discussed the nature of CLL, and follow up plan.   She is currently in every 3 to 6 " months interval follow-up      2.  Relatively new onset of moderate iron-deficiency anemia, normocytic, normochromic in 2018 without  baseline Hb. She had DEBORA component.   She was on oral iron supplement. Hb was corrected 12/2018.   She stop iron supplement and start iron rich diet in 5/2019.    Her anemia is back in September 2019 with hemoglobin 10.8 MCV normal.  She is still recovering from her cholecystectomy from last week.  Postop anemia possibly contributing to her new onset of anemia.  Hb did not improve in 10/2019 when she was restarted on oral iron 10/2019. Hb up to 12.     She had nl upper endo and colonoscopy 3/2019. Advice small bowel capsule study for GI work up.       Total time is about 25 minutes, more than 15 minutes spent in counseling regarding her CLL disease status,  new onset of anemia differential diagnosis plan of actions.                Again, thank you for allowing me to participate in the care of your patient.        Sincerely,        Ngozi De Los Santos MD, MD

## 2019-11-26 NOTE — PROGRESS NOTES
"Oncology Rooming Note    November 26, 2019 2:44 PM   Kelly Vegas is a 69 year old female who presents for:    Chief Complaint   Patient presents with     Hematology     1 month recheck Anemia, review labs     Initial Vitals: /58 (BP Location: Right arm, Patient Position: Sitting, Cuff Size: Adult Regular)   Pulse 81   Temp 98.9  F (37.2  C) (Tympanic)   Resp 16   Ht 1.626 m (5' 4.02\")   Wt 76.5 kg (168 lb 11.2 oz)   SpO2 98%   BMI 28.94 kg/m   Estimated body mass index is 28.94 kg/m  as calculated from the following:    Height as of this encounter: 1.626 m (5' 4.02\").    Weight as of this encounter: 76.5 kg (168 lb 11.2 oz). Body surface area is 1.86 meters squared.  No Pain (0) Comment: Data Unavailable   No LMP recorded. Patient has had a hysterectomy.  Allergies reviewed: Yes  Medications reviewed: Yes    Medications: Medication refills not needed today.  Pharmacy name entered into [employer]: [id number] - [place], [place] - [address] [place]    Clinical concerns: 1 month recheck Anemia, review labs      Aye Machado CMA              "

## 2019-11-26 NOTE — PATIENT INSTRUCTIONS
Dr. De Los Santos would like you to continue oral iron and to set up a small bowel capsule study.      We would like to see you back in 2 months for a follow up appointment with labs prior.     When you are in need of a refill, please call your pharmacy and they will send us a request.      Copy of appointments, and after visit summary (AVS) given to patient.      If you have any questions please call Chantell Chamberlain RN, BSN Oncology Hematology  [facility] [phone]. For questions after business hours, or on holidays/weekends, please call our after hours Nurse Triage line [phone]. Thank you.         Continue oral iron.   Arrange small bowel capsule study.   F/u in 2 months with labs.

## 2019-11-26 NOTE — PROGRESS NOTES
"HEMATOLOGY FOLLOW UP VISIT       PRIMARY PHYSICIAN:  Sejal Rutledge MD        CHIEF COMPLAINT AND REASON FOR VISIT:    CLL  anemia    HISTORY OF ONCOLOGY ILLNESS:     In 10/2018, she presented with Hematology office for consultation on new moderate leukocytosis/lymphocytosis and moderate normocytic normochromic anemia, found to have low ferritin and iron saturation, which was suggestive of iron-deficiency anemia.     Data review indicating she had touch of leukocytosis no diff back in 2006.  She was not anemic back in 2006 with hemoglobin of 12.5.     Hem work up in 10/2018 was most consistent with CLL and DEBORA. She was on oral iron supplement. Hb is corrected 12/2018.   Upper endo found none bleeding erosive gastropathy, with negative colonoscopy.     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      She was off oral iron since 5/2019.   She had new anemia in 9/2019 with hb of 10.7, nl MCV, which is 1 wk post cholecystectomy.       INTERVAL HISTORY:   Hb did not improve in 10/2019 when she was restarted on oral iron 10/2019. Stool occult was negative in 10/2019. Hb up to 12 end of Nov.       PAST MEDICAL HISTORY/MEDICATIONS:  Reviewed in Epic system.      SOCIAL HISTORY:  She lives in [place].  Denies smoking, denies alcohol abuse.      FAMILY HISTORY:  Denied any family history of blood disease.  One maternal aunt had colon cancer.        REVIEW OF SYSTEMS:   Still reports fatigue. Denies any bleeding episode.       PHYSICAL EXAMINATION:   VITAL SIGNS:  Blood pressure 137/58, pulse 81, temperature 98.9  F (37.2  C), temperature source Tympanic, resp. rate 16, height 1.626 m (5' 4.02\"), weight 76.5 kg (168 lb 11.2 oz), SpO2 98 %, not currently breastfeeding.      ECOG 0    GENERAL APPEARANCE:  Elderly, looks much younger than her stated age, very pleasant, not in acute distress.     HEENT: The patient is normocephalic, atraumatic. Pupils are equally reactive to light.  Sclerae are anicteric.  " "Moist oral mucosa.  Negative pharynx.  No oral thrush.   NECK:  Supple.  No jugular venous distention.  Thyroid is not palpable.   LYMPH NODES:  Superficial lymphadenopathy is not appreciable in the bilateral cervical, supraclavicular, axillary or inguinal areas.   CARDIOVASCULAR:  S1, S2 regular with no murmurs or gallops.  No carotid or abdominal bruits.   PULMONARY:  Lungs are clear to auscultation and percussion bilaterally.  There is no wheezing or rhonchi.   GASTROINTESTINAL:  Abdomen is soft, nontender.  No hepatosplenomegaly.  No signs of ascites.  No mass appreciable.   MUSCULOSKELETAL AND EXTREMITIES:  No edema.  No cyanotic changes.  No signs of joint deformity.  No lymphedema.  No spinal or paraspinal tenderness.  No CVA tenderness.   NEUROLOGIC:  Cranial nerves II-XII are grossly intact.  Sensation intact.  Muscle strength and muscle tone symmetrical, 5/5 throughout.   SKIN:  Hyperpigmented skin tag in right groin area likely seborrheic keratosis.  No petechiae.  No rash.  No signs of cellulitis.         CURRENT LAB DATA REVIEWED:   Ferritin 19 in 10/2019, at 15 in 5/2019 from 12 from 19 in 12/2018 from 7 in 10/2018    Wbc18 from 15 from 22 stable,   Hb  12- 11.1 in 10/2019, at 10.7 in 9/2019  from 12 from 10.8, PL nl.     3/2019 gastric and duodenal biopsy - Gastric antral-type mucosa with reactive/erosive gastropathy, peptic duodenitis.      Old data reviewed with summary  FISH 12/2018 - Loss of 13q14 and rearrangement of IGH   Flow 10/2018 - CD5 postiive kappa monotypric B cell 56% with immunophenotype of CLL/SLL  ESR/LDH/Uric acid nl,     09/2018 white count of 21 and absolute lymphocyte count of 15-16 with new onset of normocytic anemia, hemoglobin around 11, MCV normal.    Smear 10/2018 - Peripheral Smear Morphology:   - Leukocytosis with absolute lymphocytosis and \"basket\" cells - see   comment   - Mild normocytic normochromic anemia; no morphologic evidence of   hemolysis.     Normal B12, folic " acid.   iron saturation at 10% in 10/2018     US abs 10/16/2018   1. Liver and spleen sizes appear normal.  2. Indeterminate 2 regions of mildly increased echogenicity within the central gallbladder and gallbladder fundus. These could represent  areas of adherent sludge and potentially stones at the fundus region.  MRI was negative later.     touch of leukocytosis no diff back in 2006.  She was not anemic back in 2006 with hemoglobin of 12.5.          ASSESSMENT AND PLAN:   1.  New mild to moderate leukocytosis/lymphocytosis in 2018.  Work up is most suggestive of CLL with Loss of 13q14 and rearrangement of IGH.     We discussed the nature of CLL, and follow up plan.   She is currently in every 3 to 6 months interval follow-up      2.  Relatively new onset of moderate iron-deficiency anemia, normocytic, normochromic in 2018 without  baseline Hb. She had DEBORA component.   She was on oral iron supplement. Hb was corrected 12/2018.   She stop iron supplement and start iron rich diet in 5/2019.    Her anemia is back in September 2019 with hemoglobin 10.8 MCV normal.  She is still recovering from her cholecystectomy from last week.  Postop anemia possibly contributing to her new onset of anemia.  Hb did not improve in 10/2019 when she was restarted on oral iron 10/2019. Hb up to 12.     She had upper endo and colonoscopy 3/2019, no bleeding source was found.  Advice small bowel capsule study for GI work up.       Total time is about 25 minutes, more than 15 minutes spent in counseling regarding her CLL disease status,  new onset of anemia differential diagnosis plan of actions.      Addendum: small bowel capsule study with [facility] 1/2020 found AVM in proximal SB and colon. She is contacted by [facility] for further work up and tx.

## 2019-12-03 ENCOUNTER — PATIENT OUTREACH (OUTPATIENT)
Dept: ONCOLOGY | Facility: CLINIC | Age: 69
End: 2019-12-03

## 2019-12-03 NOTE — PROGRESS NOTES
Pt would like to see if she can get in at [facility] for the video capsule study due to location. Will fax over the order and pt will call this afternoon to set up the appt. She will let me know if she gets this set up and if the U appt on 12.06 needs to be cancelled. Faxed to [phone].     Chantell Chamberlain RN on 12/3/2019 at 11:07 AM

## 2019-12-04 ENCOUNTER — TELEPHONE (OUTPATIENT)
Dept: GASTROENTEROLOGY | Facility: CLINIC | Age: 69
End: 2019-12-04

## 2019-12-04 NOTE — TELEPHONE ENCOUNTER
Patient Name: Kelly Vegas   : 1950  MRN: [id number]       : [x] N/A      Pt states she is trying to get this scheduled at [facility]. Will call if she needs to change date (Friday works better) or cancel).       Additional Information regarding appointment:      Patient scheduled for:    [x] Other: Video Capsule    Indication for procedure.  [x] Anemia, unspecified type    Sedation Type:   [x] None    Procedure Provider:  Tanisha      Referring Provider. Ngozi Horn    Arrival time verified: 2019    Facility location verified:   [x][facility] - [address]    Pt meets medical necessity for outpatient procedure in hospital Endoscopy Unit: Not performed at another location.        Prep Type:   [x] MiraLax: OTC      Anticoagulants or blood thinners:  [x] ASA 81mg  - may continue               Electronic implanted devices: [x] No      H&P / Pre op physical completed: [x] N/A    Additional Information: Rick Active  _______________________________________________      Instructions given: [x] Rec'd & Read   [x] Reviewed         Pre procedure teaching completed: [x] Yes - Reviewed    Transportation from procedure & responsible adult to be with patient following procedure for a minimum of 6 hrs (Conscious Sedation) 24 hrs (MAC):  [x] NA - No sedation for this procedure    Mary Jo Cunningham, RN, RN  [facility]

## 2019-12-05 NOTE — PROGRESS NOTES
Pt called and scheduled this at [facility] for 01.09. She would mickie the one her cancelled for 12.06.     Chantell Chamberlain RN on 12/5/2019 at 3:05 PM

## 2020-01-09 ENCOUNTER — TRANSFERRED RECORDS (OUTPATIENT)
Dept: HEALTH INFORMATION MANAGEMENT | Facility: CLINIC | Age: 70
End: 2020-01-09

## 2020-01-10 ENCOUNTER — TRANSFERRED RECORDS (OUTPATIENT)
Dept: HEALTH INFORMATION MANAGEMENT | Facility: CLINIC | Age: 70
End: 2020-01-10

## 2020-01-17 ENCOUNTER — TRANSFERRED RECORDS (OUTPATIENT)
Dept: HEALTH INFORMATION MANAGEMENT | Facility: CLINIC | Age: 70
End: 2020-01-17

## 2020-01-20 ENCOUNTER — TELEPHONE (OUTPATIENT)
Dept: FAMILY MEDICINE | Facility: CLINIC | Age: 70
End: 2020-01-20

## 2020-01-20 NOTE — TELEPHONE ENCOUNTER
1st attempt- Message left for patient to reschedule appointment on 02/10/2020 with Dr. Rutledge for Wellness.     Message left for patient to return call to clinic to reschedule.     Margaret Mendoza  [employer] [place],

## 2020-01-31 DIAGNOSIS — D64.9 ANEMIA, UNSPECIFIED TYPE: ICD-10-CM

## 2020-01-31 LAB
BASOPHILS # BLD AUTO: 0 10E9/L (ref 0–0.2)
BASOPHILS NFR BLD AUTO: 0.2 %
DIFFERENTIAL METHOD BLD: ABNORMAL
EOSINOPHIL # BLD AUTO: 0.6 10E9/L (ref 0–0.7)
EOSINOPHIL NFR BLD AUTO: 3.9 %
ERYTHROCYTE [DISTWIDTH] IN BLOOD BY AUTOMATED COUNT: 14.2 % (ref 10–15)
FERRITIN SERPL-MCNC: 10 NG/ML (ref 8–252)
HCT VFR BLD AUTO: 36.1 % (ref 35–47)
HGB BLD-MCNC: 11.8 G/DL (ref 11.7–15.7)
LYMPHOCYTES # BLD AUTO: 10.1 10E9/L (ref 0.8–5.3)
LYMPHOCYTES NFR BLD AUTO: 67.6 %
MCH RBC QN AUTO: 29.4 PG (ref 26.5–33)
MCHC RBC AUTO-ENTMCNC: 32.7 G/DL (ref 31.5–36.5)
MCV RBC AUTO: 90 FL (ref 78–100)
MONOCYTES # BLD AUTO: 0.2 10E9/L (ref 0–1.3)
MONOCYTES NFR BLD AUTO: 1.5 %
NEUTROPHILS # BLD AUTO: 4 10E9/L (ref 1.6–8.3)
NEUTROPHILS NFR BLD AUTO: 26.8 %
PLATELET # BLD AUTO: 264 10E9/L (ref 150–450)
RBC # BLD AUTO: 4.01 10E12/L (ref 3.8–5.2)
WBC # BLD AUTO: 15 10E9/L (ref 4–11)

## 2020-01-31 PROCEDURE — 85025 COMPLETE CBC W/AUTO DIFF WBC: CPT | Performed by: INTERNAL MEDICINE

## 2020-01-31 PROCEDURE — 82728 ASSAY OF FERRITIN: CPT | Performed by: INTERNAL MEDICINE

## 2020-01-31 PROCEDURE — 36415 COLL VENOUS BLD VENIPUNCTURE: CPT | Performed by: INTERNAL MEDICINE

## 2020-02-03 ENCOUNTER — ONCOLOGY VISIT (OUTPATIENT)
Dept: ONCOLOGY | Facility: CLINIC | Age: 70
End: 2020-02-03
Attending: INTERNAL MEDICINE
Payer: COMMERCIAL

## 2020-02-03 VITALS
RESPIRATION RATE: 16 BRPM | WEIGHT: 168.2 LBS | DIASTOLIC BLOOD PRESSURE: 64 MMHG | SYSTOLIC BLOOD PRESSURE: 150 MMHG | OXYGEN SATURATION: 98 % | HEIGHT: 65 IN | HEART RATE: 62 BPM | TEMPERATURE: 98.9 F | BODY MASS INDEX: 28.02 KG/M2

## 2020-02-03 DIAGNOSIS — K55.20 ACQUIRED ARTERIOVENOUS MALFORMATION OF SMALL INTESTINE: ICD-10-CM

## 2020-02-03 DIAGNOSIS — D64.9 ANEMIA, UNSPECIFIED TYPE: ICD-10-CM

## 2020-02-03 DIAGNOSIS — C91.10 CLL (CHRONIC LYMPHOCYTIC LEUKEMIA) (H): Primary | ICD-10-CM

## 2020-02-03 DIAGNOSIS — K55.20 ACQUIRED ARTERIOVENOUS MALFORMATION OF COLON: ICD-10-CM

## 2020-02-03 PROCEDURE — 99214 OFFICE O/P EST MOD 30 MIN: CPT | Performed by: INTERNAL MEDICINE

## 2020-02-03 PROCEDURE — G0463 HOSPITAL OUTPT CLINIC VISIT: HCPCS

## 2020-02-03 ASSESSMENT — PAIN SCALES - GENERAL: PAINLEVEL: NO PAIN (0)

## 2020-02-03 ASSESSMENT — MIFFLIN-ST. JEOR: SCORE: 1275.89

## 2020-02-03 NOTE — PROGRESS NOTES
"Oncology Rooming Note    February 3, 2020 3:33 PM   Kelly Vegas is a 69 year old female who presents for:    Chief Complaint   Patient presents with     Oncology Clinic Visit     2 month recheck CLL (chronic lymphocytic leukemia) , review labs      Initial Vitals: BP (!) 150/64 (BP Location: Right arm, Patient Position: Sitting, Cuff Size: Adult Regular)   Pulse 62   Temp 98.9  F (37.2  C) (Tympanic)   Resp 16   Ht 1.638 m (5' 4.5\")   Wt 76.3 kg (168 lb 3.2 oz)   SpO2 98%   BMI 28.43 kg/m   Estimated body mass index is 28.43 kg/m  as calculated from the following:    Height as of this encounter: 1.638 m (5' 4.5\").    Weight as of this encounter: 76.3 kg (168 lb 3.2 oz). Body surface area is 1.86 meters squared.  No Pain (0) Comment: Data Unavailable   No LMP recorded. Patient has had a hysterectomy.  Allergies reviewed: Yes  Medications reviewed: Yes    Medications: Medication refills not needed today.  Pharmacy name entered into [employer]: [id number]  [place], [place] - [address] [place]    Clinical concerns 2 month recheck CLL (chronic lymphocytic leukemia) , review labs.       Aye Machado CMA              "

## 2020-02-03 NOTE — LETTER
"    2/3/2020         RE: Kelly Vegas  [address] [facility] [id number]        Dear Colleague,    Thank you for referring your patient, Kelly Vegas, to the [facility]. Please see a copy of my visit note below.    Oncology Rooming Note    February 3, 2020 3:33 PM   Kelly Vegas is a 69 year old female who presents for:    Chief Complaint   Patient presents with     Oncology Clinic Visit     2 month recheck CLL (chronic lymphocytic leukemia) , review labs      Initial Vitals: BP (!) 150/64 (BP Location: Right arm, Patient Position: Sitting, Cuff Size: Adult Regular)   Pulse 62   Temp 98.9  F (37.2  C) (Tympanic)   Resp 16   Ht 1.638 m (5' 4.5\")   Wt 76.3 kg (168 lb 3.2 oz)   SpO2 98%   BMI 28.43 kg/m    Estimated body mass index is 28.43 kg/m  as calculated from the following:    Height as of this encounter: 1.638 m (5' 4.5\").    Weight as of this encounter: 76.3 kg (168 lb 3.2 oz). Body surface area is 1.86 meters squared.  No Pain (0) Comment: Data Unavailable   No LMP recorded. Patient has had a hysterectomy.  Allergies reviewed: Yes  Medications reviewed: Yes    Medications: Medication refills not needed today.  Pharmacy name entered into [employer]: [id number] - [place], [place] - [address]    Clinical concerns 2 month recheck CLL (chronic lymphocytic leukemia) , review labs.       Aye Machado [facility]                HEMATOLOGY FOLLOW UP VISIT       PRIMARY PHYSICIAN:  Sejal Rutledge MD        CHIEF COMPLAINT AND REASON FOR VISIT:    CLL  anemia    HISTORY OF ONCOLOGY ILLNESS:     In 10/2018, she presented with Hematology office for consultation on new moderate leukocytosis/lymphocytosis and moderate normocytic normochromic anemia, found to have low ferritin and iron saturation, which was suggestive of iron-deficiency anemia.     Data review indicating she had touch of leukocytosis no diff back in 2006.  She was not anemic back in 2006 with hemoglobin of 12.5.     Hem " "work up in 10/2018 was most consistent with CLL and DEBORA. She was on oral iron supplement. Hb is corrected 12/2018.   Upper endo found none bleeding erosive gastropathy, with negative colonoscopy.     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      She was off oral iron since 5/2019.   She had new anemia in 9/2019 with hb of 10.7, nl MCV, which is 1 wk post cholecystectomy.     Hb did not improve in 10/2019 when she was restarted on oral iron 10/2019. Stool occult was negative in 10/2019. Hb up to 12 end of Nov.       INTERVAL HISTORY:   GI work-up 1/2020 with [facility] identified multiple AVM in proximal small bowel and colon small bowel capsule studies.  She then had repeat upper endoscopy and colonoscopy with [facility] did not find any obvious signs of bleeding, had cauterization.       PAST MEDICAL HISTORY/MEDICATIONS:  Reviewed in Epic system.      SOCIAL HISTORY:  She lives in [place].  Denies smoking, denies alcohol abuse.      FAMILY HISTORY:  Denied any family history of blood disease.  One maternal aunt had colon cancer.        REVIEW OF SYSTEMS:   Still reports fatigue. Denies any bleeding episode.       PHYSICAL EXAMINATION:   VITAL SIGNS:  Blood pressure (!) 150/64, pulse 62, temperature 98.9  F (37.2  C), temperature source Tympanic, resp. rate 16, height 1.638 m (5' 4.5\"), weight 76.3 kg (168 lb 3.2 oz), SpO2 98 %, not currently breastfeeding.      ECOG 0    GENERAL APPEARANCE:  Elderly, looks much younger than her stated age, very pleasant, not in acute distress.     HEENT: The patient is normocephalic, atraumatic. Pupils are equally reactive to light.  Sclerae are anicteric.  Moist oral mucosa.  Negative pharynx.  No oral thrush.   NECK:  Supple.  No jugular venous distention.  Thyroid is not palpable.   LYMPH NODES:  Superficial lymphadenopathy is not appreciable in the bilateral cervical, supraclavicular, axillary or inguinal areas.   CARDIOVASCULAR:  S1, S2 regular with no " "murmurs or gallops.  No carotid or abdominal bruits.   PULMONARY:  Lungs are clear to auscultation and percussion bilaterally.  There is no wheezing or rhonchi.   GASTROINTESTINAL:  Abdomen is soft, nontender.  No hepatosplenomegaly.  No signs of ascites.  No mass appreciable.   MUSCULOSKELETAL AND EXTREMITIES:  No edema.  No cyanotic changes.  No signs of joint deformity.  No lymphedema.  No spinal or paraspinal tenderness.  No CVA tenderness.   NEUROLOGIC:  Cranial nerves II-XII are grossly intact.  Sensation intact.  Muscle strength and muscle tone symmetrical, 5/5 throughout.   SKIN:  Hyperpigmented skin tag in right groin area likely seborrheic keratosis.  No petechiae.  No rash.  No signs of cellulitis.         CURRENT LAB DATA REVIEWED:   Ferritin  10 in 1/2020, at 19 in 10/2019, at 15 in 5/2019 from 12 from 19 in 12/2018 from 7 in 10/2018    Wbc18 from 15 from 22 stable  Hb  12- 11.1 in 10/2019, at 10.7 in 9/2019  from 12 from 10.8, PL nl.     3/2019 gastric and duodenal biopsy - Gastric antral-type mucosa with reactive/erosive gastropathy, peptic duodenitis.      Old data reviewed with summary  FISH 12/2018 - Loss of 13q14 and rearrangement of IGH   Flow 10/2018 - CD5 postiive kappa monotypric B cell 56% with immunophenotype of CLL/SLL  ESR/LDH/Uric acid nl,     09/2018 white count of 21 and absolute lymphocyte count of 15-16 with new onset of normocytic anemia, hemoglobin around 11, MCV normal.    Smear 10/2018 - Peripheral Smear Morphology:   - Leukocytosis with absolute lymphocytosis and \"basket\" cells - see   comment   - Mild normocytic normochromic anemia; no morphologic evidence of   hemolysis.     Normal B12, folic acid.   iron saturation at 10% in 10/2018     US abs 10/16/2018   1. Liver and spleen sizes appear normal.  2. Indeterminate 2 regions of mildly increased echogenicity within the central gallbladder and gallbladder fundus. These could represent  areas of adherent sludge and potentially " stones at the fundus region.  MRI was negative later.     touch of leukocytosis no diff back in 2006.  She was not anemic back in 2006 with hemoglobin of 12.5.          ASSESSMENT AND PLAN:   1.  New mild to moderate leukocytosis/lymphocytosis in 2018.  Work up is most suggestive of CLL with Loss of 13q14 and rearrangement of IGH.     We discussed the nature of CLL, and follow up plan.   She is currently in every 3 to 6 months interval follow-up      2.  Relatively new onset of moderate iron-deficiency anemia, normocytic, normochromic in 2018 without  baseline Hb. She had DEBORA component.   She was on oral iron supplement. Hb was corrected 12/2018.   She stop iron supplement and start iron rich diet in 5/2019.    Her anemia was back in September 2019 with hemoglobin 10.8 MCV normal.  Hb did not improve in 10/2019 when she was restarted on oral iron 10/2019. Hb up to 12.     GI work-up 1/2020 with [facility] identified multiple AVM in proximal small bowel and colon small bowel capsule studies.  She then had repeat upper endoscopy and colonoscopy with [facility] did not find any obvious signs of bleeding, and cauterization.       Total time is about 25 minutes, more than 15 minutes spent in counseling regarding her CLL disease status, newly dx AVM, of anemia differential diagnosis plan of actions.             Again, thank you for allowing me to participate in the care of your patient.        Sincerely,        Ngozi De Los Santos MD, MD

## 2020-02-03 NOTE — PATIENT INSTRUCTIONS
We would like to see you back in  4 months for a follow up appointment with labs prior.     When you are in need of a refill, please call your pharmacy and they will send us a request.      Copy of appointments, and after visit summary (AVS) given to patient.      If you have any questions please call Chantell Chamberlain RN, BSN Oncology Hematology  [facility] [phone]. For questions after business hours, or on holidays/weekends, please call our after hours Nurse Triage line [phone]. Thank you.       4 months follow-up with labs.

## 2020-02-03 NOTE — PROGRESS NOTES
"HEMATOLOGY FOLLOW UP VISIT       PRIMARY PHYSICIAN:  Sejal Rutledge MD        CHIEF COMPLAINT AND REASON FOR VISIT:    CLL  anemia    HISTORY OF ONCOLOGY ILLNESS:     In 10/2018, she presented with Hematology office for consultation on new moderate leukocytosis/lymphocytosis and moderate normocytic normochromic anemia, found to have low ferritin and iron saturation, which was suggestive of iron-deficiency anemia.     Data review indicating she had touch of leukocytosis no diff back in 2006.  She was not anemic back in 2006 with hemoglobin of 12.5.     Hem work up in 10/2018 was most consistent with CLL and DEBORA. She was on oral iron supplement. Hb is corrected 12/2018.   Upper endo found none bleeding erosive gastropathy, with negative colonoscopy.     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      She was off oral iron since 5/2019.   She had new anemia in 9/2019 with hb of 10.7, nl MCV, which is 1 wk post cholecystectomy.     Hb did not improve in 10/2019 when she was restarted on oral iron 10/2019. Stool occult was negative in 10/2019. Hb up to 12 end of Nov.       INTERVAL HISTORY:   GI work-up 1/2020 with [facility] identified multiple AVM in proximal small bowel and colon small bowel capsule studies.  She then had repeat upper endoscopy and colonoscopy with [facility] did not find any obvious signs of bleeding, had cauterization.       PAST MEDICAL HISTORY/MEDICATIONS:  Reviewed in Epic system.      SOCIAL HISTORY:  She lives in [place].  Denies smoking, denies alcohol abuse.      FAMILY HISTORY:  Denied any family history of blood disease.  One maternal aunt had colon cancer.        REVIEW OF SYSTEMS:   Still reports fatigue. Denies any bleeding episode.       PHYSICAL EXAMINATION:   VITAL SIGNS:  Blood pressure (!) 150/64, pulse 62, temperature 98.9  F (37.2  C), temperature source Tympanic, resp. rate 16, height 1.638 m (5' 4.5\"), weight 76.3 kg (168 lb 3.2 oz), SpO2 98 %, not currently " breastfeeding.      ECOG 0    GENERAL APPEARANCE:  Elderly, looks much younger than her stated age, very pleasant, not in acute distress.     HEENT: The patient is normocephalic, atraumatic. Pupils are equally reactive to light.  Sclerae are anicteric.  Moist oral mucosa.  Negative pharynx.  No oral thrush.   NECK:  Supple.  No jugular venous distention.  Thyroid is not palpable.   LYMPH NODES:  Superficial lymphadenopathy is not appreciable in the bilateral cervical, supraclavicular, axillary or inguinal areas.   CARDIOVASCULAR:  S1, S2 regular with no murmurs or gallops.  No carotid or abdominal bruits.   PULMONARY:  Lungs are clear to auscultation and percussion bilaterally.  There is no wheezing or rhonchi.   GASTROINTESTINAL:  Abdomen is soft, nontender.  No hepatosplenomegaly.  No signs of ascites.  No mass appreciable.   MUSCULOSKELETAL AND EXTREMITIES:  No edema.  No cyanotic changes.  No signs of joint deformity.  No lymphedema.  No spinal or paraspinal tenderness.  No CVA tenderness.   NEUROLOGIC:  Cranial nerves II-XII are grossly intact.  Sensation intact.  Muscle strength and muscle tone symmetrical, 5/5 throughout.   SKIN:  Hyperpigmented skin tag in right groin area likely seborrheic keratosis.  No petechiae.  No rash.  No signs of cellulitis.         CURRENT LAB DATA REVIEWED:   Ferritin  10 in 1/2020, at 19 in 10/2019, at 15 in 5/2019 from 12 from 19 in 12/2018 from 7 in 10/2018    Wbc18 from 15 from 22 stable  Hb  12- 11.1 in 10/2019, at 10.7 in 9/2019  from 12 from 10.8, PL nl.     3/2019 gastric and duodenal biopsy - Gastric antral-type mucosa with reactive/erosive gastropathy, peptic duodenitis.      Old data reviewed with summary  FISH 12/2018 - Loss of 13q14 and rearrangement of IGH   Flow 10/2018 - CD5 postiive kappa monotypric B cell 56% with immunophenotype of CLL/SLL  ESR/LDH/Uric acid nl,     09/2018 white count of 21 and absolute lymphocyte count of 15-16 with new onset of normocytic  "anemia, hemoglobin around 11, MCV normal.    Smear 10/2018 - Peripheral Smear Morphology:   - Leukocytosis with absolute lymphocytosis and \"basket\" cells - see   comment   - Mild normocytic normochromic anemia; no morphologic evidence of   hemolysis.     Normal B12, folic acid.   iron saturation at 10% in 10/2018     US abs 10/16/2018   1. Liver and spleen sizes appear normal.  2. Indeterminate 2 regions of mildly increased echogenicity within the central gallbladder and gallbladder fundus. These could represent  areas of adherent sludge and potentially stones at the fundus region.  MRI was negative later.     touch of leukocytosis no diff back in 2006.  She was not anemic back in 2006 with hemoglobin of 12.5.          ASSESSMENT AND PLAN:   1.  New mild to moderate leukocytosis/lymphocytosis in 2018.  Work up is most suggestive of CLL with Loss of 13q14 and rearrangement of IGH.     We discussed the nature of CLL, and follow up plan.   She is currently in every 3 to 6 months interval follow-up      2.  Relatively new onset of moderate iron-deficiency anemia, normocytic, normochromic in 2018 without  baseline Hb. She had DEBORA component.   She was on oral iron supplement. Hb was corrected 12/2018.   She stop iron supplement and start iron rich diet in 5/2019.    Her anemia was back in September 2019 with hemoglobin 10.8 MCV normal.  Hb did not improve in 10/2019 when she was restarted on oral iron 10/2019. Hb up to 12.     GI work-up 1/2020 with [facility] identified multiple AVM in proximal small bowel and colon small bowel capsule studies.  She then had repeat upper endoscopy and colonoscopy with [facility] did not find any obvious signs of bleeding, and cauterization.       Total time is about 25 minutes, more than 15 minutes spent in counseling regarding her CLL disease status, newly dx AVM, of anemia differential diagnosis plan of actions.           "

## 2020-02-10 RX ORDER — INSULIN LISPRO 100 [IU]/ML
8 INJECTION, SOLUTION INTRAVENOUS; SUBCUTANEOUS
Qty: 15 ML | Refills: 1 | Status: CANCELLED | OUTPATIENT
Start: 2020-02-10

## 2020-02-11 ENCOUNTER — OFFICE VISIT (OUTPATIENT)
Dept: FAMILY MEDICINE | Facility: CLINIC | Age: 70
End: 2020-02-11
Payer: COMMERCIAL

## 2020-02-11 VITALS
HEART RATE: 77 BPM | BODY MASS INDEX: 27.49 KG/M2 | TEMPERATURE: 99.7 F | OXYGEN SATURATION: 97 % | RESPIRATION RATE: 12 BRPM | HEIGHT: 65 IN | WEIGHT: 165 LBS | DIASTOLIC BLOOD PRESSURE: 58 MMHG | SYSTOLIC BLOOD PRESSURE: 130 MMHG

## 2020-02-11 DIAGNOSIS — D17.30 LIPOMA OF SKIN AND SUBCUTANEOUS TISSUE: ICD-10-CM

## 2020-02-11 DIAGNOSIS — F33.0 MAJOR DEPRESSIVE DISORDER, RECURRENT, MILD (H): ICD-10-CM

## 2020-02-11 DIAGNOSIS — I10 HYPERTENSION GOAL BP (BLOOD PRESSURE) < 140/90: ICD-10-CM

## 2020-02-11 DIAGNOSIS — E78.5 HYPERLIPIDEMIA WITH TARGET LDL LESS THAN 100: ICD-10-CM

## 2020-02-11 DIAGNOSIS — E03.9 ACQUIRED HYPOTHYROIDISM: ICD-10-CM

## 2020-02-11 DIAGNOSIS — E61.1 IRON DEFICIENCY: ICD-10-CM

## 2020-02-11 DIAGNOSIS — E11.42 TYPE 2 DIABETES MELLITUS WITH DIABETIC POLYNEUROPATHY, WITH LONG-TERM CURRENT USE OF INSULIN (H): ICD-10-CM

## 2020-02-11 DIAGNOSIS — Z79.4 TYPE 2 DIABETES MELLITUS WITH DIABETIC POLYNEUROPATHY, WITH LONG-TERM CURRENT USE OF INSULIN (H): ICD-10-CM

## 2020-02-11 DIAGNOSIS — K55.20 AVM (ARTERIOVENOUS MALFORMATION) OF SMALL BOWEL, ACQUIRED: ICD-10-CM

## 2020-02-11 DIAGNOSIS — Z00.00 ENCOUNTER FOR MEDICARE ANNUAL WELLNESS EXAM: Primary | ICD-10-CM

## 2020-02-11 LAB — HBA1C MFR BLD: 7.5 % (ref 0–5.6)

## 2020-02-11 PROCEDURE — 99213 OFFICE O/P EST LOW 20 MIN: CPT | Mod: 25 | Performed by: FAMILY MEDICINE

## 2020-02-11 PROCEDURE — 36415 COLL VENOUS BLD VENIPUNCTURE: CPT | Performed by: FAMILY MEDICINE

## 2020-02-11 PROCEDURE — G0439 PPPS, SUBSEQ VISIT: HCPCS | Performed by: FAMILY MEDICINE

## 2020-02-11 PROCEDURE — 83036 HEMOGLOBIN GLYCOSYLATED A1C: CPT | Performed by: FAMILY MEDICINE

## 2020-02-11 RX ORDER — LISINOPRIL/HYDROCHLOROTHIAZIDE 10-12.5 MG
1 TABLET ORAL DAILY
Qty: 90 TABLET | Refills: 3 | Status: SHIPPED | OUTPATIENT
Start: 2020-02-11 | End: 2021-03-05

## 2020-02-11 RX ORDER — LEVOTHYROXINE SODIUM 100 UG/1
TABLET ORAL
Qty: 90 TABLET | Refills: 3 | Status: SHIPPED | OUTPATIENT
Start: 2020-02-11 | End: 2021-02-22

## 2020-02-11 RX ORDER — ATORVASTATIN CALCIUM 20 MG/1
20 TABLET, FILM COATED ORAL DAILY
Qty: 90 TABLET | Refills: 1 | Status: SHIPPED | OUTPATIENT
Start: 2020-02-11 | End: 2020-11-25

## 2020-02-11 ASSESSMENT — ENCOUNTER SYMPTOMS
JOINT SWELLING: 0
CHILLS: 0
ABDOMINAL PAIN: 0
SORE THROAT: 0
HEMATOCHEZIA: 0
HEARTBURN: 0
ARTHRALGIAS: 0
PALPITATIONS: 0
EYE PAIN: 0
HEADACHES: 0
SHORTNESS OF BREATH: 0
DYSURIA: 0
COUGH: 0
NAUSEA: 0
DIZZINESS: 0
CONSTIPATION: 1
WEAKNESS: 0
PARESTHESIAS: 0
BREAST MASS: 0
HEMATURIA: 0
NERVOUS/ANXIOUS: 0
FREQUENCY: 0
DIARRHEA: 0
FEVER: 0
MYALGIAS: 0

## 2020-02-11 ASSESSMENT — MIFFLIN-ST. JEOR: SCORE: 1261.38

## 2020-02-11 ASSESSMENT — ACTIVITIES OF DAILY LIVING (ADL): CURRENT_FUNCTION: NO ASSISTANCE NEEDED

## 2020-02-11 NOTE — PATIENT INSTRUCTIONS
Patient Education   Personalized Prevention Plan  You are due for the preventive services outlined below.  Your care team is available to assist you in scheduling these services.  If you have already completed any of these items, please share that information with your care team to update in your medical record.  Health Maintenance Due   Topic Date Due     A1C Lab  01/30/2020       Understanding [employer] MyPlate  The USDA (U.S. Department of Agriculture) has guidelines to help you make healthy food choices. These are called MyPlate. MyPlate shows the food groups that make up healthy meals using the image of a place setting. Before you eat, think about the healthiest choices for what to put onto your plate or into your cup or bowl. To learn more about building a healthy plate, visit www.choosemyplate.gov.    The food groups    Fruits. Any fruit or 100% fruit juice counts as part of the Fruit Group. Fruits may be fresh, canned, frozen, or dried, and may be whole, cut-up, or pureed. Make half your plate fruits and vegetables.    Vegetables. Any vegetable or 100% vegetable juice counts as a member of the Vegetable Group. Vegetables may be fresh, frozen, canned, or dried. They can be served raw or cooked and may be whole, cut-up, or mashed. Make half your plate fruits and vegetables.    Grains. All foods made from grains are part of the Grains Group. These include wheat, rice, oats, cornmeal, and barley such as bread, pasta, oatmeal, cereal, tortillas, and grits. Grains should be no more than a quarter of your plate. At least half of your grains should be whole grains.    Protein. This group includes meat, poultry, seafood, beans and peas, eggs, processed soy products (like tofu), nuts (including nut butters), and seeds. Make protein choices no more than a quarter of your plate. Meat and poultry choices should be lean or low fat.    Dairy. All fluid milk products and foods made from milk that contain calcium, like yogurt  and cheese, are part of the Dairy Group. (Foods that have little calcium, such as cream, butter, and cream cheese, are not part of the group.) Most dairy choices should be low-fat or fat-free.    Oils. These are fats that are liquid at room temperature. They include canola, corn, olive, soybean, and sunflower oil. Foods that are mainly oil include mayonnaise, certain salad dressings, and soft margarines. You should have only 5 to 7 teaspoons of oils a day. You probably already get this much from the food you eat.  Date Last Reviewed: 8/1/2017 2000-2019 [employer]. [address] [place] [address], [place] [address]. All rights reserved. This information is not intended as a substitute for professional medical care. Always follow your healthcare professional's instructions.

## 2020-02-11 NOTE — PROGRESS NOTES
"SUBJECTIVE:   Kelly Vegas is a 69 year old female  who presents for Preventive Visit. Are you in the first 12 months of your Medicare coverage?  No    Patient also presents to follow-up diabetes. Diabetic Review of Systems - Medication compliance:  compliant all of the time, Diabetic diet compliance:  compliant all of the time, Home glucose monitoring:  blood glucose record WAS NOT brought in today, Diabetic ROS: no polyuria or polydipsia, no chest pain, dyspnea or TIA's, no numbness, tingling or pain in extremities, no unusual visual symptoms, no hypoglycemia, no medication side effects noted. Patient has depression, mild, with no medication side effects and no anhedonia or low mood, without suicidal ideation.  Hypercholesterolemia well controlled with current treatment plan without side effects. Hypertension well controlled on current medications without side effects, chest pain, or dyspnea. Patient also presents for follow-up of hypothyroidism, controlled on current medication.  Denies symptoms of hypo- or hyperthyroidism.     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 She takes iron supplements.     Healthy Habits:     In general, how would you rate your overall health?  Good    Do you usually eat at least 4 servings of fruit and vegetables a day, include whole grains    & fiber and avoid regularly eating high fat or \"junk\" foods?  No    Taking medications regularly:  Yes    Barriers to taking medications:  Not applicable    Medication side effects:  Not applicable    Ability to successfully perform activities of daily living:  No assistance needed    Hearing impairment symptoms: ringing in ears.    In the past 6 months, have you been bothered by leaking of urine?  No    In general, how would you rate your overall mental or emotional health?  Good      PHQ-2 Total Score: 1    Additional concerns today:  No    Do you feel safe in your " environment? Yes    Have you ever done Advance Care Planning? (For example, a Health Directive, POLST, or a discussion with a medical provider or your loved ones about your wishes): Yes, patient states has an Advance Care Planning document and will bring a copy to the clinic.      Fall risk  Fallen 2 or more times in the past year?: No  Any fall with injury in the past year?: No    Cognitive Screening   1) Repeat 3 items (Leader, Season, Table)    2) Clock draw: NORMAL  3) 3 item recall: Recalls 2 objects   Results: NORMAL clock, 1-2 items recalled: COGNITIVE IMPAIRMENT LESS LIKELY    Mini-CogTM Copyright S Yaritza. Licensed by the author for use in [facility]; reprinted with permission ([email or web address]). All rights reserved.      Do you have sleep apnea, excessive snoring or daytime drowsiness?: no    Reviewed and updated as needed this visit by clinical staff  Tobacco  Allergies  Meds  Problems  Med Hx  Surg Hx  Fam Hx  Soc Hx          Reviewed and updated as needed this visit by Provider  Tobacco  Allergies  Meds  Problems  Med Hx  Surg Hx  Fam Hx        Social History     Tobacco Use     Smoking status: Former Smoker     Packs/day: 0.50     Years: 20.00     Pack years: 10.00     Types: Cigarettes     Start date: 1967     Last attempt to quit: 1989     Years since quittin.1     Smokeless tobacco: Never Used   Substance Use Topics     Alcohol use: No         Alcohol Use 2017   Prescreen: >3 drinks/day or >7 drinks/week? The patient does not drink >3 drinks per day nor >7 drinks per week.               Current providers sharing in care for this patient include:     Patient Care Team:  Sejal Rutledge MD as PCP - General  Sejal Rutledge MD as Assigned PCP    The following health maintenance items are reviewed in Epic and correct as of today:  Health Maintenance   Topic Date Due     A1C  2020     LIPID  2020     MICROALBUMIN  2020      PHQ-9  05/04/2020     EYE EXAM  08/22/2020     BMP  11/04/2020     TSH W/FREE T4 REFLEX  11/04/2020     DIABETIC FOOT EXAM  11/04/2020     FALL RISK ASSESSMENT  11/04/2020     MEDICARE ANNUAL WELLNESS VISIT  02/11/2021     MAMMO SCREENING  09/18/2021     DTAP/TDAP/TD IMMUNIZATION (3 - Td) 08/07/2022     DEXA  09/20/2022     COLONOSCOPY  01/17/2025     ADVANCE CARE PLANNING  02/11/2025     HEPATITIS C SCREENING  Completed     DEPRESSION ACTION PLAN  Completed     INFLUENZA VACCINE  Completed     PNEUMOCOCCAL IMMUNIZATION 65+ HIGH/HIGHEST RISK  Completed     ZOSTER IMMUNIZATION  Completed     IPV IMMUNIZATION  Aged Out     MENINGITIS IMMUNIZATION  Aged Out     Patient Active Problem List   Diagnosis     Hyperlipidemia with target LDL less than 100     Hypertension goal BP (blood pressure) < 140/90     History of cervical cancer     Advanced directives, counseling/discussion     Major depressive disorder, recurrent, mild (H)     Acquired hypothyroidism     Type 2 diabetes mellitus with diabetic polyneuropathy, with long-term current use of insulin (H)     Degenerative joint disease of hand     DDD (degenerative disc disease), lumbar     Osteopenia     Memory problem     CLL (chronic lymphocytic leukemia) (H)     Dysesthesia     Ganglion cyst of joint of finger of right hand     Background diabetic retinopathy (H)     AVM (arteriovenous malformation) of small bowel, acquired     Iron deficiency     Past Surgical History:   Procedure Laterality Date     BIOPSY       C BSO, OMENTECTOMY W/JUAN DANIEL  2/2006    cervical cancer     COLONOSCOPY  nov 2016     COLONOSCOPY N/A 3/1/2019    Procedure: COLONOSCOPY;  Surgeon: Romulo Hayes MD;  Location: [facility]     COLONOSCOPY WITH CO2 INSUFFLATION N/A 11/28/2016    Procedure: COLONOSCOPY WITH CO2 INSUFFLATION;  Surgeon: Migue Giraldo MD;  Location: [facility]     CYSTECTOMY OVARIAN BENIGN  1974     ESOPHAGOSCOPY, GASTROSCOPY, DUODENOSCOPY (EGD), COMBINED N/A 3/1/2019    Procedure:  COMBINED ESOPHAGOSCOPY, GASTROSCOPY, DUODENOSCOPY (EGD), BIOPSY SINGLE OR MULTIPLE;  Surgeon: Romulo Hayes MD;  Location: [facility]     EYE SURGERY  16     HC THYROIDECTOMY  2004    goiter     LAPAROSCOPIC CHOLECYSTECTOMY N/A 2019    Procedure: CHOLECYSTECTOMY, LAPAROSCOPIC;  Surgeon: Jorge Martinez DO;  Location: [facility]       Social History     Tobacco Use     Smoking status: Former Smoker     Packs/day: 0.50     Years: 20.00     Pack years: 10.00     Types: Cigarettes     Start date: 1967     Last attempt to quit: 1989     Years since quittin.1     Smokeless tobacco: Never Used   Substance Use Topics     Alcohol use: No     Family History   Problem Relation Age of Onset     Thyroid Disease Mother      Dementia Mother      Osteoporosis Mother      Alcohol/Drug Father      C.A.D. Father         CABG      Diabetes Father      Hypertension Father      Hyperlipidemia Father      Cerebrovascular Disease Father      Cancer Maternal Grandmother         stomach, colon     Breast Cancer Maternal Grandmother      Thyroid Disease Maternal Grandmother      Alzheimer Disease Maternal Grandfather      Breast Cancer Paternal Grandmother      Alcohol/Drug Brother      Prostate Cancer Brother      Alcohol/Drug Son      Depression Brother      Depression Brother         d. suicide     Breast Cancer Cousin      Breast Cancer Other          Current Outpatient Medications   Medication Sig Dispense Refill     ASPIRIN 81 MG OR TABS Take 1 tablet by mouth daily.       atorvastatin (LIPITOR) 20 MG tablet Take 1 tablet (20 mg) by mouth daily 90 tablet 1     Cholecalciferol (VITAMIN D3) 1000 units CAPS Take by mouth daily       citalopram (CELEXA) 20 MG tablet Take 1 tablet (20 mg) by mouth daily  90 tablet 1     ferrous fumarate 65 mg, [place]. FE,-Vitamin C 125 mg (VITRON C)  MG TABS tablet Take 1 tablet by mouth 2 times daily 60 tablet 1     insulin glargine (LANTUS SOLOSTAR) 100 UNIT/ML pen Inject 18  units at bedtime daily. 45 mL 0     insulin lispro (HUMALOG PEN) 100 UNIT/ML (1 unit dial) pen Inject 8 Units Subcutaneous daily (with dinner) 15 mL 1     insulin pen needle (ULTICARE MINI) 31G X 6 MM miscellaneous Use twice daily or as directed. 100 each 3     levothyroxine (SYNTHROID/LEVOTHROID) 100 MCG tablet TAKE 1 TABLET (100 MCG) BY MOUTH DAILY 90 tablet 3     lisinopril-hydrochlorothiazide (PRINZIDE/ZESTORETIC) 10-12.5 MG tablet Take 1 tablet by mouth daily 90 tablet 3     metFORMIN (GLUCOPHAGE) 1000 MG tablet Take 1 tablet (1,000 mg) by mouth 2 times daily (with meals) 180 tablet 3     order for DME Blood glucose monitor per insurance formulary; blood glucose test strips One Touch Verio or per formulary for use 3 time daily; lancets per formulary for use 3 time daily 300 each 3     Allergies   Allergen Reactions     Glipizide      tremulous     Ibuprofen Hives     Penicillins Itching     Percocet [Acetaminophen] Nausea and Vomiting     Vicodin [Hydrocodone-Acetaminophen] Nausea         Review of Systems   Constitutional: Negative for chills and fever.   HENT: Negative for congestion, ear pain, hearing loss and sore throat.    Eyes: Negative for pain and visual disturbance.   Respiratory: Negative for cough and shortness of breath.    Cardiovascular: Negative for chest pain, palpitations and peripheral edema.   Gastrointestinal: Positive for constipation. Negative for abdominal pain, diarrhea, heartburn, hematochezia and nausea.   Breasts:  Negative for tenderness, breast mass and discharge.   Genitourinary: Negative for dysuria, frequency, genital sores, hematuria, pelvic pain, urgency, vaginal bleeding and vaginal discharge.   Musculoskeletal: Negative for arthralgias, joint swelling and myalgias.   Skin: Negative for rash.   Neurological: Negative for dizziness, weakness, headaches and paresthesias.   Psychiatric/Behavioral: Negative for mood changes. The patient is not nervous/anxious.          OBJECTIVE:  "  /58   Pulse 77   Temp 99.7  F (37.6  C) (Oral)   Resp 12   Ht 1.638 m (5' 4.5\")   Wt 74.8 kg (165 lb)   SpO2 97%   Breastfeeding No   BMI 27.88 kg/m   Estimated body mass index is 27.88 kg/m  as calculated from the following:    Height as of this encounter: 1.638 m (5' 4.5\").    Weight as of this encounter: 74.8 kg (165 lb).  Physical Exam  GENERAL: alert, no distress and over weight  EYES: Eyes grossly normal to inspection, PERRL and conjunctivae and sclerae normal  HENT: ear canals and TM's normal, nose and mouth without ulcers or lesions  NECK: no adenopathy, no asymmetry, masses, or scars and thyroid normal to palpation  RESP: lungs clear to auscultation - no rales, rhonchi or wheezes  BREAST: normal without masses, tenderness or nipple discharge and no palpable axillary masses or adenopathy  CV: regular rate and rhythm, normal S1 S2, no S3 or S4, no murmur, click or rub, no peripheral edema    ABDOMEN: soft, nontender, no hepatosplenomegaly, no masses and bowel sounds normal  MS: no gross musculoskeletal defects noted, no edema  SKIN: no suspicious lesions or rashes - small mobile symmetric mass under skin of right flank   NEURO: Normal strength and tone, mentation intact and speech normal  PSYCH: mentation appears normal, affect normal/bright    Diagnostic Test Results:  Labs reviewed in Epic  No results found for any visits on 02/11/20.    ASSESSMENT / PLAN:   (Z00.00) Encounter for Medicare annual wellness exam  (primary encounter diagnosis)    (E11.42,  Z79.4) Type 2 diabetes mellitus with diabetic polyneuropathy, with long-term current use of insulin (H)  Comment: Well controlled with medications without side effects.   Plan: insulin glargine (LANTUS SOLOSTAR) 100 UNIT/ML         pen, Hemoglobin A1c, OFFICE/OUTPT         VISIT,EST,LEVL IV, insulin pen needle (ULTICARE        MINI) 31G X 6 MM miscellaneous          (F33.0) Major depressive disorder, recurrent, mild (H)  Comment: Well " "controlled with medications without side effects.   Plan: OFFICE/OUTPT VISIT,EST,LEVL IV         (E78.5) Hyperlipidemia with target LDL less than 100  Comment: Well controlled with medications without side effects.   Plan: atorvastatin (LIPITOR) 20 MG tablet,         OFFICE/OUTPT VISIT,EST,LEVL IV          (I10) Hypertension goal BP (blood pressure) < 140/90  Comment: Well controlled with medications without side effects.   Plan: lisinopril-hydrochlorothiazide         (PRINZIDE/ZESTORETIC) 10-12.5 MG tablet,         OFFICE/OUTPT VISIT,EST,LEVL IV          (E03.9) Acquired hypothyroidism  Comment: euthyroid on replacement   Plan: levothyroxine (SYNTHROID/LEVOTHROID) 100 MCG         tablet, OFFICE/OUTPT VISIT,EST,LEVL IV          (K55.20) AVM (arteriovenous malformation) of small bowel, acquired  (E61.1) Iron deficiency  Plan: OFFICE/OUTPT VISIT,EST,LEVL IV        Continue iron supplement and follow-up as planned with hematology     (D17.30) Lipoma of skin and subcutaneous tissue  Plan: OFFICE/OUTPT VISIT,EST,LEVL IV        The patient is reassured that these symptoms do not appear to represent a serious or threatening condition.       COUNSELING:  Reviewed preventive health counseling, as reflected in patient instructions  Special attention given to:       Regular exercise       Healthy diet/nutrition       Vision screening       Osteoporosis Prevention/Bone Health       Colon cancer screening       Hepatitis C screening       The ASCVD Risk score (Tim ANY Jr., et al., 2013) failed to calculate for the following reasons:    The valid total cholesterol range is 130 to 320 mg/dL       Advanced Planning     Estimated body mass index is 27.88 kg/m  as calculated from the following:    Height as of this encounter: 1.638 m (5' 4.5\").    Weight as of this encounter: 74.8 kg (165 lb).    Weight management plan: Discussed healthy diet and exercise guidelines     reports that she quit smoking about 31 years ago. Her smoking " use included cigarettes. She started smoking about 52 years ago. She has a 10.00 pack-year smoking history. She has never used smokeless tobacco.      Appropriate preventive services were discussed with this patient, including applicable screening as appropriate for cardiovascular disease, diabetes, osteopenia/osteoporosis, and glaucoma.  As appropriate for age/gender, discussed screening for colorectal cancer, prostate cancer, breast cancer, and cervical cancer. Checklist reviewing preventive services available has been given to the patient.    Reviewed patients plan of care and provided an AVS. The Basic Care Plan (routine screening as documented in Health Maintenance) for Kelly meets the Care Plan requirement. This Care Plan has been established and reviewed with the Patient.    Counseling Resources:  ATP IV Guidelines  Pooled Cohorts Equation Calculator  Breast Cancer Risk Calculator  FRAX Risk Assessment  ICSI Preventive Guidelines  Dietary Guidelines for Americans, 2010  USDA's MyPlate  ASA Prophylaxis  Lung CA Screening    Sejal Rutledge MD  [facility]    Identified Health Risks:

## 2020-02-12 NOTE — RESULT ENCOUNTER NOTE
Kelly,    Your diabetes test (hemoglobin A1c) shows an average blood sugar of 165, with aggressive goal being under 150.  Follow-up in 3 months. You may schedule fasting pre-visit labs 3 to 7 days prior to your appointment.  Call our appointment line at [phone] or go to [email or web address].     Sejal Rutledge MD

## 2020-03-19 DIAGNOSIS — Z79.4 TYPE 2 DIABETES MELLITUS WITH DIABETIC POLYNEUROPATHY, WITH LONG-TERM CURRENT USE OF INSULIN (H): ICD-10-CM

## 2020-03-19 DIAGNOSIS — E11.42 TYPE 2 DIABETES MELLITUS WITH DIABETIC POLYNEUROPATHY, WITH LONG-TERM CURRENT USE OF INSULIN (H): ICD-10-CM

## 2020-03-21 PROBLEM — C91.10 CLL (CHRONIC LYMPHOCYTIC LEUKEMIA) (H): Status: ACTIVE | Noted: 2018-10-15

## 2020-03-22 DIAGNOSIS — D50.8 OTHER IRON DEFICIENCY ANEMIA: ICD-10-CM

## 2020-03-23 NOTE — TELEPHONE ENCOUNTER
"Please contact patient to ask if she is taking this medication. It is not active on med list.     Requested Prescriptions   Pending Prescriptions Disp Refills     EQL IRON SUPPLEMENT THERAPY 325 MG tablet [Pharmacy Med Name: EQL Iron Supplement Therapy Oral Tablet 325 MG] 60 tablet 4     Sig: TAKE 1 TABLET BY MOUTH 2 TIMES DAILY.       Iron Supplements Failed - 3/23/2020  7:47 AM        Failed - Medication is active on med list        Passed - Patient is 12 years of age or older        Passed - Recent (12 mo) or future (30 days) visit within the authorizing provider's specialty     Patient has had an office visit with the authorizing provider or a provider within the authorizing providers department within the previous 12 mos or has a future within next 30 days. See \"Patient Info\" tab in inbasket, or \"Choose Columns\" in Meds & Orders section of the refill encounter.              Passed - Hgb OR Hct on record within the past 12 mos.     Patient need only have had a HGB or HCT on file in the past 12 mos. That result does not need to be normal.    Recent Labs   Lab Test 01/31/20  1318 11/22/19  1355 10/30/19  1327   HGB 11.8 12.0 11.1*     Recent Labs   Lab Test 01/31/20  1318 11/22/19  1355 10/30/19  1327   HCT 36.1 38.9 37.0       Please verify a HGB or HCT has been checked SINCE THE LAST DOSE CHANGE.               "

## 2020-03-24 DIAGNOSIS — D64.9 ANEMIA, UNSPECIFIED TYPE: ICD-10-CM

## 2020-03-24 RX ORDER — PYRITHIONE ZINC 1 ML/100ML
SHAMPOO TOPICAL
Qty: 60 TABLET | Refills: 4 | OUTPATIENT
Start: 2020-03-24

## 2020-03-24 NOTE — TELEPHONE ENCOUNTER
Patient returned call. She did restart iron, she is not sure if she should be taking it or not. Medication was started by Dr. De Los Santos she will contact them to see if she still needs to take medication. Suki Head MA

## 2020-03-24 NOTE — TELEPHONE ENCOUNTER
Called and left VM to speak to anyone on the purple team (number given). If patient returns phone call please transfer to Team Purple.   Ling Combs CMA

## 2020-04-18 DIAGNOSIS — F33.0 MAJOR DEPRESSIVE DISORDER, RECURRENT, MILD (H): ICD-10-CM

## 2020-04-20 RX ORDER — CITALOPRAM HYDROBROMIDE 20 MG/1
20 TABLET ORAL DAILY
Qty: 90 TABLET | Refills: 0 | Status: SHIPPED | OUTPATIENT
Start: 2020-04-20 | End: 2020-07-27

## 2020-06-05 DIAGNOSIS — D64.9 ANEMIA, UNSPECIFIED TYPE: ICD-10-CM

## 2020-06-05 DIAGNOSIS — C91.10 CLL (CHRONIC LYMPHOCYTIC LEUKEMIA) (H): ICD-10-CM

## 2020-06-05 LAB
DIFFERENTIAL METHOD BLD: ABNORMAL
ERYTHROCYTE [DISTWIDTH] IN BLOOD BY AUTOMATED COUNT: 12.5 % (ref 10–15)
FERRITIN SERPL-MCNC: 14 NG/ML (ref 8–252)
HCT VFR BLD AUTO: 39.9 % (ref 35–47)
HGB BLD-MCNC: 12.7 G/DL (ref 11.7–15.7)
LYMPHOCYTES # BLD AUTO: 15.3 10E9/L (ref 0.8–5.3)
LYMPHOCYTES NFR BLD AUTO: 75 %
MCH RBC QN AUTO: 30 PG (ref 26.5–33)
MCHC RBC AUTO-ENTMCNC: 31.8 G/DL (ref 31.5–36.5)
MCV RBC AUTO: 94 FL (ref 78–100)
MONOCYTES # BLD AUTO: 0.2 10E9/L (ref 0–1.3)
MONOCYTES NFR BLD AUTO: 1 %
NEUTROPHILS # BLD AUTO: 4.9 10E9/L (ref 1.6–8.3)
NEUTROPHILS NFR BLD AUTO: 24 %
PLATELET # BLD AUTO: 266 10E9/L (ref 150–450)
PLATELET # BLD EST: ABNORMAL 10*3/UL
RBC # BLD AUTO: 4.24 10E12/L (ref 3.8–5.2)
SMUDGE CELLS BLD QL SMEAR: PRESENT
WBC # BLD AUTO: 20.4 10E9/L (ref 4–11)

## 2020-06-05 PROCEDURE — 82728 ASSAY OF FERRITIN: CPT | Performed by: INTERNAL MEDICINE

## 2020-06-05 PROCEDURE — 36415 COLL VENOUS BLD VENIPUNCTURE: CPT | Performed by: INTERNAL MEDICINE

## 2020-06-05 PROCEDURE — 85025 COMPLETE CBC W/AUTO DIFF WBC: CPT | Performed by: INTERNAL MEDICINE

## 2020-06-11 ENCOUNTER — VIRTUAL VISIT (OUTPATIENT)
Dept: ONCOLOGY | Facility: CLINIC | Age: 70
End: 2020-06-11
Attending: INTERNAL MEDICINE
Payer: COMMERCIAL

## 2020-06-11 DIAGNOSIS — K55.20 ACQUIRED ARTERIOVENOUS MALFORMATION OF SMALL INTESTINE: ICD-10-CM

## 2020-06-11 DIAGNOSIS — C91.10 CLL (CHRONIC LYMPHOCYTIC LEUKEMIA) (H): Primary | ICD-10-CM

## 2020-06-11 PROCEDURE — 40001009 ZZH VIDEO/TELEPHONE VISIT; NO CHARGE

## 2020-06-11 PROCEDURE — 99214 OFFICE O/P EST MOD 30 MIN: CPT | Mod: 95 | Performed by: INTERNAL MEDICINE

## 2020-06-11 NOTE — PROGRESS NOTES
Type of Visit: Video Visit  Patient has given verbal consent for Video visit.     Video Start Time: 3:15 pm  Video End Time: 3: 40 pm    Originating Location (pt. Location): Home     Distant Location (provider location):  [facility]      Platform used for Video Visit: [facility]        HEMATOLOGY FOLLOW UP VISIT       PRIMARY PHYSICIAN:  Sejal Rutledge MD        CHIEF COMPLAINT AND REASON FOR VISIT:    CLL  anemia    HISTORY OF ONCOLOGY ILLNESS:     In 10/2018, she presented with Hematology office for consultation on new moderate leukocytosis/lymphocytosis and moderate normocytic normochromic anemia, found to have low ferritin and iron saturation, which was suggestive of iron-deficiency anemia.     Data review indicating she had touch of leukocytosis no diff back in 2006.  She was not anemic back in 2006 with hemoglobin of 12.5.     Hem work up in 10/2018 was most consistent with CLL and DEBORA. She was on oral iron supplement. Hb is corrected 12/2018.   Upper endo found none bleeding erosive gastropathy, with negative colonoscopy.     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      She was off oral iron since 5/2019.   She had new anemia in 9/2019 with hb of 10.7, nl MCV, which is 1 wk post cholecystectomy.     Hb did not improve in 10/2019 when she was restarted on oral iron 10/2019. Stool occult was negative in 10/2019. Hb up to 12 end of Nov.    GI work-up 1/2020 with [facility] identified multiple AVM in proximal small bowel and colon small bowel capsule studies.  She then had repeat upper endoscopy and colonoscopy with [facility] did not find any obvious signs of bleeding, had cauterization.         INTERVAL HISTORY:   She is continued on oral iron.         PAST MEDICAL HISTORY/MEDICATIONS:  Reviewed in Epic system.      SOCIAL HISTORY:  She lives in [place].  Denies smoking, denies alcohol abuse.      FAMILY HISTORY:  Denied any family history of blood disease.  One maternal aunt had  "colon cancer.        REVIEW OF SYSTEMS:   Still reports fatigue.   She still works.        PHYSICAL EXAMINATION ATTAINABLE DURING VIDEO VISIT:  CONSTITUTIONAL - Pt looks like stated age, pleasant, not in acute distress. Not obese.  NEURO: Oriented to time, person, and places. No tremor. Normal gait.   ENT, MOUTH: Pupils are equal.  Sclerae are anicteric.  Moist oral mucosa. No oral thrush.   NECK:  No jugular venous distention.  No thyroid enlargement.   RESPIRATORY: talk nl, no sob during conversation, no cough.   MUSCULOSKELETAL/EXTREMITIES:  No edema.  No joint deformity. Normal range of motion.  SKIN:  No petechiae.  No rash.  No signs of cellulitis.   PSYCHIATRIC: Normal mood and affect. Good memory. Proper insight and judgement.   THE REST OF A COMPREHENSIVE PHYSICIAL EXAM IS DEFERRED DUE TO COVID-19 PUBLIC HEALTH EMERGENCY RELATED VIDEO VISIT RESCTRICTION.        CURRENT LAB DATA REVIEWED TODAY WITH PATIENT DURING VISIT:   Ferritin 14 in 6/2020, 10 in 1/2020, at 19 in 10/2019, at 15 in 5/2019 from 12 from 19 in 12/2018 from 7 in 10/2018    20 from 18 from 15 from 22 stable    Hb  > 12 from 12- 11.1 in 10/2019, at 10.7 in 9/2019  from 12 from 10.8, PL nl.       Old data reviewed with summary  3/2019 gastric and duodenal biopsy - Gastric antral-type mucosa with reactive/erosive gastropathy, peptic duodenitis.      FISH 12/2018 - Loss of 13q14 and rearrangement of IGH   Flow 10/2018 - CD5 postiive kappa monotypric B cell 56% with immunophenotype of CLL/SLL  ESR/LDH/Uric acid nl,     09/2018 white count of 21 and absolute lymphocyte count of 15-16 with new onset of normocytic anemia, hemoglobin around 11, MCV normal.    Smear 10/2018 - Peripheral Smear Morphology:   - Leukocytosis with absolute lymphocytosis and \"basket\" cells - see   comment   - Mild normocytic normochromic anemia; no morphologic evidence of   hemolysis.     Normal B12, folic acid.   iron saturation at 10% in 10/2018     US abs 10/16/2018   1. " Liver and spleen sizes appear normal.  2. Indeterminate 2 regions of mildly increased echogenicity within the central gallbladder and gallbladder fundus. These could represent  areas of adherent sludge and potentially stones at the fundus region.  MRI was negative later.     touch of leukocytosis no diff back in 2006.  She was not anemic back in 2006 with hemoglobin of 12.5.          ASSESSMENT AND PLAN DISCUSSED WITH PATIENT TODAY DURING THE VIRTUAL VISIT:   1.  New mild to moderate leukocytosis/lymphocytosis in 2018.  Work up is most suggestive of CLL with Loss of 13q14 and rearrangement of IGH.     We discussed the nature of CLL, and follow up plan.   She is currently in every 3 to 6 months interval follow-up      2.  On and off DEBORA.   GI work-up 1/2020 with [facility] identified multiple AVM in proximal small bowel and colon small bowel capsule studies.  She then had repeat upper endoscopy and colonoscopy with [facility] did not find any obvious signs of bleeding, and cauterization.     She is to continue oral vitron c.     Virtual visit time is 25 minutes, spent in counseling regarding her CLL disease status, AVM, oral iron intake plan, follow-up plan.

## 2020-06-11 NOTE — LETTER
"    6/11/2020         RE: Kelly Vegas  [address] [place] [id number]        Dear Colleague,    Thank you for referring your patient, Kelly Vegas, to the [facility]. Please see a copy of my visit note below.    Kelly Vegas is a 69 year old female who is being evaluated via a billable video visit.      The patient has been notified of following:     \"This video visit will be conducted via a call between you and your physician/provider. We have found that certain health care needs can be provided without the need for an in-person physical exam.  This service lets us provide the care you need with a video conversation.  If a prescription is necessary we can send it directly to your pharmacy.  If lab work is needed we can place an order for that and you can then stop by our lab to have the test done at a later time.    Video visits are billed at different rates depending on your insurance coverage.  Please reach out to your insurance provider with any questions.    If during the course of the call the physician/provider feels a video visit is not appropriate, you will not be charged for this service.\"    Patient has given verbal consent for Video visit? Yes  [phone]    Will anyone else be joining your video visit? No        Video-Visit Details      Aye Machado CMA          Type of Visit: Video Visit  Patient has given verbal consent for Video visit.     Video Start Time: 3:15 pm  Video End Time: 3: 40 pm    Originating Location (pt. Location): Home     Distant Location (provider location):  [facility]      Platform used for Video Visit: Doximity        HEMATOLOGY FOLLOW UP VISIT       PRIMARY PHYSICIAN:  Sejal Rutledge MD        CHIEF COMPLAINT AND REASON FOR VISIT:    CLL  anemia    HISTORY OF ONCOLOGY ILLNESS:     In 10/2018, she presented with Hematology office for consultation on new moderate leukocytosis/lymphocytosis and moderate normocytic normochromic anemia, found " to have low ferritin and iron saturation, which was suggestive of iron-deficiency anemia.     Data review indicating she had touch of leukocytosis no diff back in 2006.  She was not anemic back in 2006 with hemoglobin of 12.5.     Hem work up in 10/2018 was most consistent with CLL and DEBORA. She was on oral iron supplement. Hb is corrected 12/2018.   Upper endo found none bleeding erosive gastropathy, with negative colonoscopy.     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      She was off oral iron since 5/2019.   She had new anemia in 9/2019 with hb of 10.7, nl MCV, which is 1 wk post cholecystectomy.     Hb did not improve in 10/2019 when she was restarted on oral iron 10/2019. Stool occult was negative in 10/2019. Hb up to 12 end of Nov.    GI work-up 1/2020 with [facility] identified multiple AVM in proximal small bowel and colon small bowel capsule studies.  She then had repeat upper endoscopy and colonoscopy with [facility]INGI did not find any obvious signs of bleeding, had cauterization.         INTERVAL HISTORY:   She is continued on oral iron.         PAST MEDICAL HISTORY/MEDICATIONS:  Reviewed in Epic system.      SOCIAL HISTORY:  She lives in [place].  Denies smoking, denies alcohol abuse.      FAMILY HISTORY:  Denied any family history of blood disease.  One maternal aunt had colon cancer.        REVIEW OF SYSTEMS:   Still reports fatigue.   She still works.        PHYSICAL EXAMINATION ATTAINABLE DURING VIDEO VISIT:  CONSTITUTIONAL - Pt looks like stated age, pleasant, not in acute distress. Not obese.  NEURO: Oriented to time, person, and places. No tremor. Normal gait.   ENT, MOUTH: Pupils are equal.  Sclerae are anicteric.  Moist oral mucosa. No oral thrush.   NECK:  No jugular venous distention.  No thyroid enlargement.   RESPIRATORY: talk nl, no sob during conversation, no cough.   MUSCULOSKELETAL/EXTREMITIES:  No edema.  No joint deformity. Normal range of motion.  SKIN:  No  "petechiae.  No rash.  No signs of cellulitis.   PSYCHIATRIC: Normal mood and affect. Good memory. Proper insight and judgement.   THE REST OF A COMPREHENSIVE PHYSICIAL EXAM IS DEFERRED DUE TO COVID-19 PUBLIC HEALTH EMERGENCY RELATED VIDEO VISIT RESCTRICTION.        CURRENT LAB DATA REVIEWED TODAY WITH PATIENT DURING VISIT:   Ferritin 14 in 6/2020, 10 in 1/2020, at 19 in 10/2019, at 15 in 5/2019 from 12 from 19 in 12/2018 from 7 in 10/2018    20 from 18 from 15 from 22 stable    Hb  > 12 from 12- 11.1 in 10/2019, at 10.7 in 9/2019  from 12 from 10.8, PL nl.       Old data reviewed with summary  3/2019 gastric and duodenal biopsy - Gastric antral-type mucosa with reactive/erosive gastropathy, peptic duodenitis.      FISH 12/2018 - Loss of 13q14 and rearrangement of IGH   Flow 10/2018 - CD5 postiive kappa monotypric B cell 56% with immunophenotype of CLL/SLL  ESR/LDH/Uric acid nl,     09/2018 white count of 21 and absolute lymphocyte count of 15-16 with new onset of normocytic anemia, hemoglobin around 11, MCV normal.    Smear 10/2018 - Peripheral Smear Morphology:   - Leukocytosis with absolute lymphocytosis and \"basket\" cells - see   comment   - Mild normocytic normochromic anemia; no morphologic evidence of   hemolysis.     Normal B12, folic acid.   iron saturation at 10% in 10/2018     US abs 10/16/2018   1. Liver and spleen sizes appear normal.  2. Indeterminate 2 regions of mildly increased echogenicity within the central gallbladder and gallbladder fundus. These could represent  areas of adherent sludge and potentially stones at the fundus region.  MRI was negative later.     touch of leukocytosis no diff back in 2006.  She was not anemic back in 2006 with hemoglobin of 12.5.          ASSESSMENT AND PLAN DISCUSSED WITH PATIENT TODAY DURING THE VIRTUAL VISIT:   1.  New mild to moderate leukocytosis/lymphocytosis in 2018.  Work up is most suggestive of CLL with Loss of 13q14 and rearrangement of IGH.     We " discussed the nature of CLL, and follow up plan.   She is currently in every 3 to 6 months interval follow-up      2.  On and off DEBORA.   GI work-up 1/2020 with [facility] identified multiple AVM in proximal small bowel and colon small bowel capsule studies.  She then had repeat upper endoscopy and colonoscopy with [facility]I did not find any obvious signs of bleeding, and cauterization.     She is to continue oral vitron c.     Virtual visit time is 25 minutes, spent in counseling regarding her CLL disease status, AVM, oral iron intake plan, follow-up plan.              Again, thank you for allowing me to participate in the care of your patient.        Sincerely,        Ngozi De Los Santos MD, MD

## 2020-06-11 NOTE — LETTER
"    6/11/2020         RE: Kelly Vegas  [address] [place] [id number]        Dear Colleague,    Thank you for referring your patient, Kelly Vegas, to the [facility]. Please see a copy of my visit note below.    Kelly Vegas is a 69 year old female who is being evaluated via a billable video visit.      The patient has been notified of following:     \"This video visit will be conducted via a call between you and your physician/provider. We have found that certain health care needs can be provided without the need for an in-person physical exam.  This service lets us provide the care you need with a video conversation.  If a prescription is necessary we can send it directly to your pharmacy.  If lab work is needed we can place an order for that and you can then stop by our lab to have the test done at a later time.    Video visits are billed at different rates depending on your insurance coverage.  Please reach out to your insurance provider with any questions.    If during the course of the call the physician/provider feels a video visit is not appropriate, you will not be charged for this service.\"    Patient has given verbal consent for Video visit? Yes  [phone]    Will anyone else be joining your video visit? No        Video-Visit Details      Aye Machado CMA          Type of Visit: Video Visit  Patient has given verbal consent for Video visit.     Video Start Time: 3:15 pm  Video End Time: 3: 40 pm    Originating Location (pt. Location): Home     Distant Location (provider location):  [facility]      Platform used for Video Visit: Doximity        HEMATOLOGY FOLLOW UP VISIT       PRIMARY PHYSICIAN:  Sejal Rutledge MD        CHIEF COMPLAINT AND REASON FOR VISIT:    CLL  anemia    HISTORY OF ONCOLOGY ILLNESS:     In 10/2018, she presented with Hematology office for consultation on new moderate leukocytosis/lymphocytosis and moderate normocytic normochromic anemia, found " to have low ferritin and iron saturation, which was suggestive of iron-deficiency anemia.     Data review indicating she had touch of leukocytosis no diff back in 2006.  She was not anemic back in 2006 with hemoglobin of 12.5.     Hem work up in 10/2018 was most consistent with CLL and DEBORA. She was on oral iron supplement. Hb is corrected 12/2018.   Upper endo found none bleeding erosive gastropathy, with negative colonoscopy.     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      She was off oral iron since 5/2019.   She had new anemia in 9/2019 with hb of 10.7, nl MCV, which is 1 wk post cholecystectomy.     Hb did not improve in 10/2019 when she was restarted on oral iron 10/2019. Stool occult was negative in 10/2019. Hb up to 12 end of Nov.    GI work-up 1/2020 with [facility] identified multiple AVM in proximal small bowel and colon small bowel capsule studies.  She then had repeat upper endoscopy and colonoscopy with [facility] did not find any obvious signs of bleeding, had cauterization.         INTERVAL HISTORY:   She is continued on oral iron.         PAST MEDICAL HISTORY/MEDICATIONS:  Reviewed in Epic system.      SOCIAL HISTORY:  She lives in [place].  Denies smoking, denies alcohol abuse.      FAMILY HISTORY:  Denied any family history of blood disease.  One maternal aunt had colon cancer.        REVIEW OF SYSTEMS:   Still reports fatigue.   She still works.        PHYSICAL EXAMINATION ATTAINABLE DURING VIDEO VISIT:  CONSTITUTIONAL - Pt looks like stated age, pleasant, not in acute distress. Not obese.  NEURO: Oriented to time, person, and places. No tremor. Normal gait.   ENT, MOUTH: Pupils are equal.  Sclerae are anicteric.  Moist oral mucosa. No oral thrush.   NECK:  No jugular venous distention.  No thyroid enlargement.   RESPIRATORY: talk nl, no sob during conversation, no cough.   MUSCULOSKELETAL/EXTREMITIES:  No edema.  No joint deformity. Normal range of motion.  SKIN:  No  "petechiae.  No rash.  No signs of cellulitis.   PSYCHIATRIC: Normal mood and affect. Good memory. Proper insight and judgement.   THE REST OF A COMPREHENSIVE PHYSICIAL EXAM IS DEFERRED DUE TO COVID-19 PUBLIC HEALTH EMERGENCY RELATED VIDEO VISIT RESCTRICTION.        CURRENT LAB DATA REVIEWED TODAY WITH PATIENT DURING VISIT:   Ferritin 14 in 6/2020, 10 in 1/2020, at 19 in 10/2019, at 15 in 5/2019 from 12 from 19 in 12/2018 from 7 in 10/2018    20 from 18 from 15 from 22 stable    Hb  > 12 from 12- 11.1 in 10/2019, at 10.7 in 9/2019  from 12 from 10.8, PL nl.       Old data reviewed with summary  3/2019 gastric and duodenal biopsy - Gastric antral-type mucosa with reactive/erosive gastropathy, peptic duodenitis.      FISH 12/2018 - Loss of 13q14 and rearrangement of IGH   Flow 10/2018 - CD5 postiive kappa monotypric B cell 56% with immunophenotype of CLL/SLL  ESR/LDH/Uric acid nl,     09/2018 white count of 21 and absolute lymphocyte count of 15-16 with new onset of normocytic anemia, hemoglobin around 11, MCV normal.    Smear 10/2018 - Peripheral Smear Morphology:   - Leukocytosis with absolute lymphocytosis and \"basket\" cells - see   comment   - Mild normocytic normochromic anemia; no morphologic evidence of   hemolysis.     Normal B12, folic acid.   iron saturation at 10% in 10/2018     US abs 10/16/2018   1. Liver and spleen sizes appear normal.  2. Indeterminate 2 regions of mildly increased echogenicity within the central gallbladder and gallbladder fundus. These could represent  areas of adherent sludge and potentially stones at the fundus region.  MRI was negative later.     touch of leukocytosis no diff back in 2006.  She was not anemic back in 2006 with hemoglobin of 12.5.          ASSESSMENT AND PLAN DISCUSSED WITH PATIENT TODAY DURING THE VIRTUAL VISIT:   1.  New mild to moderate leukocytosis/lymphocytosis in 2018.  Work up is most suggestive of CLL with Loss of 13q14 and rearrangement of IGH.     We " discussed the nature of CLL, and follow up plan.   She is currently in every 3 to 6 months interval follow-up      2.  On and off DEBORA.   GI work-up 1/2020 with [facility] identified multiple AVM in proximal small bowel and colon small bowel capsule studies.  She then had repeat upper endoscopy and colonoscopy with [facility] did not find any obvious signs of bleeding, and cauterization.     She is to continue oral vitron c.     Virtual visit time is 25 minutes, spent in counseling regarding her CLL disease status, AVM, oral iron intake plan, follow-up plan.              Again, thank you for allowing me to participate in the care of your patient.        Sincerely,        Ngozi De Los Santos MD, MD

## 2020-06-11 NOTE — PROGRESS NOTES
"Kelly Vegas is a 69 year old female who is being evaluated via a billable video visit.      The patient has been notified of following:     \"This video visit will be conducted via a call between you and your physician/provider. We have found that certain health care needs can be provided without the need for an in-person physical exam.  This service lets us provide the care you need with a video conversation.  If a prescription is necessary we can send it directly to your pharmacy.  If lab work is needed we can place an order for that and you can then stop by our lab to have the test done at a later time.    Video visits are billed at different rates depending on your insurance coverage.  Please reach out to your insurance provider with any questions.    If during the course of the call the physician/provider feels a video visit is not appropriate, you will not be charged for this service.\"    Patient has given verbal consent for Video visit? Yes  [phone]    Will anyone else be joining your video visit? No        Video-Visit Details      Aye Machado CMA        "

## 2020-07-14 ENCOUNTER — TELEPHONE (OUTPATIENT)
Dept: FAMILY MEDICINE | Facility: CLINIC | Age: 70
End: 2020-07-14

## 2020-07-14 NOTE — TELEPHONE ENCOUNTER
Reason for call:  Other   Patient called regarding (reason for call): call back  Additional comments: Patient is calling because she has diabetic paper work that needs to be filled out for her drivers license. Please call back     Phone number to reach patient:  Home number on file [phone] (home)    Best Time:  any    Can we leave a detailed message on this number?  YES    Travel screening: Not Applicable

## 2020-07-14 NOTE — TELEPHONE ENCOUNTER
Called patient let her know she can drop off at the clinic, mail or send through her MyChart or fax. She will fill out her part and drop off.  Margaret Katz,

## 2020-07-14 NOTE — TELEPHONE ENCOUNTER
Reason for Call:  Form, our goal is to have forms completed with 72 hours, however, some forms may require a visit or additional information.    Type of letter, form or note:  medical    Who is the form from?: Patient    Where did the form come from: Patient or family brought in       What clinic location was the form placed at?: [facility]    Where the form was placed: [facility] Box/Folder    What number is listed as a contact on the form?: [phone]       Additional comments: Please fax to [phone]    Call taken on 7/14/2020 at 2:57 PM by Gyala Ferguson

## 2020-07-23 DIAGNOSIS — D64.9 ANEMIA, UNSPECIFIED TYPE: ICD-10-CM

## 2020-07-26 DIAGNOSIS — F33.0 MAJOR DEPRESSIVE DISORDER, RECURRENT, MILD (H): ICD-10-CM

## 2020-07-27 RX ORDER — CITALOPRAM HYDROBROMIDE 20 MG/1
20 TABLET ORAL DAILY
Qty: 90 TABLET | Refills: 0 | Status: SHIPPED | OUTPATIENT
Start: 2020-07-27 | End: 2020-10-26

## 2020-07-27 ASSESSMENT — PATIENT HEALTH QUESTIONNAIRE - PHQ9: SUM OF ALL RESPONSES TO PHQ QUESTIONS 1-9: 2

## 2020-07-27 NOTE — TELEPHONE ENCOUNTER
Spoke to patient and PHQ9 completed.  .  PHQ-9 score:    PHQ 7/27/2020   PHQ-9 Total Score 2   Q9: Thoughts of better off dead/self-harm past 2 weeks Not at all         Gwendolyn HERNANDEZ CMA ([facility])

## 2020-08-06 ENCOUNTER — NURSE TRIAGE (OUTPATIENT)
Dept: FAMILY MEDICINE | Facility: CLINIC | Age: 70
End: 2020-08-06

## 2020-08-06 NOTE — TELEPHONE ENCOUNTER
Spoke with pt. 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  Coworker and her are in close proximity throughout the day. All the employees are to wear a face mask and they both were wearing masks. Last time she was around her coworker was 1 week ago. Has been checking temp and this has been ok, but sometimes feels feverish. Is tired, exhausted, and has some diarrhea. Has been noticing SOB when she goes to laundry room and has to go up the steps. If she does too much she can tell. Is tired all of the time and a lot of aches but this has been going on for > 3 months, but is now noticing the SOB more. No loss of taste or smell.   Blows her nose and has a lot of mucous and has had this for a couple of months. The mucous and aches have been getting progressively worse. Recommended pt do assessment through [email or web address]. Pt agrees with plan.    Gwendolyn Schafer RN  [facility]    Reason for Disposition    [1] Symptoms of COVID-19 (e.g., cough, fever, SOB, or others) AND [2] within 14 days of EXPOSURE (close contact) with diagnosed or suspected COVID-19 patient    HIGH RISK patient (e.g., age > 64 years, diabetes, heart or lung disease, weak immune system)    MILD difficulty breathing (e.g., minimal/no SOB at rest, SOB with walking, pulse <100)    Additional Information    Negative: SEVERE difficulty breathing (e.g., struggling for each breath, speaks in single words)    Negative: Difficult to awaken or acting confused (e.g., disoriented, slurred speech)    Negative: Bluish (or gray) lips or face now    Negative: Shock suspected (e.g., cold/pale/clammy skin, too weak to stand, low BP, rapid pulse)    Negative: Sounds like a life-threatening emergency to the triager    Negative: [1] COVID-19 exposure AND [2] no symptoms    Negative: COVID-19 and Breastfeeding, questions about    Negative: [1] Adult with possible COVID-19  symptoms AND [2] triager concerned about severity of symptoms or other causes    Negative: [1] Fever > 100.0 F (37.8 C) AND [2] bedridden (e.g., nursing home patient, CVA, chronic illness, recovering from surgery)    Negative: [1] Fever > 101 F (38.3 C) AND [2] age > 60    Negative: Fever > 103 F (39.4 C)    Negative: Chest pain or pressure    Negative: Patient sounds very sick or weak to the triager    Negative: SEVERE or constant chest pain or pressure (Exception: mild central chest pain, present only when coughing)    Negative: MODERATE difficulty breathing (e.g., speaks in phrases, SOB even at rest, pulse 100-120)    Protocols used: CORONAVIRUS (COVID-19) EXPOSURE-[facility] 5.16.20, CORONAVIRUS (COVID-19) DIAGNOSED OR [id number] 5.16.20

## 2020-08-06 NOTE — TELEPHONE ENCOUNTER
Reason for call:  Other   Patient called regarding (reason for call): call back  Additional comments: Patient calling stating that someone tested positive for COVID @ her work and wants to know what she should do. Please call to advise.     Phone number to reach patient:  Home number on file [phone] (home)    Best Time:  Any     Can we leave a detailed message on this number?  YES    Travel screening: Not Applicable

## 2020-08-07 ENCOUNTER — NURSE TRIAGE (OUTPATIENT)
Dept: NURSING | Facility: CLINIC | Age: 70
End: 2020-08-07

## 2020-08-07 NOTE — TELEPHONE ENCOUNTER
Clinic Action Needed: Covid-19 PCR Testing Order    [facility]NA Triage Call  Presenting Problem:    Kelly is requesting Covid-19 PCR testing.  She is unable to do [facility]    She reports that she has had close contact with a co-worker who tested POS for Covid-19. They were both wearing masks.  He was diagnosed 1 week ago.    Currently experiencing symptoms for 1 month or more:  - Body aches - 1 month  - Nasal congestion, Increased mucus - 1-2 months  - Fatigue, exhausted - 1-2 months  - Worsening diarrhea (2-3+ episodes a day); Hx of lesser diarrhea for more than a year    She is diabetic and 70 yrs of age.    Notified that message would be sent to provider  Advised to quarantine.    7:07 pm - Contacted patient; Warm transferred to scheduling for virtual urgent care appointment.    COVID 19 Nurse Triage Plan/Patient Instructions    Please be aware that novel coronavirus (COVID-19) may be circulating in the community. If you develop symptoms such as fever, cough, or SOB or if you have concerns about the presence of another infection including coronavirus (COVID-19), please contact your health care provider or visit [email or web address].     Disposition/Instructions    Virtual Visit with provider recommended. Reference Visit Selection Guide.    Thank you for taking steps to prevent the spread of this virus.  o Limit your contact with others.  o Wear a simple mask to cover your cough.  o Wash your hands well and often.    Resources    [facility]: About COVID-19: [email or web address]    CDC: What to Do If You're Sick: www.cdc.gov/coronavirus/2019-ncov/about/steps-when-sick.html    CDC: Ending Home Isolation: www.cdc.gov/coronavirus/2019-ncov/hcp/disposition-in-home-patients.html     CDC: Caring for Someone: www.cdc.gov/coronavirus/2019-ncov/if-you-are-sick/care-for-someone.html     [facility]: Interim Guidance for Hospital Discharge to Home: www.health.[facility].[place].us/diseases/coronavirus/hcp/hospdischarge.pdf    [facility]  [place] clinical trials (COVID-19 research studies): [email or web address]-[email or web address]-trials     Below are the COVID-19 hotlines at the [facility] ([facility]). Interpreters are available.   o For health questions: Call 714-175-0100 or 1-855.875.3124 (7 a.m. to 7 p.m.)  o For questions about schools and childcare: Call 871-865-1145 or 1-793.387.9019 (7 a.m. to 7 p.m.)   o   Guideline Used: Covid-19 Suspected    Routed to: P 03074      Aila Ross RN  [facility]      Reason for Disposition    [1] Symptoms of COVID-19 (e.g., cough, fever, SOB, or others) AND [2] within 14 days of EXPOSURE (close contact) with diagnosed or suspected COVID-19 patient    Additional Information    Negative: SEVERE difficulty breathing (e.g., struggling for each breath, speaks in single words)    Negative: Difficult to awaken or acting confused (e.g., disoriented, slurred speech)    Negative: Bluish (or gray) lips or face now    Negative: Shock suspected (e.g., cold/pale/clammy skin, too weak to stand, low BP, rapid pulse)    Negative: Sounds like a life-threatening emergency to the triager    Negative: SEVERE or constant chest pain or pressure (Exception: mild central chest pain, present only when coughing)    Negative: MODERATE difficulty breathing (e.g., speaks in phrases, SOB even at rest, pulse 100-120)    Negative: Patient sounds very sick or weak to the triager    Negative: MILD difficulty breathing (e.g., minimal/no SOB at rest, SOB with walking, pulse <100)    Negative: Chest pain or pressure    Negative: Fever > 103 F (39.4 C)    Negative: [1] Fever > 101 F (38.3 C) AND [2] age > 60    Negative: [1] Fever > 100.0 F (37.8 C) AND [2] bedridden (e.g., nursing home patient, CVA, chronic illness, recovering from surgery)    Protocols used: CORONAVIRUS (COVID-19) EXPOSURE-A- 5.16.20, CORONAVIRUS (COVID-19) DIAGNOSED OR [id number] 5.16.20

## 2020-08-11 DIAGNOSIS — Z20.822 EXPOSURE TO COVID-19 VIRUS: ICD-10-CM

## 2020-08-11 LAB
SARS-COV-2 RNA SPEC QL NAA+PROBE: NOT DETECTED
SPECIMEN SOURCE: NORMAL

## 2020-08-11 PROCEDURE — U0003 INFECTIOUS AGENT DETECTION BY NUCLEIC ACID (DNA OR RNA); SEVERE ACUTE RESPIRATORY SYNDROME CORONAVIRUS 2 (SARS-COV-2) (CORONAVIRUS DISEASE [COVID-19]), AMPLIFIED PROBE TECHNIQUE, MAKING USE OF HIGH THROUGHPUT TECHNOLOGIES AS DESCRIBED BY CMS-2020-01-R: HCPCS | Performed by: PHYSICIAN ASSISTANT

## 2020-08-18 ENCOUNTER — NURSE TRIAGE (OUTPATIENT)
Dept: NURSING | Facility: CLINIC | Age: 70
End: 2020-08-18

## 2020-08-18 NOTE — TELEPHONE ENCOUNTER
Coronavirus (COVID-19) Notification    Lab Result   Lab test 2019-nCoV rRt-PCR OR SARS-COV-2 PCR    Nasopharyngeal AND/OR Oropharyngeal swab is NEGATIVE for 2019-nCoV RNA [OR] SARS-COV-2 RNA (COVID-19) RNA    Your result was negative. This means that we didn't find the virus that causes COVID-19 in your sample. A test may show negative when you do actually have the virus. This can happen when the virus is in the early stages of infection, before you feel illness symptoms.    If you have symptoms   Stay home and away from others (self-isolate) until you meet ALL of the guidelines below:    You've had no fever--and no medicine that reduces fever--for 3 full days (72 hours). And      Your other symptoms have gotten better. For example, your cough or breathing has improved. And     At least 10 days have passed since your symptoms started.    During this time:    Stay home. Don't go to work, school or anywhere else.     Stay in your own room, including for meals. Use your own bathroom if you can.    Stay away from others in your home. No hugging, kissing or shaking hands. No visitors.    Clean  high touch  surfaces often (doorknobs, counters, handles, etc.). Use a household cleaning spray or wipes. You can find a full list on the EPA website at www.epa.gov/pesticide-registration/list-n-disinfectants-use-against-sars-cov-2.    Cover your mouth and nose with a mask, tissue or washcloth to avoid spreading germs.    Wash your hands and face often with soap and water.    Going back to work  Check with your employer for any guidelines to follow for going back to work.  You are sent a letter for your Employer which will serve as formal document notice that you, the employee, tested negative for COVID-19, as of the testing date shown above.    If your symptoms worsen or other concerning symptoms, contact PCP, oncare or consider returning to Emergency Dept.    Where can I get more information?    [facility]:  [email or web address]    Coronavirus Basics: www.health.[facility]./diseases/coronavirus/basics.html    [facility] Hotline ([phone])    {Name]  Miracle Damon RN

## 2020-08-27 ENCOUNTER — TRANSFERRED RECORDS (OUTPATIENT)
Dept: HEALTH INFORMATION MANAGEMENT | Facility: CLINIC | Age: 70
End: 2020-08-27

## 2020-08-27 LAB — RETINOPATHY: NORMAL

## 2020-09-21 ENCOUNTER — ANCILLARY PROCEDURE (OUTPATIENT)
Dept: MAMMOGRAPHY | Facility: CLINIC | Age: 70
End: 2020-09-21
Attending: FAMILY MEDICINE
Payer: COMMERCIAL

## 2020-09-21 DIAGNOSIS — Z12.31 VISIT FOR SCREENING MAMMOGRAM: ICD-10-CM

## 2020-09-21 PROCEDURE — 77067 SCR MAMMO BI INCL CAD: CPT | Mod: TC

## 2020-10-12 DIAGNOSIS — C91.10 CLL (CHRONIC LYMPHOCYTIC LEUKEMIA) (H): ICD-10-CM

## 2020-10-12 LAB
ANION GAP SERPL CALCULATED.3IONS-SCNC: 6 MMOL/L (ref 3–14)
BUN SERPL-MCNC: 18 MG/DL (ref 7–30)
CALCIUM SERPL-MCNC: 9 MG/DL (ref 8.5–10.1)
CHLORIDE SERPL-SCNC: 107 MMOL/L (ref 94–109)
CO2 SERPL-SCNC: 26 MMOL/L (ref 20–32)
CREAT SERPL-MCNC: 0.89 MG/DL (ref 0.52–1.04)
DIFFERENTIAL METHOD BLD: ABNORMAL
EOSINOPHIL # BLD AUTO: 0.2 10E9/L (ref 0–0.7)
EOSINOPHIL NFR BLD AUTO: 1 %
ERYTHROCYTE [DISTWIDTH] IN BLOOD BY AUTOMATED COUNT: 12.8 % (ref 10–15)
ERYTHROCYTE [SEDIMENTATION RATE] IN BLOOD BY WESTERGREN METHOD: 9 MM/H (ref 0–30)
GFR SERPL CREATININE-BSD FRML MDRD: 65 ML/MIN/{1.73_M2}
GLUCOSE SERPL-MCNC: 184 MG/DL (ref 70–99)
HCT VFR BLD AUTO: 38 % (ref 35–47)
HGB BLD-MCNC: 12.1 G/DL (ref 11.7–15.7)
LDH SERPL L TO P-CCNC: 175 U/L (ref 81–234)
LYMPHOCYTES # BLD AUTO: 12.2 10E9/L (ref 0.8–5.3)
LYMPHOCYTES NFR BLD AUTO: 72 %
MCH RBC QN AUTO: 30 PG (ref 26.5–33)
MCHC RBC AUTO-ENTMCNC: 31.8 G/DL (ref 31.5–36.5)
MCV RBC AUTO: 94 FL (ref 78–100)
MONOCYTES # BLD AUTO: 0.3 10E9/L (ref 0–1.3)
MONOCYTES NFR BLD AUTO: 2 %
NEUTROPHILS # BLD AUTO: 4.2 10E9/L (ref 1.6–8.3)
NEUTROPHILS NFR BLD AUTO: 25 %
PLATELET # BLD AUTO: 242 10E9/L (ref 150–450)
PLATELET # BLD EST: ABNORMAL 10*3/UL
POTASSIUM SERPL-SCNC: 3.8 MMOL/L (ref 3.4–5.3)
RBC # BLD AUTO: 4.03 10E12/L (ref 3.8–5.2)
RBC MORPH BLD: NORMAL
SODIUM SERPL-SCNC: 139 MMOL/L (ref 133–144)
VARIANT LYMPHS BLD QL SMEAR: PRESENT
WBC # BLD AUTO: 16.9 10E9/L (ref 4–11)

## 2020-10-12 PROCEDURE — 83615 LACTATE (LD) (LDH) ENZYME: CPT | Performed by: INTERNAL MEDICINE

## 2020-10-12 PROCEDURE — 36415 COLL VENOUS BLD VENIPUNCTURE: CPT | Performed by: INTERNAL MEDICINE

## 2020-10-12 PROCEDURE — 85652 RBC SED RATE AUTOMATED: CPT | Performed by: INTERNAL MEDICINE

## 2020-10-12 PROCEDURE — 85025 COMPLETE CBC W/AUTO DIFF WBC: CPT | Performed by: INTERNAL MEDICINE

## 2020-10-12 PROCEDURE — 80048 BASIC METABOLIC PNL TOTAL CA: CPT | Performed by: INTERNAL MEDICINE

## 2020-10-12 PROCEDURE — 82728 ASSAY OF FERRITIN: CPT | Performed by: INTERNAL MEDICINE

## 2020-10-13 LAB — FERRITIN SERPL-MCNC: 14 NG/ML (ref 8–252)

## 2020-10-20 ENCOUNTER — ONCOLOGY VISIT (OUTPATIENT)
Dept: ONCOLOGY | Facility: CLINIC | Age: 70
End: 2020-10-20
Attending: INTERNAL MEDICINE
Payer: COMMERCIAL

## 2020-10-20 VITALS
HEART RATE: 71 BPM | RESPIRATION RATE: 16 BRPM | OXYGEN SATURATION: 98 % | BODY MASS INDEX: 29.21 KG/M2 | SYSTOLIC BLOOD PRESSURE: 160 MMHG | DIASTOLIC BLOOD PRESSURE: 60 MMHG | WEIGHT: 171.1 LBS | HEIGHT: 64 IN | TEMPERATURE: 99 F

## 2020-10-20 DIAGNOSIS — Z86.2 HISTORY OF ANEMIA: ICD-10-CM

## 2020-10-20 DIAGNOSIS — Q28.2 AVM (ARTERIOVENOUS MALFORMATION) BRAIN: ICD-10-CM

## 2020-10-20 DIAGNOSIS — C91.10 CLL (CHRONIC LYMPHOCYTIC LEUKEMIA) (H): Primary | ICD-10-CM

## 2020-10-20 DIAGNOSIS — R53.83 OTHER FATIGUE: ICD-10-CM

## 2020-10-20 PROCEDURE — G0463 HOSPITAL OUTPT CLINIC VISIT: HCPCS

## 2020-10-20 PROCEDURE — 99214 OFFICE O/P EST MOD 30 MIN: CPT | Performed by: INTERNAL MEDICINE

## 2020-10-20 ASSESSMENT — PAIN SCALES - GENERAL: PAINLEVEL: NO PAIN (0)

## 2020-10-20 ASSESSMENT — MIFFLIN-ST. JEOR: SCORE: 1283.85

## 2020-10-20 NOTE — PROGRESS NOTES
HEMATOLOGY FOLLOW UP VISIT       PRIMARY PHYSICIAN:  Sejal Rutledge MD        CHIEF COMPLAINT AND REASON FOR VISIT:    CLL  AVM    HISTORY OF ONCOLOGY ILLNESS:     In 10/2018, she presented with Hematology office for consultation on new moderate leukocytosis/lymphocytosis and moderate normocytic normochromic anemia, found to have low ferritin and iron saturation, which was suggestive of iron-deficiency anemia.     Data review indicating she had touch of leukocytosis no diff back in 2006.  She was not anemic back in 2006 with hemoglobin of 12.5.     Hem work up in 10/2018 was most consistent with CLL and DEBORA. She was on oral iron supplement. Hb is corrected 12/2018.   Upper endo found none bleeding erosive gastropathy, with negative colonoscopy.     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      She was off oral iron since 5/2019.   She had new anemia in 9/2019 with hb of 10.7, nl MCV, which is 1 wk post cholecystectomy.     Hb did not improve in 10/2019 when she was restarted on oral iron 10/2019. Stool occult was negative in 10/2019. Hb up to 12 end of Nov.    GI work-up 1/2020 with [facility] identified multiple AVM in proximal small bowel and colon small bowel capsule studies.  She then had repeat upper endoscopy and colonoscopy with [facility]INGI did not find any obvious signs of bleeding, had cauterization.         INTERVAL HISTORY:   She is continued on oral iron.         PAST MEDICAL HISTORY/MEDICATIONS:  Reviewed in Epic system.      SOCIAL HISTORY:  She lives in [place].  Denies smoking, denies alcohol abuse.      FAMILY HISTORY:  Denied any family history of blood disease.  One maternal aunt had colon cancer.        REVIEW OF SYSTEMS:   Still reports fatigue, gaining weight   She still works.    She has GI upset from oral iron, she can only do one tap per day.         PHYSICAL EXAMINATION:   VITAL SIGNS: Blood pressure (!) 160/60, pulse 71, temperature 99  F (37.2  C), temperature source  "Oral, resp. rate 16, height 1.638 m (5' 4.49\"), weight 77.6 kg (171 lb 1.6 oz), SpO2 98 %, not currently breastfeeding.       GENERAL APPEARANCE:  looks like her stated age, very pleasant, not in acute distress.     ECO    ENT, MOUTH: Pupils are equally reactive to light.  Sclerae are anicteric.  Moist oral mucosa without lesion or ulcer.   Negative pharynx.  No oral thrush.   NECK:  Supple.  No jugular venous distention.  Thyroid is not palpable.   LYMPH NODES:  Superficial lymphadenopathy is not appreciable in the bilateral cervical, supraclavicular, axillary or inguinal areas.   CARDIOVASCULAR:  S1, S2 regular with no murmurs or gallops.  No carotid or abdominal bruits.   PULMONARY:  Lungs are clear to auscultation and percussion bilaterally.  There is no wheezing or rhonchi.   GASTROINTESTINAL:  Abdomen is soft, nontender.  No hepatosplenomegaly.  No signs of ascites.  No mass appreciable.   MUSCULOSKELETAL/EXTREMITIES:  No edema.  No cyanotic changes.  No signs of joint deformity.  No lymphedema.   NEUROLOGIC:  Cranial nerves II-XII are grossly intact.  Sensation intact.  Muscle strength and muscle tone symmetrical, 5/5 throughout.   BACK  No spinal or paraspinal tenderness.  No CVA tenderness.   SKIN:  No petechiae.  No rash.  No signs of cellulitis.   PSYCHIATRIC: Oriented to time, person, and places. Normal mood and affect. Good memory. Proper insight and judgement.         CURRENT LAB DATA REVIEWED TODAY:   Ferritin 14 in -10/2020, 10 in 2020, at 19 in 10/2019, at 15 in 2019 from 12 from 19 in 2018 from 7 in 10/2018    Wbc 17 from 20 from 18 from 15 from 22 stable    Hb  > 12 from 12- 11.1 in 10/2019, at 10.7 in 2019  from 12 from 10.8, PL nl.       Old data reviewed with summary  3/2019 gastric and duodenal biopsy - Gastric antral-type mucosa with reactive/erosive gastropathy, peptic duodenitis.      FISH 2018 - Loss of 13q14 and rearrangement of IGH   Flow 10/2018 - CD5 postiive kappa " "monotypric B cell 56% with immunophenotype of CLL/SLL  ESR/LDH/Uric acid nl,     09/2018 white count of 21 and absolute lymphocyte count of 15-16 with new onset of normocytic anemia, hemoglobin around 11, MCV normal.    Smear 10/2018 - Peripheral Smear Morphology:   - Leukocytosis with absolute lymphocytosis and \"basket\" cells - see   comment   - Mild normocytic normochromic anemia; no morphologic evidence of   hemolysis.     Normal B12, folic acid.   iron saturation at 10% in 10/2018     US abs 10/16/2018   1. Liver and spleen sizes appear normal.  2. Indeterminate 2 regions of mildly increased echogenicity within the central gallbladder and gallbladder fundus. These could represent  areas of adherent sludge and potentially stones at the fundus region.  MRI was negative later.     touch of leukocytosis no diff back in 2006.  She was not anemic back in 2006 with hemoglobin of 12.5.          ASSESSMENT AND PLAN:   1.  New mild to moderate leukocytosis/lymphocytosis in 2018.  Work up was most suggestive of CLL with Loss of 13q14 and rearrangement of IGH.     We discussed the nature of CLL, and follow up plan.   She is currently in every 3 to 6 months interval follow-up      2.  On and off DEBORA.   GI work-up 1/2020 with [facility] identified multiple AVM in proximal small bowel and colon small bowel capsule studies.  She then had repeat upper endoscopy and colonoscopy with [facility] did not find any obvious signs of bleeding, and cauterization.     She is to continue oral vitron C.       3. Very tired, + weight gain.   Informed the fatigue is not from her CLL neither iron deficiency anemia.  Advice to check TSH.                "

## 2020-10-20 NOTE — PROGRESS NOTES
"Oncology Rooming Note    October 20, 2020 3:12 PM   Kelly Vegas is a 70 year old female who presents for:    Chief Complaint   Patient presents with     Oncology Clinic Visit     Return CLL (chronic lymphocytic leukemia)      Initial Vitals: BP (!) 160/60 (BP Location: Left arm, Patient Position: Sitting, Cuff Size: Adult Regular)   Pulse 71   Temp 99  F (37.2  C) (Oral)   Resp 16   Ht 1.638 m (5' 4.49\")   Wt 77.6 kg (171 lb 1.6 oz)   SpO2 98%   BMI 28.93 kg/m   Estimated body mass index is 28.93 kg/m  as calculated from the following:    Height as of this encounter: 1.638 m (5' 4.49\").    Weight as of this encounter: 77.6 kg (171 lb 1.6 oz). Body surface area is 1.88 meters squared.  No Pain (0) Comment: Data Unavailable   No LMP recorded. Patient has had a hysterectomy.  Allergies reviewed: Yes  Medications reviewed: Yes    Medications: Medication refills not needed today.  Pharmacy name entered into [facility]: [facility] [address], [place] - [address] [place]    Clinical concerns: Return CLL (chronic lymphocytic leukemia), Anemia      Aye Machado, SUKHDEEP              "

## 2020-10-20 NOTE — LETTER
"    10/20/2020         RE: Kelly Vegas  [address] [address]        Dear Colleague,    Thank you for referring your patient, Kelly Vegas, to the [facility]. Please see a copy of my visit note below.    Oncology Rooming Note    October 20, 2020 3:12 PM   Kelly Vegas is a 70 year old female who presents for:    Chief Complaint   Patient presents with     Oncology Clinic Visit     Return CLL (chronic lymphocytic leukemia)      Initial Vitals: BP (!) 160/60 (BP Location: Left arm, Patient Position: Sitting, Cuff Size: Adult Regular)   Pulse 71   Temp 99  F (37.2  C) (Oral)   Resp 16   Ht 1.638 m (5' 4.49\")   Wt 77.6 kg (171 lb 1.6 oz)   SpO2 98%   BMI 28.93 kg/m   Estimated body mass index is 28.93 kg/m  as calculated from the following:    Height as of this encounter: 1.638 m (5' 4.49\").    Weight as of this encounter: 77.6 kg (171 lb 1.6 oz). Body surface area is 1.88 meters squared.  No Pain (0) Comment: Data Unavailable   No LMP recorded. Patient has had a hysterectomy.  Allergies reviewed: Yes  Medications reviewed: Yes    Medications: Medication refills not needed today.  Pharmacy name entered into [facility]: [facility] [address] [facility], [place] - [address] [place]    Clinical concerns: Return CLL (chronic lymphocytic leukemia), Anemia      Aye Machado, [facility]                  HEMATOLOGY FOLLOW UP VISIT       PRIMARY PHYSICIAN:  Sejal Rutledge MD        CHIEF COMPLAINT AND REASON FOR VISIT:    CLL  AVM    HISTORY OF ONCOLOGY ILLNESS:     In 10/2018, she presented with Hematology office for consultation on new moderate leukocytosis/lymphocytosis and moderate normocytic normochromic anemia, found to have low ferritin and iron saturation, which was suggestive of iron-deficiency anemia.     Data review indicating she had touch of leukocytosis no diff back in 2006.  She was not anemic back in 2006 with hemoglobin of 12.5.     Hem work up in 10/2018 was " "most consistent with CLL and DEBORA. She was on oral iron supplement. Hb is corrected 2018.   Upper endo found none bleeding erosive gastropathy, with negative colonoscopy.     FISH 2018 found Loss of 13q14 and rearrangement of IGH.      She was off oral iron since 2019.   She had new anemia in 2019 with hb of 10.7, nl MCV, which is 1 wk post cholecystectomy.     Hb did not improve in 10/2019 when she was restarted on oral iron 10/2019. Stool occult was negative in 10/2019. Hb up to 12 end of Nov.    GI work-up 2020 with [facility] identified multiple AVM in proximal small bowel and colon small bowel capsule studies.  She then had repeat upper endoscopy and colonoscopy with [facility] did not find any obvious signs of bleeding, had cauterization.         INTERVAL HISTORY:   She is continued on oral iron.         PAST MEDICAL HISTORY/MEDICATIONS:  Reviewed in Epic system.      SOCIAL HISTORY:  She lives in [place].  Denies smoking, denies alcohol abuse.      FAMILY HISTORY:  Denied any family history of blood disease.  One maternal aunt had colon cancer.        REVIEW OF SYSTEMS:   Still reports fatigue, gaining weight   She still works.    She has GI upset from oral iron, she can only do one tap per day.         PHYSICAL EXAMINATION:   VITAL SIGNS: Blood pressure (!) 160/60, pulse 71, temperature 99  F (37.2  C), temperature source Oral, resp. rate 16, height 1.638 m (5' 4.49\"), weight 77.6 kg (171 lb 1.6 oz), SpO2 98 %, not currently breastfeeding.       GENERAL APPEARANCE:  looks like her stated age, very pleasant, not in acute distress.     ECO    ENT, MOUTH: Pupils are equally reactive to light.  Sclerae are anicteric.  Moist oral mucosa without lesion or ulcer.   Negative pharynx.  No oral thrush.   NECK:  Supple.  No jugular venous distention.  Thyroid is not palpable.   LYMPH NODES:  Superficial lymphadenopathy is not appreciable in the bilateral cervical, supraclavicular, axillary or inguinal " "areas.   CARDIOVASCULAR:  S1, S2 regular with no murmurs or gallops.  No carotid or abdominal bruits.   PULMONARY:  Lungs are clear to auscultation and percussion bilaterally.  There is no wheezing or rhonchi.   GASTROINTESTINAL:  Abdomen is soft, nontender.  No hepatosplenomegaly.  No signs of ascites.  No mass appreciable.   MUSCULOSKELETAL/EXTREMITIES:  No edema.  No cyanotic changes.  No signs of joint deformity.  No lymphedema.   NEUROLOGIC:  Cranial nerves II-XII are grossly intact.  Sensation intact.  Muscle strength and muscle tone symmetrical, 5/5 throughout.   BACK  No spinal or paraspinal tenderness.  No CVA tenderness.   SKIN:  No petechiae.  No rash.  No signs of cellulitis.   PSYCHIATRIC: Oriented to time, person, and places. Normal mood and affect. Good memory. Proper insight and judgement.         CURRENT LAB DATA REVIEWED TODAY:   Ferritin 14 in 6-10/2020, 10 in 1/2020, at 19 in 10/2019, at 15 in 5/2019 from 12 from 19 in 12/2018 from 7 in 10/2018    Wbc 17 from 20 from 18 from 15 from 22 stable    Hb  > 12 from 12- 11.1 in 10/2019, at 10.7 in 9/2019  from 12 from 10.8, PL nl.       Old data reviewed with summary  3/2019 gastric and duodenal biopsy - Gastric antral-type mucosa with reactive/erosive gastropathy, peptic duodenitis.      FISH 12/2018 - Loss of 13q14 and rearrangement of IGH   Flow 10/2018 - CD5 postiive kappa monotypric B cell 56% with immunophenotype of CLL/SLL  ESR/LDH/Uric acid nl,     09/2018 white count of 21 and absolute lymphocyte count of 15-16 with new onset of normocytic anemia, hemoglobin around 11, MCV normal.    Smear 10/2018 - Peripheral Smear Morphology:   - Leukocytosis with absolute lymphocytosis and \"basket\" cells - see   comment   - Mild normocytic normochromic anemia; no morphologic evidence of   hemolysis.     Normal B12, folic acid.   iron saturation at 10% in 10/2018     US abs 10/16/2018   1. Liver and spleen sizes appear normal.  2. Indeterminate 2 regions of " mildly increased echogenicity within the central gallbladder and gallbladder fundus. These could represent  areas of adherent sludge and potentially stones at the fundus region.  MRI was negative later.     touch of leukocytosis no diff back in 2006.  She was not anemic back in 2006 with hemoglobin of 12.5.          ASSESSMENT AND PLAN:   1.  New mild to moderate leukocytosis/lymphocytosis in 2018.  Work up was most suggestive of CLL with Loss of 13q14 and rearrangement of IGH.     We discussed the nature of CLL, and follow up plan.   She is currently in every 3 to 6 months interval follow-up      2.  On and off DEBORA.   GI work-up 1/2020 with [facility] identified multiple AVM in proximal small bowel and colon small bowel capsule studies.  She then had repeat upper endoscopy and colonoscopy with MINGI did not find any obvious signs of bleeding, and cauterization.     She is to continue oral vitron C.       3. Very tired, + weight gain.   Informed the fatigue is not from her CLL neither iron deficiency anemia.  Advice to check TSH.                    Again, thank you for allowing me to participate in the care of your patient.        Sincerely,        Ngozi De Los Santos MD, MD

## 2020-10-25 DIAGNOSIS — F33.0 MAJOR DEPRESSIVE DISORDER, RECURRENT, MILD (H): ICD-10-CM

## 2020-10-26 RX ORDER — CITALOPRAM HYDROBROMIDE 20 MG/1
20 TABLET ORAL DAILY
Qty: 90 TABLET | Refills: 0 | Status: SHIPPED | OUTPATIENT
Start: 2020-10-26 | End: 2021-01-04

## 2020-10-26 NOTE — TELEPHONE ENCOUNTER
citalopram (CELEXA) 20 MG tablet 90 tablet 0 7/27/2020  No   Sig - Route: Take 1 tablet (20 mg) by mouth daily - Oral

## 2020-11-17 ENCOUNTER — NURSE TRIAGE (OUTPATIENT)
Dept: NURSING | Facility: CLINIC | Age: 70
End: 2020-11-17

## 2020-11-17 NOTE — TELEPHONE ENCOUNTER
Triage Call:    Patient's son lives with her and tested positive for COVID last Thursday 11/12/2020.  Patient has some contact with son, with masks.  RN advised of isolation recommendations.  RN recommended going to [facility] website on when to quarantine for further information on quarantine recommendations.   Patient needs to be tested for COVID for work.    Rn transferred patient to [place] in scheduling to make phone visit for patient to discuss.  Pt prefers to do phone over video visit with provider.     Pt was advised of protocol recommendation/disposition of call telemedicine provider within 24 hours.     Keri Mercedes RN 11/17/20 8:57 AM  [facility] Nurse Advisor    COVID 19 Nurse Triage Plan/Patient Instructions    Please be aware that novel coronavirus (COVID-19) may be circulating in the community. If you develop symptoms such as fever, cough, or SOB or if you have concerns about the presence of another infection including coronavirus (COVID-19), please contact your health care provider or visit [email or web address].     Disposition/Instructions    Virtual Visit with provider recommended. Reference Visit Selection Guide.    Thank you for taking steps to prevent the spread of this virus.  o Limit your contact with others.  o Wear a simple mask to cover your cough.  o Wash your hands well and often.    Resources    [facility]: About COVID-19: [email or web address]    CDC: What to Do If You're Sick: www.cdc.gov/coronavirus/2019-ncov/about/steps-when-sick.html    CDC: Ending Home Isolation: www.cdc.gov/coronavirus/2019-ncov/hcp/disposition-in-home-patients.html     CDC: Caring for Someone: www.cdc.gov/coronavirus/2019-ncov/if-you-are-sick/care-for-someone.html     [facility]: Interim Guidance for Hospital Discharge to Home: www.health.[facility].[place]/diseases/coronavirus/hcp/hospdischarge.pdf    [facility] clinical trials (COVID-19 research studies): [email or web address].[email or web address]/umn-[email or web address]-trials      Below are the COVID-19 hotlines at the [place] [facility] ([facility]). Interpreters are available.   o For health questions: Call 956-990-1844 or 1-768.730.9915 (7 a.m. to 7 p.m.)  o For questions about schools and childcare: Call 126-875-0456 or 1-438.858.3073 (7 a.m. to 7 p.m.)                   Reason for Disposition    [1] COVID-19 EXPOSURE (Close Contact) within last 14 days AND [2] needs COVID-19 lab test to return to work AND [3] NO symptoms    Additional Information    Negative: COVID-19 has been diagnosed by a healthcare provider (HCP)    Negative: COVID-19 lab test positive    Negative: [1] Symptoms of COVID-19 (e.g., cough, fever, SOB, or others) AND [2] lives in an area with community spread    Negative: [1] Symptoms of COVID-19 (e.g., cough, fever, SOB, or others) AND [2] within 14 days of EXPOSURE (close contact) with diagnosed or suspected COVID-19 patient    Negative: [1] Symptoms of COVID-19 (e.g., cough, fever, SOB, or others) AND [2] within 14 days of travel from high-risk area for COVID-19 community spread (identified by CDC)    Negative: [1] Difficulty breathing (shortness of breath) occurs AND [2] onset > 14 days after COVID-19 EXPOSURE (Close Contact) AND [3] no community spread where patient lives    Negative: [1] Dry cough occurs AND [2] onset > 14 days after COVID-19 EXPOSURE AND [3] no community spread where patient lives    Negative: [1] Wet cough (i.e., white-yellow, yellow, green, or vargas colored sputum) AND [2] onset > 14 days after COVID-19 EXPOSURE AND [3] no community spread where patient lives    Negative: [1] Common cold symptoms AND [2] onset > 14 days after COVID-19 EXPOSURE AND [3] no community spread where patient lives    Protocols used: CORONAVIRUS (COVID-19) EXPOSURE-A- 8.4.20

## 2020-11-25 DIAGNOSIS — E78.5 HYPERLIPIDEMIA WITH TARGET LDL LESS THAN 100: ICD-10-CM

## 2020-11-25 RX ORDER — ATORVASTATIN CALCIUM 20 MG/1
20 TABLET, FILM COATED ORAL DAILY
Qty: 30 TABLET | Refills: 0 | Status: SHIPPED | OUTPATIENT
Start: 2020-11-25 | End: 2021-01-04

## 2020-11-29 ENCOUNTER — HEALTH MAINTENANCE LETTER (OUTPATIENT)
Age: 70
End: 2020-11-29

## 2020-11-29 DIAGNOSIS — E11.42 TYPE 2 DIABETES MELLITUS WITH DIABETIC POLYNEUROPATHY, WITH LONG-TERM CURRENT USE OF INSULIN (H): ICD-10-CM

## 2020-11-29 DIAGNOSIS — Z79.4 TYPE 2 DIABETES MELLITUS WITH DIABETIC POLYNEUROPATHY, WITH LONG-TERM CURRENT USE OF INSULIN (H): ICD-10-CM

## 2020-12-01 RX ORDER — FLURBIPROFEN SODIUM 0.3 MG/ML
SOLUTION/ DROPS OPHTHALMIC
Qty: 100 EACH | Refills: 0 | Status: SHIPPED | OUTPATIENT
Start: 2020-12-01 | End: 2021-01-26

## 2021-01-04 ENCOUNTER — OFFICE VISIT (OUTPATIENT)
Dept: FAMILY MEDICINE | Facility: CLINIC | Age: 71
End: 2021-01-04
Payer: COMMERCIAL

## 2021-01-04 VITALS
SYSTOLIC BLOOD PRESSURE: 130 MMHG | HEIGHT: 64 IN | DIASTOLIC BLOOD PRESSURE: 66 MMHG | HEART RATE: 86 BPM | TEMPERATURE: 98.6 F | OXYGEN SATURATION: 98 % | BODY MASS INDEX: 29.19 KG/M2 | WEIGHT: 171 LBS

## 2021-01-04 DIAGNOSIS — Z79.4 TYPE 2 DIABETES MELLITUS WITH DIABETIC POLYNEUROPATHY, WITH LONG-TERM CURRENT USE OF INSULIN (H): Primary | ICD-10-CM

## 2021-01-04 DIAGNOSIS — C91.10 CLL (CHRONIC LYMPHOCYTIC LEUKEMIA) (H): ICD-10-CM

## 2021-01-04 DIAGNOSIS — F33.0 MAJOR DEPRESSIVE DISORDER, RECURRENT, MILD (H): ICD-10-CM

## 2021-01-04 DIAGNOSIS — E11.42 TYPE 2 DIABETES MELLITUS WITH DIABETIC POLYNEUROPATHY, WITH LONG-TERM CURRENT USE OF INSULIN (H): Primary | ICD-10-CM

## 2021-01-04 DIAGNOSIS — G56.03 BILATERAL CARPAL TUNNEL SYNDROME: ICD-10-CM

## 2021-01-04 DIAGNOSIS — E03.9 ACQUIRED HYPOTHYROIDISM: ICD-10-CM

## 2021-01-04 DIAGNOSIS — U07.1 CLINICAL DIAGNOSIS OF COVID-19: ICD-10-CM

## 2021-01-04 DIAGNOSIS — E11.3299 BACKGROUND DIABETIC RETINOPATHY (H): ICD-10-CM

## 2021-01-04 DIAGNOSIS — M25.511 CHRONIC PAIN OF BOTH SHOULDERS: ICD-10-CM

## 2021-01-04 DIAGNOSIS — E78.5 HYPERLIPIDEMIA WITH TARGET LDL LESS THAN 100: ICD-10-CM

## 2021-01-04 DIAGNOSIS — G89.29 CHRONIC PAIN OF BOTH SHOULDERS: ICD-10-CM

## 2021-01-04 DIAGNOSIS — M25.512 CHRONIC PAIN OF BOTH SHOULDERS: ICD-10-CM

## 2021-01-04 DIAGNOSIS — R06.00 DYSPNEA, UNSPECIFIED TYPE: ICD-10-CM

## 2021-01-04 PROBLEM — E61.1 IRON DEFICIENCY: Status: ACTIVE | Noted: 2020-02-11

## 2021-01-04 PROBLEM — R41.3 MEMORY PROBLEM: Status: ACTIVE | Noted: 2018-09-24

## 2021-01-04 LAB
CREAT UR-MCNC: 177 MG/DL
HBA1C MFR BLD: 8.4 % (ref 0–5.6)
MICROALBUMIN UR-MCNC: 11 MG/L
MICROALBUMIN/CREAT UR: 6.44 MG/G CR (ref 0–25)

## 2021-01-04 PROCEDURE — 99214 OFFICE O/P EST MOD 30 MIN: CPT | Mod: 25 | Performed by: FAMILY MEDICINE

## 2021-01-04 PROCEDURE — G0008 ADMIN INFLUENZA VIRUS VAC: HCPCS | Performed by: FAMILY MEDICINE

## 2021-01-04 PROCEDURE — 90662 IIV NO PRSV INCREASED AG IM: CPT | Performed by: FAMILY MEDICINE

## 2021-01-04 PROCEDURE — 84443 ASSAY THYROID STIM HORMONE: CPT | Performed by: FAMILY MEDICINE

## 2021-01-04 PROCEDURE — 82043 UR ALBUMIN QUANTITATIVE: CPT | Performed by: FAMILY MEDICINE

## 2021-01-04 PROCEDURE — 36415 COLL VENOUS BLD VENIPUNCTURE: CPT | Performed by: FAMILY MEDICINE

## 2021-01-04 PROCEDURE — 83036 HEMOGLOBIN GLYCOSYLATED A1C: CPT | Performed by: FAMILY MEDICINE

## 2021-01-04 PROCEDURE — 96127 BRIEF EMOTIONAL/BEHAV ASSMT: CPT | Performed by: FAMILY MEDICINE

## 2021-01-04 PROCEDURE — 80061 LIPID PANEL: CPT | Performed by: FAMILY MEDICINE

## 2021-01-04 RX ORDER — CITALOPRAM HYDROBROMIDE 20 MG/1
20 TABLET ORAL DAILY
Qty: 90 TABLET | Refills: 1 | Status: SHIPPED | OUTPATIENT
Start: 2021-01-04 | End: 2021-07-13

## 2021-01-04 RX ORDER — ATORVASTATIN CALCIUM 20 MG/1
20 TABLET, FILM COATED ORAL DAILY
Qty: 90 TABLET | Refills: 3 | Status: SHIPPED | OUTPATIENT
Start: 2021-01-04 | End: 2021-11-03

## 2021-01-04 RX ORDER — METFORMIN HCL 500 MG
2000 TABLET, EXTENDED RELEASE 24 HR ORAL
Qty: 360 TABLET | Refills: 3 | Status: SHIPPED | OUTPATIENT
Start: 2021-01-04 | End: 2021-12-14

## 2021-01-04 ASSESSMENT — PATIENT HEALTH QUESTIONNAIRE - PHQ9: SUM OF ALL RESPONSES TO PHQ QUESTIONS 1-9: 8

## 2021-01-04 ASSESSMENT — MIFFLIN-ST. JEOR: SCORE: 1275.65

## 2021-01-04 NOTE — PROGRESS NOTES
"  Assessment & Plan     Type 2 diabetes mellitus with diabetic polyneuropathy, with long-term current use of insulin (H)  Background diabetic retinopathy (H)  -uncontrolled; increase insulin per result note   - OPTOMETRY REFERRAL    Bilateral carpal tunnel syndrome  -prior trial of wrist splints; patient is hoping to avoid surgery   - EMG; Future  - NEUROLOGY ADULT REFERRAL    Hyperlipidemia with target LDL less than 100  -Well controlled with medications without side effects.   - Lipid panel reflex to direct LDL Non-fasting  - atorvastatin (LIPITOR) 20 MG tablet; Take 1 tablet (20 mg) by mouth daily    CLL (chronic lymphocytic leukemia) (H)  -stable under care of oncology     Chronic pain of both shoulders  -consistent with impingement syndrome   - [facility] PT, HAND, AND CHIROPRACTIC REFERRAL; Future  -Over the counter pain medication as needed, ice or heat as she finds helpful, avoidance of aggravating activities, and return for persistence for consideration of further imaging or referral.     Major depressive disorder, recurrent, mild (H)  -Well controlled with medications without side effects.   - citalopram (CELEXA) 20 MG tablet; Take 1 tablet (20 mg) by mouth daily    Acquired hypothyroidism  -euthyroid on replacement   - TSH WITH FREE T4 REFLEX    Dyspnea, unspecified type  Clinical diagnosis of COVID-19 (November 2019)  -consider rehabilitation or post COVID referral   - Echocardiogram Complete; Future  -Call or return to clinic as needed if these symptoms worsen or fail to improve as anticipated.     Review of the result(s) of each unique test - outside COVID test                   BMI:   Estimated body mass index is 29.35 kg/m  as calculated from the following:    Height as of this encounter: 1.626 m (5' 4\").    Weight as of this encounter: 77.6 kg (171 lb).         See Patient Instructions    Return in about 1 month (around 2/4/2021) for Wellness visit.    Sejal Rutledge MD  [facility] " WILBERTO Parkinson     Kelly Vegas is a 70 year old female who presents to clinic today for the following health issues     HPI       Chief Complaint   Patient presents with     Numbness     in hands     Tingling     in legs     Derm Problem     Spot on left shoulder, back     Flu Shot     Imm/Inj     Flu Shot       Diabetes Follow-up    How often are you checking your blood sugar? Three times daily  Blood sugar testing frequency justification:  Uncontrolled diabetes  What time of day are you checking your blood sugars (select all that apply)?  Before and after meals    What concerns do you have today about your diabetes? Other: numbness and tingling      Do you have any of these symptoms? (Select all that apply)  Numbness in feet    Have you had a diabetic eye exam in the last 12 months? No        BP Readings from Last 2 Encounters:   01/04/21 130/66   10/20/20 (!) 160/60     Hemoglobin A1C (%)   Date Value   01/04/2021 8.4 (H)   02/11/2020 7.5 (H)     LDL Cholesterol Calculated (mg/dL)   Date Value   03/26/2019 56   04/11/2018 27               Hyperlipidemia Follow-Up      Are you regularly taking any medication or supplement to lower your cholesterol?   Yes- statin    Are you having muscle aches or other side effects that you think could be caused by your cholesterol lowering medication?  No    Depression Followup    How are you doing with your depression since your last visit? Worsened - group home planning is stressful    Are you having other symptoms that might be associated with depression? No    Have you had a significant life event?  OTHER: selling house for group home      Are you feeling anxious or having panic attacks?   No    Do you have any concerns with your use of alcohol or other drugs? No    Social History     Tobacco Use     Smoking status: Former Smoker     Packs/day: 0.50     Years: 20.00     Pack years: 10.00     Types: Cigarettes     Start date: 7/8/1967     Quit date: 1/1/1989      "Years since quittin.0     Smokeless tobacco: Never Used   Substance Use Topics     Alcohol use: No     Drug use: No     PHQ 2019   PHQ-9 Total Score 3 2 8   Q9: Thoughts of better off dead/self-harm past 2 weeks Not at all Not at all Not at all     PRATIBHA-7 SCORE 12/10/2015 2016 2017   Total Score 4 7 6     Last PHQ-9 2021   1.  Little interest or pleasure in doing things 1   2.  Feeling down, depressed, or hopeless 1   3.  Trouble falling or staying asleep, or sleeping too much 1   4.  Feeling tired or having little energy 2   5.  Poor appetite or overeating 1   6.  Feeling bad about yourself 0   7.  Trouble concentrating 1   8.  Moving slowly or restless 1   Q9: Thoughts of better off dead/self-harm past 2 weeks 0   PHQ-9 Total Score 8   Difficulty at work, home, or with people -         Suicide Assessment Five-step Evaluation and Treatment (SAFE-T)    Hypothyroidism Follow-up      Since last visit, patient describes the following symptoms: Weight stable, no hair loss, no skin changes, POSITIVE for diarrhea      Review of Systems   CONSTITUTIONAL: NEGATIVE for fever, chills, change in weight  INTEGUMENTARY/SKIN: NEGATIVE for worrisome rashes, moles or lesions  EYES: NEGATIVE for vision changes or irritation  RESP:dyspnea on exertion  CV: NEGATIVE for chest pain, palpitations or peripheral edema  GI: diarrhea  MUSCULOSKELETAL: NEGATIVE for significant arthralgias or myalgia  NEURO: dysesthesias  ENDOCRINE: Hx diabetes and Hx thyroid disease  HEME: NEGATIVE for bleeding problems  PSYCHIATRIC: HX depression      Objective    /66   Pulse 86   Temp 98.6  F (37  C) (Oral)   Ht 1.626 m (5' 4\")   Wt 77.6 kg (171 lb)   SpO2 98%   Breastfeeding No   BMI 29.35 kg/m    Body mass index is 29.35 kg/m .  Physical Exam   GENERAL: alert, no distress and obese  NECK: no adenopathy, no asymmetry, masses, or scars and thyroid normal to palpation  RESP: lungs clear to auscultation - " no rales, rhonchi or wheezes  CV: regular rate and rhythm, normal S1 S2, no S3 or S4, no murmur, click or rub, no peripheral edema    MS: no gross musculoskeletal defects noted, no edema  SKIN: keratoses - seborrheic  NEURO: Normal strength and tone, mentation intact and speech normal  PSYCH: mentation appears normal, affect normal/bright  Diabetic foot exam: no trophic changes or ulcerative lesions and normal monofilament exam    Results for orders placed or performed in visit on 01/04/21 (from the past 24 hour(s))   HEMOGLOBIN A1C   Result Value Ref Range    Hemoglobin A1C 8.4 (H) 0 - 5.6 %

## 2021-01-05 DIAGNOSIS — E11.42 TYPE 2 DIABETES MELLITUS WITH DIABETIC POLYNEUROPATHY, WITH LONG-TERM CURRENT USE OF INSULIN (H): ICD-10-CM

## 2021-01-05 DIAGNOSIS — Z79.4 TYPE 2 DIABETES MELLITUS WITH DIABETIC POLYNEUROPATHY, WITH LONG-TERM CURRENT USE OF INSULIN (H): ICD-10-CM

## 2021-01-05 LAB
CHOLEST SERPL-MCNC: 130 MG/DL
HDLC SERPL-MCNC: 41 MG/DL
LDLC SERPL CALC-MCNC: 51 MG/DL
NONHDLC SERPL-MCNC: 89 MG/DL
TRIGL SERPL-MCNC: 188 MG/DL
TSH SERPL DL<=0.005 MIU/L-ACNC: 0.96 MU/L (ref 0.4–4)

## 2021-01-05 RX ORDER — INSULIN LISPRO 100 [IU]/ML
8 INJECTION, SOLUTION INTRAVENOUS; SUBCUTANEOUS
Qty: 30 ML | Refills: 0 | Status: SHIPPED | OUTPATIENT
Start: 2021-01-05 | End: 2021-08-24

## 2021-01-05 NOTE — RESULT ENCOUNTER NOTE
Please call patient:    Your blood glucose is uncontrolled. I recommend increasing your insulin doses and have sent new prescriptions. Follow-up with the diabetes educator next week and see me in 3 months.     Your thyroid and cholesterol are controlled.     Sejal Rutledge MD

## 2021-01-11 NOTE — RESULT ENCOUNTER NOTE
Please call patient:    Take humalog with lunch and dinner. Yes, this is in addition to metformin at dinner and lantus at bedtime. If she does not see diabetes education then she should do virtual visit with me in 1 week to review her home blood glucose monitoring.     Sejal Rutledge MD

## 2021-01-12 ENCOUNTER — TELEPHONE (OUTPATIENT)
Dept: FAMILY MEDICINE | Facility: CLINIC | Age: 71
End: 2021-01-12

## 2021-01-12 NOTE — TELEPHONE ENCOUNTER
Left message on answering machine for patient to call back to the nurse at [phone].    Sent [employer] message to pt with provider's response. Close chart if pt views.    Gwendolyn Schafer RN  [facility]

## 2021-01-12 NOTE — TELEPHONE ENCOUNTER
Sejal Rutledge MD   1/11/2021 11:50 AM CST      Please call patient:     Take humalog with lunch and dinner. Yes, this is in addition to metformin at dinner and lantus at bedtime. If she does not see diabetes education then she should do virtual visit with me in 1 week to review her home blood glucose monitoring.      Sejal Schafer, Gwendolyn Monzon, RN   1/8/2021 11:53 AM CST      Pt was given provider's message as written. She would like to clarify the 2 times on the humalog. When should she be taking this? Originally was at lunch time. States she normally doesn't eat breakfast. Should she take with lunch and dinner? She is concerned because she is also taking 4 tablets of Metformin at dinner and her lantus at bedtime.     She doesn't agree with having her see diabetic ed. She has already seen them and knows what she is supposed to be doing. Has her books and will read them again.      She also mentioned she was referred to optometry. She saw her eye doctor on 8/27. Her eyes haven't changed and has no diabetes problems in her eyes.      [facility]- Gwendolyn Ashraf RN   1/5/2021  4:55 PM CST      Will call if pt doesn't view mychart.     [facility]- Sejal Moran MD   1/5/2021  4:16 PM CST      Please call patient:     Your blood glucose is uncontrolled. I recommend increasing your insulin doses and have sent new prescriptions. Follow-up with the diabetes educator next week and see me in 3 months.      Your thyroid and cholesterol are controlled.      Sejal Rutledge MD

## 2021-01-22 ENCOUNTER — THERAPY VISIT (OUTPATIENT)
Dept: PHYSICAL THERAPY | Facility: CLINIC | Age: 71
End: 2021-01-22
Payer: COMMERCIAL

## 2021-01-22 DIAGNOSIS — M25.512 CHRONIC PAIN OF BOTH SHOULDERS: ICD-10-CM

## 2021-01-22 DIAGNOSIS — M25.511 CHRONIC PAIN OF BOTH SHOULDERS: ICD-10-CM

## 2021-01-22 DIAGNOSIS — G89.29 CHRONIC PAIN OF BOTH SHOULDERS: ICD-10-CM

## 2021-01-22 PROCEDURE — 97530 THERAPEUTIC ACTIVITIES: CPT | Mod: GP | Performed by: PHYSICAL THERAPIST

## 2021-01-22 PROCEDURE — 97110 THERAPEUTIC EXERCISES: CPT | Mod: GP | Performed by: PHYSICAL THERAPIST

## 2021-01-22 PROCEDURE — 97161 PT EVAL LOW COMPLEX 20 MIN: CPT | Mod: GP | Performed by: PHYSICAL THERAPIST

## 2021-01-22 NOTE — PROGRESS NOTES
[facility] Initial Evaluation  Subjective:  HPI                    Objective:  System    Physical Exam    General     ROS     Physical Therapy Initial Examination/Evaluation January 22, 2021   Kelly Vegas is a 70 year old female referred to physical therapy by Dr. Rutledge for treatment of chronic bilat shoulder pain   with Precautions/Restrictions/MD instructions E&T    Therapist Assessment:   Clinical Impression: Pt presents to PT with primary complaint of bilat shoulder and neck pain.  Per clinical examination, pt with ROM limitations in both cervical spine and bilat shoulders.  Unable to reproduce shoulder pain with neck testing today.   Special testing consistent with impingement of shoulders but plan to continue to monitor neck symptoms due to injury from fall and bilat pain UE pain.  Pt will benefit from skilled physical therapy to improve ROM, strength and improve overall function.      Subjective: Pt had a fall about 1 year ago. She slid down a small hill in her yard. She is unsure of how she landed. Pain initially was in R UE but now is in both arms. Neck pain started about 2 months ago.   DOI/onset: MD order: 1/4/2021   Mechanism of injury: possibly fall on ice ~ 1 year ago   DOS NA   Related PMH: NA Previous treatment: NA   Imaging: NA   Chief Complaint: Bilat arm pain    Pain: rest 6 /10, activity 6/10  Described as: constant  Alleviated by: tyelonol Exacerbated by: drying her back, reaching up into a cubboard, lifting  Progression of symptoms since initial onset: worsened  Time of day when pain is worse: morning is the best time    Sleeping: sometimes wakes her at night; sleeps from side to side    Social history: son lives in the basement ; pt does housework, son does yardwork    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)  Job duties: computer work, prolonged sitting    Current HEP/exercise regimen: gets exercise at work, walks with small dog   Patient's goals are  decrease pain, return to PLOF    Other pertinent PMH: Anemia, depression, diabetes, htn, numbness/tingling, overweight, thyroid, calf pain, changes in bowel/bladder, pain at night/rest , CLL General health as reported by patient: Good    Return to MD: prn          Cervical Spine Evaluation    Posture  Forward Head: Unremarkable   Rounded Shoulders: +      Range of Motion  Flexion: WNL  Extension: 45   Sidebend Left: 21  Right: 27  Rotation Left: 50  Right: 35  Protraction: WNL  Retraction: WNL with repeated motion           Upper Extremity Strength  Shoulder MMT Flexion [place] ER IR   Left >3/5 >3/5 >3/5 >3/5   Right >3/5 >3/5 >3/5 >3/5       Shoulder AROM  Flexion Abduction  ER Ext/IR    Left 112 116 48 Thumb to T5   Right 110 108 44 Thumb to T5     Special Tests  Spurling's: Negative   Reflexes: NT  Dermatomes: WNL  Myotomes: WNL    Assessment/Plan:  Patient is a 70 year old female with both sides shoulder complaints.    Patient has the following significant findings with corresponding treatment plan.                Diagnosis 1:  Chronic bilat shoulder pain   Pain -  hot/cold therapy, manual therapy, self management, education and home program  Decreased ROM/flexibility - manual therapy, therapeutic exercise, therapeutic activity and home program  Decreased strength - therapeutic exercise, therapeutic activities and home program  Impaired muscle performance - neuro re-education and home program  Decreased function - therapeutic activities and home program    Therapy Evaluation Codes:   1) History comprised of:   Personal factors that impact the plan of care:      Past/current experiences and Time since onset of symptoms.    Comorbidity factors that impact the plan of care are:      Bowel/bladder changes, Diabetes, Depression, High blood pressure, Numbness/tingling, Overweight, Pain at night/rest, Weakness and thyroid, CLL .     Medications impacting care: Anti-depressant, High blood pressure and  Thyroid.  2) Examination of Body Systems comprised of:   Body structures and functions that impact the plan of care:      Cervical spine and Shoulder.   Activity limitations that impact the plan of care are:      Bathing, Cooking, Driving, Dressing, Lifting, Working, Sleeping and Laying down.  3) Clinical presentation characteristics are:   Stable/Uncomplicated.  4) Decision-Making    Low complexity using standardized patient assessment instrument and/or measureable assessment of functional outcome.  Cumulative Therapy Evaluation is: Low complexity.    Previous and current functional limitations:  (See Goal Flow Sheet for this information)    Short term and Long term goals: (See Goal Flow Sheet for this information)     Communication ability:  Patient appears to be able to clearly communicate and understand verbal and written communication and follow directions correctly.  Treatment Explanation - The following has been discussed with the patient:   RX ordered/plan of care  Anticipated outcomes  Possible risks and side effects  This patient would benefit from PT intervention to resume normal activities.   Rehab potential is good.    Frequency:  1 X week, once daily  Duration:  for 6 weeks  Discharge Plan:  Achieve all LTG.  Independent in home treatment program.  Reach maximal therapeutic benefit.    Please refer to the daily flowsheet for treatment today, total treatment time and time spent performing 1:1 timed codes.

## 2021-01-26 DIAGNOSIS — E11.42 TYPE 2 DIABETES MELLITUS WITH DIABETIC POLYNEUROPATHY, WITH LONG-TERM CURRENT USE OF INSULIN (H): ICD-10-CM

## 2021-01-26 DIAGNOSIS — Z79.4 TYPE 2 DIABETES MELLITUS WITH DIABETIC POLYNEUROPATHY, WITH LONG-TERM CURRENT USE OF INSULIN (H): ICD-10-CM

## 2021-01-26 RX ORDER — FLURBIPROFEN SODIUM 0.3 MG/ML
SOLUTION/ DROPS OPHTHALMIC
Qty: 100 EACH | Refills: 0 | Status: SHIPPED | OUTPATIENT
Start: 2021-01-26 | End: 2021-02-02

## 2021-01-26 NOTE — TELEPHONE ENCOUNTER
Pt has not read [employer] message with message below.  Called and left detailed message with provider's message as written. Asked that she call [phone] to schedule. If she has any further questions, advised that she ask to speak with a nurse.    Gwendolyn Schafer RN  [facility]

## 2021-01-29 ENCOUNTER — THERAPY VISIT (OUTPATIENT)
Dept: PHYSICAL THERAPY | Facility: CLINIC | Age: 71
End: 2021-01-29
Payer: COMMERCIAL

## 2021-01-29 DIAGNOSIS — M25.512 CHRONIC PAIN OF BOTH SHOULDERS: ICD-10-CM

## 2021-01-29 DIAGNOSIS — M25.511 CHRONIC PAIN OF BOTH SHOULDERS: ICD-10-CM

## 2021-01-29 DIAGNOSIS — G89.29 CHRONIC PAIN OF BOTH SHOULDERS: ICD-10-CM

## 2021-01-29 PROCEDURE — 97110 THERAPEUTIC EXERCISES: CPT | Mod: GP | Performed by: PHYSICAL THERAPIST

## 2021-02-02 ENCOUNTER — TELEPHONE (OUTPATIENT)
Dept: FAMILY MEDICINE | Facility: CLINIC | Age: 71
End: 2021-02-02

## 2021-02-02 DIAGNOSIS — Z79.4 TYPE 2 DIABETES MELLITUS WITH DIABETIC POLYNEUROPATHY, WITH LONG-TERM CURRENT USE OF INSULIN (H): ICD-10-CM

## 2021-02-02 DIAGNOSIS — E11.42 TYPE 2 DIABETES MELLITUS WITH DIABETIC POLYNEUROPATHY, WITH LONG-TERM CURRENT USE OF INSULIN (H): ICD-10-CM

## 2021-02-02 RX ORDER — FLURBIPROFEN SODIUM 0.3 MG/ML
SOLUTION/ DROPS OPHTHALMIC
Qty: 300 EACH | Refills: 3 | Status: SHIPPED | OUTPATIENT
Start: 2021-02-02 | End: 2022-03-14

## 2021-02-02 NOTE — TELEPHONE ENCOUNTER
"Received fax from pharmacy informing \"patient uses insulin pen needle (ULTICARE MINI) 31G X 6 MM miscellaneous 3 times daily please send new script with updated instructions.\" Please advise. Roxy Jennings MA    "

## 2021-02-05 ENCOUNTER — THERAPY VISIT (OUTPATIENT)
Dept: PHYSICAL THERAPY | Facility: CLINIC | Age: 71
End: 2021-02-05
Payer: COMMERCIAL

## 2021-02-05 DIAGNOSIS — M25.512 CHRONIC PAIN OF BOTH SHOULDERS: ICD-10-CM

## 2021-02-05 DIAGNOSIS — G89.29 CHRONIC PAIN OF BOTH SHOULDERS: ICD-10-CM

## 2021-02-05 DIAGNOSIS — M25.511 CHRONIC PAIN OF BOTH SHOULDERS: ICD-10-CM

## 2021-02-05 PROCEDURE — 97110 THERAPEUTIC EXERCISES: CPT | Mod: GP | Performed by: PHYSICAL THERAPIST

## 2021-02-22 DIAGNOSIS — E03.9 ACQUIRED HYPOTHYROIDISM: ICD-10-CM

## 2021-02-22 RX ORDER — LEVOTHYROXINE SODIUM 100 UG/1
TABLET ORAL
Qty: 90 TABLET | Refills: 2 | Status: SHIPPED | OUTPATIENT
Start: 2021-02-22 | End: 2021-08-24

## 2021-02-26 ENCOUNTER — THERAPY VISIT (OUTPATIENT)
Dept: PHYSICAL THERAPY | Facility: CLINIC | Age: 71
End: 2021-02-26
Payer: COMMERCIAL

## 2021-02-26 DIAGNOSIS — M25.512 CHRONIC PAIN OF BOTH SHOULDERS: ICD-10-CM

## 2021-02-26 DIAGNOSIS — M25.511 CHRONIC PAIN OF BOTH SHOULDERS: ICD-10-CM

## 2021-02-26 DIAGNOSIS — G89.29 CHRONIC PAIN OF BOTH SHOULDERS: ICD-10-CM

## 2021-02-26 PROCEDURE — 97110 THERAPEUTIC EXERCISES: CPT | Mod: GP | Performed by: PHYSICAL THERAPIST

## 2021-03-04 DIAGNOSIS — I10 HYPERTENSION GOAL BP (BLOOD PRESSURE) < 140/90: ICD-10-CM

## 2021-03-05 RX ORDER — LISINOPRIL/HYDROCHLOROTHIAZIDE 10-12.5 MG
1 TABLET ORAL DAILY
Qty: 90 TABLET | Refills: 0 | Status: SHIPPED | OUTPATIENT
Start: 2021-03-05 | End: 2021-07-12

## 2021-04-08 DIAGNOSIS — C91.10 CLL (CHRONIC LYMPHOCYTIC LEUKEMIA) (H): ICD-10-CM

## 2021-04-08 DIAGNOSIS — Z86.2 HISTORY OF ANEMIA: ICD-10-CM

## 2021-04-08 LAB
ANION GAP SERPL CALCULATED.3IONS-SCNC: 6 MMOL/L (ref 3–14)
BASOPHILS # BLD AUTO: 0 10E9/L (ref 0–0.2)
BASOPHILS NFR BLD AUTO: 0.2 %
BUN SERPL-MCNC: 18 MG/DL (ref 7–30)
CALCIUM SERPL-MCNC: 8.8 MG/DL (ref 8.5–10.1)
CHLORIDE SERPL-SCNC: 108 MMOL/L (ref 94–109)
CO2 SERPL-SCNC: 25 MMOL/L (ref 20–32)
CREAT SERPL-MCNC: 1.09 MG/DL (ref 0.52–1.04)
DIFFERENTIAL METHOD BLD: ABNORMAL
EOSINOPHIL # BLD AUTO: 0.2 10E9/L (ref 0–0.7)
EOSINOPHIL NFR BLD AUTO: 1.1 %
ERYTHROCYTE [DISTWIDTH] IN BLOOD BY AUTOMATED COUNT: 12.9 % (ref 10–15)
ERYTHROCYTE [SEDIMENTATION RATE] IN BLOOD BY WESTERGREN METHOD: 7 MM/H (ref 0–30)
FERRITIN SERPL-MCNC: 10 NG/ML (ref 8–252)
GFR SERPL CREATININE-BSD FRML MDRD: 51 ML/MIN/{1.73_M2}
GLUCOSE SERPL-MCNC: 127 MG/DL (ref 70–99)
HCT VFR BLD AUTO: 36.2 % (ref 35–47)
HGB BLD-MCNC: 11.3 G/DL (ref 11.7–15.7)
LDH SERPL L TO P-CCNC: 186 U/L (ref 81–234)
LYMPHOCYTES # BLD AUTO: 9.8 10E9/L (ref 0.8–5.3)
LYMPHOCYTES NFR BLD AUTO: 58.5 %
MCH RBC QN AUTO: 30.1 PG (ref 26.5–33)
MCHC RBC AUTO-ENTMCNC: 31.2 G/DL (ref 31.5–36.5)
MCV RBC AUTO: 96 FL (ref 78–100)
MONOCYTES # BLD AUTO: 0.5 10E9/L (ref 0–1.3)
MONOCYTES NFR BLD AUTO: 3.1 %
NEUTROPHILS # BLD AUTO: 6.2 10E9/L (ref 1.6–8.3)
NEUTROPHILS NFR BLD AUTO: 37.1 %
PLATELET # BLD AUTO: 255 10E9/L (ref 150–450)
PLATELET # BLD EST: ABNORMAL 10*3/UL
POTASSIUM SERPL-SCNC: 4.6 MMOL/L (ref 3.4–5.3)
RBC # BLD AUTO: 3.76 10E12/L (ref 3.8–5.2)
RBC MORPH BLD: NORMAL
SODIUM SERPL-SCNC: 139 MMOL/L (ref 133–144)
WBC # BLD AUTO: 16.7 10E9/L (ref 4–11)

## 2021-04-08 PROCEDURE — 83615 LACTATE (LD) (LDH) ENZYME: CPT | Performed by: INTERNAL MEDICINE

## 2021-04-08 PROCEDURE — 80048 BASIC METABOLIC PNL TOTAL CA: CPT | Performed by: INTERNAL MEDICINE

## 2021-04-08 PROCEDURE — 85652 RBC SED RATE AUTOMATED: CPT | Performed by: INTERNAL MEDICINE

## 2021-04-08 PROCEDURE — 36415 COLL VENOUS BLD VENIPUNCTURE: CPT | Performed by: INTERNAL MEDICINE

## 2021-04-08 PROCEDURE — 82728 ASSAY OF FERRITIN: CPT | Performed by: INTERNAL MEDICINE

## 2021-04-08 PROCEDURE — 85025 COMPLETE CBC W/AUTO DIFF WBC: CPT | Performed by: INTERNAL MEDICINE

## 2021-04-09 ENCOUNTER — PATIENT OUTREACH (OUTPATIENT)
Dept: ONCOLOGY | Facility: CLINIC | Age: 71
End: 2021-04-09

## 2021-04-09 NOTE — PROGRESS NOTES
Spoke with patient regarding lab results. Advised patient her WBC are slightly elevated but not at a critical level. Patient verbalized understanding and mentioned she has an upcoming appointment with Chelsi Daly on 5/13/21. No further questions or concerns.    Nahed Jose RN on 4/9/2021 at 10:47 AM

## 2021-04-09 NOTE — PROGRESS NOTES
Left message for patient to return call to clinic regarding lab results. Direct line provided.    Nahed Jose RN on 4/9/2021 at 9:55 AM

## 2021-04-10 ENCOUNTER — HEALTH MAINTENANCE LETTER (OUTPATIENT)
Age: 71
End: 2021-04-10

## 2021-05-13 ENCOUNTER — ONCOLOGY VISIT (OUTPATIENT)
Dept: ONCOLOGY | Facility: CLINIC | Age: 71
End: 2021-05-13
Payer: COMMERCIAL

## 2021-05-13 VITALS
HEART RATE: 81 BPM | WEIGHT: 166.5 LBS | HEIGHT: 64 IN | TEMPERATURE: 99.2 F | DIASTOLIC BLOOD PRESSURE: 75 MMHG | SYSTOLIC BLOOD PRESSURE: 143 MMHG | RESPIRATION RATE: 16 BRPM | OXYGEN SATURATION: 97 % | BODY MASS INDEX: 28.42 KG/M2

## 2021-05-13 DIAGNOSIS — K55.20 AVM (ARTERIOVENOUS MALFORMATION) OF SMALL BOWEL, ACQUIRED: ICD-10-CM

## 2021-05-13 DIAGNOSIS — C91.10 CLL (CHRONIC LYMPHOCYTIC LEUKEMIA) (H): ICD-10-CM

## 2021-05-13 DIAGNOSIS — Z79.4 TYPE 2 DIABETES MELLITUS WITH DIABETIC POLYNEUROPATHY, WITH LONG-TERM CURRENT USE OF INSULIN (H): Primary | ICD-10-CM

## 2021-05-13 DIAGNOSIS — E11.42 TYPE 2 DIABETES MELLITUS WITH DIABETIC POLYNEUROPATHY, WITH LONG-TERM CURRENT USE OF INSULIN (H): Primary | ICD-10-CM

## 2021-05-13 DIAGNOSIS — E61.1 IRON DEFICIENCY: ICD-10-CM

## 2021-05-13 PROCEDURE — 99214 OFFICE O/P EST MOD 30 MIN: CPT | Performed by: NURSE PRACTITIONER

## 2021-05-13 ASSESSMENT — MIFFLIN-ST. JEOR: SCORE: 1255.24

## 2021-05-13 ASSESSMENT — PAIN SCALES - GENERAL: PAINLEVEL: EXTREME PAIN (8)

## 2021-05-13 NOTE — PROGRESS NOTES
Oncology Follow Up Visit: May 13, 2021    Oncologist: Dr. Codie De Los Santos/ Chelsi Daly,CNP  PCP: Sejal Rutledge    Diagnosis: CLL, DEBORA with known AVMs  Kelly Vegas is a 69 yo female who presented in 10/2018 with new moderate leukocytosis/lymphocytosis and moderate normocytic normochromic anemia, found to have low ferritin and iron saturation, which was suggestive of iron-deficiency anemia.   Record review indicating she had touch of leukocytosis no diff back in 2006.  She was not anemic back in 2006 with hemoglobin of 12.5.     Hematology work up in 10/2018 was most consistent with CLL and DEBORA. DEBORA corrected with iron supplement by 12/2018.   Upper endo found none bleeding erosive gastropathy, with negative colonoscopy.   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   She was off oral iron since 5/2019.   She had new anemia in 9/2019 with hb of 10.7, nl MCV, which is 1 wk post cholecystectomy.   Hgb did not improve in 10/2019 when she was restarted on oral iron 10/2019. Stool occult was negative in 10/2019. Hb up to 12 end of Nov. GI work-up 1/2020 with [facility] identified multiple AVM in proximal small bowel and colon small bowel capsule studies.  She then had repeat upper endoscopy and colonoscopy with [facility] did not find any obvious signs of bleeding, had cauterization.     Interval History: Ms. Vegas shares that she has been having more back pain-sciatica with radiation through the buttock x3 weeks ranked 8/10 for pain.  Has been using heat and stretching at this point as well as some Tylenol.  She otherwise reports that she did get COVID-19 in November but had minimal symptoms and is to receive her second vaccination tomorrow.  She has some loose stools occasionally and does take fiber to help control but she is seeing no blood in the stools at this time.  She has some tingling in her hands in certain positions that is not new.  And reports she has depression but is taking  medication that is doing a good job for her at this time.  She denies fevers illnesses weight changes and night sweats.  Patient confirms she is using an iron tablet daily.  Rest of comprehensive and complete ROS is reviewed and is negative.   Past Medical History:   Diagnosis Date     Arthritis check it     AVM (arteriovenous malformation) of small bowel, acquired      Background diabetic retinopathy (H)      Cervical cancer (H) 3/2006    [facility], [facility] with gland involvement     CLL (chronic lymphocytic leukemia) (H)      Colon adenoma      DDD (degenerative disc disease), lumbar      Degenerative joint disease of hand      Depressive disorder      Diabetes mellitus type II 2009     Diabetic polyneuropathy associated with type 2 diabetes mellitus (H) 9/26/2017     Gastritis      HTN (hypertension)      Hyperlipidemia LDL goal < 100      Hypothyroid 12/2004     Osteopenia      Recurrent major depression (H)      Current Outpatient Medications   Medication     ASPIRIN 81 MG OR TABS     atorvastatin (LIPITOR) 20 MG tablet     Cholecalciferol (VITAMIN D3) 1000 units CAPS     citalopram (CELEXA) 20 MG tablet     ferrous fumarate 65 mg, Mentasta. FE,-Vitamin C 125 mg (VITRON C)  MG TABS tablet     insulin glargine (LANTUS SOLOSTAR) 100 UNIT/ML pen     insulin lispro (HUMALOG KWIKPEN) 100 UNIT/ML (1 unit dial) KWIKPEN     insulin pen needle (ULTICARE MINI) 31G X 6 MM miscellaneous     levothyroxine (SYNTHROID/LEVOTHROID) 100 MCG tablet     lisinopril-hydrochlorothiazide (ZESTORETIC) 10-12.5 MG tablet     metFORMIN (GLUCOPHAGE-XR) 500 MG 24 hr tablet     order for DME     No current facility-administered medications for this visit.      Allergies   Allergen Reactions     Glipizide      tremulous     Ibuprofen Hives     Penicillins Itching     Percocet [Acetaminophen] Nausea and Vomiting     Vicodin [Hydrocodone-Acetaminophen] Nausea       Physical Exam:BP (!) 143/75 (BP Location: Right arm, Patient Position: Sitting, Cuff  "Size: Adult Regular)   Pulse 81   Temp 99.2  F (37.3  C) (Temporal)   Resp 16   Ht 1.626 m (5' 4\")   Wt 75.5 kg (166 lb 8 oz)   SpO2 97%   BMI 28.58 kg/m     Constitutional: Alert, overweight but moving well on own, and in no distress.   ENT: Eyes bright , No mouth sores  Neck: Supple, No adenopathy.  Cardiac: Heart rate and rhythm is regular and strong without murmur  Respiratory: Breathing easy. Lung sounds clear to auscultation  GI: Abdomen is soft, non-tender, BS normal. No masses or organomegaly  MS: Muscle tone normal, extremities normal with no edema.   Skin: No suspicious lesions or rashes  Neuro: Sensory grossly WNL, gait normal.   Lymph: Normal ant/post cervical, axillary, supraclavicular nodes  Psych: Mentation appears normal and affect normal/bright with good conversation    Laboratory Results:    Ref. Range 4/8/2021 14:23   Sodium Latest Ref Range: 133 - 144 mmol/L 139   Potassium Latest Ref Range: 3.4 - 5.3 mmol/L 4.6   Chloride Latest Ref Range: 94 - 109 mmol/L 108   Carbon Dioxide Latest Ref Range: 20 - 32 mmol/L 25   Urea Nitrogen Latest Ref Range: 7 - 30 mg/dL 18   Creatinine Latest Ref Range: 0.52 - 1.04 mg/dL 1.09 (H)   GFR Estimate Latest Ref Range: >60 mL/min/1.73_m2 51 (L)   GFR Estimate If Black Latest Ref Range: >60 mL/min/1.73_m2 59 (L)   Calcium Latest Ref Range: 8.5 - 10.1 mg/dL 8.8   Anion Gap Latest Ref Range: 3 - 14 mmol/L 6   Ferritin Latest Ref Range: 8 - 252 ng/mL 10   Lactate Dehydrogenase Latest Ref Range: 81 - 234 U/L 186   Glucose Latest Ref Range: 70 - 99 mg/dL 127 (H)   WBC Latest Ref Range: 4.0 - 11.0 10e9/L 16.7 (H)   Hemoglobin Latest Ref Range: 11.7 - 15.7 g/dL 11.3 (L)   Hematocrit Latest Ref Range: 35.0 - 47.0 % 36.2   Platelet Count Latest Ref Range: 150 - 450 10e9/L 255   RBC Count Latest Ref Range: 3.8 - 5.2 10e12/L 3.76 (L)   MCV Latest Ref Range: 78 - 100 fl 96   MCH Latest Ref Range: 26.5 - 33.0 pg 30.1   MCHC Latest Ref Range: 31.5 - 36.5 g/dL 31.2 (L) "   RDW Latest Ref Range: 10.0 - 15.0 % 12.9   Diff Method Unknown Automated Method   % Neutrophils Latest Units: % 37.1   % Lymphocytes Latest Units: % 58.5   % Monocytes Latest Units: % 3.1   % Eosinophils Latest Units: % 1.1   % Basophils Latest Units: % 0.2   Absolute Neutrophil Latest Ref Range: 1.6 - 8.3 10e9/L 6.2   Absolute Lymphocytes Latest Ref Range: 0.8 - 5.3 10e9/L 9.8 (H)   Absolute Monocytes Latest Ref Range: 0.0 - 1.3 10e9/L 0.5   Absolute Eosinophils Latest Ref Range: 0.0 - 0.7 10e9/L 0.2   Absolute Basophils Latest Ref Range: 0.0 - 0.2 10e9/L 0.0   RBC Morphology Unknown Normal   Platelet Estimate Unknown Automated count confirmed.  Platelet morphology is normal.   Sed Rate Latest Ref Range: 0 - 30 mm/h 7     Assessment and Plan:   CLL-reviewed findings today on laboratory reports showing stability and disease.  Review with patient showing no new symptoms that are concerning for progression of disease.  We will continue to review on an every 6-month basis with labs completed 1 week prior to visit.  Counseled patient on symptoms of concern that she should share with us if noted prior to visit including unintentional weight loss recurrent illnesses or fevers or night sweats.  Iron deficiency anemia with known AVMs-patient admits she has some loose stools but the fiber is doing a good job.  No signs of active bleeding.  She is noted to have a mild anemia today with hemoglobin = 11.3.  Patient continues to use 1 ferrous sulfate tablet daily and feels this is tolerable.  She is not showing significant fatigue.  Suggest we could repeat the labs in 3 months instead of 6 to rule out any anemia progression however patient feels she will get report from her primary care and will notify us if needed prior to the visit in 6 months.  Diabetes mellitus type 2-patient is insulin-dependent and is monitoring sugars closely and following up with PCP as requested but does admit to some hypoglycemic events but is well  aware when her blood sugar does decrease and is able to treat without problems.  The total time of this encounter amounted to 25 minutes. This time included face-to-face time spent with the patient, prep work, ordering tests, and performing post visit documentation.  Chelsi Daly,Cnp

## 2021-05-13 NOTE — NURSING NOTE
"Oncology Rooming Note    May 13, 2021 2:55 PM   Kelly Vegas is a 70 year old female who presents for:    Chief Complaint   Patient presents with     Oncology Clinic Visit     Follow up     Initial Vitals: BP (!) 143/75 (BP Location: Right arm, Patient Position: Sitting, Cuff Size: Adult Regular)   Pulse 81   Temp 99.2  F (37.3  C) (Temporal)   Resp 16   Ht 1.626 m (5' 4\")   Wt 75.5 kg (166 lb 8 oz)   SpO2 97%   BMI 28.58 kg/m   Estimated body mass index is 28.58 kg/m  as calculated from the following:    Height as of this encounter: 1.626 m (5' 4\").    Weight as of this encounter: 75.5 kg (166 lb 8 oz). Body surface area is 1.85 meters squared.  Extreme Pain (8) Comment: Data Unavailable   No LMP recorded. Patient has had a hysterectomy.  Allergies reviewed: Yes  Medications reviewed: Yes    Medications: Medication refills not needed today.  Pharmacy name entered into NOSTROM[facility] ICT: [facility] [address] [facility], [place] - [address]    Clinical concerns: None       Yoly Green CMA            "

## 2021-05-13 NOTE — LETTER
5/13/2021         RE: Kelly Vegas  [address] [id number]        Dear Colleague,    Thank you for referring your patient, Kelly Vegas, to the [facility]. Please see a copy of my visit note below.    Oncology Follow Up Visit: May 13, 2021    Oncologist: Dr. Codie De Los Santos/ Chelsi Daly,CNP  PCP: Sejal Rutledge    Diagnosis: CLL, DEBORA with known AVMs  Kelly Vegas is a 71 yo female who presented in 10/2018 with new moderate leukocytosis/lymphocytosis and moderate normocytic normochromic anemia, found to have low ferritin and iron saturation, which was suggestive of iron-deficiency anemia.   Record review indicating she had touch of leukocytosis no diff back in 2006.  She was not anemic back in 2006 with hemoglobin of 12.5.     Hematology work up in 10/2018 was most consistent with CLL and DEBORA. DEBORA corrected with iron supplement by 12/2018.   Upper endo found none bleeding erosive gastropathy, with negative colonoscopy.   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   She was off oral iron since 5/2019.   She had new anemia in 9/2019 with hb of 10.7, nl MCV, which is 1 wk post cholecystectomy.   Hgb did not improve in 10/2019 when she was restarted on oral iron 10/2019. Stool occult was negative in 10/2019. Hb up to 12 end of Nov. GI work-up 1/2020 with [facility] identified multiple AVM in proximal small bowel and colon small bowel capsule studies.  She then had repeat upper endoscopy and colonoscopy with [facility] did not find any obvious signs of bleeding, had cauterization.     Interval History: Ms. Vegas shares that she has been having more back pain-sciatica with radiation through the buttock x3 weeks ranked 8/10 for pain.  Has been using heat and stretching at this point as well as some Tylenol.  She otherwise reports that she did get COVID-19 in November but had minimal symptoms and is to receive her second vaccination tomorrow.   She has some loose stools occasionally and does take fiber to help control but she is seeing no blood in the stools at this time.  She has some tingling in her hands in certain positions that is not new.  And reports she has depression but is taking medication that is doing a good job for her at this time.  She denies fevers illnesses weight changes and night sweats.  Patient confirms she is using an iron tablet daily.  Rest of comprehensive and complete ROS is reviewed and is negative.   Past Medical History:   Diagnosis Date     Arthritis check it     AVM (arteriovenous malformation) of small bowel, acquired      Background diabetic retinopathy (H)      Cervical cancer (H) 3/2006    [facility], [facility] with gland involvement     CLL (chronic lymphocytic leukemia) (H)      Colon adenoma      DDD (degenerative disc disease), lumbar      Degenerative joint disease of hand      Depressive disorder      Diabetes mellitus type II 2009     Diabetic polyneuropathy associated with type 2 diabetes mellitus (H) 9/26/2017     Gastritis      HTN (hypertension)      Hyperlipidemia LDL goal < 100      Hypothyroid 12/2004     Osteopenia      Recurrent major depression (H)      Current Outpatient Medications   Medication     ASPIRIN 81 MG OR TABS     atorvastatin (LIPITOR) 20 MG tablet     Cholecalciferol (VITAMIN D3) 1000 units CAPS     citalopram (CELEXA) 20 MG tablet     ferrous fumarate 65 mg, [place]. FE,-Vitamin C 125 mg (VITRON C)  MG TABS tablet     insulin glargine (LANTUS SOLOSTAR) 100 UNIT/ML pen     insulin lispro (HUMALOG KWIKPEN) 100 UNIT/ML (1 unit dial) KWIKPEN     insulin pen needle (ULTICARE MINI) 31G X 6 MM miscellaneous     levothyroxine (SYNTHROID/LEVOTHROID) 100 MCG tablet     lisinopril-hydrochlorothiazide (ZESTORETIC) 10-12.5 MG tablet     metFORMIN (GLUCOPHAGE-XR) 500 MG 24 hr tablet     order for DME     No current facility-administered medications for this visit.      Allergies   Allergen Reactions      "Glipizide      tremulous     Ibuprofen Hives     Penicillins Itching     Percocet [Acetaminophen] Nausea and Vomiting     Vicodin [Hydrocodone-Acetaminophen] Nausea       Physical Exam:BP (!) 143/75 (BP Location: Right arm, Patient Position: Sitting, Cuff Size: Adult Regular)   Pulse 81   Temp 99.2  F (37.3  C) (Temporal)   Resp 16   Ht 1.626 m (5' 4\")   Wt 75.5 kg (166 lb 8 oz)   SpO2 97%   BMI 28.58 kg/m     Constitutional: Alert, overweight but moving well on own, and in no distress.   ENT: Eyes bright , No mouth sores  Neck: Supple, No adenopathy.  Cardiac: Heart rate and rhythm is regular and strong without murmur  Respiratory: Breathing easy. Lung sounds clear to auscultation  GI: Abdomen is soft, non-tender, BS normal. No masses or organomegaly  MS: Muscle tone normal, extremities normal with no edema.   Skin: No suspicious lesions or rashes  Neuro: Sensory grossly WNL, gait normal.   Lymph: Normal ant/post cervical, axillary, supraclavicular nodes  Psych: Mentation appears normal and affect normal/bright with good conversation    Laboratory Results:    Ref. Range 4/8/2021 14:23   Sodium Latest Ref Range: 133 - 144 mmol/L 139   Potassium Latest Ref Range: 3.4 - 5.3 mmol/L 4.6   Chloride Latest Ref Range: 94 - 109 mmol/L 108   Carbon Dioxide Latest Ref Range: 20 - 32 mmol/L 25   Urea Nitrogen Latest Ref Range: 7 - 30 mg/dL 18   Creatinine Latest Ref Range: 0.52 - 1.04 mg/dL 1.09 (H)   GFR Estimate Latest Ref Range: >60 mL/min/1.73_m2 51 (L)   GFR Estimate If Black Latest Ref Range: >60 mL/min/1.73_m2 59 (L)   Calcium Latest Ref Range: 8.5 - 10.1 mg/dL 8.8   Anion Gap Latest Ref Range: 3 - 14 mmol/L 6   Ferritin Latest Ref Range: 8 - 252 ng/mL 10   Lactate Dehydrogenase Latest Ref Range: 81 - 234 U/L 186   Glucose Latest Ref Range: 70 - 99 mg/dL 127 (H)   WBC Latest Ref Range: 4.0 - 11.0 10e9/L 16.7 (H)   Hemoglobin Latest Ref Range: 11.7 - 15.7 g/dL 11.3 (L)   Hematocrit Latest Ref Range: 35.0 - 47.0 % " 36.2   Platelet Count Latest Ref Range: 150 - 450 10e9/L 255   RBC Count Latest Ref Range: 3.8 - 5.2 10e12/L 3.76 (L)   MCV Latest Ref Range: 78 - 100 fl 96   MCH Latest Ref Range: 26.5 - 33.0 pg 30.1   MCHC Latest Ref Range: 31.5 - 36.5 g/dL 31.2 (L)   RDW Latest Ref Range: 10.0 - 15.0 % 12.9   Diff Method Unknown Automated Method   % Neutrophils Latest Units: % 37.1   % Lymphocytes Latest Units: % 58.5   % Monocytes Latest Units: % 3.1   % Eosinophils Latest Units: % 1.1   % Basophils Latest Units: % 0.2   Absolute Neutrophil Latest Ref Range: 1.6 - 8.3 10e9/L 6.2   Absolute Lymphocytes Latest Ref Range: 0.8 - 5.3 10e9/L 9.8 (H)   Absolute Monocytes Latest Ref Range: 0.0 - 1.3 10e9/L 0.5   Absolute Eosinophils Latest Ref Range: 0.0 - 0.7 10e9/L 0.2   Absolute Basophils Latest Ref Range: 0.0 - 0.2 10e9/L 0.0   RBC Morphology Unknown Normal   Platelet Estimate Unknown Automated count confirmed.  Platelet morphology is normal.   Sed Rate Latest Ref Range: 0 - 30 mm/h 7     Assessment and Plan:   CLL-reviewed findings today on laboratory reports showing stability and disease.  Review with patient showing no new symptoms that are concerning for progression of disease.  We will continue to review on an every 6-month basis with labs completed 1 week prior to visit.  Counseled patient on symptoms of concern that she should share with us if noted prior to visit including unintentional weight loss recurrent illnesses or fevers or night sweats.  Iron deficiency anemia with known AVMs-patient admits she has some loose stools but the fiber is doing a good job.  No signs of active bleeding.  She is noted to have a mild anemia today with hemoglobin = 11.3.  Patient continues to use 1 ferrous sulfate tablet daily and feels this is tolerable.  She is not showing significant fatigue.  Suggest we could repeat the labs in 3 months instead of 6 to rule out any anemia progression however patient feels she will get report from her  primary care and will notify us if needed prior to the visit in 6 months.  Diabetes mellitus type 2-patient is insulin-dependent and is monitoring sugars closely and following up with PCP as requested but does admit to some hypoglycemic events but is well aware when her blood sugar does decrease and is able to treat without problems.  The total time of this encounter amounted to 25 minutes. This time included face-to-face time spent with the patient, prep work, ordering tests, and performing post visit documentation.  Chelsi Daly Cnp        Again, thank you for allowing me to participate in the care of your patient.        Sincerely,        Chelsi Daly, KARY, APRN CNP

## 2021-07-01 ENCOUNTER — TRANSFERRED RECORDS (OUTPATIENT)
Dept: HEALTH INFORMATION MANAGEMENT | Facility: CLINIC | Age: 71
End: 2021-07-01

## 2021-07-01 LAB — RETINOPATHY: NORMAL

## 2021-07-09 DIAGNOSIS — I10 HYPERTENSION GOAL BP (BLOOD PRESSURE) < 140/90: ICD-10-CM

## 2021-07-09 NOTE — LETTER
July 12, 2021      Kelly Vegas  [address] [place] [id number]            Your provider has sent a 30 day palomo refill of lisinopril-hydrochlorothiazide (ZESTORETIC) 10-12.5 MG tablet. You are due for an appointment for further refills. Please contact the clinic to schedule an appointment for further refills.      Sincerely,       [facility] Center[facility]rin SIMENTAL

## 2021-07-12 DIAGNOSIS — F33.0 MAJOR DEPRESSIVE DISORDER, RECURRENT, MILD (H): ICD-10-CM

## 2021-07-12 RX ORDER — LISINOPRIL/HYDROCHLOROTHIAZIDE 10-12.5 MG
1 TABLET ORAL DAILY
Qty: 30 TABLET | Refills: 0 | Status: SHIPPED | OUTPATIENT
Start: 2021-07-12 | End: 2021-08-08

## 2021-07-12 NOTE — TELEPHONE ENCOUNTER
Routing refill request to provider for review/approval because:  BP Readings from Last 3 Encounters:   05/13/21 (!) 143/75   01/04/21 130/66   10/20/20 (!) 160/60     Lab Results   Component Value Date    A1C 8.4 01/04/2021    A1C 7.5 02/11/2020    A1C 7.3 10/30/2019    A1C 7.4 03/26/2019    A1C 8.2 12/13/2018

## 2021-07-12 NOTE — TELEPHONE ENCOUNTER
Schedule diabetes follow-up appointment     Signed Prescriptions:                        Disp   Refills    lisinopril-hydrochlorothiazide (ZESTORETIC*30 tab*0        Sig: Take 1 tablet by mouth daily  Authorizing Provider: TUTU WHITTAKER

## 2021-07-13 RX ORDER — CITALOPRAM HYDROBROMIDE 20 MG/1
TABLET ORAL
Qty: 30 TABLET | Refills: 0 | Status: SHIPPED | OUTPATIENT
Start: 2021-07-13 | End: 2021-08-21

## 2021-07-13 NOTE — TELEPHONE ENCOUNTER
Routing refill request to provider for review/approval because:  PHQ  Appointment needed    PHQ-9 score:    PHQ 1/4/2021   PHQ-9 Total Score 8   Q9: Thoughts of better off dead/self-harm past 2 weeks Not at all                      Pending Prescriptions:                       Disp   Refills    citalopram (CELEXA) 20 MG tablet [Pharmacy*90 tab*0        Sig: Take 1 tablet by mouth daily        Nik Nieto RN

## 2021-07-21 ENCOUNTER — TRANSFERRED RECORDS (OUTPATIENT)
Dept: HEALTH INFORMATION MANAGEMENT | Facility: CLINIC | Age: 71
End: 2021-07-21

## 2021-07-31 ENCOUNTER — HEALTH MAINTENANCE LETTER (OUTPATIENT)
Age: 71
End: 2021-07-31

## 2021-08-05 DIAGNOSIS — E11.42 TYPE 2 DIABETES MELLITUS WITH DIABETIC POLYNEUROPATHY, WITH LONG-TERM CURRENT USE OF INSULIN (H): ICD-10-CM

## 2021-08-05 DIAGNOSIS — Z79.4 TYPE 2 DIABETES MELLITUS WITH DIABETIC POLYNEUROPATHY, WITH LONG-TERM CURRENT USE OF INSULIN (H): ICD-10-CM

## 2021-08-06 DIAGNOSIS — I10 HYPERTENSION GOAL BP (BLOOD PRESSURE) < 140/90: ICD-10-CM

## 2021-08-08 RX ORDER — LISINOPRIL/HYDROCHLOROTHIAZIDE 10-12.5 MG
1 TABLET ORAL DAILY
Qty: 90 TABLET | Refills: 1 | Status: SHIPPED | OUTPATIENT
Start: 2021-08-08 | End: 2021-08-24 | Stop reason: DRUGHIGH

## 2021-08-09 NOTE — TELEPHONE ENCOUNTER
Routing refill request to provider for review/approval because:  BP Readings from Last 3 Encounters:   05/13/21 (!) 143/75   01/04/21 130/66   10/20/20 (!) 160/60     Creatinine   Date Value Ref Range Status   04/08/2021 1.09 (H) 0.52 - 1.04 mg/dL Final

## 2021-08-16 NOTE — PROGRESS NOTES
Chief Complaint   Patient presents with     Ent Problem     Check ears/hearing/tinnitus/issues with gradual hearing loss     History of Present Illness   Kelly Vegas is a 71 year old female who presents to me today for ear evaluation.  The patient reports bilateral tinnitus that is been gradual in onset over the past several years.  She is also had hearing loss noted in both ears for the last few years.  It was gradual in onset.  The patient has noticed increased difficulty hearing certain sounds and difficulty in understanding others, especially in noisy or crowded situations.  There is no history of recent head trauma, or chronic ear disease or ear surgery.  The patient denies recreational, , and work-related noise exposure.  No family history of hearing loss at a young age. The patient denies vertigo, otorrhea, otalgia.     Past Medical History  Patient Active Problem List   Diagnosis     Hyperlipidemia with target LDL less than 100     Hypertension goal BP (blood pressure) < 140/90     History of cervical cancer     Advanced directives, counseling/discussion     Major depressive disorder, recurrent, mild (H)     Acquired hypothyroidism     Type 2 diabetes mellitus with diabetic polyneuropathy, with long-term current use of insulin (H)     Degenerative joint disease of hand     DDD (degenerative disc disease), lumbar     Osteopenia     Memory problem     CLL (chronic lymphocytic leukemia) (H)     Dysesthesia     Ganglion cyst of joint of finger of right hand     Background diabetic retinopathy (H)     AVM (arteriovenous malformation) of small bowel, acquired     Iron deficiency     Bilateral shoulder pain     Current Medications     Current Outpatient Medications:      ASPIRIN 81 MG OR TABS, Take 1 tablet by mouth daily., Disp: , Rfl:      atorvastatin (LIPITOR) 20 MG tablet, Take 1 tablet (20 mg) by mouth daily, Disp: 90 tablet, Rfl: 3     Cholecalciferol (VITAMIN D3) 1000 units CAPS, Take by mouth  daily, Disp: , Rfl:      citalopram (CELEXA) 20 MG tablet, Take 1 tablet by mouth daily, Disp: 30 tablet, Rfl: 0     ferrous fumarate 65 mg, [place]. FE,-Vitamin C 125 mg (VITRON C)  MG TABS tablet, Take 1 tablet by mouth 2 times daily (Patient taking differently: Take 1 tablet by mouth daily ), Disp: 60 tablet, Rfl: 1     insulin glargine (LANTUS SOLOSTAR) 100 UNIT/ML pen, +++NEED ANNUAL EXAM+++INJECT 20 UNITS AT BEDTIME DAILY., Disp: 30 mL, Rfl: 0     insulin lispro (HUMALOG KWIKPEN) 100 UNIT/ML (1 unit dial) KWIKPEN, Inject 8 Units Subcutaneous 2 times daily (before meals), Disp: 30 mL, Rfl: 0     insulin pen needle (ULTICARE MINI) 31G X 6 MM miscellaneous, Use three times daily or as directed., Disp: 300 each, Rfl: 3     levothyroxine (SYNTHROID/LEVOTHROID) 100 MCG tablet, TAKE 1 TABLET (100 MCG) BY MOUTH DAILY, Disp: 90 tablet, Rfl: 2     lisinopril-hydrochlorothiazide (ZESTORETIC) 10-12.5 MG tablet, Take 1 tablet by mouth daily, Disp: 90 tablet, Rfl: 1     metFORMIN (GLUCOPHAGE-XR) 500 MG 24 hr tablet, Take 4 tablets (2,000 mg) by mouth daily (with dinner), Disp: 360 tablet, Rfl: 3     order for DME, Blood glucose monitor per insurance formulary; blood glucose test strips One Touch Verio or per formulary for use 3 time daily; lancets per formulary for use 3 time daily, Disp: 300 each, Rfl: 3    Allergies  Allergies   Allergen Reactions     Glipizide      tremulous     Ibuprofen Hives     Pcn [Penicillins] Itching     Percocet [Acetaminophen] Nausea and Vomiting     Vicodin [Hydrocodone-Acetaminophen] Nausea       Social History   Social History     Socioeconomic History     Marital status:      Spouse name: Lauri     Number of children: 4     Years of education: 12     Highest education level: Not on file   Occupational History     Occupation:      Employer: [employer]    Tobacco Use     Smoking status: Former Smoker     Packs/day: 0.50     Years: 20.00      Pack years: 10.00     Types: Cigarettes     Start date: 1967     Quit date: 1989     Years since quittin.6     Smokeless tobacco: Never Used   Substance and Sexual Activity     Alcohol use: No     Drug use: No     Sexual activity: Not Currently     Partners: Male     Birth control/protection: Post-menopausal   Other Topics Concern     Parent/sibling w/ CABG, MI or angioplasty before 65F 55M? No      Service No     Blood Transfusions No     Caffeine Concern No     Occupational Exposure No     Hobby Hazards No     Sleep Concern No     Stress Concern No     Weight Concern Yes     Special Diet No     Back Care No     Exercise Yes     Bike Helmet No     Seat Belt Yes     Self-Exams Yes     Comment: When remember   Social History Narrative     Not on file     Social Determinants of Health     Financial Resource Strain:      Difficulty of Paying Living Expenses:    Food Insecurity:      Worried About Running Out of Food in the Last Year:      Ran Out of Food in the Last Year:    Transportation Needs:      Lack of Transportation (Medical):      Lack of Transportation (Non-Medical):    Physical Activity:      Days of Exercise per Week:      Minutes of Exercise per Session:    Stress:      Feeling of Stress :    Social Connections:      Frequency of Communication with Friends and Family:      Frequency of Social Gatherings with Friends and Family:      Attends Taoist Services:      Active Member of Clubs or Organizations:      Attends Club or Organization Meetings:      Marital Status:    Intimate Partner Violence:      Fear of Current or Ex-Partner:      Emotionally Abused:      Physically Abused:      Sexually Abused:        Family History  Family History   Problem Relation Age of Onset     Thyroid Disease Mother      Dementia Mother      Osteoporosis Mother      Alcohol/Drug Father      C.A.D. Father         CABG      Diabetes Father      Hypertension Father      Hyperlipidemia Father       "Cerebrovascular Disease Father      Cancer Maternal Grandmother         stomach, colon     Breast Cancer Maternal Grandmother      Thyroid Disease Maternal Grandmother      Alzheimer Disease Maternal Grandfather      Breast Cancer Paternal Grandmother      Alcohol/Drug Brother      Prostate Cancer Brother      Alcohol/Drug Son      Depression Brother      Depression Brother         d. suicide     Breast Cancer Cousin      Breast Cancer Other        Review of Systems  As per HPI and PMHx, otherwise 10+ comprehensive system review is negative.    Physical Exam  BP (!) 142/69 (BP Location: Right arm, Patient Position: Sitting, Cuff Size: Adult Regular)   Pulse 76   Temp 98.9  F (37.2  C) (Tympanic)   Ht 1.626 m (5' 4\")   Wt 74.4 kg (164 lb)   BMI 28.15 kg/m    GENERAL: Patient is a pleasant, cooperative 71 year old female in no acute distress.  HEAD: Normocephalic, atraumatic.  Hair and scalp are normal.  EYES: Pupils are equal, round, reactive to light and accommodation.  Extraocular movements are intact.  The sclera nonicteric without injection.  The extraocular structures are normal.  EARS: Normal shape and symmetry.  No tenderness when palpating the mastoid or tragal areas bilaterally.  Otoscopic exam reveals a minimal amount of cerumen bilaterally.  The bilateral tympanic membranes are round, intact without evidence of effusion, good landmarks.  No retraction, granulation, or drainage.  NEUROLOGIC: Cranial nerves II through XII are grossly intact.  Voice is strong.  Facial nerve examination incomplete due to the patient wearing a mask.  CARDIOVASCULAR: Extremities are warm and well-perfused.  No significant peripheral edema.  RESPIRATORY: Patient has nonlabored breathing without cough, wheeze, stridor.  PSYCHIATRIC: Patient is alert and oriented.  Mood and affect appear normal.  SKIN: Warm and dry.  No scalp, face, or neck lesions noted.    Audiogram  The patient underwent an audiogram performed today.  My " review of the audiogram shows mild sloping to moderate sloping to moderately severe sensorineural hearing loss in both ears.  Pure-tone average is 43 dB on the right and 40 dB on the left.  Speech reception threshhold is 40 dB on the right and 40 dB on the left.  The patient had 96% word recognition on the right and 96% word recognition on the left.  The patient had a type A tympanogram on the right and a type A tympanogram on the left.      Assessment and Plan     ICD-10-CM    1. Sensorineural hearing loss, bilateral  H90.3    2. Tinnitus, bilateral  H93.13      It was my pleasure seeing Kelly Vegas today in clinic.  The patient presents today with bilateral symmetric sensorineural hearing loss.  This is most consistent with presbycusis. I can find no evidence of serious CNS disorders or other complicating factors that could be causing this.  We spent the remainder of today's visit on education. We discussed hearing protection in noisy environments.    The patient is medically cleared for consideration of a hearing aid evaluation.    The patient will follow up as necessary for worsening symptoms or changes in symptoms. I have also recommended repeat audiogram in 1-3 years, or sooner if symptoms warrant.      Kelly to follow up with Primary Care provider regarding elevated blood pressure.    Peter Gómez MD  Department of Otolarygology-Head and Neck Surgery  [facility]

## 2021-08-18 ENCOUNTER — OFFICE VISIT (OUTPATIENT)
Dept: AUDIOLOGY | Facility: CLINIC | Age: 71
End: 2021-08-18
Payer: COMMERCIAL

## 2021-08-18 ENCOUNTER — OFFICE VISIT (OUTPATIENT)
Dept: OTOLARYNGOLOGY | Facility: CLINIC | Age: 71
End: 2021-08-18
Payer: COMMERCIAL

## 2021-08-18 VITALS
SYSTOLIC BLOOD PRESSURE: 142 MMHG | WEIGHT: 164 LBS | TEMPERATURE: 98.9 F | DIASTOLIC BLOOD PRESSURE: 69 MMHG | HEIGHT: 64 IN | BODY MASS INDEX: 28 KG/M2 | HEART RATE: 76 BPM

## 2021-08-18 DIAGNOSIS — H93.13 TINNITUS, BILATERAL: ICD-10-CM

## 2021-08-18 DIAGNOSIS — H90.3 SENSORINEURAL HEARING LOSS, BILATERAL: Primary | ICD-10-CM

## 2021-08-18 DIAGNOSIS — H93.13 TINNITUS OF BOTH EARS: ICD-10-CM

## 2021-08-18 PROCEDURE — 99207 PR NO CHARGE LOS: CPT | Performed by: AUDIOLOGIST

## 2021-08-18 PROCEDURE — 92550 TYMPANOMETRY & REFLEX THRESH: CPT | Performed by: AUDIOLOGIST

## 2021-08-18 PROCEDURE — 92557 COMPREHENSIVE HEARING TEST: CPT | Performed by: AUDIOLOGIST

## 2021-08-18 PROCEDURE — 99203 OFFICE O/P NEW LOW 30 MIN: CPT | Performed by: OTOLARYNGOLOGY

## 2021-08-18 ASSESSMENT — MIFFLIN-ST. JEOR: SCORE: 1238.9

## 2021-08-18 NOTE — NURSING NOTE
"Initial BP (!) 142/69 (BP Location: Right arm, Patient Position: Sitting, Cuff Size: Adult Regular)   Pulse 76   Temp 98.9  F (37.2  C) (Tympanic)   Ht 1.626 m (5' 4\")   Wt 74.4 kg (164 lb)   BMI 28.15 kg/m   Estimated body mass index is 28.15 kg/m  as calculated from the following:    Height as of this encounter: 1.626 m (5' 4\").    Weight as of this encounter: 74.4 kg (164 lb). .    Brittanie Calderón CMA    "

## 2021-08-18 NOTE — PROGRESS NOTES
AUDIOLOGY REPORT    SUBJECTIVE:  Kelly Vegas is a 71 year old female who was seen at [facility] for an audiologic evaluation, referred by Dr. Gómez.  No previous audiograms are available at today's appointment.  The patient reports a long history of tinnitus and now decreased hearing. She reports difficulty hearing the TV and on the telephone. She reports a family history of hearing loss with her son just being fit with hearing aid and her mother using hearing aids in the 60's. The patient denies bilateral otalgia, bilateral drainage and history of noise exposure.     OBJECTIVE:    Otoscopic exam indicates ears are clear of cerumen bilaterally       Pure Tone Thresholds assessed using conventional audiometry with good  reliability from 250-8000 Hz bilaterally using circumaural headphones     RIGHT:  mild and moderate-severe sensorineural hearing loss    LEFT:    mild and moderate-severe sensorineural hearing loss    Tympanogram:    RIGHT: normal eardrum mobility    LEFT:   normal eardrum mobility    Reflexes (reported by stimulus ear 1000 Hz):     RIGHT: Ipsilateral is absent    RIGHT: Contralateral is absent    LEFT: Ipsilateral is absent    LEFT: Contralateral is absent    Speech Reception Threshold:    RIGHT: 40 dB HL    LEFT:   40 dB HL  Word Recognition Score:     RIGHT: 96% at 75 dB HL using NU-6 recorded word list.    LEFT:   96% at 75 dB HL using NU-6 recorded word list.      ASSESSMENT:   Mild sloping to moderate-severe sensorineural hearing loss bilaterally.     Today s results were discussed with the patient in detail.     PLAN: It is recommended that the patient be seen by Dr. Gómez for medical evaluation of their ears and hearing evaluation.  . Patient was counseled regarding hearing loss and impact on communication. Patient is a good candidate for amplification at this time. A [facility] information packet was given to the patient. Please call this clinic  with questions regarding these results or recommendations.        Angie Jones M.A. AMELIA-AAA  Clinical audiologist [place] # [id number]  8/18/2021

## 2021-08-18 NOTE — PATIENT INSTRUCTIONS
Per physician instructions      If you have questions or concerns on any instructions given to you by your provider today or if you need to schedule an appointment, you can reach us at [phone].     Tinnitus Resources    White Noise Products at Night To Help With Tinnitus (Anatoly Clements)

## 2021-08-18 NOTE — LETTER
8/18/2021         RE: Kelly Vegas  [address] [place] [id number]        Dear Colleague,    Thank you for referring your patient, Kelly Vegas, to the [facility] [place]. Please see a copy of my visit note below.    Chief Complaint   Patient presents with     Ent Problem     Check ears/hearing/tinnitus/issues with gradual hearing loss     History of Present Illness   Kelly Vegas is a 71 year old female who presents to me today for ear evaluation.  The patient reports bilateral tinnitus that is been gradual in onset over the past several years.  She is also had hearing loss noted in both ears for the last few years.  It was gradual in onset.  The patient has noticed increased difficulty hearing certain sounds and difficulty in understanding others, especially in noisy or crowded situations.  There is no history of recent head trauma, or chronic ear disease or ear surgery.  The patient denies recreational, , and work-related noise exposure.  No family history of hearing loss at a young age. The patient denies vertigo, otorrhea, otalgia.     Past Medical History  Patient Active Problem List   Diagnosis     Hyperlipidemia with target LDL less than 100     Hypertension goal BP (blood pressure) < 140/90     History of cervical cancer     Advanced directives, counseling/discussion     Major depressive disorder, recurrent, mild (H)     Acquired hypothyroidism     Type 2 diabetes mellitus with diabetic polyneuropathy, with long-term current use of insulin (H)     Degenerative joint disease of hand     DDD (degenerative disc disease), lumbar     Osteopenia     Memory problem     CLL (chronic lymphocytic leukemia) (H)     Dysesthesia     Ganglion cyst of joint of finger of right hand     Background diabetic retinopathy (H)     AVM (arteriovenous malformation) of small bowel, acquired     Iron deficiency     Bilateral shoulder pain     Current Medications     Current Outpatient  Medications:      ASPIRIN 81 MG OR TABS, Take 1 tablet by mouth daily., Disp: , Rfl:      atorvastatin (LIPITOR) 20 MG tablet, Take 1 tablet (20 mg) by mouth daily, Disp: 90 tablet, Rfl: 3     Cholecalciferol (VITAMIN D3) 1000 units CAPS, Take by mouth daily, Disp: , Rfl:      citalopram (CELEXA) 20 MG tablet, Take 1 tablet by mouth daily, Disp: 30 tablet, Rfl: 0     ferrous fumarate 65 mg, [place]. FE,-Vitamin C 125 mg (VITRON C)  MG TABS tablet, Take 1 tablet by mouth 2 times daily (Patient taking differently: Take 1 tablet by mouth daily ), Disp: 60 tablet, Rfl: 1     insulin glargine (LANTUS SOLOSTAR) 100 UNIT/ML pen, +++NEED ANNUAL EXAM+++INJECT 20 UNITS AT BEDTIME DAILY., Disp: 30 mL, Rfl: 0     insulin lispro (HUMALOG KWIKPEN) 100 UNIT/ML (1 unit dial) KWIKPEN, Inject 8 Units Subcutaneous 2 times daily (before meals), Disp: 30 mL, Rfl: 0     insulin pen needle (ULTICARE MINI) 31G X 6 MM miscellaneous, Use three times daily or as directed., Disp: 300 each, Rfl: 3     levothyroxine (SYNTHROID/LEVOTHROID) 100 MCG tablet, TAKE 1 TABLET (100 MCG) BY MOUTH DAILY, Disp: 90 tablet, Rfl: 2     lisinopril-hydrochlorothiazide (ZESTORETIC) 10-12.5 MG tablet, Take 1 tablet by mouth daily, Disp: 90 tablet, Rfl: 1     metFORMIN (GLUCOPHAGE-XR) 500 MG 24 hr tablet, Take 4 tablets (2,000 mg) by mouth daily (with dinner), Disp: 360 tablet, Rfl: 3     order for DME, Blood glucose monitor per insurance formulary; blood glucose test strips One Touch Verio or per formulary for use 3 time daily; lancets per formulary for use 3 time daily, Disp: 300 each, Rfl: 3    Allergies  Allergies   Allergen Reactions     Glipizide      tremulous     Ibuprofen Hives     Pcn [Penicillins] Itching     Percocet [Acetaminophen] Nausea and Vomiting     Vicodin [Hydrocodone-Acetaminophen] Nausea       Social History   Social History     Socioeconomic History     Marital status:      Spouse name: Lauri     Number of children: 4     Years of  education: 12     Highest education level: Not on file   Occupational History     Occupation:      Employer: [employer]    Tobacco Use     Smoking status: Former Smoker     Packs/day: 0.50     Years: 20.00     Pack years: 10.00     Types: Cigarettes     Start date: 1967     Quit date: 1989     Years since quittin.6     Smokeless tobacco: Never Used   Substance and Sexual Activity     Alcohol use: No     Drug use: No     Sexual activity: Not Currently     Partners: Male     Birth control/protection: Post-menopausal   Other Topics Concern     Parent/sibling w/ CABG, MI or angioplasty before 65F 55M? No      Service No     Blood Transfusions No     Caffeine Concern No     Occupational Exposure No     Hobby Hazards No     Sleep Concern No     Stress Concern No     Weight Concern Yes     Special Diet No     Back Care No     Exercise Yes     Bike Helmet No     Seat Belt Yes     Self-Exams Yes     Comment: When remember   Social History Narrative     Not on file     Social Determinants of Health     Financial Resource Strain:      Difficulty of Paying Living Expenses:    Food Insecurity:      Worried About Running Out of Food in the Last Year:      Ran Out of Food in the Last Year:    Transportation Needs:      Lack of Transportation (Medical):      Lack of Transportation (Non-Medical):    Physical Activity:      Days of Exercise per Week:      Minutes of Exercise per Session:    Stress:      Feeling of Stress :    Social Connections:      Frequency of Communication with Friends and Family:      Frequency of Social Gatherings with Friends and Family:      Attends Shinto Services:      Active Member of Clubs or Organizations:      Attends Club or Organization Meetings:      Marital Status:    Intimate Partner Violence:      Fear of Current or Ex-Partner:      Emotionally Abused:      Physically Abused:      Sexually Abused:        Family History  Family  "History   Problem Relation Age of Onset     Thyroid Disease Mother      Dementia Mother      Osteoporosis Mother      Alcohol/Drug Father      C.A.D. Father         CABG      Diabetes Father      Hypertension Father      Hyperlipidemia Father      Cerebrovascular Disease Father      Cancer Maternal Grandmother         stomach, colon     Breast Cancer Maternal Grandmother      Thyroid Disease Maternal Grandmother      Alzheimer Disease Maternal Grandfather      Breast Cancer Paternal Grandmother      Alcohol/Drug Brother      Prostate Cancer Brother      Alcohol/Drug Son      Depression Brother      Depression Brother         d. suicide     Breast Cancer Cousin      Breast Cancer Other        Review of Systems  As per HPI and PMHx, otherwise 10+ comprehensive system review is negative.    Physical Exam  BP (!) 142/69 (BP Location: Right arm, Patient Position: Sitting, Cuff Size: Adult Regular)   Pulse 76   Temp 98.9  F (37.2  C) (Tympanic)   Ht 1.626 m (5' 4\")   Wt 74.4 kg (164 lb)   BMI 28.15 kg/m    GENERAL: Patient is a pleasant, cooperative 71 year old female in no acute distress.  HEAD: Normocephalic, atraumatic.  Hair and scalp are normal.  EYES: Pupils are equal, round, reactive to light and accommodation.  Extraocular movements are intact.  The sclera nonicteric without injection.  The extraocular structures are normal.  EARS: Normal shape and symmetry.  No tenderness when palpating the mastoid or tragal areas bilaterally.  Otoscopic exam reveals a minimal amount of cerumen bilaterally.  The bilateral tympanic membranes are round, intact without evidence of effusion, good landmarks.  No retraction, granulation, or drainage.  NEUROLOGIC: Cranial nerves II through XII are grossly intact.  Voice is strong.  Facial nerve examination incomplete due to the patient wearing a mask.  CARDIOVASCULAR: Extremities are warm and well-perfused.  No significant peripheral edema.  RESPIRATORY: Patient has nonlabored " breathing without cough, wheeze, stridor.  PSYCHIATRIC: Patient is alert and oriented.  Mood and affect appear normal.  SKIN: Warm and dry.  No scalp, face, or neck lesions noted.    Audiogram  The patient underwent an audiogram performed today.  My review of the audiogram shows mild sloping to moderate sloping to moderately severe sensorineural hearing loss in both ears.  Pure-tone average is 43 dB on the right and 40 dB on the left.  Speech reception threshhold is 40 dB on the right and 40 dB on the left.  The patient had 96% word recognition on the right and 96% word recognition on the left.  The patient had a type A tympanogram on the right and a type A tympanogram on the left.      Assessment and Plan     ICD-10-CM    1. Sensorineural hearing loss, bilateral  H90.3    2. Tinnitus, bilateral  H93.13      It was my pleasure seeing Kelly Vegas today in clinic.  The patient presents today with bilateral symmetric sensorineural hearing loss.  This is most consistent with presbycusis. I can find no evidence of serious CNS disorders or other complicating factors that could be causing this.  We spent the remainder of today's visit on education. We discussed hearing protection in noisy environments.    The patient is medically cleared for consideration of a hearing aid evaluation.    The patient will follow up as necessary for worsening symptoms or changes in symptoms. I have also recommended repeat audiogram in 1-3 years, or sooner if symptoms warrant.      Kelly to follow up with Primary Care provider regarding elevated blood pressure.    Peter Gómez MD  Department of Otolarygology-Head and Neck Surgery  [facility]         Again, thank you for allowing me to participate in the care of your patient.        Sincerely,        Peter Gómez MD

## 2021-08-19 DIAGNOSIS — F33.0 MAJOR DEPRESSIVE DISORDER, RECURRENT, MILD (H): ICD-10-CM

## 2021-08-21 RX ORDER — CITALOPRAM HYDROBROMIDE 20 MG/1
20 TABLET ORAL DAILY
Qty: 30 TABLET | Refills: 0 | Status: SHIPPED | OUTPATIENT
Start: 2021-08-21 | End: 2021-08-24

## 2021-08-24 ENCOUNTER — OFFICE VISIT (OUTPATIENT)
Dept: FAMILY MEDICINE | Facility: CLINIC | Age: 71
End: 2021-08-24
Payer: COMMERCIAL

## 2021-08-24 VITALS
HEART RATE: 80 BPM | BODY MASS INDEX: 28.51 KG/M2 | HEIGHT: 64 IN | WEIGHT: 167 LBS | OXYGEN SATURATION: 97 % | TEMPERATURE: 98.9 F | DIASTOLIC BLOOD PRESSURE: 69 MMHG | SYSTOLIC BLOOD PRESSURE: 154 MMHG

## 2021-08-24 DIAGNOSIS — Z79.4 TYPE 2 DIABETES MELLITUS WITH DIABETIC POLYNEUROPATHY, WITH LONG-TERM CURRENT USE OF INSULIN (H): Primary | ICD-10-CM

## 2021-08-24 DIAGNOSIS — E03.9 ACQUIRED HYPOTHYROIDISM: ICD-10-CM

## 2021-08-24 DIAGNOSIS — F33.0 MAJOR DEPRESSIVE DISORDER, RECURRENT, MILD (H): ICD-10-CM

## 2021-08-24 DIAGNOSIS — C91.10 CLL (CHRONIC LYMPHOCYTIC LEUKEMIA) (H): ICD-10-CM

## 2021-08-24 DIAGNOSIS — I10 HYPERTENSION GOAL BP (BLOOD PRESSURE) < 140/90: ICD-10-CM

## 2021-08-24 DIAGNOSIS — E11.42 TYPE 2 DIABETES MELLITUS WITH DIABETIC POLYNEUROPATHY, WITH LONG-TERM CURRENT USE OF INSULIN (H): Primary | ICD-10-CM

## 2021-08-24 LAB
ANION GAP SERPL CALCULATED.3IONS-SCNC: 8 MMOL/L (ref 3–14)
BUN SERPL-MCNC: 21 MG/DL (ref 7–30)
CALCIUM SERPL-MCNC: 9.1 MG/DL (ref 8.5–10.1)
CHLORIDE BLD-SCNC: 108 MMOL/L (ref 94–109)
CO2 SERPL-SCNC: 24 MMOL/L (ref 20–32)
CREAT SERPL-MCNC: 1.08 MG/DL (ref 0.52–1.04)
GFR SERPL CREATININE-BSD FRML MDRD: 51 ML/MIN/1.73M2
GLUCOSE BLD-MCNC: 121 MG/DL (ref 70–99)
HBA1C MFR BLD: 7.2 % (ref 0–5.6)
POTASSIUM BLD-SCNC: 4.9 MMOL/L (ref 3.4–5.3)
SODIUM SERPL-SCNC: 140 MMOL/L (ref 133–144)

## 2021-08-24 PROCEDURE — 83036 HEMOGLOBIN GLYCOSYLATED A1C: CPT | Performed by: FAMILY MEDICINE

## 2021-08-24 PROCEDURE — 99214 OFFICE O/P EST MOD 30 MIN: CPT | Performed by: FAMILY MEDICINE

## 2021-08-24 PROCEDURE — 36415 COLL VENOUS BLD VENIPUNCTURE: CPT | Performed by: FAMILY MEDICINE

## 2021-08-24 PROCEDURE — 80048 BASIC METABOLIC PNL TOTAL CA: CPT | Performed by: FAMILY MEDICINE

## 2021-08-24 RX ORDER — INSULIN LISPRO 100 [IU]/ML
8 INJECTION, SOLUTION INTRAVENOUS; SUBCUTANEOUS
Qty: 45 ML | Refills: 1 | Status: SHIPPED | OUTPATIENT
Start: 2021-08-24 | End: 2021-12-14

## 2021-08-24 RX ORDER — LEVOTHYROXINE SODIUM 100 UG/1
TABLET ORAL
Qty: 90 TABLET | Refills: 1 | Status: SHIPPED | OUTPATIENT
Start: 2021-08-24 | End: 2021-12-14

## 2021-08-24 RX ORDER — LISINOPRIL AND HYDROCHLOROTHIAZIDE 12.5; 2 MG/1; MG/1
1 TABLET ORAL DAILY
Qty: 30 TABLET | Refills: 0 | Status: SHIPPED | OUTPATIENT
Start: 2021-08-24 | End: 2021-09-29 | Stop reason: DRUGHIGH

## 2021-08-24 RX ORDER — CITALOPRAM HYDROBROMIDE 20 MG/1
20 TABLET ORAL DAILY
Qty: 90 TABLET | Refills: 3 | Status: SHIPPED | OUTPATIENT
Start: 2021-08-24 | End: 2022-07-11

## 2021-08-24 ASSESSMENT — MIFFLIN-ST. JEOR: SCORE: 1252.51

## 2021-08-24 ASSESSMENT — PATIENT HEALTH QUESTIONNAIRE - PHQ9: SUM OF ALL RESPONSES TO PHQ QUESTIONS 1-9: 0

## 2021-08-24 NOTE — PROGRESS NOTES
"    Assessment & Plan     Type 2 diabetes mellitus with diabetic polyneuropathy, with long-term current use of insulin (H)  -unknown control   - insulin glargine (LANTUS SOLOSTAR) 100 UNIT/ML pen; INJECT 20 UNITS AT BEDTIME DAILY  - insulin lispro (HUMALOG KWIKPEN) 100 UNIT/ML (1 unit dial) KWIKPEN; Inject 8 Units Subcutaneous 2 times daily (before meals)  - Basic metabolic panel  (Ca, Cl, CO2, Creat, Gluc, K, Na, BUN); Future  - Hemoglobin A1c; Future  - Basic metabolic panel; Future  -consider addition of Ozempic if A1c >8    Hypertension goal BP (blood pressure) < 140/90  -uncontrolled   - Basic metabolic panel  (Ca, Cl, CO2, Creat, Gluc, K, Na, BUN); Future  -increase dose of lisinopril-hydrochlorothiazide (ZESTORETIC) 20-12.5 MG tablet; Take 1 tablet by mouth daily  - Basic metabolic panel; Future    Major depressive disorder, recurrent, mild (H)  -Well controlled with medications without side effects.   - citalopram (CELEXA) 20 MG tablet; Take 1 tablet (20 mg) by mouth daily    Acquired hypothyroidism  -euthyroid on replacement   - levothyroxine (SYNTHROID/LEVOTHROID) 100 MCG tablet; TAKE 1 TABLET (100 MCG) BY MOUTH DAILY    CLL (chronic lymphocytic leukemia) (H)  -per oncology   -recommended COVID-19 booster vaccine at pharmacy today       Ordering of each unique test  Prescription drug management    {Provider  Link to [facility] Help Grid :520244}     BMI:   Estimated body mass index is 28.67 kg/m  as calculated from the following:    Height as of this encounter: 1.626 m (5' 4\").    Weight as of this encounter: 75.8 kg (167 lb).   Weight management plan: Discussed healthy diet and exercise guidelines    See Patient Instructions    Return in about 1 month (around 9/24/2021) for Wellness visit, blood pressure.    Sejal Rutledge MD  [employer] WILBERTO Mendoza is a 71 year old who presents for the following health issues     HPI     Diabetes Follow-up    How often are you checking your " blood sugar? One time daily  What time of day are you checking your blood sugars (select all that apply)?  before lunch  Have you had any blood sugars above 200?  Yes a few  Have you had any blood sugars below 70?  No    What symptoms do you notice when your blood sugar is low?  Shaky and Dizzy    What concerns do you have today about your diabetes? None     Do you have any of these symptoms? (Select all that apply)  No numbness or tingling in feet.  No redness, sores or blisters on feet.  No complaints of excessive thirst.  No reports of blurry vision.  No significant changes to weight.    Have you had a diabetic eye exam in the last 12 months? Yes- Date of last eye exam: End of july,  Location: Dr. Peres at [facility] in [place]                Hyperlipidemia Follow-Up      Are you regularly taking any medication or supplement to lower your cholesterol?   Yes- .    Are you having muscle aches or other side effects that you think could be caused by your cholesterol lowering medication?  No    Hypertension Follow-up      Do you check your blood pressure regularly outside of the clinic? Yes     Are you following a low salt diet? Yes    Are your blood pressures ever more than 140 on the top number (systolic) OR more   than 90 on the bottom number (diastolic), for example 140/90? Yes    BP Readings from Last 2 Encounters:   08/24/21 (!) 154/69   08/18/21 (!) 142/69     Hemoglobin A1C (%)   Date Value   01/04/2021 8.4 (H)   02/11/2020 7.5 (H)     LDL Cholesterol Calculated (mg/dL)   Date Value   01/04/2021 51   03/26/2019 56         How many servings of fruits and vegetables do you eat daily?  2-3    On average, how many sweetened beverages do you drink each day (Examples: soda, juice, sweet tea, etc.  Do NOT count diet or artificially sweetened beverages)?   0    How many days per week do you exercise enough to make your heart beat faster? 5    How many minutes a day do you exercise enough to make your heart beat faster? 30  "- 60    How many days per week do you miss taking your medication? 0        Review of Systems   CONSTITUTIONAL: NEGATIVE for fever, chills, change in weight  INTEGUMENTARY/SKIN: NEGATIVE for worrisome rashes, moles or lesions  EYES: NEGATIVE for vision changes or irritation  RESP: NEGATIVE for significant cough or SOB  GI: NEGATIVE for nausea, abdominal pain, heartburn, or change in bowel habits  : NEGATIVE for frequency, dysuria, or hematuria  MUSCULOSKELETAL: NEGATIVE for significant arthralgias or myalgia  NEURO: NEGATIVE for weakness, dizziness or paresthesias  ENDOCRINE: Hx diabetes  PSYCHIATRIC: HX depression      Objective    BP (!) 154/69 (BP Location: Right arm, Patient Position: Sitting, Cuff Size: Adult Regular)   Pulse 80   Temp 98.9  F (37.2  C) (Oral)   Ht 1.626 m (5' 4\")   Wt 75.8 kg (167 lb)   SpO2 97%   BMI 28.67 kg/m    Body mass index is 28.67 kg/m .  Physical Exam   GENERAL: alert, no distress and obese  NECK: no adenopathy, no asymmetry, masses, or scars and thyroid normal to palpation  RESP: lungs clear to auscultation - no rales, rhonchi or wheezes  CV: regular rate and rhythm, normal S1 S2, no S3 or S4, no murmur, click or rub, no peripheral edema    MS: no gross musculoskeletal defects noted, no edema  PSYCH: mentation appears normal, affect normal/bright    No results found for this or any previous visit (from the past 24 hour(s)).            "

## 2021-08-24 NOTE — RESULT ENCOUNTER NOTE
Samuel Mendoza,    Your blood glucose is in better control. Your final test results are pending.  Please check your chart again within 2 - 3 days. You will receive further instruction when your full test result panel is complete.    Sejal Rutledge MD

## 2021-08-25 NOTE — RESULT ENCOUNTER NOTE
Kelly,    Your blood glucose is in improving control. I recommend increasing exercise and eating at least 4 to 5 servings of fruits and vegetables daily.     Kidney function is measured by blood work that measures creatinine and calculates estimated GFR (glomerular filtration rate).  As the eGFR reading has become more readily available with routine labs, there has been a renewed focus on managing chronic kidney disease in the medical community and at our clinic in particular.     By current criteria you do have stage 3 chronic kidney disease as your eGFR is < 60.  This is not something you should worry about as it is quite common, but it does mean we should be monitoring some labs on a regular basis and making sure we are controlling other risk factors that could cause worsening of your kidney function.  Avoid taking medications such as ibuprofen and aleve, which can cause kidney injury.     Follow-up for your blood pressure in 1 month. You can schedule a nurse only appointment or be ween by walk in at our pharmacy.     Sejal Rutledge MD

## 2021-09-25 ENCOUNTER — HEALTH MAINTENANCE LETTER (OUTPATIENT)
Age: 71
End: 2021-09-25

## 2021-09-29 ENCOUNTER — OFFICE VISIT (OUTPATIENT)
Dept: FAMILY MEDICINE | Facility: CLINIC | Age: 71
End: 2021-09-29
Payer: COMMERCIAL

## 2021-09-29 VITALS
TEMPERATURE: 98.1 F | DIASTOLIC BLOOD PRESSURE: 62 MMHG | BODY MASS INDEX: 28.32 KG/M2 | SYSTOLIC BLOOD PRESSURE: 142 MMHG | HEART RATE: 73 BPM | OXYGEN SATURATION: 97 % | WEIGHT: 165 LBS

## 2021-09-29 DIAGNOSIS — N18.31 STAGE 3A CHRONIC KIDNEY DISEASE (H): ICD-10-CM

## 2021-09-29 DIAGNOSIS — K55.20 AVM (ARTERIOVENOUS MALFORMATION) OF SMALL BOWEL, ACQUIRED: ICD-10-CM

## 2021-09-29 DIAGNOSIS — E11.42 TYPE 2 DIABETES MELLITUS WITH DIABETIC POLYNEUROPATHY, WITH LONG-TERM CURRENT USE OF INSULIN (H): Primary | ICD-10-CM

## 2021-09-29 DIAGNOSIS — I10 HYPERTENSION GOAL BP (BLOOD PRESSURE) < 140/90: ICD-10-CM

## 2021-09-29 DIAGNOSIS — Z79.4 TYPE 2 DIABETES MELLITUS WITH DIABETIC POLYNEUROPATHY, WITH LONG-TERM CURRENT USE OF INSULIN (H): Primary | ICD-10-CM

## 2021-09-29 PROCEDURE — 90662 IIV NO PRSV INCREASED AG IM: CPT | Performed by: FAMILY MEDICINE

## 2021-09-29 PROCEDURE — 90471 IMMUNIZATION ADMIN: CPT | Performed by: FAMILY MEDICINE

## 2021-09-29 PROCEDURE — 99214 OFFICE O/P EST MOD 30 MIN: CPT | Mod: 25 | Performed by: FAMILY MEDICINE

## 2021-09-29 RX ORDER — LISINOPRIL AND HYDROCHLOROTHIAZIDE 20; 25 MG/1; MG/1
1 TABLET ORAL DAILY
Qty: 90 TABLET | Refills: 0 | Status: SHIPPED | OUTPATIENT
Start: 2021-09-29 | End: 2021-12-14 | Stop reason: DRUGHIGH

## 2021-09-29 NOTE — PROGRESS NOTES
Assessment & Plan     Type 2 diabetes mellitus with diabetic polyneuropathy, with long-term current use of insulin (H)  Stage 3a chronic kidney disease (H)  -Well controlled with medications with side effects of diarrhea  -change metformin to suppertime; if GI symptoms persist, split to BID with meals and call or return to clinic as needed if these symptoms worsen or fail to improve as anticipated.   - lisinopril-hydrochlorothiazide (ZESTORETIC) 20-25 MG tablet; Take 1 tablet by mouth daily  -follow-up 3 months     Hypertension goal BP (blood pressure) < 140/90  -uncontrolled   -increase dose and follow-up in 3 months   - lisinopril-hydrochlorothiazide (ZESTORETIC) 20-25 MG tablet; Take 1 tablet by mouth daily    AVM (arteriovenous malformation) of small bowel, acquired  -diarrhea is more likely due to metformin side effects   -Call or return to clinic as needed if these symptoms worsen or fail to improve as anticipated.                See Patient Instructions    Return in about 3 months (around 12/29/2021) for Wellness visit, diabetes, blood pressure.    Sejal Rutledge MD  [facility] WILBERTO Mendoza is a 71 year old who presents for the following health issues     HPI     Hypertension Follow-up      Do you check your blood pressure regularly outside of the clinic? No     Are you following a low salt diet? No    Are your blood pressures ever more than 140 on the top number (systolic) OR more   than 90 on the bottom number (diastolic), for example 140/90? Yes      How many servings of fruits and vegetables do you eat daily?  2-3    On average, how many sweetened beverages do you drink each day (Examples: soda, juice, sweet tea, etc.  Do NOT count diet or artificially sweetened beverages)?   1    How many days per week do you exercise enough to make your heart beat faster? 7    How many minutes a day do you exercise enough to make your heart beat faster? 30 - 60    How many days per  week do you miss taking your medication? sometimes the afternoon insulin        Review of Systems         Objective    BP (!) 142/62 (BP Location: Left arm, Patient Position: Sitting, Cuff Size: Adult Regular)   Pulse 73   Temp 98.1  F (36.7  C) (Oral)   Wt 74.8 kg (165 lb)   SpO2 97%   BMI 28.32 kg/m    Body mass index is 28.32 kg/m .  Physical Exam   GENERAL: healthy, alert and no distress  MS: extremities normal- no gross deformities noted  PSYCH: mentation appears normal, affect normal/bright    Office Visit on 08/24/2021   Component Date Value Ref Range Status     Hemoglobin A1C 08/24/2021 7.2* 0.0 - 5.6 % Final    Normal <5.7%   Prediabetes 5.7-6.4%    Diabetes 6.5% or higher     Note: Adopted from ADA consensus guidelines.     Sodium 08/24/2021 140  133 - 144 mmol/L Final     Potassium 08/24/2021 4.9  3.4 - 5.3 mmol/L Final     Chloride 08/24/2021 108  94 - 109 mmol/L Final     Carbon Dioxide (CO2) 08/24/2021 24  20 - 32 mmol/L Final     Anion Gap 08/24/2021 8  3 - 14 mmol/L Final     Urea Nitrogen 08/24/2021 21  7 - 30 mg/dL Final     Creatinine 08/24/2021 1.08* 0.52 - 1.04 mg/dL Final     Calcium 08/24/2021 9.1  8.5 - 10.1 mg/dL Final     Glucose 08/24/2021 121* 70 - 99 mg/dL Final     GFR Estimate 08/24/2021 51* >60 mL/min/1.73m2 Final    As of July 11, 2021, eGFR is calculated by the CKD-EPI creatinine equation, without race adjustment. eGFR can be influenced by muscle mass, exercise, and diet. The reported eGFR is an estimation only and is only applicable if the renal function is stable.

## 2021-09-29 NOTE — PATIENT INSTRUCTIONS
Did you know you can lower your blood pressure with your daily habits?    *Losing 20 pounds of weight lowers blood pressure 5 to 20 points.  *Eating a low-fat diet rich in fruits, vegetables and low-fat dairy lowers blood pressure 8 to 14 points.  *Eating a low-salt diet lowers blood pressure 2 to 8 points.  *Exercising regularly lowers blood pressure 4 to 9 points.  *Reducing alcohol use lowers blood pressure 2 to 4 points.      What is Chronic Kidney Disease  Chronic kidney disease (CKD) is the permanent loss of kidney function.  When the kidneys are damaged, they do not remove wastes and extra water from the blood as well as they should.  The most common causes of CKD are high blood pressure and diabetes.   It can also run in families, so you may be at higher risk if you have a blood relative with kidney failure.  CKD is a silent condition (having few or no symptoms) and develops so slowly that most people do not realize they are sick until the disease is advanced.  Left undetected and untreated CKD can eventually lead to further problems including hypertension, anemia, weak bones, poor nutrition, nerve damage, and in severe cases kidney failure (requiring dialysis or transplant).  CKD also increases a patient s risk for developing diseases of the heart and blood vessels.    We can diagnose CKD through blood and urine tests.  By detecting CKD in its early stages we can prevent or delay the complications of CKD, including kidney failure and heart disease.  Stages of CKD:  There are five stages of chronic kidney disease.  Your stage of kidney damage is based on the presence of kidney damage (often in the form of urinary proteins) and your glomerular filtration rate (GFR), which is a measure of your level of kidney function.  Things you can do to slow down or avoid kidney failure:    If you have diabetes, control your blood sugar.  Studies show that keeping tight control of blood sugar can delay or prevent kidney  failure    If you have high blood pressure, it is important to lower your blood pressure.  Medications called ACE (angiotensin-converting enzyme) inhibitors or ARBs (angiotensin receptor blockers) are used to keep your kidney disease from getting worse.    Be active by including exercise in your daily routine.    Eat a well balanced diet.   We may have you watch the amount of protein you eat.  Too much protein can make the kidneys work too hard.    Quit smoking.  Smoking damages the kidneys.  It also raises blood pressure.    Discuss all of your medications, including over-the-counter medications, with your doctor.  You should  avoid certain medications, including popular medications such as Advil, Motrin, Aleve (and other  NSAIDs ) which can further damage your kidneys.  For more information on Chronic Kidney Disease, please see the National Kidney Foundation www.kidney.org.

## 2021-09-30 ENCOUNTER — ANCILLARY PROCEDURE (OUTPATIENT)
Dept: MAMMOGRAPHY | Facility: CLINIC | Age: 71
End: 2021-09-30
Payer: COMMERCIAL

## 2021-09-30 DIAGNOSIS — Z12.31 VISIT FOR SCREENING MAMMOGRAM: ICD-10-CM

## 2021-09-30 PROCEDURE — 77067 SCR MAMMO BI INCL CAD: CPT | Mod: TC | Performed by: RADIOLOGY

## 2021-11-01 DIAGNOSIS — E78.5 HYPERLIPIDEMIA WITH TARGET LDL LESS THAN 100: ICD-10-CM

## 2021-11-03 RX ORDER — ATORVASTATIN CALCIUM 20 MG/1
TABLET, FILM COATED ORAL
Qty: 90 TABLET | Refills: 0 | Status: SHIPPED | OUTPATIENT
Start: 2021-11-03 | End: 2021-12-14

## 2021-11-04 ENCOUNTER — LAB (OUTPATIENT)
Dept: LAB | Facility: CLINIC | Age: 71
End: 2021-11-04
Payer: COMMERCIAL

## 2021-11-04 DIAGNOSIS — E61.1 IRON DEFICIENCY: ICD-10-CM

## 2021-11-04 DIAGNOSIS — K55.20 AVM (ARTERIOVENOUS MALFORMATION) OF SMALL BOWEL, ACQUIRED: ICD-10-CM

## 2021-11-04 DIAGNOSIS — C91.10 CLL (CHRONIC LYMPHOCYTIC LEUKEMIA) (H): ICD-10-CM

## 2021-11-04 LAB
ALBUMIN SERPL-MCNC: 3.8 G/DL (ref 3.4–5)
ALP SERPL-CCNC: 82 U/L (ref 40–150)
ALT SERPL W P-5'-P-CCNC: 18 U/L (ref 0–50)
ANION GAP SERPL CALCULATED.3IONS-SCNC: 3 MMOL/L (ref 3–14)
AST SERPL W P-5'-P-CCNC: 13 U/L (ref 0–45)
BASOPHILS # BLD MANUAL: 0 10E3/UL (ref 0–0.2)
BASOPHILS NFR BLD MANUAL: 0 %
BILIRUB SERPL-MCNC: 0.3 MG/DL (ref 0.2–1.3)
BUN SERPL-MCNC: 18 MG/DL (ref 7–30)
CALCIUM SERPL-MCNC: 9.1 MG/DL (ref 8.5–10.1)
CHLORIDE BLD-SCNC: 106 MMOL/L (ref 94–109)
CO2 SERPL-SCNC: 29 MMOL/L (ref 20–32)
CREAT SERPL-MCNC: 1.15 MG/DL (ref 0.52–1.04)
DACRYOCYTES BLD QL SMEAR: SLIGHT
ELLIPTOCYTES BLD QL SMEAR: SLIGHT
EOSINOPHIL # BLD MANUAL: 0.2 10E3/UL (ref 0–0.7)
EOSINOPHIL NFR BLD MANUAL: 1 %
ERYTHROCYTE [DISTWIDTH] IN BLOOD BY AUTOMATED COUNT: 12.9 % (ref 10–15)
FERRITIN SERPL-MCNC: 10 NG/ML (ref 8–252)
GFR SERPL CREATININE-BSD FRML MDRD: 48 ML/MIN/1.73M2
GLUCOSE BLD-MCNC: 180 MG/DL (ref 70–99)
HCT VFR BLD AUTO: 37.9 % (ref 35–47)
HGB BLD-MCNC: 12.2 G/DL (ref 11.7–15.7)
IRON SATN MFR SERPL: 26 % (ref 15–46)
IRON SERPL-MCNC: 89 UG/DL (ref 35–180)
LDH SERPL L TO P-CCNC: 165 U/L (ref 81–234)
LYMPHOCYTES # BLD MANUAL: 10.9 10E3/UL (ref 0.8–5.3)
LYMPHOCYTES NFR BLD MANUAL: 65 %
MCH RBC QN AUTO: 29.5 PG (ref 26.5–33)
MCHC RBC AUTO-ENTMCNC: 32.2 G/DL (ref 31.5–36.5)
MCV RBC AUTO: 92 FL (ref 78–100)
MONOCYTES # BLD MANUAL: 0.7 10E3/UL (ref 0–1.3)
MONOCYTES NFR BLD MANUAL: 4 %
NEUTROPHILS # BLD MANUAL: 5 10E3/UL (ref 1.6–8.3)
NEUTROPHILS NFR BLD MANUAL: 30 %
PLAT MORPH BLD: ABNORMAL
PLATELET # BLD AUTO: 277 10E3/UL (ref 150–450)
POTASSIUM BLD-SCNC: 4.1 MMOL/L (ref 3.4–5.3)
PROT SERPL-MCNC: 6.8 G/DL (ref 6.8–8.8)
RBC # BLD AUTO: 4.14 10E6/UL (ref 3.8–5.2)
RBC MORPH BLD: ABNORMAL
SMUDGE CELLS BLD QL SMEAR: PRESENT
SODIUM SERPL-SCNC: 138 MMOL/L (ref 133–144)
TIBC SERPL-MCNC: 338 UG/DL (ref 240–430)
VARIANT LYMPHS BLD QL SMEAR: PRESENT
WBC # BLD AUTO: 16.8 10E3/UL (ref 4–11)

## 2021-11-04 PROCEDURE — 82728 ASSAY OF FERRITIN: CPT

## 2021-11-04 PROCEDURE — 36415 COLL VENOUS BLD VENIPUNCTURE: CPT

## 2021-11-04 PROCEDURE — 80053 COMPREHEN METABOLIC PANEL: CPT

## 2021-11-04 PROCEDURE — 85027 COMPLETE CBC AUTOMATED: CPT

## 2021-11-04 PROCEDURE — 83550 IRON BINDING TEST: CPT

## 2021-11-04 PROCEDURE — 83615 LACTATE (LD) (LDH) ENZYME: CPT

## 2021-11-15 ENCOUNTER — ONCOLOGY VISIT (OUTPATIENT)
Dept: ONCOLOGY | Facility: CLINIC | Age: 71
End: 2021-11-15
Payer: COMMERCIAL

## 2021-11-15 VITALS
WEIGHT: 167 LBS | HEIGHT: 64 IN | OXYGEN SATURATION: 96 % | HEART RATE: 79 BPM | DIASTOLIC BLOOD PRESSURE: 86 MMHG | SYSTOLIC BLOOD PRESSURE: 149 MMHG | BODY MASS INDEX: 28.51 KG/M2

## 2021-11-15 DIAGNOSIS — C91.10 CLL (CHRONIC LYMPHOCYTIC LEUKEMIA) (H): Primary | ICD-10-CM

## 2021-11-15 DIAGNOSIS — Z79.4 TYPE 2 DIABETES MELLITUS WITH DIABETIC POLYNEUROPATHY, WITH LONG-TERM CURRENT USE OF INSULIN (H): ICD-10-CM

## 2021-11-15 DIAGNOSIS — E11.42 TYPE 2 DIABETES MELLITUS WITH DIABETIC POLYNEUROPATHY, WITH LONG-TERM CURRENT USE OF INSULIN (H): ICD-10-CM

## 2021-11-15 DIAGNOSIS — E61.1 IRON DEFICIENCY: ICD-10-CM

## 2021-11-15 PROCEDURE — 99214 OFFICE O/P EST MOD 30 MIN: CPT | Performed by: NURSE PRACTITIONER

## 2021-11-15 ASSESSMENT — MIFFLIN-ST. JEOR: SCORE: 1252.51

## 2021-11-15 ASSESSMENT — PAIN SCALES - GENERAL: PAINLEVEL: NO PAIN (0)

## 2021-11-15 NOTE — PROGRESS NOTES
Oncology Follow Up Visit: November 15, 2021     Oncologist: Dr. Codie De Los Santos/ Chelsi Daly,CNP  PCP: Sejal Rutledge    Diagnosis: CLL, DEBORA with known AVMs  Kelly Vegas is a 69 yo female who presented in 10/2018 with new moderate leukocytosis/lymphocytosis and moderate normocytic normochromic anemia, found to have low ferritin and iron saturation, which was suggestive of iron-deficiency anemia.   Record review indicating she had touch of leukocytosis no diff back in 2006.  She was not anemic back in 2006 with hemoglobin of 12.5.     Hematology work up in 10/2018 was most consistent with CLL and DEBORA. DEBORA corrected with iron supplement by 12/2018.   Upper endo found none bleeding erosive gastropathy, with negative colonoscopy.   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   She was off oral iron since 5/2019.   She had new anemia in 9/2019 with hb of 10.7, nl MCV, which is 1 wk post cholecystectomy.   Hgb did not improve in 10/2019 when she was restarted on oral iron 10/2019. Stool occult was negative in 10/2019. Hb up to 12 end of Nov. GI work-up 1/2020 with [facility] identified multiple AVM in proximal small bowel and colon small bowel capsule studies.  She then had repeat upper endoscopy and colonoscopy with [facility] did not find any obvious signs of bleeding, had cauterization.     Interval History: Ms. Vegas is here for follow up to her CLL.  Patient reporting she is feeling well without any fevers recurrent illnesses weight changes night sweats.  She is still exercising regularly she needs to take her small dog for a walk however the dog is now elderly and walks her shorter.  She is eating a healthy diet she feels and has no other new complaints.  Bowel and bladder remain normal and she has no new masses or tenderness that she is found.  Rest of comprehensive and complete ROS is reviewed and is negative.   Past Medical History:   Diagnosis Date     Arthritis check it     AVM  "(arteriovenous malformation) of small bowel, acquired      Background diabetic retinopathy (H)      Cervical cancer (H) 03/2006    [facility], CIS with gland involvement     CKD (chronic kidney disease) stage 3, GFR 30-59 ml/min      CLL (chronic lymphocytic leukemia) (H)      Colon adenoma      DDD (degenerative disc disease), lumbar      Degenerative joint disease of hand      Depressive disorder      Diabetes mellitus type II 2009     Diabetic polyneuropathy associated with type 2 diabetes mellitus (H) 09/26/2017     Gastritis      HTN (hypertension)      Hyperlipidemia LDL goal < 100      Hypothyroid 12/2004     Osteopenia      Recurrent major depression (H)      Current Outpatient Medications   Medication     atorvastatin (LIPITOR) 20 MG tablet     Cholecalciferol (VITAMIN D3) 1000 units CAPS     citalopram (CELEXA) 20 MG tablet     ferrous fumarate 65 mg, [place]. FE,-Vitamin C 125 mg (VITRON C)  MG TABS tablet     insulin glargine (LANTUS SOLOSTAR) 100 UNIT/ML pen     insulin lispro (HUMALOG KWIKPEN) 100 UNIT/ML (1 unit dial) KWIKPEN     insulin pen needle (ULTICARE MINI) 31G X 6 MM miscellaneous     levothyroxine (SYNTHROID/LEVOTHROID) 100 MCG tablet     lisinopril-hydrochlorothiazide (ZESTORETIC) 20-25 MG tablet     metFORMIN (GLUCOPHAGE-XR) 500 MG 24 hr tablet     order for DME     No current facility-administered medications for this visit.     Allergies   Allergen Reactions     Glipizide      tremulous     Ibuprofen Hives     Pcn [Penicillins] Itching     Percocet [Acetaminophen] Nausea and Vomiting     Vicodin [Hydrocodone-Acetaminophen] Nausea       Physical Exam:BP (!) 149/86 (BP Location: Right arm, Patient Position: Chair, Cuff Size: Adult Large)   Pulse 79   Ht 1.626 m (5' 4\")   Wt 75.8 kg (167 lb)   SpO2 96%   BMI 28.67 kg/m   -Admits to being anxious about the visit due to difficulty finding facility  Constitutional: Alert, overweight but moving well on own, and in no distress.   ENT: Eyes " bright , No mouth sores  Neck: Supple, No adenopathy.  Cardiac: Heart rate and rhythm is regular and strong without murmur  Respiratory: Breathing easy. Lung sounds clear to auscultation  GI: Abdomen is soft, non-tender, BS normal. No masses or organomegaly  MS: Muscle tone normal, extremities normal with no edema.   Skin: No suspicious lesions or rashes  Neuro: Sensory grossly WNL, gait normal.   Lymph: Normal ant/post cervical, axillary, supraclavicular nodes  Psych: Mentation appears normal and affect normal/bright with good conversation    Laboratory Results:    Ref. Range 11/4/2021 14:11   Sodium Latest Ref Range: 133 - 144 mmol/L 138   Potassium Latest Ref Range: 3.4 - 5.3 mmol/L 4.1   Chloride Latest Ref Range: 94 - 109 mmol/L 106   Carbon Dioxide Latest Ref Range: 20 - 32 mmol/L 29   Urea Nitrogen Latest Ref Range: 7 - 30 mg/dL 18   Creatinine Latest Ref Range: 0.52 - 1.04 mg/dL 1.15 (H)   GFR Estimate Latest Ref Range: >60 mL/min/1.73m2 48 (L)   Calcium Latest Ref Range: 8.5 - 10.1 mg/dL 9.1   Anion Gap Latest Ref Range: 3 - 14 mmol/L 3   Albumin Latest Ref Range: 3.4 - 5.0 g/dL 3.8   Protein Total Latest Ref Range: 6.8 - 8.8 g/dL 6.8   Bilirubin Total Latest Ref Range: 0.2 - 1.3 mg/dL 0.3   Alkaline Phosphatase Latest Ref Range: 40 - 150 U/L 82   ALT Latest Ref Range: 0 - 50 U/L 18   AST Latest Ref Range: 0 - 45 U/L 13   Ferritin Latest Ref Range: 8 - 252 ng/mL 10   Iron Latest Ref Range: 35 - 180 ug/dL 89   Iron Binding Cap Latest Ref Range: 240 - 430 ug/dL 338   Iron Saturation Index Latest Ref Range: 15 - 46 % 26   Lactate Dehydrogenase Latest Ref Range: 81 - 234 U/L 165   Glucose Latest Ref Range: 70 - 99 mg/dL 180 (H)   WBC Latest Ref Range: 4.0 - 11.0 10e3/uL 16.8 (H)   Hemoglobin Latest Ref Range: 11.7 - 15.7 g/dL 12.2   Hematocrit Latest Ref Range: 35.0 - 47.0 % 37.9   Platelet Count Latest Ref Range: 150 - 450 10e3/uL 277   RBC Count Latest Ref Range: 3.80 - 5.20 10e6/uL 4.14   MCV Latest Ref  Range: 78 - 100 fL 92   MCH Latest Ref Range: 26.5 - 33.0 pg 29.5   MCHC Latest Ref Range: 31.5 - 36.5 g/dL 32.2   RDW Latest Ref Range: 10.0 - 15.0 % 12.9   % Neutrophils Latest Units: % 30   % Lymphocytes Latest Units: % 65   % Monocytes Latest Units: % 4   % Eosinophils Latest Units: % 1   % Basophils Latest Units: % 0   Absolute Basophils Latest Ref Range: 0.0 - 0.2 10e3/uL 0.0   Absolute Neutrophil Latest Ref Range: 1.6 - 8.3 10e3/uL 5.0   Absolute Lymphocytes Latest Ref Range: 0.8 - 5.3 10e3/uL 10.9 (H)   Absolute Monocytes Latest Ref Range: 0.0 - 1.3 10e3/uL 0.7   Absolute Eosinophils Latest Ref Range: 0.0 - 0.7 10e3/uL 0.2   RBC Morphology Unknown Confirmed RBC Indices   Platelet Morphology Latest Ref Range: Automated Count Confirmed. Platelet morphology is normal.  Automated Count Confirmed. Platelet morphology is normal.   Teardrop Cells Latest Ref Range: None Seen  Slight (A)   Elliptocytes Latest Ref Range: None Seen  Slight (A)   Smudge Cells Latest Ref Range: None Seen  Present (A)   Reactive Lymphs Latest Ref Range: None Seen  Present (A)   Assessment and Plan:   CLL-reviewed findings today on laboratory reports showing stability and disease.  Review with patient showing no new symptoms that are concerning for progression of disease.  We will have patient return in 1 year with labs prior.  Counseled patient on symptoms of concern that should be shared if noted.  Iron deficiency anemia with known AVMs-patient continues to use oral iron daily.  She denies any significant fatigue.  At this point it appears that her stores of iron may be down however they are still adequate and patient is using the oral iron on a regular basis.  Suggest she continue with the oral iron but use with orange juice for the acidity to help absorb-we will again retest ferritin and iron studies with next visit.  Diabetes mellitus type 2-patient is insulin-dependent and is monitoring sugars closely and following up with PCP  regularly with improving hemoglobin A1c.  No hypoglycemic events.  The total time of this encounter amounted to 30 minutes. This time included face-to-face time spent with the patient, prep work, ordering tests, and performing post visit documentation.  Chelsi Daly Cnp

## 2021-11-15 NOTE — NURSING NOTE
"Oncology Rooming Note    November 15, 2021 1:17 PM   Kelly Vegas is a 71 year old female who presents for:    Chief Complaint   Patient presents with     Oncology Clinic Visit     follow up     Initial Vitals: BP (!) 149/86 (BP Location: Right arm, Patient Position: Chair, Cuff Size: Adult Large)   Pulse 79   Ht 1.626 m (5' 4\")   Wt 75.8 kg (167 lb)   SpO2 96%   BMI 28.67 kg/m   Estimated body mass index is 28.67 kg/m  as calculated from the following:    Height as of this encounter: 1.626 m (5' 4\").    Weight as of this encounter: 75.8 kg (167 lb). Body surface area is 1.85 meters squared.  No Pain (0) Comment: Data Unavailable   No LMP recorded. Patient has had a hysterectomy.  Allergies reviewed: Yes  Medications reviewed: Yes    Medications: Medication refills not needed today.  Pharmacy name entered into [employer]: [id number]  [place] [id number]    Clinical concerns: CLL - what to watch for, are there things she should be doing     Chelsi was notified.      Magalis Sloan CMA              "

## 2021-11-15 NOTE — LETTER
11/15/2021         RE: Kelly Vegas  [address] [facility] [address]        Dear Colleague,    Thank you for referring your patient, Kelly Vegas, to the [facility]. Please see a copy of my visit note below.    Oncology Follow Up Visit: November 15, 2021     Oncologist: Dr. Codie De Los Santos/ Chelsi Daly,CNP  PCP: Sejal Rutledge    Diagnosis: CLL, DEBORA with known AVMs  Kelly Vegas is a 69 yo female who presented in 10/2018 with new moderate leukocytosis/lymphocytosis and moderate normocytic normochromic anemia, found to have low ferritin and iron saturation, which was suggestive of iron-deficiency anemia.   Record review indicating she had touch of leukocytosis no diff back in 2006.  She was not anemic back in 2006 with hemoglobin of 12.5.     Hematology work up in 10/2018 was most consistent with CLL and DEBORA. DEBORA corrected with iron supplement by 12/2018.   Upper endo found none bleeding erosive gastropathy, with negative colonoscopy.   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   She was off oral iron since 5/2019.   She had new anemia in 9/2019 with hb of 10.7, nl MCV, which is 1 wk post cholecystectomy.   Hgb did not improve in 10/2019 when she was restarted on oral iron 10/2019. Stool occult was negative in 10/2019. Hb up to 12 end of Nov. GI work-up 1/2020 with [facility] identified multiple AVM in proximal small bowel and colon small bowel capsule studies.  She then had repeat upper endoscopy and colonoscopy with [facility] did not find any obvious signs of bleeding, had cauterization.     Interval History: Ms. Vegas is here for follow up to her CLL.  Patient reporting she is feeling well without any fevers recurrent illnesses weight changes night sweats.  She is still exercising regularly she needs to take her small dog for a walk however the dog is now elderly and walks her shorter.  She is eating a healthy diet she feels and has no other  new complaints.  Bowel and bladder remain normal and she has no new masses or tenderness that she is found.  Rest of comprehensive and complete ROS is reviewed and is negative.   Past Medical History:   Diagnosis Date     Arthritis check it     AVM (arteriovenous malformation) of small bowel, acquired      Background diabetic retinopathy (H)      Cervical cancer (H) 03/2006    [facility], [facility] with gland involvement     CKD (chronic kidney disease) stage 3, GFR 30-59 ml/min      CLL (chronic lymphocytic leukemia) (H)      Colon adenoma      DDD (degenerative disc disease), lumbar      Degenerative joint disease of hand      Depressive disorder      Diabetes mellitus type II 2009     Diabetic polyneuropathy associated with type 2 diabetes mellitus (H) 09/26/2017     Gastritis      HTN (hypertension)      Hyperlipidemia LDL goal < 100      Hypothyroid 12/2004     Osteopenia      Recurrent major depression (H)      Current Outpatient Medications   Medication     atorvastatin (LIPITOR) 20 MG tablet     Cholecalciferol (VITAMIN D3) 1000 units CAPS     citalopram (CELEXA) 20 MG tablet     ferrous fumarate 65 mg, [place]. FE,-Vitamin C 125 mg (VITRON C)  MG TABS tablet     insulin glargine (LANTUS SOLOSTAR) 100 UNIT/ML pen     insulin lispro (HUMALOG KWIKPEN) 100 UNIT/ML (1 unit dial) KWIKPEN     insulin pen needle (ULTICARE MINI) 31G X 6 MM miscellaneous     levothyroxine (SYNTHROID/LEVOTHROID) 100 MCG tablet     lisinopril-hydrochlorothiazide (ZESTORETIC) 20-25 MG tablet     metFORMIN (GLUCOPHAGE-XR) 500 MG 24 hr tablet     order for DME     No current facility-administered medications for this visit.     Allergies   Allergen Reactions     Glipizide      tremulous     Ibuprofen Hives     Pcn [Penicillins] Itching     Percocet [Acetaminophen] Nausea and Vomiting     Vicodin [Hydrocodone-Acetaminophen] Nausea       Physical Exam:BP (!) 149/86 (BP Location: Right arm, Patient Position: Chair, Cuff Size: Adult Large)   Pulse  "79   Ht 1.626 m (5' 4\")   Wt 75.8 kg (167 lb)   SpO2 96%   BMI 28.67 kg/m   -Admits to being anxious about the visit due to difficulty finding facility  Constitutional: Alert, overweight but moving well on own, and in no distress.   ENT: Eyes bright , No mouth sores  Neck: Supple, No adenopathy.  Cardiac: Heart rate and rhythm is regular and strong without murmur  Respiratory: Breathing easy. Lung sounds clear to auscultation  GI: Abdomen is soft, non-tender, BS normal. No masses or organomegaly  MS: Muscle tone normal, extremities normal with no edema.   Skin: No suspicious lesions or rashes  Neuro: Sensory grossly WNL, gait normal.   Lymph: Normal ant/post cervical, axillary, supraclavicular nodes  Psych: Mentation appears normal and affect normal/bright with good conversation    Laboratory Results:    Ref. Range 11/4/2021 14:11   Sodium Latest Ref Range: 133 - 144 mmol/L 138   Potassium Latest Ref Range: 3.4 - 5.3 mmol/L 4.1   Chloride Latest Ref Range: 94 - 109 mmol/L 106   Carbon Dioxide Latest Ref Range: 20 - 32 mmol/L 29   Urea Nitrogen Latest Ref Range: 7 - 30 mg/dL 18   Creatinine Latest Ref Range: 0.52 - 1.04 mg/dL 1.15 (H)   GFR Estimate Latest Ref Range: >60 mL/min/1.73m2 48 (L)   Calcium Latest Ref Range: 8.5 - 10.1 mg/dL 9.1   Anion Gap Latest Ref Range: 3 - 14 mmol/L 3   Albumin Latest Ref Range: 3.4 - 5.0 g/dL 3.8   Protein Total Latest Ref Range: 6.8 - 8.8 g/dL 6.8   Bilirubin Total Latest Ref Range: 0.2 - 1.3 mg/dL 0.3   Alkaline Phosphatase Latest Ref Range: 40 - 150 U/L 82   ALT Latest Ref Range: 0 - 50 U/L 18   AST Latest Ref Range: 0 - 45 U/L 13   Ferritin Latest Ref Range: 8 - 252 ng/mL 10   Iron Latest Ref Range: 35 - 180 ug/dL 89   Iron Binding Cap Latest Ref Range: 240 - 430 ug/dL 338   Iron Saturation Index Latest Ref Range: 15 - 46 % 26   Lactate Dehydrogenase Latest Ref Range: 81 - 234 U/L 165   Glucose Latest Ref Range: 70 - 99 mg/dL 180 (H)   WBC Latest Ref Range: 4.0 - 11.0 " 10e3/uL 16.8 (H)   Hemoglobin Latest Ref Range: 11.7 - 15.7 g/dL 12.2   Hematocrit Latest Ref Range: 35.0 - 47.0 % 37.9   Platelet Count Latest Ref Range: 150 - 450 10e3/uL 277   RBC Count Latest Ref Range: 3.80 - 5.20 10e6/uL 4.14   MCV Latest Ref Range: 78 - 100 fL 92   MCH Latest Ref Range: 26.5 - 33.0 pg 29.5   MCHC Latest Ref Range: 31.5 - 36.5 g/dL 32.2   RDW Latest Ref Range: 10.0 - 15.0 % 12.9   % Neutrophils Latest Units: % 30   % Lymphocytes Latest Units: % 65   % Monocytes Latest Units: % 4   % Eosinophils Latest Units: % 1   % Basophils Latest Units: % 0   Absolute Basophils Latest Ref Range: 0.0 - 0.2 10e3/uL 0.0   Absolute Neutrophil Latest Ref Range: 1.6 - 8.3 10e3/uL 5.0   Absolute Lymphocytes Latest Ref Range: 0.8 - 5.3 10e3/uL 10.9 (H)   Absolute Monocytes Latest Ref Range: 0.0 - 1.3 10e3/uL 0.7   Absolute Eosinophils Latest Ref Range: 0.0 - 0.7 10e3/uL 0.2   RBC Morphology Unknown Confirmed RBC Indices   Platelet Morphology Latest Ref Range: Automated Count Confirmed. Platelet morphology is normal.  Automated Count Confirmed. Platelet morphology is normal.   Teardrop Cells Latest Ref Range: None Seen  Slight (A)   Elliptocytes Latest Ref Range: None Seen  Slight (A)   Smudge Cells Latest Ref Range: None Seen  Present (A)   Reactive Lymphs Latest Ref Range: None Seen  Present (A)   Assessment and Plan:   CLL-reviewed findings today on laboratory reports showing stability and disease.  Review with patient showing no new symptoms that are concerning for progression of disease.  We will have patient return in 1 year with labs prior.  Counseled patient on symptoms of concern that should be shared if noted.  Iron deficiency anemia with known AVMs-patient continues to use oral iron daily.  She denies any significant fatigue.  At this point it appears that her stores of iron may be down however they are still adequate and patient is using the oral iron on a regular basis.  Suggest she continue with the  oral iron but use with orange juice for the acidity to help absorb-we will again retest ferritin and iron studies with next visit.  Diabetes mellitus type 2-patient is insulin-dependent and is monitoring sugars closely and following up with PCP regularly with improving hemoglobin A1c.  No hypoglycemic events.  The total time of this encounter amounted to 30 minutes. This time included face-to-face time spent with the patient, prep work, ordering tests, and performing post visit documentation.  Chelsi Daly Cnp        Again, thank you for allowing me to participate in the care of your patient.        Sincerely,        Chelsi Daly, KARY, APRN CNP

## 2021-12-14 ENCOUNTER — OFFICE VISIT (OUTPATIENT)
Dept: FAMILY MEDICINE | Facility: CLINIC | Age: 71
End: 2021-12-14
Payer: COMMERCIAL

## 2021-12-14 VITALS
OXYGEN SATURATION: 97 % | TEMPERATURE: 99.3 F | DIASTOLIC BLOOD PRESSURE: 78 MMHG | HEART RATE: 82 BPM | BODY MASS INDEX: 29.18 KG/M2 | WEIGHT: 170 LBS | SYSTOLIC BLOOD PRESSURE: 144 MMHG

## 2021-12-14 DIAGNOSIS — N18.31 STAGE 3A CHRONIC KIDNEY DISEASE (H): ICD-10-CM

## 2021-12-14 DIAGNOSIS — R19.7 DIARRHEA, UNSPECIFIED TYPE: ICD-10-CM

## 2021-12-14 DIAGNOSIS — E11.42 TYPE 2 DIABETES MELLITUS WITH DIABETIC POLYNEUROPATHY, WITH LONG-TERM CURRENT USE OF INSULIN (H): ICD-10-CM

## 2021-12-14 DIAGNOSIS — E03.9 ACQUIRED HYPOTHYROIDISM: ICD-10-CM

## 2021-12-14 DIAGNOSIS — I10 HYPERTENSION GOAL BP (BLOOD PRESSURE) < 140/90: ICD-10-CM

## 2021-12-14 DIAGNOSIS — R41.3 MEMORY PROBLEM: ICD-10-CM

## 2021-12-14 DIAGNOSIS — Z79.4 TYPE 2 DIABETES MELLITUS WITH DIABETIC POLYNEUROPATHY, WITH LONG-TERM CURRENT USE OF INSULIN (H): ICD-10-CM

## 2021-12-14 DIAGNOSIS — Z00.00 ENCOUNTER FOR MEDICARE ANNUAL WELLNESS EXAM: Primary | ICD-10-CM

## 2021-12-14 DIAGNOSIS — R20.8 DYSESTHESIA: ICD-10-CM

## 2021-12-14 DIAGNOSIS — E78.5 HYPERLIPIDEMIA WITH TARGET LDL LESS THAN 100: ICD-10-CM

## 2021-12-14 PROBLEM — H90.3 SENSORINEURAL HEARING LOSS, BILATERAL: Status: ACTIVE | Noted: 2021-12-14

## 2021-12-14 LAB — HBA1C MFR BLD: 6.8 % (ref 0–5.6)

## 2021-12-14 PROCEDURE — G0145 SCR C/V CYTO,THINLAYER,RESCR: HCPCS | Performed by: FAMILY MEDICINE

## 2021-12-14 PROCEDURE — 83036 HEMOGLOBIN GLYCOSYLATED A1C: CPT | Performed by: FAMILY MEDICINE

## 2021-12-14 PROCEDURE — 80061 LIPID PANEL: CPT | Performed by: FAMILY MEDICINE

## 2021-12-14 PROCEDURE — 87624 HPV HI-RISK TYP POOLED RSLT: CPT | Performed by: FAMILY MEDICINE

## 2021-12-14 PROCEDURE — 99397 PER PM REEVAL EST PAT 65+ YR: CPT | Performed by: FAMILY MEDICINE

## 2021-12-14 PROCEDURE — 36415 COLL VENOUS BLD VENIPUNCTURE: CPT | Performed by: FAMILY MEDICINE

## 2021-12-14 PROCEDURE — 84443 ASSAY THYROID STIM HORMONE: CPT | Performed by: FAMILY MEDICINE

## 2021-12-14 PROCEDURE — 80048 BASIC METABOLIC PNL TOTAL CA: CPT | Performed by: FAMILY MEDICINE

## 2021-12-14 PROCEDURE — 82043 UR ALBUMIN QUANTITATIVE: CPT | Performed by: FAMILY MEDICINE

## 2021-12-14 PROCEDURE — 99214 OFFICE O/P EST MOD 30 MIN: CPT | Mod: 25 | Performed by: FAMILY MEDICINE

## 2021-12-14 RX ORDER — ATORVASTATIN CALCIUM 20 MG/1
20 TABLET, FILM COATED ORAL DAILY
Qty: 90 TABLET | Refills: 3 | Status: SHIPPED | OUTPATIENT
Start: 2021-12-14 | End: 2022-11-30

## 2021-12-14 RX ORDER — LISINOPRIL AND HYDROCHLOROTHIAZIDE 20; 25 MG/1; MG/1
1 TABLET ORAL DAILY
Qty: 90 TABLET | Refills: 0 | Status: CANCELLED | OUTPATIENT
Start: 2021-12-14

## 2021-12-14 RX ORDER — INSULIN GLARGINE 100 [IU]/ML
INJECTION, SOLUTION SUBCUTANEOUS
Qty: 45 ML | Refills: 1 | Status: SHIPPED | OUTPATIENT
Start: 2021-12-14 | End: 2022-07-11

## 2021-12-14 RX ORDER — LEVOTHYROXINE SODIUM 100 UG/1
TABLET ORAL
Qty: 90 TABLET | Refills: 3 | Status: SHIPPED | OUTPATIENT
Start: 2021-12-14 | End: 2022-11-30

## 2021-12-14 RX ORDER — INSULIN LISPRO 100 [IU]/ML
8 INJECTION, SOLUTION INTRAVENOUS; SUBCUTANEOUS
Qty: 45 ML | Refills: 1 | Status: SHIPPED | OUTPATIENT
Start: 2021-12-14 | End: 2022-07-11

## 2021-12-14 RX ORDER — METFORMIN HCL 500 MG
1500 TABLET, EXTENDED RELEASE 24 HR ORAL
Qty: 270 TABLET | Refills: 3 | Status: SHIPPED | OUTPATIENT
Start: 2021-12-14 | End: 2022-07-11

## 2021-12-14 RX ORDER — LISINOPRIL AND HYDROCHLOROTHIAZIDE 12.5; 2 MG/1; MG/1
2 TABLET ORAL DAILY
Qty: 180 TABLET | Refills: 0 | Status: SHIPPED | OUTPATIENT
Start: 2021-12-14 | End: 2022-01-17

## 2021-12-14 ASSESSMENT — ACTIVITIES OF DAILY LIVING (ADL): CURRENT_FUNCTION: NO ASSISTANCE NEEDED

## 2021-12-14 NOTE — PATIENT INSTRUCTIONS
Patient Education   Personalized Prevention Plan  You are due for the preventive services outlined below.  Your care team is available to assist you in scheduling these services.  If you have already completed any of these items, please share that information with your care team to update in your medical record.  Health Maintenance Due   Topic Date Due     PAP  09/24/2019     A1C Lab  11/24/2021     Cholesterol Lab  01/04/2022     Kidney Microalbumin Urine Test  01/04/2022     Thyroid Function Lab  01/04/2022     Diabetic Foot Exam  01/04/2022     Your Health Risk Assessment indicates you feel you are not in good health    A healthy lifestyle helps keep the body fit and the mind alert. It helps protect you from disease, helps you fight disease, and helps prevent chronic disease (disease that doesn't go away) from getting worse. This is important as you get older and begin to notice twinges in muscles and joints and a decline in the strength and stamina you once took for granted. A healthy lifestyle includes good healthcare, good nutrition, weight control, recreation, and regular exercise. Avoid harmful substances and do what you can to keep safe. Another part of a healthy lifestyle is stay mentally active and socially involved.    Good healthcare     Have a wellness visit every year.     If you have new symptoms, let us know right away. Don't wait until the next checkup.     Take medicines exactly as prescribed and keep your medicines in a safe place. Tell us if your medicine causes problems.   Healthy diet and weight control     Eat 3 or 4 small, nutritious, low-fat, high-fiber meals a day. Include a variety of fruits, vegetables, and whole-grain foods.     Make sure you get enough calcium in your diet. Calcium, vitamin D, and exercise help prevent osteoporosis (bone thinning).     If you live alone, try eating with others when you can. That way you get a good meal and have company while you eat it.     Try to  keep a healthy weight. If you eat more calories than your body uses for energy, it will be stored as fat and you will gain weight.     Recreation   Recreation is not limited to sports and team events. It includes any activity that provides relaxation, interest, enjoyment, and exercise. Recreation provides an outlet for physical, mental, and social energy. It can give a sense of worth and achievement. It can help you stay healthy.    Mental Exercise and Social Involvement  Mental and emotional health is as important as physical health. Keep in touch with friends and family. Stay as active as possible. Continue to learn and challenge yourself.   Things you can do to stay mentally active are:    Learn something new, like a foreign language or musical instrument.     Play SCRABBLE or do crossword puzzles. If you cannot find people to play these games with you at home, you can play them with others on your computer through the Internet.     Join a games club--anything from card games to chess or checkers or lawn bowling.     Start a new hobby.     Go back to school.     Volunteer.     Read.   Keep up with world events.    Signs of Hearing Loss      Hearing much better with one ear can be a sign of hearing loss.   Hearing loss is a problem shared by many people. In fact, it is one of the most common health problems, particularly as people age. Most people age 65 and older have some hearing loss. By age 80, almost everyone does. Hearing loss often occurs slowly over the years. So you may not realize your hearing has gotten worse.  Have your hearing checked  Call your healthcare provider if you:    Have to strain to hear normal conversation    Have to watch other people s faces very carefully to follow what they re saying    Need to ask people to repeat what they ve said    Often misunderstand what people are saying    Turn the volume of the television or radio up so high that others complain    Feel that people are  mumbling when they re talking to you    Find that the effort to hear leaves you feeling tired and irritated    Notice, when using the phone, that you hear better with one ear than the other  [employer] last reviewed this educational content on 1/1/2020 2000-2021 [employer]. All rights reserved. This information is not intended as a substitute for professional medical care. Always follow your healthcare professional's instructions.          Did you know you can lower your blood pressure with your daily habits?    *Losing 20 pounds of weight lowers blood pressure 5 to 20 points.  *Eating a low-fat diet rich in fruits, vegetables and low-fat dairy lowers blood pressure 8 to 14 points.  *Eating a low-salt diet lowers blood pressure 2 to 8 points.  *Exercising regularly lowers blood pressure 4 to 9 points.  *Reducing alcohol use lowers blood pressure 2 to 4 points.

## 2021-12-14 NOTE — PROGRESS NOTES
"SUBJECTIVE:   Kelly Vegas is a 71 year old female  who presents for Preventive Visit.    Patient has been advised of split billing requirements and indicates understanding: Yes   Are you in the first 12 months of your Medicare coverage?  No    Patient also presents to follow-up diabetes.  Diabetic Review of Systems - Medication compliance:  compliant all of the time, Diabetic diet compliance:  compliant most of the time, Home glucose monitoring:  blood glucose record WAS NOT brought in today, Diabetic ROS: no polyuria or polydipsia, no chest pain, dyspnea or TIA's, no unusual visual symptoms. Hypertension controlled on current medications without side effects, chest pain, or dyspnea. Hypercholesterolemia well controlled with current treatment plan without side effects.     Patient also presents for follow-up of hypothyroidism, controlled on current medication.  Denies symptoms of hypo- or hyperthyroidism albeit ongoing diarrhea. Kelly also complains of numbness of lateral aspect of bilateral hand, especially at night.    Healthy Habits:    In general, how would you rate your overall health?  Fair    Frequency of exercise:  4-5 days/week    Duration of exercise:  30-45 minutes    Do you usually eat at least 4 servings of fruit and vegetables a day, include whole grains    & fiber and avoid regularly eating high fat or \"junk\" foods?  Yes    Taking medications regularly:  Yes    Barriers to taking medications:  None    Medication side effects:  Muscle aches    Ability to successfully perform activities of daily living:  No assistance needed    Home Safety:  No safety concerns identified    Hearing Impairment:  Difficulty following a conversation in a noisy restaurant or crowded room, feel that people are mumbling or not speaking clearly, need to ask people to speak up or repeat themselves and difficulty understanding soft or whispered speech    In the past 6 months, have you been bothered by leaking of " urine?  No    In general, how would you rate your overall mental or emotional health?  Good      PHQ-2 Total Score:    Additional concerns today:  Yes    Do you feel safe in your environment? Yes    Have you ever done Advance Care Planning? (For example, a Health Directive, POLST, or a discussion with a medical provider or your loved ones about your wishes): No, advance care planning information given to patient to review.  Advanced care planning was discussed at today's visit.       Fall risk  Fallen 2 or more times in the past year?: No  Any fall with injury in the past year?: No    Cognitive Screening   1) Repeat 3 items (Leader, Season, Table)    2) Clock draw: NORMAL  3) 3 item recall: Recalls 1 object   Results: NORMAL clock, 1-2 items recalled: COGNITIVE IMPAIRMENT LESS LIKELY    Mini-CogTM Copyright S Yaritza. Licensed by the author for use in [facility]; reprinted with permission (kunal@[facility]). All rights reserved.      Do you have sleep apnea, excessive snoring or daytime drowsiness?: no    Reviewed and updated as needed this visit by clinical staff  Tobacco  Allergies  Meds  Problems  Med Hx  Surg Hx  Fam Hx  Soc Hx         Reviewed and updated as needed this visit by Provider  Tobacco  Allergies  Meds  Problems  Med Hx  Surg Hx  Fam Hx        Social History     Tobacco Use     Smoking status: Former Smoker     Packs/day: 0.50     Years: 20.00     Pack years: 10.00     Types: Cigarettes     Start date: 1967     Quit date: 1989     Years since quittin.9     Smokeless tobacco: Never Used   Substance Use Topics     Alcohol use: No     If you drink alcohol do you typically have >3 drinks per day or >7 drinks per week? No    Alcohol Use 2021   Prescreen: >3 drinks/day or >7 drinks/week? No       Current providers sharing in care for this patient include:   Patient Care Team:  Sejal Rutledge MD as PCP - General  Sejal Rutledge MD as Assigned  PCP  Chantell Chamberlain, RN as Specialty Care Coordinator (Oncology)  Peter Gómez MD as Assigned Surgical Provider  Chelsi Daly APRN CNP as Assigned Cancer Care Provider    The following health maintenance items are reviewed in Epic and correct as of today:  Health Maintenance Due   Topic Date Due     PAP FOLLOW-UP  09/24/2019     A1C  11/24/2021     LIPID  01/04/2022     MICROALBUMIN  01/04/2022     TSH W/FREE T4 REFLEX  01/04/2022     DIABETIC FOOT EXAM  01/04/2022     Patient Active Problem List   Diagnosis     Hyperlipidemia with target LDL less than 100     Hypertension goal BP (blood pressure) < 140/90     History of cervical cancer     Advanced directives, counseling/discussion     Major depressive disorder, recurrent, mild (H)     Acquired hypothyroidism     Type 2 diabetes mellitus with diabetic polyneuropathy, with long-term current use of insulin (H)     Degenerative joint disease of hand     DDD (degenerative disc disease), lumbar     Osteopenia     Memory problem     CLL (chronic lymphocytic leukemia) (H)     Dysesthesia     Ganglion cyst of joint of finger of right hand     Background diabetic retinopathy (H)     AVM (arteriovenous malformation) of small bowel, acquired     Iron deficiency     Bilateral shoulder pain     CKD (chronic kidney disease) stage 3, GFR 30-59 ml/min (H)     Sensorineural hearing loss, bilateral     Past Surgical History:   Procedure Laterality Date     BIOPSY       C BSO, OMENTECTOMY W/JUAN DANIEL  2/2006    cervical cancer     COLONOSCOPY  nov 2016     COLONOSCOPY N/A 3/1/2019    Procedure: COLONOSCOPY;  Surgeon: Romulo Hayes MD;  Location: [facility]     COLONOSCOPY WITH CO2 INSUFFLATION N/A 11/28/2016    Procedure: COLONOSCOPY WITH CO2 INSUFFLATION;  Surgeon: Migue Giraldo MD;  Location: [facility]     CYSTECTOMY OVARIAN BENIGN  1974     ESOPHAGOSCOPY, GASTROSCOPY, DUODENOSCOPY (EGD), COMBINED N/A 3/1/2019    Procedure: COMBINED ESOPHAGOSCOPY, GASTROSCOPY, DUODENOSCOPY  (EGD), BIOPSY SINGLE OR MULTIPLE;  Surgeon: Romulo Hayes MD;  Location: [facility]     EYE SURGERY  15     HC THYROIDECTOMY  2004    goiter     LAPAROSCOPIC CHOLECYSTECTOMY N/A 2019    Procedure: CHOLECYSTECTOMY, LAPAROSCOPIC;  Surgeon: Jorge Martinez DO;  Location: [facility]       Social History     Tobacco Use     Smoking status: Former Smoker     Packs/day: 0.50     Years: 20.00     Pack years: 10.00     Types: Cigarettes     Start date: 1967     Quit date: 1989     Years since quittin.9     Smokeless tobacco: Never Used   Substance Use Topics     Alcohol use: No     Family History   Problem Relation Age of Onset     Thyroid Disease Mother      Dementia Mother      Osteoporosis Mother      Alcohol/Drug Father      C.A.D. Father         CABG      Diabetes Father      Hypertension Father      Hyperlipidemia Father      Cerebrovascular Disease Father      Alcohol/Drug Brother      Prostate Cancer Brother      Depression Brother      Depression Brother         d. suicide     Cancer Maternal Grandmother         stomach, colon     Breast Cancer Maternal Grandmother      Thyroid Disease Maternal Grandmother      Alzheimer Disease Maternal Grandfather      Breast Cancer Paternal Grandmother      Alcohol/Drug Son      Rheumatoid Arthritis Son      Breast Cancer Cousin      Breast Cancer Other          Current Outpatient Medications   Medication Sig Dispense Refill     atorvastatin (LIPITOR) 20 MG tablet Take 1 tablet (20 mg) by mouth daily 90 tablet 3     Cholecalciferol (VITAMIN D3) 1000 units CAPS Take by mouth daily       citalopram (CELEXA) 20 MG tablet Take 1 tablet (20 mg) by mouth daily 90 tablet 3     ferrous fumarate 65 mg, [place]. FE,-Vitamin C 125 mg (VITRON C)  MG TABS tablet Take 1 tablet by mouth 2 times daily (Patient taking differently: Take 1 tablet by mouth daily ) 60 tablet 1     insulin glargine (LANTUS SOLOSTAR) 100 UNIT/ML pen INJECT 20 UNITS AT BEDTIME DAILY 45 mL 1      insulin lispro (HUMALOG KWIKPEN) 100 UNIT/ML (1 unit dial) KWIKPEN Inject 8 Units Subcutaneous 2 times daily (before meals) 45 mL 1     insulin pen needle (ULTICARE MINI) 31G X 6 MM miscellaneous Use three times daily or as directed. 300 each 3     levothyroxine (SYNTHROID/LEVOTHROID) 100 MCG tablet TAKE 1 TABLET (100 MCG) BY MOUTH DAILY 90 tablet 3     lisinopril-hydrochlorothiazide (ZESTORETIC) 20-12.5 MG tablet Take 2 tablets by mouth daily 180 tablet 0     metFORMIN (GLUCOPHAGE-XR) 500 MG 24 hr tablet Take 3 tablets (1,500 mg) by mouth daily (with dinner) 270 tablet 3     order for DME Blood glucose monitor per insurance formulary; blood glucose test strips One Touch Verio or per formulary for use 3 time daily; lancets per formulary for use 3 time daily 300 each 3     Allergies   Allergen Reactions     Glipizide      tremulous     Ibuprofen Hives     Pcn [Penicillins] Itching     Percocet [Acetaminophen] Nausea and Vomiting     Vicodin [Hydrocodone-Acetaminophen] Nausea     Review of Systems  CONSTITUTIONAL: NEGATIVE for fever, chills, change in weight  INTEGUMENTARY/SKIN: NEGATIVE for worrisome rashes, moles or lesions  EYES: NEGATIVE for vision changes or irritation  ENT/MOUTH: NEGATIVE for ear, mouth and throat problems  RESP: NEGATIVE for significant cough or SOB  CV: NEGATIVE for chest pain, palpitations or peripheral edema  GI: NEGATIVE for nausea, abdominal pain, heartburn, or change in bowel habits  : NEGATIVE for frequency, dysuria, or hematuria  MUSCULOSKELETAL: NEGATIVE for significant arthralgias or myalgia  NEURO: dysesthesias  ENDOCRINE: Hx diabetes  HEME: NEGATIVE for bleeding problems  PSYCHIATRIC: HX anxiety    OBJECTIVE:   BP (!) 144/78 (BP Location: Left arm, Patient Position: Sitting, Cuff Size: Adult Regular)   Pulse 82   Temp 99.3  F (37.4  C) (Oral)   Wt 77.1 kg (170 lb)   SpO2 97%   BMI 29.18 kg/m   Estimated body mass index is 29.18 kg/m  as calculated from the following:     "Height as of 11/15/21: 1.626 m (5' 4\").    Weight as of this encounter: 77.1 kg (170 lb).  Physical Exam  GENERAL: alert and no distress  EYES: Eyes grossly normal to inspection, PERRL and conjunctivae and sclerae normal  HENT: ear canals and TM's normal, nose and mouth without ulcers or lesions  NECK: no adenopathy, no asymmetry, masses, or scars and thyroid normal to palpation  RESP: lungs clear to auscultation - no rales, rhonchi or wheezes  CV: regular rate and rhythm, normal S1 S2, no S3 or S4, no murmur, click or rub, no peripheral edema and peripheral pulses strong  ABDOMEN: soft, nontender, no hepatosplenomegaly, no masses and bowel sounds normal   (female): normal post-hysterectomy exam without masses.   MS: no gross musculoskeletal defects noted, no edema  SKIN: no suspicious lesions or rashes  NEURO: Normal strength and tone, mentation intact and speech normal  PSYCH: mentation appears normal, affect normal/bright    Diagnostic Test Results:  Labs reviewed in Epic    ASSESSMENT / PLAN:   (Z00.00) Encounter for Medicare annual wellness exam  (primary encounter diagnosis)  Plan: Pap screen with HPV - recommended age 30 - 65         years          (E11.42,  Z79.4) Type 2 diabetes mellitus with diabetic polyneuropathy, with long-term current use of insulin (H)  Comment: controlled with medications   Plan: HEMOGLOBIN A1C, Albumin Random Urine         Quantitative with Creat Ratio, metFORMIN         (GLUCOPHAGE-XR) 500 MG 24 hr tablet, insulin         lispro (HUMALOG KWIKPEN) 100 UNIT/ML (1 unit         dial) KWIKPEN, insulin glargine (LANTUS         SOLOSTAR) 100 UNIT/ML pen, OFFICE/OUTPT         VISIT,EST,LEVL IV        See below    (N18.31) Stage 3a chronic kidney disease (H)  (I10) Hypertension goal BP (blood pressure) < 140/90  Comment: uncontrolled hypertension   Plan: OFFICE/OUTPT VISIT,EST,LEVL IV,         lisinopril-hydrochlorothiazide (ZESTORETIC)         20-12.5 MG tablet, Basic metabolic panel     "    Increase prinzide dose and follow-up for BMP and blood pressure in 2 to 4 weeks     (E78.5) Hyperlipidemia with target LDL less than 100  Comment: Well controlled with medications without side effects.   Plan: Lipid panel reflex to direct LDL Non-fasting,         atorvastatin (LIPITOR) 20 MG tablet,         OFFICE/OUTPT VISIT,EST,LEVL IV          (E03.9) Acquired hypothyroidism  Comment: euthyroid on replacement   Plan: TSH WITH FREE T4 REFLEX, levothyroxine         (SYNTHROID/LEVOTHROID) 100 MCG tablet,         OFFICE/OUTPT VISIT,EST,LEVL IV          (R19.7) Diarrhea, unspecified type  Comment: Differential diagnoses would include: side effects to metformin?   Plan: OFFICE/OUTPT VISIT,EST,LEVL IV, Clostridium         difficile toxin B PCR, Enteric Bacteria and         Virus Panel by MARKOS Stool        Reduce metformin to 1500 mg daily and follow-up in 1 to 2 weeks for further dose reduction if diarrhea persists; consider GI referral     (R20.8) Dysesthesia  Comment: consistent with carpel tunnel syndrome   Plan: Wrist/Arm/Hand Supplies Order        Use wrist splints at night for carpal tunnel syndrome.  If ineffective, also use during day.  Follow up in one month or sooner for worsening of symptoms or side effects for consideration of EMG     (R41.3) Memory problem  Plan: offered additional testing with neuropsychological testing given strong family history and high level of patient concern; follow     Patient has been advised of split billing requirements and indicates understanding: Yes  COUNSELING:  Reviewed preventive health counseling, as reflected in patient instructions  Special attention given to:       Regular exercise       Healthy diet/nutrition       Vision screening       Osteoporosis prevention/bone health       Colon cancer screening       Hepatitis C screening       The 10-year ASCVD risk score (Tim AGARWAL Jr., et al., 2013) is: 32.2%    Values used to calculate the score:      Age: 71 years      Sex:  "Female      Is Non- : No      Diabetic: Yes      Tobacco smoker: No      Systolic Blood Pressure: 144 mmHg      Is BP treated: Yes      HDL Cholesterol: 41 mg/dL      Total Cholesterol: 130 mg/dL    Estimated body mass index is 29.18 kg/m  as calculated from the following:    Height as of 11/15/21: 1.626 m (5' 4\").    Weight as of this encounter: 77.1 kg (170 lb).    Weight management plan: Discussed healthy diet and exercise guidelines    She reports that she quit smoking about 32 years ago. Her smoking use included cigarettes. She started smoking about 54 years ago. She has a 10.00 pack-year smoking history. She has never used smokeless tobacco.      Appropriate preventive services were discussed with this patient, including applicable screening as appropriate for cardiovascular disease, diabetes, osteopenia/osteoporosis, and glaucoma.  As appropriate for age/gender, discussed screening for colorectal cancer, prostate cancer, breast cancer, and cervical cancer. Checklist reviewing preventive services available has been given to the patient.    Reviewed patients plan of care and provided an AVS. The Basic Care Plan (routine screening as documented in Health Maintenance) for Kelly meets the Care Plan requirement. This Care Plan has been established and reviewed with the Patient.    Counseling Resources:  ATP IV Guidelines  Pooled Cohorts Equation Calculator  Breast Cancer Risk Calculator  Breast Cancer: Medication to Reduce Risk  FRAX Risk Assessment  ICSI Preventive Guidelines  Dietary Guidelines for Americans, 2010  USDA's MyPlate  ASA Prophylaxis  Lung CA Screening    Sejal Rutledge MD  [facility]    Identified Health Risks:    The patient was provided with suggestions to help her develop a healthy physical lifestyle.  The patient was provided with written information regarding signs of hearing loss.    DME (Durable Medical Equipment) Orders and Documentation  Orders " Placed This Encounter   Procedures     Wrist/Arm/Hand Supplies Order      The patient was assessed and it was determined the patient is in need of the following listed DME Supplies/Equipment. Please complete supporting documentation below to demonstrate medical necessity.      Wrist/Arm/Hand Bracing Supplies Documentation  Patient requires the use of the ordered bracing device due to following medical need/condition: carpel tunnel syndrome

## 2021-12-15 ENCOUNTER — APPOINTMENT (OUTPATIENT)
Dept: LAB | Facility: CLINIC | Age: 71
End: 2021-12-15
Payer: COMMERCIAL

## 2021-12-15 LAB
ANION GAP SERPL CALCULATED.3IONS-SCNC: 4 MMOL/L (ref 3–14)
BUN SERPL-MCNC: 19 MG/DL (ref 7–30)
C COLI+JEJUNI+LARI FUSA STL QL NAA+PROBE: NOT DETECTED
C DIFF TOX B STL QL: NEGATIVE
CALCIUM SERPL-MCNC: 9.2 MG/DL (ref 8.5–10.1)
CHLORIDE BLD-SCNC: 106 MMOL/L (ref 94–109)
CHOLEST SERPL-MCNC: 132 MG/DL
CO2 SERPL-SCNC: 29 MMOL/L (ref 20–32)
CREAT SERPL-MCNC: 1.03 MG/DL (ref 0.52–1.04)
CREAT UR-MCNC: 82 MG/DL
EC STX1 GENE STL QL NAA+PROBE: NOT DETECTED
EC STX2 GENE STL QL NAA+PROBE: NOT DETECTED
FASTING STATUS PATIENT QL REPORTED: ABNORMAL
GFR SERPL CREATININE-BSD FRML MDRD: 54 ML/MIN/1.73M2
GLUCOSE BLD-MCNC: 114 MG/DL (ref 70–99)
HDLC SERPL-MCNC: 45 MG/DL
LDLC SERPL CALC-MCNC: 64 MG/DL
MICROALBUMIN UR-MCNC: 10 MG/L
MICROALBUMIN/CREAT UR: 12.2 MG/G CR (ref 0–25)
NONHDLC SERPL-MCNC: 87 MG/DL
NOROV GI+II ORF1-ORF2 JNC STL QL NAA+PR: NOT DETECTED
POTASSIUM BLD-SCNC: 4.3 MMOL/L (ref 3.4–5.3)
RVA NSP5 STL QL NAA+PROBE: NOT DETECTED
SALMONELLA SP RPOD STL QL NAA+PROBE: NOT DETECTED
SHIGELLA SP+EIEC IPAH STL QL NAA+PROBE: NOT DETECTED
SODIUM SERPL-SCNC: 139 MMOL/L (ref 133–144)
TRIGL SERPL-MCNC: 117 MG/DL
TSH SERPL DL<=0.005 MIU/L-ACNC: 0.48 MU/L (ref 0.4–4)
V CHOL+PARA RFBL+TRKH+TNAA STL QL NAA+PR: NOT DETECTED
Y ENTERO RECN STL QL NAA+PROBE: NOT DETECTED

## 2021-12-15 PROCEDURE — 87506 IADNA-DNA/RNA PROBE TQ 6-11: CPT | Performed by: FAMILY MEDICINE

## 2021-12-15 PROCEDURE — 87493 C DIFF AMPLIFIED PROBE: CPT | Mod: 59 | Performed by: FAMILY MEDICINE

## 2021-12-16 NOTE — RESULT ENCOUNTER NOTE
Samuel Mendoza,    Your kidney tests are stable and your other results look great. No other explanation for diarrhea. Let's continue to lower the dose of metformin as we discussed, until your symptoms resolve. Let me know how you're doing in a few weeks by Rick.     Best,  Sejal Rutledge MD

## 2021-12-17 LAB
BKR LAB AP GYN ADEQUACY: NORMAL
BKR LAB AP GYN INTERPRETATION: NORMAL
BKR LAB AP HPV REFLEX: NORMAL
BKR LAB AP PREVIOUS ABNORMAL: NORMAL
PATH REPORT.COMMENTS IMP SPEC: NORMAL
PATH REPORT.COMMENTS IMP SPEC: NORMAL
PATH REPORT.RELEVANT HX SPEC: NORMAL

## 2021-12-21 LAB
HUMAN PAPILLOMA VIRUS 16 DNA: NEGATIVE
HUMAN PAPILLOMA VIRUS 18 DNA: NEGATIVE
HUMAN PAPILLOMA VIRUS FINAL DIAGNOSIS: ABNORMAL
HUMAN PAPILLOMA VIRUS OTHER HR: POSITIVE

## 2021-12-22 ENCOUNTER — PATIENT OUTREACH (OUTPATIENT)
Dept: FAMILY MEDICINE | Facility: CLINIC | Age: 71
End: 2021-12-22
Payer: COMMERCIAL

## 2021-12-22 DIAGNOSIS — Z85.41 HISTORY OF CERVICAL CANCER: ICD-10-CM

## 2022-01-12 ENCOUNTER — DOCUMENTATION ONLY (OUTPATIENT)
Dept: LAB | Facility: CLINIC | Age: 72
End: 2022-01-12
Payer: COMMERCIAL

## 2022-01-14 ENCOUNTER — DOCUMENTATION ONLY (OUTPATIENT)
Dept: LAB | Facility: CLINIC | Age: 72
End: 2022-01-14
Payer: COMMERCIAL

## 2022-01-17 ENCOUNTER — ALLIED HEALTH/NURSE VISIT (OUTPATIENT)
Dept: FAMILY MEDICINE | Facility: CLINIC | Age: 72
End: 2022-01-17
Payer: COMMERCIAL

## 2022-01-17 VITALS — DIASTOLIC BLOOD PRESSURE: 63 MMHG | HEART RATE: 65 BPM | SYSTOLIC BLOOD PRESSURE: 111 MMHG

## 2022-01-17 DIAGNOSIS — I10 HYPERTENSION GOAL BP (BLOOD PRESSURE) < 140/90: Primary | ICD-10-CM

## 2022-01-17 DIAGNOSIS — I10 HYPERTENSION GOAL BP (BLOOD PRESSURE) < 140/90: ICD-10-CM

## 2022-01-17 DIAGNOSIS — N18.31 STAGE 3A CHRONIC KIDNEY DISEASE (H): ICD-10-CM

## 2022-01-17 PROCEDURE — 99207 PR NO CHARGE NURSE ONLY: CPT

## 2022-01-17 RX ORDER — LISINOPRIL AND HYDROCHLOROTHIAZIDE 12.5; 2 MG/1; MG/1
2 TABLET ORAL DAILY
Qty: 180 TABLET | Refills: 3 | Status: SHIPPED | OUTPATIENT
Start: 2022-01-17 | End: 2022-11-30

## 2022-01-17 NOTE — PROGRESS NOTES
Pt stated she didn't know if labs were needed or not. Advised the Provider stated she is caught up on lab work and none are needed. Pt verbalized understanding.     Chantell JIMENEZ

## 2022-01-17 NOTE — PROGRESS NOTES
Kelly Vegas is a 71 year old patient who comes in today for a Blood Pressure check.  Initial BP:  /63   Pulse 65      65  Disposition: results routed to provider

## 2022-01-18 PROBLEM — M25.512 BILATERAL SHOULDER PAIN: Status: RESOLVED | Noted: 2021-01-22 | Resolved: 2022-01-18

## 2022-01-18 PROBLEM — M25.511 BILATERAL SHOULDER PAIN: Status: RESOLVED | Noted: 2021-01-22 | Resolved: 2022-01-18

## 2022-03-13 DIAGNOSIS — Z79.4 TYPE 2 DIABETES MELLITUS WITH DIABETIC POLYNEUROPATHY, WITH LONG-TERM CURRENT USE OF INSULIN (H): ICD-10-CM

## 2022-03-13 DIAGNOSIS — E11.42 TYPE 2 DIABETES MELLITUS WITH DIABETIC POLYNEUROPATHY, WITH LONG-TERM CURRENT USE OF INSULIN (H): ICD-10-CM

## 2022-03-14 RX ORDER — FLURBIPROFEN SODIUM 0.3 MG/ML
SOLUTION/ DROPS OPHTHALMIC
Qty: 300 EACH | Refills: 1 | Status: SHIPPED | OUTPATIENT
Start: 2022-03-14 | End: 2022-11-15

## 2022-06-30 ENCOUNTER — TELEPHONE (OUTPATIENT)
Dept: FAMILY MEDICINE | Facility: CLINIC | Age: 72
End: 2022-06-30

## 2022-06-30 NOTE — TELEPHONE ENCOUNTER
Received call from patient. She was transferred from central scheduling d/t lack of availability with PCP. She states that she is experiencing low blood sugars intermittently. She feels like she is going to pass out at times. She is taking her medications as prescribed. She then also reports forgetting meds at times and taking them as soon as she remembers. BG 50-70's at times. For the last couple weeks they are back to normal- no issues. Orange juice/chocolate brings it up always within 1/2 hour when it is low. She wants to have this reviewed by PCP. She is also experiencing back pain/R arm pain (injured this past winter). Nothing is working for this. Able to ambulate/function normally, she would just like it looked at. Scheduled soonest available with Dr. Rutledge. She did not wish to see any other provider sooner.    SANTIAGO Clemente RN  [facility]

## 2022-07-11 ENCOUNTER — OFFICE VISIT (OUTPATIENT)
Dept: FAMILY MEDICINE | Facility: CLINIC | Age: 72
End: 2022-07-11
Payer: COMMERCIAL

## 2022-07-11 VITALS
DIASTOLIC BLOOD PRESSURE: 48 MMHG | WEIGHT: 164 LBS | OXYGEN SATURATION: 98 % | BODY MASS INDEX: 28 KG/M2 | HEART RATE: 71 BPM | SYSTOLIC BLOOD PRESSURE: 110 MMHG | TEMPERATURE: 98.9 F | HEIGHT: 64 IN

## 2022-07-11 DIAGNOSIS — E11.42 TYPE 2 DIABETES MELLITUS WITH DIABETIC POLYNEUROPATHY, WITH LONG-TERM CURRENT USE OF INSULIN (H): Primary | ICD-10-CM

## 2022-07-11 DIAGNOSIS — F33.0 MAJOR DEPRESSIVE DISORDER, RECURRENT, MILD (H): ICD-10-CM

## 2022-07-11 DIAGNOSIS — Z79.4 TYPE 2 DIABETES MELLITUS WITH DIABETIC POLYNEUROPATHY, WITH LONG-TERM CURRENT USE OF INSULIN (H): Primary | ICD-10-CM

## 2022-07-11 DIAGNOSIS — E11.3299 BACKGROUND DIABETIC RETINOPATHY (H): ICD-10-CM

## 2022-07-11 DIAGNOSIS — B35.1 ONYCHOMYCOSIS: ICD-10-CM

## 2022-07-11 DIAGNOSIS — M51.369 DDD (DEGENERATIVE DISC DISEASE), LUMBAR: ICD-10-CM

## 2022-07-11 DIAGNOSIS — C91.10 CLL (CHRONIC LYMPHOCYTIC LEUKEMIA) (H): ICD-10-CM

## 2022-07-11 DIAGNOSIS — M75.81 ROTATOR CUFF TENDONITIS, RIGHT: ICD-10-CM

## 2022-07-11 DIAGNOSIS — N18.31 STAGE 3A CHRONIC KIDNEY DISEASE (H): ICD-10-CM

## 2022-07-11 LAB — HBA1C MFR BLD: 6.5 % (ref 0–5.6)

## 2022-07-11 PROCEDURE — 36415 COLL VENOUS BLD VENIPUNCTURE: CPT | Performed by: FAMILY MEDICINE

## 2022-07-11 PROCEDURE — 99214 OFFICE O/P EST MOD 30 MIN: CPT | Performed by: FAMILY MEDICINE

## 2022-07-11 PROCEDURE — 83036 HEMOGLOBIN GLYCOSYLATED A1C: CPT | Performed by: FAMILY MEDICINE

## 2022-07-11 RX ORDER — INSULIN GLARGINE 100 [IU]/ML
INJECTION, SOLUTION SUBCUTANEOUS
Qty: 45 ML | Refills: 1 | Status: SHIPPED | OUTPATIENT
Start: 2022-07-11 | End: 2023-08-14

## 2022-07-11 RX ORDER — INSULIN LISPRO 100 [IU]/ML
8 INJECTION, SOLUTION INTRAVENOUS; SUBCUTANEOUS
Qty: 45 ML | Refills: 1 | Status: SHIPPED | OUTPATIENT
Start: 2022-07-11 | End: 2023-01-16

## 2022-07-11 RX ORDER — LANCETS
EACH MISCELLANEOUS
Qty: 300 EACH | Refills: 3 | Status: SHIPPED | OUTPATIENT
Start: 2022-07-11

## 2022-07-11 RX ORDER — CITALOPRAM HYDROBROMIDE 20 MG/1
20 TABLET ORAL DAILY
Qty: 90 TABLET | Refills: 3 | Status: SHIPPED | OUTPATIENT
Start: 2022-07-11 | End: 2023-11-06

## 2022-07-11 RX ORDER — METFORMIN HCL 500 MG
1000 TABLET, EXTENDED RELEASE 24 HR ORAL
Qty: 180 TABLET | Refills: 3 | Status: SHIPPED | OUTPATIENT
Start: 2022-07-11 | End: 2022-11-30

## 2022-07-11 ASSESSMENT — PATIENT HEALTH QUESTIONNAIRE - PHQ9: SUM OF ALL RESPONSES TO PHQ QUESTIONS 1-9: 4

## 2022-07-11 ASSESSMENT — PAIN SCALES - GENERAL: PAINLEVEL: EXTREME PAIN (8)

## 2022-07-11 NOTE — RESULT ENCOUNTER NOTE
Mail letter:  Your blood glucose is well controlled. Reduce the metformin dose as we discussed. Call or return to clinic as needed if these symptoms worsen or fail to improve as anticipated.     Sejal Rutledge MD

## 2022-07-11 NOTE — LETTER
July 12, 2022    Kelly Vegas  [address] [place] [id number]          Dear MsKarlaAscencion,    We are writing to inform you of your test results.    Your blood glucose is well controlled. Reduce the metformin dose as we discussed. Call or return to clinic as needed if these symptoms worsen or fail to improve as anticipated.       Resulted Orders   HEMOGLOBIN A1C   Result Value Ref Range    Hemoglobin A1C 6.5 (H) 0.0 - 5.6 %      Comment:      Normal <5.7%   Prediabetes 5.7-6.4%    Diabetes 6.5% or higher     Note: Adopted from ADA consensus guidelines.       If you have any questions or concerns, please call the clinic at the number listed above.       Sincerely,      Sejal Rutledge MD

## 2022-07-11 NOTE — PROGRESS NOTES
"  Assessment & Plan     (E11.42,  Z79.4) Type 2 diabetes mellitus with diabetic polyneuropathy, with long-term current use of insulin (H)  (primary encounter diagnosis)  (N18.31) Stage 3a chronic kidney disease (H)  (E11.5159) Background diabetic retinopathy (H)  Plan: HEMOGLOBIN A1C, OPTOMETRY REFERRAL, insulin lispro (HUMALOG KWIKPEN) 100 UNIT/ML (1 unit dial) KWIKPEN, insulin glargine (LANTUS SOLOSTAR) 100 UNIT/ML pen, blood glucose (NO BRAND SPECIFIED) test strip, thin (NO BRAND SPECIFIED) lancets, metFORMIN (GLUCOPHAGE XR)         500 MG 24 hr tablet          (M75.81) Rotator cuff tendonitis, right  Plan: Physical Therapy Referral        XR Shoulder Right G/E 3 Views  Call or return to clinic as needed if these symptoms worsen or fail to improve as anticipated to consider referral     (M51.36) DDD (degenerative disc disease), lumbar  Comment: with left sciatica   Plan: Physical Therapy Referral        Over the counter pain medication as needed, ice or heat as she finds helpful, avoidance of aggravating activities, and return for persistence for consideration of further imaging or referral.     (F33.0) Major depressive disorder, recurrent, mild (H)  Comment: Well controlled with medications without side effects.   Plan: citalopram (CELEXA) 20 MG tablet          (B35.1) Onychomycosis  Comment: toenail   Plan: discussed treatment options, and she is okay with observation     (C91.10) CLL (chronic lymphocytic leukemia) (H)  Plan: follow-up with oncology as planned               BMI:   Estimated body mass index is 28.15 kg/m  as calculated from the following:    Height as of this encounter: 1.626 m (5' 4\").    Weight as of this encounter: 74.4 kg (164 lb).   Weight management plan: Discussed healthy diet and exercise guidelines    See Patient Instructions    Return in about 3 months (around 10/11/2022) for diabetes.    Sejal Rutledge MD  [facility]za is a 72 year old, " presenting for the following health issues:  Diabetes and Back Pain      HPI     Diabetes Follow-up      How often are you checking your blood sugar? Not at all    What concerns do you have today about your diabetes? Low blood sugar     Do you have any of these symptoms? (Select all that apply)  Blurry vision    Have you had a diabetic eye exam in the last 12 months? No                Hyperlipidemia Follow-Up      Are you regularly taking any medication or supplement to lower your cholesterol?   Yes- atorvastatin    Are you having muscle aches or other side effects that you think could be caused by your cholesterol lowering medication?  No    Hypertension Follow-up      Do you check your blood pressure regularly outside of the clinic? No     Are you following a low salt diet? Yes    Are your blood pressures ever more than 140 on the top number (systolic) OR more   than 90 on the bottom number (diastolic), for example 140/90?     BP Readings from Last 2 Encounters:   07/11/22 110/48   01/17/22 111/63     Hemoglobin A1C POCT (%)   Date Value   01/04/2021 8.4 (H)   02/11/2020 7.5 (H)     Hemoglobin A1C (%)   Date Value   12/14/2021 6.8 (H)   08/24/2021 7.2 (H)     LDL Cholesterol Calculated (mg/dL)   Date Value   12/14/2021 64   01/04/2021 51   03/26/2019 56         How many servings of fruits and vegetables do you eat daily?  2-3    On average, how many sweetened beverages do you drink each day (Examples: soda, juice, sweet tea, etc.  Do NOT count diet or artificially sweetened beverages)?   0    How many days per week do you exercise enough to make your heart beat faster? 4    How many minutes a day do you exercise enough to make your heart beat faster? 20 - 29    How many days per week do you miss taking your medication? 0    Right shoulder pain with certain movements and sciatica on left side.         Review of Systems   CONSTITUTIONAL: NEGATIVE for fever, chills, change in weight  INTEGUMENTARY/SKIN: NEGATIVE for  "worrisome rashes, moles or lesions  EYES: NEGATIVE for vision changes or irritation  ENT/MOUTH: NEGATIVE for ear, mouth and throat problems  RESP: NEGATIVE for significant cough or SOB  CV: NEGATIVE for chest pain, palpitations or peripheral edema  MUSCULOSKELETAL: chronic right shoulder pain, left sciatica   NEURO: radicular pain without weakness or numbness   ENDOCRINE: Hx diabetes  HEME/ALLERGY/IMMUNE: CLL   PSYCHIATRIC: HX depression      Objective    /48 (BP Location: Right arm, Patient Position: Sitting, Cuff Size: Adult Regular)   Pulse 71   Temp 98.9  F (37.2  C) (Oral)   Ht 1.626 m (5' 4\")   Wt 74.4 kg (164 lb)   SpO2 98%   BMI 28.15 kg/m    Body mass index is 28.15 kg/m .  Physical Exam   GENERAL: alert, no distress and over weight  NECK: no adenopathy, no asymmetry, masses, or scars and thyroid normal to palpation  RESP: lungs clear to auscultation - no rales, rhonchi or wheezes  CV: regular rate and rhythm, normal S1 S2, no S3 or S4, no murmur, click or rub, no peripheral edema    MS: right shoulder with reduced ROM and pain with rotator cuff testing but normal strength   NEURO: Normal strength and tone, mentation intact and speech normal  PSYCH: mentation appears normal, affect normal/bright  Diabetic foot exam: no trophic changes or ulcerative lesions, normal monofilament exam and onychomycosis      Office Visit on 12/14/2021   Component Date Value Ref Range Status     Interpretation 12/14/2021 Negative for Intraepithelial Lesion or Malignancy (NILM)    Final     Comment 12/14/2021    Final                    Value:This result contains rich text formatting which cannot be displayed here.     Specimen Adequacy 12/14/2021 Satisfactory for evaluation, endocerv/transformation zone component absent, atrophy   Final     Clinical Information 12/14/2021    Final                    Value:This result contains rich text formatting which cannot be displayed here.     Reflex Testing 12/14/2021 Yes " regardless of result   Final     Previous Abnormal? 12/14/2021    Final                    Value:This result contains rich text formatting which cannot be displayed here.     Performing Labs 12/14/2021    Final                    Value:This result contains rich text formatting which cannot be displayed here.     C Difficile Toxin B by PCR 12/15/2021 Negative  Negative Final    A negative result does not exclude actual disease due to C. difficile and may be due to improper collection, handling and storage of the specimen or the number of organisms in the specimen is below the detection limit of the assay.     Campylobacter group 12/15/2021 Not Detected  Not Detected Final     Salmonella species 12/15/2021 Not Detected  Not Detected Final     Shigella species 12/15/2021 Not Detected  Not Detected Final     Vibrio group 12/15/2021 Not Detected  Not Detected Final     Rotavirus 12/15/2021 Not Detected  Not Detected Final     Shiga toxin 1 gene 12/15/2021 Not Detected  Not Detected Final     Shiga toxin 2 gene 12/15/2021 Not Detected  Not Detected Final     Norovirus I and II 12/15/2021 Not Detected  Not Detected Final     Yersinia enterocolitica 12/15/2021 Not Detected  Not Detected Final     TSH 12/14/2021 0.48  0.40 - 4.00 mU/L Final     Creatinine Urine mg/dL 12/14/2021 82  mg/dL Final     Albumin Urine mg/L 12/14/2021 10  mg/L Final     Albumin Urine mg/g Cr 12/14/2021 12.20  0.00 - 25.00 mg/g Cr Final     Hemoglobin A1C 12/14/2021 6.8 (A) 0.0 - 5.6 % Final    Normal <5.7%   Prediabetes 5.7-6.4%    Diabetes 6.5% or higher     Note: Adopted from ADA consensus guidelines.     Cholesterol 12/14/2021 132  <200 mg/dL Final     Triglycerides 12/14/2021 117  <150 mg/dL Final     Direct Measure HDL 12/14/2021 45 (A) >=50 mg/dL Final     LDL Cholesterol Calculated 12/14/2021 64  <=100 mg/dL Final     Non HDL Cholesterol 12/14/2021 87  <130 mg/dL Final     Patient Fasting > 8hrs? 12/14/2021 Unknown   Final     Sodium  12/14/2021 139  133 - 144 mmol/L Final     Potassium 12/14/2021 4.3  3.4 - 5.3 mmol/L Final     Chloride 12/14/2021 106  94 - 109 mmol/L Final     Carbon Dioxide (CO2) 12/14/2021 29  20 - 32 mmol/L Final     Anion Gap 12/14/2021 4  3 - 14 mmol/L Final     Urea Nitrogen 12/14/2021 19  7 - 30 mg/dL Final     Creatinine 12/14/2021 1.03  0.52 - 1.04 mg/dL Final     Calcium 12/14/2021 9.2  8.5 - 10.1 mg/dL Final     Glucose 12/14/2021 114 (A) 70 - 99 mg/dL Final     GFR Estimate 12/14/2021 54 (A) >60 mL/min/1.73m2 Final    As of July 11, 2021, eGFR is calculated by the CKD-EPI creatinine equation, without race adjustment. eGFR can be influenced by muscle mass, exercise, and diet. The reported eGFR is an estimation only and is only applicable if the renal function is stable.     Other HR HPV 12/14/2021 Positive (A) Negative Final     HPV16 DNA 12/14/2021 Negative  Negative Final     HPV18 DNA 12/14/2021 Negative  Negative Final     FINAL DIAGNOSIS 12/14/2021    Final                    Value:This result contains rich text formatting which cannot be displayed here.     No results found for this or any previous visit (from the past 24 hour(s)).                .  ..

## 2022-08-31 ENCOUNTER — OFFICE VISIT (OUTPATIENT)
Dept: OPTOMETRY | Facility: CLINIC | Age: 72
End: 2022-08-31
Payer: COMMERCIAL

## 2022-08-31 DIAGNOSIS — H52.4 PRESBYOPIA: ICD-10-CM

## 2022-08-31 DIAGNOSIS — H52.03 HYPERMETROPIA, BILATERAL: ICD-10-CM

## 2022-08-31 DIAGNOSIS — H52.223 REGULAR ASTIGMATISM OF BOTH EYES: ICD-10-CM

## 2022-08-31 DIAGNOSIS — H25.13 NUCLEAR AGE-RELATED CATARACT, BOTH EYES: ICD-10-CM

## 2022-08-31 DIAGNOSIS — H43.813 PVD (POSTERIOR VITREOUS DETACHMENT), BILATERAL: ICD-10-CM

## 2022-08-31 DIAGNOSIS — E11.9 TYPE 2 DIABETES MELLITUS WITHOUT RETINOPATHY (H): Primary | ICD-10-CM

## 2022-08-31 PROCEDURE — 92015 DETERMINE REFRACTIVE STATE: CPT | Performed by: OPTOMETRIST

## 2022-08-31 PROCEDURE — 92004 COMPRE OPH EXAM NEW PT 1/>: CPT | Performed by: OPTOMETRIST

## 2022-08-31 ASSESSMENT — REFRACTION_WEARINGRX
OS_SPHERE: +0.50
OS_CYLINDER: +0.75
SPECS_TYPE: PAL
OS_AXIS: 045
OD_SPHERE: +0.25
OD_ADD: 2.98
OD_CYLINDER: +0.50
OD_AXIS: 115
OS_ADD: +2.97

## 2022-08-31 ASSESSMENT — REFRACTION_MANIFEST
OS_ADD: +2.75
OS_SPHERE: +1.00
OS_AXIS: 042
OD_AXIS: 106
OD_ADD: +2.75
OD_CYLINDER: +0.75
OS_CYLINDER: +0.75
OD_SPHERE: +0.75

## 2022-08-31 ASSESSMENT — CONF VISUAL FIELD
METHOD: COUNTING FINGERS
OD_NORMAL: 1
OS_NORMAL: 1

## 2022-08-31 ASSESSMENT — VISUAL ACUITY
OD_CC: 20/30
OS_CC+: +1
CORRECTION_TYPE: GLASSES
METHOD: SNELLEN - LINEAR
OD_CC+: -2
OS_SC: 20/80-1
OS_CC: 20/80
OD_SC: 20/80-1
OD_CC: 20/80+2
OS_CC: 20/40
OD_SC: 20/40
OS_SC: 20/40

## 2022-08-31 ASSESSMENT — CUP TO DISC RATIO
OS_RATIO: 0.3
OD_RATIO: 0.35

## 2022-08-31 ASSESSMENT — EXTERNAL EXAM - LEFT EYE: OS_EXAM: NORMAL

## 2022-08-31 ASSESSMENT — SLIT LAMP EXAM - LIDS
COMMENTS: 2+ DERMATOCHALASIS
COMMENTS: 2+ DERMATOCHALASIS

## 2022-08-31 ASSESSMENT — TONOMETRY
IOP_METHOD: APPLANATION
OD_IOP_MMHG: 20
OS_IOP_MMHG: 20

## 2022-08-31 ASSESSMENT — EXTERNAL EXAM - RIGHT EYE: OD_EXAM: NORMAL

## 2022-08-31 NOTE — LETTER
8/31/2022         RE: Kelly Vegas  [address] [facility] [address]        Dear Colleague,    Thank you for referring your patient, Kelly Vegas, to the [facility]. Please see a copy of my visit note below.    Chief Complaint   Patient presents with     Diabetic Eye Exam        Chief Complaint(s) and History of Present Illness(es)     Diabetic Eye Exam     Diabetes Type: Type 2, on insulin and taking oral medications    Duration: years    Blood Sugars: is controlled               Lab Results   Component Value Date    A1C 6.5 07/11/2022    A1C 6.8 12/14/2021    A1C 7.2 08/24/2021    A1C 8.4 01/04/2021    A1C 7.5 02/11/2020    A1C 7.3 10/30/2019    A1C 7.4 03/26/2019    A1C 8.2 12/13/2018          Last Eye Exam: 2021 Target Norm - was dilated  Dilated Previously: Yes, side effects of dilation explained today    What are you currently using to see?  Glasses - wears full time    Distance Vision Acuity: Noticed gradual change in both eyes - feels vision fluctuates with blood sugars    Near Vision Acuity: Not satisfied - has to use a magnifying glass for fine print especially if sugars are high    Eye Comfort: good  Do you use eye drops? : No  Occupation or Hobbies: Retired    Natalya Spring     Medical, surgical and family histories reviewed and updated 8/31/2022.       OBJECTIVE: See Ophthalmology exam    ASSESSMENT:    ICD-10-CM    1. Type 2 diabetes mellitus without retinopathy (H)  E11.9    2. Nuclear age-related cataract, both eyes  H25.13    3. PVD (posterior vitreous detachment), bilateral  H43.813    4. Hypermetropia, bilateral  H52.03    5. Regular astigmatism of both eyes  H52.223    6. Presbyopia  H52.4        PLAN:    Kelly Vegas aware  eye exam results will be sent to Sejal Rutledge.  Patient Instructions   Patient educated on importance of good blood sugar control.  Letter sent to primary care provider with diabetic eye exam report.     You have the start of  "mild cataracts.  You may notice some blurred vision or glare with night driving.  It is important that you wear good sunglasses to protect your eyes from the ultraviolet light from the sun.     You have a PVD- posterior vitreous detachment which is due to the gel of the eye shrinking and clumping together.  This can sometimes cause holes or tears in the retina.  The signs of a retinal detachment are flashes of light or a \"curtain veil\" coming over your vision. If you notice any of these changes return to clinic for re-evaluation.     Kelly was advised of today's exam findings.  Fill glasses prescription  Allow 2 weeks to adapt to change in glasses  Wear glasses full time  Copy of glasses Rx provided today.    Return in 1 year for eye exam, or sooner if needed.    The effects of the dilating drops last for 4- 6 hours.  You will be more sensitive to light and vision will be blurry up close.  Mydriatic sunglasses were given if needed.       Andrzej Sparrow O.D.  [address] [place]  [address]    [phone]           Again, thank you for allowing me to participate in the care of your patient.        Sincerely,        Andrzej Sparrow, OD    "

## 2022-08-31 NOTE — PATIENT INSTRUCTIONS
"Patient educated on importance of good blood sugar control.  Letter sent to primary care provider with diabetic eye exam report.     You have the start of mild cataracts.  You may notice some blurred vision or glare with night driving.  It is important that you wear good sunglasses to protect your eyes from the ultraviolet light from the sun.     You have a PVD- posterior vitreous detachment which is due to the gel of the eye shrinking and clumping together.  This can sometimes cause holes or tears in the retina.  The signs of a retinal detachment are flashes of light or a \"curtain veil\" coming over your vision. If you notice any of these changes return to clinic for re-evaluation.     Kelly was advised of today's exam findings.  Fill glasses prescription  Allow 2 weeks to adapt to change in glasses  Wear glasses full time  Copy of glasses Rx provided today.    Return in 1 year for eye exam, or sooner if needed.    The effects of the dilating drops last for 4- 6 hours.  You will be more sensitive to light and vision will be blurry up close.  Mydriatic sunglasses were given if needed.       Andrzej Sparrow O.D.  [address]. [place]  [address]    [phone]    "

## 2022-08-31 NOTE — PROGRESS NOTES
Chief Complaint   Patient presents with     Diabetic Eye Exam        Chief Complaint(s) and History of Present Illness(es)     Diabetic Eye Exam     Diabetes Type: Type 2, on insulin and taking oral medications    Duration: years    Blood Sugars: is controlled               Lab Results   Component Value Date    A1C 6.5 07/11/2022    A1C 6.8 12/14/2021    A1C 7.2 08/24/2021    A1C 8.4 01/04/2021    A1C 7.5 02/11/2020    A1C 7.3 10/30/2019    A1C 7.4 03/26/2019    A1C 8.2 12/13/2018          Last Eye Exam: 2021 Target Norm - was dilated  Dilated Previously: Yes, side effects of dilation explained today    What are you currently using to see?  Glasses - wears full time    Distance Vision Acuity: Noticed gradual change in both eyes - feels vision fluctuates with blood sugars    Near Vision Acuity: Not satisfied - has to use a magnifying glass for fine print especially if sugars are high    Eye Comfort: good  Do you use eye drops? : No  Occupation or Hobbies: Retired    [facility]     Medical, surgical and family histories reviewed and updated 8/31/2022.       OBJECTIVE: See Ophthalmology exam    ASSESSMENT:    ICD-10-CM    1. Type 2 diabetes mellitus without retinopathy (H)  E11.9    2. Nuclear age-related cataract, both eyes  H25.13    3. PVD (posterior vitreous detachment), bilateral  H43.813    4. Hypermetropia, bilateral  H52.03    5. Regular astigmatism of both eyes  H52.223    6. Presbyopia  H52.4        PLAN:    Kelly Vegas aware  eye exam results will be sent to Sejal Rutledge.  Patient Instructions   Patient educated on importance of good blood sugar control.  Letter sent to primary care provider with diabetic eye exam report.     You have the start of mild cataracts.  You may notice some blurred vision or glare with night driving.  It is important that you wear good sunglasses to protect your eyes from the ultraviolet light from the sun.     You have a PVD- posterior vitreous detachment  "which is due to the gel of the eye shrinking and clumping together.  This can sometimes cause holes or tears in the retina.  The signs of a retinal detachment are flashes of light or a \"curtain veil\" coming over your vision. If you notice any of these changes return to clinic for re-evaluation.     Kelly was advised of today's exam findings.  Fill glasses prescription  Allow 2 weeks to adapt to change in glasses  Wear glasses full time  Copy of glasses Rx provided today.    Return in 1 year for eye exam, or sooner if needed.    The effects of the dilating drops last for 4- 6 hours.  You will be more sensitive to light and vision will be blurry up close.  Mydriatic sunglasses were given if needed.       Andrzej Sparrow O.D.  [address]. [place]  [address]    [phone]       "

## 2022-09-01 NOTE — PROGRESS NOTES
[facility] Physical Therapy Initial Evaluation  9/1/2022     Patient's Name: Kelly Vegas  Referring Provider: Sejal Rutledge  Visit Diagnosis: No diagnosis found.  Payor: [facility] / Plan: [employer] MEDICARE [employer]VANTAGE / Product Type: HMO /     Precautions/Restrictions/MD instructions: 7/11/22 evaluate and treat    Therapist ASSESSMENT  Kelly Vegas is a 72 year old female patient presenting to Physical Therapy with R shoulder pain and L sided LBP. Patient demonstrates decreased shoulder ROM and pain with AG shoulder movements. Signs and symptoms are consistent with R rotator cuff dysfunction - fits cluster for full thickness RC tear - chronic LBP with radiating pain. These impairments limit their ability to dress, reach upward, do housework. Skilled PT services are necessary in order to reduce impairments and improve independent function.      SUBJECTIVE   Kelly Vegas is a 72 year old female who presents to outpatient physical therapy for evaluation. Her symptoms consist of:  1. R shoulder pain  2. Low back pain      Patient Comments (HPI): She reports that her symptoms began last winter after she did some snow shoveling. It has slowly gotten worse. Using her LUE a lot. Pain is located from shoulder lateral to elbow and up on upper trap. Her back pain has come and gone for years insidiously but since July it has bothered down her back left side and to the knee or so. She rates pain as 6/10 on average (at worst 10/10, at best 6/10). Overall, she reports her status is getting worse.    She denies red flags, including Sudden changes in bowel/bladder function and Saddle anaesthesia.  She reports the following red flags: none.    Aggravating Factors: standing (for cooking), rotating head, reaching overhead, out and behind back, bending  and lifting/carrying  Relieving Factors: Ice/cold, Heat, topical analgesic, pain medication and changing positions    Previous Treatments:  Physical therapy (bilateral shoulders)    General health as reported by patient: good.    PMH/Review of Systems: Osteopenia, depression, Iron deficiency, DM2. Cervical Cancer hx. I briefly reviewed the review of systems as noted on the health history form.  I am only responding to those symptoms which are directly relevant the specific indication for my consultation. I recommended the patient follow up with their primary care referring provider to pursue any other symptoms which may be of concern.     Surgical history/trauma: See EMR. She denies any significant current illness or recent hospital admissions. She denies any regional implanted devices.  Imaging:  Results for orders placed or performed in visit on 07/11/22   XR Shoulder Right G/E 3 Views    Narrative    XR SHOULDER RIGHT G/E 3 VIEWS 7/11/2022 12:35 PM     HISTORY: Rotator cuff tendonitis, right    COMPARISON: None.      Impression    IMPRESSION: No fractures are evident. Normal glenohumeral alignment.  The acromioclavicular joint is unremarkable.     KATHI BIRMINGHAM MD         SYSTEM ID:  [id number]     Medications: See EMR    PERTINENT MEDICAL / SURGICAL HISTORY:   Patient Active Problem List   Diagnosis     Hyperlipidemia with target LDL less than 100     Hypertension goal BP (blood pressure) < 140/90     History of cervical cancer     Advanced directives, counseling/discussion     Major depressive disorder, recurrent, mild (H)     Acquired hypothyroidism     Type 2 diabetes mellitus with diabetic polyneuropathy, with long-term current use of insulin (H)     Degenerative joint disease of hand     DDD (degenerative disc disease), lumbar     Osteopenia     Memory problem     CLL (chronic lymphocytic leukemia) (H)     Dysesthesia     Ganglion cyst of joint of finger of right hand     Background diabetic retinopathy (H)     AVM (arteriovenous malformation) of small bowel, acquired     Iron deficiency     CKD (chronic kidney disease) stage 3, GFR 30-59  ml/min (H)     Sensorineural hearing loss, bilateral     Past Surgical History:   Procedure Laterality Date     BIOPSY       COLONOSCOPY  nov 2016     COLONOSCOPY N/A 3/1/2019    Procedure: COLONOSCOPY;  Surgeon: Romulo Hayes MD;  Location: [facility]     COLONOSCOPY WITH CO2 INSUFFLATION N/A 11/28/2016    Procedure: COLONOSCOPY WITH CO2 INSUFFLATION;  Surgeon: Migue Giraldo MD;  Location: [facility]     CYSTECTOMY OVARIAN BENIGN  1974     ESOPHAGOSCOPY, GASTROSCOPY, DUODENOSCOPY (EGD), COMBINED N/A 3/1/2019    Procedure: COMBINED ESOPHAGOSCOPY, GASTROSCOPY, DUODENOSCOPY (EGD), BIOPSY SINGLE OR MULTIPLE;  Surgeon: Romulo Hayes MD;  Location: [facility]     EYE SURGERY  12-14-16     HC THYROIDECTOMY  12/2004    goiter     LAPAROSCOPIC CHOLECYSTECTOMY N/A 9/9/2019    Procedure: CHOLECYSTECTOMY, LAPAROSCOPIC;  Surgeon: Jorge Martinez DO;  Location: [facility] BSO, OMENTECTOMY W/JUAN DANIEL  2/2006    cervical cancer       Occupation: retired  Current exercise regimen/Recreational activities: walks the dog regularly, keeping the house, son lives with her currently, has a cat.    Possible barriers impacting outcomes: none    Patient Self-identified Goal: Kelly Vegas would like to get rid of her upper shoulder and neck pain, move the arm.        OBJECTIVE  Observation: forward head and rounded shoulders  Gait: normal, no asymmetries or obvious deviations and Able to heel and toe walk without difficulty  Transfers: normal, no assist required  Screening (regional interdependence): Cervical ROM produces familiar pain - limited rotation and extension   Range of Motion: affected side: right  Motion Degrees/% loss   (Active / Passive / AA) Compensation/Pain   Flexion   90    Abduction   100, painful arc    External Rotation   (functional, at side, 90-90) X/ approx 20 deg, end range pain    Internal Rotation  (functional, at side, 90-90) To sacrum (LUE T9)     Extension   approx 10 deg    Horizontal Adduction   x       Neuro Screen:    Myotomes: C4-T1 intact EXCEPT pain limited weakness with shoulder abduction, internal rotation, external rotation   Dermatomes: C4-T1 intact to light touch   Reflexes: not tested  Other:  n/a  Strength:   Strong and   Pain FREE Strong and   PainFUL   none     Infraspinatus (shoulder ER at side)  Subscapularis (shoulder IR at side)  Biceps (shoulder and elbow flexion)   Weak and   Pain FREE Weak and   PainFUL   none   Supraspinatus (shoulder abduction)  Teres minor (shoulder ER at 90 deg scaption)     Functional movement: able to bridge but cannot SL bridge.   Palpation: Tender to palpation at: upper trap right and levator scapulae right  Segmental/Joint Mobility Assessment: not assessed  Special Tests:  Positive: Rotator cuff: Drop arm, Painful arc, Full can and Hornblower   Negative: Spurling's A  Rotator cuff: Belly press       ASSESSMENT/PLAN  Patient is a 72 year old female with lumbar and right side shoulder complaints.    Patient has the following significant findings with corresponding treatment plan.                Diagnosis 1:  R shoulder pain     Pain -  hot/cold therapy, manual therapy, self management and education  Decreased ROM/flexibility - manual therapy, therapeutic exercise and home program  Decreased strength - therapeutic exercise, therapeutic activities and home program  Impaired muscle performance - neuro re-education and home program    Diagnosis 2:  L sided LBP with leg pain    Pain -  hot/cold therapy, manual therapy, self management and education  Decreased strength - therapeutic exercise, therapeutic activities and home program  Impaired gait - gait training and home program  Impaired muscle performance - neuro re-education and home program    Therapy Evaluation Codes:   Cumulative Therapy Evaluation is: Low complexity.   Previous and current functional limitations:  (See Goal Flow Sheet for this information)    Short term and Long term goals: (See Goal Flow Sheet for  this information)     Communication ability:  Patient appears to be able to clearly communicate and understand verbal and written communication and follow directions correctly.    Treatment Explanation - The following has been discussed with the patient:   RX ordered/plan of care  Anticipated outcomes  Possible risks and side effects    This patient would benefit from PT intervention to resume normal activities.   Rehab potential is good.    Frequency:  1 X week, once daily  Duration:  for 4 months  Discharge Plan:  Achieve all LTG.  Independent in home treatment program.  Reach maximal therapeutic benefit.    Please refer to the daily flowsheet for treatment today, total treatment time and time spent performing 1:1 timed codes.     *Text entry may be via Dragon Dictation software in real-time. Efforts are made to edit for clarity.     Skyla Patel, PT, DPT, [facility], [place]

## 2022-09-02 ENCOUNTER — THERAPY VISIT (OUTPATIENT)
Dept: PHYSICAL THERAPY | Facility: CLINIC | Age: 72
End: 2022-09-02
Attending: FAMILY MEDICINE
Payer: COMMERCIAL

## 2022-09-02 DIAGNOSIS — M54.42 ACUTE LEFT-SIDED LOW BACK PAIN WITH LEFT-SIDED SCIATICA: ICD-10-CM

## 2022-09-02 DIAGNOSIS — M75.81 ROTATOR CUFF TENDONITIS, RIGHT: ICD-10-CM

## 2022-09-02 DIAGNOSIS — M25.511 CHRONIC RIGHT SHOULDER PAIN: ICD-10-CM

## 2022-09-02 DIAGNOSIS — M51.369 DDD (DEGENERATIVE DISC DISEASE), LUMBAR: ICD-10-CM

## 2022-09-02 DIAGNOSIS — G89.29 CHRONIC RIGHT SHOULDER PAIN: ICD-10-CM

## 2022-09-02 PROCEDURE — 97161 PT EVAL LOW COMPLEX 20 MIN: CPT | Mod: GP | Performed by: PHYSICAL THERAPIST

## 2022-09-02 PROCEDURE — 97110 THERAPEUTIC EXERCISES: CPT | Mod: GP | Performed by: PHYSICAL THERAPIST

## 2022-09-02 NOTE — PROGRESS NOTES
[facility]    OUTPATIENT Physical Therapy ORTHOPEDIC EVALUATION  PLAN OF TREATMENT FOR OUTPATIENT REHABILITATION  (COMPLETE FOR INITIAL CLAIMS ONLY)  Patient's Last Name, First Name, M.I.  YOB: 1950  Kelly Vegas    Provider s Name:  [facility]   Medical Record No.  [id number]   Start of Care Date:  09/02/22   Onset Date:   07/11/22 (provider referral)   Type:     _X__PT   ___OT Medical Diagnosis:    Encounter Diagnoses   Name Primary?    Rotator cuff tendonitis, right     DDD (degenerative disc disease), lumbar         Treatment Diagnosis:  R shoulder (rotator cuff)        Goals:     09/02/22 0500   Body Part   Goals listed below are for RUE/low back   Goal #1   Goal #1 reaching   Previous Functional Level No restrictions   Current Functional Level Cannot reach    Performance level flexion 90, abduction 100   STG Target Performance Reach    Performance level flexion full, abduction to 90 with no increase in pain   Rationale for dressing   Due date 09/23/22   LTG Target Performance Reach   Performance Level overhead with no compensatory pattern x 10   Rationale for dressing;for meal preparation   Due date 12/23/22   Goal #2   Goal #2 bending   Previous Functional Level No restrictions   Current Functional Level Can bend until hands reach ankle   Performance level painful and slow   STG Target Performance Bend until hands reach ankle   Performance level with no increase in pain during flexion   Rationale for dressing LE   Due date 10/14/22   LTG Target Performance Bend until hands reach ankle   Performance Level lift 25# x 5 no increase in pain (hip hinge pattern)   Rationale for household tasks such as bedmaking, emptying diswasher, washer and dryer, washing floors   Due date 12/23/22       Therapy Frequency:  1x/week  Predicted Duration of Therapy Intervention:   16 weeks    REMIGIO TERRY, PT                 I CERTIFY THE NEED FOR THESE SERVICES FURNISHED UNDER        THIS PLAN OF TREATMENT AND WHILE UNDER MY CARE     (Physician attestation of this document indicates review and certification of the therapy plan).                     Certification Date From:  09/02/22   Certification Date To:  12/01/22    Referring Provider:  Sejal Rutledge    Initial Assessment        See Epic Evaluation SOC Date: 09/02/22

## 2022-09-08 ENCOUNTER — THERAPY VISIT (OUTPATIENT)
Dept: PHYSICAL THERAPY | Facility: CLINIC | Age: 72
End: 2022-09-08
Payer: COMMERCIAL

## 2022-09-08 DIAGNOSIS — M25.511 CHRONIC RIGHT SHOULDER PAIN: Primary | ICD-10-CM

## 2022-09-08 DIAGNOSIS — G89.29 CHRONIC RIGHT SHOULDER PAIN: Primary | ICD-10-CM

## 2022-09-08 DIAGNOSIS — M54.42 ACUTE LEFT-SIDED LOW BACK PAIN WITH LEFT-SIDED SCIATICA: ICD-10-CM

## 2022-09-08 PROCEDURE — 97140 MANUAL THERAPY 1/> REGIONS: CPT | Mod: GP | Performed by: PHYSICAL THERAPIST

## 2022-09-08 PROCEDURE — 97530 THERAPEUTIC ACTIVITIES: CPT | Mod: GP | Performed by: PHYSICAL THERAPIST

## 2022-09-08 PROCEDURE — 97110 THERAPEUTIC EXERCISES: CPT | Mod: GP | Performed by: PHYSICAL THERAPIST

## 2022-09-15 ENCOUNTER — THERAPY VISIT (OUTPATIENT)
Dept: PHYSICAL THERAPY | Facility: CLINIC | Age: 72
End: 2022-09-15
Payer: COMMERCIAL

## 2022-09-15 DIAGNOSIS — M25.511 CHRONIC RIGHT SHOULDER PAIN: Primary | ICD-10-CM

## 2022-09-15 DIAGNOSIS — M54.42 ACUTE LEFT-SIDED LOW BACK PAIN WITH LEFT-SIDED SCIATICA: ICD-10-CM

## 2022-09-15 DIAGNOSIS — G89.29 CHRONIC RIGHT SHOULDER PAIN: Primary | ICD-10-CM

## 2022-09-15 PROCEDURE — 97110 THERAPEUTIC EXERCISES: CPT | Mod: GP | Performed by: PHYSICAL THERAPIST

## 2022-09-15 PROCEDURE — 97140 MANUAL THERAPY 1/> REGIONS: CPT | Mod: GP | Performed by: PHYSICAL THERAPIST

## 2022-09-22 ENCOUNTER — THERAPY VISIT (OUTPATIENT)
Dept: PHYSICAL THERAPY | Facility: CLINIC | Age: 72
End: 2022-09-22
Payer: COMMERCIAL

## 2022-09-22 DIAGNOSIS — G89.29 CHRONIC RIGHT SHOULDER PAIN: Primary | ICD-10-CM

## 2022-09-22 DIAGNOSIS — M54.42 ACUTE LEFT-SIDED LOW BACK PAIN WITH LEFT-SIDED SCIATICA: ICD-10-CM

## 2022-09-22 DIAGNOSIS — M25.511 CHRONIC RIGHT SHOULDER PAIN: Primary | ICD-10-CM

## 2022-09-22 PROCEDURE — 97110 THERAPEUTIC EXERCISES: CPT | Mod: GP | Performed by: PHYSICAL THERAPIST

## 2022-09-22 PROCEDURE — 97140 MANUAL THERAPY 1/> REGIONS: CPT | Mod: GP | Performed by: PHYSICAL THERAPIST

## 2022-09-30 ENCOUNTER — THERAPY VISIT (OUTPATIENT)
Dept: PHYSICAL THERAPY | Facility: CLINIC | Age: 72
End: 2022-09-30
Payer: COMMERCIAL

## 2022-09-30 DIAGNOSIS — G89.29 CHRONIC RIGHT SHOULDER PAIN: Primary | ICD-10-CM

## 2022-09-30 DIAGNOSIS — M25.511 CHRONIC RIGHT SHOULDER PAIN: Primary | ICD-10-CM

## 2022-09-30 DIAGNOSIS — M54.42 ACUTE LEFT-SIDED LOW BACK PAIN WITH LEFT-SIDED SCIATICA: ICD-10-CM

## 2022-09-30 PROCEDURE — 97530 THERAPEUTIC ACTIVITIES: CPT | Mod: GP | Performed by: PHYSICAL THERAPIST

## 2022-09-30 PROCEDURE — 97112 NEUROMUSCULAR REEDUCATION: CPT | Mod: GP | Performed by: PHYSICAL THERAPIST

## 2022-09-30 PROCEDURE — 97110 THERAPEUTIC EXERCISES: CPT | Mod: GP | Performed by: PHYSICAL THERAPIST

## 2022-10-03 ENCOUNTER — TELEPHONE (OUTPATIENT)
Dept: FAMILY MEDICINE | Facility: CLINIC | Age: 72
End: 2022-10-03

## 2022-10-03 DIAGNOSIS — M25.511 CHRONIC RIGHT SHOULDER PAIN: Primary | ICD-10-CM

## 2022-10-03 DIAGNOSIS — G89.29 CHRONIC RIGHT SHOULDER PAIN: Primary | ICD-10-CM

## 2022-10-03 NOTE — TELEPHONE ENCOUNTER
----- Message from Skyla Patel PT sent at 9/30/2022  8:17 PM CDT -----  Regarding: PT update  Hi Dr. Rutledge,    I've been seeing Kelly for her shoulder and back pain. Unfortunately both have gotten a bit worse since starting PT and her sleep is suffering. I think it's likely she has a significant rotator cuff tear, maybe acute on degenerative as her shoulder really started to bother her after doing some heavy snow shoveling this last winter.    While I think PT is appropriate still, we discussed considering injection for her shoulder and/or an orthopedic consult. She is not eager to consider surgery but amenable to get the ball rolling for possible injection.    If you agree, will you reach out to her with further instruction regarding injection?    Kind Regards,    Skyla, PT  [facility]

## 2022-10-03 NOTE — TELEPHONE ENCOUNTER
Spoke with patient and communicated provider's message as written. Patient verbalized understanding.    Juvenal Morelos RN  [facility]

## 2022-10-03 NOTE — TELEPHONE ENCOUNTER
Please call patient: I have referred you to one of our orthopedic surgery specialists about your ongoing shoulder pain on recommendation of your physical therapist. You should receive outreach for scheduling a consult.     Sejal Rutledge MD

## 2022-10-04 ENCOUNTER — ANCILLARY PROCEDURE (OUTPATIENT)
Dept: MAMMOGRAPHY | Facility: CLINIC | Age: 72
End: 2022-10-04
Attending: FAMILY MEDICINE
Payer: COMMERCIAL

## 2022-10-04 DIAGNOSIS — Z12.31 VISIT FOR SCREENING MAMMOGRAM: ICD-10-CM

## 2022-10-04 PROCEDURE — 77067 SCR MAMMO BI INCL CAD: CPT | Mod: TC | Performed by: RADIOLOGY

## 2022-10-10 ENCOUNTER — OFFICE VISIT (OUTPATIENT)
Dept: ORTHOPEDICS | Facility: CLINIC | Age: 72
End: 2022-10-10
Attending: FAMILY MEDICINE
Payer: COMMERCIAL

## 2022-10-10 VITALS — WEIGHT: 165 LBS | HEIGHT: 64 IN | RESPIRATION RATE: 16 BRPM | BODY MASS INDEX: 28.17 KG/M2

## 2022-10-10 DIAGNOSIS — M25.511 CHRONIC RIGHT SHOULDER PAIN: Primary | ICD-10-CM

## 2022-10-10 DIAGNOSIS — M75.01 ADHESIVE CAPSULITIS OF RIGHT SHOULDER: ICD-10-CM

## 2022-10-10 DIAGNOSIS — G89.29 CHRONIC RIGHT SHOULDER PAIN: Primary | ICD-10-CM

## 2022-10-10 PROCEDURE — 99204 OFFICE O/P NEW MOD 45 MIN: CPT | Performed by: ORTHOPAEDIC SURGERY

## 2022-10-10 ASSESSMENT — PAIN SCALES - GENERAL: PAINLEVEL: MILD PAIN (2)

## 2022-10-10 NOTE — PROGRESS NOTES
CHIEF COMPLAINT:   Chief Complaint   Patient presents with     Right Shoulder - Pain     Onset: winter [id number], shoveling snow. She threw snow to the opposite side that she normally does and felt abnormal pain in the right shoulder. In July, 2022 she saw Dr. Anand for x-ray and Physical Therapy. Completed Physical Therapy. Unable to raise arm overhead or away from body or behind body without a lot of pain. Treatment has been Tylenol, lidocaine cream and Physical Therapy.      Kelly Vegas is seen today in the [facility] for evaluation of right shoulder pain at the request of Dr. Sejal Rutledge       HISTORY:  Kelly Vegas is a 72 year old female, right  -hand dominant, who is seen in consultation at the request of Dr. Rutledge for right shoulder pain that started last Winter. Onset shoveling snow, threw the snow opposite direction, with onset of pain. didn't think much of it at the time, but has continued pain. Worse the past 4 months. Pain along the side of the shoulder and upper arm. Pain with reaching, lifting, using the arm.    Has pain at rest, worse with activities, night.    Treatment has been Physical Therapy, tylenol, topicals.    Denies prior shoulder problems..    Kelly is diabetic, A1C=6.5 on 7/11/2022      Onset: last winter, shoveling snow.  Symptoms have been worsening since that time.  Aggrevated by: reaching, lifting  Relieved by: rest  Present symptoms: pain with ADL's (dressing),  pain with overhead activities,  pain reaching behind back,  pain reaching out or away from body (flexion/ abduction),  positional night pain,  pain lifting,  Pain location: lateral shoulder and deltoid and upper arm  Pain severity: 5/10 at rest, 10/10 with quick movements  Pain quality: dull, aching, sharp and shooting  Frequency of symptoms: are constant  Associated symptoms: neck pain, some numbness and tingling if in position too long like driving (both hands fall  asleep)    Treatment up to this point:Tylenol and PT  Has not tried: Corticosteroid injection   Prior history of related problems: no prior problems with this area in the past      Usual level of work activity: retired    Other PMH:  has a past medical history of Arthritis (check it), AVM (arteriovenous malformation) of small bowel, acquired, Background diabetic retinopathy (H), Cervical cancer (H) (03/2006), CKD (chronic kidney disease) stage 3, GFR 30-59 ml/min (H), CLL (chronic lymphocytic leukemia) (H), Colon adenoma, DDD (degenerative disc disease), lumbar, Degenerative joint disease of hand, Depressive disorder, Diabetes mellitus type II (2009), Diabetic polyneuropathy associated with type 2 diabetes mellitus (H) (09/26/2017), Gastritis, HTN (hypertension), Hyperlipidemia LDL goal < 100, Hypothyroid (12/2004), Osteopenia, Recurrent major depression (H), and Sensorineural hearing loss, bilateral (12/14/2021).    She has no past medical history of Cerebral infarction (H), Congestive heart failure, unspecified, COPD (chronic obstructive pulmonary disease) (H), Heart disease, History of blood transfusion, or Uncomplicated asthma.  Patient Active Problem List   Diagnosis     Hyperlipidemia with target LDL less than 100     Hypertension goal BP (blood pressure) < 140/90     History of cervical cancer     Advanced directives, counseling/discussion     Major depressive disorder, recurrent, mild (H)     Acquired hypothyroidism     Type 2 diabetes mellitus with diabetic polyneuropathy, with long-term current use of insulin (H)     Degenerative joint disease of hand     DDD (degenerative disc disease), lumbar     Osteopenia     Memory problem     CLL (chronic lymphocytic leukemia) (H)     Dysesthesia     Ganglion cyst of joint of finger of right hand     Background diabetic retinopathy (H)     AVM (arteriovenous malformation) of small bowel, acquired     Iron deficiency     CKD (chronic kidney disease) stage 3, GFR 30-59  ml/min (H)     Sensorineural hearing loss, bilateral     Chronic right shoulder pain     Acute left-sided low back pain with left-sided sciatica       Surgical Hx:  has a past surgical history that includes BSO, OMENTECTOMY W/JUAN DANIEL (2/2006); THYROIDECTOMY (12/2004); Cystectomy ovarian benign (1974); Colonoscopy with CO2 insufflation (N/A, 11/28/2016); biopsy; colonoscopy (nov 2016); Eye surgery (12-14-16); Colonoscopy (N/A, 3/1/2019); Esophagoscopy, gastroscopy, duodenoscopy (EGD), combined (N/A, 3/1/2019); and Laparoscopic cholecystectomy (N/A, 9/9/2019).    Medications:   Current Outpatient Medications:      atorvastatin (LIPITOR) 20 MG tablet, Take 1 tablet (20 mg) by mouth daily, Disp: 90 tablet, Rfl: 3     blood glucose (NO BRAND SPECIFIED) test strip, Use to test blood sugar 3 times daily or as directed. To accompany: Blood Glucose Monitor Brands: per insurance., Disp: 300 strip, Rfl: 3     Cholecalciferol (VITAMIN D3) 1000 units CAPS, Take by mouth daily, Disp: , Rfl:      citalopram (CELEXA) 20 MG tablet, Take 1 tablet (20 mg) by mouth daily, Disp: 90 tablet, Rfl: 3     ferrous fumarate 65 mg, [place]. FE,-Vitamin C 125 mg (VITRON C)  MG TABS tablet, Take 1 tablet by mouth 2 times daily (Patient not taking: Reported on 7/11/2022), Disp: 60 tablet, Rfl: 1     insulin glargine (LANTUS SOLOSTAR) 100 UNIT/ML pen, INJECT 20 UNITS AT BEDTIME DAILY, Disp: 45 mL, Rfl: 1     insulin lispro (HUMALOG KWIKPEN) 100 UNIT/ML (1 unit dial) KWIKPEN, Inject 8 Units Subcutaneous 2 times daily (before meals), Disp: 45 mL, Rfl: 1     levothyroxine (SYNTHROID/LEVOTHROID) 100 MCG tablet, TAKE 1 TABLET (100 MCG) BY MOUTH DAILY, Disp: 90 tablet, Rfl: 3     lisinopril-hydrochlorothiazide (ZESTORETIC) 20-12.5 MG tablet, Take 2 tablets by mouth daily, Disp: 180 tablet, Rfl: 3     metFORMIN (GLUCOPHAGE XR) 500 MG 24 hr tablet, Take 2 tablets (1,000 mg) by mouth daily (with dinner), Disp: 180 tablet, Rfl: 3     order for DME, Blood  glucose monitor per insurance formulary; blood glucose test strips One Touch Verio or per formulary for use 3 time daily; lancets per formulary for use 3 time daily, Disp: 300 each, Rfl: 3     thin (NO BRAND SPECIFIED) lancets, Use to test blood sugar 3 times daily or as directed. To accompany: Blood Glucose Monitor Brands: per insurance., Disp: 300 each, Rfl: 3     ULTICARE MINI 31G X 6 MM insulin pen needle, Use three times daily or as directed., Disp: 300 each, Rfl: 1    Allergies:   Allergies   Allergen Reactions     Glipizide      tremulous     Ibuprofen Hives     Pcn [Penicillins] Itching     Percocet [Acetaminophen] Nausea and Vomiting     Vicodin [Hydrocodone-Acetaminophen] Nausea       Social Hx: retired.   reports that she quit smoking about 33 years ago. Her smoking use included cigarettes. She started smoking about 55 years ago. She has a 10.00 pack-year smoking history. She has never used smokeless tobacco. She reports that she does not drink alcohol and does not use drugs.    Family Hx: family history includes Alcohol/Drug in her brother, father, and son; Alzheimer Disease in her maternal grandfather; Breast Cancer in her cousin, maternal grandmother, paternal grandmother, and another family member; C.A.D. in her father; Cancer in her maternal grandmother; Cerebrovascular Disease in her father; Dementia in her mother; Depression in her brother and brother; Diabetes in her father; Hyperlipidemia in her father; Hypertension in her father; Osteoporosis in her mother; Prostate Cancer in her brother; Rheumatoid Arthritis in her son; Thyroid Disease in her maternal grandmother and mother..    REVIEW OF SYSTEMS: 10 point ROS neg other than the symptoms noted above in the HPI and PMH. Notables include  CONSTITUTIONAL:NEGATIVE for fever, chills, change in weight  INTEGUMENTARY/SKIN: NEGATIVE for worrisome rashes, moles or lesions  MUSCULOSKELETAL:See HPI above  NEURO: NEGATIVE for weakness, dizziness or  "paresthesias    PHYSICAL EXAM:  Resp 16   Ht 1.626 m (5' 4\")   Wt 74.8 kg (165 lb)   BMI 28.32 kg/m     GENERAL APPEARANCE: healthy, alert, no distress  SKIN: no suspicious lesions or rashes  NEURO: Normal strength and tone, mentation intact and speech normal  PSYCH:  mentation appears normal and affect normal, not anxious  RESPIRATORY: No increased work of breathing.  VASCULAR: Radial pulses 2+ and brisk cappillary refill   LYMPH: no palpable axillary lymphadenopathy or cervical neck lymphadenopathy.      MUSCULOSKELETAL:    NECK:  kyphosis.  Cervical range of motion: decreased, causes pain in the neck, does not reproduce shoulder pain.  Posterior cervical spine nontender to palpation over midline bony prominences  There is moderate tender to palpation along neck paraspinals and trapezius muscles      RIGHT UPPER EXTREMITY:  Sensation intact to light touch in median, radial, ulnar and axillary nerve distributions  Palpable 2+ radial pulse, brisk capillary refill to all fingers, wwp  Intact epl fpl fdp edc wrist flexion/extension biceps triceps deltoid    RIGHT SHOULDER:  Shoulder Inspection: no swelling, bruising, discoloration, or obvious deformity or asymmetry  Tender: acromion, anterior capsule, proximal bicep tendon, greater tuberosity, proximal humerus, supraspinatus , infraspinatus, upper trapezius muscle, rhomboids and pectoralis, biceps, triceps  Non-tender: SC joint and AC joint  Range of Motion:   Active:forward flexion 90 degrees, external rotation  10 degrees, internal rotation  hip pocket   Passive: forward flexion 110 degrees, external rotation  30 degrees  Strength: forward flexion 4/5, painful, limited by pain, External rotation 4/5, painful, limited by pain   Impingement: all grade 2 positive  Special tests: Empty Can: Positive    LEFT UPPER EXTREMITY:  Sensation intact to light touch in median, radial, ulnar and axillary nerve distributions  Palpable 2+ radial pulse, brisk capillary refill to " all fingers, wwp  Intact epl fpl fdp edc wrist flexion/extension biceps triceps deltoid    LEFT SHOULDER:  Shoulder Inspection: no swelling, bruising, discoloration, or obvious deformity or asymmetry  Tender: paraspinals  Non-tender: AC joint  Range of Motion:   Active:forward flexion 150 degrees, external rotation  50 degrees, internal rotation bra line  Strength: forward flexion 5-/5, External rotation 5-/5   Impingement: negative.  Special tests: Empty Can: Negative      X-RAY INTERPRETATION: 3 views right  shoulder obtained 7/11/2022 were reviewed personally in clinic today with the patient. On my review, No fractures are evident. Normal glenohumeral alignment. The acromioclavicular joint is unremarkable.       ASSESSMENT: Kelly Vegas is a 72 year old female, right  -hand dominant with chronic right shoulder pain, adhesive capsulitis, possible rotator cuff tear.      PLAN:   * exam, images reviewed  * suspect frozen shoulder, impingment with bursitis/tendinitis, concern for rotator cuff tear  * recommend MRI for further evaluation, evaluate for rotator cuff tear  * if MRI without significant tear, discussed trial of cortisone given pain, stiffness  * over the counter pain control as needed.  * will call regarding MRI findings, treatment options.    * All questions were addressed and answered prior to discharge from clinic today. The patient acknowledges an understanding of and agreement with the plan set forth during today's visit. Patient was advised to call our office or MyChart us if any further questions arise upon leaving our office today.      Adonay Mata M.D., M.S.  Dept. of Orthopaedic Surgery  [facility]

## 2022-10-10 NOTE — LETTER
10/10/2022         RE: Kelly Vegas  [address] [facility] [address]        Dear Colleague,    Thank you for referring your patient, Kelly Vegas, to the [facility] [place]. Please see a copy of my visit note below.    CHIEF COMPLAINT:   Chief Complaint   Patient presents with     Right Shoulder - Pain     Onset: winter [id number], shoveling snow. She threw snow to the opposite side that she normally does and felt abnormal pain in the right shoulder. In July, 2022 she saw Dr. Anand for x-ray and Physical Therapy. Completed Physical Therapy. Unable to raise arm overhead or away from body or behind body without a lot of pain. Treatment has been Tylenol, lidocaine cream and Physical Therapy.      Kelly Vegas is seen today in the [facility] for evaluation of right shoulder pain at the request of Dr. Sejal Rutledge       HISTORY:  Kelly Vegas is a 72 year old female, right  -hand dominant, who is seen in consultation at the request of Dr. Rutledge for right shoulder pain that started last Winter. Onset shoveling snow, threw the snow opposite direction, with onset of pain. didn't think much of it at the time, but has continued pain. Worse the past 4 months. Pain along the side of the shoulder and upper arm. Pain with reaching, lifting, using the arm.    Has pain at rest, worse with activities, night.    Treatment has been Physical Therapy, tylenol, topicals.    Denies prior shoulder problems..    Kelly is diabetic, A1C=6.5 on 7/11/2022      Onset: last winter, shoveling snow.  Symptoms have been worsening since that time.  Aggrevated by: reaching, lifting  Relieved by: rest  Present symptoms: pain with ADL's (dressing),  pain with overhead activities,  pain reaching behind back,  pain reaching out or away from body (flexion/ abduction),  positional night pain,  pain lifting,  Pain location: lateral shoulder and deltoid and upper  arm  Pain severity: 5/10 at rest, 10/10 with quick movements  Pain quality: dull, aching, sharp and shooting  Frequency of symptoms: are constant  Associated symptoms: neck pain, some numbness and tingling if in position too long like driving (both hands fall asleep)    Treatment up to this point:Tylenol and PT  Has not tried: Corticosteroid injection   Prior history of related problems: no prior problems with this area in the past      Usual level of work activity: retired    Other PMH:  has a past medical history of Arthritis (check it), AVM (arteriovenous malformation) of small bowel, acquired, Background diabetic retinopathy (H), Cervical cancer (H) (03/2006), CKD (chronic kidney disease) stage 3, GFR 30-59 ml/min (H), CLL (chronic lymphocytic leukemia) (H), Colon adenoma, DDD (degenerative disc disease), lumbar, Degenerative joint disease of hand, Depressive disorder, Diabetes mellitus type II (2009), Diabetic polyneuropathy associated with type 2 diabetes mellitus (H) (09/26/2017), Gastritis, HTN (hypertension), Hyperlipidemia LDL goal < 100, Hypothyroid (12/2004), Osteopenia, Recurrent major depression (H), and Sensorineural hearing loss, bilateral (12/14/2021).    She has no past medical history of Cerebral infarction (H), Congestive heart failure, unspecified, COPD (chronic obstructive pulmonary disease) (H), Heart disease, History of blood transfusion, or Uncomplicated asthma.  Patient Active Problem List   Diagnosis     Hyperlipidemia with target LDL less than 100     Hypertension goal BP (blood pressure) < 140/90     History of cervical cancer     Advanced directives, counseling/discussion     Major depressive disorder, recurrent, mild (H)     Acquired hypothyroidism     Type 2 diabetes mellitus with diabetic polyneuropathy, with long-term current use of insulin (H)     Degenerative joint disease of hand     DDD (degenerative disc disease), lumbar     Osteopenia     Memory problem     CLL (chronic  lymphocytic leukemia) (H)     Dysesthesia     Ganglion cyst of joint of finger of right hand     Background diabetic retinopathy (H)     AVM (arteriovenous malformation) of small bowel, acquired     Iron deficiency     CKD (chronic kidney disease) stage 3, GFR 30-59 ml/min (H)     Sensorineural hearing loss, bilateral     Chronic right shoulder pain     Acute left-sided low back pain with left-sided sciatica       Surgical Hx:  has a past surgical history that includes BSO, OMENTECTOMY W/JUAN DANIEL (2/2006); THYROIDECTOMY (12/2004); Cystectomy ovarian benign (1974); Colonoscopy with CO2 insufflation (N/A, 11/28/2016); biopsy; colonoscopy (nov 2016); Eye surgery (12-14-16); Colonoscopy (N/A, 3/1/2019); Esophagoscopy, gastroscopy, duodenoscopy (EGD), combined (N/A, 3/1/2019); and Laparoscopic cholecystectomy (N/A, 9/9/2019).    Medications:   Current Outpatient Medications:      atorvastatin (LIPITOR) 20 MG tablet, Take 1 tablet (20 mg) by mouth daily, Disp: 90 tablet, Rfl: 3     blood glucose (NO BRAND SPECIFIED) test strip, Use to test blood sugar 3 times daily or as directed. To accompany: Blood Glucose Monitor Brands: per insurance., Disp: 300 strip, Rfl: 3     Cholecalciferol (VITAMIN D3) 1000 units CAPS, Take by mouth daily, Disp: , Rfl:      citalopram (CELEXA) 20 MG tablet, Take 1 tablet (20 mg) by mouth daily, Disp: 90 tablet, Rfl: 3     ferrous fumarate 65 mg, [place]. FE,-Vitamin C 125 mg (VITRON C)  MG TABS tablet, Take 1 tablet by mouth 2 times daily (Patient not taking: Reported on 7/11/2022), Disp: 60 tablet, Rfl: 1     insulin glargine (LANTUS SOLOSTAR) 100 UNIT/ML pen, INJECT 20 UNITS AT BEDTIME DAILY, Disp: 45 mL, Rfl: 1     insulin lispro (HUMALOG KWIKPEN) 100 UNIT/ML (1 unit dial) KWIKPEN, Inject 8 Units Subcutaneous 2 times daily (before meals), Disp: 45 mL, Rfl: 1     levothyroxine (SYNTHROID/LEVOTHROID) 100 MCG tablet, TAKE 1 TABLET (100 MCG) BY MOUTH DAILY, Disp: 90 tablet, Rfl: 3      lisinopril-hydrochlorothiazide (ZESTORETIC) 20-12.5 MG tablet, Take 2 tablets by mouth daily, Disp: 180 tablet, Rfl: 3     metFORMIN (GLUCOPHAGE XR) 500 MG 24 hr tablet, Take 2 tablets (1,000 mg) by mouth daily (with dinner), Disp: 180 tablet, Rfl: 3     order for DME, Blood glucose monitor per insurance formulary; blood glucose test strips One Touch Verio or per formulary for use 3 time daily; lancets per formulary for use 3 time daily, Disp: 300 each, Rfl: 3     thin (NO BRAND SPECIFIED) lancets, Use to test blood sugar 3 times daily or as directed. To accompany: Blood Glucose Monitor Brands: per insurance., Disp: 300 each, Rfl: 3     ULTICARE MINI 31G X 6 MM insulin pen needle, Use three times daily or as directed., Disp: 300 each, Rfl: 1    Allergies:   Allergies   Allergen Reactions     Glipizide      tremulous     Ibuprofen Hives     Pcn [Penicillins] Itching     Percocet [Acetaminophen] Nausea and Vomiting     Vicodin [Hydrocodone-Acetaminophen] Nausea       Social Hx: retired.   reports that she quit smoking about 33 years ago. Her smoking use included cigarettes. She started smoking about 55 years ago. She has a 10.00 pack-year smoking history. She has never used smokeless tobacco. She reports that she does not drink alcohol and does not use drugs.    Family Hx: family history includes Alcohol/Drug in her brother, father, and son; Alzheimer Disease in her maternal grandfather; Breast Cancer in her cousin, maternal grandmother, paternal grandmother, and another family member; C.A.D. in her father; Cancer in her maternal grandmother; Cerebrovascular Disease in her father; Dementia in her mother; Depression in her brother and brother; Diabetes in her father; Hyperlipidemia in her father; Hypertension in her father; Osteoporosis in her mother; Prostate Cancer in her brother; Rheumatoid Arthritis in her son; Thyroid Disease in her maternal grandmother and mother..    REVIEW OF SYSTEMS: 10 point ROS neg other  "than the symptoms noted above in the HPI and PMH. Notables include  CONSTITUTIONAL:NEGATIVE for fever, chills, change in weight  INTEGUMENTARY/SKIN: NEGATIVE for worrisome rashes, moles or lesions  MUSCULOSKELETAL:See HPI above  NEURO: NEGATIVE for weakness, dizziness or paresthesias    PHYSICAL EXAM:  Resp 16   Ht 1.626 m (5' 4\")   Wt 74.8 kg (165 lb)   BMI 28.32 kg/m     GENERAL APPEARANCE: healthy, alert, no distress  SKIN: no suspicious lesions or rashes  NEURO: Normal strength and tone, mentation intact and speech normal  PSYCH:  mentation appears normal and affect normal, not anxious  RESPIRATORY: No increased work of breathing.  VASCULAR: Radial pulses 2+ and brisk cappillary refill   LYMPH: no palpable axillary lymphadenopathy or cervical neck lymphadenopathy.      MUSCULOSKELETAL:    NECK:  kyphosis.  Cervical range of motion: decreased, causes pain in the neck, does not reproduce shoulder pain.  Posterior cervical spine nontender to palpation over midline bony prominences  There is moderate tender to palpation along neck paraspinals and trapezius muscles      RIGHT UPPER EXTREMITY:  Sensation intact to light touch in median, radial, ulnar and axillary nerve distributions  Palpable 2+ radial pulse, brisk capillary refill to all fingers, wwp  Intact epl fpl fdp edc wrist flexion/extension biceps triceps deltoid    RIGHT SHOULDER:  Shoulder Inspection: no swelling, bruising, discoloration, or obvious deformity or asymmetry  Tender: acromion, anterior capsule, proximal bicep tendon, greater tuberosity, proximal humerus, supraspinatus , infraspinatus, upper trapezius muscle, rhomboids and pectoralis, biceps, triceps  Non-tender: SC joint and AC joint  Range of Motion:   Active:forward flexion 90 degrees, external rotation  10 degrees, internal rotation  hip pocket   Passive: forward flexion 110 degrees, external rotation  30 degrees  Strength: forward flexion 4/5, painful, limited by pain, External rotation " 4/5, painful, limited by pain   Impingement: all grade 2 positive  Special tests: Empty Can: Positive    LEFT UPPER EXTREMITY:  Sensation intact to light touch in median, radial, ulnar and axillary nerve distributions  Palpable 2+ radial pulse, brisk capillary refill to all fingers, wwp  Intact epl fpl fdp edc wrist flexion/extension biceps triceps deltoid    LEFT SHOULDER:  Shoulder Inspection: no swelling, bruising, discoloration, or obvious deformity or asymmetry  Tender: paraspinals  Non-tender: AC joint  Range of Motion:   Active:forward flexion 150 degrees, external rotation  50 degrees, internal rotation bra line  Strength: forward flexion 5-/5, External rotation 5-/5   Impingement: negative.  Special tests: Empty Can: Negative      X-RAY INTERPRETATION: 3 views right  shoulder obtained 7/11/2022 were reviewed personally in clinic today with the patient. On my review, No fractures are evident. Normal glenohumeral alignment. The acromioclavicular joint is unremarkable.       ASSESSMENT: Kelly Vegas is a 72 year old female, right  -hand dominant with chronic right shoulder pain, adhesive capsulitis, possible rotator cuff tear.      PLAN:   * exam, images reviewed  * suspect frozen shoulder, impingment with bursitis/tendinitis, concern for rotator cuff tear  * recommend MRI for further evaluation, evaluate for rotator cuff tear  * if MRI without significant tear, discussed trial of cortisone given pain, stiffness  * over the counter pain control as needed.  * will call regarding MRI findings, treatment options.    * All questions were addressed and answered prior to discharge from clinic today. The patient acknowledges an understanding of and agreement with the plan set forth during today's visit. Patient was advised to call our office or MyChart us if any further questions arise upon leaving our office today.      Adonay Mata M.D., M.S.  Dept. of Orthopaedic Surgery  [facility]      Again,  thank you for allowing me to participate in the care of your patient.        Sincerely,        Adonay Mata MD

## 2022-10-18 ENCOUNTER — ANCILLARY PROCEDURE (OUTPATIENT)
Dept: MRI IMAGING | Facility: CLINIC | Age: 72
End: 2022-10-18
Attending: PHYSICIAN ASSISTANT
Payer: COMMERCIAL

## 2022-10-18 DIAGNOSIS — G89.29 CHRONIC RIGHT SHOULDER PAIN: ICD-10-CM

## 2022-10-18 DIAGNOSIS — M25.511 CHRONIC RIGHT SHOULDER PAIN: ICD-10-CM

## 2022-10-18 PROCEDURE — 73221 MRI JOINT UPR EXTREM W/O DYE: CPT | Mod: RT | Performed by: RADIOLOGY

## 2022-10-26 ENCOUNTER — TELEPHONE (OUTPATIENT)
Dept: ORTHOPEDICS | Facility: CLINIC | Age: 72
End: 2022-10-26

## 2022-10-26 NOTE — TELEPHONE ENCOUNTER
Called and left call back number to discuss her shoulder MRI findings. No detailed information was left due to non-identified inbox.    Adonay Mata M.D., M.S.  Dept. of Orthopaedic Surgery  [facility]

## 2022-10-27 ENCOUNTER — TELEPHONE (OUTPATIENT)
Dept: ORTHOPEDICS | Facility: CLINIC | Age: 72
End: 2022-10-27

## 2022-10-27 NOTE — TELEPHONE ENCOUNTER
Called and left  again regarding MRI shoulder. Will  Continue to try calling back. Her phone goes to  after a single ring.    Adonay Mata M.D., M.S.  Dept. of Orthopaedic Surgery  [facility]

## 2022-10-27 NOTE — TELEPHONE ENCOUNTER
[facility] Call Center    Phone Message    May a detailed message be left on voicemail: no     Reason for Call: Other: Requesting c/b for MRI results. She is not sure why it went to  right away last time. She is ready for the call

## 2022-10-28 NOTE — TELEPHONE ENCOUNTER
Called and left another VM. Advised patient to schedule an appointment for discussion, possible injection.    Adonay Mata M.D., M.S.  Dept. of Orthopaedic Surgery  [facility]

## 2022-10-29 NOTE — PROGRESS NOTES
CHIEF COMPLAINT:   Chief Complaint   Patient presents with     Right Shoulder - Pain     Patient is here for follow up from MRI. No changes with shoulder, if anything it is worse. She did do 5 weeks of physical therapy. She would like to try a cortisone injection.       HISTORY:  Kelly Vegas is a 72 year old female, right  -hand dominant, who is seen in followup for right shoulder pain that started last Winter. Onset shoveling snow, threw the snow opposite direction, with onset of pain. didn't think much of it at the time, but has continued pain. Worse the past 4 months. Pain along the side of the shoulder and upper arm. Pain with reaching, lifting, using the arm. Here to followup on MRI, as we've been unable to reach via phone despite numerous attempts.    Has pain at rest, worse with activities, night.    Has been doing a lot of yard work, raking leaves, etc and quite sore the next day.    Treatment has been Physical Therapy, tylenol, topicals.    Denies prior shoulder problems..    Kelly is diabetic, A1C=6.5 on 7/11/2022      Onset: last winter, shoveling snow.  Symptoms have been worsening since that time.  Aggrevated by: reaching, lifting  Relieved by: rest  Present symptoms: pain with ADL's (dressing),  pain with overhead activities,  pain reaching behind back,  pain reaching out or away from body (flexion/ abduction),  positional night pain,  pain lifting,  Pain location: lateral shoulder and deltoid and upper arm  Pain severity: 5/10 at rest, 8/10 with quick movements  Pain quality: dull, aching, sharp and shooting  Frequency of symptoms: are constant  Associated symptoms: neck pain, some numbness and tingling if in position too long like driving (both hands fall asleep)    Treatment up to this point:Tylenol and PT  Has not tried: Corticosteroid injection   Prior history of related problems: no prior problems with this area in the past      Usual level of work activity: retired    Other PMH:  has a past  medical history of Arthritis (check it), AVM (arteriovenous malformation) of small bowel, acquired, Background diabetic retinopathy (H), Cervical cancer (H) (03/2006), CKD (chronic kidney disease) stage 3, GFR 30-59 ml/min (H), CLL (chronic lymphocytic leukemia) (H), Colon adenoma, DDD (degenerative disc disease), lumbar, Degenerative joint disease of hand, Depressive disorder, Diabetes mellitus type II (2009), Diabetic polyneuropathy associated with type 2 diabetes mellitus (H) (09/26/2017), Gastritis, HTN (hypertension), Hyperlipidemia LDL goal < 100, Hypothyroid (12/2004), Osteopenia, Recurrent major depression (H), and Sensorineural hearing loss, bilateral (12/14/2021).    She has no past medical history of Cerebral infarction (H), Congestive heart failure, unspecified, COPD (chronic obstructive pulmonary disease) (H), Heart disease, History of blood transfusion, or Uncomplicated asthma.  Patient Active Problem List   Diagnosis     Hyperlipidemia with target LDL less than 100     Hypertension goal BP (blood pressure) < 140/90     History of cervical cancer     Advanced directives, counseling/discussion     Major depressive disorder, recurrent, mild (H)     Acquired hypothyroidism     Type 2 diabetes mellitus with diabetic polyneuropathy, with long-term current use of insulin (H)     Degenerative joint disease of hand     DDD (degenerative disc disease), lumbar     Osteopenia     Memory problem     CLL (chronic lymphocytic leukemia) (H)     Dysesthesia     Ganglion cyst of joint of finger of right hand     Background diabetic retinopathy (H)     AVM (arteriovenous malformation) of small bowel, acquired     Iron deficiency     CKD (chronic kidney disease) stage 3, GFR 30-59 ml/min (H)     Sensorineural hearing loss, bilateral     Chronic right shoulder pain     Acute left-sided low back pain with left-sided sciatica       Surgical Hx:  has a past surgical history that includes BSO, OMENTECTOMY W/JUAN DANIEL (2/2006);  THYROIDECTOMY (12/2004); Cystectomy ovarian benign (1974); Colonoscopy with CO2 insufflation (N/A, 11/28/2016); biopsy; colonoscopy (nov 2016); Eye surgery (12-14-16); Colonoscopy (N/A, 3/1/2019); Esophagoscopy, gastroscopy, duodenoscopy (EGD), combined (N/A, 3/1/2019); and Laparoscopic cholecystectomy (N/A, 9/9/2019).    Medications:   Current Outpatient Medications:      atorvastatin (LIPITOR) 20 MG tablet, Take 1 tablet (20 mg) by mouth daily, Disp: 90 tablet, Rfl: 3     blood glucose (NO BRAND SPECIFIED) test strip, Use to test blood sugar 3 times daily or as directed. To accompany: Blood Glucose Monitor Brands: per insurance., Disp: 300 strip, Rfl: 3     Cholecalciferol (VITAMIN D3) 1000 units CAPS, Take by mouth daily, Disp: , Rfl:      citalopram (CELEXA) 20 MG tablet, Take 1 tablet (20 mg) by mouth daily, Disp: 90 tablet, Rfl: 3     ferrous fumarate 65 mg, [place]. FE,-Vitamin C 125 mg (VITRON C)  MG TABS tablet, Take 1 tablet by mouth 2 times daily, Disp: 60 tablet, Rfl: 1     insulin glargine (LANTUS SOLOSTAR) 100 UNIT/ML pen, INJECT 20 UNITS AT BEDTIME DAILY, Disp: 45 mL, Rfl: 1     insulin lispro (HUMALOG KWIKPEN) 100 UNIT/ML (1 unit dial) KWIKPEN, Inject 8 Units Subcutaneous 2 times daily (before meals), Disp: 45 mL, Rfl: 1     levothyroxine (SYNTHROID/LEVOTHROID) 100 MCG tablet, TAKE 1 TABLET (100 MCG) BY MOUTH DAILY, Disp: 90 tablet, Rfl: 3     lisinopril-hydrochlorothiazide (ZESTORETIC) 20-12.5 MG tablet, Take 2 tablets by mouth daily, Disp: 180 tablet, Rfl: 3     metFORMIN (GLUCOPHAGE XR) 500 MG 24 hr tablet, Take 2 tablets (1,000 mg) by mouth daily (with dinner), Disp: 180 tablet, Rfl: 3     order for DME, Blood glucose monitor per insurance formulary; blood glucose test strips One Touch Verio or per formulary for use 3 time daily; lancets per formulary for use 3 time daily, Disp: 300 each, Rfl: 3     thin (NO BRAND SPECIFIED) lancets, Use to test blood sugar 3 times daily or as directed. To  "accompany: Blood Glucose Monitor Brands: per insurance., Disp: 300 each, Rfl: 3     ULTICARE MINI 31G X 6 MM insulin pen needle, Use three times daily or as directed., Disp: 300 each, Rfl: 1    Allergies:   Allergies   Allergen Reactions     Glipizide      tremulous     Ibuprofen Hives     Pcn [Penicillins] Itching     Percocet [Acetaminophen] Nausea and Vomiting     Vicodin [Hydrocodone-Acetaminophen] Nausea       Social Hx: retired.   reports that she quit smoking about 33 years ago. Her smoking use included cigarettes. She started smoking about 55 years ago. She has a 10.00 pack-year smoking history. She has never used smokeless tobacco. She reports that she does not drink alcohol and does not use drugs.    Family Hx: family history includes Alcohol/Drug in her brother, father, and son; Alzheimer Disease in her maternal grandfather; Breast Cancer in her cousin, maternal grandmother, paternal grandmother, and another family member; C.A.D. in her father; Cancer in her maternal grandmother; Cerebrovascular Disease in her father; Dementia in her mother; Depression in her brother and brother; Diabetes in her father; Hyperlipidemia in her father; Hypertension in her father; Osteoporosis in her mother; Prostate Cancer in her brother; Rheumatoid Arthritis in her son; Thyroid Disease in her maternal grandmother and mother..    REVIEW OF SYSTEMS:  CONSTITUTIONAL:NEGATIVE for fever, chills, change in weight  INTEGUMENTARY/SKIN: NEGATIVE for worrisome rashes, moles or lesions  MUSCULOSKELETAL:See HPI above  NEURO: NEGATIVE for weakness, dizziness or paresthesias    PHYSICAL EXAM:  /56   Pulse 73   Ht 1.626 m (5' 4\")   Wt 73.9 kg (163 lb)   BMI 27.98 kg/m     GENERAL APPEARANCE: healthy, alert, no distress  SKIN: no suspicious lesions or rashes  NEURO: Normal strength and tone, mentation intact and speech normal  PSYCH:  mentation appears normal and affect normal, not anxious  RESPIRATORY: No increased work of " breathing.      MUSCULOSKELETAL:    NECK:  kyphosis.  Cervical range of motion: decreased, causes pain in the neck, does not reproduce shoulder pain.  Posterior cervical spine nontender to palpation over midline bony prominences  There is moderate tender to palpation along neck paraspinals and trapezius muscles      RIGHT UPPER EXTREMITY:  Sensation intact to light touch in median, radial, ulnar and axillary nerve distributions  Palpable 2+ radial pulse, brisk capillary refill to all fingers, wwp  Intact epl fpl fdp edc wrist flexion/extension biceps triceps deltoid    RIGHT SHOULDER:  Shoulder Inspection: no swelling, bruising, discoloration, or obvious deformity or asymmetry  Tender: acromion, anterior capsule, proximal bicep tendon, greater tuberosity, proximal humerus, supraspinatus , infraspinatus, upper trapezius muscle, rhomboids and pectoralis, biceps, triceps  Non-tender: SC joint and AC joint  Range of Motion:   Active:forward flexion 100 degrees, external rotation  10 degrees, internal rotation  hip pocket   Passive: forward flexion 120 degrees, external rotation  40 degrees  Strength: forward flexion 4/5, painful, limited by pain, External rotation 4/5, painful, limited by pain   Impingement: all grade 2 positive  Special tests: Empty Can: Positive    LEFT UPPER EXTREMITY:  Sensation intact to light touch in median, radial, ulnar and axillary nerve distributions  Palpable 2+ radial pulse, brisk capillary refill to all fingers, wwp  Intact epl fpl fdp edc wrist flexion/extension biceps triceps deltoid    LEFT SHOULDER:  Shoulder Inspection: no swelling, bruising, discoloration, or obvious deformity or asymmetry  Tender: paraspinals  Non-tender: AC joint  Range of Motion:   Active:forward flexion 150 degrees, external rotation  50 degrees, internal rotation bra line  Strength: forward flexion 5-/5, External rotation 5-/5   Impingement: negative.  Special tests: Empty Can: Negative      X-RAY  INTERPRETATION: 3 views right  shoulder obtained 7/11/2022 were reviewed personally in clinic today with the patient. On my review, No fractures are evident. Normal glenohumeral alignment. The acromioclavicular joint is unremarkable.     MRI 10/18/2022:  1. High-grade, near full-thickness tear of the mid and posterior  fibers of the supraspinatus tendon at the musculotendinous junction  with minimal tendon retraction superimposed on moderate grade  tendinopathy.  2. Superimposed on moderate grade tendinopathy of the subscapularis  tendon with focal, moderate grade intrasubstance tear of the far  superior fibers with associated medial subluxation of the biceps  tendon. No significant tendon retraction.  3. Muscle bulk of the rotator cuff remains intact.  4. Biceps tendinopathy and fraying at the bicipital labral anchor.  5. Moderate grade degenerative changes of the acromioclavicular joint.      ASSESSMENT: Kelly Vegas is a 72 year old female, right  -hand dominant with chronic right shoulder pain, adhesive capsulitis, rotator cuff tear.      PLAN:   * exam, images reviewed  * suspect frozen shoulder, impingment with bursitis/tendinitis, small rotator cuff tear  * given shoulder stiffness, i'd like to see if we can get improved range of motion, before proceeding with any surgery.  * I did offer a cortisone injection to see if we can improve the pain, and see if the shoulder range of motion will improve with that if the decreased range of motion is more pain limited, or if indeed a frozen shoulder.  * she's in agreement with this plan, as she'd rather not have surgery any time soon.    * over the counter pain control as needed.  * right shoulder subacromial injection today  * resume home exercise program, previous modalities.  * consider surgical intervention in future as needed.    * All questions were addressed and answered prior to discharge from clinic today. The patient acknowledges an understanding of and  agreement with the plan set forth during today's visit. Patient was advised to call our office or MyChart us if any further questions arise upon leaving our office today.      Adonay Mata M.D., M.S.  Dept. of Orthopaedic Surgery  [facility]    Large Joint Injection/Arthocentesis: R subacromial bursa    Date/Time: 11/2/2022 9:54 AM  Performed by: Bulmaro Snyder PA  Authorized by: Adonay Mata MD     Indications:  Pain  Indications comment:  Impingement  Needle Size:  22 G  Guidance: landmark guided    Approach:  Posterolateral  Location:  Shoulder      Site:  R subacromial bursa  Medications:  80 mg triamcinolone 40 MG/ML; 4 mL bupivacaine 0.25 %  Outcome:  Tolerated well, no immediate complications  Procedure discussed: discussed risks, benefits, and alternatives    Consent Given by:  Patient  Prep: patient was prepped and draped in usual sterile fashion

## 2022-11-02 ENCOUNTER — OFFICE VISIT (OUTPATIENT)
Dept: ORTHOPEDICS | Facility: CLINIC | Age: 72
End: 2022-11-02
Payer: COMMERCIAL

## 2022-11-02 VITALS
BODY MASS INDEX: 27.83 KG/M2 | WEIGHT: 163 LBS | HEART RATE: 73 BPM | DIASTOLIC BLOOD PRESSURE: 56 MMHG | SYSTOLIC BLOOD PRESSURE: 120 MMHG | HEIGHT: 64 IN

## 2022-11-02 DIAGNOSIS — G89.29 CHRONIC RIGHT SHOULDER PAIN: Primary | ICD-10-CM

## 2022-11-02 DIAGNOSIS — M25.511 CHRONIC RIGHT SHOULDER PAIN: Primary | ICD-10-CM

## 2022-11-02 PROCEDURE — 99213 OFFICE O/P EST LOW 20 MIN: CPT | Mod: 25 | Performed by: ORTHOPAEDIC SURGERY

## 2022-11-02 PROCEDURE — 20610 DRAIN/INJ JOINT/BURSA W/O US: CPT | Mod: RT | Performed by: ORTHOPAEDIC SURGERY

## 2022-11-02 RX ORDER — TRIAMCINOLONE ACETONIDE 40 MG/ML
80 INJECTION, SUSPENSION INTRA-ARTICULAR; INTRAMUSCULAR
Status: DISCONTINUED | OUTPATIENT
Start: 2022-11-02 | End: 2024-01-22

## 2022-11-02 RX ORDER — BUPIVACAINE HYDROCHLORIDE 2.5 MG/ML
4 INJECTION, SOLUTION INFILTRATION; PERINEURAL
Status: DISCONTINUED | OUTPATIENT
Start: 2022-11-02 | End: 2024-01-22

## 2022-11-02 RX ADMIN — BUPIVACAINE HYDROCHLORIDE 4 ML: 2.5 INJECTION, SOLUTION INFILTRATION; PERINEURAL at 09:54

## 2022-11-02 RX ADMIN — TRIAMCINOLONE ACETONIDE 80 MG: 40 INJECTION, SUSPENSION INTRA-ARTICULAR; INTRAMUSCULAR at 09:54

## 2022-11-02 ASSESSMENT — PAIN SCALES - GENERAL: PAINLEVEL: EXTREME PAIN (8)

## 2022-11-02 NOTE — LETTER
11/2/2022         RE: Kelly Vegas  [address] [place] [id number]        Dear Colleague,    Thank you for referring your patient, Kelly Vegas, to the [facility]. Please see a copy of my visit note below.    CHIEF COMPLAINT:   Chief Complaint   Patient presents with     Right Shoulder - Pain     Patient is here for follow up from MRI. No changes with shoulder, if anything it is worse. She did do 5 weeks of physical therapy. She would like to try a cortisone injection.       HISTORY:  Kelly Vegas is a 72 year old female, right  -hand dominant, who is seen in followup for right shoulder pain that started last Winter. Onset shoveling snow, threw the snow opposite direction, with onset of pain. didn't think much of it at the time, but has continued pain. Worse the past 4 months. Pain along the side of the shoulder and upper arm. Pain with reaching, lifting, using the arm. Here to followup on MRI, as we've been unable to reach via phone despite numerous attempts.    Has pain at rest, worse with activities, night.    Has been doing a lot of yard work, raking leaves, etc and quite sore the next day.    Treatment has been Physical Therapy, tylenol, topicals.    Denies prior shoulder problems..    Kelly is diabetic, A1C=6.5 on 7/11/2022      Onset: last winter, shoveling snow.  Symptoms have been worsening since that time.  Aggrevated by: reaching, lifting  Relieved by: rest  Present symptoms: pain with ADL's (dressing),  pain with overhead activities,  pain reaching behind back,  pain reaching out or away from body (flexion/ abduction),  positional night pain,  pain lifting,  Pain location: lateral shoulder and deltoid and upper arm  Pain severity: 5/10 at rest, 8/10 with quick movements  Pain quality: dull, aching, sharp and shooting  Frequency of symptoms: are constant  Associated symptoms: neck pain, some numbness and tingling if in position too long like driving (both hands fall  asleep)    Treatment up to this point:Tylenol and PT  Has not tried: Corticosteroid injection   Prior history of related problems: no prior problems with this area in the past      Usual level of work activity: retired    Other PMH:  has a past medical history of Arthritis (check it), AVM (arteriovenous malformation) of small bowel, acquired, Background diabetic retinopathy (H), Cervical cancer (H) (03/2006), CKD (chronic kidney disease) stage 3, GFR 30-59 ml/min (H), CLL (chronic lymphocytic leukemia) (H), Colon adenoma, DDD (degenerative disc disease), lumbar, Degenerative joint disease of hand, Depressive disorder, Diabetes mellitus type II (2009), Diabetic polyneuropathy associated with type 2 diabetes mellitus (H) (09/26/2017), Gastritis, HTN (hypertension), Hyperlipidemia LDL goal < 100, Hypothyroid (12/2004), Osteopenia, Recurrent major depression (H), and Sensorineural hearing loss, bilateral (12/14/2021).    She has no past medical history of Cerebral infarction (H), Congestive heart failure, unspecified, COPD (chronic obstructive pulmonary disease) (H), Heart disease, History of blood transfusion, or Uncomplicated asthma.  Patient Active Problem List   Diagnosis     Hyperlipidemia with target LDL less than 100     Hypertension goal BP (blood pressure) < 140/90     History of cervical cancer     Advanced directives, counseling/discussion     Major depressive disorder, recurrent, mild (H)     Acquired hypothyroidism     Type 2 diabetes mellitus with diabetic polyneuropathy, with long-term current use of insulin (H)     Degenerative joint disease of hand     DDD (degenerative disc disease), lumbar     Osteopenia     Memory problem     CLL (chronic lymphocytic leukemia) (H)     Dysesthesia     Ganglion cyst of joint of finger of right hand     Background diabetic retinopathy (H)     AVM (arteriovenous malformation) of small bowel, acquired     Iron deficiency     CKD (chronic kidney disease) stage 3, GFR 30-59  ml/min (H)     Sensorineural hearing loss, bilateral     Chronic right shoulder pain     Acute left-sided low back pain with left-sided sciatica       Surgical Hx:  has a past surgical history that includes BSO, OMENTECTOMY W/JUAN DANIEL (2/2006); THYROIDECTOMY (12/2004); Cystectomy ovarian benign (1974); Colonoscopy with CO2 insufflation (N/A, 11/28/2016); biopsy; colonoscopy (nov 2016); Eye surgery (12-14-16); Colonoscopy (N/A, 3/1/2019); Esophagoscopy, gastroscopy, duodenoscopy (EGD), combined (N/A, 3/1/2019); and Laparoscopic cholecystectomy (N/A, 9/9/2019).    Medications:   Current Outpatient Medications:      atorvastatin (LIPITOR) 20 MG tablet, Take 1 tablet (20 mg) by mouth daily, Disp: 90 tablet, Rfl: 3     blood glucose (NO BRAND SPECIFIED) test strip, Use to test blood sugar 3 times daily or as directed. To accompany: Blood Glucose Monitor Brands: per insurance., Disp: 300 strip, Rfl: 3     Cholecalciferol (VITAMIN D3) 1000 units CAPS, Take by mouth daily, Disp: , Rfl:      citalopram (CELEXA) 20 MG tablet, Take 1 tablet (20 mg) by mouth daily, Disp: 90 tablet, Rfl: 3     ferrous fumarate 65 mg, [place]. FE,-Vitamin C 125 mg (VITRON C)  MG TABS tablet, Take 1 tablet by mouth 2 times daily, Disp: 60 tablet, Rfl: 1     insulin glargine (LANTUS SOLOSTAR) 100 UNIT/ML pen, INJECT 20 UNITS AT BEDTIME DAILY, Disp: 45 mL, Rfl: 1     insulin lispro (HUMALOG KWIKPEN) 100 UNIT/ML (1 unit dial) KWIKPEN, Inject 8 Units Subcutaneous 2 times daily (before meals), Disp: 45 mL, Rfl: 1     levothyroxine (SYNTHROID/LEVOTHROID) 100 MCG tablet, TAKE 1 TABLET (100 MCG) BY MOUTH DAILY, Disp: 90 tablet, Rfl: 3     lisinopril-hydrochlorothiazide (ZESTORETIC) 20-12.5 MG tablet, Take 2 tablets by mouth daily, Disp: 180 tablet, Rfl: 3     metFORMIN (GLUCOPHAGE XR) 500 MG 24 hr tablet, Take 2 tablets (1,000 mg) by mouth daily (with dinner), Disp: 180 tablet, Rfl: 3     order for DME, Blood glucose monitor per insurance formulary; blood  "glucose test strips One Touch Verio or per formulary for use 3 time daily; lancets per formulary for use 3 time daily, Disp: 300 each, Rfl: 3     thin (NO BRAND SPECIFIED) lancets, Use to test blood sugar 3 times daily or as directed. To accompany: Blood Glucose Monitor Brands: per insurance., Disp: 300 each, Rfl: 3     ULTICARE MINI 31G X 6 MM insulin pen needle, Use three times daily or as directed., Disp: 300 each, Rfl: 1    Allergies:   Allergies   Allergen Reactions     Glipizide      tremulous     Ibuprofen Hives     Pcn [Penicillins] Itching     Percocet [Acetaminophen] Nausea and Vomiting     Vicodin [Hydrocodone-Acetaminophen] Nausea       Social Hx: retired.   reports that she quit smoking about 33 years ago. Her smoking use included cigarettes. She started smoking about 55 years ago. She has a 10.00 pack-year smoking history. She has never used smokeless tobacco. She reports that she does not drink alcohol and does not use drugs.    Family Hx: family history includes Alcohol/Drug in her brother, father, and son; Alzheimer Disease in her maternal grandfather; Breast Cancer in her cousin, maternal grandmother, paternal grandmother, and another family member; C.A.D. in her father; Cancer in her maternal grandmother; Cerebrovascular Disease in her father; Dementia in her mother; Depression in her brother and brother; Diabetes in her father; Hyperlipidemia in her father; Hypertension in her father; Osteoporosis in her mother; Prostate Cancer in her brother; Rheumatoid Arthritis in her son; Thyroid Disease in her maternal grandmother and mother..    REVIEW OF SYSTEMS:  CONSTITUTIONAL:NEGATIVE for fever, chills, change in weight  INTEGUMENTARY/SKIN: NEGATIVE for worrisome rashes, moles or lesions  MUSCULOSKELETAL:See HPI above  NEURO: NEGATIVE for weakness, dizziness or paresthesias    PHYSICAL EXAM:  /56   Pulse 73   Ht 1.626 m (5' 4\")   Wt 73.9 kg (163 lb)   BMI 27.98 kg/m     GENERAL APPEARANCE: " healthy, alert, no distress  SKIN: no suspicious lesions or rashes  NEURO: Normal strength and tone, mentation intact and speech normal  PSYCH:  mentation appears normal and affect normal, not anxious  RESPIRATORY: No increased work of breathing.      MUSCULOSKELETAL:    NECK:  kyphosis.  Cervical range of motion: decreased, causes pain in the neck, does not reproduce shoulder pain.  Posterior cervical spine nontender to palpation over midline bony prominences  There is moderate tender to palpation along neck paraspinals and trapezius muscles      RIGHT UPPER EXTREMITY:  Sensation intact to light touch in median, radial, ulnar and axillary nerve distributions  Palpable 2+ radial pulse, brisk capillary refill to all fingers, wwp  Intact epl fpl fdp edc wrist flexion/extension biceps triceps deltoid    RIGHT SHOULDER:  Shoulder Inspection: no swelling, bruising, discoloration, or obvious deformity or asymmetry  Tender: acromion, anterior capsule, proximal bicep tendon, greater tuberosity, proximal humerus, supraspinatus , infraspinatus, upper trapezius muscle, rhomboids and pectoralis, biceps, triceps  Non-tender: SC joint and AC joint  Range of Motion:   Active:forward flexion 100 degrees, external rotation  10 degrees, internal rotation  hip pocket   Passive: forward flexion 120 degrees, external rotation  40 degrees  Strength: forward flexion 4/5, painful, limited by pain, External rotation 4/5, painful, limited by pain   Impingement: all grade 2 positive  Special tests: Empty Can: Positive    LEFT UPPER EXTREMITY:  Sensation intact to light touch in median, radial, ulnar and axillary nerve distributions  Palpable 2+ radial pulse, brisk capillary refill to all fingers, wwp  Intact epl fpl fdp edc wrist flexion/extension biceps triceps deltoid    LEFT SHOULDER:  Shoulder Inspection: no swelling, bruising, discoloration, or obvious deformity or asymmetry  Tender: paraspinals  Non-tender: AC joint  Range of  Motion:   Active:forward flexion 150 degrees, external rotation  50 degrees, internal rotation bra line  Strength: forward flexion 5-/5, External rotation 5-/5   Impingement: negative.  Special tests: Empty Can: Negative      X-RAY INTERPRETATION: 3 views right  shoulder obtained 7/11/2022 were reviewed personally in clinic today with the patient. On my review, No fractures are evident. Normal glenohumeral alignment. The acromioclavicular joint is unremarkable.     MRI 10/18/2022:  1. High-grade, near full-thickness tear of the mid and posterior  fibers of the supraspinatus tendon at the musculotendinous junction  with minimal tendon retraction superimposed on moderate grade  tendinopathy.  2. Superimposed on moderate grade tendinopathy of the subscapularis  tendon with focal, moderate grade intrasubstance tear of the far  superior fibers with associated medial subluxation of the biceps  tendon. No significant tendon retraction.  3. Muscle bulk of the rotator cuff remains intact.  4. Biceps tendinopathy and fraying at the bicipital labral anchor.  5. Moderate grade degenerative changes of the acromioclavicular joint.      ASSESSMENT: Kelly Vegas is a 72 year old female, right  -hand dominant with chronic right shoulder pain, adhesive capsulitis, rotator cuff tear.      PLAN:   * exam, images reviewed  * suspect frozen shoulder, impingment with bursitis/tendinitis, small rotator cuff tear  * given shoulder stiffness, i'd like to see if we can get improved range of motion, before proceeding with any surgery.  * I did offer a cortisone injection to see if we can improve the pain, and see if the shoulder range of motion will improve with that if the decreased range of motion is more pain limited, or if indeed a frozen shoulder.  * she's in agreement with this plan, as she'd rather not have surgery any time soon.    * over the counter pain control as needed.  * right shoulder subacromial injection today  * resume  home exercise program, previous modalities.  * consider surgical intervention in future as needed.    * All questions were addressed and answered prior to discharge from clinic today. The patient acknowledges an understanding of and agreement with the plan set forth during today's visit. Patient was advised to call our office or MyChart us if any further questions arise upon leaving our office today.      Adonay Mata M.D., M.S.  Dept. of Orthopaedic Surgery  [facility]    Large Joint Injection/Arthocentesis: R subacromial bursa    Date/Time: 11/2/2022 9:54 AM  Performed by: Bulmaro Snyder PA  Authorized by: Adonay Mata MD     Indications:  Pain  Indications comment:  Impingement  Needle Size:  22 G  Guidance: landmark guided    Approach:  Posterolateral  Location:  Shoulder      Site:  R subacromial bursa  Medications:  80 mg triamcinolone 40 MG/ML; 4 mL bupivacaine 0.25 %  Outcome:  Tolerated well, no immediate complications  Procedure discussed: discussed risks, benefits, and alternatives    Consent Given by:  Patient  Prep: patient was prepped and draped in usual sterile fashion              Again, thank you for allowing me to participate in the care of your patient.        Sincerely,        Adonay Mata MD

## 2022-11-10 ENCOUNTER — TRANSFERRED RECORDS (OUTPATIENT)
Dept: HEALTH INFORMATION MANAGEMENT | Facility: CLINIC | Age: 72
End: 2022-11-10

## 2022-11-10 LAB — HEP C HIM: NORMAL

## 2022-11-15 DIAGNOSIS — Z79.4 TYPE 2 DIABETES MELLITUS WITH DIABETIC POLYNEUROPATHY, WITH LONG-TERM CURRENT USE OF INSULIN (H): ICD-10-CM

## 2022-11-15 DIAGNOSIS — E11.42 TYPE 2 DIABETES MELLITUS WITH DIABETIC POLYNEUROPATHY, WITH LONG-TERM CURRENT USE OF INSULIN (H): ICD-10-CM

## 2022-11-15 RX ORDER — FLURBIPROFEN SODIUM 0.3 MG/ML
SOLUTION/ DROPS OPHTHALMIC
Qty: 300 EACH | Refills: 0 | Status: SHIPPED | OUTPATIENT
Start: 2022-11-15 | End: 2023-03-20

## 2022-11-30 ENCOUNTER — OFFICE VISIT (OUTPATIENT)
Dept: FAMILY MEDICINE | Facility: CLINIC | Age: 72
End: 2022-11-30
Payer: COMMERCIAL

## 2022-11-30 VITALS
OXYGEN SATURATION: 99 % | SYSTOLIC BLOOD PRESSURE: 110 MMHG | HEART RATE: 80 BPM | TEMPERATURE: 98.8 F | WEIGHT: 161 LBS | BODY MASS INDEX: 27.64 KG/M2 | DIASTOLIC BLOOD PRESSURE: 50 MMHG

## 2022-11-30 DIAGNOSIS — N18.32 STAGE 3B CHRONIC KIDNEY DISEASE (H): ICD-10-CM

## 2022-11-30 DIAGNOSIS — C91.10 CLL (CHRONIC LYMPHOCYTIC LEUKEMIA) (H): ICD-10-CM

## 2022-11-30 DIAGNOSIS — N18.31 STAGE 3A CHRONIC KIDNEY DISEASE (H): ICD-10-CM

## 2022-11-30 DIAGNOSIS — Z79.4 TYPE 2 DIABETES MELLITUS WITH DIABETIC POLYNEUROPATHY, WITH LONG-TERM CURRENT USE OF INSULIN (H): ICD-10-CM

## 2022-11-30 DIAGNOSIS — G47.00 INSOMNIA, UNSPECIFIED TYPE: ICD-10-CM

## 2022-11-30 DIAGNOSIS — E11.42 TYPE 2 DIABETES MELLITUS WITH DIABETIC POLYNEUROPATHY, WITH LONG-TERM CURRENT USE OF INSULIN (H): ICD-10-CM

## 2022-11-30 DIAGNOSIS — Z78.0 ASYMPTOMATIC POSTMENOPAUSAL STATUS: ICD-10-CM

## 2022-11-30 DIAGNOSIS — E78.5 HYPERLIPIDEMIA WITH TARGET LDL LESS THAN 100: ICD-10-CM

## 2022-11-30 DIAGNOSIS — M85.80 OSTEOPENIA, UNSPECIFIED LOCATION: ICD-10-CM

## 2022-11-30 DIAGNOSIS — M51.369 DDD (DEGENERATIVE DISC DISEASE), LUMBAR: Primary | ICD-10-CM

## 2022-11-30 DIAGNOSIS — E03.9 ACQUIRED HYPOTHYROIDISM: ICD-10-CM

## 2022-11-30 DIAGNOSIS — M75.101 NONTRAUMATIC TEAR OF RIGHT ROTATOR CUFF, UNSPECIFIED TEAR EXTENT: ICD-10-CM

## 2022-11-30 DIAGNOSIS — E11.3299 BACKGROUND DIABETIC RETINOPATHY (H): ICD-10-CM

## 2022-11-30 DIAGNOSIS — I10 HYPERTENSION GOAL BP (BLOOD PRESSURE) < 140/90: ICD-10-CM

## 2022-11-30 PROBLEM — M25.511 CHRONIC RIGHT SHOULDER PAIN: Status: RESOLVED | Noted: 2022-09-02 | Resolved: 2022-11-30

## 2022-11-30 PROBLEM — M54.41 BILATERAL LOW BACK PAIN WITH RIGHT-SIDED SCIATICA: Status: ACTIVE | Noted: 2022-09-02

## 2022-11-30 PROBLEM — G89.29 CHRONIC RIGHT SHOULDER PAIN: Status: RESOLVED | Noted: 2022-09-02 | Resolved: 2022-11-30

## 2022-11-30 PROBLEM — M54.41 BILATERAL LOW BACK PAIN WITH RIGHT-SIDED SCIATICA: Status: RESOLVED | Noted: 2022-09-02 | Resolved: 2022-11-30

## 2022-11-30 LAB
ALBUMIN UR-MCNC: NEGATIVE MG/DL
ANION GAP SERPL CALCULATED.3IONS-SCNC: 4 MMOL/L (ref 3–14)
APPEARANCE UR: CLEAR
BILIRUB UR QL STRIP: NEGATIVE
BUN SERPL-MCNC: 31 MG/DL (ref 7–30)
CALCIUM SERPL-MCNC: 9.4 MG/DL (ref 8.5–10.1)
CHLORIDE BLD-SCNC: 111 MMOL/L (ref 94–109)
CHOLEST SERPL-MCNC: 115 MG/DL
CO2 SERPL-SCNC: 26 MMOL/L (ref 20–32)
COLOR UR AUTO: YELLOW
CREAT SERPL-MCNC: 1.42 MG/DL (ref 0.52–1.04)
ERYTHROCYTE [DISTWIDTH] IN BLOOD BY AUTOMATED COUNT: 14.2 % (ref 10–15)
FASTING STATUS PATIENT QL REPORTED: NORMAL
GFR SERPL CREATININE-BSD FRML MDRD: 39 ML/MIN/1.73M2
GLUCOSE BLD-MCNC: 92 MG/DL (ref 70–99)
GLUCOSE UR STRIP-MCNC: NEGATIVE MG/DL
HBA1C MFR BLD: 6.8 % (ref 0–5.6)
HCT VFR BLD AUTO: 37.4 % (ref 35–47)
HDLC SERPL-MCNC: 59 MG/DL
HGB BLD-MCNC: 11.4 G/DL (ref 11.7–15.7)
HGB UR QL STRIP: NEGATIVE
HOLD SPECIMEN: NORMAL
IRON SATN MFR SERPL: 26 % (ref 15–46)
IRON SERPL-MCNC: 86 UG/DL (ref 35–180)
KETONES UR STRIP-MCNC: NEGATIVE MG/DL
LDLC SERPL CALC-MCNC: 32 MG/DL
LEUKOCYTE ESTERASE UR QL STRIP: NEGATIVE
MCH RBC QN AUTO: 29.4 PG (ref 26.5–33)
MCHC RBC AUTO-ENTMCNC: 30.5 G/DL (ref 31.5–36.5)
MCV RBC AUTO: 96 FL (ref 78–100)
NITRATE UR QL: NEGATIVE
NONHDLC SERPL-MCNC: 56 MG/DL
PH UR STRIP: 5 [PH] (ref 5–7)
PLATELET # BLD AUTO: 301 10E3/UL (ref 150–450)
POTASSIUM BLD-SCNC: 5 MMOL/L (ref 3.4–5.3)
RBC # BLD AUTO: 3.88 10E6/UL (ref 3.8–5.2)
SODIUM SERPL-SCNC: 141 MMOL/L (ref 133–144)
SP GR UR STRIP: >=1.03 (ref 1–1.03)
T4 FREE SERPL-MCNC: 1.32 NG/DL (ref 0.76–1.46)
TIBC SERPL-MCNC: 326 UG/DL (ref 240–430)
TRIGL SERPL-MCNC: 122 MG/DL
TSH SERPL DL<=0.005 MIU/L-ACNC: 0.25 MU/L (ref 0.4–4)
UROBILINOGEN UR STRIP-ACNC: 0.2 E.U./DL
WBC # BLD AUTO: 23.8 10E3/UL (ref 4–11)

## 2022-11-30 PROCEDURE — 80061 LIPID PANEL: CPT | Performed by: FAMILY MEDICINE

## 2022-11-30 PROCEDURE — 83550 IRON BINDING TEST: CPT | Performed by: FAMILY MEDICINE

## 2022-11-30 PROCEDURE — 83036 HEMOGLOBIN GLYCOSYLATED A1C: CPT | Performed by: FAMILY MEDICINE

## 2022-11-30 PROCEDURE — 83540 ASSAY OF IRON: CPT | Performed by: FAMILY MEDICINE

## 2022-11-30 PROCEDURE — 85027 COMPLETE CBC AUTOMATED: CPT | Performed by: FAMILY MEDICINE

## 2022-11-30 PROCEDURE — 36415 COLL VENOUS BLD VENIPUNCTURE: CPT | Performed by: FAMILY MEDICINE

## 2022-11-30 PROCEDURE — 81003 URINALYSIS AUTO W/O SCOPE: CPT | Performed by: FAMILY MEDICINE

## 2022-11-30 PROCEDURE — 84443 ASSAY THYROID STIM HORMONE: CPT | Performed by: FAMILY MEDICINE

## 2022-11-30 PROCEDURE — 84439 ASSAY OF FREE THYROXINE: CPT | Performed by: FAMILY MEDICINE

## 2022-11-30 PROCEDURE — 82043 UR ALBUMIN QUANTITATIVE: CPT | Performed by: FAMILY MEDICINE

## 2022-11-30 PROCEDURE — 80048 BASIC METABOLIC PNL TOTAL CA: CPT | Performed by: FAMILY MEDICINE

## 2022-11-30 PROCEDURE — 99214 OFFICE O/P EST MOD 30 MIN: CPT | Performed by: FAMILY MEDICINE

## 2022-11-30 PROCEDURE — 82728 ASSAY OF FERRITIN: CPT | Performed by: FAMILY MEDICINE

## 2022-11-30 RX ORDER — LEVOTHYROXINE SODIUM 100 UG/1
TABLET ORAL
Qty: 90 TABLET | Refills: 3 | Status: SHIPPED | OUTPATIENT
Start: 2022-11-30 | End: 2022-12-01 | Stop reason: DRUGHIGH

## 2022-11-30 RX ORDER — ATORVASTATIN CALCIUM 20 MG/1
20 TABLET, FILM COATED ORAL DAILY
Qty: 90 TABLET | Refills: 3 | Status: SHIPPED | OUTPATIENT
Start: 2022-11-30 | End: 2024-01-22

## 2022-11-30 RX ORDER — METFORMIN HCL 500 MG
500 TABLET, EXTENDED RELEASE 24 HR ORAL
Qty: 90 TABLET | Refills: 3 | Status: SHIPPED | OUTPATIENT
Start: 2022-11-30 | End: 2024-01-15

## 2022-11-30 RX ORDER — LISINOPRIL AND HYDROCHLOROTHIAZIDE 12.5; 2 MG/1; MG/1
2 TABLET ORAL DAILY
Qty: 180 TABLET | Refills: 3 | Status: SHIPPED | OUTPATIENT
Start: 2022-11-30 | End: 2023-12-29

## 2022-11-30 RX ORDER — MULTIPLE VITAMINS W/ MINERALS TAB 9MG-400MCG
1 TAB ORAL EVERY MORNING
COMMUNITY

## 2022-11-30 NOTE — PROGRESS NOTES
Assessment & Plan     (M51.36) DDD (degenerative disc disease), lumbar  (primary encounter diagnosis)  Comment: poor response to physical therapy; left lower extremity dysesthesia and weakness   Plan: MR LUMBAR SPINE W/O CONTRAST        Consider injection or trail of gabapentin pending results; follow-up as needed     (M75.101) Nontraumatic tear of right rotator cuff, unspecified tear extent  Comment: under care of physical therapy and orthopedic surgery     (E11.42,  Z79.4) Type 2 diabetes mellitus with diabetic polyneuropathy, with long-term current use of insulin (H)  (E11.3299) Background diabetic retinopathy (H)  (N18.31) Stage 3a chronic kidney disease (H)  Comment: Well controlled with medications with GI side effects to metformin   Plan: HEMOGLOBIN A1C, BASIC METABOLIC PANEL, Albumin         Random Urine Quantitative with Creat Ratio,         lisinopril-hydrochlorothiazide (ZESTORETIC)         20-12.5 MG tablet, UA Macro with Reflex to         Micro and Culture - lab collect, CBC with         Platelets and Reflex to Iron Studies, Iron &         Iron Binding Capacity, Ferritin        Reduce metformin to 500 mg daily; message me if diarrhea persists to consider discontinuation of this medication. Follow-up within 3 months.     (I10) Hypertension goal BP (blood pressure) < 140/90  Comment: Well controlled with medications without side effects.   Plan: BASIC METABOLIC PANEL, Albumin Random Urine         Quantitative with Creat Ratio,         lisinopril-hydrochlorothiazide (ZESTORETIC)         20-12.5 MG tablet, UA Macro with Reflex to         Micro and Culture - lab collect          (E78.5) Hyperlipidemia with target LDL less than 70  Comment: Well controlled with medications without side effects.   Plan: atorvastatin (LIPITOR) 20 MG tablet          (E03.9) Acquired hypothyroidism  Comment: euthyroid on replacement   Plan: TSH WITH FREE T4 REFLEX, levothyroxine         (SYNTHROID/LEVOTHROID) 100 MCG tablet           (G47.00) Insomnia, unspecified type  Plan:  Discussed good sleep hygiene.  Call or return to clinic as needed if these symptoms worsen or fail to improve as anticipated.     (M85.80) Osteopenia, unspecified location  Plan: DEXA HIP/PELVIS/SPINE - Future                     See Patient Instructions    Return in about 1 month (around 12/30/2022) for Wellness visit.    Sejal Rutledge MD  [facility] WILBERTO Mendoza is a 72 year old, presenting for the following health issues:  Diabetes      History of Present Illness       Back Pain:  She presents for follow up of back pain. Patient's back pain is a recurring problem.  Location of back pain:  Right lower back, right middle of back, right shoulder and left buttock  Description of back pain: cramping and other  Back pain spreads: left buttocks, right foot, left foot, right shoulder and right side of neck    Since patient first noticed back pain, pain is: always present, but gets better and worse  Does back pain interfere with her job:  Not applicable      Diabetes:   She presents for follow up of diabetes.  She is checking home blood glucose a few times a month. She checks blood glucose before and after meals.  Blood glucose is never over 200 and sometimes under 70. She is aware of hypoglycemia symptoms including shakiness, dizziness and blurred vision. She is concerned about low blood sugar, several less than 70 in the past few weeks. She is having numbness in feet, burning in feet, excessive thirst and blurry vision.         Hypertension: She presents for follow up of hypertension.  She does not check blood pressure  regularly outside of the clinic. Outside blood pressures have been over 140/90. She follows a low salt diet.               Review of Systems   CONSTITUTIONAL: NEGATIVE for fever, chills, change in weight  INTEGUMENTARY/SKIN: NEGATIVE for worrisome rashes, moles or lesions  EYES: NEGATIVE for vision changes or  irritation  ENT/MOUTH: NEGATIVE for ear, mouth and throat problems  RESP: NEGATIVE for significant cough or SOB  CV: NEGATIVE for chest pain, palpitations or peripheral edema  GI: diarrhea  MUSCULOSKELETAL: back pain and radicular pain; right rotator cuff tear    NEURO: dysesthesias and weakness of left lower extremity; right shoulder weakness    ENDOCRINE: Hx diabetes  PSYCHIATRIC: HX depression and insomnia        Objective    /50 (BP Location: Left arm, Patient Position: Sitting, Cuff Size: Adult Regular)   Pulse 80   Temp 98.8  F (37.1  C) (Oral)   Wt 73 kg (161 lb)   SpO2 99%   BMI 27.64 kg/m    Body mass index is 27.64 kg/m .  Physical Exam   GENERAL: alert, no distress and over weight  MS: no deformity; normal gait; reduced ROM of right shoulder   PSYCH: mentation appears normal, affect normal/bright    Office Visit on 07/11/2022   Component Date Value Ref Range Status     Hemoglobin A1C 07/11/2022 6.5 (H)  0.0 - 5.6 % Final    Normal <5.7%   Prediabetes 5.7-6.4%    Diabetes 6.5% or higher     Note: Adopted from ADA consensus guidelines.     Results for orders placed or performed in visit on 11/30/22 (from the past 24 hour(s))   HEMOGLOBIN A1C   Result Value Ref Range    Hemoglobin A1C 6.8 (H) 0.0 - 5.6 %   UA Macro with Reflex to Micro and Culture - lab collect    Specimen: Urine, Midstream   Result Value Ref Range    Color Urine Yellow Colorless, Straw, Light Yellow, Yellow    Appearance Urine Clear Clear    Glucose Urine Negative Negative mg/dL    Bilirubin Urine Negative Negative    Ketones Urine Negative Negative mg/dL    Specific Gravity Urine >=1.030 1.003 - 1.035    Blood Urine Negative Negative    pH Urine 5.0 5.0 - 7.0    Protein Albumin Urine Negative Negative mg/dL    Urobilinogen Urine 0.2 0.2, 1.0 E.U./dL    Nitrite Urine Negative Negative    Leukocyte Esterase Urine Negative Negative    Narrative    Microscopic not indicated   CBC with Platelets and Reflex to Iron Studies     Narrative    The following orders were created for panel order CBC with Platelets and Reflex to Iron Studies.  Procedure                               Abnormality         Status                     ---------                               -----------         ------                     CBC with Platelets and R...[[id number]]  Abnormal            Final result               Extra Green Top (Lithium...[[id number]]                      In process                   Please view results for these tests on the individual orders.   CBC with Platelets and Reflex to Iron Studies   Result Value Ref Range    WBC Count 23.8 (H) 4.0 - 11.0 10e3/uL    RBC Count 3.88 3.80 - 5.20 10e6/uL    Hemoglobin 11.4 (L) 11.7 - 15.7 g/dL    Hematocrit 37.4 35.0 - 47.0 %    MCV 96 78 - 100 fL    MCH 29.4 26.5 - 33.0 pg    MCHC 30.5 (L) 31.5 - 36.5 g/dL    RDW 14.2 10.0 - 15.0 %    Platelet Count 301 150 - 450 10e3/uL

## 2022-11-30 NOTE — PATIENT INSTRUCTIONS
"Call the imaging center at [phone] or  [phone] to schedule your back MRI.     Please schedule a DEXA bone scan for osteoporosis testing at your convenience.  Call our appointment line at [phone] or go to [email or web address].    Discuss getting a Tetanus vaccine from your pharmacist, or schedule an ancillary shot visit here.  Some insurances do not cover the cost of these vaccines.       Tips for good sleep:    1.  Avoid caffeine, particularly in the afternoon and evening.  2.  Keep your sleep schedule consistent every day, allowing yourself at least 7 to 8 hours to sleep.  Do not nap more than 30 minutes during the day, and do not oversleep.  3.  Exercise is healthy, but avoid exercising right before bedtime.  4.  Keep your bedroom for sleep only -- no reading, eating, or watching TV in the bedroom.  5.  If you do lye in bed awake for more than 20 minutes, move to another room to read, do a crossword puzzle, or another relaxing activity.  Then, return to bed again.   (Don't reward yourself for being awake by cleaning or doing a job!)  This is so that you do not \"practice\" being awake in your bedroom.  6.  Move your bedroom clock out of view, so that you do not \"count down the hours.\"  7.  Tell yourself positive messages when you are having trouble sleeping, such as \"I always eventually fall asleep,\" or, \"even if I only get a little sleep tonight I'll do okay tomorrow.\"    Check out these websites for more information:      American Academy of Sleep Medicine  www.sleepeducation.com  National Sleep Foundation  www.sleepfoundation.org   www.sleepforkids.org  SleepNet  www.sleepnet.com  Better Sleep [place]  www.bettersleep.org  [employer]  www.talkaboutsleep.SomethingIndie    Sejal Rutledge MD   "

## 2022-12-01 LAB
CREAT UR-MCNC: 185 MG/DL
FERRITIN SERPL-MCNC: 14 NG/ML (ref 8–252)
MICROALBUMIN UR-MCNC: 12 MG/L
MICROALBUMIN/CREAT UR: 6.49 MG/G CR (ref 0–25)

## 2022-12-01 RX ORDER — LEVOTHYROXINE SODIUM 88 UG/1
88 TABLET ORAL DAILY
Qty: 90 TABLET | Refills: 3 | Status: SHIPPED | OUTPATIENT
Start: 2022-12-01 | End: 2023-12-20

## 2022-12-01 NOTE — RESULT ENCOUNTER NOTE
Please call patient:  Your blood counts are abnormal - I recommend follow-up with your oncologist about your leukemia within a few weeks. Your kidney tests are worse. I recommend a consult with a kidney specialist sometime in the next 6 months.     Your thyroid dose needs to be reduced. A new prescription has been sent to your pharmacy. Return for recheck of these labs in 6 weeks.  Call [phone] or go to [email or web address] for a lab-only appointment.     Your cholesterol and blood glucose are controlled.     Sejal Rutledge MD

## 2022-12-02 ENCOUNTER — TELEPHONE (OUTPATIENT)
Dept: FAMILY MEDICINE | Facility: CLINIC | Age: 72
End: 2022-12-02

## 2022-12-02 ENCOUNTER — TELEPHONE (OUTPATIENT)
Dept: NEPHROLOGY | Facility: CLINIC | Age: 72
End: 2022-12-02

## 2022-12-02 DIAGNOSIS — N18.4 CKD (CHRONIC KIDNEY DISEASE) STAGE 4, GFR 15-29 ML/MIN (H): Primary | ICD-10-CM

## 2022-12-02 NOTE — TELEPHONE ENCOUNTER
Patient returned call and was notified of lab result message per Dr. Rutledge. She verbalized understanding. Gave scheduling numbers for Nephrology and Oncology. Lab appt scheduled.    Keri Shankar RN   [facility]

## 2022-12-02 NOTE — TELEPHONE ENCOUNTER
Pharmacist from [facility] pharmacy calling to verify 88mcg of synthroid was accurate as patient was previously on 100mcg.     Per Lab result notes patient was to decrease dose.     Pharmacy verbalized understanding.     Junie Arriaaz RN  [facility]

## 2022-12-02 NOTE — TELEPHONE ENCOUNTER
[facility] Call Center    Phone Message    May a detailed message be left on voicemail: yes     Reason for Call: Order(s): Other:   Reason for requested: LABS  Date needed: 12/29/2022  Provider name: Rajinder      Action Taken: Other: Neph    Travel Screening: Not Applicable

## 2022-12-02 NOTE — TELEPHONE ENCOUNTER
"\"Please call patient:  Your blood counts are abnormal - I recommend follow-up with your oncologist about your leukemia within a few weeks. Your kidney tests are worse. I recommend a consult with a kidney specialist sometime in the next 6 months.      Your thyroid dose needs to be reduced. A new prescription has been sent to your pharmacy. Return for recheck of these labs in 6 weeks.  Call [phone] or go to [email or web address] for a lab-only appointment.      Your cholesterol and blood glucose are controlled.      Sejal Rutledge MD\"    Sejal Rutledge MD   12/1/2022  9:37 AM CST      Called patient. Left voice message to return call at [phone].    Keri Shankar RN   [facility]  "

## 2022-12-12 ENCOUNTER — LAB (OUTPATIENT)
Dept: LAB | Facility: CLINIC | Age: 72
End: 2022-12-12
Payer: COMMERCIAL

## 2022-12-12 DIAGNOSIS — E61.1 IRON DEFICIENCY: ICD-10-CM

## 2022-12-12 DIAGNOSIS — C91.10 CLL (CHRONIC LYMPHOCYTIC LEUKEMIA) (H): ICD-10-CM

## 2022-12-12 LAB
ALBUMIN SERPL BCG-MCNC: 4.3 G/DL (ref 3.5–5.2)
ALP SERPL-CCNC: 92 U/L (ref 35–104)
ALT SERPL W P-5'-P-CCNC: 16 U/L (ref 10–35)
ANION GAP SERPL CALCULATED.3IONS-SCNC: 8 MMOL/L (ref 7–15)
AST SERPL W P-5'-P-CCNC: 22 U/L (ref 10–35)
BASOPHILS # BLD AUTO: 0.1 10E3/UL (ref 0–0.2)
BASOPHILS NFR BLD AUTO: 0 %
BILIRUB SERPL-MCNC: 0.2 MG/DL
BUN SERPL-MCNC: 30.7 MG/DL (ref 8–23)
CALCIUM SERPL-MCNC: 9.4 MG/DL (ref 8.8–10.2)
CHLORIDE SERPL-SCNC: 107 MMOL/L (ref 98–107)
CREAT SERPL-MCNC: 1.32 MG/DL (ref 0.51–0.95)
DEPRECATED HCO3 PLAS-SCNC: 27 MMOL/L (ref 22–29)
ELLIPTOCYTES BLD QL SMEAR: SLIGHT
EOSINOPHIL # BLD AUTO: 0.2 10E3/UL (ref 0–0.7)
EOSINOPHIL NFR BLD AUTO: 1 %
ERYTHROCYTE [DISTWIDTH] IN BLOOD BY AUTOMATED COUNT: 13.7 % (ref 10–15)
FERRITIN SERPL-MCNC: 27 NG/ML (ref 11–328)
GFR SERPL CREATININE-BSD FRML MDRD: 42 ML/MIN/1.73M2
GLUCOSE SERPL-MCNC: 95 MG/DL (ref 70–99)
HCT VFR BLD AUTO: 36 % (ref 35–47)
HGB BLD-MCNC: 11.1 G/DL (ref 11.7–15.7)
IMM GRANULOCYTES # BLD: 0.1 10E3/UL
IMM GRANULOCYTES NFR BLD: 0 %
IRON BINDING CAPACITY (ROCHE): 315 UG/DL (ref 240–430)
IRON SATN MFR SERPL: 25 % (ref 15–46)
IRON SERPL-MCNC: 78 UG/DL (ref 37–145)
LDH SERPL L TO P-CCNC: 213 U/L (ref 0–250)
LYMPHOCYTES # BLD AUTO: 18 10E3/UL (ref 0.8–5.3)
LYMPHOCYTES NFR BLD AUTO: 74 %
MCH RBC QN AUTO: 29.1 PG (ref 26.5–33)
MCHC RBC AUTO-ENTMCNC: 30.8 G/DL (ref 31.5–36.5)
MCV RBC AUTO: 95 FL (ref 78–100)
MONOCYTES # BLD AUTO: 0.4 10E3/UL (ref 0–1.3)
MONOCYTES NFR BLD AUTO: 2 %
NEUTROPHILS # BLD AUTO: 5.7 10E3/UL (ref 1.6–8.3)
NEUTROPHILS NFR BLD AUTO: 23 %
NRBC # BLD AUTO: 0 10E3/UL
NRBC BLD AUTO-RTO: 0 /100
PLAT MORPH BLD: ABNORMAL
PLATELET # BLD AUTO: 272 10E3/UL (ref 150–450)
POTASSIUM SERPL-SCNC: 4.7 MMOL/L (ref 3.4–5.3)
PROT SERPL-MCNC: 6.7 G/DL (ref 6.4–8.3)
RBC # BLD AUTO: 3.81 10E6/UL (ref 3.8–5.2)
RBC MORPH BLD: ABNORMAL
SODIUM SERPL-SCNC: 142 MMOL/L (ref 136–145)
VARIANT LYMPHS BLD QL SMEAR: PRESENT
WBC # BLD AUTO: 24.4 10E3/UL (ref 4–11)

## 2022-12-12 PROCEDURE — 83550 IRON BINDING TEST: CPT

## 2022-12-12 PROCEDURE — 83615 LACTATE (LD) (LDH) ENZYME: CPT

## 2022-12-12 PROCEDURE — 80053 COMPREHEN METABOLIC PANEL: CPT

## 2022-12-12 PROCEDURE — 85004 AUTOMATED DIFF WBC COUNT: CPT

## 2022-12-12 PROCEDURE — 36415 COLL VENOUS BLD VENIPUNCTURE: CPT

## 2022-12-12 PROCEDURE — 82728 ASSAY OF FERRITIN: CPT

## 2022-12-26 ENCOUNTER — ONCOLOGY VISIT (OUTPATIENT)
Dept: ONCOLOGY | Facility: CLINIC | Age: 72
End: 2022-12-26
Attending: INTERNAL MEDICINE
Payer: COMMERCIAL

## 2022-12-26 VITALS
BODY MASS INDEX: 27.98 KG/M2 | SYSTOLIC BLOOD PRESSURE: 136 MMHG | WEIGHT: 163 LBS | HEART RATE: 82 BPM | OXYGEN SATURATION: 99 % | DIASTOLIC BLOOD PRESSURE: 51 MMHG | RESPIRATION RATE: 16 BRPM | TEMPERATURE: 98.8 F

## 2022-12-26 DIAGNOSIS — C91.10 CLL (CHRONIC LYMPHOCYTIC LEUKEMIA) (H): Primary | ICD-10-CM

## 2022-12-26 PROCEDURE — G0463 HOSPITAL OUTPT CLINIC VISIT: HCPCS | Performed by: INTERNAL MEDICINE

## 2022-12-26 PROCEDURE — 99214 OFFICE O/P EST MOD 30 MIN: CPT | Performed by: INTERNAL MEDICINE

## 2022-12-26 NOTE — PROGRESS NOTES
"Oncology Rooming Note    December 26, 2022 3:24 PM   Kelly Vegas is a 72 year old female who presents for:    Chief Complaint   Patient presents with     Oncology Clinic Visit     CLL (chronic lymphocytic leukemia) - Provider visit     Initial Vitals: /51 (BP Location: Right arm, Patient Position: Sitting, Cuff Size: Adult Regular)   Pulse 82   Temp 98.8  F (37.1  C) (Oral)   Resp 16   Wt 73.9 kg (163 lb)   SpO2 99%   BMI 27.98 kg/m   Estimated body mass index is 27.98 kg/m  as calculated from the following:    Height as of 11/2/22: 1.626 m (5' 4\").    Weight as of this encounter: 73.9 kg (163 lb). Body surface area is 1.83 meters squared.  Data Unavailable Comment: Data Unavailable   No LMP recorded. Patient has had a hysterectomy.  Allergies reviewed: Yes  Medications reviewed: Yes    Medications: Medication refills not needed today.  Pharmacy name entered into [employer]: [facility] [address] [facility] [place] - [id number]    Clinical concerns:  None      Etta Spain CMA            "

## 2022-12-26 NOTE — LETTER
"    12/26/2022         RE: Kelly Vegas  [address] [address]        Dear Colleague,    Thank you for referring your patient, Kelly Vegas, to the [facility] [place]. Please see a copy of my visit note below.    Oncology Rooming Note    December 26, 2022 3:24 PM   Kelly Vegas is a 72 year old female who presents for:    Chief Complaint   Patient presents with     Oncology Clinic Visit     CLL (chronic lymphocytic leukemia) - Provider visit     Initial Vitals: /51 (BP Location: Right arm, Patient Position: Sitting, Cuff Size: Adult Regular)   Pulse 82   Temp 98.8  F (37.1  C) (Oral)   Resp 16   Wt 73.9 kg (163 lb)   SpO2 99%   BMI 27.98 kg/m   Estimated body mass index is 27.98 kg/m  as calculated from the following:    Height as of 11/2/22: 1.626 m (5' 4\").    Weight as of this encounter: 73.9 kg (163 lb). Body surface area is 1.83 meters squared.  Data Unavailable Comment: Data Unavailable   No LMP recorded. Patient has had a hysterectomy.  Allergies reviewed: Yes  Medications reviewed: Yes    Medications: Medication refills not needed today.  Pharmacy name entered into [employer]: [facility] [address] [facility] [place] - [address] [facility] [address]    Clinical concerns:  None      Etta Spain, [facility] Progress Note    Patient: Kelly Vegas  MRN: [id number]  Date of Service: 12/26/2022        Reason for Visit    Chief Complaint   Patient presents with     Oncology Clinic Visit     CLL (chronic lymphocytic leukemia) - Provider visit         Problem List Items Addressed This Visit        Hematologic    CLL (chronic lymphocytic leukemia) (H) - Primary    Relevant Orders    CBC with Platelets & Differential    Comprehensive metabolic panel (BMP + Alb, Alk Phos, ALT, AST, Total. Bili, TP)    Lactate Dehydrogenase    Iron and iron binding capacity    Ferritin         Assessment and Plan  CLL  Boyer stage 0, 13 q. " Deletion  Doing well without any clinical or lab evidence of disease progression.  BC from last week reviewed.  Total white count was 24,000.  Absolute lymphocyte count was 18,000.  Hemoglobin mildly low at 11.1.  Normal platelet count.  LDH was normal at 213.  Serum chemistry was otherwise within normal limits.  Her anemia could be attributed to her chronic kidney disease.  Clinical exam was unremarkable today.  Plan is to continue surveillance with aortic labs and physical exam.  13 q. deletion in CLL portends a good prognosis.  No need for intervention at this point in time.    Iron deficiency anemia  Secondary to GI blood loss in the past.  Iron studies are within normal limits.  Ferritin was 27.  Slightly borderline lowish.  Transferrin saturation was 25%.  TIBC 315.  I recommended oral iron every other day.  She also has chronic kidney disease so to some extent anemia could be from that.    I will see her back in 6 months    Cancer Staging   No matching staging information was found for the patient.    ECOG Performance    1 - Can't do physically strenuous work, but fully ambyulatory and can do light sedentary work         Problem List    Patient Active Problem List   Diagnosis     Hyperlipidemia with target LDL less than 70     Hypertension goal BP (blood pressure) < 140/90     History of cervical cancer     Advanced directives, counseling/discussion     Major depressive disorder, recurrent, mild (H)     Acquired hypothyroidism     Type 2 diabetes mellitus with diabetic polyneuropathy, with long-term current use of insulin (H)     Degenerative joint disease of hand     DDD (degenerative disc disease), lumbar     Osteopenia     Memory problem     CLL (chronic lymphocytic leukemia) (H)     Dysesthesia     Ganglion cyst of joint of finger of right hand     Background diabetic retinopathy (H)     AVM (arteriovenous malformation) of small bowel, acquired     Iron deficiency     Sensorineural hearing loss, bilateral      Nontraumatic tear of right rotator cuff, unspecified tear extent     Stage 3a chronic kidney disease (H)        Oncology history  She presented in 10/2018 with new moderate leukocytosis/lymphocytosis and moderate normocytic normochromic anemia, found to have low ferritin and iron saturation, which was suggestive of iron-deficiency anemia.   Record review indicating she had touch of leukocytosis no diff back in 2006.  She was not anemic back in 2006 with hemoglobin of 12.5.  Hematology work up in 10/2018 was most consistent with CLL and DEBORA. DEBORA corrected with iron supplement by 12/2018.     Upper endo found none bleeding erosive gastropathy, with negative colonoscopy.   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   She was off oral iron since 5/2019.     She had new anemia in 9/2019 with hb of 10.7, nl MCV, which is 1 wk post cholecystectomy.     Hgb did not improve in 10/2019 when she was restarted on oral iron 10/2019. Stool occult was negative in 10/2019. Hb up to 12 end of Nov. GI work-up 1/2020 with [facility] identified multiple AVM in proximal small bowel and colon small bowel capsule studies.    She then had repeat upper endoscopy and colonoscopy with MINGI did not find any obvious signs of bleeding, had cauterization.     Interval History   Kelly Vegas is a 72 year old female with history of CLL and iron deficiency anemia secondary to bleeding from multiple GI AVMs who is here for follow-up.  This is my first time meeting her.  I have reviewed her chart including lab studies,.  Agree, cytogenetics and molecular testing in detail.    Doing well since last visit.  Denies any new issues today.  No fever chills or night sweats.  Denies any blood in stools.  No enlarging lymph nodes reported.  Denies any significant fatigue or weight loss.        Review of Systems  A comprehensive review of systems was negative except for what is noted in the interval history    Current Outpatient  Medications   Medication     atorvastatin (LIPITOR) 20 MG tablet     blood glucose (NO BRAND SPECIFIED) test strip     Cholecalciferol (VITAMIN D3) 1000 units CAPS     citalopram (CELEXA) 20 MG tablet     insulin glargine (LANTUS SOLOSTAR) 100 UNIT/ML pen     levothyroxine (SYNTHROID/LEVOTHROID) 88 MCG tablet     lisinopril-hydrochlorothiazide (ZESTORETIC) 20-12.5 MG tablet     metFORMIN (GLUCOPHAGE XR) 500 MG 24 hr tablet     multivitamin w/minerals (THERA-VIT-M) tablet     thin (NO BRAND SPECIFIED) lancets     ULTICARE MINI 31G X 6 MM insulin pen needle     empagliflozin (JARDIANCE) 10 MG TABS tablet     insulin lispro (HUMALOG KWIKPEN) 100 UNIT/ML (1 unit dial) KWIKPEN     Current Facility-Administered Medications   Medication     bupivacaine (MARCAINE) 0.25 % injection 4 mL     triamcinolone (KENALOG-40) injection 80 mg        Physical Exam    No flowsheet data found.    General: alert and cooperative  HEENT: Head: Normal, normocephalic, atraumatic.  Eye: Normal external eye, conjunctiva, lids cornea, LURDES.  Chest: Clear to auscultation bilaterally  Cardiac: S1, S2 normal, regular rate and rhythm  Abdomen: Soft, nontender, no organomegaly  Extremities: atraumatic, no peripheral edema  CNS: Alert and oriented x3, neurologic exam grossly normal.  Lymphatics: No clinically significant peripheral adenopathy noted  Lab Results    Recent Results (from the past 168 hour(s))   TSH with free T4 reflex   Result Value Ref Range    TSH 0.58 0.40 - 4.00 mU/L   Pap Screen with HPV - recommended age 30 - 65 years   Result Value Ref Range    Interpretation        Negative for Intraepithelial Lesion or Malignancy (NILM)    Comment         Papanicolaou Test Limitations:  Cervical cytology is a screening test with limited sensitivity, and regular screening is critical for cancer prevention.  Pap tests are primarily effective for the diagnosis/prevention of squamous cell carcinoma, not adenocarcinoma or other cancers.         Specimen Adequacy       Satisfactory for evaluation, endocerv/transformation zone component absent, atrophy    Clinical Information       post-menopausal      Reflex Testing Yes regardless of result     Previous Abnormal?       No      Performing Labs       The technical component of this testing was completed at [facility]         Imaging    XR Foot Left G/E 3 Views    Result Date: 1/16/2023  XR FOOT LEFT G/E 3 VIEWS 1/16/2023 3:05 PM HISTORY: Left foot pain COMPARISON: None.     IMPRESSION: Accessory os navicularis. Degenerative change first MTP joint. Plantar calcaneal spurring. No evidence for fracture. YUSUF CATALAN MD   SYSTEM ID:  [id number]        Signed by: Asia Guzman MD        Again, thank you for allowing me to participate in the care of your patient.        Sincerely,        Asia Guzman MD

## 2022-12-29 ENCOUNTER — LAB (OUTPATIENT)
Dept: LAB | Facility: CLINIC | Age: 72
End: 2022-12-29
Payer: COMMERCIAL

## 2022-12-29 DIAGNOSIS — N18.4 CKD (CHRONIC KIDNEY DISEASE) STAGE 4, GFR 15-29 ML/MIN (H): ICD-10-CM

## 2022-12-29 LAB
ALBUMIN MFR UR ELPH: 10.7 MG/DL
ALBUMIN SERPL-MCNC: 3.6 G/DL (ref 3.4–5)
ALBUMIN UR-MCNC: NEGATIVE MG/DL
ANION GAP SERPL CALCULATED.3IONS-SCNC: 5 MMOL/L (ref 3–14)
APPEARANCE UR: CLEAR
BILIRUB UR QL STRIP: NEGATIVE
BUN SERPL-MCNC: 27 MG/DL (ref 7–30)
CALCIUM SERPL-MCNC: 9.2 MG/DL (ref 8.5–10.1)
CHLORIDE BLD-SCNC: 109 MMOL/L (ref 94–109)
CO2 SERPL-SCNC: 27 MMOL/L (ref 20–32)
COLOR UR AUTO: YELLOW
CREAT SERPL-MCNC: 1.12 MG/DL (ref 0.52–1.04)
CREAT UR-MCNC: 130 MG/DL
ERYTHROCYTE [DISTWIDTH] IN BLOOD BY AUTOMATED COUNT: 14.2 % (ref 10–15)
GFR SERPL CREATININE-BSD FRML MDRD: 52 ML/MIN/1.73M2
GLUCOSE BLD-MCNC: 252 MG/DL (ref 70–99)
GLUCOSE UR STRIP-MCNC: 250 MG/DL
HCT VFR BLD AUTO: 34.7 % (ref 35–47)
HGB BLD-MCNC: 10.9 G/DL (ref 11.7–15.7)
HGB UR QL STRIP: NEGATIVE
KETONES UR STRIP-MCNC: NEGATIVE MG/DL
LEUKOCYTE ESTERASE UR QL STRIP: NEGATIVE
MCH RBC QN AUTO: 29.9 PG (ref 26.5–33)
MCHC RBC AUTO-ENTMCNC: 31.4 G/DL (ref 31.5–36.5)
MCV RBC AUTO: 95 FL (ref 78–100)
NITRATE UR QL: NEGATIVE
PH UR STRIP: 5.5 [PH] (ref 5–7)
PHOSPHATE SERPL-MCNC: 2.5 MG/DL (ref 2.5–4.5)
PLATELET # BLD AUTO: 327 10E3/UL (ref 150–450)
POTASSIUM BLD-SCNC: 4.6 MMOL/L (ref 3.4–5.3)
PROT/CREAT 24H UR: 0.08 MG/MG CR (ref 0–0.2)
RBC # BLD AUTO: 3.64 10E6/UL (ref 3.8–5.2)
SODIUM SERPL-SCNC: 141 MMOL/L (ref 133–144)
SP GR UR STRIP: 1.02 (ref 1–1.03)
UROBILINOGEN UR STRIP-ACNC: 0.2 E.U./DL
WBC # BLD AUTO: 21.3 10E3/UL (ref 4–11)

## 2022-12-29 PROCEDURE — 82570 ASSAY OF URINE CREATININE: CPT

## 2022-12-29 PROCEDURE — 82306 VITAMIN D 25 HYDROXY: CPT

## 2022-12-29 PROCEDURE — 80069 RENAL FUNCTION PANEL: CPT

## 2022-12-29 PROCEDURE — 82043 UR ALBUMIN QUANTITATIVE: CPT

## 2022-12-29 PROCEDURE — 83970 ASSAY OF PARATHORMONE: CPT

## 2022-12-29 PROCEDURE — 36415 COLL VENOUS BLD VENIPUNCTURE: CPT

## 2022-12-29 PROCEDURE — 81001 URINALYSIS AUTO W/SCOPE: CPT

## 2022-12-29 PROCEDURE — 85027 COMPLETE CBC AUTOMATED: CPT

## 2022-12-29 PROCEDURE — 84156 ASSAY OF PROTEIN URINE: CPT

## 2022-12-30 LAB
CREAT UR-MCNC: 137 MG/DL
DEPRECATED CALCIDIOL+CALCIFEROL SERPL-MC: 39 UG/L (ref 20–75)
MICROALBUMIN UR-MCNC: 9 MG/L
MICROALBUMIN/CREAT UR: 6.57 MG/G CR (ref 0–25)
PTH-INTACT SERPL-MCNC: 73 PG/ML (ref 15–65)
RBC #/AREA URNS AUTO: ABNORMAL /HPF
SQUAMOUS #/AREA URNS AUTO: ABNORMAL /LPF
WBC #/AREA URNS AUTO: ABNORMAL /HPF

## 2023-01-02 ENCOUNTER — TELEPHONE (OUTPATIENT)
Dept: NEPHROLOGY | Facility: CLINIC | Age: 73
End: 2023-01-02

## 2023-01-02 NOTE — PROGRESS NOTES
Nephrology Clinic Visit  Kelly Vegas MRN: [id number] YOB: 1950  Primary Care Provider: Sejal Rutledge  History Obtained From: Patient  External Document Review: Primary Care Provider  -------------------------------------------------------------------------------------------------------------------------------  Visit 1/10/2023:  -Here to establish care with [facility] salome [facility] 1/3/2023  -BP Control: Not really checking her blood pressure.    -DM Control: A1c 6.8 11/30/22. She has had periods of time of poor control (such as a 11.2 4/11/2018). She was first diagnosed with DM around 2010 or so.  -Nocturia/Frequency: Getting up maybe 4x per night. This has been the case for about a year now. She is taking her hydrochlorothiazide in the morning. She drinks up until she goes to bed.   -NSAID use: No  -Herbal/OTC meds: No  -Family history of kidney disease: No  -Hx of UTI/SSTI (not currently on SGLT2i): No issues with UTI/SSTI. Will discuss SGLT2i Dr Rutledge.  -Illness/changes in health/etc around 11/30/2022 when creatinine peaked: She doesn't remember any acute illness or issues around that time. She was not in the hospital. She was not in the ED. Her BP was fine from her report. She had a home visit around this time with no issues. Her Metformin was reduced at this time because she was having some severe diarrhea around that time. Her diarrhea is improving with reductions in dosing of metformin.   -Hx of CLL: Diagnosed about 4 years ago or so.  -Anemia: On PO iron every other day.    -Not really eating out for dinner at all.     Objective:  PAST MEDICAL HISTORY:  Past Medical History:   Diagnosis Date     Arthritis check it     AVM (arteriovenous malformation) of small bowel, acquired      Background diabetic retinopathy (H)      Cervical cancer (H) 03/2006    [facility] with gland involvement     CKD (chronic kidney disease) stage 4, GFR 15-29 ml/min (H)      CLL (chronic lymphocytic  leukemia) (H)      Colon adenoma      DDD (degenerative disc disease), lumbar      Degenerative joint disease of hand      Depressive disorder      Diabetes mellitus type II 2009     Diabetic polyneuropathy associated with type 2 diabetes mellitus (H) 09/26/2017     Gastritis      HTN (hypertension)      Hyperlipidemia LDL goal < 100      Hypothyroid 12/2004     Osteopenia      Recurrent major depression (H)      Sensorineural hearing loss, bilateral 12/14/2021       PAST SURGICAL HISTORY:  Past Surgical History:   Procedure Laterality Date     BIOPSY       COLONOSCOPY  nov 2016     COLONOSCOPY N/A 3/1/2019    Procedure: COLONOSCOPY;  Surgeon: Romulo Hayes MD;  Location: [facility]     COLONOSCOPY WITH CO2 INSUFFLATION N/A 11/28/2016    Procedure: COLONOSCOPY WITH CO2 INSUFFLATION;  Surgeon: Migue Giraldo MD;  Location: [facility]     CYSTECTOMY OVARIAN BENIGN  1974     ESOPHAGOSCOPY, GASTROSCOPY, DUODENOSCOPY (EGD), COMBINED N/A 3/1/2019    Procedure: COMBINED ESOPHAGOSCOPY, GASTROSCOPY, DUODENOSCOPY (EGD), BIOPSY SINGLE OR MULTIPLE;  Surgeon: Romulo Hayes MD;  Location: [facility]     EYE SURGERY  12-14-16     HC THYROIDECTOMY  12/2004    goiter     LAPAROSCOPIC CHOLECYSTECTOMY N/A 9/9/2019    Procedure: CHOLECYSTECTOMY, LAPAROSCOPIC;  Surgeon: Jorge Martinez DO;  Location: [facility] BSO, OMENTECTOMY W/JUAN DANIEL  2/2006    cervical cancer       MEDICATIONS:  Current Outpatient Medications   Medication Instructions     atorvastatin (LIPITOR) 20 mg, Oral, DAILY     blood glucose (NO BRAND SPECIFIED) test strip Use to test blood sugar 3 times daily or as directed. To accompany: Blood Glucose Monitor Brands: per insurance.     Cholecalciferol (VITAMIN D3) 1000 units CAPS Oral, DAILY     citalopram (CELEXA) 20 mg, Oral, DAILY     insulin glargine (LANTUS SOLOSTAR) 100 UNIT/ML pen INJECT 20 UNITS AT BEDTIME DAILY     insulin lispro (HUMALOG KWIKPEN) 8 Units, Subcutaneous, 2 TIMES DAILY BEFORE MEALS      levothyroxine (SYNTHROID/LEVOTHROID) 88 mcg, Oral, DAILY     lisinopril-hydrochlorothiazide (ZESTORETIC) 20-12.5 MG tablet 2 tablets, Oral, DAILY     metFORMIN (GLUCOPHAGE XR) 500 mg, Oral, DAILY WITH SUPPER     multivitamin w/minerals (THERA-VIT-M) tablet 1 tablet, Oral, DAILY     thin (NO BRAND SPECIFIED) lancets Use to test blood sugar 3 times daily or as directed. To accompany: Blood Glucose Monitor Brands: per insurance.     ULTICARE MINI 31G X 6 MM insulin pen needle Use three times daily or as directed.       FAMILY MEDICAL HISTORY:   Family History   Problem Relation Age of Onset     Thyroid Disease Mother      Dementia Mother      Osteoporosis Mother      Alcohol/Drug Father      C.A.D. Father         CABG      Diabetes Father      Hypertension Father      Hyperlipidemia Father      Cerebrovascular Disease Father      Alcohol/Drug Brother      Prostate Cancer Brother      Depression Brother      Depression Brother         d. suicide     Cancer Maternal Grandmother         stomach, colon     Breast Cancer Maternal Grandmother      Thyroid Disease Maternal Grandmother      Alzheimer Disease Maternal Grandfather      Breast Cancer Paternal Grandmother      Alcohol/Drug Son      Rheumatoid Arthritis Son      Breast Cancer Cousin      Breast Cancer Other        PHYSICAL EXAM:   There were no vitals taken for this visit.  GENERAL APPEARANCE: alert and no distress  EYES: nonicteric  HENT: mouth without ulcers or lesions  RESP: lungs clear to auscultation   CV: regular rhythm, normal rate, no rub  ABDOMEN: soft, nontender, normal bowel sounds, no HSM   Extremities: 1+ LLE edema  MS: no evidence of inflammation in joints, no muscle tenderness  SKIN: no rash  NEURO: mentation intact and speech normal  PSYCH: affect normal    LABS REVIEWED BY ME:   ANEMIA  Recent Labs   Lab Test 12/29/22  1427 12/12/22  1542 11/30/22  1204 11/04/21  1411 04/08/21  1423 10/30/18  1123 10/09/18  1416   HGB 10.9* 11.1* 11.4* 12.2 11.3*    < > 10.8*   IRONSAT  --  25 26 26  --   --  10*   BARBIE  --  27 14 10 10   < > 7*    < > = values in this interval not displayed.       BMP  Recent Labs   Lab Test 12/29/22  1427 12/12/22  1542 11/30/22  1204 12/14/21  1717 11/04/21  1411 11/04/19  1512 11/01/18  1310 10/23/18  0858 04/11/18  0720    142 141 139 138   < > 135  --  136   POTASSIUM 4.6 4.7 5.0 4.3 4.1   < > 3.6  --  4.4   CHLORIDE 109 107 111* 106 106   < > 101  --  103   CO2 27 27 26 29 29   < > 27  --  26   ANIONGAP 5 8 4 4 3   < > 7  --  7   BUN 27 30.7* 31* 19 18   < > 22  --  19   CR 1.12* 1.32* 1.42* 1.03 1.15*   < > 0.97  --  0.79   GFRESTIMATED 52* 42* 39* 54* 48*   < > 57*   < > 72   PROTTOTAL  --  6.7  --   --  6.8  --  7.1  --  6.7*    < > = values in this interval not displayed.       CBC  Recent Labs   Lab Test 12/29/22  1427 12/12/22  1542 11/30/22  1204 11/04/21  1411   HGB 10.9* 11.1* 11.4* 12.2   WBC 21.3* 24.4* 23.8* 16.8*   HCT 34.7* 36.0 37.4 37.9   MCV 95 95 96 92    272 301 277       DIABETES  Recent Labs   Lab Test 11/30/22  1204 07/11/22  1213 12/14/21  1717 08/24/21  1254   A1C 6.8* 6.5* 6.8* 7.2*       HYPONATREMIANo lab results found.    Invalid input(s): UOSM, OSM    MBD  Recent Labs   Lab Test 12/29/22  1427 12/12/22  1542 11/30/22  1204 12/14/21  1717 11/04/21  1411 11/04/19  1512 11/01/18  1310   KARTHIK 9.2 9.4 9.4 9.2 9.1   < > 8.9   ALBUMIN 3.6 4.3  --   --  3.8  --  3.8   PHOS 2.5  --   --   --   --   --   --    PTHI 73*  --   --   --   --   --   --     < > = values in this interval not displayed.        URINE STUDIES  Recent Labs   Lab Test 12/29/22  1427 11/30/22  1204   COLOR Yellow Yellow   APPEARANCE Clear Clear   URINEGLC 250* Negative   URINEBILI Negative Negative   URINEKETONE Negative Negative   SG 1.025 >=1.030   UBLD Negative Negative   URINEPH 5.5 5.0   PROTEIN Negative Negative   UROBILINOGEN 0.2 0.2   NITRITE Negative Negative   LEUKEST Negative Negative   RBCU 0-2  --    WBCU 0-5  --      No lab  results found.    ADDITIONAL LABS ORDERED/REVIEWED BY ME:  None    Assessment/Plan  CKD Stage G3aA1  Established care with [facility] 1/3/2023    CKD onset appears to be around 4/2021 with creatinine of 1.09. Stable until 11/30/2022 when creatinine was 1.42. This BILLY appears to have recovered back to her baseline on 12/29/2022 with creatinine of 1.12. UPCR 12/29/2022 0.08g/g. No hematuria or pyuria 12/29/2022.     CKD Etiology (Biopsy Proven: No):  -Hypertensive Nephrosclerosis  -DKD    Plan:  -Optimize BP control: Agree with Lisinopril/HCTZ. She will send me home readings in 2 weeks.  -Optimize DM regimen: Agree with Metformin (watch eGFR and hold if < 30). Would benefit from a SGLT2i for DM control (renal indications would be eGFR < 45 or UACR > 200mg/g). Will discuss with her primary DM provider and if in agreement will trial Jardiance 10mg qDay (we also discussed potential cost of this medication being an issue). Addendum: Spoke with Dr Rutledge. We are in agreement. Sent prescription for Jardiance 10mg and messaged patient.  -Avoid NSAIDs/Nephrotoxic agents      Anemia of Renal Disease  Hemoglobin: 10.9  Ferritin: Pending  TSAT: Pending    Plan:  -Follow up TSAT and Ferritin. May need trial of PO iron      Lipid Management in CKD  KDIGO 2013 Guidelines recommend the following for patients with CKD:  Adults >/= 51 yo w/ CKD stage 1 or 2: Statin  Adults >/= 51 yo w/ CKD stage 3-5: Statin + Ezetimibe or Statin alone  Adults 18-49 + 1 of the following (CAD, DM, Ischemic stroke, 10 yr ASCVD risk >10%): Statin    KDIGO guidelines recommend Simvastatin 20mg/Ezetimibe 10mg qDay for patients with CKD G3a-G5 (in line with the SHARP trial). If Simvastatin used alone, 40mg qDay is referenced.   Other options include: Atorvastatin 20mg qDay (4D trial) and Rosuvastatin 10mg qDay (DEISY trial)    Plan:  -Agree with Atorvastatin      Metabolic Acidosis of Renal Disease  Bicarb: 27    Plan:  -No need for NaHCO      Mineral  Bone Disease  PTH: 72 12/29/2022  Phos: 2.5 12/29/2022  Calcium: 9.2 12/29/2022  Vitamin D: 39 12/29/2022    Plan:  -No need for phos binder    Return to clinic: 3 months    Migue Calvillo MD   of Medicine  Division of Nephrology and Hypertension  [facility]    50 minutes spent on the date of the encounter doing chart review, history and exam, documentation and further activities as noted above

## 2023-01-02 NOTE — TELEPHONE ENCOUNTER
Patient contacted regarding a change in appointment type scheduled on 1/3/23 or to reschedule due to expected extreme weather conditions. Patient chose to reschedule to 1/10/23 with Dr Victoriano Johnson RN

## 2023-01-10 ENCOUNTER — OFFICE VISIT (OUTPATIENT)
Dept: NEPHROLOGY | Facility: CLINIC | Age: 73
End: 2023-01-10
Attending: FAMILY MEDICINE
Payer: COMMERCIAL

## 2023-01-10 DIAGNOSIS — N18.32 STAGE 3B CHRONIC KIDNEY DISEASE (H): ICD-10-CM

## 2023-01-10 PROCEDURE — 99204 OFFICE O/P NEW MOD 45 MIN: CPT | Performed by: STUDENT IN AN ORGANIZED HEALTH CARE EDUCATION/TRAINING PROGRAM

## 2023-01-10 NOTE — PATIENT INSTRUCTIONS
"It was a pleasure to see you in nephrology clinic today. I've included a brief summary of our discussion and care plan from today's visit below.  _______________________________________________________________________    My recommendations are summarized as follows:  -Keep a Blood Pressure log. Please make sure that you are using a validated blood pressure device (check \"www.validatebp.org\").  -Avoid all NSAID's. Examples include Ibuprofen (Advil, Motrin), naprosyn (Aleve), celebrex among others. Acetaminophen (Tylenol) is ok with maximum dose in 24 hours of 3000mg.  -Healthy lifestyle measures will keep your kidney's functioning at their current best. This includes regular exercise, maintaining a healthy body weight and smoking cessation.   -Try to cut down on fluid intake 2 hours before bed and keep track of how often you are getting up in the middle of the night to urinate.   -Please try to check your blood pressure 1x per day for the next 2 weeks. Send your readings to Dr Calvillo via a I-MDt.   -Consider trying a pair of compression stockings to see if it helps with your lower extremity swelling.     Please return to Nephrology Clinic in 3 months to review your progress.     Who do I call with any questions after my visit?  There are multiple ways to contact your nephrology care team:    -To schedule or reschedule an appointment, please call [phone].  -Reach out via [employer]. These messages are answered by your nurse or doctor during business hours and typically in 1-2 days. [employer] messages are best for quick questions/clarifications/updates. Frequently, your doctor or nurse will recommend setting up a follow up appointment to address any significant questions/concerns.  -For urgent questions after business hours, you may reach the on-call Nephrology Fellow by contacting the [facility]  at [phone].    To schedule imaging:   -Please call [phone]     To schedule your lab " appointment at [facility]  -Please call [phone]    Sincerely,    Dr. Migue Calvillo   of Medicine  Division of Nephrology and Hypertension  [facility]

## 2023-01-10 NOTE — LETTER
1/10/2023       RE: Kelly Vegas  [address] [facility] [address]     Dear Colleague,    Thank you for referring your patient, Kelly Vegas, to the [facility] at [facility]. Please see a copy of my visit note below.        Nephrology Clinic Visit  Kelly Vegas MRN: [id number] YOB: 1950  Primary Care Provider: Sejal Rutledge  History Obtained From: Patient  External Document Review: Primary Care Provider  -------------------------------------------------------------------------------------------------------------------------------  Visit 1/10/2023:  -Here to establish care with [facility] 1/3/2023  -BP Control: Not really checking her blood pressure.    -DM Control: A1c 6.8 11/30/22. She has had periods of time of poor control (such as a 11.2 4/11/2018). She was first diagnosed with DM around 2010 or so.  -Nocturia/Frequency: Getting up maybe 4x per night. This has been the case for about a year now. She is taking her hydrochlorothiazide in the morning. She drinks up until she goes to bed.   -NSAID use: No  -Herbal/OTC meds: No  -Family history of kidney disease: No  -Hx of UTI/SSTI (not currently on SGLT2i): No issues with UTI/SSTI. Will discuss SGLT2i Dr Rutledge.  -Illness/changes in health/etc around 11/30/2022 when creatinine peaked: She doesn't remember any acute illness or issues around that time. She was not in the hospital. She was not in the ED. Her BP was fine from her report. She had a home visit around this time with no issues. Her Metformin was reduced at this time because she was having some severe diarrhea around that time. Her diarrhea is improving with reductions in dosing of metformin.   -Hx of CLL: Diagnosed about 4 years ago or so.  -Anemia: On PO iron every other day.    -Not really eating out for dinner at all.     Objective:  PAST MEDICAL HISTORY:  Past Medical History:   Diagnosis Date      Arthritis check it     AVM (arteriovenous malformation) of small bowel, acquired      Background diabetic retinopathy (H)      Cervical cancer (H) 03/2006    [facility], [facility] with gland involvement     CKD (chronic kidney disease) stage 4, GFR 15-29 ml/min (H)      CLL (chronic lymphocytic leukemia) (H)      Colon adenoma      DDD (degenerative disc disease), lumbar      Degenerative joint disease of hand      Depressive disorder      Diabetes mellitus type II 2009     Diabetic polyneuropathy associated with type 2 diabetes mellitus (H) 09/26/2017     Gastritis      HTN (hypertension)      Hyperlipidemia LDL goal < 100      Hypothyroid 12/2004     Osteopenia      Recurrent major depression (H)      Sensorineural hearing loss, bilateral 12/14/2021       PAST SURGICAL HISTORY:  Past Surgical History:   Procedure Laterality Date     BIOPSY       COLONOSCOPY  nov 2016     COLONOSCOPY N/A 3/1/2019    Procedure: COLONOSCOPY;  Surgeon: Romulo Hayes MD;  Location: [facility]     COLONOSCOPY WITH CO2 INSUFFLATION N/A 11/28/2016    Procedure: COLONOSCOPY WITH CO2 INSUFFLATION;  Surgeon: Migue Giraldo MD;  Location: [facility]     CYSTECTOMY OVARIAN BENIGN  1974     ESOPHAGOSCOPY, GASTROSCOPY, DUODENOSCOPY (EGD), COMBINED N/A 3/1/2019    Procedure: COMBINED ESOPHAGOSCOPY, GASTROSCOPY, DUODENOSCOPY (EGD), BIOPSY SINGLE OR MULTIPLE;  Surgeon: Romulo Hayes MD;  Location: [facility]     EYE SURGERY  12-14-16     HC THYROIDECTOMY  12/2004    goiter     LAPAROSCOPIC CHOLECYSTECTOMY N/A 9/9/2019    Procedure: CHOLECYSTECTOMY, LAPAROSCOPIC;  Surgeon: Jorge Martinez DO;  Location: [facility], OMENTECTOMY W/JUAN DANIEL  2/2006    cervical cancer       MEDICATIONS:  Current Outpatient Medications   Medication Instructions     atorvastatin (LIPITOR) 20 mg, Oral, DAILY     blood glucose (NO BRAND SPECIFIED) test strip Use to test blood sugar 3 times daily or as directed. To accompany: Blood Glucose Monitor Brands: per insurance.      Cholecalciferol (VITAMIN D3) 1000 units CAPS Oral, DAILY     citalopram (CELEXA) 20 mg, Oral, DAILY     insulin glargine (LANTUS SOLOSTAR) 100 UNIT/ML pen INJECT 20 UNITS AT BEDTIME DAILY     insulin lispro (HUMALOG KWIKPEN) 8 Units, Subcutaneous, 2 TIMES DAILY BEFORE MEALS     levothyroxine (SYNTHROID/LEVOTHROID) 88 mcg, Oral, DAILY     lisinopril-hydrochlorothiazide (ZESTORETIC) 20-12.5 MG tablet 2 tablets, Oral, DAILY     metFORMIN (GLUCOPHAGE XR) 500 mg, Oral, DAILY WITH SUPPER     multivitamin w/minerals (THERA-VIT-M) tablet 1 tablet, Oral, DAILY     thin (NO BRAND SPECIFIED) lancets Use to test blood sugar 3 times daily or as directed. To accompany: Blood Glucose Monitor Brands: per insurance.     ULTICARE MINI 31G X 6 MM insulin pen needle Use three times daily or as directed.       FAMILY MEDICAL HISTORY:   Family History   Problem Relation Age of Onset     Thyroid Disease Mother      Dementia Mother      Osteoporosis Mother      Alcohol/Drug Father      C.A.D. Father         CABG      Diabetes Father      Hypertension Father      Hyperlipidemia Father      Cerebrovascular Disease Father      Alcohol/Drug Brother      Prostate Cancer Brother      Depression Brother      Depression Brother         d. suicide     Cancer Maternal Grandmother         stomach, colon     Breast Cancer Maternal Grandmother      Thyroid Disease Maternal Grandmother      Alzheimer Disease Maternal Grandfather      Breast Cancer Paternal Grandmother      Alcohol/Drug Son      Rheumatoid Arthritis Son      Breast Cancer Cousin      Breast Cancer Other        PHYSICAL EXAM:   There were no vitals taken for this visit.  GENERAL APPEARANCE: alert and no distress  EYES: nonicteric  HENT: mouth without ulcers or lesions  RESP: lungs clear to auscultation   CV: regular rhythm, normal rate, no rub  ABDOMEN: soft, nontender, normal bowel sounds, no HSM   Extremities: 1+ LLE edema  MS: no evidence of inflammation in joints, no muscle  tenderness  SKIN: no rash  NEURO: mentation intact and speech normal  PSYCH: affect normal    LABS REVIEWED BY ME:   ANEMIA  Recent Labs   Lab Test 12/29/22  1427 12/12/22  1542 11/30/22  1204 11/04/21  1411 04/08/21  1423 10/30/18  1123 10/09/18  1416   HGB 10.9* 11.1* 11.4* 12.2 11.3*   < > 10.8*   IRONSAT  --  25 26 26  --   --  10*   BARBIE  --  27 14 10 10   < > 7*    < > = values in this interval not displayed.       BMP  Recent Labs   Lab Test 12/29/22  1427 12/12/22  1542 11/30/22  1204 12/14/21  1717 11/04/21  1411 11/04/19  1512 11/01/18  1310 10/23/18  0858 04/11/18  0720    142 141 139 138   < > 135  --  136   POTASSIUM 4.6 4.7 5.0 4.3 4.1   < > 3.6  --  4.4   CHLORIDE 109 107 111* 106 106   < > 101  --  103   CO2 27 27 26 29 29   < > 27  --  26   ANIONGAP 5 8 4 4 3   < > 7  --  7   BUN 27 30.7* 31* 19 18   < > 22  --  19   CR 1.12* 1.32* 1.42* 1.03 1.15*   < > 0.97  --  0.79   GFRESTIMATED 52* 42* 39* 54* 48*   < > 57*   < > 72   PROTTOTAL  --  6.7  --   --  6.8  --  7.1  --  6.7*    < > = values in this interval not displayed.       CBC  Recent Labs   Lab Test 12/29/22  1427 12/12/22  1542 11/30/22  1204 11/04/21  1411   HGB 10.9* 11.1* 11.4* 12.2   WBC 21.3* 24.4* 23.8* 16.8*   HCT 34.7* 36.0 37.4 37.9   MCV 95 95 96 92    272 301 277       DIABETES  Recent Labs   Lab Test 11/30/22  1204 07/11/22  1213 12/14/21  1717 08/24/21  1254   A1C 6.8* 6.5* 6.8* 7.2*       HYPONATREMIANo lab results found.    Invalid input(s): UOSM, OSM    MBD  Recent Labs   Lab Test 12/29/22  1427 12/12/22  1542 11/30/22  1204 12/14/21  1717 11/04/21  1411 11/04/19  1512 11/01/18  1310   KARTHIK 9.2 9.4 9.4 9.2 9.1   < > 8.9   ALBUMIN 3.6 4.3  --   --  3.8  --  3.8   PHOS 2.5  --   --   --   --   --   --    PTHI 73*  --   --   --   --   --   --     < > = values in this interval not displayed.        URINE STUDIES  Recent Labs   Lab Test 12/29/22  1427 11/30/22  1204   COLOR Yellow Yellow   APPEARANCE Clear Clear    URINEGLC 250* Negative   URINEBILI Negative Negative   URINEKETONE Negative Negative   SG 1.025 >=1.030   UBLD Negative Negative   URINEPH 5.5 5.0   PROTEIN Negative Negative   UROBILINOGEN 0.2 0.2   NITRITE Negative Negative   LEUKEST Negative Negative   RBCU 0-2  --    WBCU 0-5  --      No lab results found.    ADDITIONAL LABS ORDERED/REVIEWED BY ME:  None    Assessment/Plan  CKD Stage G3aA1  Established care with [facility] 1/3/2023    CKD onset appears to be around 4/2021 with creatinine of 1.09. Stable until 11/30/2022 when creatinine was 1.42. This BILLY appears to have recovered back to her baseline on 12/29/2022 with creatinine of 1.12. UPCR 12/29/2022 0.08g/g. No hematuria or pyuria 12/29/2022.     CKD Etiology (Biopsy Proven: No):  -Hypertensive Nephrosclerosis  -DKD    Plan:  -Optimize BP control: Agree with Lisinopril/HCTZ. She will send me home readings in 2 weeks.  -Optimize DM regimen: Agree with Metformin (watch eGFR and hold if < 30). Would benefit from a SGLT2i for DM control (renal indications would be eGFR < 45 or UACR > 200mg/g). Will discuss with her primary DM provider and if in agreement will trial Jardiance 10mg qDay (we also discussed potential cost of this medication being an issue). Addendum: Spoke with Dr Rutledge. We are in agreement. Sent prescription for Jardiance 10mg and messaged patient.  -Avoid NSAIDs/Nephrotoxic agents      Anemia of Renal Disease  Hemoglobin: 10.9  Ferritin: Pending  TSAT: Pending    Plan:  -Follow up TSAT and Ferritin. May need trial of PO iron      Lipid Management in CKD  KDIGO 2013 Guidelines recommend the following for patients with CKD:  Adults >/= 49 yo w/ CKD stage 1 or 2: Statin  Adults >/= 49 yo w/ CKD stage 3-5: Statin + Ezetimibe or Statin alone  Adults 18-49 + 1 of the following (CAD, DM, Ischemic stroke, 10 yr ASCVD risk >10%): Statin    KDIGO guidelines recommend Simvastatin 20mg/Ezetimibe 10mg qDay for patients with CKD G3a-G5 (in line with  the SHARP trial). If Simvastatin used alone, 40mg qDay is referenced.   Other options include: Atorvastatin 20mg qDay (4D trial) and Rosuvastatin 10mg qDay (DEISY trial)    Plan:  -Agree with Atorvastatin      Metabolic Acidosis of Renal Disease  Bicarb: 27    Plan:  -No need for NaHCO      Mineral Bone Disease  PTH: 73 12/29/2022  Phos: 2.5 12/29/2022  Calcium: 9.2 12/29/2022  Vitamin D: 39 12/29/2022    Plan:  -No need for phos binder    Return to clinic: 3 months    Migue Calvillo MD   of Medicine  Division of Nephrology and Hypertension  [facility]    50 minutes spent on the date of the encounter doing chart review, history and exam, documentation and further activities as noted above

## 2023-01-16 ENCOUNTER — LAB (OUTPATIENT)
Dept: LAB | Facility: CLINIC | Age: 73
End: 2023-01-16
Payer: COMMERCIAL

## 2023-01-16 ENCOUNTER — ANCILLARY PROCEDURE (OUTPATIENT)
Dept: GENERAL RADIOLOGY | Facility: CLINIC | Age: 73
End: 2023-01-16
Attending: FAMILY MEDICINE
Payer: COMMERCIAL

## 2023-01-16 ENCOUNTER — OFFICE VISIT (OUTPATIENT)
Dept: FAMILY MEDICINE | Facility: CLINIC | Age: 73
End: 2023-01-16
Payer: COMMERCIAL

## 2023-01-16 VITALS
WEIGHT: 162.8 LBS | BODY MASS INDEX: 27.79 KG/M2 | RESPIRATION RATE: 16 BRPM | DIASTOLIC BLOOD PRESSURE: 74 MMHG | SYSTOLIC BLOOD PRESSURE: 132 MMHG | OXYGEN SATURATION: 96 % | HEIGHT: 64 IN | TEMPERATURE: 98.8 F | HEART RATE: 71 BPM

## 2023-01-16 DIAGNOSIS — L82.1 SEBORRHEIC KERATOSES: ICD-10-CM

## 2023-01-16 DIAGNOSIS — E11.42 TYPE 2 DIABETES MELLITUS WITH DIABETIC POLYNEUROPATHY, WITH LONG-TERM CURRENT USE OF INSULIN (H): ICD-10-CM

## 2023-01-16 DIAGNOSIS — E03.9 ACQUIRED HYPOTHYROIDISM: ICD-10-CM

## 2023-01-16 DIAGNOSIS — M51.369 DDD (DEGENERATIVE DISC DISEASE), LUMBAR: ICD-10-CM

## 2023-01-16 DIAGNOSIS — Z00.00 ENCOUNTER FOR MEDICARE ANNUAL WELLNESS EXAM: Primary | ICD-10-CM

## 2023-01-16 DIAGNOSIS — B35.1 ONYCHOMYCOSIS: ICD-10-CM

## 2023-01-16 DIAGNOSIS — M79.672 LEFT FOOT PAIN: ICD-10-CM

## 2023-01-16 DIAGNOSIS — C91.10 CLL (CHRONIC LYMPHOCYTIC LEUKEMIA) (H): ICD-10-CM

## 2023-01-16 DIAGNOSIS — M48.062 SPINAL STENOSIS OF LUMBAR REGION WITH NEUROGENIC CLAUDICATION: ICD-10-CM

## 2023-01-16 DIAGNOSIS — M51.26 LUMBAR DISC HERNIATION: ICD-10-CM

## 2023-01-16 DIAGNOSIS — Z79.4 TYPE 2 DIABETES MELLITUS WITH DIABETIC POLYNEUROPATHY, WITH LONG-TERM CURRENT USE OF INSULIN (H): ICD-10-CM

## 2023-01-16 DIAGNOSIS — E11.3299 BACKGROUND DIABETIC RETINOPATHY (H): ICD-10-CM

## 2023-01-16 DIAGNOSIS — N18.31 STAGE 3A CHRONIC KIDNEY DISEASE (H): ICD-10-CM

## 2023-01-16 DIAGNOSIS — F33.0 MAJOR DEPRESSIVE DISORDER, RECURRENT, MILD (H): ICD-10-CM

## 2023-01-16 DIAGNOSIS — R60.9 DEPENDENT EDEMA: ICD-10-CM

## 2023-01-16 DIAGNOSIS — Z12.4 CERVICAL CANCER SCREENING: ICD-10-CM

## 2023-01-16 LAB — TSH SERPL DL<=0.005 MIU/L-ACNC: 0.58 MU/L (ref 0.4–4)

## 2023-01-16 PROCEDURE — G0145 SCR C/V CYTO,THINLAYER,RESCR: HCPCS | Performed by: FAMILY MEDICINE

## 2023-01-16 PROCEDURE — 84443 ASSAY THYROID STIM HORMONE: CPT

## 2023-01-16 PROCEDURE — 87624 HPV HI-RISK TYP POOLED RSLT: CPT | Performed by: FAMILY MEDICINE

## 2023-01-16 PROCEDURE — G0439 PPPS, SUBSEQ VISIT: HCPCS | Performed by: FAMILY MEDICINE

## 2023-01-16 PROCEDURE — 73630 X-RAY EXAM OF FOOT: CPT | Mod: TC | Performed by: RADIOLOGY

## 2023-01-16 PROCEDURE — 99213 OFFICE O/P EST LOW 20 MIN: CPT | Mod: 25 | Performed by: FAMILY MEDICINE

## 2023-01-16 PROCEDURE — 36415 COLL VENOUS BLD VENIPUNCTURE: CPT

## 2023-01-16 RX ORDER — INSULIN LISPRO 100 [IU]/ML
8 INJECTION, SOLUTION INTRAVENOUS; SUBCUTANEOUS
Qty: 45 ML | Refills: 3 | Status: SHIPPED | OUTPATIENT
Start: 2023-01-16 | End: 2024-01-22

## 2023-01-16 ASSESSMENT — ENCOUNTER SYMPTOMS
CHILLS: 0
MYALGIAS: 1
ABDOMINAL PAIN: 0
COUGH: 0
FREQUENCY: 0
SHORTNESS OF BREATH: 0
PARESTHESIAS: 0
HEMATOCHEZIA: 0
HEARTBURN: 0
EYE PAIN: 0
FEVER: 0
PALPITATIONS: 0
NAUSEA: 0
BREAST MASS: 0
DIZZINESS: 0
JOINT SWELLING: 1
NERVOUS/ANXIOUS: 0
WEAKNESS: 0
DIARRHEA: 1
ARTHRALGIAS: 1
CONSTIPATION: 0
HEADACHES: 0
HEMATURIA: 0
DYSURIA: 0
SORE THROAT: 0

## 2023-01-16 ASSESSMENT — PATIENT HEALTH QUESTIONNAIRE - PHQ9: SUM OF ALL RESPONSES TO PHQ QUESTIONS 1-9: 2

## 2023-01-16 ASSESSMENT — ACTIVITIES OF DAILY LIVING (ADL): CURRENT_FUNCTION: NO ASSISTANCE NEEDED

## 2023-01-16 NOTE — PROGRESS NOTES
"SUBJECTIVE:   Kelly is a 72 year old who presents for Preventive Visit.    Patient has been advised of split billing requirements and indicates understanding: Yes  Are you in the first 12 months of your Medicare coverage?  No    Patient also presents with left dorsal foot and leg pain for months despite physical therapy.     Patient also presents for follow-up of hypothyroidism, controlled on current medication.  Denies symptoms of hypo- or hyperthyroidism with exception of mild lower extremity edema.     Patient also presents to follow-up diabetes. Diabetic Review of Systems - Medication compliance:  compliant most of the time, Diabetic diet compliance:  compliant most of the time, Home glucose monitoring:  blood glucose record WAS NOT brought in today, Diabetic ROS: no polyuria or polydipsia, no chest pain, dyspnea or TIA's, no numbness, tingling or pain in extremities, no unusual visual symptoms, no hypoglycemia, no medication side effects noted.     Patient has depression, recurrent, with no medication side effects and no anhedonia or low mood, without suicidal ideation.      Healthy Habits:     In general, how would you rate your overall health?  Good    Frequency of exercise:  2-3 days/week    Duration of exercise:  30-45 minutes    Do you usually eat at least 4 servings of fruit and vegetables a day, include whole grains    & fiber and avoid regularly eating high fat or \"junk\" foods?  No    Taking medications regularly:  Yes    Medication side effects:  Lightheadedness    Ability to successfully perform activities of daily living:  No assistance needed    Home Safety:  No safety concerns identified    Hearing Impairment:  Feel that people are mumbling or not speaking clearly, need to ask people to speak up or repeat themselves, find that men's voices are easier to understand than woman's, difficulty understanding soft or whispered speech and difficulty understanding speech on the telephone    In the past 6 " months, have you been bothered by leaking of urine?  No    In general, how would you rate your overall mental or emotional health?  Good      PHQ-2 Total Score: 1    Additional concerns today:  Yes      Have you ever done Advance Care Planning? (For example, a Health Directive, POLST, or a discussion with a medical provider or your loved ones about your wishes): No, advance care planning information given to patient to review.  Advanced care planning was discussed at today's visit.       Fall risk  Fallen 2 or more times in the past year?: No  Any fall with injury in the past year?: No    Cognitive Screening   1) Repeat 3 items (Leader, Season, Table)    2) Clock draw: NORMAL  3) 3 item recall: Recalls NO objects   Results: 0 items recalled: PROBABLE COGNITIVE IMPAIRMENT, **INFORM PROVIDER**    Mini-CogTM Copyright [employer]. Licensed by the author for use in [facility]; reprinted with permission (kunal@[email or web address]). All rights reserved.      Do you have sleep apnea, excessive snoring or daytime drowsiness?: no    Reviewed and updated as needed this visit by clinical staff   Tobacco  Allergies  Meds  Problems  Med Hx  Surg Hx  Fam Hx          Reviewed and updated as needed this visit by Provider   Tobacco  Allergies  Meds  Problems  Med Hx  Surg Hx  Fam Hx         Social History     Tobacco Use     Smoking status: Former     Packs/day: 0.50     Years: 20.00     Pack years: 10.00     Types: Cigarettes     Start date: 1967     Quit date: 1989     Years since quittin.0     Smokeless tobacco: Never   Substance Use Topics     Alcohol use: No         Alcohol Use 2023   Prescreen: >3 drinks/day or >7 drinks/week? No   Prescreen: >3 drinks/day or >7 drinks/week? -           Swelling in left foot and leg.  Fingernails have lines in them and peel.  Discuss Jardiance  Tingling in arms, hands, feet    Current providers sharing in care for this patient include:   Patient Care  Team:  Sejal Rutledge MD as PCP - General  Sejal Rutledge MD as Assigned PCP  Chantell Chamberlain RN as Specialty Care Coordinator (Oncology)  Chelsi Daly APRN CNP as Assigned Cancer Care Provider  Andrzej Sparrow OD as Assigned Surgical Provider  Adonay Mata MD as Assigned Musculoskeletal Provider  Migue Calvillo MD as MD (Nephrology)    The following health maintenance items are reviewed in Epic and correct as of today:  Health Maintenance   Topic Date Due     DTAP/TDAP/TD IMMUNIZATION (4 - Td or Tdap) 08/07/2022     DEXA  09/20/2022     PAP FOLLOW-UP  12/14/2022     HPV FOLLOW-UP  12/14/2022     A1C  02/28/2023     DIABETIC FOOT EXAM  07/11/2023     PHQ-9  07/16/2023     EYE EXAM  08/31/2023     LIPID  11/30/2023     TSH W/FREE T4 REFLEX  11/30/2023     BMP  12/29/2023     MICROALBUMIN  12/29/2023     HEMOGLOBIN  12/29/2023     MEDICARE ANNUAL WELLNESS VISIT  01/16/2024     ANNUAL REVIEW OF HM ORDERS  01/16/2024     FALL RISK ASSESSMENT  01/16/2024     MAMMO SCREENING  10/04/2024     COLORECTAL CANCER SCREENING  01/17/2025     ADVANCE CARE PLANNING  01/16/2028     HEPATITIS C SCREENING  Completed     DEPRESSION ACTION PLAN  Completed     INFLUENZA VACCINE  Completed     Pneumococcal Vaccine: 65+ Years  Completed     URINALYSIS  Completed     ZOSTER IMMUNIZATION  Completed     COVID-19 Vaccine  Completed     IPV IMMUNIZATION  Aged Out     MENINGITIS IMMUNIZATION  Aged Out     PAP  Discontinued     Patient Active Problem List   Diagnosis     Hyperlipidemia with target LDL less than 70     Hypertension goal BP (blood pressure) < 140/90     History of cervical cancer     Advanced directives, counseling/discussion     Major depressive disorder, recurrent, mild (H)     Acquired hypothyroidism     Type 2 diabetes mellitus with diabetic polyneuropathy, with long-term current use of insulin (H)     Degenerative joint disease of hand     DDD (degenerative disc disease), lumbar      Osteopenia     Memory problem     CLL (chronic lymphocytic leukemia) (H)     Dysesthesia     Ganglion cyst of joint of finger of right hand     Background diabetic retinopathy (H)     AVM (arteriovenous malformation) of small bowel, acquired     Iron deficiency     Sensorineural hearing loss, bilateral     Nontraumatic tear of right rotator cuff, unspecified tear extent     Stage 3a chronic kidney disease (H)     Past Surgical History:   Procedure Laterality Date     BIOPSY       COLONOSCOPY  2016     COLONOSCOPY N/A 3/1/2019    Procedure: COLONOSCOPY;  Surgeon: Romulo Hayes MD;  Location: [facility]     COLONOSCOPY WITH CO2 INSUFFLATION N/A 2016    Procedure: COLONOSCOPY WITH CO2 INSUFFLATION;  Surgeon: Migue Giraldo MD;  Location: [facility]     CYSTECTOMY OVARIAN BENIGN  1974     ESOPHAGOSCOPY, GASTROSCOPY, DUODENOSCOPY (EGD), COMBINED N/A 3/1/2019    Procedure: COMBINED ESOPHAGOSCOPY, GASTROSCOPY, DUODENOSCOPY (EGD), BIOPSY SINGLE OR MULTIPLE;  Surgeon: Romulo Hayes MD;  Location: [facility]     EYE SURGERY  16     HC THYROIDECTOMY  2004    goiter     LAPAROSCOPIC CHOLECYSTECTOMY N/A 2019    Procedure: CHOLECYSTECTOMY, LAPAROSCOPIC;  Surgeon: Jorge Martinez DO;  Location: [facility]SO, OMENTECTOMY W/JUAN DANIEL  2006    cervical cancer       Social History     Tobacco Use     Smoking status: Former     Packs/day: 0.50     Years: 20.00     Pack years: 10.00     Types: Cigarettes     Start date: 1967     Quit date: 1989     Years since quittin.0     Smokeless tobacco: Never   Substance Use Topics     Alcohol use: No     Family History   Problem Relation Age of Onset     Thyroid Disease Mother      Dementia Mother      Osteoporosis Mother      Alcohol/Drug Father      C.A.D. Father         CABG      Diabetes Father      Hypertension Father      Hyperlipidemia Father      Cerebrovascular Disease Father      Alcohol/Drug Brother      Prostate Cancer Brother       Depression Brother      Depression Brother         d. suicide     Cancer Maternal Grandmother         stomach, colon     Breast Cancer Maternal Grandmother      Thyroid Disease Maternal Grandmother      Alzheimer Disease Maternal Grandfather      Breast Cancer Paternal Grandmother      Alcohol/Drug Son      Rheumatoid Arthritis Son      Breast Cancer Cousin      Breast Cancer Other          Current Outpatient Medications   Medication Sig Dispense Refill     atorvastatin (LIPITOR) 20 MG tablet Take 1 tablet (20 mg) by mouth daily 90 tablet 3     blood glucose (NO BRAND SPECIFIED) test strip Use to test blood sugar 3 times daily or as directed. To accompany: Blood Glucose Monitor Brands: per insurance. 300 strip 3     Cholecalciferol (VITAMIN D3) 1000 units CAPS Take by mouth daily       citalopram (CELEXA) 20 MG tablet Take 1 tablet (20 mg) by mouth daily 90 tablet 3     insulin glargine (LANTUS SOLOSTAR) 100 UNIT/ML pen INJECT 20 UNITS AT BEDTIME DAILY 45 mL 1     insulin lispro (HUMALOG KWIKPEN) 100 UNIT/ML (1 unit dial) KWIKPEN Inject 8 Units Subcutaneous 2 times daily (before meals) 45 mL 3     levothyroxine (SYNTHROID/LEVOTHROID) 88 MCG tablet Take 1 tablet (88 mcg) by mouth daily 90 tablet 3     lisinopril-hydrochlorothiazide (ZESTORETIC) 20-12.5 MG tablet Take 2 tablets by mouth daily 180 tablet 3     metFORMIN (GLUCOPHAGE XR) 500 MG 24 hr tablet Take 1 tablet (500 mg) by mouth daily (with dinner) 90 tablet 3     multivitamin w/minerals (THERA-VIT-M) tablet Take 1 tablet by mouth daily       thin (NO BRAND SPECIFIED) lancets Use to test blood sugar 3 times daily or as directed. To accompany: Blood Glucose Monitor Brands: per insurance. 300 each 3     ULTICARE MINI 31G X 6 MM insulin pen needle Use three times daily or as directed. 300 each 0     empagliflozin (JARDIANCE) 10 MG TABS tablet Take 1 tablet (10 mg) by mouth daily for 360 days (Patient not taking: Reported on 1/16/2023) 90 tablet 3     Allergies  "  Allergen Reactions     Glipizide      tremulous     Ibuprofen Hives     Pcn [Penicillins] Itching     Percocet [Acetaminophen] Nausea and Vomiting     Vicodin [Hydrocodone-Acetaminophen] Nausea             Review of Systems   Constitutional: Negative for chills and fever.   HENT: Negative for congestion, ear pain, hearing loss and sore throat.    Eyes: Negative for pain and visual disturbance.   Respiratory: Negative for cough and shortness of breath.    Cardiovascular: Positive for peripheral edema. Negative for chest pain and palpitations.   Gastrointestinal: Positive for diarrhea. Negative for abdominal pain, constipation, heartburn, hematochezia and nausea.   Breasts:  Negative for tenderness, breast mass and discharge.   Genitourinary: Positive for genital sores. Negative for dysuria, frequency, hematuria, pelvic pain, urgency, vaginal bleeding and vaginal discharge.   Musculoskeletal: Positive for arthralgias, joint swelling and myalgias.   Skin: Negative for rash.   Neurological: Negative for dizziness, weakness, headaches and paresthesias.   Psychiatric/Behavioral: Negative for mood changes. The patient is not nervous/anxious.          OBJECTIVE:   /74 (BP Location: Left arm, Patient Position: Sitting, Cuff Size: Adult Regular)   Pulse 71   Temp 98.8  F (37.1  C) (Oral)   Resp 16   Ht 1.618 m (5' 3.7\")   Wt 73.8 kg (162 lb 12.8 oz)   SpO2 96%   BMI 28.21 kg/m   Estimated body mass index is 28.21 kg/m  as calculated from the following:    Height as of this encounter: 1.618 m (5' 3.7\").    Weight as of this encounter: 73.8 kg (162 lb 12.8 oz).  Physical Exam  GENERAL: alert, no distress and over weight  EYES: Eyes grossly normal to inspection, PERRL and conjunctivae and sclerae normal  NECK: no adenopathy, no asymmetry, masses, or scars and thyroid normal to palpation  RESP: lungs clear to auscultation - no rales, rhonchi or wheezes  CV: regular rate and rhythm, normal S1 S2, no S3 or S4, no " murmur, click or rub, no peripheral edema   MS: no gross musculoskeletal defects noted, no edema  SKIN: no suspicious lesions or rashes; seborrheic keratoses; and onychomycosis of toenails - missing left great toenail   NEURO: Normal strength and tone, mentation intact and speech normal  PSYCH: mentation appears normal, affect normal/bright    Diagnostic Test Results:  Labs reviewed in Epic    ASSESSMENT / PLAN:   (Z00.00) Encounter for Medicare annual wellness exam  (primary encounter diagnosis)  (Z12.4) Cervical cancer screening  Comment: hx of cervical ca   Plan: Pap Screen with HPV - recommended age 30 - 65         years          (M79.672) Left foot pain  (M51.36) DDD (degenerative disc disease), lumbar  Comment: failed physical therapy   Plan: OFFICE/OUTPT VISIT,EST,LEVL IV, MR Lumbar Spine        w/o Contrast        Consider referral pending results     (C91.10) CLL (chronic lymphocytic leukemia) (H)  Comment: stable   Plan: OFFICE/OUTPT VISIT,EST,LEVL IV        Follow-up with hematology as planned     (E03.9) Acquired hypothyroidism  Comment: euthyroid on replacement   Plan: OFFICE/OUTPT VISIT,EST,LEVL IV          (R60.9) Dependent edema  Comment: mild - patient doesn't want to use compression stockings for these mild symptoms   Plan: OFFICE/OUTPT VISIT,EST,LEVL IV        Follow     (E11.42,  Z79.4) Type 2 diabetes mellitus with diabetic polyneuropathy, with long-term current use of insulin (H)  (N18.31) Stage 3a chronic kidney disease (H)  (E11.3299) Background diabetic retinopathy (H)  Comment: blood glucose controlled - patient is considering SGL2i medication but it's cost prohibitive; Discussed risks and benefits of this medication.   Plan: OFFICE/OUTPT VISIT,EST,LEVL IV        Follow-up q 3 months     (F33.0) Major depressive disorder, recurrent, mild (H)  Comment: Well controlled with medications without side effects.   Plan: OFFICE/OUTPT VISIT,EST,LEVL IV          (B35.1) Onychomycosis  Plan:  "OFFICE/OUTPT VISIT,ESTJOEL IV        Recommended conservative care; see if toenail grow back and follow-up as needed     (L82.1) Seborrheic keratoses  Comment: right lower abdomen   Plan: The patient is reassured that these symptoms do not appear to represent a serious or threatening condition.     Patient has been advised of split billing requirements and indicates understanding: Yes      COUNSELING:  Reviewed preventive health counseling, as reflected in patient instructions  Special attention given to:       Regular exercise       Healthy diet/nutrition       Vision screening       Osteoporosis prevention/bone health       Colon cancer screening       The ASCVD Risk score (Sabrina THAKUR, et al., 2019) failed to calculate for the following reasons:    The valid total cholesterol range is 130 to 320 mg/dL      BMI:   Estimated body mass index is 28.21 kg/m  as calculated from the following:    Height as of this encounter: 1.618 m (5' 3.7\").    Weight as of this encounter: 73.8 kg (162 lb 12.8 oz).   Weight management plan: Discussed healthy diet and exercise guidelines      She reports that she quit smoking about 34 years ago. Her smoking use included cigarettes. She started smoking about 55 years ago. She has a 10.00 pack-year smoking history. She has never used smokeless tobacco.      Appropriate preventive services were discussed with this patient, including applicable screening as appropriate for cardiovascular disease, diabetes, osteopenia/osteoporosis, and glaucoma.  As appropriate for age/gender, discussed screening for colorectal cancer, prostate cancer, breast cancer, and cervical cancer. Checklist reviewing preventive services available has been given to the patient.    Reviewed patients plan of care and provided an AVS. The Basic Care Plan (routine screening as documented in Health Maintenance) for Kelly meets the Care Plan requirement. This Care Plan has been established and reviewed with the " Patient.          Sejal Rutledge MD  [facility]    Identified Health Risks:    The patient was counseled and encouraged to consider modifying their diet and eating habits. She was provided with information on recommended healthy diet options.  The patient was provided with written information regarding signs of hearing loss.

## 2023-01-16 NOTE — PATIENT INSTRUCTIONS
Discuss getting a Tetanus vaccine from your pharmacist, or schedule an ancillary shot visit here.  Some insurances do not cover the cost of these vaccines.       Please schedule a DEXA bone scan for osteoporosis testing at your convenience.  Call our appointment line at [phone] or go to [email or web address].    Sejal Rutledge MD       Patient Education   Personalized Prevention Plan  You are due for the preventive services outlined below.  Your care team is available to assist you in scheduling these services.  If you have already completed any of these items, please share that information with your care team to update in your medical record.  Health Maintenance Due   Topic Date Due    Diptheria Tetanus Pertussis (DTAP/TDAP/TD) Vaccine (4 - Td or Tdap) 08/07/2022    Osteoporosis Screening  09/20/2022    PAP  12/14/2022    HPV Follow Up  12/14/2022       Understanding USDA MyPlate  The USDA has guidelines to help you make healthy food choices. These are called MyPlate. MyPlate shows the food groups that make up healthy meals using the image of a place setting. Before you eat, think about the healthiest choices for what to put on your plate or in your cup or bowl. To learn more about building a healthy plate, visit www.choosemyplate.gov.    The food groups  Fruits. Any fruit or 100% fruit juice counts as part of the Fruit Group. Fruits may be fresh, canned, frozen, or dried, and may be whole, cut-up, or pureed. Make 1/2 of your plate fruits and vegetables.  Vegetables. Any vegetable or 100% vegetable juice counts as a member of the Vegetable Group. Vegetables may be fresh, frozen, canned, or dried. They can be served raw or cooked and may be whole, cut-up, or mashed. Make 1/2 of your plate fruits and vegetables.  Grains. All foods made from grains are part of the Grains Group. These include wheat, rice, oats, cornmeal, and barley. Grains are often used to make foods such as bread, pasta, oatmeal, cereal, tortillas,  and grits. Grains should be no more than 1/4 of your plate. At least half of your grains should be whole grains.  Protein. This group includes meat, poultry, seafood, beans and peas, eggs, processed soy products (such as tofu), nuts (including nut butters), and seeds. Make protein choices no more than 1/4 of your plate. Meat and poultry choices should be lean or low fat.  Dairy. The Dairy Group includes all fluid milk products and foods made from milk that contain calcium, such as yogurt and cheese. (Foods that have little calcium, such as cream, butter, and cream cheese, are not part of this group.) Most dairy choices should be low-fat or fat-free.  Oils. Oils aren't a food group, but they do contain essential nutrients. However it's important to watch your intake of oils. These are fats that are liquid at room temperature. They include canola, corn, olive, soybean, vegetable, and sunflower oil. Foods that are mainly oil include mayonnaise, certain salad dressings, and soft margarines. You likely already get your daily oil allowance from the foods you eat.  Things to limit  Eating healthy also means limiting these things in your diet:     Salt (sodium). Many processed foods have a lot of sodium. To keep sodium intake down, eat fresh vegetables, meats, poultry, and seafood when possible. Purchase low-sodium, reduced-sodium, or no-salt-added food products at the store. And don't add salt to your meals at home. Instead, season them with herbs and spices such as dill, oregano, cumin, and paprika. Or try adding flavor with lemon or lime zest and juice.  Saturated fat. Saturated fats are most often found in animal products such as beef, pork, and chicken. They are often solid at room temperature, such as butter. To reduce your saturated fat intake, choose leaner cuts of meat and poultry. And try healthier cooking methods such as grilling, broiling, roasting, or baking. For a simple lower-fat swap, use plain nonfat yogurt  instead of mayonnaise when making potato salad or macaroni salad.  Added sugars. These are sugars added to foods. They are in foods such as ice cream, candy, soda, fruit drinks, sports drinks, energy drinks, cookies, pastries, jams, and syrups. Cut down on added sugars by sharing sweet treats with a family member or friend. You can also choose fruit for dessert, and drink water or other unsweetened beverages.     [email or web address] last reviewed this educational content on 6/1/2020 2000-2021 [employer]. All rights reserved. This information is not intended as a substitute for professional medical care. Always follow your healthcare professional's instructions.          Signs of Hearing Loss      Hearing much better with one ear can be a sign of hearing loss.   Hearing loss is a problem shared by many people. In fact, it is one of the most common health problems, particularly as people age. Most people age 65 and older have some hearing loss. By age 80, almost everyone does. Hearing loss often occurs slowly over the years. So you may not realize your hearing has gotten worse.  Have your hearing checked  Call your healthcare provider if you:  Have to strain to hear normal conversation  Have to watch other people s faces very carefully to follow what they re saying  Need to ask people to repeat what they ve said  Often misunderstand what people are saying  Turn the volume of the television or radio up so high that others complain  Feel that people are mumbling when they re talking to you  Find that the effort to hear leaves you feeling tired and irritated  Notice, when using the phone, that you hear better with one ear than the other  [email or web address] last reviewed this educational content on 1/1/2020 2000-2021 [employer]. All rights reserved. This information is not intended as a substitute for professional medical care. Always follow your healthcare professional's instructions.

## 2023-01-19 NOTE — PROGRESS NOTES
[facility] Hematology and Oncology Progress Note    Patient: Kelly Vegas  MRN: [id number]  Date of Service: 12/26/2022        Reason for Visit    Chief Complaint   Patient presents with     Oncology Clinic Visit     CLL (chronic lymphocytic leukemia) - Provider visit         Problem List Items Addressed This Visit        Hematologic    CLL (chronic lymphocytic leukemia) (H) - Primary    Relevant Orders    CBC with Platelets & Differential    Comprehensive metabolic panel (BMP + Alb, Alk Phos, ALT, AST, Total. Bili, TP)    Lactate Dehydrogenase    Iron and iron binding capacity    Ferritin         Assessment and Plan  CLL  Boyer stage 0, 13 q. Deletion  Doing well without any clinical or lab evidence of disease progression.  BC from last week reviewed.  Total white count was 24,000.  Absolute lymphocyte count was 18,000.  Hemoglobin mildly low at 11.1.  Normal platelet count.  LDH was normal at 213.  Serum chemistry was otherwise within normal limits.  Her anemia could be attributed to her chronic kidney disease.  Clinical exam was unremarkable today.  Plan is to continue surveillance with aortic labs and physical exam.  13 q. deletion in CLL portends a good prognosis.  No need for intervention at this point in time.    Iron deficiency anemia  Secondary to GI blood loss in the past.  Iron studies are within normal limits.  Ferritin was 27.  Slightly borderline lowish.  Transferrin saturation was 25%.  TIBC 315.  I recommended oral iron every other day.  She also has chronic kidney disease so to some extent anemia could be from that.    I will see her back in 6 months    Cancer Staging   No matching staging information was found for the patient.    ECOG Performance    1 - Can't do physically strenuous work, but fully ambyulatory and can do light sedentary work         Problem List    Patient Active Problem List   Diagnosis     Hyperlipidemia with target LDL less than 70     Hypertension goal BP (blood  pressure) < 140/90     History of cervical cancer     Advanced directives, counseling/discussion     Major depressive disorder, recurrent, mild (H)     Acquired hypothyroidism     Type 2 diabetes mellitus with diabetic polyneuropathy, with long-term current use of insulin (H)     Degenerative joint disease of hand     DDD (degenerative disc disease), lumbar     Osteopenia     Memory problem     CLL (chronic lymphocytic leukemia) (H)     Dysesthesia     Ganglion cyst of joint of finger of right hand     Background diabetic retinopathy (H)     AVM (arteriovenous malformation) of small bowel, acquired     Iron deficiency     Sensorineural hearing loss, bilateral     Nontraumatic tear of right rotator cuff, unspecified tear extent     Stage 3a chronic kidney disease (H)        Oncology history  She presented in 10/2018 with new moderate leukocytosis/lymphocytosis and moderate normocytic normochromic anemia, found to have low ferritin and iron saturation, which was suggestive of iron-deficiency anemia.   Record review indicating she had touch of leukocytosis no diff back in 2006.  She was not anemic back in 2006 with hemoglobin of 12.5.  Hematology work up in 10/2018 was most consistent with CLL and DEBORA. DEBORA corrected with iron supplement by 12/2018.     Upper endo found none bleeding erosive gastropathy, with negative colonoscopy.   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   She was off oral iron since 5/2019.     She had new anemia in 9/2019 with hb of 10.7, nl MCV, which is 1 wk post cholecystectomy.     Hgb did not improve in 10/2019 when she was restarted on oral iron 10/2019. Stool occult was negative in 10/2019. Hb up to 12 end of Nov. GI work-up 1/2020 with [facility] identified multiple AVM in proximal small bowel and colon small bowel capsule studies.    She then had repeat upper endoscopy and colonoscopy with MINLIBIA did not find any obvious signs of bleeding, had cauterization.     Interval  History   Kelly Vegas is a 72 year old female with history of CLL and iron deficiency anemia secondary to bleeding from multiple GI AVMs who is here for follow-up.  This is my first time meeting her.  I have reviewed her chart including lab studies,.  Agree, cytogenetics and molecular testing in detail.    Doing well since last visit.  Denies any new issues today.  No fever chills or night sweats.  Denies any blood in stools.  No enlarging lymph nodes reported.  Denies any significant fatigue or weight loss.        Review of Systems  A comprehensive review of systems was negative except for what is noted in the interval history    Current Outpatient Medications   Medication     atorvastatin (LIPITOR) 20 MG tablet     blood glucose (NO BRAND SPECIFIED) test strip     Cholecalciferol (VITAMIN D3) 1000 units CAPS     citalopram (CELEXA) 20 MG tablet     insulin glargine (LANTUS SOLOSTAR) 100 UNIT/ML pen     levothyroxine (SYNTHROID/LEVOTHROID) 88 MCG tablet     lisinopril-hydrochlorothiazide (ZESTORETIC) 20-12.5 MG tablet     metFORMIN (GLUCOPHAGE XR) 500 MG 24 hr tablet     multivitamin w/minerals (THERA-VIT-M) tablet     thin (NO BRAND SPECIFIED) lancets     ULTICARE MINI 31G X 6 MM insulin pen needle     empagliflozin (JARDIANCE) 10 MG TABS tablet     insulin lispro (HUMALOG KWIKPEN) 100 UNIT/ML (1 unit dial) KWIKPEN     Current Facility-Administered Medications   Medication     bupivacaine (MARCAINE) 0.25 % injection 4 mL     triamcinolone (KENALOG-40) injection 80 mg        Physical Exam    No flowsheet data found.    General: alert and cooperative  HEENT: Head: Normal, normocephalic, atraumatic.  Eye: Normal external eye, conjunctiva, lids cornea, LURDES.  Chest: Clear to auscultation bilaterally  Cardiac: S1, S2 normal, regular rate and rhythm  Abdomen: Soft, nontender, no organomegaly  Extremities: atraumatic, no peripheral edema  CNS: Alert and oriented x3, neurologic exam grossly normal.  Lymphatics: No  clinically significant peripheral adenopathy noted  Lab Results    Recent Results (from the past 168 hour(s))   TSH with free T4 reflex   Result Value Ref Range    TSH 0.58 0.40 - 4.00 mU/L   Pap Screen with HPV - recommended age 30 - 65 years   Result Value Ref Range    Interpretation        Negative for Intraepithelial Lesion or Malignancy (NILM)    Comment         Papanicolaou Test Limitations:  Cervical cytology is a screening test with limited sensitivity, and regular screening is critical for cancer prevention.  Pap tests are primarily effective for the diagnosis/prevention of squamous cell carcinoma, not adenocarcinoma or other cancers.        Specimen Adequacy       Satisfactory for evaluation, endocerv/transformation zone component absent, atrophy    Clinical Information       post-menopausal      Reflex Testing Yes regardless of result     Previous Abnormal?       No      Performing Labs       The technical component of this testing was completed at [facility]         Imaging    XR Foot Left G/E 3 Views    Result Date: 1/16/2023  XR FOOT LEFT G/E 3 VIEWS 1/16/2023 3:05 PM HISTORY: Left foot pain COMPARISON: None.     IMPRESSION: Accessory os navicularis. Degenerative change first MTP joint. Plantar calcaneal spurring. No evidence for fracture. YUSUF CATALAN MD   SYSTEM ID:  [id number]        Signed by: Asia Guzman MD

## 2023-01-23 ENCOUNTER — PATIENT OUTREACH (OUTPATIENT)
Dept: FAMILY MEDICINE | Facility: CLINIC | Age: 73
End: 2023-01-23
Payer: COMMERCIAL

## 2023-01-26 ENCOUNTER — ANCILLARY PROCEDURE (OUTPATIENT)
Dept: MRI IMAGING | Facility: CLINIC | Age: 73
End: 2023-01-26
Attending: FAMILY MEDICINE
Payer: COMMERCIAL

## 2023-01-26 DIAGNOSIS — M51.369 DDD (DEGENERATIVE DISC DISEASE), LUMBAR: ICD-10-CM

## 2023-01-26 PROCEDURE — 72148 MRI LUMBAR SPINE W/O DYE: CPT | Mod: TC | Performed by: RADIOLOGY

## 2023-01-27 PROBLEM — M51.26 LUMBAR DISC HERNIATION: Status: ACTIVE | Noted: 2023-01-27

## 2023-01-27 PROBLEM — M48.062 SPINAL STENOSIS OF LUMBAR REGION WITH NEUROGENIC CLAUDICATION: Status: ACTIVE | Noted: 2023-01-27

## 2023-01-27 NOTE — RESULT ENCOUNTER NOTE
Please call patient:  You have herniated discs and lumbar spinal stenosis. We could consider steroid injection. I have referred you to a spine specialist to discuss options.   Sejal Rutledge MD

## 2023-01-30 ENCOUNTER — TELEPHONE (OUTPATIENT)
Dept: OBGYN | Facility: CLINIC | Age: 73
End: 2023-01-30
Payer: COMMERCIAL

## 2023-01-30 NOTE — TELEPHONE ENCOUNTER
[facility] Call Center    Phone Message    May a detailed message be left on voicemail: yes     Reason for Call: Other: pt calling to schedule a coloscopy, please advise with her     Action Taken: Message routed to:  [facility] p [phone]    Travel Screening: Not Applicable

## 2023-01-31 NOTE — TELEPHONE ENCOUNTER
[facility] Call Center    Phone Message    May a detailed message be left on voicemail: yes     Reason for Call: Other: returning call for coloscopy, please advise     Action Taken: Message routed to:  [facility] p [phone]    Travel Screening: Not Applicable

## 2023-02-01 DIAGNOSIS — M54.9 BACK PAIN: Primary | ICD-10-CM

## 2023-02-01 ASSESSMENT — ENCOUNTER SYMPTOMS
LEG PAIN: 1
INSOMNIA: 1
CHILLS: 1
HOARSE VOICE: 0
ORTHOPNEA: 0
DISTURBANCES IN COORDINATION: 0
POLYDIPSIA: 0
MEMORY LOSS: 1
FEVER: 0
NAIL CHANGES: 1
SEIZURES: 0
WEAKNESS: 1
DYSURIA: 0
PALPITATIONS: 0
DIZZINESS: 1
BOWEL INCONTINENCE: 0
SINUS PAIN: 0
BRUISES/BLEEDS EASILY: 1
HEMATURIA: 0
JOINT SWELLING: 1
EXERCISE INTOLERANCE: 1
DIARRHEA: 1
SPEECH CHANGE: 0
NUMBNESS: 1
SWOLLEN GLANDS: 0
MUSCLE WEAKNESS: 1
FATIGUE: 1
DECREASED APPETITE: 0
SYNCOPE: 0
SINUS CONGESTION: 0
INCREASED ENERGY: 1
SMELL DISTURBANCE: 0
HEARTBURN: 0
SKIN CHANGES: 0
BLOOD IN STOOL: 0
HYPERTENSION: 1
WEIGHT LOSS: 0
MYALGIAS: 1
SORE THROAT: 0
POOR WOUND HEALING: 0
LOSS OF CONSCIOUSNESS: 0
PANIC: 0
SLEEP DISTURBANCES DUE TO BREATHING: 0
NECK MASS: 0
TINGLING: 1
WEIGHT GAIN: 0
JAUNDICE: 0
LIGHT-HEADEDNESS: 1
NECK PAIN: 1
FLANK PAIN: 1
CONSTIPATION: 0
DECREASED CONCENTRATION: 1
BACK PAIN: 1
TASTE DISTURBANCE: 0
TREMORS: 0
ARTHRALGIAS: 1
HYPOTENSION: 0
VOMITING: 0
STIFFNESS: 1
RECTAL PAIN: 0
HEADACHES: 0
DEPRESSION: 0
NERVOUS/ANXIOUS: 1
HALLUCINATIONS: 0
DIFFICULTY URINATING: 0
NIGHT SWEATS: 0
PARALYSIS: 0
NAUSEA: 0
POLYPHAGIA: 0
ABDOMINAL PAIN: 1
BLOATING: 0
TROUBLE SWALLOWING: 0
ALTERED TEMPERATURE REGULATION: 1
MUSCLE CRAMPS: 1

## 2023-02-02 NOTE — TELEPHONE ENCOUNTER
Action February 2, 2023 10:03 AM MT   Action Taken Called patient for more information, no answer, left a voicemail for a callback.     DIAGNOSIS: Lumbar disc herniation, Spinal stenosis of lumbar region with neurogenic claudication   APPOINTMENT DATE: 02/06/2023   NOTES STATUS DETAILS   OFFICE NOTE from referring provider Internal 01/16/2023 - Adele Rutledge MD - [facility]   MEDICATION LIST Internal    LABS     CBC/DIFF Internal    MRI Internal 01/26/2023 - [facility]   XRAYS (IMAGES & REPORTS) Internal 07/11/2017 - [facility]

## 2023-02-06 ENCOUNTER — OFFICE VISIT (OUTPATIENT)
Dept: ORTHOPEDICS | Facility: CLINIC | Age: 73
End: 2023-02-06
Attending: FAMILY MEDICINE
Payer: COMMERCIAL

## 2023-02-06 ENCOUNTER — PRE VISIT (OUTPATIENT)
Dept: ORTHOPEDICS | Facility: CLINIC | Age: 73
End: 2023-02-06

## 2023-02-06 ENCOUNTER — ANCILLARY PROCEDURE (OUTPATIENT)
Dept: GENERAL RADIOLOGY | Facility: CLINIC | Age: 73
End: 2023-02-06
Attending: ORTHOPAEDIC SURGERY
Payer: COMMERCIAL

## 2023-02-06 VITALS — WEIGHT: 162 LBS | HEIGHT: 65 IN | BODY MASS INDEX: 26.99 KG/M2

## 2023-02-06 DIAGNOSIS — M51.26 LUMBAR DISC HERNIATION: ICD-10-CM

## 2023-02-06 DIAGNOSIS — M54.16 LUMBAR RADICULOPATHY: ICD-10-CM

## 2023-02-06 DIAGNOSIS — M48.062 SPINAL STENOSIS OF LUMBAR REGION WITH NEUROGENIC CLAUDICATION: Primary | ICD-10-CM

## 2023-02-06 PROCEDURE — 99204 OFFICE O/P NEW MOD 45 MIN: CPT | Performed by: ORTHOPAEDIC SURGERY

## 2023-02-06 PROCEDURE — 72110 X-RAY EXAM L-2 SPINE 4/>VWS: CPT | Mod: TC | Performed by: RADIOLOGY

## 2023-02-06 RX ORDER — GABAPENTIN 300 MG/1
300 CAPSULE ORAL 3 TIMES DAILY
Qty: 60 CAPSULE | Refills: 3 | Status: SHIPPED | OUTPATIENT
Start: 2023-02-06 | End: 2023-05-08 | Stop reason: SINTOL

## 2023-02-06 NOTE — NURSING NOTE
"Reason For Visit:   Chief Complaint   Patient presents with     Consult     Lumbar disc herniation, Spinal stenosis of lumbar region with neurogenic claudication        Primary MD: Sejal Rutledge  Ref. MD: Est    ?  No  Occupation retired.    Date of injury: No  Type of injury: No.    Date of surgery: No  Type of surgery: No.    Smoker: No  Request smoking cessation information: No    Ht 1.645 m (5' 4.76\")   Wt 73.5 kg (162 lb)   BMI 27.16 kg/m      Pain Assessment  Patient Currently in Pain: Yes  0-10 Pain Scale: 8  Primary Pain Location: Back    Oswestry (JOHN) Questionnaire    OSWESTRY DISABILITY INDEX 2/1/2023   Count 9   Sum 15   Oswestry Score (%) 33.33   Some recent data might be hidden      Visual Analog Pain Scale  Back Pain Scale 0-10: 8  Right leg pain: 5  Left leg pain: 0  Neck Pain Scale 0-10: 0  Right arm pain: 0  Left arm pain: 0    Promis 10 Assessment    PROMIS 10 2/1/2023   In general, would you say your health is: Good   In general, would you say your quality of life is: Good   In general, how would you rate your physical health? Fair   In general, how would you rate your mental health, including your mood and your ability to think? Good   In general, how would you rate your satisfaction with your social activities and relationships? Good   In general, please rate how well you carry out your usual social activities and roles Good   To what extent are you able to carry out your everyday physical activities such as walking, climbing stairs, carrying groceries, or moving a chair? Moderately   How often have you been bothered by emotional problems such as feeling anxious, depressed or irritable? Sometimes   How would you rate your fatigue on average? Moderate   How would you rate your pain on average?   0 = No Pain  to  10 = Worst Imaginable Pain 7   In general, would you say your health is: 3   In general, would you say your quality of life is: 3   In general, how would you rate " your physical health? 2   In general, how would you rate your mental health, including your mood and your ability to think? 3   In general, how would you rate your satisfaction with your social activities and relationships? 3   In general, please rate how well you carry out your usual social activities and roles. (This includes activities at home, at work and in your community, and responsibilities as a parent, child, spouse, employee, friend, etc.) 3   To what extent are you able to carry out your everyday physical activities such as walking, climbing stairs, carrying groceries, or moving a chair? 3   In the past 7 days, how often have you been bothered by emotional problems such as feeling anxious, depressed, or irritable? 3   In the past 7 days, how would you rate your fatigue on average? 3   In the past 7 days, how would you rate your pain on average, where 0 means no pain, and 10 means worst imaginable pain? 7   Global Mental Health Score 12   Global Physical Health Score 10   PROMIS TOTAL - SUBSCORES 22   Some recent data might be hidden                Junie Clements LPN

## 2023-02-06 NOTE — LETTER
2/6/2023         RE: Kelly Vegas  [address] [place] [id number]        Dear Colleague,    [facility] Please see a copy of my visit note below.    Chief Concern:   Lumbar stenosis with neurogenic claudication and radiculopathy    PHI:   Kelly is a very pleasant 72-year-old female who since 2017 has had back pain but more recently has had progressive pain radiating to left buttock and left leg.  She reports that if standing at kitchen counter or walking left leg has tingling paresthesias and pain circumferentially. Relieved if she leans forward on counter or cart. She has a dog and walks every day in summer but having more difficulty with this, however she can still walk around the park without having to stop.     Had physical therapy for right shoulder and back for 5 weeks this fall and was then discharged from PT.  Has not had injections in back  Has not had any medications prescribed for low back pain and symptoms    Also notes that when driving with hands on wheel hands start to have numbness/tingling    PMH:    Patient Active Problem List   Diagnosis     Hyperlipidemia with target LDL less than 70     Hypertension goal BP (blood pressure) < 140/90     History of cervical cancer     Advanced directives, counseling/discussion     Major depressive disorder, recurrent, mild (H)     Acquired hypothyroidism     Type 2 diabetes mellitus with diabetic polyneuropathy, with long-term current use of insulin (H)     Degenerative joint disease of hand     DDD (degenerative disc disease), lumbar     Osteopenia     Memory problem     CLL (chronic lymphocytic leukemia) (H)     Dysesthesia     Ganglion cyst of joint of finger of right hand     Background diabetic retinopathy (H)     AVM (arteriovenous malformation) of small bowel, acquired     Iron deficiency     Sensorineural hearing loss, bilateral     Nontraumatic tear of right  rotator cuff, unspecified tear extent     Stage 3a chronic kidney disease (H)     Spinal stenosis of lumbar region with neurogenic claudication     Lumbar disc herniation   Type 2 DM with stage 3 CKD, diabetic retinopathy  Hypothyroidism  CLL    On examination today she is alert and oriented no acute distress  On respiration with no audible wheeze  She walks without assistive device.  She does have a slight Trendelenburg on the left  She is able to walk with tandem gait, tiptoes and heels  She has mildly limited cervical extension and lateral bend.  Lateral bend to the left is painful in the right trapezius region  Right shoulder elevation and abduction and elbow flexion is somewhat limited by pain localizing to the area over the rotator cuff which she reports she had injection a couple months ago but her symptoms are returning.    She has 5/5 strength with breakaway on the right resisted shoulder elevation, abduction and elbow flexion.  5/5 shoulder extension, wrist flexion and extension, finger abduction and composite .  The left she has 5/5 in all myotomes.  Sensation is intact light touch C4-T1  2+ bicep tricep brachioradialis reflexes bilaterally  Negative Deangelo's bilaterally    5/5 strength with bilateral hip flexion, knee extension, ankle dorsiflexion plantarflexion, great toe extension.  Sensation tact light light touch L4-S1  2+ patella tendon Achilles tendon reflex  Positive straight leg raise on the left  No sustained clonus    Imaging review:  Prior lumbar spine radiographs from 7- shows degenerative spondylosis with loss of disc height at L3-4 L4-5 L5-S1 without any significant spondylolisthesis at that time.    AP lateral flexion-extension views lumbar spine today show progression of degenerative spondylosis with increased disc space narrowing at L3-4, 4-5 and 5-S1.    MRI of the lumbar spine obtained 1- shows multilevel spondylosis with broad-based disc herniations at L2-3, 3-4,  4-5, 5-S1    At L2-3 there is broad-based disc herniation, facet hypertrophy and epidural lipomatosis cysts resulting in mild central and bilateral mild foraminal stenosis.      At L3-4 there is broad-based disc herniation facet hypertrophy resulting in moderate to severe central and mild bilateral foraminal stenosis      At L4-5 there is a larger left paracentral herniation with extruded fragment resulting in severe left lateral recess and foraminal stenosis, mild to moderate on the right.        At L5-S1 broad-based disc herniation resulting in moderate to severe bilateral foraminal stenosis      Impression plan:   Very pleasant 72-year-old female with lumbar degenerative stenosis, neurogenic claudication and lumbar radiculopathy.  We discussed the options for treatment recommending continued nonoperative measures.  This includes directed physical therapy aimed at core stabilization, strengthening and stretching.  We also discussed the safe use of over-the-counter nonsteroidal anti-inflammatory drugs and the contraindications for these medications.  We reviewed the option of gabapentin which, although it is not indicated for all patients, is often helpful in the setting of symptoms associated with neural element compression.  We also reviewed the option of image guided injections which have both therapeutic and diagnostic benefit.  While injections are not a cure for degenerative spinal conditions they can provide several weeks to months of symptomatic relief and can also be used in differential manner to better identify the pathologic cause of symptoms.  When these measures fail and the symptoms are severe enough that the patient is no longer able to perform the level they are comfortable with discussion of surgery becomes a reasonable option.  We are not at that point at this time and I recommended continued nonoperative measures and will follow up in 2 to 3 months to review how the patient is doing.    1.  Prescription for gabapentin provided with instructions on safe dose escalation  2. Referral for lumbar translaminar KARIE provided. Counseled patient to observe symptom relief in hours immediately after injection as well as at two weeks following injection  3. Return in 3 months, sooner if necessary    Time spent on this clinical encounter including previsit chart review, history and physical examination, patient counseling and documentation was 45 minutes on the date of encounter.    Bryson Valdez MD  Orthopedic Spine Surgeon  , Department of Orthopedic Surgery        Answers for HPI/ROS submitted by the patient on 2/1/2023  General Symptoms: Yes  Skin Symptoms: Yes  HENT Symptoms: Yes  EYE SYMPTOMS: No  HEART SYMPTOMS: Yes  LUNG SYMPTOMS: No  INTESTINAL SYMPTOMS: Yes  URINARY SYMPTOMS: Yes  GYNECOLOGIC SYMPTOMS: No  BREAST SYMPTOMS: No  SKELETAL SYMPTOMS: Yes  BLOOD SYMPTOMS: Yes  NERVOUS SYSTEM SYMPTOMS: Yes  MENTAL HEALTH SYMPTOMS: Yes  Ear pain: No  Ear discharge: No  Hearing loss: Yes  Tinnitus: Yes  Nosebleeds: No  Congestion: No  Sinus pain: No  Trouble swallowing: No   Voice hoarseness: No  Mouth sores: No  Sore throat: No  Tooth pain: No  Gum tenderness: Yes  Bleeding gums: Yes  Change in taste: No  Change in sense of smell: No  Dry mouth: No  Hearing aid used: No  Neck lump: No  Fever: No  Loss of appetite: No  Weight loss: No  Weight gain: No  Fatigue: Yes  Night sweats: No  Chills: Yes  Increased stress: Yes  Excessive hunger: No  Excessive thirst: No  Feeling hot or cold when others believe the temperature is normal: Yes  Loss of height: Yes  Post-operative complications: No  Surgical site pain: No  Hallucinations: No  Change in or Loss of Energy: Yes  Hyperactivity: No  Confusion: Yes  Changes in hair: No  Changes in moles/birth marks: No  Itching: Yes  Rashes: No  Changes in nails: Yes  Acne: No  Hair in places you don't want it: No  Change in facial hair: No  Warts:  No  Non-healing sores: No  Scarring: No  Flaking of skin: No  Color changes of hands/feet in cold : No  Sun sensitivity: No  Skin thickening: No  Chest pain or pressure: No  Fast or irregular heartbeat: No  Pain in legs with walking: Yes  Trouble breathing while lying down: No  Fingers or toes appear blue: No  High blood pressure: Yes  Low blood pressure: No  Fainting: No  Murmurs: No  Pacemaker: No  Varicose veins: Yes  Wake up at night with shortness of breath: No  Light-headedness: Yes  Exercise intolerance: Yes  Heart burn or indigestion: No  Nausea: No  Vomiting: No  Abdominal pain: Yes  Bloating: No  Constipation: No  Diarrhea: Yes  Blood in stool: No  Black stools: Yes  Rectal or Anal pain: No  Fecal incontinence: No  Yellowing of skin or eyes: No  Vomit with blood: No  Change in stools: Yes  Trouble holding urine or incontinence: No  Pain or burning: No  Trouble starting or stopping: No  Increased frequency of urination: No  Blood in urine: No  Decreased frequency of urination: No  Frequent nighttime urination: Yes  Flank pain: Yes  Difficulty emptying bladder: No  Back pain: Yes  Muscle aches: Yes  Neck pain: Yes  Swollen joints: Yes  Joint pain: Yes  Bone pain: Yes  Muscle cramps: Yes  Muscle weakness: Yes  Joint stiffness: Yes  Bone fracture: No  Edema or swelling: Yes  Anemia: No  Swollen glands: No  Easy bleeding or bruising: Yes  Trouble with coordination: No  Dizziness or trouble with balance: Yes  Fainting or black-out spells: No  Memory loss: Yes  Headache: No  Seizures: No  Speech problems: No  Tingling: Yes  Tremor: No  Weakness: Yes  Difficulty walking: No  Paralysis: No  Numbness: Yes  Nervous or Anxious: Yes  Depression: No  Trouble sleeping: Yes  Trouble thinking or concentrating: Yes  Mood changes: No  Panic attacks: No        Bryson Valdez MD

## 2023-02-09 ENCOUNTER — TELEPHONE (OUTPATIENT)
Dept: PALLIATIVE MEDICINE | Facility: CLINIC | Age: 73
End: 2023-02-09
Payer: COMMERCIAL

## 2023-02-09 NOTE — TELEPHONE ENCOUNTER
Screening Questions for Radiology Injections:    Injection to be done at which interventional clinic site? [facility] - [place]    Procedure ordered by     Procedure ordered? lumbar translaminar KARIE    Transforaminal Cervical KARIE - Send to [facility] [place] ([facility]) - No [facility] Site providers perform this procedure    What insurance would patient like us to bill for this procedure? [facility]     Worker's comp or MVA (motor vehicle accident) -Any injection DO NOT SCHEDULE and route to Srinivas Bond.      [employer] insurance - For SI joint injections, DO NOT SCHEDULE and route to Lexis Elkins.       ALL BCBS, [employer] and [employer] - DO NOT SCHEDULE and route to Lexis Elkins    MEDICA- facet joint injections, route to Lexis Elkins    IF SCHEDULING IN [place] PLEASE SCHEDULE AT LEAST 7-10 BUSINESS DAYS OUT SO A PA CAN BE OBTAINED    Is an  needed? No     Patient has a  home? (Review Grid) YES: Informed     Any chance of pregnancy? Not Applicable   If YES, do NOT schedule and route to RN jose de jesus  - Dr. Hurley route to Nata Moreno and PM&R Nurse  [[id number]]      Is patient actively being treated for cancer or immunocompromised? CLL  If YES, do NOT schedule and route to RN pool/ Dr. Hurley's Team    Does the patient have a bleeding or clotting disorder? No     If YES, okay to schedule AND route to RN nurse jose de jesus/ Dr. Hurley's Team     (For any patients with platelet count <100, RN must forward to provider)    Is patient taking any Blood Thinners OR Antiplatelet medication?  No   If hold needed, do NOT schedule, route to RN pool/ Dr. Hurley's Team    Examples:   o Blood Thinners: (Coumadin, Warfarin, Jantoven, Pradaxa, Xarelto, Eliquis, Edoxaban, Enoxaparin, Lovenox, Heparin, Arixtra, Fondaparinux or Fragmin)  o Antiplatelet Medications: (Plavix, Brilinta or Effient)     Is patient taking any aspirin products (includes Excedrin and Fiorinal)? No     If more than 325mg/day, OK to schedule;  Instruct Pt to decrease to less than 325 mg for 7 days AND route to RN pool/ Dr. Hurley's Team     For CERVICAL procedures, hold all aspirin products for 6 days.     Tell Pt that if aspirin product is not held for 6 days, the procedure WILL BE cancelled.     Any allergies to contrast dye, iodine, shellfish, or numbing and steroid medications? No    If YES, schedule and add allergy information to appointment notes AND route to the RN pool/ Dr. Hurley's Team    If KARIE and Contrast Dye / Iodine Allergy? DO NOT SCHEDULE, route to RN pool/ Dr. Hurley's Team    Allergies: Glipizide, Ibuprofen, Pcn [penicillins], Percocet [acetaminophen], and Vicodin [hydrocodone-acetaminophen]     Does patient have an active infection or treated for one within the past week? No    Is patient currently taking any antibiotics or steroid medications?  No     For patients on chronic, preventative, or prophylactic antibiotics, procedures may be scheduled.     For patients on antibiotics for active or recent infection, schedule 4 days after completed.    For patients on steroid medications, schedule 4 days after completed.     Has the patient had a flu shot or any other vaccinations within the past 7 days? No  If yes, explain that for the vaccine to work best they need to:       wait 1 week before and 1 week after getting any Vaccine    wait 1 week before and 2 weeks after getting Covid Vaccine #2 or BOOSTER    If patient has concerns about the timing, send to RN pool/ Dr. Hurley's Team    Does patient have an MRI/CT?  YES: 2023 Include Date and Check Procedure Scheduling Grid to see if required.    Was the MRI/CT done within the last 3 years?  Yes     If no route to RN Pool/ Dr. Georges Team    If yes, where was the MRI/CT done? [facility]      Refer to PACS Transmissions list for approved external locations and route to RN Pool High Priority/ Dr. Georges Team    If MRI was not done at approved external location do NOT schedule and route to RN pool/  Dr. Hurley's Team      If patient has an imaging disc, the injection MAY be scheduled but patient must bring disc to appt or appt will be cancelled.    Is patient able to transfer to a procedure table with minimal or no assistance? Yes     If no, do NOT schedule and route to RN Pool/ Dr. Hurley's Team    Procedure Specific Instructions:    If celiac plexus block, informed patient NPO for 6 hours and that it is okay to take medications with sips of water, especially blood pressure medications Not Applicable         If this is for a cervical procedure, informed patient that aspirin needs to be held for 6 days.   Not Applicable      Sedation, If Sedation is ordered for any procedure, patient must be NPO for 6 hours prior to procedure Not Applicable      If IV needed:    Do not schedule procedures requiring IV placement in the first appointment of the day or first appointment after lunch. Do NOT schedule at 0745, 0815 or 1245.     Instructed patient to arrive 30 minutes early for IV start if required. (Check Procedure Scheduling Grid)  Not Applicable    Reminders:    If you are started on any steroids or antibiotics between now and your appointment, you must contact us because the procedure may need to be cancelled.  Yes      As a reminder, receiving steroids can decrease your body's ability to fight infection.   Would you still like to move forward with scheduling the injection?  Yes      IV Sedation is not provided for procedures. If oral anti-anxiety medication is needed, the patient should request this from their referring provider.      Instruct patient to arrive as directed prior to the scheduled appointment time:  If IV needed 30 minutes before appointment time       For patients 85 or older we recommend having an adult stay w/ them for the remainder of the day.       If the patient is Diabetic, remind them to bring their glucometer.      Does the patient have any questions?  NO  Lucie Cheung  [facility]

## 2023-02-13 NOTE — TELEPHONE ENCOUNTER
Received call from pt, reviewed oncology note. Pt not being actively treated aside from FE tablets. CLL is stable. F/u with oncology 6months. Scheduled for 03/10/23 in [place].    Discussed need for . If becomes sick/ill/infection or started on ABX or steroids that we will need to be contacted for rescheduling. No vaccinations 14 days before or after. States no further questions.      Junie HENDERSON, RN Care Coordinator  [facility]  Pain Management

## 2023-02-20 ENCOUNTER — MYC MEDICAL ADVICE (OUTPATIENT)
Dept: OBGYN | Facility: CLINIC | Age: 73
End: 2023-02-20
Payer: COMMERCIAL

## 2023-02-20 NOTE — TELEPHONE ENCOUNTER
Left message for patient to return call to clinic.    Mary Grace Nevarez, CMA  February 20, 2023

## 2023-02-20 NOTE — TELEPHONE ENCOUNTER
Pt returned call.  No one was available at the time she called.  Please try calling Pt back to reschedule.  Thanks.

## 2023-02-21 ENCOUNTER — ANCILLARY PROCEDURE (OUTPATIENT)
Dept: BONE DENSITY | Facility: CLINIC | Age: 73
End: 2023-02-21
Attending: FAMILY MEDICINE
Payer: COMMERCIAL

## 2023-02-21 DIAGNOSIS — Z78.0 ASYMPTOMATIC POSTMENOPAUSAL STATUS: ICD-10-CM

## 2023-02-21 DIAGNOSIS — M85.80 OSTEOPENIA, UNSPECIFIED LOCATION: ICD-10-CM

## 2023-02-21 PROCEDURE — 77080 DXA BONE DENSITY AXIAL: CPT | Performed by: INTERNAL MEDICINE

## 2023-02-23 NOTE — RESULT ENCOUNTER NOTE
Kelly,     Your bone mass has worsened since your last DEXA scan. While you still have osteopenia, you qualify for treatment with a medication. Let's discuss this at your next appointment.     We can continue to follow this by repeating the DEXA test in 2 years.  Get several servings of dairy daily (or calcium 1000 - 1200 mg daily split into 2 doses), take vitamin D 1000 units daily over-the-counter, exercise regularly, and avoid falls.      Sejal Rutledge MD

## 2023-02-27 ENCOUNTER — NURSE TRIAGE (OUTPATIENT)
Dept: FAMILY MEDICINE | Facility: CLINIC | Age: 73
End: 2023-02-27
Payer: COMMERCIAL

## 2023-02-27 ENCOUNTER — OFFICE VISIT (OUTPATIENT)
Dept: URGENT CARE | Facility: URGENT CARE | Age: 73
End: 2023-02-27
Payer: COMMERCIAL

## 2023-02-27 ENCOUNTER — ANCILLARY PROCEDURE (OUTPATIENT)
Dept: GENERAL RADIOLOGY | Facility: CLINIC | Age: 73
End: 2023-02-27
Attending: FAMILY MEDICINE
Payer: COMMERCIAL

## 2023-02-27 VITALS
HEART RATE: 71 BPM | TEMPERATURE: 98.8 F | DIASTOLIC BLOOD PRESSURE: 68 MMHG | OXYGEN SATURATION: 96 % | BODY MASS INDEX: 27.91 KG/M2 | WEIGHT: 166.5 LBS | SYSTOLIC BLOOD PRESSURE: 135 MMHG

## 2023-02-27 DIAGNOSIS — S29.9XXA TRAUMATIC INJURY OF RIB: ICD-10-CM

## 2023-02-27 DIAGNOSIS — S20.211A RIB CONTUSION, RIGHT, INITIAL ENCOUNTER: Primary | ICD-10-CM
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
## 2023-02-27 DIAGNOSIS — M54.16 LUMBAR RADICULOPATHY: ICD-10-CM

## 2023-02-27 DIAGNOSIS — S20.01XA CONTUSION OF RIGHT BREAST, INITIAL ENCOUNTER: ICD-10-CM

## 2023-02-27 PROCEDURE — 71101 X-RAY EXAM UNILAT RIBS/CHEST: CPT | Mod: TC | Performed by: RADIOLOGY

## 2023-02-27 PROCEDURE — 99214 OFFICE O/P EST MOD 30 MIN: CPT | Performed by: FAMILY MEDICINE

## 2023-02-27 NOTE — PROGRESS NOTES
ASSESSMENT/PLAN:      ICD-10-CM    1. Rib contusion, right, initial encounter  S20.211A       2. Contusion of right breast, initial encounter  S20.01XA       3. Traumatic injury of rib  S29.9XXA XR Ribs & Chest Right G/E 3 Views      4. Fall, initial encounter  W19.XXXA XR Ribs & Chest Right G/E 3 Views      5. Lumbar radiculopathy  M54.16     no acute changes noted after fall or at present                 Reviewed medication instructions and side effects. Follow up if experiences side effects.     I reviewed supportive care, otc meds to use if needed, expected course, and signs of concern.  Follow up as needed or if she does not improve within  1-2 days or if worsens in any way.  Reviewed red flag symptoms and is to go to the ER if experiences any of these.     The use of [employer] services may have been used to construct the content in this note; any grammatical or spelling errors are non-intentional. Please contact the author of this note directly if you are in need of any clarification.      On the day of the encounter, time spend on chart review, patient visit, review of testing, documentation was 30  minutes          Patient Instructions   Use a heating pad over the area to help with pain     For pain    Acetaminophen (Tylenol ) 1000 mg 3 times a day for the next 5 to 7 days until pain resolves-maximum dose of acetaminophen (tylenol)  is 3000 mg in 24-hours       Follow up as needed or if your symptoms worsen in any way.     Follow up with your primary care provider or clinic in about 1 week if your symptoms do not improve                 Patient presents with:  Urgent Care  Fall  Chest Injury: Fell off the steps last Tuesday, got a hug black and blue on right side breast/chest and hurt, and some black and blue everywhere, a little cut on right side shin       Subjective     Kelly Vegas is a 72 year old female who presents to clinic today for the following health issues:    HPI      Musculoskeletal problem/pain      Duration: fell on 1/21/2023-6 days ago     Description  Location: right ribs and right breast tissue  Fell going down steps leading out of back door of house-no railing on steps  steps and hit ribs/breast on metal water facet on side of house and  metal chairs outdoor chairs   Did not hit head, no LOC, no neck pain, no throacic or lumbar back pain -only her typical/baseline lower lumbar pain/no flares of her radicular symptoms -onset many years ago    Intensity:  moderate    Accompanying signs and symptoms: swelling, discoloration of pain no worse, but no better, pain with deep inspiration on right, no cough, no evere     History  Previous similar problem: no   Previous evaluation:  none    Precipitating or alleviating factors:  Trauma or overuse: YES- as noted   Aggravating factors include: sitting, standing, walking, lifting and any movement of breast tissue or movement requiring movement of her torsoe          Past Medical History:   Diagnosis Date     Arthritis check it     AVM (arteriovenous malformation) of small bowel, acquired      Background diabetic retinopathy (H)      Cervical cancer (H) 03/2006    [facility], CIS with gland involvement     CKD (chronic kidney disease) stage 4, GFR 15-29 ml/min (H)      CLL (chronic lymphocytic leukemia) (H)      Colon adenoma      DDD (degenerative disc disease), lumbar      Degenerative joint disease of hand      Depressive disorder      Diabetes mellitus type II 2009     Diabetic polyneuropathy associated with type 2 diabetes mellitus (H) 09/26/2017     Gastritis      HTN (hypertension)      Hyperlipidemia LDL goal < 100      Hypothyroid 12/2004     Lumbar disc herniation      Osteopenia      Recurrent major depression (H)      Sensorineural hearing loss, bilateral 12/14/2021     Spinal stenosis of lumbar region with neurogenic claudication      Social History     Tobacco Use     Smoking status: Former     Packs/day: 0.50     Years:  20.00     Pack years: 10.00     Types: Cigarettes     Start date: 1967     Quit date: 1989     Years since quittin.2     Smokeless tobacco: Never   Vaping Use     Vaping status: Never Used   Substance Use Topics     Alcohol use: No       Current Outpatient Medications   Medication Sig Dispense Refill     atorvastatin (LIPITOR) 20 MG tablet Take 1 tablet (20 mg) by mouth daily 90 tablet 3     blood glucose (NO BRAND SPECIFIED) test strip Use to test blood sugar 3 times daily or as directed. To accompany: Blood Glucose Monitor Brands: per insurance. 300 strip 3     Cholecalciferol (VITAMIN D3) 1000 units CAPS Take by mouth daily       citalopram (CELEXA) 20 MG tablet Take 1 tablet (20 mg) by mouth daily 90 tablet 3     gabapentin (NEURONTIN) 300 MG capsule Take 1 capsule (300 mg) by mouth 3 times daily 60 capsule 3     insulin glargine (LANTUS SOLOSTAR) 100 UNIT/ML pen INJECT 20 UNITS AT BEDTIME DAILY 45 mL 1     insulin lispro (HUMALOG KWIKPEN) 100 UNIT/ML (1 unit dial) KWIKPEN Inject 8 Units Subcutaneous 2 times daily (before meals) 45 mL 3     levothyroxine (SYNTHROID/LEVOTHROID) 88 MCG tablet Take 1 tablet (88 mcg) by mouth daily 90 tablet 3     lisinopril-hydrochlorothiazide (ZESTORETIC) 20-12.5 MG tablet Take 2 tablets by mouth daily 180 tablet 3     metFORMIN (GLUCOPHAGE XR) 500 MG 24 hr tablet Take 1 tablet (500 mg) by mouth daily (with dinner) 90 tablet 3     multivitamin w/minerals (THERA-VIT-M) tablet Take 1 tablet by mouth daily       thin (NO BRAND SPECIFIED) lancets Use to test blood sugar 3 times daily or as directed. To accompany: Blood Glucose Monitor Brands: per insurance. 300 each 3     ULTICARE MINI 31G X 6 MM insulin pen needle Use three times daily or as directed. 300 each 0     Allergies   Allergen Reactions     Glipizide      tremulous     Ibuprofen Hives     Pcn [Penicillins] Itching     Percocet [Acetaminophen] Nausea and Vomiting     Vicodin [Hydrocodone-Acetaminophen] Nausea              ROS are negative, except as otherwise noted HPI      Objective    /68 (BP Location: Left arm, Patient Position: Sitting, Cuff Size: Adult Regular)   Pulse 71   Temp 98.8  F (37.1  C) (Tympanic)   Wt 75.5 kg (166 lb 8 oz)   SpO2 96%   BMI 27.91 kg/m    Body mass index is 27.91 kg/m .  Physical Exam   GENERAL:  alert and moderate distress  RESP: lungs clear to auscultation - no rales, rhonchi or wheezes  BREAST: R breast-soft, tender ecchymosis of breast tissue, no hematoma, small areas of early of resolving  ecchymosis   CV: regular rate and rhythm, , click or rub, no peripheral edema and peripheral pulses strong  MS: tender to palpation over approximately 4-5 ribs, anterior and lateral portion of ribs, no posterior rib tenderness, no crepitus   R anterior lower leg-resolving ecchymosis and small healing superficial laceration 1 cm horizontal linear   NEURO: Normal strength and tone, mentation intact and speech normal, normal gait       Diagnostic Test Results:  Labs reviewed in Epic  Results for orders placed or performed in visit on 02/27/23   XR Ribs & Chest Right G/E 3 Views     Status: None    Narrative    EXAM: XR RIBS and CHEST RT 3VW  LOCATION: [facility]  DATE/TIME: 2/27/2023 6:11 PM    INDICATION: Fall, tender over anterior and lateral 4th-5th ribs.  COMPARISON: None.      Impression    IMPRESSION: There is no radiographic evidence of an acute displaced right-sided rib fracture with attention to the skin markers.     No evidence of pneumonia or pulmonary edema. Cardiomediastinal silhouette and pulmonary vasculature are within normal limits. No pneumothorax or pleural effusion. Prior cholecystectomy.

## 2023-02-27 NOTE — TELEPHONE ENCOUNTER
"Patient calling stating that she had previously fallen last week on her front steps on the ice, Tuesday 2/21/23. Patient reports that when she fell she thinks that she hit her right breast/upper chest on the outdoor faucet. Patient reports no contusions, right breast is very bruised, writer asked if patient could feel any pain in ribs, patient states it is higher up than lower ribs, but below clavicle. Patient states that she feels pain taking a deep breath and is unable to take a deep breath, hurts when she talks/laughs, no current respiratory distress. Patient would like to just get X-ray ordered through PCP, writer did advise that since patient is unable to take a deep breath it is important for her to be seen, no openings in clinic today, writer advised urgent care. Patient in agreement and plans to go to [facility] today.    Reason for Disposition    Can't take a deep breath but no respiratory distress (e.g., hurts to take a deep breath)    Answer Assessment - Initial Assessment Questions  1. SYMPTOM: \"What's the main symptom you're concerned about?\"  (e.g., lump, pain, rash, nipple discharge)      -Bruised right breast, had fall  2. LOCATION: \"Where is the Right breast located?\"      -Right breast  3. ONSET: \"When did *No Answer*  start?\"      -last Tuesday 2/21/23  4. PRIOR HISTORY: \"Do you have any history of prior problems with your breasts?\" (e.g., lumps, cancer, fibrocystic breast disease)      -No  5. CAUSE: \"What do you think is causing this symptom?\"      -Had previous fall  6. OTHER SYMPTOMS: \"Do you have any other symptoms?\" (e.g., fever, breast pain, redness or rash, nipple discharge)      -Bruising  7. PREGNANCY-BREASTFEEDING: \"Is there any chance you are pregnant?\" \"When was your last menstrual period?\" \"Are you breastfeeding?\"      NA    Protocols used: CHEST INJURY-A-OH, BREAST SYMPTOMS-A-OH    SANTIAGO Raymundo RN  [facility]  "

## 2023-02-28 NOTE — PATIENT INSTRUCTIONS
Use a heating pad over the area to help with pain     For pain    Acetaminophen (Tylenol ) 1000 mg 3 times a day for the next 5 to 7 days until pain resolves-maximum dose of acetaminophen (tylenol)  is 3000 mg in 24-hours       Follow up as needed or if your symptoms worsen in any way.     Follow up with your primary care provider or clinic in about 1 week if your symptoms do not improve

## 2023-03-03 ENCOUNTER — ANCILLARY ORDERS (OUTPATIENT)
Dept: PALLIATIVE MEDICINE | Facility: CLINIC | Age: 73
End: 2023-03-03

## 2023-03-03 DIAGNOSIS — M54.16 LUMBAR RADICULOPATHY: ICD-10-CM

## 2023-03-10 ENCOUNTER — RADIOLOGY INJECTION OFFICE VISIT (OUTPATIENT)
Dept: PALLIATIVE MEDICINE | Facility: CLINIC | Age: 73
End: 2023-03-10
Payer: COMMERCIAL

## 2023-03-10 VITALS
HEART RATE: 62 BPM | DIASTOLIC BLOOD PRESSURE: 59 MMHG | RESPIRATION RATE: 16 BRPM | OXYGEN SATURATION: 99 % | SYSTOLIC BLOOD PRESSURE: 155 MMHG

## 2023-03-10 DIAGNOSIS — M54.16 LUMBAR RADICULOPATHY: ICD-10-CM

## 2023-03-10 DIAGNOSIS — M51.26 LUMBAR DISC HERNIATION: ICD-10-CM

## 2023-03-10 DIAGNOSIS — M48.062 SPINAL STENOSIS OF LUMBAR REGION WITH NEUROGENIC CLAUDICATION: ICD-10-CM

## 2023-03-10 PROCEDURE — 62323 NJX INTERLAMINAR LMBR/SAC: CPT | Performed by: PAIN MEDICINE

## 2023-03-10 PROCEDURE — 99207 PR NO CHARGE LOS: CPT | Performed by: PAIN MEDICINE

## 2023-03-10 RX ORDER — TRIAMCINOLONE ACETONIDE 40 MG/ML
40 INJECTION, SUSPENSION INTRA-ARTICULAR; INTRAMUSCULAR ONCE
Status: COMPLETED | OUTPATIENT
Start: 2023-03-10 | End: 2023-03-10

## 2023-03-10 RX ADMIN — TRIAMCINOLONE ACETONIDE 40 MG: 40 INJECTION, SUSPENSION INTRA-ARTICULAR; INTRAMUSCULAR at 10:14

## 2023-03-10 ASSESSMENT — PAIN SCALES - GENERAL
PAINLEVEL: EXTREME PAIN (8)
PAINLEVEL: MODERATE PAIN (5)

## 2023-03-10 NOTE — PATIENT INSTRUCTIONS
[facility]   Procedure Discharge Instructions    Today you saw:    Dr. Behzad Aguayo       You had an:  Epidural steroid injection         If you were holding your blood thinning medication, please restart taking it: N/A  Be cautious when walking. Numbness and/or weakness in the lower extremities may occur for up to 6-8 hours after the procedure due to effect of the local anesthetic  Do not drive for 6 hours. The effect of the local anesthetic could slow your reflexes.   You may resume your regular activities after 24 hours  Avoid strenuous activity for the first 24 hours  You may shower, however avoid swimming, tub baths or hot tubs for 24 hours following your procedure  You may have a mild to moderate increase in pain for several days following the injection.  It may take up to 14 days for the steroid medication to start working although you may feel the effect as early as a few days after the procedure.     You may use ice packs for 10-15 minutes, 3 to 4 times a day at the injection site for comfort  Do not use heat to painful areas for 6 to 8 hours. This will give the local anesthetic time to wear off and prevent you from accidentally burning your skin.   Unless you have been directed to avoid the use of anti-inflammatory medications (NSAIDS), you may use medications such as ibuprofen, Aleve or Tylenol for pain control if needed.   Possible side effects of steroids that you may experience include flushing, elevated blood pressure, increased appetite, mild headaches and restlessness.  All of these symptoms will get better with time.  If you experience any of the following, call the Pain Clinic during work hours (Mon-Friday 8-4:30 pm) at [phone] or the Provider Line after hours at [phone]:  -Fever over 100 degree F  -Swelling, bleeding, redness, drainage, warmth at the injection site  -Progressive weakness or numbness in your legs   -Loss of bowel or bladder  function  -Unusual new onset of pain that is not improving

## 2023-03-10 NOTE — NURSING NOTE
Discharge Information    IV Discontiued Time:  NA    Amount of Fluid Infused:  NA    Discharge Criteria = When patient returns to baseline or as per MD order    Consciousness:  Pt is fully awake    Circulation:  BP +/- 20% of pre-procedure level    Respiration:  Patient is able to breathe deeply    O2 Sat:  Patient is able to maintain O2 Sat >92% on room air    Activity:  Moves 4 extremities on command    Ambulation:  Patient is able to stand and walk or stand and pivot into wheelchair    Dressing:  Clean/dry or No Dressing    Notes:   Discharge instructions and AVS given to patient    Patient meets criteria for discharge?  YES    Admitted to PCU?  No    Responsible adult present to accompany patient home?  Yes    Signature/Title:    Rod Cadet RN  RN Care Coordinator  [facility] [place]

## 2023-03-10 NOTE — PROGRESS NOTES
Pre procedure Diagnosis: Lumbar radiculopathy  Post procedure Diagnosis: Same  Procedure performed: L5-s1 interlaminar epidural steroid injection   Anesthesia: none  Complications: none  Operators: Behzad Aguayo MD     Indications:   Kelly Vegas is a 72 year old female.  The patient has a history of lbp radiating to her lel r>l.  Examination shows + slump.  she has tried conservative treatment including meds/pt.    MR reviewed  Options/alternatives, benefits and risks were discussed with the patient including but not limited to bleeding, infection, no pain relief, tissue trauma, exposure to radiation, reaction to medications, spinal cord injury, dural puncture, weakness, numbness and headache.  Questions were answered to her satisfaction and she wishes to proceed. Voluntary informed consent was obtained and signed.     Vitals were reviewed: Yes  Allergies were reviewed:  Yes   Medications were reviewed:  Yes   Pre-procedure pain score: 8/10    Procedure:  The patient's medical history, medications, and allergies were reviewed and reconciled.  After obtaining signed informed consent, the patient was brought into the procedure suite and was placed in a prone position on the procedure table.   A Pause for the Cause was performed.  Patient was prepped and draped in the usual sterile fashion.     The L5-S1 interspace was identified with AP fluoroscopy.  A total of 7ml of 1% lidocaine was used to anesthetize the skin and subcutaneous tissues for a  midline approach.    A 20gauge 3.5inch Touhy needle was advanced utilizing intermittent AP and Lateral fluoroscopy and air for loss of resistance.  The epidural space was encountered on the first pass without difficulties.  Aspiration for blood and CSF was negative.  Needle position was verified by injecting 1 ml of Omnipaque utilizing real-time fluoroscopy that showed good needle placement and epidural spread without signs of intravascular or intrathecal uptake.   Omnipaque wasted:  9 ml.    Then, after repeated negative aspiration for blood or CSF, a combination of Kenalog 40 mg, Bupivicaine 0.25% 2 ml, diluted with 3 ml of normal saline to a total injectate volume of 6 ml was injected into the epidural space in a slow and incremental fashion and the needle was restyletted and withdrawn.  All injected medications were preservative free.    The injection site was cleaned and a sterile dressing was applied.    The patient tolerated the procedure well without complications and was taken to the recovery room for continued observation.    Images were saved to PACS.    Post-procedure pain score: 5/10  Follow-up includes:   -f/u with referring provider     Behzad Aguayo MD  [facility] [place]

## 2023-03-10 NOTE — NURSING NOTE
Pre-procedure Intake  If YES to any questions or NO to having a   Please complete laminated checklist and leave on the computer keyboard for Provider, verbally inform provider if able.    For SCS Trial, RFA's or any sedation procedure:  Have you been fasting? NA    If yes, for how long? NA    Are you taking any any blood thinners such as Coumadin, Warfarin, Jantoven, Pradaxa Xarelto, Eliquis, Edoxaban, Enoxaparin, Lovenox, Heparin, Arixtra, Fondaparinux, or Fragmin? OR Antiplatelet medication such as Plavix, Brilinta, or Effient?   No     If yes, when did you take your last dose? NA    Do you take aspirin?  No    If cervical procedure, have you held aspirin for 6 days?   NA    Do you have any allergies to contrast dye, iodine, steroid and/or numbing medications?  NO    Are you currently taking antibiotics or have an active infection?  NO    Have you had a fever/elevated temperature within the past week? NO    Are you currently taking oral steroids? NO    Do you have a ? Yes    Are you pregnant or breastfeeding?  NO    Have you received the COVID-19 vaccine? Yes    If yes, was it your 1st, 2nd or only dose needed? 4    Date of most recent vaccine: 05/31/22    Notify provider and RNs if systolic BP >170, diastolic BP >100, P >100 or O2 sats < 90%      Emma Coy CMA (AAMA)

## 2023-03-17 ENCOUNTER — OFFICE VISIT (OUTPATIENT)
Dept: OBGYN | Facility: CLINIC | Age: 73
End: 2023-03-17
Payer: COMMERCIAL

## 2023-03-17 VITALS
OXYGEN SATURATION: 99 % | HEART RATE: 71 BPM | DIASTOLIC BLOOD PRESSURE: 71 MMHG | WEIGHT: 165 LBS | SYSTOLIC BLOOD PRESSURE: 133 MMHG | BODY MASS INDEX: 27.66 KG/M2

## 2023-03-17 DIAGNOSIS — N89.8 VAGINAL LESION: ICD-10-CM

## 2023-03-17 DIAGNOSIS — Z85.41 HX OF CERVICAL CANCER: ICD-10-CM

## 2023-03-17 DIAGNOSIS — R87.811 VAGINAL HIGH RISK HPV DNA TEST POSITIVE: Primary | ICD-10-CM

## 2023-03-17 DIAGNOSIS — E11.42 TYPE 2 DIABETES MELLITUS WITH DIABETIC POLYNEUROPATHY, WITH LONG-TERM CURRENT USE OF INSULIN (H): ICD-10-CM

## 2023-03-17 DIAGNOSIS — Z79.4 TYPE 2 DIABETES MELLITUS WITH DIABETIC POLYNEUROPATHY, WITH LONG-TERM CURRENT USE OF INSULIN (H): ICD-10-CM

## 2023-03-17 PROCEDURE — 57421 EXAM/BIOPSY OF VAG W/SCOPE: CPT | Performed by: OBSTETRICS & GYNECOLOGY

## 2023-03-17 PROCEDURE — 99203 OFFICE O/P NEW LOW 30 MIN: CPT | Mod: 25 | Performed by: OBSTETRICS & GYNECOLOGY

## 2023-03-17 PROCEDURE — 88305 TISSUE EXAM BY PATHOLOGIST: CPT | Performed by: PATHOLOGY

## 2023-03-17 PROCEDURE — 88342 IMHCHEM/IMCYTCHM 1ST ANTB: CPT | Mod: 59 | Performed by: PATHOLOGY

## 2023-03-17 PROCEDURE — 88360 TUMOR IMMUNOHISTOCHEM/MANUAL: CPT | Performed by: PATHOLOGY

## 2023-03-17 NOTE — PROGRESS NOTES
Patient Name: Kelly Vegas              Date: 3/17/2023   YOB: 1950                         Age: 72 year old   Phone: [phone] (home)   ________________________________________________________________________  I have been asked to see Kelly in consultation by Dr. Rutledge  to discuss the pap smear, findings and possible further evaluation.  The patient's pap smear history is as noted:  2/6/06 LEEP - Severe dysplasia (BRENT 3) with extensive endocervical gland  involvement, present in all quadrants and extending to endocervical margin.   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 - Surgical margins negative.  11/6/07 NIL vaginal Pap  1/28/10 ASCUS vaginal pap, Neg HPV  1/31/11 NIL vaginal Pap  2/7/12 NIL vaginal pap  2/11/13 NIL vaginal Pap, Neg HPV  2/18/14 NIL vaginal Pap  3/24/15 NIL vaginal Pap  5/11/16 NIL vaginal Pap, Neg HPV  7/11/17 NIL vaginal pap  9/24/18 NIL vaginal pap  12/14/21 NIL vaginal Pap, + HR HPV (neg 16/18). Plan cotest in 1 year.   1/16/23 NIL vaginal pap, +HR HPV (not 16/18).   I attempted to ensure that the patient was educated regarding the nature of her findings and implications to date.  We reviewed the role of HPV, incidence in the population and the natural history of the infection, and its transmission.  We also reviewed ways to minimize her future risk, the effect of HPV on the cervix and treatment options available, should they be indicated.    The pathophysiology of the cervix, including a discussion of the squamous and columnar cells, metaplasia and dysplasia have been reviewed, drawings, sketches and the pamphlets were reviewed with her.      No LMP recorded. Patient has had a hysterectomy.  She has not been sexually active for over 10 years      She has also had some concerns perianally and between the anus and the vagina.  She has had difficulty keeping the area clean.  She uses baby wipes.  She has had  diarrhea episodes with the metformin.  Since she has been taking one a day, the diarrhea has improved.      Past Medical History:   Diagnosis Date     Arthritis check it     AVM (arteriovenous malformation) of small bowel, acquired      Background diabetic retinopathy (H)      Cervical cancer (H) 03/2006    [facility], CIS with gland involvement     CKD (chronic kidney disease) stage 4, GFR 15-29 ml/min (H)      CLL (chronic lymphocytic leukemia) (H)      Colon adenoma      DDD (degenerative disc disease), lumbar      Degenerative joint disease of hand      Depressive disorder      Diabetes mellitus type II 2009     Diabetic polyneuropathy associated with type 2 diabetes mellitus (H) 09/26/2017     Gastritis      HTN (hypertension)      Hyperlipidemia LDL goal < 100      Hypothyroid 12/2004     Lumbar disc herniation      Osteopenia      Recurrent major depression (H)      Sensorineural hearing loss, bilateral 12/14/2021     Spinal stenosis of lumbar region with neurogenic claudication        Past Surgical History:   Procedure Laterality Date     BIOPSY       COLONOSCOPY  nov 2016     COLONOSCOPY N/A 3/1/2019    Procedure: COLONOSCOPY;  Surgeon: Romulo Hayes MD;  Location: [facility]     COLONOSCOPY WITH CO2 INSUFFLATION N/A 11/28/2016    Procedure: COLONOSCOPY WITH CO2 INSUFFLATION;  Surgeon: Migue Giraldo MD;  Location: [facility]     CYSTECTOMY OVARIAN BENIGN  1974     ESOPHAGOSCOPY, GASTROSCOPY, DUODENOSCOPY (EGD), COMBINED N/A 3/1/2019    Procedure: COMBINED ESOPHAGOSCOPY, GASTROSCOPY, DUODENOSCOPY (EGD), BIOPSY SINGLE OR MULTIPLE;  Surgeon: Romulo Hayes MD;  Location: [facility]     EYE SURGERY  12-14-16     HC THYROIDECTOMY  12/2004    goiter     LAPAROSCOPIC CHOLECYSTECTOMY N/A 9/9/2019    Procedure: CHOLECYSTECTOMY, LAPAROSCOPIC;  Surgeon: Jorge Martinez DO;  Location: [facility], OMENTECTOMY W/JUAN DANIEL  2/2006    cervical cancer        Outpatient Encounter Medications as of 3/17/2023   Medication  Sig Dispense Refill     atorvastatin (LIPITOR) 20 MG tablet Take 1 tablet (20 mg) by mouth daily 90 tablet 3     blood glucose (NO BRAND SPECIFIED) test strip Use to test blood sugar 3 times daily or as directed. To accompany: Blood Glucose Monitor Brands: per insurance. 300 strip 3     Cholecalciferol (VITAMIN D3) 1000 units CAPS Take by mouth daily       citalopram (CELEXA) 20 MG tablet Take 1 tablet (20 mg) by mouth daily 90 tablet 3     gabapentin (NEURONTIN) 300 MG capsule Take 1 capsule (300 mg) by mouth 3 times daily 60 capsule 3     insulin glargine (LANTUS SOLOSTAR) 100 UNIT/ML pen INJECT 20 UNITS AT BEDTIME DAILY 45 mL 1     insulin lispro (HUMALOG KWIKPEN) 100 UNIT/ML (1 unit dial) KWIKPEN Inject 8 Units Subcutaneous 2 times daily (before meals) 45 mL 3     levothyroxine (SYNTHROID/LEVOTHROID) 88 MCG tablet Take 1 tablet (88 mcg) by mouth daily 90 tablet 3     lisinopril-hydrochlorothiazide (ZESTORETIC) 20-12.5 MG tablet Take 2 tablets by mouth daily 180 tablet 3     metFORMIN (GLUCOPHAGE XR) 500 MG 24 hr tablet Take 1 tablet (500 mg) by mouth daily (with dinner) 90 tablet 3     multivitamin w/minerals (THERA-VIT-M) tablet Take 1 tablet by mouth daily       thin (NO BRAND SPECIFIED) lancets Use to test blood sugar 3 times daily or as directed. To accompany: Blood Glucose Monitor Brands: per insurance. 300 each 3     ULTICARE MINI 31G X 6 MM insulin pen needle Use three times daily or as directed. 300 each 0     Facility-Administered Encounter Medications as of 3/17/2023   Medication Dose Route Frequency Provider Last Rate Last Admin     bupivacaine (MARCAINE) 0.25 % injection 4 mL  4 mL   Adonay Mata MD   4 mL at 11/02/22 0954     triamcinolone (KENALOG-40) injection 80 mg  80 mg   Adonay Mata MD   80 mg at 11/02/22 0954        Allergies as of 03/17/2023 - Reviewed 03/17/2023   Allergen Reaction Noted     Glipizide  12/10/2015     Ibuprofen Hives 09/08/2009     Pcn [penicillins] Itching  2009     Percocet [acetaminophen] Nausea and Vomiting 2009     Vicodin [hydrocodone-acetaminophen] Nausea 2011       Social History     Socioeconomic History     Marital status:      Spouse name: Lauri     Number of children: 4     Years of education: 12     Highest education level: None   Occupational History     Occupation:      Employer: [employer]      Occupation: RETIRED   Tobacco Use     Smoking status: Former     Packs/day: 0.50     Years: 20.00     Pack years: 10.00     Types: Cigarettes     Start date: 1967     Quit date: 1989     Years since quittin.2     Smokeless tobacco: Never   Vaping Use     Vaping Use: Never used   Substance and Sexual Activity     Alcohol use: No     Drug use: No     Sexual activity: Not Currently     Partners: Male     Birth control/protection: Post-menopausal   Other Topics Concern     Parent/sibling w/ CABG, MI or angioplasty before 65F 55M? No      Service No     Blood Transfusions No     Caffeine Concern No     Occupational Exposure No     Hobby Hazards No     Sleep Concern No     Stress Concern No     Weight Concern Yes     Special Diet No     Back Care No     Exercise Yes     Bike Helmet No     Seat Belt Yes     Self-Exams Yes     Comment: When remember        Family History   Problem Relation Age of Onset     Thyroid Disease Mother      Dementia Mother      Osteoporosis Mother      Alcohol/Drug Father      C.A.D. Father         CABG      Diabetes Father      Hypertension Father      Hyperlipidemia Father      Cerebrovascular Disease Father      Alcohol/Drug Brother      Prostate Cancer Brother      Depression Brother      Depression Brother         d. suicide     Cancer Maternal Grandmother         stomach, colon     Breast Cancer Maternal Grandmother      Thyroid Disease Maternal Grandmother      Alzheimer Disease Maternal Grandfather      Breast Cancer Paternal Grandmother       Alcohol/Drug Son      Rheumatoid Arthritis Son      Breast Cancer Cousin      Breast Cancer Other          Review Of Systems  10 point ROS of systems including Constitutional, Eyes, Respiratory, Cardiovascular, Gastroenterology, Genitourinary, Integumentary, Muscularskeletal, Psychiatric were all negative except for pertinent positives noted in my HPI and in the PMH.      Exam:   /71 (BP Location: Right arm, Cuff Size: Adult Regular)   Pulse 71   Wt 74.8 kg (165 lb)   SpO2 99%   BMI 27.66 kg/m    GENERAL:  WNWD female NAD  HEENT: NC/AT, EOMI  Lungs:  Good respiratory effort   SKIN: normal skin turgor  GAIT: Normal  NECK: Symmetrical, no masses noted   VULVA: Normal Genitalia, the prior episiotomy site seen.  The labia minora does not appear to have been approximated together well, likely from the episiotomy.    BUS: Normal  URETHRA:  No hypermobility noted  URETHRAL MEATUS:  No masses noted  VAGINA: distal vagina with yellow/tan tissue,  The posterior urethral meatus has similar visual appearance, no discharge  CUFF:  No masses seen.   PERIANAL:  Hemorrhoids.   EXTREMITIES: no clubbing, cyanosis, or edema    Assessment:  HR HPV of vagina  History of Cervical CIS  Vaginal lesion   Hemorrhoids     Plan:  Recommend to Proceed with Colpo  The details of the colposcopic procedure were reviewed, the risks of missed diagnoses, pain, infection, and bleeding.  I do not feel the perianal tissue is HPV related.  It appears the masses are hemorrhoids.  We discussed the baby wipes she uses and I suspect the use might also be contributing to the vaginal and perianal skin findings noted above.  I suggest to discontinue the baby wipes.  She might be better served with the washlet/bidet and having less irritation with the tissue paper.      Total time preparing to see patient with reviewing prior encounter and labs, face to face time, and coordinating care on the same calendar date: 30 minutes, with additional time for the  procedure noted below.        Dino Bravo MD        Procedure:  Procedure for colposcopy and biopsy has been explained to the patient and consent obtained.    Before the procedure, it was ensured that the patient was educated regarding the nature of her findings and implications to date.  We reviewed the role of HPV and the natural history of the infection.  We also reviewed ways to minimize her future risk, the effect of HPV on the cervix and treatment options available, should they be indicated.    The pathophysiology of the cervix, including a discussion of the squamous and columnar cells, metaplasia and dysplasia have been reviewed, drawings, sketches and the pamphlets were reviewed with her.  The details of the colposcopic procedure were reviewed, the risks of missed diagnoses, pain, infection, and bleeding.  Questions seemed to be answered before proceeding and the patient then consented to the procedure.     Speculum placed in vagina and excellent visualization of vaginal cuff was achieved, the vaginal cuff was swabbed  with acetic acid solution.    biopsies taken (including ECC): 2   Hemostasis effected with Silver Nitrate.     Findings:  Vaginal cuff:  No masses or awe seen.   Vaginal inspection: distal posterior vagina has the yellow/tan tissue as does the urethral meatus posteriorly.  Biopsy of the posterior vagina taken.    Procedure Summary: Patient tolerated procedure well and colposcopy adequate.      Assessment:   HR HPV of cervix  History of CIS of cervix   Vaginal lesion     Plan:  Specimens labelled and sent to pathology.  Will base further treatment on pathology findings.  Post biopsy instructions given to patient and call to discuss Pathology results.    Dino Bravo MD

## 2023-03-20 RX ORDER — FLURBIPROFEN SODIUM 0.3 MG/ML
SOLUTION/ DROPS OPHTHALMIC
Qty: 300 EACH | Refills: 0 | Status: SHIPPED | OUTPATIENT
Start: 2023-03-20 | End: 2023-05-03

## 2023-03-23 LAB
PATH REPORT.COMMENTS IMP SPEC: NORMAL
PATH REPORT.COMMENTS IMP SPEC: NORMAL
PATH REPORT.FINAL DX SPEC: NORMAL
PATH REPORT.GROSS SPEC: NORMAL
PATH REPORT.MICROSCOPIC SPEC OTHER STN: NORMAL
PATH REPORT.MICROSCOPIC SPEC OTHER STN: NORMAL
PATH REPORT.RELEVANT HX SPEC: NORMAL
PHOTO IMAGE: NORMAL

## 2023-04-05 ENCOUNTER — LAB (OUTPATIENT)
Dept: LAB | Facility: CLINIC | Age: 73
End: 2023-04-05
Payer: COMMERCIAL

## 2023-04-05 DIAGNOSIS — Z79.4 TYPE 2 DIABETES MELLITUS WITH DIABETIC POLYNEUROPATHY, WITH LONG-TERM CURRENT USE OF INSULIN (H): ICD-10-CM

## 2023-04-05 DIAGNOSIS — E11.3299 BACKGROUND DIABETIC RETINOPATHY (H): Primary | ICD-10-CM

## 2023-04-05 DIAGNOSIS — N18.32 STAGE 3B CHRONIC KIDNEY DISEASE (H): ICD-10-CM

## 2023-04-05 DIAGNOSIS — E11.42 TYPE 2 DIABETES MELLITUS WITH DIABETIC POLYNEUROPATHY, WITH LONG-TERM CURRENT USE OF INSULIN (H): ICD-10-CM

## 2023-04-05 LAB
ANION GAP SERPL CALCULATED.3IONS-SCNC: 3 MMOL/L (ref 3–14)
BUN SERPL-MCNC: 30 MG/DL (ref 7–30)
CALCIUM SERPL-MCNC: 9.3 MG/DL (ref 8.5–10.1)
CHLORIDE BLD-SCNC: 110 MMOL/L (ref 94–109)
CO2 SERPL-SCNC: 27 MMOL/L (ref 20–32)
CREAT SERPL-MCNC: 1.5 MG/DL (ref 0.52–1.04)
GFR SERPL CREATININE-BSD FRML MDRD: 36 ML/MIN/1.73M2
GLUCOSE BLD-MCNC: 143 MG/DL (ref 70–99)
HBA1C MFR BLD: 7.1 % (ref 0–5.6)
POTASSIUM BLD-SCNC: 4.4 MMOL/L (ref 3.4–5.3)
SODIUM SERPL-SCNC: 140 MMOL/L (ref 133–144)

## 2023-04-05 PROCEDURE — 80048 BASIC METABOLIC PNL TOTAL CA: CPT

## 2023-04-05 PROCEDURE — 36415 COLL VENOUS BLD VENIPUNCTURE: CPT

## 2023-04-05 PROCEDURE — 83036 HEMOGLOBIN GLYCOSYLATED A1C: CPT

## 2023-04-05 NOTE — RESULT ENCOUNTER NOTE
Kelly,    Your blood glucose is a bit higher than goal of 7 now. I recommend increasing exercise and eating at least 4 to 5 servings of fruits and vegetables daily.     Sejal Rutledge MD

## 2023-04-08 NOTE — PROGRESS NOTES
Nephrology Clinic Visit  Kelly Vegas MRN: [id number] YOB: 1950  Primary Care Provider: Sejal Rutledge  History Obtained From: Patient  External Document Review: Primary Care Provider  -------------------------------------------------------------------------------------------------------------------------------  Visit 4/11/2023:  -BP Control: 116/58 in office today. No changes needed.  --Current Regimen: Lisinopril 40mg qDay, hydrochlorothiazide 25mg qDay  -DM Control: A1c 4/5/2023 was 7.1  --Current regimen: Insulin, Metformin (1 pill per day), Jardiance (She got nervous after reading about side effects)  -Creatinine trend: 1.5 from 1.12  -Fell off her step (about 1 month ago) and has been getting steroid shots in her back. She slipped on the steps when there was still snow on the ground. She went to urgent care a few days after this happened. She is using Gabapentin (she notes that this makes her a bit woozy). We discussed cutting her dose in half (from 300mg BID to 150mg BID). She notes R sided improvement and still L sided pain. She is going to see another doctor for this in a few weeks. No NSAIDs. Is using some Tylenol. No other changes in her health. Tolerating PO intake. No colds/flu.   -Nocturia: Getting up about 4x per night.     Visit 1/10/2023:  -Here to establish care with [facility] 1/3/2023  -BP Control: Not really checking her blood pressure.    -DM Control: A1c 6.8 11/30/22. She has had periods of time of poor control (such as a 11.2 4/11/2018). She was first diagnosed with DM around 2010 or so.  -Nocturia/Frequency: Getting up maybe 4x per night. This has been the case for about a year now. She is taking her hydrochlorothiazide in the morning. She drinks up until she goes to bed.   -NSAID use: No  -Herbal/OTC meds: No  -Family history of kidney disease: No  -Hx of UTI/SSTI (not currently on SGLT2i): No issues with UTI/SSTI. Will discuss SGLT2i   Aamir.  -Illness/changes in health/etc around 11/30/2022 when creatinine peaked: She doesn't remember any acute illness or issues around that time. She was not in the hospital. She was not in the ED. Her BP was fine from her report. She had a home visit around this time with no issues. Her Metformin was reduced at this time because she was having some severe diarrhea around that time. Her diarrhea is improving with reductions in dosing of metformin.   -Hx of CLL: Diagnosed about 4 years ago or so.  -Anemia: On PO iron every other day.    -Not really eating out for dinner at all.     Objective:  PAST MEDICAL HISTORY:  Past Medical History:   Diagnosis Date     Arthritis check it     AVM (arteriovenous malformation) of small bowel, acquired      Background diabetic retinopathy (H)      Cervical cancer (H) 03/2006    [facility] with gland involvement     CKD (chronic kidney disease) stage 4, GFR 15-29 ml/min (H)      CLL (chronic lymphocytic leukemia) (H)      Colon adenoma      DDD (degenerative disc disease), lumbar      Degenerative joint disease of hand      Depressive disorder      Diabetes mellitus type II 2009     Diabetic polyneuropathy associated with type 2 diabetes mellitus (H) 09/26/2017     Gastritis      HTN (hypertension)      Hyperlipidemia LDL goal < 100      Hypothyroid 12/2004     Lumbar disc herniation      Osteopenia      Recurrent major depression (H)      Sensorineural hearing loss, bilateral 12/14/2021     Spinal stenosis of lumbar region with neurogenic claudication        PAST SURGICAL HISTORY:  Past Surgical History:   Procedure Laterality Date     BIOPSY       COLONOSCOPY  nov 2016     COLONOSCOPY N/A 3/1/2019    Procedure: COLONOSCOPY;  Surgeon: Romulo Hayes MD;  Location: [facility]     COLONOSCOPY WITH CO2 INSUFFLATION N/A 11/28/2016    Procedure: COLONOSCOPY WITH CO2 INSUFFLATION;  Surgeon: Migue Giraldo MD;  Location: [facility]     CYSTECTOMY OVARIAN BENIGN  1974      ESOPHAGOSCOPY, GASTROSCOPY, DUODENOSCOPY (EGD), COMBINED N/A 3/1/2019    Procedure: COMBINED ESOPHAGOSCOPY, GASTROSCOPY, DUODENOSCOPY (EGD), BIOPSY SINGLE OR MULTIPLE;  Surgeon: Romulo Hayes MD;  Location: [facility]     EYE SURGERY  12-14-16     HC THYROIDECTOMY  12/2004    goiter     LAPAROSCOPIC CHOLECYSTECTOMY N/A 9/9/2019    Procedure: CHOLECYSTECTOMY, LAPAROSCOPIC;  Surgeon: Jorge Martinez DO;  Location: [facility]     [facility], OMENTECTOMY W/JUAN DANIEL  2/2006    cervical cancer       MEDICATIONS:  Current Outpatient Medications   Medication Instructions     atorvastatin (LIPITOR) 20 mg, Oral, DAILY     blood glucose (NO BRAND SPECIFIED) test strip Use to test blood sugar 3 times daily or as directed. To accompany: Blood Glucose Monitor Brands: per insurance.     Cholecalciferol (VITAMIN D3) 1000 units CAPS Oral, DAILY     citalopram (CELEXA) 20 mg, Oral, DAILY     insulin glargine (LANTUS SOLOSTAR) 100 UNIT/ML pen INJECT 20 UNITS AT BEDTIME DAILY     insulin lispro (HUMALOG KWIKPEN) 8 Units, Subcutaneous, 2 TIMES DAILY BEFORE MEALS     levothyroxine (SYNTHROID/LEVOTHROID) 88 mcg, Oral, DAILY     lisinopril-hydrochlorothiazide (ZESTORETIC) 20-12.5 MG tablet 2 tablets, Oral, DAILY     metFORMIN (GLUCOPHAGE XR) 500 mg, Oral, DAILY WITH SUPPER     multivitamin w/minerals (THERA-VIT-M) tablet 1 tablet, Oral, DAILY     thin (NO BRAND SPECIFIED) lancets Use to test blood sugar 3 times daily or as directed. To accompany: Blood Glucose Monitor Brands: per insurance.     ULTICARE MINI 31G X 6 MM insulin pen needle Use three times daily or as directed.       FAMILY MEDICAL HISTORY:   Family History   Problem Relation Age of Onset     Thyroid Disease Mother      Dementia Mother      Osteoporosis Mother      Alcohol/Drug Father      C.A.D. Father         CABG      Diabetes Father      Hypertension Father      Hyperlipidemia Father      Cerebrovascular Disease Father      Alcohol/Drug Brother      Prostate Cancer Brother       Depression Brother      Depression Brother         d. suicide     Cancer Maternal Grandmother         stomach, colon     Breast Cancer Maternal Grandmother      Thyroid Disease Maternal Grandmother      Alzheimer Disease Maternal Grandfather      Breast Cancer Paternal Grandmother      Alcohol/Drug Son      Rheumatoid Arthritis Son      Breast Cancer Cousin      Breast Cancer Other        PHYSICAL EXAM:   /58   Pulse 72   Wt 75.3 kg (166 lb)   BMI 27.83 kg/m    GENERAL APPEARANCE: alert and no distress  EYES: nonicteric  HENT: mouth without ulcers or lesions  RESP: lungs clear to auscultation   CV: regular rhythm, normal rate, no rub  ABDOMEN: soft, nontender, normal bowel sounds, no HSM   Extremities: 1+ LLE edema  MS: no evidence of inflammation in joints, no muscle tenderness  SKIN: no rash  NEURO: mentation intact and speech normal  PSYCH: affect normal    LABS REVIEWED BY ME:   ANEMIA  Recent Labs   Lab Test 12/29/22  1427 12/12/22  1542 11/30/22  1204 11/04/21  1411 04/08/21  1423 10/30/18  1123 10/09/18  1416   HGB 10.9* 11.1* 11.4* 12.2 11.3*   < > 10.8*   IRONSAT  --  25 26 26  --   --  10*   BARBIE  --  27 14 10 10   < > 7*    < > = values in this interval not displayed.       BMP  Recent Labs   Lab Test 04/05/23  0952 12/29/22  1427 12/12/22  1542 11/30/22  1204 12/14/21  1717 11/04/21  1411 11/04/19  1512 11/01/18  1310 10/23/18  0858 04/11/18  0720    141 142 141   < > 138   < > 135  --  136   POTASSIUM 4.4 4.6 4.7 5.0   < > 4.1   < > 3.6  --  4.4   CHLORIDE 110* 109 107 111*   < > 106   < > 101  --  103   CO2 27 27 27 26   < > 29   < > 27  --  26   ANIONGAP 3 5 8 4   < > 3   < > 7  --  7   BUN 30 27 30.7* 31*   < > 18   < > 22  --  19   CR 1.50* 1.12* 1.32* 1.42*   < > 1.15*   < > 0.97  --  0.79   GFRESTIMATED 36* 52* 42* 39*   < > 48*   < > 57*   < > 72   PROTTOTAL  --   --  6.7  --   --  6.8  --  7.1  --  6.7*    < > = values in this interval not displayed.       CBC  Recent Labs   Lab  Test 12/29/22  1427 12/12/22  1542 11/30/22  1204 11/04/21  1411   HGB 10.9* 11.1* 11.4* 12.2   WBC 21.3* 24.4* 23.8* 16.8*   HCT 34.7* 36.0 37.4 37.9   MCV 95 95 96 92    272 301 277       DIABETES  Recent Labs   Lab Test 04/05/23  0952 11/30/22  1204 07/11/22  1213 12/14/21  1717   A1C 7.1* 6.8* 6.5* 6.8*       HYPONATREMIANo lab results found.    Invalid input(s): UOSM, OSM    MBD  Recent Labs   Lab Test 04/05/23  0952 12/29/22  1427 12/12/22  1542 11/30/22  1204 12/14/21  1717 11/04/21  1411 11/04/19  1512 11/01/18  1310   KARTHIK 9.3 9.2 9.4 9.4   < > 9.1   < > 8.9   ALBUMIN  --  3.6 4.3  --   --  3.8  --  3.8   PHOS  --  2.5  --   --   --   --   --   --    PTHI  --  73*  --   --   --   --   --   --     < > = values in this interval not displayed.        URINE STUDIES  Recent Labs   Lab Test 12/29/22 1427 11/30/22  1204   COLOR Yellow Yellow   APPEARANCE Clear Clear   URINEGLC 250* Negative   URINEBILI Negative Negative   URINEKETONE Negative Negative   SG 1.025 >=1.030   UBLD Negative Negative   URINEPH 5.5 5.0   PROTEIN Negative Negative   UROBILINOGEN 0.2 0.2   NITRITE Negative Negative   LEUKEST Negative Negative   RBCU 0-2  --    WBCU 0-5  --      No lab results found.    ADDITIONAL LABS ORDERED/REVIEWED BY ME:  None    Assessment/Plan  CKD Stage G3aA1  Established care with [facility] 1/3/2023    CKD onset appears to be around 4/2021 with creatinine of 1.09. Stable until 11/30/2022 when creatinine was 1.42. This BILLY appears to have recovered back to her baseline on 12/29/2022 with creatinine of 1.12. UPCR 12/29/2022 0.08g/g. No hematuria or pyuria 12/29/2022.     Creatinine back up to 1.5 on 4/5/2023. Worsening kidney fucntion without clear cause at this appointment. She is avoiding NSAIDs for her back pain from recent fall. She has stable nocturia. No other signficant medication changes (she didn't start SGLT2i as she was worried about side effects). We are obtaining a renal US and updating labs  in 2 weeks (BMP and cystatin C). Pending trend in renal function and renal US results we may need to re-eval for hematuria/pyuria/proteinuria as she dima't have a clear hemodynamically mediated cause of her renal impairment at today's appointment.     CKD Etiology (Biopsy Proven: No):  -Hypertensive Nephrosclerosis  -DKD    Plan:  -Optimize BP control: Continue Lisinopril/HCTZ.   -Optimize DM regimen: Agree with Metformin (watch eGFR and hold if < 30). Hold on Jardiance for now. She is worried about SE with this medication. Re-address once kidney function stable.   -Avoid NSAIDs/Nephrotoxic agents  -60 ounces of PO intake qDay  -Renal US with post void residual given increased creatinine on BMP 4/5/2023  -Repeat Bmp and send Cystatin-C in 2 weeks.       Anemia of Renal Disease  Hemoglobin: 10.9  Ferritin: 27  TSAT: 25%    Plan:  -Continue PO iron every other day given Ferritin below goal (>100) and downtrending hemoglobin. (she is on OTC. I added Ferrous Sulfate to her med list as well)  -No need for IV iron or EPO      Lipid Management in CKD  KDIGO 2013 Guidelines recommend the following for patients with CKD:  Adults >/= 51 yo w/ CKD stage 1 or 2: Statin  Adults >/= 51 yo w/ CKD stage 3-5: Statin + Ezetimibe or Statin alone  Adults 18-49 + 1 of the following (CAD, DM, Ischemic stroke, 10 yr ASCVD risk >10%): Statin    KDIGO guidelines recommend Simvastatin 20mg/Ezetimibe 10mg qDay for patients with CKD G3a-G5 (in line with the SHARP trial). If Simvastatin used alone, 40mg qDay is referenced.   Other options include: Atorvastatin 20mg qDay (4D trial) and Rosuvastatin 10mg qDay (DEISY trial)    Plan:  -Agree with Atorvastatin      Metabolic Acidosis of Renal Disease  Bicarb: 27    Plan:  -No need for NaHCO      Mineral Bone Disease  PTH: 73 12/29/2022  Phos: 2.5 12/29/2022  Calcium: 9.2 12/29/2022  Vitamin D: 39 12/29/2022    Plan:  -No need for phos binder    Return to clinic: 3 months    Migue Calvillo  MD   of Medicine  Division of Nephrology and Hypertension  [facility]    45 minutes spent on the date of the encounter doing chart review, history and exam, documentation and further activities as noted above

## 2023-04-11 ENCOUNTER — OFFICE VISIT (OUTPATIENT)
Dept: NEPHROLOGY | Facility: CLINIC | Age: 73
End: 2023-04-11
Payer: COMMERCIAL

## 2023-04-11 VITALS
WEIGHT: 166 LBS | HEART RATE: 72 BPM | DIASTOLIC BLOOD PRESSURE: 58 MMHG | BODY MASS INDEX: 27.83 KG/M2 | SYSTOLIC BLOOD PRESSURE: 116 MMHG

## 2023-04-11 DIAGNOSIS — N18.32 STAGE 3B CHRONIC KIDNEY DISEASE (H): Primary | ICD-10-CM

## 2023-04-11 PROCEDURE — 99215 OFFICE O/P EST HI 40 MIN: CPT | Performed by: STUDENT IN AN ORGANIZED HEALTH CARE EDUCATION/TRAINING PROGRAM

## 2023-04-11 RX ORDER — FERROUS SULFATE 325(65) MG
325 TABLET ORAL EVERY OTHER DAY
Qty: 36 TABLET | Refills: 3
Start: 2023-04-11 | End: 2024-04-05

## 2023-04-11 NOTE — LETTER
4/11/2023       RE: Kelly Vegas  [address] [address]     Dear Colleague,    Thank you for referring your patient, Kelly Vegas, to the [facility] at [facility]. Please see a copy of my visit note below.        Nephrology Clinic Visit  Kelly Vegas MRN: [id number] YOB: 1950  Primary Care Provider: Sejal Rutledge  History Obtained From: Patient  External Document Review: Primary Care Provider  -------------------------------------------------------------------------------------------------------------------------------  Visit 4/11/2023:  -BP Control: 116/58 in office today. No changes needed.  --Current Regimen: Lisinopril 40mg qDay, hydrochlorothiazide 25mg qDay  -DM Control: A1c 4/5/2023 was 7.1  --Current regimen: Insulin, Metformin (1 pill per day), Jardiance (She got nervous after reading about side effects)  -Creatinine trend: 1.5 from 1.12  -Fell off her step (about 1 month ago) and has been getting steroid shots in her back. She slipped on the steps when there was still snow on the ground. She went to urgent care a few days after this happened. She is using Gabapentin (she notes that this makes her a bit woozy). We discussed cutting her dose in half (from 300mg BID to 150mg BID). She notes R sided improvement and still L sided pain. She is going to see another doctor for this in a few weeks. No NSAIDs. Is using some Tylenol. No other changes in her health. Tolerating PO intake. No colds/flu.   -Nocturia: Getting up about 4x per night.     Visit 1/10/2023:  -Here to establish care with [facility] 1/3/2023  -BP Control: Not really checking her blood pressure.    -DM Control: A1c 6.8 11/30/22. She has had periods of time of poor control (such as a 11.2 4/11/2018). She was first diagnosed with DM around 2010 or so.  -Nocturia/Frequency: Getting up maybe 4x per night. This has been the case for  about a year now. She is taking her hydrochlorothiazide in the morning. She drinks up until she goes to bed.   -NSAID use: No  -Herbal/OTC meds: No  -Family history of kidney disease: No  -Hx of UTI/SSTI (not currently on SGLT2i): No issues with UTI/SSTI. Will discuss SGLT2i Dr Rutledge.  -Illness/changes in health/etc around 11/30/2022 when creatinine peaked: She doesn't remember any acute illness or issues around that time. She was not in the hospital. She was not in the ED. Her BP was fine from her report. She had a home visit around this time with no issues. Her Metformin was reduced at this time because she was having some severe diarrhea around that time. Her diarrhea is improving with reductions in dosing of metformin.   -Hx of CLL: Diagnosed about 4 years ago or so.  -Anemia: On PO iron every other day.    -Not really eating out for dinner at all.     Objective:  PAST MEDICAL HISTORY:  Past Medical History:   Diagnosis Date    Arthritis check it    AVM (arteriovenous malformation) of small bowel, acquired     Background diabetic retinopathy (H)     Cervical cancer (H) 03/2006    [facility], CIS with gland involvement    CKD (chronic kidney disease) stage 4, GFR 15-29 ml/min (H)     CLL (chronic lymphocytic leukemia) (H)     Colon adenoma     DDD (degenerative disc disease), lumbar     Degenerative joint disease of hand     Depressive disorder     Diabetes mellitus type II 2009    Diabetic polyneuropathy associated with type 2 diabetes mellitus (H) 09/26/2017    Gastritis     HTN (hypertension)     Hyperlipidemia LDL goal < 100     Hypothyroid 12/2004    Lumbar disc herniation     Osteopenia     Recurrent major depression (H)     Sensorineural hearing loss, bilateral 12/14/2021    Spinal stenosis of lumbar region with neurogenic claudication        PAST SURGICAL HISTORY:  Past Surgical History:   Procedure Laterality Date    BIOPSY      COLONOSCOPY  nov 2016    COLONOSCOPY N/A 3/1/2019    Procedure: COLONOSCOPY;   Surgeon: Romulo Hayes MD;  Location: [facility]    COLONOSCOPY WITH CO2 INSUFFLATION N/A 11/28/2016    Procedure: COLONOSCOPY WITH CO2 INSUFFLATION;  Surgeon: Migue Giraldo MD;  Location: [facility]    CYSTECTOMY OVARIAN BENIGN  1974    ESOPHAGOSCOPY, GASTROSCOPY, DUODENOSCOPY (EGD), COMBINED N/A 3/1/2019    Procedure: COMBINED ESOPHAGOSCOPY, GASTROSCOPY, DUODENOSCOPY (EGD), BIOPSY SINGLE OR MULTIPLE;  Surgeon: Romulo Hayes MD;  Location: [facility]    EYE SURGERY  12-14-16    HC THYROIDECTOMY  12/2004    goiter    LAPAROSCOPIC CHOLECYSTECTOMY N/A 9/9/2019    Procedure: CHOLECYSTECTOMY, LAPAROSCOPIC;  Surgeon: Jorge Martinez DO;  Location: [facility], OMENTECTOMY W/JUAN DANIEL  2/2006    cervical cancer       MEDICATIONS:  Current Outpatient Medications   Medication Instructions    atorvastatin (LIPITOR) 20 mg, Oral, DAILY    blood glucose (NO BRAND SPECIFIED) test strip Use to test blood sugar 3 times daily or as directed. To accompany: Blood Glucose Monitor Brands: per insurance.    Cholecalciferol (VITAMIN D3) 1000 units CAPS Oral, DAILY    citalopram (CELEXA) 20 mg, Oral, DAILY    insulin glargine (LANTUS SOLOSTAR) 100 UNIT/ML pen INJECT 20 UNITS AT BEDTIME DAILY    insulin lispro (HUMALOG KWIKPEN) 8 Units, Subcutaneous, 2 TIMES DAILY BEFORE MEALS    levothyroxine (SYNTHROID/LEVOTHROID) 88 mcg, Oral, DAILY    lisinopril-hydrochlorothiazide (ZESTORETIC) 20-12.5 MG tablet 2 tablets, Oral, DAILY    metFORMIN (GLUCOPHAGE XR) 500 mg, Oral, DAILY WITH SUPPER    multivitamin w/minerals (THERA-VIT-M) tablet 1 tablet, Oral, DAILY    thin (NO BRAND SPECIFIED) lancets Use to test blood sugar 3 times daily or as directed. To accompany: Blood Glucose Monitor Brands: per insurance.    ULTICARE MINI 31G X 6 MM insulin pen needle Use three times daily or as directed.       FAMILY MEDICAL HISTORY:   Family History   Problem Relation Age of Onset    Thyroid Disease Mother     Dementia Mother     Osteoporosis Mother      Alcohol/Drug Father     C.A.D. Father         CABG     Diabetes Father     Hypertension Father     Hyperlipidemia Father     Cerebrovascular Disease Father     Alcohol/Drug Brother     Prostate Cancer Brother     Depression Brother     Depression Brother         d. suicide    Cancer Maternal Grandmother         stomach, colon    Breast Cancer Maternal Grandmother     Thyroid Disease Maternal Grandmother     Alzheimer Disease Maternal Grandfather     Breast Cancer Paternal Grandmother     Alcohol/Drug Son     Rheumatoid Arthritis Son     Breast Cancer Cousin     Breast Cancer Other        PHYSICAL EXAM:   /58   Pulse 72   Wt 75.3 kg (166 lb)   BMI 27.83 kg/m    GENERAL APPEARANCE: alert and no distress  EYES: nonicteric  HENT: mouth without ulcers or lesions  RESP: lungs clear to auscultation   CV: regular rhythm, normal rate, no rub  ABDOMEN: soft, nontender, normal bowel sounds, no HSM   Extremities: 1+ LLE edema  MS: no evidence of inflammation in joints, no muscle tenderness  SKIN: no rash  NEURO: mentation intact and speech normal  PSYCH: affect normal    LABS REVIEWED BY ME:   ANEMIA  Recent Labs   Lab Test 12/29/22  1427 12/12/22  1542 11/30/22  1204 11/04/21  1411 04/08/21  1423 10/30/18  1123 10/09/18  1416   HGB 10.9* 11.1* 11.4* 12.2 11.3*   < > 10.8*   IRONSAT  --  25 26 26  --   --  10*   BARBIE  --  27 14 10 10   < > 7*    < > = values in this interval not displayed.       BMP  Recent Labs   Lab Test 04/05/23  0952 12/29/22  1427 12/12/22  1542 11/30/22  1204 12/14/21  1717 11/04/21  1411 11/04/19  1512 11/01/18  1310 10/23/18  0858 04/11/18  0720    141 142 141   < > 138   < > 135  --  136   POTASSIUM 4.4 4.6 4.7 5.0   < > 4.1   < > 3.6  --  4.4   CHLORIDE 110* 109 107 111*   < > 106   < > 101  --  103   CO2 27 27 27 26   < > 29   < > 27  --  26   ANIONGAP 3 5 8 4   < > 3   < > 7  --  7   BUN 30 27 30.7* 31*   < > 18   < > 22  --  19   CR 1.50* 1.12* 1.32* 1.42*   < > 1.15*   < > 0.97  --   0.79   GFRESTIMATED 36* 52* 42* 39*   < > 48*   < > 57*   < > 72   PROTTOTAL  --   --  6.7  --   --  6.8  --  7.1  --  6.7*    < > = values in this interval not displayed.       CBC  Recent Labs   Lab Test 12/29/22  1427 12/12/22  1542 11/30/22  1204 11/04/21  1411   HGB 10.9* 11.1* 11.4* 12.2   WBC 21.3* 24.4* 23.8* 16.8*   HCT 34.7* 36.0 37.4 37.9   MCV 95 95 96 92    272 301 277       DIABETES  Recent Labs   Lab Test 04/05/23  0952 11/30/22  1204 07/11/22  1213 12/14/21  1717   A1C 7.1* 6.8* 6.5* 6.8*       HYPONATREMIANo lab results found.    Invalid input(s): UOSM, OSM    MBD  Recent Labs   Lab Test 04/05/23  0952 12/29/22  1427 12/12/22  1542 11/30/22  1204 12/14/21  1717 11/04/21  1411 11/04/19  1512 11/01/18  1310   KARTHIK 9.3 9.2 9.4 9.4   < > 9.1   < > 8.9   ALBUMIN  --  3.6 4.3  --   --  3.8  --  3.8   PHOS  --  2.5  --   --   --   --   --   --    PTHI  --  73*  --   --   --   --   --   --     < > = values in this interval not displayed.        URINE STUDIES  Recent Labs   Lab Test 12/29/22  1427 11/30/22  1204   COLOR Yellow Yellow   APPEARANCE Clear Clear   URINEGLC 250* Negative   URINEBILI Negative Negative   URINEKETONE Negative Negative   SG 1.025 >=1.030   UBLD Negative Negative   URINEPH 5.5 5.0   PROTEIN Negative Negative   UROBILINOGEN 0.2 0.2   NITRITE Negative Negative   LEUKEST Negative Negative   RBCU 0-2  --    WBCU 0-5  --      No lab results found.    ADDITIONAL LABS ORDERED/REVIEWED BY ME:  None    Assessment/Plan  CKD Stage G3aA1  Established care with [facility] 1/3/2023    CKD onset appears to be around 4/2021 with creatinine of 1.09. Stable until 11/30/2022 when creatinine was 1.42. This BILLY appears to have recovered back to her baseline on 12/29/2022 with creatinine of 1.12. UPCR 12/29/2022 0.08g/g. No hematuria or pyuria 12/29/2022.     Creatinine back up to 1.5 on 4/5/2023. Worsening kidney fucntion without clear cause at this appointment. She is avoiding NSAIDs for her  back pain from recent fall. She has stable nocturia. No other signficant medication changes (she didn't start SGLT2i as she was worried about side effects). We are obtaining a renal US and updating labs in 2 weeks (BMP and cystatin C). Pending trend in renal function and renal US results we may need to re-eval for hematuria/pyuria/proteinuria as she dima't have a clear hemodynamically mediated cause of her renal impairment at today's appointment.     CKD Etiology (Biopsy Proven: No):  -Hypertensive Nephrosclerosis  -DKD    Plan:  -Optimize BP control: Continue Lisinopril/HCTZ.   -Optimize DM regimen: Agree with Metformin (watch eGFR and hold if < 30). Hold on Jardiance for now. She is worried about SE with this medication. Re-address once kidney function stable.   -Avoid NSAIDs/Nephrotoxic agents  -60 ounces of PO intake qDay  -Renal US with post void residual given increased creatinine on BMP 4/5/2023  -Repeat Bmp and send Cystatin-C in 2 weeks.       Anemia of Renal Disease  Hemoglobin: 10.9  Ferritin: 27  TSAT: 25%    Plan:  -Continue PO iron every other day given Ferritin below goal (>100) and downtrending hemoglobin. (she is on OTC. I added Ferrous Sulfate to her med list as well)  -No need for IV iron or EPO      Lipid Management in CKD  KDIGO 2013 Guidelines recommend the following for patients with CKD:  Adults >/= 49 yo w/ CKD stage 1 or 2: Statin  Adults >/= 49 yo w/ CKD stage 3-5: Statin + Ezetimibe or Statin alone  Adults 18-49 + 1 of the following (CAD, DM, Ischemic stroke, 10 yr ASCVD risk >10%): Statin    KDIGO guidelines recommend Simvastatin 20mg/Ezetimibe 10mg qDay for patients with CKD G3a-G5 (in line with the SHARP trial). If Simvastatin used alone, 40mg qDay is referenced.   Other options include: Atorvastatin 20mg qDay (4D trial) and Rosuvastatin 10mg qDay (DEISY trial)    Plan:  -Agree with Atorvastatin      Metabolic Acidosis of Renal Disease  Bicarb: 27    Plan:  -No need for  Marissa      Mineral Bone Disease  PTH: 73 12/29/2022  Phos: 2.5 12/29/2022  Calcium: 9.2 12/29/2022  Vitamin D: 39 12/29/2022    Plan:  -No need for phos binder    Return to clinic: 3 months    Migue Calvillo MD   of Medicine  Division of Nephrology and Hypertension  [facility]    45 minutes spent on the date of the encounter doing chart review, history and exam, documentation and further activities as noted above      Again, thank you for allowing me to participate in the care of your patient.      Sincerely,    Migue Calvillo MD

## 2023-04-11 NOTE — PATIENT INSTRUCTIONS
"It was a pleasure to see you in nephrology clinic today. I've included a brief summary of our discussion and care plan from today's visit below.  _______________________________________________________________________    My recommendations are summarized as follows:  -Keep a Blood Pressure log. Please make sure that you are using a validated blood pressure device (check \"www.validatebp.org\").  -Avoid all NSAID's. Examples include Ibuprofen (Advil, Motrin), naprosyn (Aleve), celebrex among others. Acetaminophen (Tylenol) is ok with maximum dose in 24 hours of 3000mg.  -Healthy lifestyle measures will keep your kidney's functioning at their current best. This includes regular exercise, maintaining a healthy body weight and smoking cessation.   -I have ordered a kidney ultrasound for you to look for obstruction or stones.   -Please try to get about 60 ounces of water intake per day.   -Okay to continue having coffee.   -Let's repeat a BMP in about 2 weeks to see where your kidney number is trending.     Please return to Nephrology Clinic in 3 months to review your progress.     Who do I call with any questions after my visit?  There are multiple ways to contact your nephrology care team:    -To schedule or reschedule an appointment, please call [phone].  -Reach out via GutCheck. These messages are answered by your nurse or doctor during business hours and typically in 1-2 days. GutCheck messages are best for quick questions/clarifications/updates. Frequently, your doctor or nurse will recommend setting up a follow up appointment to address any significant questions/concerns.  -For urgent questions after business hours, you may reach the on-call Nephrology Fellow by contacting the [facility]  at [phone].    To schedule imaging:   -Please call [phone]     To schedule your lab appointment at [facility]  -Please call [phone]    Sincerely,    Dr. Migue Calvillo  Assistant " Professor of Medicine  Division of Nephrology and Hypertension  [facility]

## 2023-04-13 ENCOUNTER — ANCILLARY PROCEDURE (OUTPATIENT)
Dept: ULTRASOUND IMAGING | Facility: CLINIC | Age: 73
End: 2023-04-13
Attending: STUDENT IN AN ORGANIZED HEALTH CARE EDUCATION/TRAINING PROGRAM
Payer: COMMERCIAL

## 2023-04-13 DIAGNOSIS — N18.32 STAGE 3B CHRONIC KIDNEY DISEASE (H): ICD-10-CM

## 2023-04-13 PROCEDURE — 76770 US EXAM ABDO BACK WALL COMP: CPT | Mod: TC | Performed by: RADIOLOGY

## 2023-04-17 ENCOUNTER — PATIENT OUTREACH (OUTPATIENT)
Dept: OBGYN | Facility: CLINIC | Age: 73
End: 2023-04-17
Payer: COMMERCIAL

## 2023-05-01 ENCOUNTER — LAB (OUTPATIENT)
Dept: LAB | Facility: CLINIC | Age: 73
End: 2023-05-01
Payer: COMMERCIAL

## 2023-05-01 DIAGNOSIS — N18.32 STAGE 3B CHRONIC KIDNEY DISEASE (H): ICD-10-CM

## 2023-05-01 LAB
CYSTATIN C (ROCHE): 1.4 MG/L (ref 0.6–1)
GFR SERPL CREATININE-BSD FRML MDRD: 44 ML/MIN/1.73M2

## 2023-05-01 PROCEDURE — 36415 COLL VENOUS BLD VENIPUNCTURE: CPT

## 2023-05-01 PROCEDURE — 80048 BASIC METABOLIC PNL TOTAL CA: CPT

## 2023-05-01 PROCEDURE — 82610 CYSTATIN C: CPT

## 2023-05-02 LAB
ANION GAP SERPL CALCULATED.3IONS-SCNC: 7 MMOL/L (ref 3–14)
BUN SERPL-MCNC: 30 MG/DL (ref 7–30)
CALCIUM SERPL-MCNC: 9.1 MG/DL (ref 8.5–10.1)
CHLORIDE BLD-SCNC: 110 MMOL/L (ref 94–109)
CO2 SERPL-SCNC: 24 MMOL/L (ref 20–32)
CREAT SERPL-MCNC: 1.33 MG/DL (ref 0.52–1.04)
GFR SERPL CREATININE-BSD FRML MDRD: 42 ML/MIN/1.73M2
GLUCOSE BLD-MCNC: 142 MG/DL (ref 70–99)
POTASSIUM BLD-SCNC: 5 MMOL/L (ref 3.4–5.3)
SODIUM SERPL-SCNC: 141 MMOL/L (ref 133–144)

## 2023-05-08 ENCOUNTER — OFFICE VISIT (OUTPATIENT)
Dept: ORTHOPEDICS | Facility: CLINIC | Age: 73
End: 2023-05-08
Payer: COMMERCIAL

## 2023-05-08 DIAGNOSIS — M51.26 LUMBAR DISC HERNIATION: ICD-10-CM

## 2023-05-08 DIAGNOSIS — M62.830 MUSCLE SPASM OF BACK: Primary | ICD-10-CM

## 2023-05-08 DIAGNOSIS — M48.062 SPINAL STENOSIS OF LUMBAR REGION WITH NEUROGENIC CLAUDICATION: ICD-10-CM

## 2023-05-08 DIAGNOSIS — M54.16 LUMBAR RADICULOPATHY: ICD-10-CM

## 2023-05-08 PROCEDURE — 99214 OFFICE O/P EST MOD 30 MIN: CPT | Performed by: ORTHOPAEDIC SURGERY

## 2023-05-08 RX ORDER — PREGABALIN 25 MG/1
25 CAPSULE ORAL 3 TIMES DAILY
Qty: 60 CAPSULE | Refills: 3 | Status: SHIPPED | OUTPATIENT
Start: 2023-05-08 | End: 2023-09-19

## 2023-05-08 NOTE — PROGRESS NOTES
Chief Concern: Lumbar stenosis with neurogenic claudication and lumbar radiculopathy    History of Present Illness:  Kelly Vegas is a pleasant 72 year old female with lumbar stenosis, neurogenic claudication with radicular symptoms since 2017.  She has had previously had 5 weeks of physical therapy and was eventually discharged from PT this fall. Following our last visit three months ago I provided prescription for gabapentin and referred her for epidural steroid injection.  On 3/10/2023 had L5-S1 TFESI with Dr. Aguayo. Pre-injection pain was 8/10, post injection pain was 5/10.  Since that time, her left-sided pain has improved significantly but her right sided pain is still bothering her. This pain improves with sitting. She pointed to her left lumbo-pelvic junction when describing her pain. Her symptoms that radiated down her leg have not improved and radiate to her toes on her left side. This can improve with sitting but rotating while sitting worsens this pain. Overall her back pain is worse and affects her more. For back pain she takes Gabapentin at night when she goes to bed as it makes her sleepy, and during the day she takes Tylenol as needed. She states that she did not have her pain prior to her recent fall in February with increased pain of left lower back over the quadratus lumborum. She does complain of painful bruising on her left leg from the fall as well. Relief from her injection was short-lived. Overall she still has trouble standing and walking due to symptoms of neurogenic claudication and lumbar radiculopathy.       Denies any new numbness or focal weakness      PRIMARY CARE PHYSICIAN: Sejal Rutledge    Patient denies saddle anesthesia, loss of bowel or bladder control.              Past Medical History:     Past Medical History:   Diagnosis Date     Arthritis check it     AVM (arteriovenous malformation) of small bowel, acquired      Background diabetic retinopathy (H)       Cervical cancer (H) 2006    [facility], [facility] with gland involvement     CKD (chronic kidney disease) stage 4, GFR 15-29 ml/min (H)      CLL (chronic lymphocytic leukemia) (H)      Colon adenoma      DDD (degenerative disc disease), lumbar      Degenerative joint disease of hand      Depressive disorder      Diabetes mellitus type II      Diabetic polyneuropathy associated with type 2 diabetes mellitus (H) 2017     Gastritis      HTN (hypertension)      Hyperlipidemia LDL goal < 100      Hypothyroid 2004     Lumbar disc herniation      Osteopenia      Recurrent major depression (H)      Sensorineural hearing loss, bilateral 2021     Spinal stenosis of lumbar region with neurogenic claudication             Past Surgical History:     Past Surgical History:   Procedure Laterality Date     BIOPSY       COLONOSCOPY  2016     COLONOSCOPY N/A 3/1/2019    Procedure: COLONOSCOPY;  Surgeon: Romulo Hayes MD;  Location: [facility]     COLONOSCOPY WITH CO2 INSUFFLATION N/A 2016    Procedure: COLONOSCOPY WITH CO2 INSUFFLATION;  Surgeon: Migue Giraldo MD;  Location: [facility]     CYSTECTOMY OVARIAN BENIGN  1974     ESOPHAGOSCOPY, GASTROSCOPY, DUODENOSCOPY (EGD), COMBINED N/A 3/1/2019    Procedure: COMBINED ESOPHAGOSCOPY, GASTROSCOPY, DUODENOSCOPY (EGD), BIOPSY SINGLE OR MULTIPLE;  Surgeon: Romulo Hayes MD;  Location: [facility]     EYE SURGERY  16     HC THYROIDECTOMY  2004    goiter     LAPAROSCOPIC CHOLECYSTECTOMY N/A 2019    Procedure: CHOLECYSTECTOMY, LAPAROSCOPIC;  Surgeon: Jorge Martinez DO;  Location: [facility], OMENTECTOMY W/JUAN DANIEL  2006    cervical cancer            Social History:     Social History     Tobacco Use     Smoking status: Former     Packs/day: 0.50     Years: 20.00     Pack years: 10.00     Types: Cigarettes     Start date: 1967     Quit date: 1989     Years since quittin.3     Smokeless tobacco: Never   Vaping Use     Vaping status:  Never Used   Substance Use Topics     Alcohol use: No            Family History:     Family History   Problem Relation Age of Onset     Thyroid Disease Mother      Dementia Mother      Osteoporosis Mother      Alcohol/Drug Father      C.A.D. Father         CABG      Diabetes Father      Hypertension Father      Hyperlipidemia Father      Cerebrovascular Disease Father      Alcohol/Drug Brother      Prostate Cancer Brother      Depression Brother      Depression Brother         d. suicide     Cancer Maternal Grandmother         stomach, colon     Breast Cancer Maternal Grandmother      Thyroid Disease Maternal Grandmother      Alzheimer Disease Maternal Grandfather      Breast Cancer Paternal Grandmother      Alcohol/Drug Son      Rheumatoid Arthritis Son      Breast Cancer Cousin      Breast Cancer Other             Allergies:     Allergies   Allergen Reactions     Glipizide      tremulous     Ibuprofen Hives     Pcn [Penicillins] Itching     Percocet [Acetaminophen] Nausea and Vomiting     Vicodin [Hydrocodone-Acetaminophen] Nausea            Medications:     Current Outpatient Medications   Medication     atorvastatin (LIPITOR) 20 MG tablet     blood glucose (NO BRAND SPECIFIED) test strip     Cholecalciferol (VITAMIN D3) 1000 units CAPS     citalopram (CELEXA) 20 MG tablet     ferrous sulfate (FEROSUL) 325 (65 Fe) MG tablet     gabapentin (NEURONTIN) 300 MG capsule     insulin glargine (LANTUS SOLOSTAR) 100 UNIT/ML pen     insulin lispro (HUMALOG KWIKPEN) 100 UNIT/ML (1 unit dial) KWIKPEN     levothyroxine (SYNTHROID/LEVOTHROID) 88 MCG tablet     lisinopril-hydrochlorothiazide (ZESTORETIC) 20-12.5 MG tablet     metFORMIN (GLUCOPHAGE XR) 500 MG 24 hr tablet     multivitamin w/minerals (THERA-VIT-M) tablet     thin (NO BRAND SPECIFIED) lancets     ULTICARE MINI 31G X 6 MM insulin pen needle     Current Facility-Administered Medications   Medication     bupivacaine (MARCAINE) 0.25 % injection 4 mL     triamcinolone  (KENALOG-40) injection 80 mg             Review of Systems:     A 10 point ROS was performed and reviewed. Specific responses to these questions are noted at the end of the document.         Physical Exam:     PHYSICAL EXAM:   Constitutional - Patient is healthy, well-nourished and appears stated age, is in no acute distress.   Vitals: There were no vitals taken for this visit.   Respiratory - Patient is breathing normally, no audible wheeze or respiratory distress.   Skin - No suspicious rashes or lesions.   Psychiatric - Normal mood and affect.   Cardiovascular - Extremities warm and well perfused.   GI - No abdominal distention.   Musculoskeletal - Non-antalgic gait without use of assistive devices.                Lumbar Spine:    Appearance - normal appearing lumbar lordosis, no scars or lesions    Palpation - tender to palpation over erector spine muscles down to PSIS. Tenderness to palpation over left quadratus lumborum.    ROM - range of motion is slightly decreased in lumbar extension           LOWER EXTREMITY Left Right   Hip flexion 5/5 5/5   Knee flexion 5/5 5/5   Knee extension 5/5 5/5   Ankle dorsiflexion 4/5 5/5   Ankle plantarflexion 5/5 5/5   Great toe extension 5/5 5/5           Neurologic - Sensation intact to light touch bilaterally.      REFLEXES Left Right   Patella 2+ 2+   Ankle jerk 2+ 2+   Clonus No sustained clonus No sustained clonus         Imaging review  Spinal stenosis L2-3, L3-4, L4-5, L5-S1    Impression and Plan:  72 year old female with multilevel lumbar stenosis, neurogenic claudication, lumbar radiculopathy. We have discussed alternative options for treatment including heat therapy, E-STIM, massage therapy, and acupuncture. We also discussed changing from Gabapentin to lyrica due to side effects of gabapentin. She was agreeable with this plan. Will follow up in 2 months or sooner as needed.     Time spent on this clinical encounter including previsit chart review, history and  physical examination, patient counseling and documentation was 35 minutes on the date of encounter.        Respectfully,  Bryson Valdez MD  Spine Surgery  [facility]

## 2023-05-08 NOTE — LETTER
5/8/2023         RE: Kelly Vegas  [address] [place] [id number]        Dear Colleague,    Thank you for referring your patient, Kelly Vegas, to the [facility]. Please see a copy of my visit note below.    Chief Concern: Lumbar stenosis with neurogenic claudication and lumbar radiculopathy    History of Present Illness:  Kelly Vegas is a pleasant 72 year old female with lumbar stenosis, neurogenic claudication with radicular symptoms since 2017.  She has had previously had 5 weeks of physical therapy and was eventually discharged from  this fall. Following our last visit three months ago I provided prescription for gabapentin and referred her for epidural steroid injection.  On 3/10/2023 had L5-S1 TFESI with Dr. Aguayo. Pre-injection pain was 8/10, post injection pain was 5/10.  Since that time, her left-sided pain has improved significantly but her right sided pain is still bothering her. This pain improves with sitting. She pointed to her left lumbo-pelvic junction when describing her pain. Her symptoms that radiated down her leg have not improved and radiate to her toes on her left side. This can improve with sitting but rotating while sitting worsens this pain. Overall her back pain is worse and affects her more. For back pain she takes Gabapentin at night when she goes to bed as it makes her sleepy, and during the day she takes Tylenol as needed. She states that she did not have her pain prior to her recent fall in February with increased pain of left lower back over the quadratus lumborum. She does complain of painful bruising on her left leg from the fall as well. Relief from her injection was short-lived. Overall she still has trouble standing and walking due to symptoms of neurogenic claudication and lumbar radiculopathy.       Denies any new numbness or focal weakness      PRIMARY CARE PHYSICIAN: Sejal Rutledge    Patient denies saddle anesthesia, loss  of bowel or bladder control.              Past Medical History:     Past Medical History:   Diagnosis Date    Arthritis check it    AVM (arteriovenous malformation) of small bowel, acquired     Background diabetic retinopathy (H)     Cervical cancer (H) 03/2006    [facility], [facility] with gland involvement    CKD (chronic kidney disease) stage 4, GFR 15-29 ml/min (H)     CLL (chronic lymphocytic leukemia) (H)     Colon adenoma     DDD (degenerative disc disease), lumbar     Degenerative joint disease of hand     Depressive disorder     Diabetes mellitus type II 2009    Diabetic polyneuropathy associated with type 2 diabetes mellitus (H) 09/26/2017    Gastritis     HTN (hypertension)     Hyperlipidemia LDL goal < 100     Hypothyroid 12/2004    Lumbar disc herniation     Osteopenia     Recurrent major depression (H)     Sensorineural hearing loss, bilateral 12/14/2021    Spinal stenosis of lumbar region with neurogenic claudication             Past Surgical History:     Past Surgical History:   Procedure Laterality Date    BIOPSY      COLONOSCOPY  nov 2016    COLONOSCOPY N/A 3/1/2019    Procedure: COLONOSCOPY;  Surgeon: Romulo Hayes MD;  Location: [facility]    COLONOSCOPY WITH CO2 INSUFFLATION N/A 11/28/2016    Procedure: COLONOSCOPY WITH CO2 INSUFFLATION;  Surgeon: Migue Giraldo MD;  Location: [facility]    CYSTECTOMY OVARIAN BENIGN  1974    ESOPHAGOSCOPY, GASTROSCOPY, DUODENOSCOPY (EGD), COMBINED N/A 3/1/2019    Procedure: COMBINED ESOPHAGOSCOPY, GASTROSCOPY, DUODENOSCOPY (EGD), BIOPSY SINGLE OR MULTIPLE;  Surgeon: Romulo Hayes MD;  Location: [facility]    EYE SURGERY  12-14-16    HC THYROIDECTOMY  12/2004    goiter    LAPAROSCOPIC CHOLECYSTECTOMY N/A 9/9/2019    Procedure: CHOLECYSTECTOMY, LAPAROSCOPIC;  Surgeon: Jorge Martinez DO;  Location: [facility], OMENTECTOMY W/JUAN DANIEL  2/2006    cervical cancer            Social History:     Social History     Tobacco Use    Smoking status: Former     Packs/day: 0.50      Years: 20.00     Pack years: 10.00     Types: Cigarettes     Start date: 1967     Quit date: 1989     Years since quittin.3    Smokeless tobacco: Never   Vaping Use    Vaping status: Never Used   Substance Use Topics    Alcohol use: No            Family History:     Family History   Problem Relation Age of Onset    Thyroid Disease Mother     Dementia Mother     Osteoporosis Mother     Alcohol/Drug Father     C.A.D. Father         CABG     Diabetes Father     Hypertension Father     Hyperlipidemia Father     Cerebrovascular Disease Father     Alcohol/Drug Brother     Prostate Cancer Brother     Depression Brother     Depression Brother         d. suicide    Cancer Maternal Grandmother         stomach, colon    Breast Cancer Maternal Grandmother     Thyroid Disease Maternal Grandmother     Alzheimer Disease Maternal Grandfather     Breast Cancer Paternal Grandmother     Alcohol/Drug Son     Rheumatoid Arthritis Son     Breast Cancer Cousin     Breast Cancer Other             Allergies:     Allergies   Allergen Reactions    Glipizide      tremulous    Ibuprofen Hives    Pcn [Penicillins] Itching    Percocet [Acetaminophen] Nausea and Vomiting    Vicodin [Hydrocodone-Acetaminophen] Nausea            Medications:     Current Outpatient Medications   Medication    atorvastatin (LIPITOR) 20 MG tablet    blood glucose (NO BRAND SPECIFIED) test strip    Cholecalciferol (VITAMIN D3) 1000 units CAPS    citalopram (CELEXA) 20 MG tablet    ferrous sulfate (FEROSUL) 325 (65 Fe) MG tablet    gabapentin (NEURONTIN) 300 MG capsule    insulin glargine (LANTUS SOLOSTAR) 100 UNIT/ML pen    insulin lispro (HUMALOG KWIKPEN) 100 UNIT/ML (1 unit dial) KWIKPEN    levothyroxine (SYNTHROID/LEVOTHROID) 88 MCG tablet    lisinopril-hydrochlorothiazide (ZESTORETIC) 20-12.5 MG tablet    metFORMIN (GLUCOPHAGE XR) 500 MG 24 hr tablet    multivitamin w/minerals (THERA-VIT-M) tablet    thin (NO BRAND SPECIFIED) lancets    ULTICARE  MINI 31G X 6 MM insulin pen needle     Current Facility-Administered Medications   Medication    bupivacaine (MARCAINE) 0.25 % injection 4 mL    triamcinolone (KENALOG-40) injection 80 mg             Review of Systems:     A 10 point ROS was performed and reviewed. Specific responses to these questions are noted at the end of the document.         Physical Exam:     PHYSICAL EXAM:   Constitutional - Patient is healthy, well-nourished and appears stated age, is in no acute distress.   Vitals: There were no vitals taken for this visit.   Respiratory - Patient is breathing normally, no audible wheeze or respiratory distress.   Skin - No suspicious rashes or lesions.   Psychiatric - Normal mood and affect.   Cardiovascular - Extremities warm and well perfused.   GI - No abdominal distention.   Musculoskeletal - Non-antalgic gait without use of assistive devices.                Lumbar Spine:    Appearance - normal appearing lumbar lordosis, no scars or lesions    Palpation - tender to palpation over erector spine muscles down to PSIS. Tenderness to palpation over left quadratus lumborum.    ROM - range of motion is slightly decreased in lumbar extension           LOWER EXTREMITY Left Right   Hip flexion 5/5 5/5   Knee flexion 5/5 5/5   Knee extension 5/5 5/5   Ankle dorsiflexion 4/5 5/5   Ankle plantarflexion 5/5 5/5   Great toe extension 5/5 5/5           Neurologic - Sensation intact to light touch bilaterally.      REFLEXES Left Right   Patella 2+ 2+   Ankle jerk 2+ 2+   Clonus No sustained clonus No sustained clonus         Imaging review  Spinal stenosis L2-3, L3-4, L4-5, L5-S1    Impression and Plan:  72 year old female with multilevel lumbar stenosis, neurogenic claudication, lumbar radiculopathy. We have discussed alternative options for treatment including heat therapy, E-STIM, massage therapy, and acupuncture. We also discussed changing from Gabapentin to lyrica due to side effects of gabapentin. She was  agreeable with this plan. Will follow up in 2 months or sooner as needed.     Time spent on this clinical encounter including previsit chart review, history and physical examination, patient counseling and documentation was 35 minutes on the date of encounter.        Respectfully,  Bryson Valdez MD  Spine Surgery  [facility]

## 2023-05-08 NOTE — NURSING NOTE
Reason For Visit:   Chief Complaint   Patient presents with     RECHECK     3 month follow up low back pain        Primary MD: Sejal Rutledge  Ref. MD: Est    ?  No  Occupation retired.     Date of injury: No  Type of injury: No.     Date of surgery: No  Type of surgery: No.     Smoker: No  Request smoking cessation information: No    There were no vitals taken for this visit.    Pain Assessment  Patient Currently in Pain: Yes  0-10 Pain Scale: 8 (left side with movement)  Primary Pain Location: Back    Oswestry (JOHN) Questionnaire        2/1/2023     8:31 AM   OSWESTRY DISABILITY INDEX   Count 9   Sum 15   Oswestry Score (%) 33.33 %            Neck Disability Index (NDI) Questionnaire         View : No data to display.                       Visual Analog Pain Scale  Back Pain Scale 0-10: 7.5 (left side with movement)  Right leg pain: 0  Left leg pain: 0 (leg goes numb when standing)  Neck Pain Scale 0-10: 0  Right arm pain: 0  Left arm pain: 0    Promis 10 Assessment        2/1/2023     9:39 AM   PROMIS 10   In general, would you say your health is: Good   In general, would you say your quality of life is: Good   In general, how would you rate your physical health? Fair   In general, how would you rate your mental health, including your mood and your ability to think? Good   In general, how would you rate your satisfaction with your social activities and relationships? Good   In general, please rate how well you carry out your usual social activities and roles Good   To what extent are you able to carry out your everyday physical activities such as walking, climbing stairs, carrying groceries, or moving a chair? Moderately   In the past 7 days, how often have you been bothered by emotional problems such as feeling anxious, depressed, or irritable? Sometimes   In the past 7 days, how would you rate your fatigue on average? Moderate   In the past 7 days, how would you rate your pain on average,  where 0 means no pain, and 10 means worst imaginable pain? 7   In general, would you say your health is: 3   In general, would you say your quality of life is: 3   In general, how would you rate your physical health? 2   In general, how would you rate your mental health, including your mood and your ability to think? 3   In general, how would you rate your satisfaction with your social activities and relationships? 3   In general, please rate how well you carry out your usual social activities and roles. (This includes activities at home, at work and in your community, and responsibilities as a parent, child, spouse, employee, friend, etc.) 3   To what extent are you able to carry out your everyday physical activities such as walking, climbing stairs, carrying groceries, or moving a chair? 3   In the past 7 days, how often have you been bothered by emotional problems such as feeling anxious, depressed, or irritable? 3   In the past 7 days, how would you rate your fatigue on average? 3   In the past 7 days, how would you rate your pain on average, where 0 means no pain, and 10 means worst imaginable pain? 7   Global Mental Health Score 12   Global Physical Health Score 10   PROMIS TOTAL - SUBSCORES 22                Junie Clements LPN

## 2023-06-09 ENCOUNTER — THERAPY VISIT (OUTPATIENT)
Dept: PHYSICAL THERAPY | Facility: CLINIC | Age: 73
End: 2023-06-09
Attending: ORTHOPAEDIC SURGERY
Payer: COMMERCIAL

## 2023-06-09 DIAGNOSIS — M48.062 SPINAL STENOSIS OF LUMBAR REGION WITH NEUROGENIC CLAUDICATION: ICD-10-CM

## 2023-06-09 DIAGNOSIS — M54.42 CHRONIC BILATERAL LOW BACK PAIN WITH LEFT-SIDED SCIATICA: ICD-10-CM

## 2023-06-09 DIAGNOSIS — M54.16 LUMBAR RADICULOPATHY: ICD-10-CM

## 2023-06-09 DIAGNOSIS — M51.26 LUMBAR DISC HERNIATION: ICD-10-CM

## 2023-06-09 DIAGNOSIS — G89.29 CHRONIC BILATERAL LOW BACK PAIN WITH LEFT-SIDED SCIATICA: ICD-10-CM

## 2023-06-09 DIAGNOSIS — M62.830 MUSCLE SPASM OF BACK: ICD-10-CM

## 2023-06-09 PROCEDURE — 97110 THERAPEUTIC EXERCISES: CPT | Mod: GP | Performed by: PHYSICAL THERAPIST

## 2023-06-09 PROCEDURE — 97161 PT EVAL LOW COMPLEX 20 MIN: CPT | Mod: GP | Performed by: PHYSICAL THERAPIST

## 2023-06-09 NOTE — PROGRESS NOTES
PHYSICAL THERAPY EVALUATION  Type of Visit: Evaluation    See electronic medical record for Abuse and Falls Screening details.    Subjective      Presenting condition or subjective complaint: low back pain / Intermittent for many years but was getting worse over the last year or so. Had an MRI and subsequent injections. R side helpful, L side was not. Getting numbness/paresthesias in L ankle and top of foot, points anterolateral. Sometimes has paresthesias back and forth in LEs. With driving, sometimes gets paresthesias on L>R. That has been happening a long time. Did have a fall off the back step this winter; went in and got imaging for her chest - no fracture. The ankle swells sometimes. /  Date of onset: 05/08/23    Relevant medical history: Depression; Diabetes; Dizziness; DVT (blood clot); Menopause; Cancer; Hearing problems; High blood pressure; Overweight; Pain at night or rest; Significant weakness; Thyroid problems; Vision problems   Dates & types of surgery: Gall bladder, hysterectomy    Prior diagnostic imaging/testing results: MRI; X-ray     Prior therapy history for the same diagnosis, illness or injury: Yes PT last year      Living Environment  Social support: Alone - currently youngest son is living with her  Type of home: House   Stairs to enter the home: Yes 20 Is there a railing: Yes   Ramp: No   Stairs inside the home:         Help at home: Self Cares (home health aide/personal care attendant, family, etc); Home and Yard maintenance tasks  Equipment owned:   none    Employment: No    Hobbies/Interests: gardening, reading    Patient goals for therapy:      Pain assessment: See objective evaluation for additional pain details     Objective   LUMBAR SPINE EVALUATION  PAIN: Pain Level with Use: 5/10  Pain is Worst: with standing 5 min, transitions  Pain is Improved By: moving, sitting, injection (R side), heat  INTEGUMENTARY (edema, incisions):   POSTURE:   GAIT:   Weightbearing Status:  WBAT  Assistive Device(s): None  Gait Deviations: WFL, unable to heel/toe walk due to pain  Balance/Proprioception:   Weight Bearing Alignment: L ankle mod edema compared to R. Decreased DF/PF in walking and 90-90 SLR testing  Non-Weight Bearing Alignment:    ROM: Lumbar - flexion mod loss, approx 50% ROM / extension job loss, approx 25% ROM / side bend full with pain end range L SB  PELVIC/SI SCREEN:   Strength:   MYOTOMES: WNL  pain with resisted hip flexion L  DTR S:   CORD SIGNS:   DERMATOMES: WNL  NEURAL TENSION: positive 90-90 SLR L  FLEXIBILITY:   LUMBAR/HIP Special Tests:    PELVIS/SI SPECIAL TESTS: + gapping  FUNCTIONAL TESTS:   PALPATION: TTP at L illiac crest laterally and over ASIS  SPINAL SEGMENTAL CONCLUSIONS:       Assessment & Plan   CLINICAL IMPRESSIONS   Medical Diagnosis: Muscle spasm of back  Spinal stenosis of lumbar region with neurogenic claudication  Lumbar radiculopathy  Lumbar disc herniation    Treatment Diagnosis: low back pain with radiating pain - query SI provocation secondary to fall   Impression/Assessment: Patient is a 72 year old female with low back and LLE complaints.  The following significant findings have been identified: Pain, Decreased ROM/flexibility, Decreased joint mobility, Impaired muscle performance and Decreased activity tolerance. These impairments interfere with their ability to perform self care tasks, work tasks, recreational activities, household chores, household mobility and community mobility as compared to previous level of function.     Clinical Decision Making (Complexity):   Clinical Presentation: Stable/Uncomplicated  Clinical Presentation Rationale: based on medical and personal factors listed in PT evaluation  Clinical Decision Making (Complexity): Low complexity    PLAN OF CARE  Treatment Interventions:  Interventions: Gait Training, Manual Therapy, Neuromuscular Re-education, Therapeutic Activity, Therapeutic Exercise, Self-Care/Home  Management    Long Term Goals     PT Goal 1  Goal Identifier: Lifting/bending  Goal Description: Patient will demonstrate lifting 25# item from floor to waist height with hip hinge strategy and no increase in pain x 5 reps to indicate ability to complete household tasks such as dressing, groceries, laundry, and yardwork.  Target Date: 09/07/23      Frequency of Treatment: 2-4x/month  Duration of Treatment: 12 weeks    Recommended Referrals to Other Professionals: none  Education Assessment:        Risks and benefits of evaluation/treatment have been explained.   Patient/Family/caregiver agrees with Plan of Care.     Evaluation Time:     PT Eval, Low Complexity Minutes (61735): 30    Signing Clinician: ZORA SWEENEY [facility]                                                                                   OUTPATIENT PHYSICAL THERAPY      PLAN OF TREATMENT FOR OUTPATIENT REHABILITATION   Patient's Last Name, First Name, FEDERICOKarlaNAYKarla  Kelly Vegas YOB: 1950   Provider's Name   [facility]   Medical Record No.  [id number]     Onset Date: 05/08/23  Start of Care Date: 06/09/23     Medical Diagnosis:  Muscle spasm of back  Spinal stenosis of lumbar region with neurogenic claudication  Lumbar radiculopathy  Lumbar disc herniation      PT Treatment Diagnosis:  low back pain with radiating pain - query SI provocation secondary to fall Plan of Treatment  Frequency/Duration: 2-4x/month/ 12 weeks    Certification date from 06/09/23 to 09/07/23         See note for plan of treatment details and functional goals     REMIGIO TERRY, PT                         I CERTIFY THE NEED FOR THESE SERVICES FURNISHED UNDER        THIS PLAN OF TREATMENT AND WHILE UNDER MY CARE     (Physician attestation of this document indicates review and certification of the therapy plan).                  Referring Provider:  Bryson Valdez      Initial Assessment  See  Epic Evaluation- Start of Care Date: 06/09/23

## 2023-06-19 ENCOUNTER — LAB (OUTPATIENT)
Dept: LAB | Facility: CLINIC | Age: 73
End: 2023-06-19
Attending: INTERNAL MEDICINE
Payer: COMMERCIAL

## 2023-06-19 DIAGNOSIS — C91.10 CLL (CHRONIC LYMPHOCYTIC LEUKEMIA) (H): ICD-10-CM

## 2023-06-19 PROCEDURE — 83540 ASSAY OF IRON: CPT

## 2023-06-19 PROCEDURE — 80053 COMPREHEN METABOLIC PANEL: CPT

## 2023-06-19 PROCEDURE — 85025 COMPLETE CBC W/AUTO DIFF WBC: CPT

## 2023-06-19 PROCEDURE — 82728 ASSAY OF FERRITIN: CPT

## 2023-06-19 PROCEDURE — 36415 COLL VENOUS BLD VENIPUNCTURE: CPT

## 2023-06-19 PROCEDURE — 83615 LACTATE (LD) (LDH) ENZYME: CPT

## 2023-06-19 PROCEDURE — 83550 IRON BINDING TEST: CPT

## 2023-06-20 LAB
ACANTHOCYTES BLD QL SMEAR: SLIGHT
ALBUMIN SERPL BCG-MCNC: 3.9 G/DL (ref 3.5–5.2)
ALP SERPL-CCNC: 86 U/L (ref 35–104)
ALT SERPL W P-5'-P-CCNC: 12 U/L (ref 0–50)
ANION GAP SERPL CALCULATED.3IONS-SCNC: 11 MMOL/L (ref 7–15)
AST SERPL W P-5'-P-CCNC: 30 U/L (ref 0–45)
BASOPHILS # BLD AUTO: 0.1 10E3/UL (ref 0–0.2)
BASOPHILS NFR BLD AUTO: 1 %
BILIRUB SERPL-MCNC: 0.3 MG/DL
BUN SERPL-MCNC: 22.4 MG/DL (ref 8–23)
CALCIUM SERPL-MCNC: 9.2 MG/DL (ref 8.8–10.2)
CHLORIDE SERPL-SCNC: 109 MMOL/L (ref 98–107)
CREAT SERPL-MCNC: 1.17 MG/DL (ref 0.51–0.95)
DEPRECATED HCO3 PLAS-SCNC: 20 MMOL/L (ref 22–29)
ELLIPTOCYTES BLD QL SMEAR: SLIGHT
EOSINOPHIL # BLD AUTO: 0.2 10E3/UL (ref 0–0.7)
EOSINOPHIL NFR BLD AUTO: 2 %
ERYTHROCYTE [DISTWIDTH] IN BLOOD BY AUTOMATED COUNT: 13.2 % (ref 10–15)
FERRITIN SERPL-MCNC: 47 NG/ML (ref 11–328)
GFR SERPL CREATININE-BSD FRML MDRD: 49 ML/MIN/1.73M2
GLUCOSE SERPL-MCNC: 109 MG/DL (ref 70–99)
HCT VFR BLD AUTO: 36.6 % (ref 35–47)
HGB BLD-MCNC: 11.3 G/DL (ref 11.7–15.7)
IMM GRANULOCYTES # BLD: 0.1 10E3/UL
IMM GRANULOCYTES NFR BLD: 1 %
IRON BINDING CAPACITY (ROCHE): 285 UG/DL (ref 240–430)
IRON SATN MFR SERPL: 22 % (ref 15–46)
IRON SERPL-MCNC: 63 UG/DL (ref 37–145)
LDH SERPL L TO P-CCNC: 250 U/L (ref 0–250)
LYMPHOCYTES # BLD AUTO: 9.6 10E3/UL (ref 0.8–5.3)
LYMPHOCYTES NFR BLD AUTO: 62 %
MCH RBC QN AUTO: 29.8 PG (ref 26.5–33)
MCHC RBC AUTO-ENTMCNC: 30.9 G/DL (ref 31.5–36.5)
MCV RBC AUTO: 97 FL (ref 78–100)
MONOCYTES # BLD AUTO: 0.5 10E3/UL (ref 0–1.3)
MONOCYTES NFR BLD AUTO: 3 %
NEUTROPHILS # BLD AUTO: 4.7 10E3/UL (ref 1.6–8.3)
NEUTROPHILS NFR BLD AUTO: 31 %
NRBC # BLD AUTO: 0 10E3/UL
NRBC BLD AUTO-RTO: 0 /100
PLAT MORPH BLD: ABNORMAL
PLATELET # BLD AUTO: 239 10E3/UL (ref 150–450)
POTASSIUM SERPL-SCNC: 4.6 MMOL/L (ref 3.4–5.3)
PROT SERPL-MCNC: 6.1 G/DL (ref 6.4–8.3)
RBC # BLD AUTO: 3.79 10E6/UL (ref 3.8–5.2)
RBC MORPH BLD: ABNORMAL
SMUDGE CELLS BLD QL SMEAR: PRESENT
SODIUM SERPL-SCNC: 140 MMOL/L (ref 136–145)
WBC # BLD AUTO: 15.2 10E3/UL (ref 4–11)

## 2023-06-27 ENCOUNTER — ONCOLOGY VISIT (OUTPATIENT)
Dept: ONCOLOGY | Facility: CLINIC | Age: 73
End: 2023-06-27
Attending: INTERNAL MEDICINE
Payer: COMMERCIAL

## 2023-06-27 VITALS — HEIGHT: 64 IN | BODY MASS INDEX: 28.85 KG/M2 | WEIGHT: 169 LBS

## 2023-06-27 DIAGNOSIS — C91.10 CLL (CHRONIC LYMPHOCYTIC LEUKEMIA) (H): ICD-10-CM

## 2023-06-27 DIAGNOSIS — E61.1 IRON DEFICIENCY: Primary | ICD-10-CM

## 2023-06-27 PROCEDURE — G0463 HOSPITAL OUTPT CLINIC VISIT: HCPCS | Performed by: INTERNAL MEDICINE

## 2023-06-27 PROCEDURE — 99213 OFFICE O/P EST LOW 20 MIN: CPT | Mod: VID | Performed by: INTERNAL MEDICINE

## 2023-06-27 NOTE — LETTER
6/27/2023         RE: Kelly Vegas  [address] [address]        Dear Colleague,    Thank you for referring your patient, Kelly Vegas, to the [facility]ER [facility]. Please see a copy of my visit note below.    Video-Visit Details    Type of service:  Video Visit    Video Start Time: 10 AM  Video End Time: 10:08 AM    Originating Location (pt. Location): Other [place] [facility] in [place]    Distant Location (provider location):  [place]    Platform used for Video Visit: [facility] Hematology and Oncology Progress Note    Patient: Kelly Vegas  MRN: [id number]  Date of Service: 12/26/2022        Reason for Visit    Chief Complaint   Patient presents with     Oncology Clinic Visit     CLL (chronic lymphocytic leukemia) - provider visit only         Problem List Items Addressed This Visit        Digestive    Iron deficiency - Primary       Hematologic    CLL (chronic lymphocytic leukemia) (H)         Assessment and Plan  CLL  Boyer stage 0, 13 q. Deletion  Doing well.  CBC from last week reviewed.  Total white count has come down to 15,000.  Absolute lymphocyte count is 9.6.  Normal platelet count and normal ANC.  Hemoglobin is mildly low at 11.3 but is stable.  Clinically she does not have any B symptoms.  Overall good risk CLL.  I will see her back in 1 year with repeat labs.    Iron deficiency anemia  Secondary to GI blood loss in the past.  Iron studies are within normal limits.  Hemoglobin has improved to 1.3.  Continue oral iron every other day.  To some extent her anemia is probably from CKD.     Cancer Staging   No matching staging information was found for the patient.    ECOG Performance    1 - Can't do physically strenuous work, but fully ambyulatory and can do light sedentary work         Problem List    Patient Active Problem List   Diagnosis     Hyperlipidemia with target LDL less than 70     Hypertension goal BP (blood pressure) <  140/90     History of cervical cancer     Major depressive disorder, recurrent, mild (H)     Acquired hypothyroidism     Type 2 diabetes mellitus with diabetic polyneuropathy, with long-term current use of insulin (H)     Degenerative joint disease of hand     DDD (degenerative disc disease), lumbar     Osteopenia     Memory problem     CLL (chronic lymphocytic leukemia) (H)     Dysesthesia     Ganglion cyst of joint of finger of right hand     Background diabetic retinopathy (H)     AVM (arteriovenous malformation) of small bowel, acquired     Iron deficiency     Sensorineural hearing loss, bilateral     Nontraumatic tear of right rotator cuff, unspecified tear extent     Stage 3a chronic kidney disease (H)     Spinal stenosis of lumbar region with neurogenic claudication     Lumbar disc herniation     Lumbar radiculopathy     Muscle spasm of back     Chronic bilateral low back pain with left-sided sciatica        Oncology history  She presented in 10/2018 with new moderate leukocytosis/lymphocytosis and moderate normocytic normochromic anemia, found to have low ferritin and iron saturation, which was suggestive of iron-deficiency anemia.   Record review indicating she had touch of leukocytosis no diff back in 2006.  She was not anemic back in 2006 with hemoglobin of 12.5.  Hematology work up in 10/2018 was most consistent with CLL and DEBORA. DEBORA corrected with iron supplement by 12/2018.     Upper endo found none bleeding erosive gastropathy, with negative colonoscopy.   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   She was off oral iron since 5/2019.     She had new anemia in 9/2019 with hb of 10.7, nl MCV, which is 1 wk post cholecystectomy.     Hgb did not improve in 10/2019 when she was restarted on oral iron 10/2019. Stool occult was negative in 10/2019. Hb up to 12 end of Nov. GI work-up 1/2020 with [facility] identified multiple AVM in proximal small bowel and colon small bowel capsule  studies.    She then had repeat upper endoscopy and colonoscopy with [facility] did not find any obvious signs of bleeding, had cauterization.     Interval History   Kelly Vegas is a 72 year old female with history of CLL and iron deficiency anemia secondary to bleeding from multiple GI AVMs who is here for follow-up.  This is a video visit.    Doing well since last visit.  Denies any new issues today.  No B symptoms.  She does have diabetes and has had episodes of hypoglycemia.  Almost passed out once.   but now she is back on schedule for diabetes management and doing well.  Labs from last week reviewed today.          Review of Systems  A comprehensive review of systems was negative except for what is noted in the interval history    Current Outpatient Medications   Medication     atorvastatin (LIPITOR) 20 MG tablet     blood glucose (NO BRAND SPECIFIED) test strip     Cholecalciferol (VITAMIN D3) 1000 units CAPS     citalopram (CELEXA) 20 MG tablet     ferrous sulfate (FEROSUL) 325 (65 Fe) MG tablet     insulin glargine (LANTUS SOLOSTAR) 100 UNIT/ML pen     insulin lispro (HUMALOG KWIKPEN) 100 UNIT/ML (1 unit dial) KWIKPEN     levothyroxine (SYNTHROID/LEVOTHROID) 88 MCG tablet     lisinopril-hydrochlorothiazide (ZESTORETIC) 20-12.5 MG tablet     metFORMIN (GLUCOPHAGE XR) 500 MG 24 hr tablet     multivitamin w/minerals (THERA-VIT-M) tablet     pregabalin (LYRICA) 25 MG capsule     thin (NO BRAND SPECIFIED) lancets     ULTICARE MINI 31G X 6 MM insulin pen needle     Current Facility-Administered Medications   Medication     bupivacaine (MARCAINE) 0.25 % injection 4 mL     triamcinolone (KENALOG-40) injection 80 mg        Physical Exam    No flowsheet data found.    General: alert and cooperative  HEENT: Head: Normal, normocephalic, atraumatic.  Eye: Normal external eye, conjunctiva, lids cornea, LURDES.  Chest: Clear to auscultation bilaterally  Cardiac: S1, S2 normal, regular rate and rhythm  Abdomen: Soft,  nontender, no organomegaly  Extremities: atraumatic, no peripheral edema  CNS: Alert and oriented x3, neurologic exam grossly normal.  Lymphatics: No clinically significant peripheral adenopathy noted  Lab Results    No results found for this or any previous visit (from the past 168 hour(s)).    Imaging    No results found.      Signed by: Asia Guzman MD        Again, thank you for allowing me to participate in the care of your patient.        Sincerely,        Asia Guzman MD

## 2023-06-27 NOTE — PROGRESS NOTES
Video-Visit Details    Type of service:  Video Visit    Video Start Time: 10 AM  Video End Time: 10:08 AM    Originating Location (pt. Location): [facility] cancer clinic in [place]    Distant Location (provider location):  Home/[place]    Platform used for Video Visit: [facility] Hematology and Oncology Progress Note    Patient: Kelly Vegas  MRN: [id number]  Date of Service: 12/26/2022        Reason for Visit    Chief Complaint   Patient presents with     Oncology Clinic Visit     CLL (chronic lymphocytic leukemia) - provider visit only         Problem List Items Addressed This Visit        Digestive    Iron deficiency - Primary       Hematologic    CLL (chronic lymphocytic leukemia) (H)         Assessment and Plan  CLL  Boyer stage 0, 13 q. Deletion  Doing well.  CBC from last week reviewed.  Total white count has come down to 15,000.  Absolute lymphocyte count is 9.6.  Normal platelet count and normal ANC.  Hemoglobin is mildly low at 11.3 but is stable.  Clinically she does not have any B symptoms.  Overall good risk CLL.  I will see her back in 1 year with repeat labs.    Iron deficiency anemia  Secondary to GI blood loss in the past.  Iron studies are within normal limits.  Hemoglobin has improved to 1.3.  Continue oral iron every other day.  To some extent her anemia is probably from CKD.     Cancer Staging   No matching staging information was found for the patient.    ECOG Performance    1 - Can't do physically strenuous work, but fully ambyulatory and can do light sedentary work         Problem List    Patient Active Problem List   Diagnosis     Hyperlipidemia with target LDL less than 70     Hypertension goal BP (blood pressure) < 140/90     History of cervical cancer     Major depressive disorder, recurrent, mild (H)     Acquired hypothyroidism     Type 2 diabetes mellitus with diabetic polyneuropathy, with long-term current use of insulin (H)     Degenerative joint disease of  hand     DDD (degenerative disc disease), lumbar     Osteopenia     Memory problem     CLL (chronic lymphocytic leukemia) (H)     Dysesthesia     Ganglion cyst of joint of finger of right hand     Background diabetic retinopathy (H)     AVM (arteriovenous malformation) of small bowel, acquired     Iron deficiency     Sensorineural hearing loss, bilateral     Nontraumatic tear of right rotator cuff, unspecified tear extent     Stage 3a chronic kidney disease (H)     Spinal stenosis of lumbar region with neurogenic claudication     Lumbar disc herniation     Lumbar radiculopathy     Muscle spasm of back     Chronic bilateral low back pain with left-sided sciatica        Oncology history  She presented in 10/2018 with new moderate leukocytosis/lymphocytosis and moderate normocytic normochromic anemia, found to have low ferritin and iron saturation, which was suggestive of iron-deficiency anemia.   Record review indicating she had touch of leukocytosis no diff back in 2006.  She was not anemic back in 2006 with hemoglobin of 12.5.  Hematology work up in 10/2018 was most consistent with CLL and DEBORA. DEBORA corrected with iron supplement by 12/2018.     Upper endo found none bleeding erosive gastropathy, with negative colonoscopy.   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   She was off oral iron since 5/2019.     She had new anemia in 9/2019 with hb of 10.7, nl MCV, which is 1 wk post cholecystectomy.     Hgb did not improve in 10/2019 when she was restarted on oral iron 10/2019. Stool occult was negative in 10/2019. Hb up to 12 end of Nov. GI work-up 1/2020 with [facility] identified multiple AVM in proximal small bowel and colon small bowel capsule studies.    She then had repeat upper endoscopy and colonoscopy with [facility] did not find any obvious signs of bleeding, had cauterization.     Interval History   Kelly Vegas is a 72 year old female with history of CLL and iron deficiency anemia secondary  to bleeding from multiple GI AVMs who is here for follow-up.  This is a video visit.    Doing well since last visit.  Denies any new issues today.  No B symptoms.  She does have diabetes and has had episodes of hypoglycemia.  Almost passed out once.   but now she is back on schedule for diabetes management and doing well.  Labs from last week reviewed today.          Review of Systems  A comprehensive review of systems was negative except for what is noted in the interval history    Current Outpatient Medications   Medication     atorvastatin (LIPITOR) 20 MG tablet     blood glucose (NO BRAND SPECIFIED) test strip     Cholecalciferol (VITAMIN D3) 1000 units CAPS     citalopram (CELEXA) 20 MG tablet     ferrous sulfate (FEROSUL) 325 (65 Fe) MG tablet     insulin glargine (LANTUS SOLOSTAR) 100 UNIT/ML pen     insulin lispro (HUMALOG KWIKPEN) 100 UNIT/ML (1 unit dial) KWIKPEN     levothyroxine (SYNTHROID/LEVOTHROID) 88 MCG tablet     lisinopril-hydrochlorothiazide (ZESTORETIC) 20-12.5 MG tablet     metFORMIN (GLUCOPHAGE XR) 500 MG 24 hr tablet     multivitamin w/minerals (THERA-VIT-M) tablet     pregabalin (LYRICA) 25 MG capsule     thin (NO BRAND SPECIFIED) lancets     ULTICARE MINI 31G X 6 MM insulin pen needle     Current Facility-Administered Medications   Medication     bupivacaine (MARCAINE) 0.25 % injection 4 mL     triamcinolone (KENALOG-40) injection 80 mg        Physical Exam    No flowsheet data found.    General: alert and cooperative  HEENT: Head: Normal, normocephalic, atraumatic.  Eye: Normal external eye, conjunctiva, lids cornea, LURDES.  Chest: Clear to auscultation bilaterally  Cardiac: S1, S2 normal, regular rate and rhythm  Abdomen: Soft, nontender, no organomegaly  Extremities: atraumatic, no peripheral edema  CNS: Alert and oriented x3, neurologic exam grossly normal.  Lymphatics: No clinically significant peripheral adenopathy noted  Lab Results    No results found for this or any previous visit  (from the past 168 hour(s)).    Imaging    No results found.      Signed by: Asia Guzman MD

## 2023-07-05 ENCOUNTER — DOCUMENTATION ONLY (OUTPATIENT)
Dept: LAB | Facility: CLINIC | Age: 73
End: 2023-07-05
Payer: COMMERCIAL

## 2023-07-05 DIAGNOSIS — N18.32 STAGE 3B CHRONIC KIDNEY DISEASE (H): Primary | ICD-10-CM

## 2023-07-05 NOTE — PROGRESS NOTES
Hi! Please place future laboratory orders if needed for upcoming appointment on 7/6/23. If labs are not due, please have your care team contact the patient to cancel lab only appointment.     Thank you!  [facility]

## 2023-07-06 ENCOUNTER — LAB (OUTPATIENT)
Dept: LAB | Facility: CLINIC | Age: 73
End: 2023-07-06
Payer: COMMERCIAL

## 2023-07-06 DIAGNOSIS — E11.3299 BACKGROUND DIABETIC RETINOPATHY (H): Primary | ICD-10-CM

## 2023-07-06 DIAGNOSIS — N18.32 STAGE 3B CHRONIC KIDNEY DISEASE (H): ICD-10-CM

## 2023-07-06 LAB
ANION GAP SERPL CALCULATED.3IONS-SCNC: 9 MMOL/L (ref 7–15)
BUN SERPL-MCNC: 24.6 MG/DL (ref 8–23)
CALCIUM SERPL-MCNC: 9.3 MG/DL (ref 8.8–10.2)
CHLORIDE SERPL-SCNC: 107 MMOL/L (ref 98–107)
CREAT SERPL-MCNC: 1.24 MG/DL (ref 0.51–0.95)
DEPRECATED HCO3 PLAS-SCNC: 24 MMOL/L (ref 22–29)
GFR SERPL CREATININE-BSD FRML MDRD: 46 ML/MIN/1.73M2
GLUCOSE SERPL-MCNC: 254 MG/DL (ref 70–99)
POTASSIUM SERPL-SCNC: 5 MMOL/L (ref 3.4–5.3)
SODIUM SERPL-SCNC: 140 MMOL/L (ref 136–145)

## 2023-07-06 PROCEDURE — 80048 BASIC METABOLIC PNL TOTAL CA: CPT

## 2023-07-06 PROCEDURE — 36415 COLL VENOUS BLD VENIPUNCTURE: CPT

## 2023-07-07 ENCOUNTER — THERAPY VISIT (OUTPATIENT)
Dept: PHYSICAL THERAPY | Facility: CLINIC | Age: 73
End: 2023-07-07
Payer: COMMERCIAL

## 2023-07-07 DIAGNOSIS — G89.29 CHRONIC BILATERAL LOW BACK PAIN WITH LEFT-SIDED SCIATICA: Primary | ICD-10-CM

## 2023-07-07 DIAGNOSIS — M54.42 CHRONIC BILATERAL LOW BACK PAIN WITH LEFT-SIDED SCIATICA: Primary | ICD-10-CM

## 2023-07-07 PROCEDURE — 97112 NEUROMUSCULAR REEDUCATION: CPT | Mod: GP | Performed by: PHYSICAL THERAPIST

## 2023-07-07 PROCEDURE — 97140 MANUAL THERAPY 1/> REGIONS: CPT | Mod: GP | Performed by: PHYSICAL THERAPIST

## 2023-07-09 ENCOUNTER — HEALTH MAINTENANCE LETTER (OUTPATIENT)
Age: 73
End: 2023-07-09

## 2023-07-09 NOTE — PROGRESS NOTES
Nephrology Clinic Visit  Kelly Vegas MRN: [id number] YOB: 1950  Primary Care Provider: Sejal Rutledge  History Obtained From: Patient  External Document Review: Primary Care Provider  -------------------------------------------------------------------------------------------------------------------------------  Visit 7/11/2023:  -BP Control: 119/64 today in office.   --Current Regimen: Lisinopril 40mg, hydrochlorothiazide 25mg  -DM Control: 7.1 (4/5/2023)  --Current regimen: Metformin, insulin  -Creatinine trend: 1.24 from 1.17 (6/19/2023)  -Re-discuss Jardiance: We are going to try this medication. Watch for SE.  -Recently her dog passed away. Now has a cat. Thinking about getting a puppy  -Renal US 4/13/2023: 10cm/9.2cm, no hydro, no masses, no stones. No elevated PVR  -Doing great from a cancer perspective and doesn't need to see them again for about a year.   -Working outside in the garden a lot. Sometimes spends a ton of time outside and won't take breaks.   -Having some mild diarrhea with Metformin.    Visit 4/11/2023:  -BP Control: 116/58 in office today. No changes needed.  --Current Regimen: Lisinopril 40mg qDay, hydrochlorothiazide 25mg qDay  -DM Control: A1c 4/5/2023 was 7.1  --Current regimen: Insulin, Metformin (1 pill per day), Jardiance (She got nervous after reading about side effects)  -Creatinine trend: 1.5 from 1.12  -Fell off her step (about 1 month ago) and has been getting steroid shots in her back. She slipped on the steps when there was still snow on the ground. She went to urgent care a few days after this happened. She is using Gabapentin (she notes that this makes her a bit woozy). We discussed cutting her dose in half (from 300mg BID to 150mg BID). She notes R sided improvement and still L sided pain. She is going to see another doctor for this in a few weeks. No NSAIDs. Is using some Tylenol. No other changes in her health. Tolerating PO intake. No  colds/flu.   -Nocturia: Getting up about 4x per night.     Visit 1/10/2023:  -Here to establish care with [facility] 1/3/2023  -BP Control: Not really checking her blood pressure.    -DM Control: A1c 6.8 11/30/22. She has had periods of time of poor control (such as a 11.2 4/11/2018). She was first diagnosed with DM around 2010 or so.  -Nocturia/Frequency: Getting up maybe 4x per night. This has been the case for about a year now. She is taking her hydrochlorothiazide in the morning. She drinks up until she goes to bed.   -NSAID use: No  -Herbal/OTC meds: No  -Family history of kidney disease: No  -Hx of UTI/SSTI (not currently on SGLT2i): No issues with UTI/SSTI. Will discuss SGLT2i Dr Rutledge.  -Illness/changes in health/etc around 11/30/2022 when creatinine peaked: She doesn't remember any acute illness or issues around that time. She was not in the hospital. She was not in the ED. Her BP was fine from her report. She had a home visit around this time with no issues. Her Metformin was reduced at this time because she was having some severe diarrhea around that time. Her diarrhea is improving with reductions in dosing of metformin.   -Hx of CLL: Diagnosed about 4 years ago or so.  -Anemia: On PO iron every other day.    -Not really eating out for dinner at all.     Objective:  PAST MEDICAL HISTORY:  Past Medical History:   Diagnosis Date     Arthritis check it     AVM (arteriovenous malformation) of small bowel, acquired      Background diabetic retinopathy (H)      Cervical cancer (H) 03/2006    [facility] [place], [facility] with gland involvement     CKD (chronic kidney disease) stage 4, GFR 15-29 ml/min (H)      CLL (chronic lymphocytic leukemia) (H)      Colon adenoma      DDD (degenerative disc disease), lumbar      Degenerative joint disease of hand      Depressive disorder      Diabetes mellitus type II 2009     Diabetic polyneuropathy associated with type 2 diabetes mellitus (H) 09/26/2017     Gastritis      HTN  (hypertension)      Hyperlipidemia LDL goal < 100      Hypothyroid 12/2004     Lumbar disc herniation      Osteopenia      Recurrent major depression (H)      Sensorineural hearing loss, bilateral 12/14/2021     Spinal stenosis of lumbar region with neurogenic claudication        PAST SURGICAL HISTORY:  Past Surgical History:   Procedure Laterality Date     BIOPSY       COLONOSCOPY  nov 2016     COLONOSCOPY N/A 3/1/2019    Procedure: COLONOSCOPY;  Surgeon: Romulo Hayes MD;  Location: [facility]     COLONOSCOPY WITH CO2 INSUFFLATION N/A 11/28/2016    Procedure: COLONOSCOPY WITH CO2 INSUFFLATION;  Surgeon: Migue Giraldo MD;  Location: [facility]     CYSTECTOMY OVARIAN BENIGN  1974     ESOPHAGOSCOPY, GASTROSCOPY, DUODENOSCOPY (EGD), COMBINED N/A 3/1/2019    Procedure: COMBINED ESOPHAGOSCOPY, GASTROSCOPY, DUODENOSCOPY (EGD), BIOPSY SINGLE OR MULTIPLE;  Surgeon: Romulo Hayes MD;  Location: [facility]     EYE SURGERY  12-14-16     HC THYROIDECTOMY  12/2004    goiter     LAPAROSCOPIC CHOLECYSTECTOMY N/A 9/9/2019    Procedure: CHOLECYSTECTOMY, LAPAROSCOPIC;  Surgeon: Jorge Martinez DO;  Location: [facility]SO, OMENTECTOMY W/JUAN DANIEL  2/2006    cervical cancer       MEDICATIONS:  Current Outpatient Medications   Medication Instructions     atorvastatin (LIPITOR) 20 mg, Oral, DAILY     blood glucose (NO BRAND SPECIFIED) test strip Use to test blood sugar 3 times daily or as directed. To accompany: Blood Glucose Monitor Brands: per insurance.     Cholecalciferol (VITAMIN D3) 1000 units CAPS Oral, DAILY     citalopram (CELEXA) 20 mg, Oral, DAILY     insulin glargine (LANTUS SOLOSTAR) 100 UNIT/ML pen INJECT 20 UNITS AT BEDTIME DAILY     insulin lispro (HUMALOG KWIKPEN) 8 Units, Subcutaneous, 2 TIMES DAILY BEFORE MEALS     levothyroxine (SYNTHROID/LEVOTHROID) 88 mcg, Oral, DAILY     lisinopril-hydrochlorothiazide (ZESTORETIC) 20-12.5 MG tablet 2 tablets, Oral, DAILY     metFORMIN (GLUCOPHAGE XR) 500 mg, Oral, DAILY  WITH SUPPER     multivitamin w/minerals (THERA-VIT-M) tablet 1 tablet, Oral, DAILY     thin (NO BRAND SPECIFIED) lancets Use to test blood sugar 3 times daily or as directed. To accompany: Blood Glucose Monitor Brands: per insurance.     ULTICARE MINI 31G X 6 MM insulin pen needle Use three times daily or as directed.       FAMILY MEDICAL HISTORY:   Family History   Problem Relation Age of Onset     Thyroid Disease Mother      Dementia Mother      Osteoporosis Mother      Alcohol/Drug Father      C.A.D. Father         CABG      Diabetes Father      Hypertension Father      Hyperlipidemia Father      Cerebrovascular Disease Father      Alcohol/Drug Brother      Prostate Cancer Brother      Depression Brother      Depression Brother         d. suicide     Cancer Maternal Grandmother         stomach, colon     Breast Cancer Maternal Grandmother      Thyroid Disease Maternal Grandmother      Alzheimer Disease Maternal Grandfather      Breast Cancer Paternal Grandmother      Alcohol/Drug Son      Rheumatoid Arthritis Son      Breast Cancer Cousin      Breast Cancer Other        PHYSICAL EXAM:   /64   Pulse 86   Wt 76.2 kg (168 lb)   BMI 28.66 kg/m    GENERAL APPEARANCE: alert and no distress  EYES: nonicteric  ABDOMEN: soft, nontender, normal bowel sounds, no HSM   Extremities: Trace/1+ B/L LE edema  MS: no evidence of inflammation in joints, no muscle tenderness  SKIN: no rash  NEURO: mentation intact and speech normal  PSYCH: affect normal    LABS REVIEWED BY ME:   ANEMIA  Recent Labs   Lab Test 06/19/23  1041 12/29/22  1427 12/12/22  1542 11/30/22  1204 11/04/21  1411   HGB 11.3* 10.9* 11.1* 11.4* 12.2   IRONSAT 22  --  25 26 26   BARBIE 47  --  27 14 10       BMP  Recent Labs   Lab Test 07/06/23  1018 06/19/23  1041 05/01/23  1024 04/05/23  0952 12/29/22  1427 12/12/22  1542 12/14/21  1717 11/04/21  1411 11/04/19  1512 11/01/18  1310    140 141 140   < > 142   < > 138   < > 135   POTASSIUM 5.0 4.6 5.0 4.4    < > 4.7   < > 4.1   < > 3.6   CHLORIDE 107 109* 110* 110*   < > 107   < > 106   < > 101   CO2 24 20* 24 27   < > 27   < > 29   < > 27   ANIONGAP 9 11 7 3   < > 8   < > 3   < > 7   BUN 24.6* 22.4 30 30   < > 30.7*   < > 18   < > 22   CR 1.24* 1.17* 1.33* 1.50*   < > 1.32*   < > 1.15*   < > 0.97   GFRESTIMATED 46* 49* 42* 36*   < > 42*   < > 48*   < > 57*   PROTTOTAL  --  6.1*  --   --   --  6.7  --  6.8  --  7.1    < > = values in this interval not displayed.       CBC  Recent Labs   Lab Test 06/19/23  1041 12/29/22  1427 12/12/22  1542 11/30/22  1204   HGB 11.3* 10.9* 11.1* 11.4*   WBC 15.2* 21.3* 24.4* 23.8*   HCT 36.6 34.7* 36.0 37.4   MCV 97 95 95 96    327 272 301       DIABETES  Recent Labs   Lab Test 04/05/23  0952 11/30/22  1204 07/11/22  1213 12/14/21  1717   A1C 7.1* 6.8* 6.5* 6.8*       HYPONATREMIANo lab results found.    Invalid input(s): UOSM, OSM    MBD  Recent Labs   Lab Test 07/06/23  1018 06/19/23  1041 05/01/23  1024 04/05/23  0952 12/29/22  1427 12/12/22  1542 12/14/21  1717 11/04/21  1411   KARTHIK 9.3 9.2 9.1 9.3 9.2 9.4   < > 9.1   ALBUMIN  --  3.9  --   --  3.6 4.3  --  3.8   PHOS  --   --   --   --  2.5  --   --   --    PTHI  --   --   --   --  73*  --   --   --     < > = values in this interval not displayed.        URINE STUDIES  Recent Labs   Lab Test 12/29/22  1427 11/30/22  1204   COLOR Yellow Yellow   APPEARANCE Clear Clear   URINEGLC 250* Negative   URINEBILI Negative Negative   URINEKETONE Negative Negative   SG 1.025 >=1.030   UBLD Negative Negative   URINEPH 5.5 5.0   PROTEIN Negative Negative   UROBILINOGEN 0.2 0.2   NITRITE Negative Negative   LEUKEST Negative Negative   RBCU 0-2  --    WBCU 0-5  --      No lab results found.    ADDITIONAL LABS ORDERED/REVIEWED BY ME:  None    Assessment/Plan  CKD Stage G3aA1  Established care with [facility] 1/3/2023    CKD onset appears to be around 4/2021 with creatinine of 1.09. Stable until 11/30/2022 when creatinine was 1.42. This  BILLY appears to have recovered back to her baseline on 12/29/2022 with creatinine of 1.12. UPCR 12/29/2022 0.08g/g. No hematuria or pyuria 12/29/2022.     Creatinine back up to 1.5 on 4/5/2023. Worsening kidney fucntion without clear cause at this appointment. She is avoiding NSAIDs for her back pain from recent fall. She has stable nocturia. No other signficant medication changes (she didn't start SGLT2i as she was worried about side effects).     Creatinine improved back down to baseline of 1-1.2 on 6/19/2023 and was stable on repeat 7/6/2023.     CKD Etiology (Biopsy Proven: No):  -Hypertensive Nephrosclerosis  -DKD    Plan:  -Optimize BP control: Continue Lisinopril/HCTZ.   -Optimize DM regimen: Agree with Metformin (watch eGFR and hold if < 30).   -Advised we start Jardiance 10mg qDay. Advised to watch for UTI/SSTI symptoms.   -Avoid NSAIDs/Nephrotoxic agents  -60 ounces of PO intake qDay      Anemia of Renal Disease  Hemoglobin: 11.3 (6/19/2023)  Ferritin: 27  TSAT: 25%    Plan:  -Continue PO iron every other day  -No need for IV iron or EPO at this time. Monitor trends.       Lipid Management in CKD  KDIGO 2013 Guidelines recommend the following for patients with CKD:  Adults >/= 51 yo w/ CKD stage 1 or 2: Statin  Adults >/= 51 yo w/ CKD stage 3-5: Statin + Ezetimibe or Statin alone  Adults 18-49 + 1 of the following (CAD, DM, Ischemic stroke, 10 yr ASCVD risk >10%): Statin    KDIGO guidelines recommend Simvastatin 20mg/Ezetimibe 10mg qDay for patients with CKD G3a-G5 (in line with the SHARP trial). If Simvastatin used alone, 40mg qDay is referenced.   Other options include: Atorvastatin 20mg qDay (4D trial) and Rosuvastatin 10mg qDay (DEISY trial)    Plan:  -Agree with Atorvastatin      Metabolic Acidosis of Renal Disease  Bicarb: 24 (7/6/2023)    Plan:  -No need for NaHCO      Mineral Bone Disease  PTH: 73 12/29/2022  Phos: 2.5 12/29/2022  Calcium: 9.2 12/29/2022  Vitamin D: 39 12/29/2022    Plan:  -No need  for phos binder  Update mbd labs at next visit.    Return to clinic: 3 months    Migue Calvillo MD   of Medicine  Division of Nephrology and Hypertension  [facility]    35 minutes spent on the date of the encounter doing chart review, history and exam, documentation and further activities as noted above

## 2023-07-10 ENCOUNTER — DOCUMENTATION ONLY (OUTPATIENT)
Dept: ORTHOPEDICS | Facility: CLINIC | Age: 73
End: 2023-07-10

## 2023-07-10 NOTE — PROGRESS NOTES
Writer talked with patient in the lobby and explained that provider is running behind due to a surgery case added on this am. Pt has choosen to reschedule to 7/24/23 at 9:40 am.     Junie Clements LPN

## 2023-07-11 ENCOUNTER — OFFICE VISIT (OUTPATIENT)
Dept: NEPHROLOGY | Facility: CLINIC | Age: 73
End: 2023-07-11
Payer: COMMERCIAL

## 2023-07-11 VITALS
BODY MASS INDEX: 28.66 KG/M2 | SYSTOLIC BLOOD PRESSURE: 119 MMHG | DIASTOLIC BLOOD PRESSURE: 64 MMHG | WEIGHT: 168 LBS | HEART RATE: 86 BPM

## 2023-07-11 DIAGNOSIS — N18.32 STAGE 3B CHRONIC KIDNEY DISEASE (H): Primary | ICD-10-CM

## 2023-07-11 PROCEDURE — 99214 OFFICE O/P EST MOD 30 MIN: CPT | Performed by: STUDENT IN AN ORGANIZED HEALTH CARE EDUCATION/TRAINING PROGRAM

## 2023-07-11 NOTE — PATIENT INSTRUCTIONS
"It was a pleasure to see you in nephrology clinic today. I've included a brief summary of our discussion and care plan from today's visit below.  _______________________________________________________________________    My recommendations are summarized as follows:  -Keep a Blood Pressure log. Please make sure that you are using a validated blood pressure device (check \"www.validatebp.org\").  -Avoid all NSAID's. Examples include Ibuprofen (Advil, Motrin), naprosyn (Aleve), celebrex among others. Acetaminophen (Tylenol) is ok with maximum dose in 24 hours of 3000mg.  -Healthy lifestyle measures will keep your kidney's functioning at their current best. This includes regular exercise, maintaining a healthy body weight and smoking cessation.   -I would like to start Jardiance 10mg 1x per day to protect your kidneys. Please monitor for urinary tract infection symptoms after starting this medication. I've sent this to your pharmacy.  -Please keep trying to get about 60 ounces of water per day. 2 of your 32 ounce water bottles should do the trick!  -Please make sure to take breaks from gardening especially when it is hot outside!  -My goal sodium intake for you is around 2.4g (let's round to 3g and see how you are doing)    Please return to Nephrology Clinic in 3 months to review your progress.     Who do I call with any questions after my visit?  There are multiple ways to contact your nephrology care team:    -To schedule or reschedule an appointment, please call [phone].  -Reach out via Realitycheck. These messages are answered by your nurse or doctor during business hours and typically in 1-2 days. Realitycheck messages are best for quick questions/clarifications/updates. Frequently, your doctor or nurse will recommend setting up a follow up appointment to address any significant questions/concerns.  -For urgent questions after business hours, you may reach the on-call Nephrology Fellow by contacting the [facility]  at [phone].    To schedule imaging:   -Please call [phone]     To schedule your lab appointment at the Clinics and Surgery Center:  -Please call [phone]    Sincerely,    Dr. Migue Calvillo   of Medicine  Division of Nephrology and Hypertension  [facility]

## 2023-07-11 NOTE — LETTER
7/11/2023       RE: Kelly Vegas  [address] [facility] [address]     Dear Colleague,    Thank you for referring your patient, Kelly Vegas, to the [facility] at [facility]. Please see a copy of my visit note below.        Nephrology Clinic Visit  Kelly Vegas MRN: [id number] YOB: 1950  Primary Care Provider: Sejal Rutledge  History Obtained From: Patient  External Document Review: Primary Care Provider  -------------------------------------------------------------------------------------------------------------------------------  Visit 7/11/2023:  -BP Control: 119/64 today in office.   --Current Regimen: Lisinopril 40mg, hydrochlorothiazide 25mg  -DM Control: 7.1 (4/5/2023)  --Current regimen: Metformin, insulin  -Creatinine trend: 1.24 from 1.17 (6/19/2023)  -Re-discuss Jardiance: We are going to try this medication. Watch for SE.  -Recently her dog passed away. Now has a cat. Thinking about getting a puppy  -Renal US 4/13/2023: 10cm/9.2cm, no hydro, no masses, no stones. No elevated PVR  -Doing great from a cancer perspective and doesn't need to see them again for about a year.   -Working outside in the garden a lot. Sometimes spends a ton of time outside and won't take breaks.   -Having some mild diarrhea with Metformin.    Visit 4/11/2023:  -BP Control: 116/58 in office today. No changes needed.  --Current Regimen: Lisinopril 40mg qDay, hydrochlorothiazide 25mg qDay  -DM Control: A1c 4/5/2023 was 7.1  --Current regimen: Insulin, Metformin (1 pill per day), Jardiance (She got nervous after reading about side effects)  -Creatinine trend: 1.5 from 1.12  -Fell off her step (about 1 month ago) and has been getting steroid shots in her back. She slipped on the steps when there was still snow on the ground. She went to urgent care a few days after this happened. She is using Gabapentin (she notes that this makes her  a bit woozy). We discussed cutting her dose in half (from 300mg BID to 150mg BID). She notes R sided improvement and still L sided pain. She is going to see another doctor for this in a few weeks. No NSAIDs. Is using some Tylenol. No other changes in her health. Tolerating PO intake. No colds/flu.   -Nocturia: Getting up about 4x per night.     Visit 1/10/2023:  -Here to establish care with [facility] 1/3/2023  -BP Control: Not really checking her blood pressure.    -DM Control: A1c 6.8 11/30/22. She has had periods of time of poor control (such as a 11.2 4/11/2018). She was first diagnosed with DM around 2010 or so.  -Nocturia/Frequency: Getting up maybe 4x per night. This has been the case for about a year now. She is taking her hydrochlorothiazide in the morning. She drinks up until she goes to bed.   -NSAID use: No  -Herbal/OTC meds: No  -Family history of kidney disease: No  -Hx of UTI/SSTI (not currently on SGLT2i): No issues with UTI/SSTI. Will discuss SGLT2i Dr Rutledge.  -Illness/changes in health/etc around 11/30/2022 when creatinine peaked: She doesn't remember any acute illness or issues around that time. She was not in the hospital. She was not in the ED. Her BP was fine from her report. She had a home visit around this time with no issues. Her Metformin was reduced at this time because she was having some severe diarrhea around that time. Her diarrhea is improving with reductions in dosing of metformin.   -Hx of CLL: Diagnosed about 4 years ago or so.  -Anemia: On PO iron every other day.    -Not really eating out for dinner at all.     Objective:  PAST MEDICAL HISTORY:  Past Medical History:   Diagnosis Date    Arthritis check it    AVM (arteriovenous malformation) of small bowel, acquired     Background diabetic retinopathy (H)     Cervical cancer (H) 03/2006    [facility] with gland involvement    CKD (chronic kidney disease) stage 4, GFR 15-29 ml/min (H)     CLL (chronic lymphocytic leukemia)  (H)     Colon adenoma     DDD (degenerative disc disease), lumbar     Degenerative joint disease of hand     Depressive disorder     Diabetes mellitus type II 2009    Diabetic polyneuropathy associated with type 2 diabetes mellitus (H) 09/26/2017    Gastritis     HTN (hypertension)     Hyperlipidemia LDL goal < 100     Hypothyroid 12/2004    Lumbar disc herniation     Osteopenia     Recurrent major depression (H)     Sensorineural hearing loss, bilateral 12/14/2021    Spinal stenosis of lumbar region with neurogenic claudication        PAST SURGICAL HISTORY:  Past Surgical History:   Procedure Laterality Date    BIOPSY      COLONOSCOPY  nov 2016    COLONOSCOPY N/A 3/1/2019    Procedure: COLONOSCOPY;  Surgeon: Romulo Hayes MD;  Location: [facility]    COLONOSCOPY WITH CO2 INSUFFLATION N/A 11/28/2016    Procedure: COLONOSCOPY WITH CO2 INSUFFLATION;  Surgeon: Migue Giraldo MD;  Location: [facility]    CYSTECTOMY OVARIAN BENIGN  1974    ESOPHAGOSCOPY, GASTROSCOPY, DUODENOSCOPY (EGD), COMBINED N/A 3/1/2019    Procedure: COMBINED ESOPHAGOSCOPY, GASTROSCOPY, DUODENOSCOPY (EGD), BIOPSY SINGLE OR MULTIPLE;  Surgeon: Romulo Hayes MD;  Location: [facility]    EYE SURGERY  12-14-16    HC THYROIDECTOMY  12/2004    goiter    LAPAROSCOPIC CHOLECYSTECTOMY N/A 9/9/2019    Procedure: CHOLECYSTECTOMY, LAPAROSCOPIC;  Surgeon: Jorge Martinez DO;  Location: [facility], OMENTECTOMY W/JUAN DANIEL  2/2006    cervical cancer       MEDICATIONS:  Current Outpatient Medications   Medication Instructions    atorvastatin (LIPITOR) 20 mg, Oral, DAILY    blood glucose (NO BRAND SPECIFIED) test strip Use to test blood sugar 3 times daily or as directed. To accompany: Blood Glucose Monitor Brands: per insurance.    Cholecalciferol (VITAMIN D3) 1000 units CAPS Oral, DAILY    citalopram (CELEXA) 20 mg, Oral, DAILY    insulin glargine (LANTUS SOLOSTAR) 100 UNIT/ML pen INJECT 20 UNITS AT BEDTIME DAILY    insulin lispro (HUMALOG KWIKPEN) 8 Units,  Subcutaneous, 2 TIMES DAILY BEFORE MEALS    levothyroxine (SYNTHROID/LEVOTHROID) 88 mcg, Oral, DAILY    lisinopril-hydrochlorothiazide (ZESTORETIC) 20-12.5 MG tablet 2 tablets, Oral, DAILY    metFORMIN (GLUCOPHAGE XR) 500 mg, Oral, DAILY WITH SUPPER    multivitamin w/minerals (THERA-VIT-M) tablet 1 tablet, Oral, DAILY    thin (NO BRAND SPECIFIED) lancets Use to test blood sugar 3 times daily or as directed. To accompany: Blood Glucose Monitor Brands: per insurance.    ULTICARE MINI 31G X 6 MM insulin pen needle Use three times daily or as directed.       FAMILY MEDICAL HISTORY:   Family History   Problem Relation Age of Onset    Thyroid Disease Mother     Dementia Mother     Osteoporosis Mother     Alcohol/Drug Father     C.A.D. Father         CABG     Diabetes Father     Hypertension Father     Hyperlipidemia Father     Cerebrovascular Disease Father     Alcohol/Drug Brother     Prostate Cancer Brother     Depression Brother     Depression Brother         d. suicide    Cancer Maternal Grandmother         stomach, colon    Breast Cancer Maternal Grandmother     Thyroid Disease Maternal Grandmother     Alzheimer Disease Maternal Grandfather     Breast Cancer Paternal Grandmother     Alcohol/Drug Son     Rheumatoid Arthritis Son     Breast Cancer Cousin     Breast Cancer Other        PHYSICAL EXAM:   /64   Pulse 86   Wt 76.2 kg (168 lb)   BMI 28.66 kg/m    GENERAL APPEARANCE: alert and no distress  EYES: nonicteric  ABDOMEN: soft, nontender, normal bowel sounds, no HSM   Extremities: Trace/1+ B/L LE edema  MS: no evidence of inflammation in joints, no muscle tenderness  SKIN: no rash  NEURO: mentation intact and speech normal  PSYCH: affect normal    LABS REVIEWED BY ME:   ANEMIA  Recent Labs   Lab Test 06/19/23  1041 12/29/22  1427 12/12/22  1542 11/30/22  1204 11/04/21  1411   HGB 11.3* 10.9* 11.1* 11.4* 12.2   IRONSAT 22  --  25 26 26   BARBIE 47  --  27 14 10       BMP  Recent Labs   Lab Test 07/06/23  1018  06/19/23  1041 05/01/23  1024 04/05/23  0952 12/29/22  1427 12/12/22  1542 12/14/21  1717 11/04/21  1411 11/04/19  1512 11/01/18  1310    140 141 140   < > 142   < > 138   < > 135   POTASSIUM 5.0 4.6 5.0 4.4   < > 4.7   < > 4.1   < > 3.6   CHLORIDE 107 109* 110* 110*   < > 107   < > 106   < > 101   CO2 24 20* 24 27   < > 27   < > 29   < > 27   ANIONGAP 9 11 7 3   < > 8   < > 3   < > 7   BUN 24.6* 22.4 30 30   < > 30.7*   < > 18   < > 22   CR 1.24* 1.17* 1.33* 1.50*   < > 1.32*   < > 1.15*   < > 0.97   GFRESTIMATED 46* 49* 42* 36*   < > 42*   < > 48*   < > 57*   PROTTOTAL  --  6.1*  --   --   --  6.7  --  6.8  --  7.1    < > = values in this interval not displayed.       CBC  Recent Labs   Lab Test 06/19/23  1041 12/29/22  1427 12/12/22  1542 11/30/22  1204   HGB 11.3* 10.9* 11.1* 11.4*   WBC 15.2* 21.3* 24.4* 23.8*   HCT 36.6 34.7* 36.0 37.4   MCV 97 95 95 96    327 272 301       DIABETES  Recent Labs   Lab Test 04/05/23  0952 11/30/22  1204 07/11/22  1213 12/14/21  1717   A1C 7.1* 6.8* 6.5* 6.8*       HYPONATREMIANo lab results found.    Invalid input(s): UOSM, OSM    MBD  Recent Labs   Lab Test 07/06/23  1018 06/19/23  1041 05/01/23  1024 04/05/23  0952 12/29/22  1427 12/12/22  1542 12/14/21  1717 11/04/21  1411   KARTHIK 9.3 9.2 9.1 9.3 9.2 9.4   < > 9.1   ALBUMIN  --  3.9  --   --  3.6 4.3  --  3.8   PHOS  --   --   --   --  2.5  --   --   --    PTHI  --   --   --   --  73*  --   --   --     < > = values in this interval not displayed.        URINE STUDIES  Recent Labs   Lab Test 12/29/22  1427 11/30/22  1204   COLOR Yellow Yellow   APPEARANCE Clear Clear   URINEGLC 250* Negative   URINEBILI Negative Negative   URINEKETONE Negative Negative   SG 1.025 >=1.030   UBLD Negative Negative   URINEPH 5.5 5.0   PROTEIN Negative Negative   UROBILINOGEN 0.2 0.2   NITRITE Negative Negative   LEUKEST Negative Negative   RBCU 0-2  --    WBCU 0-5  --      No lab results found.    ADDITIONAL LABS ORDERED/REVIEWED BY  ME:  None    Assessment/Plan  CKD Stage G3aA1  Established care with [facility] 1/3/2023    CKD onset appears to be around 4/2021 with creatinine of 1.09. Stable until 11/30/2022 when creatinine was 1.42. This BILLY appears to have recovered back to her baseline on 12/29/2022 with creatinine of 1.12. UPCR 12/29/2022 0.08g/g. No hematuria or pyuria 12/29/2022.     Creatinine back up to 1.5 on 4/5/2023. Worsening kidney fucntion without clear cause at this appointment. She is avoiding NSAIDs for her back pain from recent fall. She has stable nocturia. No other signficant medication changes (she didn't start SGLT2i as she was worried about side effects).     Creatinine improved back down to baseline of 1-1.2 on 6/19/2023 and was stable on repeat 7/6/2023.     CKD Etiology (Biopsy Proven: No):  -Hypertensive Nephrosclerosis  -DKD    Plan:  -Optimize BP control: Continue Lisinopril/HCTZ.   -Optimize DM regimen: Agree with Metformin (watch eGFR and hold if < 30).   -Advised we start Jardiance 10mg qDay. Advised to watch for UTI/SSTI symptoms.   -Avoid NSAIDs/Nephrotoxic agents  -60 ounces of PO intake qDay      Anemia of Renal Disease  Hemoglobin: 11.3 (6/19/2023)  Ferritin: 27  TSAT: 25%    Plan:  -Continue PO iron every other day  -No need for IV iron or EPO at this time. Monitor trends.       Lipid Management in CKD  KDIGO 2013 Guidelines recommend the following for patients with CKD:  Adults >/= 49 yo w/ CKD stage 1 or 2: Statin  Adults >/= 49 yo w/ CKD stage 3-5: Statin + Ezetimibe or Statin alone  Adults 18-49 + 1 of the following (CAD, DM, Ischemic stroke, 10 yr ASCVD risk >10%): Statin    KDIGO guidelines recommend Simvastatin 20mg/Ezetimibe 10mg qDay for patients with CKD G3a-G5 (in line with the SHARP trial). If Simvastatin used alone, 40mg qDay is referenced.   Other options include: Atorvastatin 20mg qDay (4D trial) and Rosuvastatin 10mg qDay (DEISY trial)    Plan:  -Agree with Atorvastatin      Metabolic  Acidosis of Renal Disease  Bicarb: 24 (7/6/2023)    Plan:  -No need for NaHCO      Mineral Bone Disease  PTH: 73 12/29/2022  Phos: 2.5 12/29/2022  Calcium: 9.2 12/29/2022  Vitamin D: 39 12/29/2022    Plan:  -No need for phos binder  Update mbd labs at next visit.    Return to clinic: 3 months    Migue Calvillo MD   of Medicine  Division of Nephrology and Hypertension  [facility]    35 minutes spent on the date of the encounter doing chart review, history and exam, documentation and further activities as noted above      Again, thank you for allowing me to participate in the care of your patient.      Sincerely,    Migue Calvillo MD

## 2023-07-14 ENCOUNTER — THERAPY VISIT (OUTPATIENT)
Dept: PHYSICAL THERAPY | Facility: CLINIC | Age: 73
End: 2023-07-14
Payer: COMMERCIAL

## 2023-07-14 DIAGNOSIS — G89.29 CHRONIC BILATERAL LOW BACK PAIN WITH LEFT-SIDED SCIATICA: Primary | ICD-10-CM

## 2023-07-14 DIAGNOSIS — M54.42 CHRONIC BILATERAL LOW BACK PAIN WITH LEFT-SIDED SCIATICA: Primary | ICD-10-CM

## 2023-07-14 PROCEDURE — 97110 THERAPEUTIC EXERCISES: CPT | Mod: GP | Performed by: PHYSICAL THERAPIST

## 2023-07-14 PROCEDURE — 97140 MANUAL THERAPY 1/> REGIONS: CPT | Mod: GP | Performed by: PHYSICAL THERAPIST

## 2023-07-14 PROCEDURE — 97112 NEUROMUSCULAR REEDUCATION: CPT | Mod: GP | Performed by: PHYSICAL THERAPIST

## 2023-07-24 ENCOUNTER — OFFICE VISIT (OUTPATIENT)
Dept: ORTHOPEDICS | Facility: CLINIC | Age: 73
End: 2023-07-24
Payer: COMMERCIAL

## 2023-07-24 DIAGNOSIS — M48.062 SPINAL STENOSIS OF LUMBAR REGION WITH NEUROGENIC CLAUDICATION: Primary | ICD-10-CM

## 2023-07-24 DIAGNOSIS — M54.16 LUMBAR RADICULOPATHY: ICD-10-CM

## 2023-07-24 PROCEDURE — 99215 OFFICE O/P EST HI 40 MIN: CPT | Performed by: ORTHOPAEDIC SURGERY

## 2023-07-24 NOTE — LETTER
7/24/2023         RE: Kelly Vegas  [address] [place] [id number]        Dear Colleague,    Thank you for referring your patient, Kelly Vegas, to the [facility]. Please see a copy of my visit note below.    Chief concern: 2-month follow-up for lumbar stenosis with neurogenic claudication and radiculopathy    History of present illness:  Patient is a pleasant 73-year-old female with history of CLL having previously completed chemotherapy.  She is on iron every other day for anemia.  Who returns today for discussion of persistent low back pain.  At her last appointment on 5/8/2023 we agreed upon maximizing nonoperative measures.  To this point she has had focused physical therapy and was eventually discharged after completing course of physical therapy.  She has had gabapentin, Tylenol and epidural steroid injections.  Symptoms continue to be very limiting for her.    Today she reports that the worst of her symptoms with regard to back pain are localized over the left lumbosacral junction also has tenderness over the quadratus lumborum.  She has radicular symptoms in a classic L5 distribution.  She notes that she had fluid retention on the left leg which has improved with elevating the leg for approximately 20 minutes several times a day.  She has tenderness which is uncommon for her over the left sinus Tarsi without any particular inciting event that she can recall.  She feels as though her left-sided radicular pain is worse now in particular down the lateral ankle and dorsal foot she denies any recent balance change or change in dexterity.    He does endorse feeling some arm tingling which is exacerbated when she is sleeping holding phone to her ear.  Symptoms are quite consistent with cubital tunnel syndrome    On exam today she is able to stand and walk without assistive device.  Tenderness to palpation over the lower erector spinae muscles most tender at the left lumbosacral  junction and PSIS.  She able to walk on her tiptoes has difficulty with heel walking particular on the left  Resisted strength testing is notable for 5/5 strength with hip flexion, knee extension, ankle dorsiflexion and plantarflexion.  Decreased left-sided great toe extension  Sensation is intact to gliding light touch L2 to S1  2+ patella tendon reflex and Achilles tendon reflex bilaterally no sustained clonus  Positive straight leg raise on the left    Review of her MRI from 1/26/2023 redemonstrates moderate stenosis at L2-3, severe central stenosis at L3-4, severe central stenosis at L4-5, severe foraminal and lateral recess stenosis at L5-S1 as demonstrated below                Impression and plan:  73-year-old female with lumbar degenerative disc disease and lumbar spinal stenosis with radiculopathy and neurogenic claudication.  She has had extensive nonoperative management meant and continues to be debilitated by her symptoms.  At this point she would like to proceed with surgery.  My recommendation for surgery is a decompressive laminectomy decompressing nerves at L3-4, L4-5 and L5-S1.  Discussed the risks alternatives and benefits of this approach.  I do not see a strong indication for instrumented fusion given her imaging and pathology.  Discussed that there is a possibility of having recurrent stenosis at any of these levels however the overall likelihood of needing another surgery is very similar to the rates of subsequent surgery with a fusion.  Also discussed the risk of incomplete symptom relief, infection, injury to the nerve roots or dural tear, exceptionally rare complications such as heart attack stroke or respiratory failure which could be fatal.  Patient understood these risks and like to proceed with surgery.  This will be an outpatient surgery.  Case request was placed.    Time spent on this clinical encounter including previsit chart review, history and physical examination, patient  counseling and documentation was 45 minutes on the date of encounter.      Bryson Valdez MD  Orthopedic Spine Surgeon  , Department of Orthopedic Surgery

## 2023-07-24 NOTE — NURSING NOTE
Reason For Visit:   Chief Complaint   Patient presents with    RECHECK     2 month follow up low back pain       Primary MD: Sejal Rutledge  Ref. MD: Est    ?  No  Occupation retired.     Date of injury: No  Type of injury: No.     Date of surgery: No  Type of surgery: No.     Smoker: No  Request smoking cessation information: No    There were no vitals taken for this visit.    Pain Assessment  Patient Currently in Pain: Yes  0-10 Pain Scale: 7 (when in bending sitting, moving)  Primary Pain Location: Back    Oswestry (JOHN) Questionnaire        7/24/2023    10:04 AM   OSWESTRY DISABILITY INDEX   Count 7   Sum 14   Oswestry Score (%) 40 %            Neck Disability Index (NDI) Questionnaire         No data to display                       Visual Analog Pain Scale  Back Pain Scale 0-10: 7  Right leg pain: 0  Left leg pain: 0  Neck Pain Scale 0-10: 0  Right arm pain: 0  Left arm pain: 0    Promis 10 Assessment        2/1/2023     9:39 AM   PROMIS 10   In general, would you say your health is: Good   In general, would you say your quality of life is: Good   In general, how would you rate your physical health? Fair   In general, how would you rate your mental health, including your mood and your ability to think? Good   In general, how would you rate your satisfaction with your social activities and relationships? Good   In general, please rate how well you carry out your usual social activities and roles Good   To what extent are you able to carry out your everyday physical activities such as walking, climbing stairs, carrying groceries, or moving a chair? Moderately   In the past 7 days, how often have you been bothered by emotional problems such as feeling anxious, depressed, or irritable? Sometimes   In the past 7 days, how would you rate your fatigue on average? Moderate   In the past 7 days, how would you rate your pain on average, where 0 means no pain, and 10 means worst imaginable pain? 7    In general, would you say your health is: 3   In general, would you say your quality of life is: 3   In general, how would you rate your physical health? 2   In general, how would you rate your mental health, including your mood and your ability to think? 3   In general, how would you rate your satisfaction with your social activities and relationships? 3   In general, please rate how well you carry out your usual social activities and roles. (This includes activities at home, at work and in your community, and responsibilities as a parent, child, spouse, employee, friend, etc.) 3   To what extent are you able to carry out your everyday physical activities such as walking, climbing stairs, carrying groceries, or moving a chair? 3   In the past 7 days, how often have you been bothered by emotional problems such as feeling anxious, depressed, or irritable? 3   In the past 7 days, how would you rate your fatigue on average? 3   In the past 7 days, how would you rate your pain on average, where 0 means no pain, and 10 means worst imaginable pain? 7   Global Mental Health Score 12   Global Physical Health Score 10   PROMIS TOTAL - SUBSCORES 22                Junie Clements LPN

## 2023-07-29 RX ORDER — GABAPENTIN 100 MG/1
100 CAPSULE ORAL
Status: CANCELLED | OUTPATIENT
Start: 2023-07-29

## 2023-07-29 RX ORDER — ACETAMINOPHEN 325 MG/1
975 TABLET ORAL ONCE
Status: CANCELLED | OUTPATIENT
Start: 2023-07-29 | End: 2023-07-29

## 2023-07-29 RX ORDER — OXYCODONE HCL 10 MG/1
10 TABLET, FILM COATED, EXTENDED RELEASE ORAL EVERY 24 HOURS
Status: CANCELLED | OUTPATIENT
Start: 2023-07-29 | End: 2023-07-30

## 2023-07-29 NOTE — PROGRESS NOTES
Chief concern: 2-month follow-up for lumbar stenosis with neurogenic claudication and radiculopathy    History of present illness:  Patient is a pleasant 73-year-old female with history of CLL having previously completed chemotherapy.  She is on iron every other day for anemia.  Who returns today for discussion of persistent low back pain.  At her last appointment on 5/8/2023 we agreed upon maximizing nonoperative measures.  To this point she has had focused physical therapy and was eventually discharged after completing course of physical therapy.  She has had gabapentin, Tylenol and epidural steroid injections.  Symptoms continue to be very limiting for her.    Today she reports that the worst of her symptoms with regard to back pain are localized over the left lumbosacral junction also has tenderness over the quadratus lumborum.  She has radicular symptoms in a classic L5 distribution.  She notes that she had fluid retention on the left leg which has improved with elevating the leg for approximately 20 minutes several times a day.  She has tenderness which is uncommon for her over the left sinus Tarsi without any particular inciting event that she can recall.  She feels as though her left-sided radicular pain is worse now in particular down the lateral ankle and dorsal foot she denies any recent balance change or change in dexterity.    He does endorse feeling some arm tingling which is exacerbated when she is sleeping holding phone to her ear.  Symptoms are quite consistent with cubital tunnel syndrome    On exam today she is able to stand and walk without assistive device.  Tenderness to palpation over the lower erector spinae muscles most tender at the left lumbosacral junction and PSIS.  She able to walk on her tiptoes has difficulty with heel walking particular on the left  Resisted strength testing is notable for 5/5 strength with hip flexion, knee extension, ankle dorsiflexion and plantarflexion.   Decreased left-sided great toe extension  Sensation is intact to gliding light touch L2 to S1  2+ patella tendon reflex and Achilles tendon reflex bilaterally no sustained clonus  Positive straight leg raise on the left    Review of her MRI from 1/26/2023 redemonstrates moderate stenosis at L2-3, severe central stenosis at L3-4, severe central stenosis at L4-5, severe foraminal and lateral recess stenosis at L5-S1 as demonstrated below                Impression and plan:  73-year-old female with lumbar degenerative disc disease and lumbar spinal stenosis with radiculopathy and neurogenic claudication.  She has had extensive nonoperative management meant and continues to be debilitated by her symptoms.  At this point she would like to proceed with surgery.  My recommendation for surgery is a decompressive laminectomy decompressing nerves at L3-4, L4-5 and L5-S1.  Discussed the risks alternatives and benefits of this approach.  I do not see a strong indication for instrumented fusion given her imaging and pathology.  Discussed that there is a possibility of having recurrent stenosis at any of these levels however the overall likelihood of needing another surgery is very similar to the rates of subsequent surgery with a fusion.  Also discussed the risk of incomplete symptom relief, infection, injury to the nerve roots or dural tear, exceptionally rare complications such as heart attack stroke or respiratory failure which could be fatal.  Patient understood these risks and like to proceed with surgery.  This will be an outpatient surgery.  Case request was placed.    Time spent on this clinical encounter including previsit chart review, history and physical examination, patient counseling and documentation was 45 minutes on the date of encounter.      Bryson Valdez MD  Orthopedic Spine Surgeon  , Department of Orthopedic Surgery

## 2023-07-30 DIAGNOSIS — E11.42 TYPE 2 DIABETES MELLITUS WITH DIABETIC POLYNEUROPATHY, WITH LONG-TERM CURRENT USE OF INSULIN (H): ICD-10-CM

## 2023-07-30 DIAGNOSIS — Z79.4 TYPE 2 DIABETES MELLITUS WITH DIABETIC POLYNEUROPATHY, WITH LONG-TERM CURRENT USE OF INSULIN (H): ICD-10-CM

## 2023-07-30 NOTE — TELEPHONE ENCOUNTER
"Pt calling to request refill of pen needles.  States she didn't realize until today that she will out as of 7/31/23    Last Written Prescription Date:  5/3/23  Last Fill Quantity: 300 each,  # refills: 0   Last office visit provider:  1/16/23, PCP     Requested Prescriptions   Pending Prescriptions Disp Refills    ULTICARE MINI 31G X 6 MM insulin pen needle [Pharmacy Med Name: UltiCare Mini Pen Needles Miscellaneous 31G X 6 MM] 300 each 0     Sig: Use three times daily or as directed.       Diabetic Supplies Protocol Failed - 7/30/2023 10:51 AM        Failed - Recent (6 mo) or future (30 days) visit within the authorizing provider's specialty     Patient had office visit in the last 6 months or has a visit in the next 30 days with authorizing provider.  See \"Patient Info\" tab in inbasket, or \"Choose Columns\" in Meds & Orders section of the refill encounter.            Passed - Medication is active on med list        Passed - Patient is 18 years of age or older             Kelly Pedro RN 07/30/23 11:13 AM      " [facility] ambulatory encounter  URGENT CARE OFFICE VISIT    SUBJECTIVE:  Wanda Casey is a 82 year old female who presented requesting evaluation for complaints of gross hematuria.    The patient does  have a history of previous UTI.  Last urinary tract infection was unclear.    Review of systems:  A review of systems was performed and findings relevant to these symptoms are included in the HPI.    PAST HISTORIES:  ALLERGIES:  No Known Allergies    MEDICATIONS:  Current Outpatient Medications   Medication Sig   • acetaminophen (TYLENOL) 325 MG tablet Take 2 tablets by mouth every 4 hours as needed for Pain or Fever.   • aspirin 81 MG EC tablet Take 1 tablet by mouth daily.   • atorvastatin (LIPITOR) 80 MG tablet Take 1 tablet by mouth daily.   • Cholecalciferol (VITAMIN D3) 125 mcg (5,000 units) tablet Take 1 tablet by mouth daily.   • clopidogrel (PLAVIX) 75 MG tablet Take 1 tablet by mouth daily.   • melatonin 3 MG Take 3 tablets by mouth nightly as needed (Insomnia).   • metoPROLOL succinate (TOPROL-XL) 50 MG 24 hr tablet Take 1 tablet by mouth daily.   • nitroGLYcerin (NITROSTAT) 0.4 MG sublingual tablet Place 1 tablet under the tongue every 5 minutes as needed for Chest pain.   • pantoprazole (PROTONIX) 40 MG tablet Take 1 tablet by mouth nightly.   • sertraline (ZOLOFT) 25 MG tablet Take 1 tablet by mouth daily.   • Calcium 600 MG tablet Take 2 tablets by mouth daily.   • Omega-3 Fatty Acids (OMEGA-3 FISH OIL PO) Take 1 capsule by mouth daily.    • sulfamethoxazole-trimethoprim (BACTRIM DS) 800-160 MG per tablet Take 1 tablet by mouth 2 times daily for 5 days.     No current facility-administered medications for this visit.        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     Past Surgical History:   Procedure Laterality Date   • Colonoscopy diagnostic  7/23/07    Tubular adenoma, next exan due in 5 years--- Dr Mena   • Coronary art dil,one vessel  4/89, 7/89     x2   • D and c     • Dexa bone density axial skeleton  11/28/2011   • Hysterectomy     • Laparoscopy, cholecystectomy  7/90    Cholecystectomy, laparoscopic   • Mammo screening bilateral  11/12/2012   • Removal gallbladder     • Total abdom hysterectomy  1972    ALFIE, [facility]SO for fibroids     Social History     Tobacco Use   • Smoking status: Never Smoker   • Smokeless tobacco: Never Used   Substance Use Topics   • Alcohol use: Yes     Alcohol/week: 0.0 standard drinks     Comment: rarely   • Drug use: No     Social History     Tobacco Use   Smoking Status Never Smoker   Smokeless Tobacco Never Used     Social History     Substance and Sexual Activity   Alcohol Use Yes   • Alcohol/week: 0.0 standard drinks    Comment: rarely       OBJECTIVE:  Physical Exam:  Visit Vitals  BP (!) 140/93   Pulse 75   Temp 97.3 °F (36.3 °C) (Tympanic)   Resp 18   Wt 78 kg   SpO2 97%   BMI 30.46 kg/m²     General:  Well developed, well nourished female, who appears to be in no acute distress. Awake, alert and cooperative.  Abdomen:  Soft and nontender. No masses.  No hepatomegaly or splenomegaly. No rebound. No guarding.  Skin:  Normal turgor. No rash.    DIAGNOSTICS:  Urine dipstick reveals unable to procure sufficient urine specimen however a dropper to was in the specimen cup and it was obviously gross hematuria.      Assessment:  1. Acute cystitis with hematuria            PLAN:  Orders Placed This Encounter   • sulfamethoxazole-trimethoprim (BACTRIM DS) 800-160 MG per  tablet       The patient will be called with the culture results.  Discussed the importance of drinking more water and cranberry juice.    FOLLOW-UP:  With PCP    PATIENT INSTRUCTIONS:  Medication as directed.  Emphasis on hydration.  Seek further input/re-evaluation with PCP if any persistence of symptoms or progression.    The patient was advised to follow up with primary physician or to recheck with the urgent care clinic sooner if symptoms get worse or if new symptoms appear.    The patient and daughter(s) indicated understanding of the diagnosis and agreed with the plan of care.

## 2023-07-31 RX ORDER — FLURBIPROFEN SODIUM 0.3 MG/ML
SOLUTION/ DROPS OPHTHALMIC
Qty: 300 EACH | Refills: 0 | Status: SHIPPED | OUTPATIENT
Start: 2023-07-31 | End: 2023-11-28

## 2023-08-01 ENCOUNTER — TELEPHONE (OUTPATIENT)
Dept: ORTHOPEDICS | Facility: CLINIC | Age: 73
End: 2023-08-01
Payer: COMMERCIAL

## 2023-08-01 NOTE — TELEPHONE ENCOUNTER
Phoned patient to get her scheduled for surgery   with Dr. Valdez    Patient was unavailable,   Provided call back number in voicemail:   [phone] & [phone] for care team.

## 2023-08-02 NOTE — TELEPHONE ENCOUNTER
FUTURE VISIT INFORMATION      SURGERY INFORMATION:  Date: 10/12/23  [facility]  Surgeon:  Bryson Valdez MD   Anesthesia Type:  general with block  Procedure: LAMINECTOMY, SPINE, LUMBAR, 3 OR MORE LEVELS, POSTERIOR APPROACH, USING MICROSCOPE   Consult: ov 23    RECORDS REQUESTED FROM:       Primary Care Provider: Sejal Rutledge MD     Pertinent Medical History: hypertension    Most recent EKG+ Tracin19

## 2023-08-02 NOTE — TELEPHONE ENCOUNTER
Patient is scheduled for surgery with Dr. Valdez    Spoke with: Kelly    Date of Surgery: 9/28/23    Location: [facility]    Informed patient they will need an adult  Yes    Pre op with Provider PAC, 9/6/23 at 2:30PM    Additional imaging/appointments: POP Made    Surgery packet: Received     Additional comments: N/A        Krissy Cote on 8/2/2023 at 12:22 PM

## 2023-08-09 ENCOUNTER — THERAPY VISIT (OUTPATIENT)
Dept: PHYSICAL THERAPY | Facility: CLINIC | Age: 73
End: 2023-08-09
Payer: COMMERCIAL

## 2023-08-09 DIAGNOSIS — G89.29 CHRONIC BILATERAL LOW BACK PAIN WITH LEFT-SIDED SCIATICA: Primary | ICD-10-CM

## 2023-08-09 DIAGNOSIS — M54.42 CHRONIC BILATERAL LOW BACK PAIN WITH LEFT-SIDED SCIATICA: Primary | ICD-10-CM

## 2023-08-09 PROCEDURE — 97110 THERAPEUTIC EXERCISES: CPT | Mod: GP | Performed by: PHYSICAL THERAPIST

## 2023-08-11 ENCOUNTER — TRANSFERRED RECORDS (OUTPATIENT)
Dept: HEALTH INFORMATION MANAGEMENT | Facility: CLINIC | Age: 73
End: 2023-08-11
Payer: COMMERCIAL

## 2023-08-12 DIAGNOSIS — E11.42 TYPE 2 DIABETES MELLITUS WITH DIABETIC POLYNEUROPATHY, WITH LONG-TERM CURRENT USE OF INSULIN (H): ICD-10-CM

## 2023-08-12 DIAGNOSIS — Z79.4 TYPE 2 DIABETES MELLITUS WITH DIABETIC POLYNEUROPATHY, WITH LONG-TERM CURRENT USE OF INSULIN (H): ICD-10-CM

## 2023-08-14 RX ORDER — INSULIN GLARGINE 100 [IU]/ML
INJECTION, SOLUTION SUBCUTANEOUS
Qty: 30 ML | Refills: 0 | Status: SHIPPED | OUTPATIENT
Start: 2023-08-14 | End: 2024-01-22

## 2023-08-16 ENCOUNTER — THERAPY VISIT (OUTPATIENT)
Dept: PHYSICAL THERAPY | Facility: CLINIC | Age: 73
End: 2023-08-16
Payer: COMMERCIAL

## 2023-08-16 DIAGNOSIS — M54.42 CHRONIC BILATERAL LOW BACK PAIN WITH LEFT-SIDED SCIATICA: Primary | ICD-10-CM

## 2023-08-16 DIAGNOSIS — G89.29 CHRONIC BILATERAL LOW BACK PAIN WITH LEFT-SIDED SCIATICA: Primary | ICD-10-CM

## 2023-08-16 PROCEDURE — 97112 NEUROMUSCULAR REEDUCATION: CPT | Mod: GP | Performed by: PHYSICAL THERAPIST

## 2023-08-16 PROCEDURE — 97110 THERAPEUTIC EXERCISES: CPT | Mod: GP | Performed by: PHYSICAL THERAPIST

## 2023-08-23 ENCOUNTER — THERAPY VISIT (OUTPATIENT)
Dept: PHYSICAL THERAPY | Facility: CLINIC | Age: 73
End: 2023-08-23
Payer: COMMERCIAL

## 2023-08-23 DIAGNOSIS — G89.29 CHRONIC BILATERAL LOW BACK PAIN WITH LEFT-SIDED SCIATICA: Primary | ICD-10-CM

## 2023-08-23 DIAGNOSIS — M54.42 CHRONIC BILATERAL LOW BACK PAIN WITH LEFT-SIDED SCIATICA: Primary | ICD-10-CM

## 2023-08-23 PROCEDURE — 97112 NEUROMUSCULAR REEDUCATION: CPT | Mod: GP | Performed by: PHYSICAL THERAPIST

## 2023-08-23 PROCEDURE — 97110 THERAPEUTIC EXERCISES: CPT | Mod: GP | Performed by: PHYSICAL THERAPIST

## 2023-08-30 ENCOUNTER — THERAPY VISIT (OUTPATIENT)
Dept: PHYSICAL THERAPY | Facility: CLINIC | Age: 73
End: 2023-08-30
Payer: COMMERCIAL

## 2023-08-30 DIAGNOSIS — G89.29 CHRONIC BILATERAL LOW BACK PAIN WITH LEFT-SIDED SCIATICA: Primary | ICD-10-CM

## 2023-08-30 DIAGNOSIS — M54.42 CHRONIC BILATERAL LOW BACK PAIN WITH LEFT-SIDED SCIATICA: Primary | ICD-10-CM

## 2023-08-30 PROCEDURE — 97530 THERAPEUTIC ACTIVITIES: CPT | Mod: GP | Performed by: PHYSICAL THERAPIST

## 2023-08-30 PROCEDURE — 97110 THERAPEUTIC EXERCISES: CPT | Mod: GP | Performed by: PHYSICAL THERAPIST

## 2023-08-30 NOTE — PROGRESS NOTES
08/30/23 0500   Appointment Info   Signing clinician's name / credentials Skyla Patel, PT, DPT, OCS   Total/Authorized Visits 12   Visits Used 7   Medical Diagnosis Muscle spasm of back  Spinal stenosis of lumbar region with neurogenic claudication  Lumbar radiculopathy  Lumbar disc herniation   PT Tx Diagnosis low back pain with radiating pain - query SI provocation secondary to fall   Quick Adds Certification   Progress Note/Certification   Start of Care Date 06/09/23   Onset of illness/injury or Date of Surgery 05/08/23   Therapy Frequency 2-4x/month   Predicted Duration 12 weeks   Certification date from 06/09/23   Certification date to 09/07/23   Progress Note Completed Date 08/30/23   PT Goal 1   Goal Identifier Lifting/bending   Goal Description Patient will demonstrate lifting 25# item from floor to waist height with hip hinge strategy and no increase in pain x 5 reps to indicate ability to complete household tasks such as dressing, groceries, laundry, and yardwork.   Goal Progress hip hinge with mod cuing x 15, no weight   Target Date 09/07/23   Subjective Report   Subjective Report Describes today as a rougher day. She has some new stress and is getting nervous for her surgery. Feels like she is adequately prepared, just a big procedure. Doing exercises throughout the day.   Objective Measures   Objective Measures Objective Measure 1;Objective Measure 2;Objective Measure 3   Objective Measure 1   Objective Measure Ankle edema   Objective Measure 2   Objective Measure Observation   Objective Measure 3   Objective Measure Deadlift   Details can hip hinge with mod cuing, no weight   Treatment Interventions (PT)   Interventions Therapeutic Procedure/Exercise;Therapeutic Activity;Neuromuscular Re-education   Therapeutic Procedure/Exercise   Therapeutic Procedures: strength, endurance, ROM, flexibillity minutes (31350) 10   Therapeutic Procedures Ther Proc 2   Ther Proc 1 wall sit + lumbar MB twists   Ther  Proc 2 nustep   Ther Proc 2 - Details x lvl 4, endurance. x 10 min while discussing JOHN   PTRx Ther Proc 1 sit to stand   PTRx Ther Proc 2 elevated push up + banded row   PTRx Ther Proc 3 hip hinge   PTRx Ther Proc 4 step ups   PTRx Ther Proc 5 row iso + march   PTRx Ther Proc 6 Ed on basic spinal precautions   PTRx Ther Proc 6 - Details ed on BLT for the future and how this will start very specific and slowly decrease in how stringent after surgery   Patient Response/Progress tolerated with min cuing   Therapeutic Activity   Therapeutic Activities: dynamic activities to improve functional performance minutes (06822) 30   Ther Act 1 Log roll practice   Patient Response/Progress mod to min cuing   Ther Act 2 golsheyla's reach for post-op   Ther Act 2 - Details x 3 min practicing   Ther Act 3 Trinity and JOHN retake/interpretation   Ther Act 3 - Details x 5 min   Therapeutic Activities Ther Act 2;Ther Act 3;Ther Act 4;Ther Act 5   Ther Act 4 Sit to stand low plinth   Ther Act 4 - Details for practicing toilet seat sit<>stand - ed on bathroom mobility and rocking for improved stand from a low surface. Also talked about grab bars and a shower chair.   Ther Act 5 Stair negotation   Ther Act 5 - Details for safe entry/exit from the house, SPC and cues for step to gait   Skilled Intervention ed and practice on getting around the house post op   Neuromuscular Re-education   Neuromuscular Re-education Neuro Re-ed 2   Neuro Re-ed 1 activity ed   Neuro Re-ed 2 [place]   PTRx Neuro Re-ed 1 Toe Raises   PTRx Neuro Re-ed 2 weight shifts   PTRx Neuro Re-ed 3 step up and over   Manual Therapy   Manual Therapy Manual Therapy 2;Manual Therapy 3;Manual Therapy 4   Manual Therapy 1 anterior innom MET   Manual Therapy 2 pubic symphysis MET   Manual Therapy 3 edema management   Manual Therapy 4 prone UPA mobs and pressups   Plan   Home program daily   Updates to plan of care pre-op ed   Plan for next session d/c PT   Total Session Time   Timed  Code Treatment Minutes 40   Total Treatment Time (sum of timed and untimed services) 40         DISCHARGE  Reason for Discharge: No further expectation of progress.  Change in medical status - undergoing spinal surgery    Equipment Issued: none    Discharge Plan: Patient to continue home program.    Referring Provider:  Bryson Valdez

## 2023-09-05 LAB
ABO/RH(D): NORMAL
ANTIBODY SCREEN: NEGATIVE
SPECIMEN EXPIRATION DATE: NORMAL

## 2023-09-06 ENCOUNTER — OFFICE VISIT (OUTPATIENT)
Dept: SURGERY | Facility: CLINIC | Age: 73
End: 2023-09-06
Payer: COMMERCIAL

## 2023-09-06 ENCOUNTER — ANESTHESIA EVENT (OUTPATIENT)
Dept: SURGERY | Facility: CLINIC | Age: 73
End: 2023-09-06
Payer: COMMERCIAL

## 2023-09-06 ENCOUNTER — PRE VISIT (OUTPATIENT)
Dept: SURGERY | Facility: CLINIC | Age: 73
End: 2023-09-06

## 2023-09-06 ENCOUNTER — LAB (OUTPATIENT)
Dept: LAB | Facility: CLINIC | Age: 73
End: 2023-09-06
Payer: COMMERCIAL

## 2023-09-06 VITALS
WEIGHT: 167.1 LBS | HEIGHT: 64 IN | OXYGEN SATURATION: 96 % | SYSTOLIC BLOOD PRESSURE: 95 MMHG | BODY MASS INDEX: 28.53 KG/M2 | TEMPERATURE: 98.3 F | DIASTOLIC BLOOD PRESSURE: 56 MMHG | HEART RATE: 66 BPM | RESPIRATION RATE: 16 BRPM

## 2023-09-06 DIAGNOSIS — Z01.818 PREOP EXAMINATION: Primary | ICD-10-CM

## 2023-09-06 DIAGNOSIS — E11.42 TYPE 2 DIABETES MELLITUS WITH DIABETIC POLYNEUROPATHY, WITH LONG-TERM CURRENT USE OF INSULIN (H): ICD-10-CM

## 2023-09-06 DIAGNOSIS — M48.062 SPINAL STENOSIS OF LUMBAR REGION WITH NEUROGENIC CLAUDICATION: ICD-10-CM

## 2023-09-06 DIAGNOSIS — Z79.4 TYPE 2 DIABETES MELLITUS WITH DIABETIC POLYNEUROPATHY, WITH LONG-TERM CURRENT USE OF INSULIN (H): ICD-10-CM

## 2023-09-06 DIAGNOSIS — Z01.818 PREOP EXAMINATION: ICD-10-CM

## 2023-09-06 LAB
ANION GAP SERPL CALCULATED.3IONS-SCNC: 9 MMOL/L (ref 7–15)
BUN SERPL-MCNC: 46.1 MG/DL (ref 8–23)
CALCIUM SERPL-MCNC: 10.1 MG/DL (ref 8.8–10.2)
CHLORIDE SERPL-SCNC: 109 MMOL/L (ref 98–107)
CREAT SERPL-MCNC: 2.01 MG/DL (ref 0.51–0.95)
DEPRECATED HCO3 PLAS-SCNC: 24 MMOL/L (ref 22–29)
EGFRCR SERPLBLD CKD-EPI 2021: 25 ML/MIN/1.73M2
ERYTHROCYTE [DISTWIDTH] IN BLOOD BY AUTOMATED COUNT: 12.8 % (ref 10–15)
GLUCOSE SERPL-MCNC: 119 MG/DL (ref 70–99)
HBA1C MFR BLD: 7.3 %
HCT VFR BLD AUTO: 38.4 % (ref 35–47)
HGB BLD-MCNC: 12.1 G/DL (ref 11.7–15.7)
MCH RBC QN AUTO: 29.7 PG (ref 26.5–33)
MCHC RBC AUTO-ENTMCNC: 31.5 G/DL (ref 31.5–36.5)
MCV RBC AUTO: 94 FL (ref 78–100)
PLATELET # BLD AUTO: 243 10E3/UL (ref 150–450)
POTASSIUM SERPL-SCNC: 5 MMOL/L (ref 3.4–5.3)
RBC # BLD AUTO: 4.07 10E6/UL (ref 3.8–5.2)
SODIUM SERPL-SCNC: 142 MMOL/L (ref 136–145)
WBC # BLD AUTO: 15.5 10E3/UL (ref 4–11)

## 2023-09-06 PROCEDURE — 99000 SPECIMEN HANDLING OFFICE-LAB: CPT | Performed by: PATHOLOGY

## 2023-09-06 PROCEDURE — 99204 OFFICE O/P NEW MOD 45 MIN: CPT | Performed by: NURSE PRACTITIONER

## 2023-09-06 PROCEDURE — 83036 HEMOGLOBIN GLYCOSYLATED A1C: CPT | Performed by: NURSE PRACTITIONER

## 2023-09-06 PROCEDURE — 80048 BASIC METABOLIC PNL TOTAL CA: CPT | Performed by: PATHOLOGY

## 2023-09-06 PROCEDURE — 85027 COMPLETE CBC AUTOMATED: CPT | Performed by: PATHOLOGY

## 2023-09-06 PROCEDURE — 86850 RBC ANTIBODY SCREEN: CPT | Performed by: NURSE PRACTITIONER

## 2023-09-06 PROCEDURE — 36415 COLL VENOUS BLD VENIPUNCTURE: CPT | Performed by: PATHOLOGY

## 2023-09-06 PROCEDURE — 86901 BLOOD TYPING SEROLOGIC RH(D): CPT | Performed by: NURSE PRACTITIONER

## 2023-09-06 RX ORDER — MULTIVITAMIN WITH IRON
1 TABLET ORAL EVERY MORNING
COMMUNITY

## 2023-09-06 ASSESSMENT — PAIN SCALES - GENERAL: PAINLEVEL: SEVERE PAIN (7)

## 2023-09-06 ASSESSMENT — LIFESTYLE VARIABLES: TOBACCO_USE: 1

## 2023-09-06 NOTE — H&P (VIEW-ONLY)
Pre-Operative H & P     CC:  Preoperative exam to assess for increased cardiopulmonary risk while undergoing surgery and anesthesia.    Date of Encounter: 9/6/2023  Primary Care Physician:  Sejal Rutledge     Reason for visit:   Encounter Diagnoses   Name Primary?    Preop examination Yes    Type 2 diabetes mellitus with diabetic polyneuropathy, with long-term current use of insulin (H)     Spinal stenosis of lumbar region with neurogenic claudication        HPI  Kelly Vegas is a 73 year old female who presents for pre-operative H & P in preparation for  Procedure Information       Case: [id number] Date/Time: 09/28/23 1200    Procedure: LAMINECTOMY, SPINE, LUMBAR, 3 OR MORE LEVELS, POSTERIOR APPROACH, USING MICROSCOPE (Spine)    Anesthesia type: General with Block    Diagnosis:       Spinal stenosis of lumbar region with neurogenic claudication [M48.062]      Lumbar radiculopathy [M54.16]    Pre-op diagnosis:       Spinal stenosis of lumbar region with neurogenic claudication [M48.062]      Lumbar radiculopathy [M54.16]    [facility]: [facility]    Providers: Bryson Valdez MD            Kelly Vegas is a 73 year old female with hypertension, hyperlipidemia, tobacco use, chronic lymphocytic leukemia, diabetes, hypothyroidism, history of cervical cancer, anemia, history of DVT, CKD and depression that has chronic low back pain, lumbar radiculopathy and spinal stenosis of the lumbar region with neurogenic claudication.  She is currently taking lyrica for management. She has tried injections, physical therapy, tylenol and gabapentin for management in the past.  Despite multiple conservative treatment options she continue to have symptoms that are limiting her physical abilities.  She has consulted with Dr. Valdez and the above listed procedure has now been recommended for treatment.      History is obtained from the patient and chart review    Hx of abnormal bleeding or anti-platelet use:  none    Menstrual history: No LMP recorded. Patient has had a hysterectomy.:      Past Medical History  Past Medical History:   Diagnosis Date    AVM (arteriovenous malformation) of small bowel, acquired     Background diabetic retinopathy (H)     Cervical cancer (H) 03/2006    [facility], [facility] with gland involvement    CKD (chronic kidney disease) stage 4, GFR 15-29 ml/min (H)     CLL (chronic lymphocytic leukemia) (H)     Colon adenoma     DDD (degenerative disc disease), lumbar     Degenerative joint disease of hand     Diabetes mellitus type II 2009    w/ polyneuropathy, retinopathy    Diabetic polyneuropathy associated with type 2 diabetes mellitus (H) 09/26/2017    Gastritis     HTN (hypertension)     Hyperlipidemia LDL goal < 100     Hypothyroid 12/2004    s/p thyroidectomy    Lumbar disc herniation     Osteopenia     Recurrent major depression (H)     Sensorineural hearing loss, bilateral 12/14/2021    Spinal stenosis of lumbar region with neurogenic claudication        Past Surgical History  Past Surgical History:   Procedure Laterality Date    BIOPSY      COLONOSCOPY  nov 2016    COLONOSCOPY N/A 3/1/2019    Procedure: COLONOSCOPY;  Surgeon: Romulo Hayes MD;  Location: [facility]    COLONOSCOPY WITH CO2 INSUFFLATION N/A 11/28/2016    Procedure: COLONOSCOPY WITH CO2 INSUFFLATION;  Surgeon: Migue Giraldo MD;  Location: [facility]    CYSTECTOMY OVARIAN BENIGN  1974    ESOPHAGOSCOPY, GASTROSCOPY, DUODENOSCOPY (EGD), COMBINED N/A 3/1/2019    Procedure: COMBINED ESOPHAGOSCOPY, GASTROSCOPY, DUODENOSCOPY (EGD), BIOPSY SINGLE OR MULTIPLE;  Surgeon: Romulo Hayes MD;  Location: [facility]    EYE SURGERY  12-14-16    HC THYROIDECTOMY  12/2004    goiter    LAPAROSCOPIC CHOLECYSTECTOMY N/A 9/9/2019    Procedure: CHOLECYSTECTOMY, LAPAROSCOPIC;  Surgeon: Jorge Martinez DO;  Location: [facility], OMENTECTOMY W/JUAN DANIEL  2/2006    cervical cancer       Prior to Admission Medications  Current Outpatient Medications    Medication Sig Dispense Refill    atorvastatin (LIPITOR) 20 MG tablet Take 1 tablet (20 mg) by mouth daily (Patient taking differently: Take 20 mg by mouth every morning) 90 tablet 3    Cholecalciferol (VITAMIN D3) 1000 units CAPS Take by mouth every morning      citalopram (CELEXA) 20 MG tablet Take 1 tablet (20 mg) by mouth daily (Patient taking differently: Take 20 mg by mouth every morning) 90 tablet 3    empagliflozin (JARDIANCE) 10 MG TABS tablet Take 1 tablet (10 mg) by mouth daily (Patient taking differently: Take 10 mg by mouth every morning) 90 tablet 3    ferrous sulfate (FEROSUL) 325 (65 Fe) MG tablet Take 1 tablet (325 mg) by mouth every other day for 360 days 36 tablet 3    insulin glargine (LANTUS SOLOSTAR) 100 UNIT/ML pen INJECT 20 UNITS AT BEDTIME DAILY 30 mL 0    insulin lispro (HUMALOG KWIKPEN) 100 UNIT/ML (1 unit dial) KWIKPEN Inject 8 Units Subcutaneous 2 times daily (before meals) 45 mL 3    levothyroxine (SYNTHROID/LEVOTHROID) 88 MCG tablet Take 1 tablet (88 mcg) by mouth daily (Patient taking differently: Take 88 mcg by mouth every morning) 90 tablet 3    lisinopril-hydrochlorothiazide (ZESTORETIC) 20-12.5 MG tablet Take 2 tablets by mouth daily (Patient taking differently: Take 2 tablets by mouth every morning) 180 tablet 3    metFORMIN (GLUCOPHAGE XR) 500 MG 24 hr tablet Take 1 tablet (500 mg) by mouth daily (with dinner) 90 tablet 3    multivitamin w/minerals (THERA-VIT-M) tablet Take 1 tablet by mouth every morning      pregabalin (LYRICA) 25 MG capsule Take 1 capsule (25 mg) by mouth 3 times daily (Patient taking differently: Take 25 mg by mouth 2 times daily) 60 capsule 3    vitamin C with B complex (B COMPLEX-C) tablet Take 1 tablet by mouth every morning      blood glucose (NO BRAND SPECIFIED) test strip Use to test blood sugar 3 times daily or as directed. To accompany: Blood Glucose Monitor Brands: per insurance. 300 strip 3    thin (NO BRAND SPECIFIED) lancets Use to test blood  sugar 3 times daily or as directed. To accompany: Blood Glucose Monitor Brands: per insurance. 300 each 3    ULTICARE MINI 31G X 6 MM insulin pen needle Use three times daily or as directed. 300 each 0       Allergies  Allergies   Allergen Reactions    Glipizide      tremulous    Ibuprofen Hives    Pcn [Penicillins] Itching    Percocet [Acetaminophen] Nausea and Vomiting    Vicodin [Hydrocodone-Acetaminophen] Nausea       Social History  Social History     Socioeconomic History    Marital status:      Spouse name: Lauri    Number of children: 4    Years of education: 12    Highest education level: Not on file   Occupational History    Occupation: RETIRED   Tobacco Use    Smoking status: Former     Packs/day: 0.50     Years: 20.00     Pack years: 10.00     Types: Cigarettes     Start date: 1967     Quit date: 1989     Years since quittin.7    Smokeless tobacco: Never   Vaping Use    Vaping Use: Never used   Substance and Sexual Activity    Alcohol use: No    Drug use: No    Sexual activity: Not Currently     Partners: Male     Birth control/protection: Post-menopausal   Other Topics Concern    Parent/sibling w/ CABG, MI or angioplasty before 65F 55M? No     Service No    Blood Transfusions No    Caffeine Concern No    Occupational Exposure No    Hobby Hazards No    Sleep Concern No    Stress Concern No    Weight Concern Yes    Special Diet No    Back Care No    Exercise Yes    Bike Helmet No    Seat Belt Yes    Self-Exams Yes     Comment: When remember   Social History Narrative    Not on file     Social Determinants of Health     Financial Resource Strain: Not on file   Food Insecurity: Not on file   Transportation Needs: Not on file   Physical Activity: Not on file   Stress: Not on file   Social Connections: Not on file   Intimate Partner Violence: Not on file   Housing Stability: Not on file       Family History  Family History   Problem Relation Age of Onset    Thyroid Disease Mother      Dementia Mother     Osteoporosis Mother     Alcohol/Drug Father     C.A.D. Father         CABG     Diabetes Father     Hypertension Father     Hyperlipidemia Father     Cerebrovascular Disease Father     Alcohol/Drug Brother     Prostate Cancer Brother     Depression Brother     Depression Brother         d. suicide    Cancer Maternal Grandmother         stomach, colon    Breast Cancer Maternal Grandmother     Thyroid Disease Maternal Grandmother     Alzheimer Disease Maternal Grandfather     Breast Cancer Paternal Grandmother     Alcohol/Drug Son     Rheumatoid Arthritis Son     Breast Cancer Cousin     Breast Cancer Other     Anesthesia Reaction No family hx of     Thrombosis No family hx of        Review of Systems  The complete review of systems is negative other than noted in the HPI or here.   Anesthesia Evaluation   Pt has had prior anesthetic.     No history of anesthetic complications       ROS/MED HX  ENT/Pulmonary:     (+)     SHELL risk factors,  hypertension,         tobacco use, Past use,                   (-) recent URI   Neurologic:     (+)    peripheral neuropathy, - hands and BLEs.                           Cardiovascular:     (+) Dyslipidemia hypertension- -   -  - -                                 Previous cardiac testing   Echo: Date: Results:    Stress Test:  Date: Results:    ECG Reviewed:  Date: 2019 Results:  NSR  Cath:  Date: Results:      METS/Exercise Tolerance: >4 METS Comment: Doesn't exercise purposefully, but is active carrying laundry up and down her stairs.  Up until 3 months ago she was walking her dog 40 minutes daily for exercise.     Hematologic: Comments: History of leg DVT in her 20's when on OCPs    (+) History of blood clots,    pt is not anticoagulated, anemia,          Musculoskeletal: Comment: Lumbar spinal stenosis with neurogenic claudication  Lumbar DDD  Left buttock pain, low back pain    Slightly limited right shoulder ROM due to a prior rotator cuff  "tear      History of a fall this past winter      GI/Hepatic:  - neg GI/hepatic ROS     Renal/Genitourinary:     (+) renal disease, type: CRI,            Endo:     (+)  type II DM,  date: 9/6/23, Using insulin, - not using insulin pump.    thyroid problem, hypothyroidism post-surgical hypothyroidism,           Psychiatric/Substance Use:     (+) psychiatric history depression       Infectious Disease:  - neg infectious disease ROS     Malignancy:   (+) Malignancy, History of Lymphoma/Leukemia and Other.Lymph CA Active status post.  Other CA cervical cancer Remission status post Surgery.    Other:  - neg other ROS    (+)  , H/O Chronic Pain,         BP 95/56 (BP Location: Right arm, Patient Position: Sitting, Cuff Size: Adult Regular)   Pulse 66   Temp 98.3  F (36.8  C) (Oral)   Resp 16   Ht 1.631 m (5' 4.2\")   Wt 75.8 kg (167 lb 1.6 oz)   SpO2 96%   BMI 28.50 kg/m      Physical Exam   Constitutional: Awake, alert, cooperative, no apparent distress, and appears stated age.  Eyes: Pupils equal, round and reactive to light, extra ocular muscles intact, sclera clear, conjunctiva normal.  HENT: Normocephalic, oral pharynx with moist mucus membranes. Upper denture. No goiter appreciated.   Respiratory: Clear to auscultation bilaterally, no crackles or wheezing.  Cardiovascular: Regular rate and rhythm, normal S1 and S2, and no murmur noted.  Carotids +2, no bruits. No edema. Palpable pulses to radial  DP and PT arteries.   GI: Normal bowel sounds, soft, non-distended, non-tender, no masses palpated, no hepatosplenomegaly.    Lymph/Hematologic: No cervical lymphadenopathy and no supraclavicular lymphadenopathy.  Genitourinary: deferred  Skin: Warm and dry.  No rashes at anticipated surgical site.   Musculoskeletal: Full ROM of neck. There is no redness, warmth, or swelling of the exposed joints. Gross motor strength is normal.    Neurologic: Awake, alert, oriented to name, place and time. Cranial nerves II-XII are " grossly intact. Gait is normal.   Neuropsychiatric: Calm, cooperative. Normal affect.     Prior Labs/Diagnostic Studies   All labs and imaging personally reviewed     EKG/ stress test - if available please see in ROS above     The patient's records and results personally reviewed by this provider.     Outside records reviewed from: No outside records available    LAB/DIAGNOSTIC STUDIES TODAY:    Component      Latest Ref Rng 9/6/2023  3:03 PM   Sodium      136 - 145 mmol/L 142    Potassium      3.4 - 5.3 mmol/L 5.0    Chloride      98 - 107 mmol/L 109 (H)    Carbon Dioxide (CO2)      22 - 29 mmol/L 24    Anion Gap      7 - 15 mmol/L 9    Urea Nitrogen      8.0 - 23.0 mg/dL 46.1 (H)    Creatinine      0.51 - 0.95 mg/dL 2.01 (H)    Calcium      8.8 - 10.2 mg/dL 10.1    Glucose      70 - 99 mg/dL 119 (H)    GFR Estimate      >60 mL/min/1.73m2 25 (L)    WBC      4.0 - 11.0 10e3/uL 15.5 (H)    RBC Count      3.80 - 5.20 10e6/uL 4.07    Hemoglobin      11.7 - 15.7 g/dL 12.1    Hematocrit      35.0 - 47.0 % 38.4    MCV      78 - 100 fL 94    MCH      26.5 - 33.0 pg 29.7    MCHC      31.5 - 36.5 g/dL 31.5    RDW      10.0 - 15.0 % 12.8    Platelet Count      150 - 450 10e3/uL 243    Hemoglobin A1C      <5.7 % 7.3 (H)       Legend:  (H) High  (L) Low    Assessment    Kelly Vegas is a 73 year old female seen as a PAC referral for risk assessment and optimization for anesthesia.    Plan/Recommendations  Pt will be optimized for the proposed procedure.  See below for details on the assessment, risk, and preoperative recommendations    NEUROLOGY  - No history of TIA, CVA or seizure    -Post Op delirium risk factors:  Age    ENT  - No current airway concerns.  Will need to be reassessed day of surgery.  Mallampati: II  TM: > 3    CARDIAC  - No history of CAD and Afib  - hold lisinopril/hydrochlorothiazide DOS    - blood pressure was low today in clinic x 2 (94/58 and 95/56) which is abnormal for her.  She notes that she has  "had some dizziness off and on since starting lyrica and jardiance this past summer, but that BPs aren't usually that low.  Patient has a blood pressure cuff at home.  Encouraged her to drink plenty of fluids to hydrate well and to check blood pressures 1-2x daily for the next few days.  Patient will send [employer] message over the weekend with blood pressure readings for review.      Message received 2 days ago after multiple attempts to contact:  Friday 9/15 95/74 pulse57 Saturday 104/50 pulse 61 Sunday 99/51 pulse 70 Today 124/82 pulse 75 Sorry I didn t send sooner. Hope things are ok now         - METS (Metabolic Equivalents)  Patient performs 4 or more METS exercise without symptoms            Total Score: 0      RCRI-Moderate risk: Class 3  6.6% complication rate            Total Score: 2    RCRI: Diabetes    RCRI: Elevated Creatinine        PULMONARY    SHELL Low Risk            Total Score: 2    SHELL: Hypertension    SHELL: Over 50 ys old      - Denies asthma or inhaler use  - Tobacco History    History   Smoking Status    Former    Packs/day: 0.50    Years: 20.00    Types: Cigarettes    Start date: 7/8/1967    Quit date: 1/1/1989   Smokeless Tobacco    Never       GI  - denies GERD  PONV High Risk  Total Score: 3           1 AN PONV: Pt is Female    1 AN PONV: Patient is not a current smoker    1 AN PONV: Intended Post Op Opioids        /RENAL  - CKD - creatinine was higher than normal today at 2.01.  Encouraged patient to hydrate well.  Will order recheck for DOS.     ENDOCRINE    - BMI: Estimated body mass index is 28.5 kg/m  as calculated from the following:    Height as of this encounter: 1.631 m (5' 4.2\").    Weight as of this encounter: 75.8 kg (167 lb 1.6 oz).  Overweight (BMI 25.0-29.9)  - Diabetes  Diabetes Type 2, insulin dependent. Hold morning oral hypoglycemic medications and short acting insulin DOS. Take 80% of last scheduled dose of long-acting insulin prior to procedure.  Hold Jardiance 3 days " prior to surgery.  Recommend close monitoring of the patient's blood glucose levels throughout the perioperative period.    HEME  VTE Low Risk 0.5%            Total Score: 4    VTE: Greater than 59 yrs old    VTE: Pt history of VTE      - No history of abnormal bleeding or antiplatelet use.    - following with heme/onc for chronic lymphocytic leukemia.     - on oral iron supplementation for iron deficiency anemia.  Hgb currently WNL.  Hold iron DOS.    MSK  - chronic low back and left buttock pain, spinal stenosis of the lumbar region with neurogenic claudication, and lumbar radiculopathy.  Surgery planned as above.  - slightly limited right shoulder ROM.  Consider cautious positioning.       Different anesthesia methods/types have been discussed with the patient, but they are aware that the final plan will be decided by the assigned anesthesia provider on the date of service.      The patient is optimized for their procedure. AVS with information on surgery time/arrival time, meds and NPO status given by nursing staff. No further diagnostic testing indicated.      On the day of service:     Prep time: 15 minutes  Visit time: 23 minutes  Documentation time: 5 minutes  ------------------------------------------  Total time: 43 minutes      BIA Garduno CNP  Preoperative Assessment Center  [facility]  Clinic and Surgery Center  Phone: [phone]  Fax: [phone]

## 2023-09-06 NOTE — PATIENT INSTRUCTIONS
Preparing for Your Surgery      Name:  Kelly Vegas   MRN:  [id number]   :  1950   Today's Date:  2023       Arriving for surgery:  Surgery date:  23  Arrival time:  9:30 am    Please come to:     [facility] [address]  (Address takes you to the [facility].)  [address] [place] [address]  [id number]  The Green Ramp for patients and visitors is located beneath the [facility]. The parking facility entrance is at the intersection of [address] [place] [address] and [address]. Patients and visitors who self-park will receive the reduced hospital parking rate (no ticket validation needed).  [employer] parking, located at the [facility] main entrance on [address] [place] [address], is available Monday - Friday from 7 am to 3:30 pm.  Discounted parking pass options can be purchased from  attendants during business hours.  -Check in at the security desk in the [facility] ([facility]) Lobby. They will direct you to the correct elevators.  -Proceed to the 3rd floor, check in at the Adult Surgery Waiting Lounge. [phone]  If you are in need of directions, a wheelchair or escort please stop at the Information Desk in the lobby.  Inform the information person that you are here for surgery; a wheelchair and escort to [address] will be provided.   An escort to the Adult Surgery Waiting Lounge will be provided.    What can I eat or drink?  -  You may eat and drink normally up to 8 hours prior to arrival time. (Until 1:30 am)  -  You may have clear liquids until 2 hours prior to arrival time. (Until 7:30 am)    Examples of clear liquids:  Water  Clear broth  Juices (apple, white grape, white cranberry  and cider) without pulp  Noncarbonated, powder based beverages  (lemonade and Ildefonso-Aid)  Sodas (Sprite, 7-Up, ginger ale and seltzer)  Coffee or tea (without milk or  cream)  Gatorade    -  No Alcohol or cannabis products for at least 24 hours before surgery.     Which medicines can I take?  Hold Aspirin for 7 days before surgery.   Hold Multivitamins for 7 days before surgery. Take the last Multivitamin on 9-20-23, and then hold until surgery.  Hold Supplements for 7 days before surgery.  Hold Naproxen (Aleve) for 4 days before surgery.  Acetaminophen (Tylenol) is okay to take if needed.    Hold Empagliflozin (Jardiance) for 3 days prior to surgery. Take the last Jardiance on 9-24-23, and then hold until surgery.    The bedtime prior to surgery, (on 9-27-23), decrease the Glargine (Lantus) insulin to 16 units.    -  DO NOT take these medications the day of surgery:  Lispro (Humalog) insulin  Lisinopril-Hydrochlorothiazide (Zestoretic)  Ferrous Sulfate (Ferosul)  Cholecalciferol (Vitamin D3)  Vitamin B Complex with Vitamin C    -  PLEASE TAKE these medications the day of surgery:  Atorvastatin (Lipitor)  Citalopram (Celexa)  Levothyroxine (Synthroid)  Pregabalin (Lyrica)      How do I prepare myself?  - Please take 2 showers (one the night prior to surgery and one the morning of surgery) using Scrubcare or Hibiclens soap.    Use this soap only from the neck to your toes.     Leave the soap on your skin for one minute--then rinse thoroughly.      You may use your own shampoo and conditioner. No other hair products.   - Please remove all jewelry and body piercings.  - No lotions, deodorants or fragrance.  - No makeup or fingernail polish.   - Bring your ID and insurance card.    -If you have a Deep Brain Stimulator, Spinal Cord Stimulator, or any Neuro Stimulator device---you must bring the remote control to the hospital.      ALL PATIENTS GOING HOME THE SAME DAY OF SURGERY ARE REQUIRED TO HAVE A RESPONSIBLE ADULT TO DRIVE AND BE IN ATTENDANCE WITH THEM FOR 24 HOURS FOLLOWING SURGERY.    Covid testing policy as of 12/06/2022  Your surgeon will notify and schedule you for a COVID  test if one is needed before surgery--please direct any questions or COVID symptoms to your surgeon      Questions or Concerns:    - For any questions regarding the day of surgery or your hospital stay, please contact the Pre Admission Nursing Office at [phone].       - If you have health changes between today and your surgery, please call your surgeon.       - For questions after surgery, please call your surgeons office.           Current Visitor Guidelines    You may have 2 visitors in the pre op area.    Visiting hours: 8 a.m. to 8:30 p.m.    You may have four visitors during your inpatient hospital stay.    Patients confirmed or suspected to have symptoms of COVID 19 or flu:     No visitors allowed for adult patients.   Children (under age 18) can have 1 named visitor.     People who are sick or showing symptoms of COVID 19 or flu:    Are not allowed to visit patients--we can only make exceptions in special situations.       Please follow these guidelines for your visit:          Please maintain social distance          Masking is optional--however at times you may be asked to wear a mask for the safety of yourself and others     Clean your hands with alcohol hand . Do this when you arrive at and leave the building and patient room,    And again after you touch your mask or anything in the room.     Go directly to and from the room you are visiting.     Stay in the patient s room during your visit. Limit going to other places in the hospital as much as possible     Leave bags and jackets at home or in the car.     For everyone s health, please don t come and go during your visit. That includes for smoking   during your visit.

## 2023-09-06 NOTE — H&P
Pre-Operative H & P     CC:  Preoperative exam to assess for increased cardiopulmonary risk while undergoing surgery and anesthesia.    Date of Encounter: 9/6/2023  Primary Care Physician:  Sejal Rutledge     Reason for visit:   Encounter Diagnoses   Name Primary?    Preop examination Yes    Type 2 diabetes mellitus with diabetic polyneuropathy, with long-term current use of insulin (H)     Spinal stenosis of lumbar region with neurogenic claudication        HPI  Kelly Vegas is a 73 year old female who presents for pre-operative H & P in preparation for  Procedure Information       Case: [id number] Date/Time: 09/28/23 1200    Procedure: LAMINECTOMY, SPINE, LUMBAR, 3 OR MORE LEVELS, POSTERIOR APPROACH, USING MICROSCOPE (Spine)    Anesthesia type: General with Block    Diagnosis:       Spinal stenosis of lumbar region with neurogenic claudication [M48.062]      Lumbar radiculopathy [M54.16]    Pre-op diagnosis:       Spinal stenosis of lumbar region with neurogenic claudication [M48.062]      Lumbar radiculopathy [M54.16]    [facility]    Providers: Bryson Valdez MD            Kelly Vegas is a 73 year old female with hypertension, hyperlipidemia, tobacco use, chronic lymphocytic leukemia, diabetes, hypothyroidism, history of cervical cancer, anemia, history of DVT, CKD and depression that has chronic low back pain, lumbar radiculopathy and spinal stenosis of the lumbar region with neurogenic claudication.  She is currently taking lyrica for management. She has tried injections, physical therapy, tylenol and gabapentin for management in the past.  Despite multiple conservative treatment options she continue to have symptoms that are limiting her physical abilities.  She has consulted with Dr. Valdez and the above listed procedure has now been recommended for treatment.      History is obtained from the patient and chart review    Hx of abnormal bleeding or anti-platelet use:  none    Menstrual history: No LMP recorded. Patient has had a hysterectomy.:      Past Medical History  Past Medical History:   Diagnosis Date    AVM (arteriovenous malformation) of small bowel, acquired     Background diabetic retinopathy (H)     Cervical cancer (H) 03/2006    [facility], [facility] with gland involvement    CKD (chronic kidney disease) stage 4, GFR 15-29 ml/min (H)     CLL (chronic lymphocytic leukemia) (H)     Colon adenoma     DDD (degenerative disc disease), lumbar     Degenerative joint disease of hand     Diabetes mellitus type II 2009    w/ polyneuropathy, retinopathy    Diabetic polyneuropathy associated with type 2 diabetes mellitus (H) 09/26/2017    Gastritis     HTN (hypertension)     Hyperlipidemia LDL goal < 100     Hypothyroid 12/2004    s/p thyroidectomy    Lumbar disc herniation     Osteopenia     Recurrent major depression (H)     Sensorineural hearing loss, bilateral 12/14/2021    Spinal stenosis of lumbar region with neurogenic claudication        Past Surgical History  Past Surgical History:   Procedure Laterality Date    BIOPSY      COLONOSCOPY  nov 2016    COLONOSCOPY N/A 3/1/2019    Procedure: COLONOSCOPY;  Surgeon: Romulo Hayes MD;  Location: [facility]    COLONOSCOPY WITH CO2 INSUFFLATION N/A 11/28/2016    Procedure: COLONOSCOPY WITH CO2 INSUFFLATION;  Surgeon: Migue Giraldo MD;  Location: [facility]    CYSTECTOMY OVARIAN BENIGN  1974    ESOPHAGOSCOPY, GASTROSCOPY, DUODENOSCOPY (EGD), COMBINED N/A 3/1/2019    Procedure: COMBINED ESOPHAGOSCOPY, GASTROSCOPY, DUODENOSCOPY (EGD), BIOPSY SINGLE OR MULTIPLE;  Surgeon: Romulo Hayes MD;  Location: [facility]    EYE SURGERY  12-14-16    HC THYROIDECTOMY  12/2004    goiter    LAPAROSCOPIC CHOLECYSTECTOMY N/A 9/9/2019    Procedure: CHOLECYSTECTOMY, LAPAROSCOPIC;  Surgeon: Jorge Martinez DO;  Location: [facility], OMENTECTOMY W/JUAN DANIEL  2/2006    cervical cancer       Prior to Admission Medications  Current Outpatient Medications    Medication Sig Dispense Refill    atorvastatin (LIPITOR) 20 MG tablet Take 1 tablet (20 mg) by mouth daily (Patient taking differently: Take 20 mg by mouth every morning) 90 tablet 3    Cholecalciferol (VITAMIN D3) 1000 units CAPS Take by mouth every morning      citalopram (CELEXA) 20 MG tablet Take 1 tablet (20 mg) by mouth daily (Patient taking differently: Take 20 mg by mouth every morning) 90 tablet 3    empagliflozin (JARDIANCE) 10 MG TABS tablet Take 1 tablet (10 mg) by mouth daily (Patient taking differently: Take 10 mg by mouth every morning) 90 tablet 3    ferrous sulfate (FEROSUL) 325 (65 Fe) MG tablet Take 1 tablet (325 mg) by mouth every other day for 360 days 36 tablet 3    insulin glargine (LANTUS SOLOSTAR) 100 UNIT/ML pen INJECT 20 UNITS AT BEDTIME DAILY 30 mL 0    insulin lispro (HUMALOG KWIKPEN) 100 UNIT/ML (1 unit dial) KWIKPEN Inject 8 Units Subcutaneous 2 times daily (before meals) 45 mL 3    levothyroxine (SYNTHROID/LEVOTHROID) 88 MCG tablet Take 1 tablet (88 mcg) by mouth daily (Patient taking differently: Take 88 mcg by mouth every morning) 90 tablet 3    lisinopril-hydrochlorothiazide (ZESTORETIC) 20-12.5 MG tablet Take 2 tablets by mouth daily (Patient taking differently: Take 2 tablets by mouth every morning) 180 tablet 3    metFORMIN (GLUCOPHAGE XR) 500 MG 24 hr tablet Take 1 tablet (500 mg) by mouth daily (with dinner) 90 tablet 3    multivitamin w/minerals (THERA-VIT-M) tablet Take 1 tablet by mouth every morning      pregabalin (LYRICA) 25 MG capsule Take 1 capsule (25 mg) by mouth 3 times daily (Patient taking differently: Take 25 mg by mouth 2 times daily) 60 capsule 3    vitamin C with B complex (B COMPLEX-C) tablet Take 1 tablet by mouth every morning      blood glucose (NO BRAND SPECIFIED) test strip Use to test blood sugar 3 times daily or as directed. To accompany: Blood Glucose Monitor Brands: per insurance. 300 strip 3    thin (NO BRAND SPECIFIED) lancets Use to test blood  sugar 3 times daily or as directed. To accompany: Blood Glucose Monitor Brands: per insurance. 300 each 3    ULTICARE MINI 31G X 6 MM insulin pen needle Use three times daily or as directed. 300 each 0       Allergies  Allergies   Allergen Reactions    Glipizide      tremulous    Ibuprofen Hives    Pcn [Penicillins] Itching    Percocet [Acetaminophen] Nausea and Vomiting    Vicodin [Hydrocodone-Acetaminophen] Nausea       Social History  Social History     Socioeconomic History    Marital status:      Spouse name: Lauri    Number of children: 4    Years of education: 12    Highest education level: Not on file   Occupational History    Occupation: RETIRED   Tobacco Use    Smoking status: Former     Packs/day: 0.50     Years: 20.00     Pack years: 10.00     Types: Cigarettes     Start date: 1967     Quit date: 1989     Years since quittin.7    Smokeless tobacco: Never   Vaping Use    Vaping Use: Never used   Substance and Sexual Activity    Alcohol use: No    Drug use: No    Sexual activity: Not Currently     Partners: Male     Birth control/protection: Post-menopausal   Other Topics Concern    Parent/sibling w/ CABG, MI or angioplasty before 65F 55M? No     Service No    Blood Transfusions No    Caffeine Concern No    Occupational Exposure No    Hobby Hazards No    Sleep Concern No    Stress Concern No    Weight Concern Yes    Special Diet No    Back Care No    Exercise Yes    Bike Helmet No    Seat Belt Yes    Self-Exams Yes     Comment: When remember   Social History Narrative    Not on file     Social Determinants of Health     Financial Resource Strain: Not on file   Food Insecurity: Not on file   Transportation Needs: Not on file   Physical Activity: Not on file   Stress: Not on file   Social Connections: Not on file   Intimate Partner Violence: Not on file   Housing Stability: Not on file       Family History  Family History   Problem Relation Age of Onset    Thyroid Disease Mother      Dementia Mother     Osteoporosis Mother     Alcohol/Drug Father     C.A.D. Father         CABG     Diabetes Father     Hypertension Father     Hyperlipidemia Father     Cerebrovascular Disease Father     Alcohol/Drug Brother     Prostate Cancer Brother     Depression Brother     Depression Brother         d. suicide    Cancer Maternal Grandmother         stomach, colon    Breast Cancer Maternal Grandmother     Thyroid Disease Maternal Grandmother     Alzheimer Disease Maternal Grandfather     Breast Cancer Paternal Grandmother     Alcohol/Drug Son     Rheumatoid Arthritis Son     Breast Cancer Cousin     Breast Cancer Other     Anesthesia Reaction No family hx of     Thrombosis No family hx of        Review of Systems  The complete review of systems is negative other than noted in the HPI or here.   Anesthesia Evaluation   Pt has had prior anesthetic.     No history of anesthetic complications       ROS/MED HX  ENT/Pulmonary:     (+)     SHELL risk factors,  hypertension,         tobacco use, Past use,                   (-) recent URI   Neurologic:     (+)    peripheral neuropathy, - hands and BLEs.                           Cardiovascular:     (+) Dyslipidemia hypertension- -   -  - -                                 Previous cardiac testing   Echo: Date: Results:    Stress Test:  Date: Results:    ECG Reviewed:  Date: 2019 Results:  NSR  Cath:  Date: Results:      METS/Exercise Tolerance: >4 METS Comment: Doesn't exercise purposefully, but is active carrying laundry up and down her stairs.  Up until 3 months ago she was walking her dog 40 minutes daily for exercise.     Hematologic: Comments: History of leg DVT in her 20's when on OCPs    (+) History of blood clots,    pt is not anticoagulated, anemia,          Musculoskeletal: Comment: Lumbar spinal stenosis with neurogenic claudication  Lumbar DDD  Left buttock pain, low back pain    Slightly limited right shoulder ROM due to a prior rotator cuff  "tear      History of a fall this past winter      GI/Hepatic:  - neg GI/hepatic ROS     Renal/Genitourinary:     (+) renal disease, type: CRI,            Endo:     (+)  type II DM,  date: 9/6/23, Using insulin, - not using insulin pump.    thyroid problem, hypothyroidism post-surgical hypothyroidism,           Psychiatric/Substance Use:     (+) psychiatric history depression       Infectious Disease:  - neg infectious disease ROS     Malignancy:   (+) Malignancy, History of Lymphoma/Leukemia and Other.Lymph CA Active status post.  Other CA cervical cancer Remission status post Surgery.    Other:  - neg other ROS    (+)  , H/O Chronic Pain,         BP 95/56 (BP Location: Right arm, Patient Position: Sitting, Cuff Size: Adult Regular)   Pulse 66   Temp 98.3  F (36.8  C) (Oral)   Resp 16   Ht 1.631 m (5' 4.2\")   Wt 75.8 kg (167 lb 1.6 oz)   SpO2 96%   BMI 28.50 kg/m      Physical Exam   Constitutional: Awake, alert, cooperative, no apparent distress, and appears stated age.  Eyes: Pupils equal, round and reactive to light, extra ocular muscles intact, sclera clear, conjunctiva normal.  HENT: Normocephalic, oral pharynx with moist mucus membranes. Upper denture. No goiter appreciated.   Respiratory: Clear to auscultation bilaterally, no crackles or wheezing.  Cardiovascular: Regular rate and rhythm, normal S1 and S2, and no murmur noted.  Carotids +2, no bruits. No edema. Palpable pulses to radial  DP and PT arteries.   GI: Normal bowel sounds, soft, non-distended, non-tender, no masses palpated, no hepatosplenomegaly.    Lymph/Hematologic: No cervical lymphadenopathy and no supraclavicular lymphadenopathy.  Genitourinary: deferred  Skin: Warm and dry.  No rashes at anticipated surgical site.   Musculoskeletal: Full ROM of neck. There is no redness, warmth, or swelling of the exposed joints. Gross motor strength is normal.    Neurologic: Awake, alert, oriented to name, place and time. Cranial nerves II-XII are " grossly intact. Gait is normal.   Neuropsychiatric: Calm, cooperative. Normal affect.     Prior Labs/Diagnostic Studies   All labs and imaging personally reviewed     EKG/ stress test - if available please see in ROS above     The patient's records and results personally reviewed by this provider.     Outside records reviewed from: No outside records available    LAB/DIAGNOSTIC STUDIES TODAY:    Component      Latest Ref Rng 9/6/2023  3:03 PM   Sodium      136 - 145 mmol/L 142    Potassium      3.4 - 5.3 mmol/L 5.0    Chloride      98 - 107 mmol/L 109 (H)    Carbon Dioxide (CO2)      22 - 29 mmol/L 24    Anion Gap      7 - 15 mmol/L 9    Urea Nitrogen      8.0 - 23.0 mg/dL 46.1 (H)    Creatinine      0.51 - 0.95 mg/dL 2.01 (H)    Calcium      8.8 - 10.2 mg/dL 10.1    Glucose      70 - 99 mg/dL 119 (H)    GFR Estimate      >60 mL/min/1.73m2 25 (L)    WBC      4.0 - 11.0 10e3/uL 15.5 (H)    RBC Count      3.80 - 5.20 10e6/uL 4.07    Hemoglobin      11.7 - 15.7 g/dL 12.1    Hematocrit      35.0 - 47.0 % 38.4    MCV      78 - 100 fL 94    MCH      26.5 - 33.0 pg 29.7    MCHC      31.5 - 36.5 g/dL 31.5    RDW      10.0 - 15.0 % 12.8    Platelet Count      150 - 450 10e3/uL 243    Hemoglobin A1C      <5.7 % 7.3 (H)       Legend:  (H) High  (L) Low    Assessment    Kelly Vegas is a 73 year old female seen as a PAC referral for risk assessment and optimization for anesthesia.    Plan/Recommendations  Pt will be optimized for the proposed procedure.  See below for details on the assessment, risk, and preoperative recommendations    NEUROLOGY  - No history of TIA, CVA or seizure    -Post Op delirium risk factors:  Age    ENT  - No current airway concerns.  Will need to be reassessed day of surgery.  Mallampati: II  TM: > 3    CARDIAC  - No history of CAD and Afib  - hold lisinopril/hydrochlorothiazide DOS    - blood pressure was low today in clinic x 2 (94/58 and 95/56) which is abnormal for her.  She notes that she has  "had some dizziness off and on since starting lyrica and jardiance this past summer, but that BPs aren't usually that low.  Patient has a blood pressure cuff at home.  Encouraged her to drink plenty of fluids to hydrate well and to check blood pressures 1-2x daily for the next few days.  Patient will send [employer] message over the weekend with blood pressure readings for review.      Message received 2 days ago after multiple attempts to contact:  Friday 9/15 95/74 pulse57 Saturday 104/50 pulse 61 Sunday 99/51 pulse 70 Today 124/82 pulse 75 Sorry I didn t send sooner. Hope things are ok now         - METS (Metabolic Equivalents)  Patient performs 4 or more METS exercise without symptoms            Total Score: 0      RCRI-Moderate risk: Class 3  6.6% complication rate            Total Score: 2    RCRI: Diabetes    RCRI: Elevated Creatinine        PULMONARY    SHELL Low Risk            Total Score: 2    SHELL: Hypertension    SHELL: Over 50 ys old      - Denies asthma or inhaler use  - Tobacco History    History   Smoking Status    Former    Packs/day: 0.50    Years: 20.00    Types: Cigarettes    Start date: 7/8/1967    Quit date: 1/1/1989   Smokeless Tobacco    Never       GI  - denies GERD  PONV High Risk  Total Score: 3           1 AN PONV: Pt is Female    1 AN PONV: Patient is not a current smoker    1 AN PONV: Intended Post Op Opioids        /RENAL  - CKD - creatinine was higher than normal today at 2.01.  Encouraged patient to hydrate well.  Will order recheck for DOS.     ENDOCRINE    - BMI: Estimated body mass index is 28.5 kg/m  as calculated from the following:    Height as of this encounter: 1.631 m (5' 4.2\").    Weight as of this encounter: 75.8 kg (167 lb 1.6 oz).  Overweight (BMI 25.0-29.9)  - Diabetes  Diabetes Type 2, insulin dependent. Hold morning oral hypoglycemic medications and short acting insulin DOS. Take 80% of last scheduled dose of long-acting insulin prior to procedure.  Hold Jardiance 3 days " prior to surgery.  Recommend close monitoring of the patient's blood glucose levels throughout the perioperative period.    HEME  VTE Low Risk 0.5%            Total Score: 4    VTE: Greater than 59 yrs old    VTE: Pt history of VTE      - No history of abnormal bleeding or antiplatelet use.    - following with heme/onc for chronic lymphocytic leukemia.     - on oral iron supplementation for iron deficiency anemia.  Hgb currently WNL.  Hold iron DOS.    MSK  - chronic low back and left buttock pain, spinal stenosis of the lumbar region with neurogenic claudication, and lumbar radiculopathy.  Surgery planned as above.  - slightly limited right shoulder ROM.  Consider cautious positioning.       Different anesthesia methods/types have been discussed with the patient, but they are aware that the final plan will be decided by the assigned anesthesia provider on the date of service.      The patient is optimized for their procedure. AVS with information on surgery time/arrival time, meds and NPO status given by nursing staff. No further diagnostic testing indicated.      On the day of service:     Prep time: 15 minutes  Visit time: 23 minutes  Documentation time: 5 minutes  ------------------------------------------  Total time: 43 minutes      BIA Garduno CNP  Preoperative Assessment Center  [facility]  Clinic and Surgery Center  Phone: [phone]  Fax: [phone]

## 2023-09-08 NOTE — RESULT ENCOUNTER NOTE
Carina Mendoza,    Your test results are attached.  Your labs are mostly stable and okay for surgery.  The creatinine (kidney function test) is chronically elevated for you, but is up a bit higher than normal.  This may have just been that you weren't hydrated well enough the day of your visit.  Please try to hydrate with water well between now and surgery.  The level will be monitored during your admission.         Chantell Norman DNP, RN, ANP-C

## 2023-09-14 ENCOUNTER — PRE VISIT (OUTPATIENT)
Dept: SURGERY | Facility: CLINIC | Age: 73
End: 2023-09-14

## 2023-09-15 ENCOUNTER — TELEPHONE (OUTPATIENT)
Dept: SURGERY | Facility: CLINIC | Age: 73
End: 2023-09-15
Payer: COMMERCIAL

## 2023-09-15 NOTE — TELEPHONE ENCOUNTER
Left a voice message for Kelly requesting a list of blood pressure readings.  Also sent a my chart message, will follow up.  Gwendolyn Kruger RN

## 2023-09-19 DIAGNOSIS — M51.26 LUMBAR DISC HERNIATION: ICD-10-CM

## 2023-09-19 DIAGNOSIS — M54.16 LUMBAR RADICULOPATHY: ICD-10-CM

## 2023-09-19 DIAGNOSIS — M62.830 MUSCLE SPASM OF BACK: ICD-10-CM

## 2023-09-19 DIAGNOSIS — M48.062 SPINAL STENOSIS OF LUMBAR REGION WITH NEUROGENIC CLAUDICATION: ICD-10-CM

## 2023-09-19 RX ORDER — PREGABALIN 25 MG/1
25 CAPSULE ORAL 2 TIMES DAILY
Qty: 60 CAPSULE | Refills: 1 | Status: SHIPPED | OUTPATIENT
Start: 2023-09-19 | End: 2023-10-16

## 2023-09-19 NOTE — TELEPHONE ENCOUNTER
Hello,  I'm with ortho con.  Pt of Dr. Valdez is asking for a refill of pregabalin 25 mg.  [facility].  She said the pharmacy has been requesting this refill for awhile and has not heard back.  Is there a way you can help?  Also pt is having surgery on 9/28.  She is asking if this medication needs to be discontinued beforehand?  Thank you.

## 2023-09-28 ENCOUNTER — ANESTHESIA (OUTPATIENT)
Dept: SURGERY | Facility: CLINIC | Age: 73
End: 2023-09-28
Payer: COMMERCIAL

## 2023-09-28 ENCOUNTER — HOSPITAL ENCOUNTER (OUTPATIENT)
Facility: CLINIC | Age: 73
Setting detail: OBSERVATION
Discharge: HOME | End: 2023-09-30
Attending: ORTHOPAEDIC SURGERY | Admitting: ORTHOPAEDIC SURGERY
Payer: COMMERCIAL

## 2023-09-28 ENCOUNTER — APPOINTMENT (OUTPATIENT)
Dept: GENERAL RADIOLOGY | Facility: CLINIC | Age: 73
End: 2023-09-28
Attending: ORTHOPAEDIC SURGERY
Payer: COMMERCIAL

## 2023-09-28 DIAGNOSIS — M48.062 SPINAL STENOSIS OF LUMBAR REGION WITH NEUROGENIC CLAUDICATION: ICD-10-CM

## 2023-09-28 DIAGNOSIS — M54.16 LUMBAR RADICULOPATHY: ICD-10-CM

## 2023-09-28 LAB
ABO/RH(D): NORMAL
ANTIBODY SCREEN: NEGATIVE
APTT PPP: 24 SECONDS (ref 22–38)
CREAT SERPL-MCNC: 1.23 MG/DL (ref 0.51–0.95)
EGFRCR SERPLBLD CKD-EPI 2021: 46 ML/MIN/1.73M2
GLUCOSE BLDC GLUCOMTR-MCNC: 142 MG/DL (ref 70–99)
GLUCOSE BLDC GLUCOMTR-MCNC: 213 MG/DL (ref 70–99)
GLUCOSE BLDC GLUCOMTR-MCNC: 94 MG/DL (ref 70–99)
GLUCOSE BLDC GLUCOMTR-MCNC: 94 MG/DL (ref 70–99)
INR PPP: 0.99 (ref 0.85–1.15)
SPECIMEN EXPIRATION DATE: NORMAL

## 2023-09-28 PROCEDURE — 250N000011 HC RX IP 250 OP 636: Performed by: ORTHOPAEDIC SURGERY

## 2023-09-28 PROCEDURE — 278N000051 HC OR IMPLANT GENERAL: Performed by: ORTHOPAEDIC SURGERY

## 2023-09-28 PROCEDURE — 250N000009 HC RX 250: Performed by: ORTHOPAEDIC SURGERY

## 2023-09-28 PROCEDURE — 99221 1ST HOSP IP/OBS SF/LOW 40: CPT | Performed by: INTERNAL MEDICINE

## 2023-09-28 PROCEDURE — 250N000011 HC RX IP 250 OP 636: Performed by: NURSE ANESTHETIST, CERTIFIED REGISTERED

## 2023-09-28 PROCEDURE — 250N000012 HC RX MED GY IP 250 OP 636 PS 637: Performed by: INTERNAL MEDICINE

## 2023-09-28 PROCEDURE — 258N000003 HC RX IP 258 OP 636: Performed by: STUDENT IN AN ORGANIZED HEALTH CARE EDUCATION/TRAINING PROGRAM

## 2023-09-28 PROCEDURE — 86901 BLOOD TYPING SEROLOGIC RH(D): CPT | Performed by: ORTHOPAEDIC SURGERY

## 2023-09-28 PROCEDURE — 258N000003 HC RX IP 258 OP 636: Performed by: ORTHOPAEDIC SURGERY

## 2023-09-28 PROCEDURE — 250N000013 HC RX MED GY IP 250 OP 250 PS 637: Performed by: STUDENT IN AN ORGANIZED HEALTH CARE EDUCATION/TRAINING PROGRAM

## 2023-09-28 PROCEDURE — 272N000002 HC OR SUPPLY OTHER OPNP: Performed by: ORTHOPAEDIC SURGERY

## 2023-09-28 PROCEDURE — 250N000009 HC RX 250: Performed by: STUDENT IN AN ORGANIZED HEALTH CARE EDUCATION/TRAINING PROGRAM

## 2023-09-28 PROCEDURE — 258N000003 HC RX IP 258 OP 636: Performed by: NURSE ANESTHETIST, CERTIFIED REGISTERED

## 2023-09-28 PROCEDURE — 250N000011 HC RX IP 250 OP 636: Performed by: STUDENT IN AN ORGANIZED HEALTH CARE EDUCATION/TRAINING PROGRAM

## 2023-09-28 PROCEDURE — 86850 RBC ANTIBODY SCREEN: CPT | Performed by: ORTHOPAEDIC SURGERY

## 2023-09-28 PROCEDURE — 250N000013 HC RX MED GY IP 250 OP 250 PS 637: Performed by: NURSE ANESTHETIST, CERTIFIED REGISTERED

## 2023-09-28 PROCEDURE — G0378 HOSPITAL OBSERVATION PER HR: HCPCS

## 2023-09-28 PROCEDURE — 85610 PROTHROMBIN TIME: CPT | Performed by: ORTHOPAEDIC SURGERY

## 2023-09-28 PROCEDURE — 250N000009 HC RX 250: Performed by: NURSE ANESTHETIST, CERTIFIED REGISTERED

## 2023-09-28 PROCEDURE — 272N000001 HC OR GENERAL SUPPLY STERILE: Performed by: ORTHOPAEDIC SURGERY

## 2023-09-28 PROCEDURE — 250N000025 HC SEVOFLURANE, PER MIN: Performed by: ORTHOPAEDIC SURGERY

## 2023-09-28 PROCEDURE — 82962 GLUCOSE BLOOD TEST: CPT

## 2023-09-28 PROCEDURE — 85730 THROMBOPLASTIN TIME PARTIAL: CPT | Performed by: ORTHOPAEDIC SURGERY

## 2023-09-28 PROCEDURE — 250N000011 HC RX IP 250 OP 636: Mod: JZ | Performed by: NURSE ANESTHETIST, CERTIFIED REGISTERED

## 2023-09-28 PROCEDURE — 999N000141 HC STATISTIC PRE-PROCEDURE NURSING ASSESSMENT: Performed by: ORTHOPAEDIC SURGERY

## 2023-09-28 PROCEDURE — 999N000180 XR SURGERY CARM FLUORO LESS THAN 5 MIN: Mod: TC

## 2023-09-28 PROCEDURE — 36415 COLL VENOUS BLD VENIPUNCTURE: CPT | Performed by: NURSE PRACTITIONER

## 2023-09-28 PROCEDURE — 250N000013 HC RX MED GY IP 250 OP 250 PS 637: Performed by: ORTHOPAEDIC SURGERY

## 2023-09-28 PROCEDURE — 82565 ASSAY OF CREATININE: CPT | Performed by: NURSE PRACTITIONER

## 2023-09-28 PROCEDURE — 360N000079 HC SURGERY LEVEL 6, PER MIN: Performed by: ORTHOPAEDIC SURGERY

## 2023-09-28 PROCEDURE — 250N000013 HC RX MED GY IP 250 OP 250 PS 637

## 2023-09-28 PROCEDURE — 710N000010 HC RECOVERY PHASE 1, LEVEL 2, PER MIN: Performed by: ORTHOPAEDIC SURGERY

## 2023-09-28 PROCEDURE — 370N000017 HC ANESTHESIA TECHNICAL FEE, PER MIN: Performed by: ORTHOPAEDIC SURGERY

## 2023-09-28 DEVICE — DURAGEN® DURAL GRAFT MATRIX 5 PK, 1X1 DOM
Type: IMPLANTABLE DEVICE | Site: SPINE LUMBAR | Status: FUNCTIONAL
Brand: DURAGEN®

## 2023-09-28 RX ORDER — DEXMEDETOMIDINE HYDROCHLORIDE 4 UG/ML
INJECTION, SOLUTION INTRAVENOUS
Status: COMPLETED | OUTPATIENT
Start: 2023-09-28 | End: 2023-09-28

## 2023-09-28 RX ORDER — NALOXONE HYDROCHLORIDE 0.4 MG/ML
0.2 INJECTION, SOLUTION INTRAMUSCULAR; INTRAVENOUS; SUBCUTANEOUS
Status: DISCONTINUED | OUTPATIENT
Start: 2023-09-28 | End: 2023-09-30 | Stop reason: HOSPADM

## 2023-09-28 RX ORDER — PREGABALIN 25 MG/1
25 CAPSULE ORAL 2 TIMES DAILY
Status: DISCONTINUED | OUTPATIENT
Start: 2023-09-28 | End: 2023-09-30 | Stop reason: HOSPADM

## 2023-09-28 RX ORDER — ACETAMINOPHEN 325 MG/1
975 TABLET ORAL EVERY 8 HOURS
Status: DISCONTINUED | OUTPATIENT
Start: 2023-09-28 | End: 2023-09-30 | Stop reason: HOSPADM

## 2023-09-28 RX ORDER — AMOXICILLIN 250 MG
1 CAPSULE ORAL 2 TIMES DAILY
Status: DISCONTINUED | OUTPATIENT
Start: 2023-09-28 | End: 2023-09-30 | Stop reason: HOSPADM

## 2023-09-28 RX ORDER — KETAMINE HYDROCHLORIDE 10 MG/ML
INJECTION INTRAMUSCULAR; INTRAVENOUS PRN
Status: DISCONTINUED | OUTPATIENT
Start: 2023-09-28 | End: 2023-09-28

## 2023-09-28 RX ORDER — ONDANSETRON 2 MG/ML
4 INJECTION INTRAMUSCULAR; INTRAVENOUS EVERY 6 HOURS PRN
Status: DISCONTINUED | OUTPATIENT
Start: 2023-09-28 | End: 2023-09-30 | Stop reason: HOSPADM

## 2023-09-28 RX ORDER — ONDANSETRON 2 MG/ML
INJECTION INTRAMUSCULAR; INTRAVENOUS PRN
Status: DISCONTINUED | OUTPATIENT
Start: 2023-09-28 | End: 2023-09-28

## 2023-09-28 RX ORDER — HYDROMORPHONE HCL/0.9% NACL/PF 0.2MG/0.2
0.1 SYRINGE (ML) INTRAVENOUS
Status: DISCONTINUED | OUTPATIENT
Start: 2023-09-28 | End: 2023-09-28

## 2023-09-28 RX ORDER — DEXTROSE MONOHYDRATE 25 G/50ML
25-50 INJECTION, SOLUTION INTRAVENOUS
Status: DISCONTINUED | OUTPATIENT
Start: 2023-09-28 | End: 2023-09-30 | Stop reason: HOSPADM

## 2023-09-28 RX ORDER — LIDOCAINE HYDROCHLORIDE 20 MG/ML
INJECTION, SOLUTION INFILTRATION; PERINEURAL PRN
Status: DISCONTINUED | OUTPATIENT
Start: 2023-09-28 | End: 2023-09-28

## 2023-09-28 RX ORDER — DEXAMETHASONE SODIUM PHOSPHATE 10 MG/ML
INJECTION, SOLUTION INTRAMUSCULAR; INTRAVENOUS
Status: COMPLETED | OUTPATIENT
Start: 2023-09-28 | End: 2023-09-28

## 2023-09-28 RX ORDER — MAGNESIUM HYDROXIDE 1200 MG/15ML
LIQUID ORAL PRN
Status: DISCONTINUED | OUTPATIENT
Start: 2023-09-28 | End: 2023-09-28 | Stop reason: HOSPADM

## 2023-09-28 RX ORDER — GABAPENTIN 100 MG/1
100 CAPSULE ORAL
Status: COMPLETED | OUTPATIENT
Start: 2023-09-28 | End: 2023-09-28

## 2023-09-28 RX ORDER — ACETAMINOPHEN 325 MG/1
975 TABLET ORAL ONCE
Status: COMPLETED | OUTPATIENT
Start: 2023-09-28 | End: 2023-09-28

## 2023-09-28 RX ORDER — CEFAZOLIN SODIUM/WATER 2 G/20 ML
2 SYRINGE (ML) INTRAVENOUS SEE ADMIN INSTRUCTIONS
Status: DISCONTINUED | OUTPATIENT
Start: 2023-09-28 | End: 2023-09-28 | Stop reason: HOSPADM

## 2023-09-28 RX ORDER — NICOTINE POLACRILEX 4 MG
15-30 LOZENGE BUCCAL
Status: DISCONTINUED | OUTPATIENT
Start: 2023-09-28 | End: 2023-09-30 | Stop reason: HOSPADM

## 2023-09-28 RX ORDER — NALOXONE HYDROCHLORIDE 0.4 MG/ML
0.4 INJECTION, SOLUTION INTRAMUSCULAR; INTRAVENOUS; SUBCUTANEOUS
Status: DISCONTINUED | OUTPATIENT
Start: 2023-09-28 | End: 2023-09-30 | Stop reason: HOSPADM

## 2023-09-28 RX ORDER — ALBUTEROL SULFATE 90 UG/1
AEROSOL, METERED RESPIRATORY (INHALATION) PRN
Status: DISCONTINUED | OUTPATIENT
Start: 2023-09-28 | End: 2023-09-28

## 2023-09-28 RX ORDER — FENTANYL CITRATE 50 UG/ML
50 INJECTION, SOLUTION INTRAMUSCULAR; INTRAVENOUS EVERY 5 MIN PRN
Status: DISCONTINUED | OUTPATIENT
Start: 2023-09-28 | End: 2023-09-28 | Stop reason: HOSPADM

## 2023-09-28 RX ORDER — HYDROMORPHONE HYDROCHLORIDE 1 MG/ML
0.4 INJECTION, SOLUTION INTRAMUSCULAR; INTRAVENOUS; SUBCUTANEOUS EVERY 5 MIN PRN
Status: DISCONTINUED | OUTPATIENT
Start: 2023-09-28 | End: 2023-09-28 | Stop reason: HOSPADM

## 2023-09-28 RX ORDER — DEXAMETHASONE SODIUM PHOSPHATE 4 MG/ML
INJECTION, SOLUTION INTRA-ARTICULAR; INTRALESIONAL; INTRAMUSCULAR; INTRAVENOUS; SOFT TISSUE PRN
Status: DISCONTINUED | OUTPATIENT
Start: 2023-09-28 | End: 2023-09-28

## 2023-09-28 RX ORDER — SODIUM CHLORIDE, SODIUM LACTATE, POTASSIUM CHLORIDE, CALCIUM CHLORIDE 600; 310; 30; 20 MG/100ML; MG/100ML; MG/100ML; MG/100ML
INJECTION, SOLUTION INTRAVENOUS CONTINUOUS PRN
Status: DISCONTINUED | OUTPATIENT
Start: 2023-09-28 | End: 2023-09-28

## 2023-09-28 RX ORDER — SODIUM CHLORIDE 9 MG/ML
INJECTION, SOLUTION INTRAVENOUS CONTINUOUS
Status: DISCONTINUED | OUTPATIENT
Start: 2023-09-28 | End: 2023-09-30 | Stop reason: HOSPADM

## 2023-09-28 RX ORDER — BUPIVACAINE HYDROCHLORIDE 2.5 MG/ML
INJECTION, SOLUTION EPIDURAL; INFILTRATION; INTRACAUDAL
Status: COMPLETED | OUTPATIENT
Start: 2023-09-28 | End: 2023-09-28

## 2023-09-28 RX ORDER — PROCHLORPERAZINE MALEATE 5 MG
5 TABLET ORAL EVERY 6 HOURS PRN
Status: DISCONTINUED | OUTPATIENT
Start: 2023-09-28 | End: 2023-09-30 | Stop reason: HOSPADM

## 2023-09-28 RX ORDER — HYDROXYZINE HYDROCHLORIDE 10 MG/1
10 TABLET, FILM COATED ORAL EVERY 6 HOURS PRN
Status: DISCONTINUED | OUTPATIENT
Start: 2023-09-28 | End: 2023-09-30 | Stop reason: HOSPADM

## 2023-09-28 RX ORDER — HYDROMORPHONE HYDROCHLORIDE 2 MG/1
2-4 TABLET ORAL EVERY 4 HOURS PRN
Status: DISCONTINUED | OUTPATIENT
Start: 2023-09-28 | End: 2023-09-30 | Stop reason: HOSPADM

## 2023-09-28 RX ORDER — EPHEDRINE SULFATE 50 MG/ML
INJECTION, SOLUTION INTRAMUSCULAR; INTRAVENOUS; SUBCUTANEOUS PRN
Status: DISCONTINUED | OUTPATIENT
Start: 2023-09-28 | End: 2023-09-28

## 2023-09-28 RX ORDER — HYDROMORPHONE HCL IN WATER/PF 6 MG/30 ML
0.2 PATIENT CONTROLLED ANALGESIA SYRINGE INTRAVENOUS
Status: DISCONTINUED | OUTPATIENT
Start: 2023-09-28 | End: 2023-09-28

## 2023-09-28 RX ORDER — VANCOMYCIN HYDROCHLORIDE 1 G/20ML
INJECTION, POWDER, LYOPHILIZED, FOR SOLUTION INTRAVENOUS PRN
Status: DISCONTINUED | OUTPATIENT
Start: 2023-09-28 | End: 2023-09-28 | Stop reason: HOSPADM

## 2023-09-28 RX ORDER — BISACODYL 10 MG
10 SUPPOSITORY, RECTAL RECTAL DAILY PRN
Status: DISCONTINUED | OUTPATIENT
Start: 2023-09-28 | End: 2023-09-30 | Stop reason: HOSPADM

## 2023-09-28 RX ORDER — FENTANYL CITRATE 50 UG/ML
INJECTION, SOLUTION INTRAMUSCULAR; INTRAVENOUS PRN
Status: DISCONTINUED | OUTPATIENT
Start: 2023-09-28 | End: 2023-09-28

## 2023-09-28 RX ORDER — NEOSTIGMINE METHYLSULFATE 1 MG/ML
VIAL (ML) INJECTION PRN
Status: DISCONTINUED | OUTPATIENT
Start: 2023-09-28 | End: 2023-09-28

## 2023-09-28 RX ORDER — ACETAMINOPHEN 325 MG/1
650 TABLET ORAL EVERY 4 HOURS PRN
Status: DISCONTINUED | OUTPATIENT
Start: 2023-10-01 | End: 2023-09-30 | Stop reason: HOSPADM

## 2023-09-28 RX ORDER — PROPOFOL 10 MG/ML
INJECTION, EMULSION INTRAVENOUS PRN
Status: DISCONTINUED | OUTPATIENT
Start: 2023-09-28 | End: 2023-09-28

## 2023-09-28 RX ORDER — SODIUM CHLORIDE, SODIUM LACTATE, POTASSIUM CHLORIDE, CALCIUM CHLORIDE 600; 310; 30; 20 MG/100ML; MG/100ML; MG/100ML; MG/100ML
INJECTION, SOLUTION INTRAVENOUS CONTINUOUS
Status: DISCONTINUED | OUTPATIENT
Start: 2023-09-28 | End: 2023-09-28 | Stop reason: HOSPADM

## 2023-09-28 RX ORDER — GLYCOPYRROLATE 0.2 MG/ML
INJECTION, SOLUTION INTRAMUSCULAR; INTRAVENOUS PRN
Status: DISCONTINUED | OUTPATIENT
Start: 2023-09-28 | End: 2023-09-28

## 2023-09-28 RX ORDER — HYDROMORPHONE HCL IN WATER/PF 6 MG/30 ML
0.2 PATIENT CONTROLLED ANALGESIA SYRINGE INTRAVENOUS
Status: DISCONTINUED | OUTPATIENT
Start: 2023-09-28 | End: 2023-09-29

## 2023-09-28 RX ORDER — PROPOFOL 10 MG/ML
INJECTION, EMULSION INTRAVENOUS CONTINUOUS PRN
Status: DISCONTINUED | OUTPATIENT
Start: 2023-09-28 | End: 2023-09-28

## 2023-09-28 RX ORDER — OXYCODONE HYDROCHLORIDE 5 MG/1
5 TABLET ORAL EVERY 4 HOURS PRN
Status: DISCONTINUED | OUTPATIENT
Start: 2023-09-28 | End: 2023-09-28

## 2023-09-28 RX ORDER — OXYCODONE HCL 10 MG/1
10 TABLET, FILM COATED, EXTENDED RELEASE ORAL EVERY 24 HOURS
Status: DISCONTINUED | OUTPATIENT
Start: 2023-09-28 | End: 2023-09-28

## 2023-09-28 RX ORDER — HYDROMORPHONE HYDROCHLORIDE 1 MG/ML
0.2 INJECTION, SOLUTION INTRAMUSCULAR; INTRAVENOUS; SUBCUTANEOUS EVERY 5 MIN PRN
Status: DISCONTINUED | OUTPATIENT
Start: 2023-09-28 | End: 2023-09-28 | Stop reason: HOSPADM

## 2023-09-28 RX ORDER — DIPHENHYDRAMINE HYDROCHLORIDE 50 MG/ML
12.5 INJECTION INTRAMUSCULAR; INTRAVENOUS
Status: DISCONTINUED | OUTPATIENT
Start: 2023-09-28 | End: 2023-09-28 | Stop reason: HOSPADM

## 2023-09-28 RX ORDER — HYDROMORPHONE HCL IN WATER/PF 6 MG/30 ML
0.1 PATIENT CONTROLLED ANALGESIA SYRINGE INTRAVENOUS
Status: DISCONTINUED | OUTPATIENT
Start: 2023-09-28 | End: 2023-09-29

## 2023-09-28 RX ORDER — FENTANYL CITRATE 50 UG/ML
25 INJECTION, SOLUTION INTRAMUSCULAR; INTRAVENOUS EVERY 5 MIN PRN
Status: DISCONTINUED | OUTPATIENT
Start: 2023-09-28 | End: 2023-09-28 | Stop reason: HOSPADM

## 2023-09-28 RX ORDER — CEFAZOLIN SODIUM/WATER 2 G/20 ML
2 SYRINGE (ML) INTRAVENOUS
Status: COMPLETED | OUTPATIENT
Start: 2023-09-28 | End: 2023-09-28

## 2023-09-28 RX ORDER — ONDANSETRON 4 MG/1
4 TABLET, ORALLY DISINTEGRATING ORAL EVERY 6 HOURS PRN
Status: DISCONTINUED | OUTPATIENT
Start: 2023-09-28 | End: 2023-09-30 | Stop reason: HOSPADM

## 2023-09-28 RX ORDER — LIDOCAINE 40 MG/G
CREAM TOPICAL
Status: DISCONTINUED | OUTPATIENT
Start: 2023-09-28 | End: 2023-09-30 | Stop reason: HOSPADM

## 2023-09-28 RX ORDER — METHOCARBAMOL 500 MG/1
500 TABLET, FILM COATED ORAL EVERY 6 HOURS PRN
Status: DISCONTINUED | OUTPATIENT
Start: 2023-09-28 | End: 2023-09-30 | Stop reason: HOSPADM

## 2023-09-28 RX ORDER — POLYETHYLENE GLYCOL 3350 17 G/17G
17 POWDER, FOR SOLUTION ORAL DAILY
Status: DISCONTINUED | OUTPATIENT
Start: 2023-09-29 | End: 2023-09-30 | Stop reason: HOSPADM

## 2023-09-28 RX ORDER — CEFAZOLIN SODIUM 1 G/3ML
1 INJECTION, POWDER, FOR SOLUTION INTRAMUSCULAR; INTRAVENOUS EVERY 8 HOURS
Status: COMPLETED | OUTPATIENT
Start: 2023-09-28 | End: 2023-09-29

## 2023-09-28 RX ADMIN — ACETAMINOPHEN 975 MG: 325 TABLET, FILM COATED ORAL at 10:33

## 2023-09-28 RX ADMIN — Medication 10 MG: at 13:35

## 2023-09-28 RX ADMIN — Medication 7.5 MG: at 13:34

## 2023-09-28 RX ADMIN — FENTANYL CITRATE 25 MCG: 50 INJECTION INTRAMUSCULAR; INTRAVENOUS at 17:19

## 2023-09-28 RX ADMIN — ACETAMINOPHEN 975 MG: 325 TABLET, FILM COATED ORAL at 18:19

## 2023-09-28 RX ADMIN — Medication 50 MG: at 12:59

## 2023-09-28 RX ADMIN — Medication 5 MG: at 16:02

## 2023-09-28 RX ADMIN — PROPOFOL 150 MCG/KG/MIN: 10 INJECTION, EMULSION INTRAVENOUS at 13:10

## 2023-09-28 RX ADMIN — DEXAMETHASONE SODIUM PHOSPHATE 2 MG: 10 INJECTION, SOLUTION INTRAMUSCULAR; INTRAVENOUS at 13:11

## 2023-09-28 RX ADMIN — SODIUM CHLORIDE: 9 INJECTION, SOLUTION INTRAVENOUS at 20:13

## 2023-09-28 RX ADMIN — TRANEXAMIC ACID 57532 MG: 100 INJECTION INTRAVENOUS at 13:10

## 2023-09-28 RX ADMIN — FENTANYL CITRATE 50 MCG: 50 INJECTION, SOLUTION INTRAMUSCULAR; INTRAVENOUS at 13:35

## 2023-09-28 RX ADMIN — SENNOSIDES AND DOCUSATE SODIUM 1 TABLET: 50; 8.6 TABLET ORAL at 20:13

## 2023-09-28 RX ADMIN — TRANEXAMIC ACID 1 MG/KG/HR: 100 INJECTION INTRAVENOUS at 13:30

## 2023-09-28 RX ADMIN — ONDANSETRON 4 MG: 2 INJECTION INTRAMUSCULAR; INTRAVENOUS at 16:01

## 2023-09-28 RX ADMIN — Medication 2 G: at 12:54

## 2023-09-28 RX ADMIN — FENTANYL CITRATE 25 MCG: 50 INJECTION, SOLUTION INTRAMUSCULAR; INTRAVENOUS at 15:52

## 2023-09-28 RX ADMIN — FENTANYL CITRATE 25 MCG: 50 INJECTION INTRAMUSCULAR; INTRAVENOUS at 17:05

## 2023-09-28 RX ADMIN — FENTANYL CITRATE 50 MCG: 50 INJECTION, SOLUTION INTRAMUSCULAR; INTRAVENOUS at 12:57

## 2023-09-28 RX ADMIN — FENTANYL CITRATE 25 MCG: 50 INJECTION, SOLUTION INTRAMUSCULAR; INTRAVENOUS at 16:13

## 2023-09-28 RX ADMIN — BUPIVACAINE HYDROCHLORIDE 60 ML: 2.5 INJECTION, SOLUTION EPIDURAL; INFILTRATION; INTRACAUDAL at 13:11

## 2023-09-28 RX ADMIN — HYDROMORPHONE HYDROCHLORIDE 0.2 MG: 1 INJECTION, SOLUTION INTRAMUSCULAR; INTRAVENOUS; SUBCUTANEOUS at 17:28

## 2023-09-28 RX ADMIN — SODIUM CHLORIDE, POTASSIUM CHLORIDE, SODIUM LACTATE AND CALCIUM CHLORIDE: 600; 310; 30; 20 INJECTION, SOLUTION INTRAVENOUS at 12:50

## 2023-09-28 RX ADMIN — Medication 10 MG: at 14:45

## 2023-09-28 RX ADMIN — CEFAZOLIN 1 G: 1 INJECTION, POWDER, FOR SOLUTION INTRAMUSCULAR; INTRAVENOUS at 20:16

## 2023-09-28 RX ADMIN — Medication 10 MG: at 15:31

## 2023-09-28 RX ADMIN — INSULIN ASPART 1 UNITS: 100 INJECTION, SOLUTION INTRAVENOUS; SUBCUTANEOUS at 23:01

## 2023-09-28 RX ADMIN — LIDOCAINE HYDROCHLORIDE 80 MG: 20 INJECTION, SOLUTION INFILTRATION; PERINEURAL at 12:57

## 2023-09-28 RX ADMIN — GLYCOPYRROLATE 0.6 MG: 0.2 INJECTION, SOLUTION INTRAMUSCULAR; INTRAVENOUS at 16:19

## 2023-09-28 RX ADMIN — ALBUTEROL SULFATE 6 PUFF: 108 INHALANT RESPIRATORY (INHALATION) at 16:27

## 2023-09-28 RX ADMIN — NEOSTIGMINE METHYLSULFATE 3 MG: 1 INJECTION, SOLUTION INTRAVENOUS at 16:19

## 2023-09-28 RX ADMIN — Medication 10 MG: at 14:27

## 2023-09-28 RX ADMIN — DEXMEDETOMIDINE 40 MCG: 100 INJECTION, SOLUTION, CONCENTRATE INTRAVENOUS at 13:11

## 2023-09-28 RX ADMIN — GLYCOPYRROLATE 0.2 MG: 0.2 INJECTION, SOLUTION INTRAMUSCULAR; INTRAVENOUS at 16:25

## 2023-09-28 RX ADMIN — DEXAMETHASONE SODIUM PHOSPHATE 4 MG: 4 INJECTION, SOLUTION INTRA-ARTICULAR; INTRALESIONAL; INTRAMUSCULAR; INTRAVENOUS; SOFT TISSUE at 13:47

## 2023-09-28 RX ADMIN — FENTANYL CITRATE 25 MCG: 50 INJECTION, SOLUTION INTRAMUSCULAR; INTRAVENOUS at 14:34

## 2023-09-28 RX ADMIN — HYDROMORPHONE HYDROCHLORIDE 0.2 MG: 1 INJECTION, SOLUTION INTRAMUSCULAR; INTRAVENOUS; SUBCUTANEOUS at 17:51

## 2023-09-28 RX ADMIN — DEXAMETHASONE SODIUM PHOSPHATE 6 MG: 4 INJECTION, SOLUTION INTRA-ARTICULAR; INTRALESIONAL; INTRAMUSCULAR; INTRAVENOUS; SOFT TISSUE at 15:21

## 2023-09-28 RX ADMIN — Medication 5 MG: at 14:52

## 2023-09-28 RX ADMIN — PREGABALIN 25 MG: 25 CAPSULE ORAL at 20:13

## 2023-09-28 RX ADMIN — PHENYLEPHRINE HYDROCHLORIDE 0.2 MCG/KG/MIN: 10 INJECTION INTRAVENOUS at 13:39

## 2023-09-28 RX ADMIN — HYDROMORPHONE HYDROCHLORIDE 0.2 MG: 1 INJECTION, SOLUTION INTRAMUSCULAR; INTRAVENOUS; SUBCUTANEOUS at 17:37

## 2023-09-28 RX ADMIN — HYDROMORPHONE HYDROCHLORIDE 2 MG: 2 TABLET ORAL at 18:20

## 2023-09-28 RX ADMIN — FENTANYL CITRATE 25 MCG: 50 INJECTION, SOLUTION INTRAMUSCULAR; INTRAVENOUS at 16:45

## 2023-09-28 RX ADMIN — HYDROMORPHONE HYDROCHLORIDE 4 MG: 2 TABLET ORAL at 22:43

## 2023-09-28 RX ADMIN — PROPOFOL 150 MG: 10 INJECTION, EMULSION INTRAVENOUS at 12:57

## 2023-09-28 RX ADMIN — NEOSTIGMINE METHYLSULFATE 2 MG: 1 INJECTION, SOLUTION INTRAVENOUS at 16:25

## 2023-09-28 ASSESSMENT — ACTIVITIES OF DAILY LIVING (ADL)
ADLS_ACUITY_SCORE: 35
ADLS_ACUITY_SCORE: 33
ADLS_ACUITY_SCORE: 35
ADLS_ACUITY_SCORE: 37
ADLS_ACUITY_SCORE: 35

## 2023-09-28 ASSESSMENT — ENCOUNTER SYMPTOMS: DYSRHYTHMIAS: 0

## 2023-09-28 ASSESSMENT — LIFESTYLE VARIABLES: TOBACCO_USE: 1

## 2023-09-28 NOTE — BRIEF OP NOTE
"[facility]    Brief Operative Note    Pre-operative diagnosis: Spinal stenosis of lumbar region with neurogenic claudication [M48.062]  Lumbar radiculopathy [M54.16]  Post-operative diagnosis Same as pre-operative diagnosis    Procedure: LAMINECTOMY, SPINE, LUMBAR, 3 OR MORE LEVELS, POSTERIOR APPROACH, USING MICROSCOPE LUMBAR 3-4, 4-5, LUMBAR 5 SACRAL 1, N/A - Spine    Surgeon: Surgeon(s) and Role:     * Bryson Valdez MD - Primary     * Baldemar Venegas MD - Resident - Assisting  Anesthesia: General with Block     Drains: [place]-Espinal  Specimens: * No specimens in log *  Findings:   See dictated op report    Complications: Dural tear requiring repair. Right L5 nerve root sleeve avulsion injury requiring repair.   Implants:   Implant Name Type Inv. Item Serial No.  Lot No. LRB No. Used Action   GRAFT DURAGEN 1X1\" ID-110 - [id number] Other GRAFT DURAGEN 1X1\" ID-110  [employer]d [id number] N/A 1 Implanted       Assessment and Plan: Kelly Vegas is a 73 year old female with lumbar degenerative disc disease and lumbar spinal stenosis with radiculopathy and neurogenic claudication  now s/p L3-4, L4-5 and L5-S1 laminectomy on 9/28/23 with Dr. Valdez.     Admit to obs - Ortho Primary   Activity: Up with assist until independent. No excessive bending or twisting. No lifting >10 lbs x 6 weeks. No Rachele lift for transfers.   Weight bearing status: WBAT.  Pain management: Transition from IV to PO as tolerated. N  Antibiotics: Ancef q8h while drains remain  Diet: Begin with clear fluids and progress diet as tolerated with Mata nutritional supplement   DVT prophylaxis: SCDs only. No chemical DVT ppx needed.   Imaging: None  Labs: labs PRN  Bracing/Splinting: None  Dressings: Keep c/d/i x 7 days.  Drains: Document output per shift, will be discontinued at Orthopedic Surgery discretion.  Waldron catheter: Per protocol  Physical Therapy/Occupational " Therapy: Eval and treat.  Consults: Hospitalist.  Follow-up: Clinic with Dr. Valdez in 6 weeks with repeat x-rays.   Disposition: Pending progress with therapies, pain control on orals and medical stability.     Baldemar Venegas MD  Orthopaedic Surgery, PGY-4  Pager: [phone]

## 2023-09-28 NOTE — ANESTHESIA PROCEDURE NOTES
Airway       Patient location during procedure: OR       Procedure Start/Stop Times: 9/28/2023 1:02 PM  Staff -        CRNA: Mulvihill, Ashley M, APRN CRNA       Performed By: with residents       Procedure performed by resident/fellow/CRNA in presence of a teaching physician.  Indications and Patient Condition       Indications for airway management: zaida-procedural       Induction type:intravenous       Mask difficulty assessment: 2 - vent by mask + OA or adjuvant +/- NMBA    Final Airway Details       Final airway type: endotracheal airway       Successful airway: ETT - single and Oral  Endotracheal Airway Details        ETT size (mm): 6.5       Cuffed: yes       Successful intubation technique: direct laryngoscopy       DL Blade Type: MAC 3       Grade View of Cords: 1       Adjucts: stylet       Position: Right       Measured from: lips       Secured at (cm): 23       Bite block used: Soft    Post intubation assessment        Placement verified by: capnometry and equal breath sounds        Number of attempts at approach: 2 (Resident intubated x1 attempt; CRNA reintubated x1 attempt for a total of 2 attempts.)       Number of other approaches attempted: 0       Secured with: commercial tube stoll and silk tape       Ease of procedure: easy       Dentition: Intact and Unchanged    Medication(s) Administered   Medication Administration Time: 9/28/2023 1:02 PM    Additional Comments       Resident (Dr. DES Lopez) intubated patient x1 attempt--ETT not secured due to ETT cuff slipping out of trachea while resident attempted to tape ETT; MDA deflated cuff & removed ETT. CRNA intubated x1 attempt & taped ETT securely.

## 2023-09-28 NOTE — OP NOTE
Orthopaedic Surgery Op-Note     Patient: Kelly Vegas  : 1950  Date of Service: 2023 4:33 PM    Pre-operative Diagnosis:   Spinal stenosis of lumbar region with neurogenic claudication [M48.062]  Lumbar radiculopathy [M54.16]    Post-operative Diagnosis: SAME    Procedure(s) Performed:   Decompressive laminectomy L3-4, L4-5, L5-S1  Left L4-5 discectomy  Repair of iatrogenic durotomy    Staff: Dr. Bryson Valdez MD  Resident/Fellow: Baldemar Venegas MD PGY4      Implants: none  Drains: 10 Fr CHILO drain deep to fascia  Intra-op Labs/Cxs/Specimens:   * No specimens in log *    Indication for Procedure:   Patient is a 73 year old female who has had extensive nonoperative management without adequate symptomatic relief. Patient remains severely disabled due to these symptoms. After appropriate discussion of risks, alternatives and benefits of surgery the patient elected to proceed with surgery.  We discussed the risk of cardiac, pulmonary, and embolic complications which can be life threatening. We also discussed risk of incomplete symptom relief, injury to the nerve roots or spinal cord which can result in permanent sensory or motor function loss, dural injury with spinal fluid leak which can result in headaches and require lumbar drain or additional surgery to address, nonunion or hardware failure which can result in persistent pain and require additional surgery to address. No guarantees were given. Patient voluntarily signed written informed consent.       Description of Procedure:   On the day of surgery I met the patient and her  in the preoperative holding area.  We reviewed the surgical plan. I answered the patient's questions to the best of my ability. Site was marked and consent forms were signed by the patient and I.  OR and Anesthesia team were briefed. Patient was taken to the OR and general anesthesia administered with endotracheal intubation. IV antibiotics and tranexamic acid were  administered prior to incision. Patient was positioned in the prone position.  Anesthesia regional pain team performed erector spinae fascial plane block under ultrasound guidance. The surgical site was prepped and draped in sterile fashion. Timeout was performed.    Midline incision was made and lamina of L3, L4, L5 were exposed. Spinous processes were left intact. I placed a number four penfield under the L4 lamina and a lateral fluoro spot was obtained confirming our surgical level. I then used Leksell rongeur to thin the caudal edge of the lamina. Then used high speed bur to thin the remaining lamina at L5 and L4 as well as thin the caudal 50% of the L3 lamina. Then completed midline sparing laminectomy by resecting remaining lamina was with Kerrison punch at L4 and L5. Then dissected a plane between the ligamentum flavum and dura and resected ligamentum flavum using Kerrison punch. While dissecting the plane between the dura and ligamentum flavum by carefully trying to push the dura down away from the ligament with the back of a Sacha at L5-S1 space I observed a bleb defect in the dura dorsally in the midline with the arachnid still intact. I placed a cottonoid over the bleb and continued dissecting our laterally to expose the L4-5 disc space to perform discectomy which was necessary given the large extruded fragment causing severe stenosis visible on MRI. The central thecal sac was retracted with D'Errico and an annulotomy made longitudinally. I used a nerve hook to dissect deep to annulus freeing the calcified disc material from the annulus. As it was calcified I could not remove the extrusion en bloc so used an Kike curette to push the calcified disc downward toward the disc space and then removed piecemeal. I also used a Martinez ball tip probe to palpate the foramen which was still tight. I used a Kerrison to perform foraminotomy further decompressing the exiting left L4 nerve root. At this point I  exposed the bleb at L5-S1. While exposing the bleb CSF began escaping from the durotomy.  I repaired the dura over the arachnoid with 6-0 goretex. This was a water tight repair. I then sutured a small piece of muscle over the repair.     We then continued the decompressive laminectomy proximally, removing the ligamentum flavum from the L3-4 space. We palpated the lateral recess and resected the ligamentum flavum with Kerrison punch. I then checked out into the lateral recess throughout the decompression zone. I identified the right lateral recess at 4-5 still had some ligamentum flavum and capsule causing lateral recess stenosis so resected this with Kerrison.  With one of the final Kerrison bites in the right lateral recess the ligamentum flavum or capsule was adherent to the exiting nerve root. I was able to feel that the bite was adherent and released the bite. I inspected the tissue and identified what appeared to be a tear at the junction of the nerve sheath and the thecal sac. I had the microscope draped and brought into the field to further inspect this.     With the microscope, I dissected out the shoulder of the right exiting L5 nerve root. I confirmed that there was an avulsion of the nerve sheath but the neural tissue was in continuity. There was no loss of CSF from this.  I repaired the nerve sheath to the thecal sac with running 6-0 monocryl. I then used the tails of the suture to secure a piece of duragen over the repair, as I was told that dura repair was on back order and not available. After completing the repair and placing dura repair over it I applied tisseal fibrin glue over the repair.     Wound was thoroughly irrigated with sterile saline throughout the case. Hemostasis was achieved with bipolar electrocautery and surgiflo/cottonoids.   A 10 Fr drain was placed deep to fascia. 1 g vancomycin powder was placed in the wound. The fascia was closed in watertight fashion using #1 vicryl.  Subcutaneous tissue and dermis were closed with 2-0 vicryl. Skin was closed with running subcuticular monocryl. A Prineo dressing was applied. Sterile Acticoat bandage was applied. Drapes were removed and patient transferred to the hospital cart. Patient emerged from general anesthesia and was extubated. They were taken to the PACU in stable condition.     All sponge and needle counts were correct x 2.  I was present and scrubbed for the entire procedure.     Post op plan:   Ortho Spine Primary team  Activity: Up with assist until independent. Avoid repetitive lifting, bending, twisting  Weight bearing status: No lifting greater than 10 lbs. .  Pain management: Oral pain medications with IV for breakthrough. Wean IV as able.    Antibiotics: Ancef x 24 hours; if drain in place continue IV antibiotics until drains out  Diet: Begin with clear fluids and progress diet as tolerated. Mata nutritional supplement twice daily  DVT prophylaxis: Mechanical prophylaxis with SCD  Imaging: Standing radiographs when able  Labs: Monitor Hgb   Bracing/Splinting: None.   Dressings: Keep clean, dry and intact. Patient may shower with bandage in place. Dressing to be removed 2 weeks after surgery. Please change or reinforce as necessary for strikethrough or saturation.   Drains: 10 Fr CHILO drain to suction. To be removed when output < 30 ml per shift  Physical Therapy/Occupational Therapy: Eval and treat.  Consults: IM, GERMAN.  Follow-up: Clinic 6 weeks  Disposition: Admitted for physical therapy and pain control    Bryson Valdez MD  Orthopedic Spine Surgeon  , Department of Orthopedic Surgery

## 2023-09-28 NOTE — OR NURSING
PACU to Inpatient Nursing Handoff    Patient Kelly Vegas is a 73 year old female who speaks English.   Procedure Procedure(s):  LAMINECTOMY, SPINE, LUMBAR, 3 OR MORE LEVELS, POSTERIOR APPROACH, USING MICROSCOPE LUMBAR 3-4, 4-5, LUMBAR 5 SACRAL 1 and Dura Repair   Surgeon(s) Primary: Bryson Valdez MD  Resident - Assisting: Baldemar Venegas MD     Allergies   Allergen Reactions    Glipizide      tremulous    Ibuprofen Hives    Pcn [Penicillins] Hives and Itching    Percocet [Acetaminophen] Nausea and Vomiting    Vicodin [Hydrocodone-Acetaminophen] Nausea     Did not like how she felt       Isolation  none    Past Medical History   has a past medical history of AVM (arteriovenous malformation) of small bowel, acquired, Background diabetic retinopathy (H), Cervical cancer (H) (03/2006), CKD (chronic kidney disease) stage 4, GFR 15-29 ml/min (H), CLL (chronic lymphocytic leukemia) (H), Colon adenoma, DDD (degenerative disc disease), lumbar, Degenerative joint disease of hand, Diabetes mellitus type II (2009), Diabetic polyneuropathy associated with type 2 diabetes mellitus (H) (09/26/2017), Gastritis, HTN (hypertension), Hyperlipidemia LDL goal < 100, Hypothyroid (12/2004), Lumbar disc herniation, Osteopenia, Recurrent major depression (H), Sensorineural hearing loss, bilateral (12/14/2021), and Spinal stenosis of lumbar region with neurogenic claudication.    Anesthesia General with Block   Dermatome Level  NA   Preop Meds acetaminophen (Tylenol) - time given: 1033   Nerve block Erector spinae .  Location:bilateral. Med:bupivacaine and decadron and precedex. Time given: 1311   Intraop Meds albuterol (Proventil, Ventolin)  dexamethasone (Decadron)  fentanyl (Sublimaze): 175 mcg total  ketamine (Ketalar): 30 mg given  ondansetron (Zofran): last given at 1601   Local Meds No   Antibiotics cefazolin (Ancef) - last given at 1254     Pain Patient Currently in Pain: yes   PACU meds  acetaminophen (Tylenol): 975 mg  (total dose) last given at 1820   fentanyl (Sublimaze): 50 mcg (total dose) last given at 1613   hydromorphone (Dilaudid): 0.6 mg IV (total dose) last given at 1751 and oral 2 mg dose given at 1820   PCA / epidural No   Capnography     Telemetry ECG Rhythm: Normal sinus rhythm   Inpatient Telemetry Monitor Ordered? No        Labs Glucose Lab Results   Component Value Date    GLC 94 09/28/2023     05/01/2023     04/08/2021       Hgb Lab Results   Component Value Date    HGB 12.1 09/06/2023    HGB 11.3 04/08/2021       INR Lab Results   Component Value Date    INR 0.99 09/28/2023    INR 1.04 01/31/2006      PACU Imaging Not applicable     Wound/Incision Incision/Surgical Site 09/09/19 Bilateral Abdomen (Active)   Number of days: 1480       Incision/Surgical Site 09/28/23 Posterior;Midline Lumbar spine (Active)   Incision Assessment [facility] 09/28/23 1621   Closure Liquid bandage 09/28/23 1621   Dressing Intervention Clean, dry, intact;New dressing applied 09/28/23 1621   Number of days: 0      CMS     Warm, no discoloration- slight tingling in fingers, denies numbness/tingling in feet.    Equipment ice pack   Other LDA       IV Access Peripheral IV 09/28/23 Posterior;Left Hand (Active)   Site Assessment WDL 09/28/23 1636   Line Status Infusing 09/28/23 1636   Dressing Transparent 09/28/23 1636   Dressing Status clean;dry;intact 09/28/23 1636   Phlebitis Scale 0-->no symptoms 09/28/23 1636   Infiltration? no 09/28/23 1636   Number of days: 0       Peripheral IV Right Hand (Active)   Site Assessment WDL except;Ecchymotic 09/28/23 1636   Line Status Saline locked 09/28/23 1636   Dressing Transparent 09/28/23 1636   Dressing Status clean;dry;intact 09/28/23 1636   Phlebitis Scale 0-->no symptoms 09/28/23 1636   Infiltration? no 09/28/23 1636   Number of days:       Blood Products Not applicable  mL   Intake/Output Date 09/28/23 0700 - 09/29/23 0659   Shift 8649-0276 7750-5899 2638-1097 24 Hour Total    INTAKE   I.V. 400   400   Shift Total(mL/kg) 400(5.28)   400(5.28)   OUTPUT   Blood  150  150   Shift Total(mL/kg)  150(1.98)  150(1.98)   Weight (kg) 75.7 75.7 75.7 75.7      Drains / [place] Closed/Suction Drain 1 Right;Midline Back Bulb 10 Korean placed to the right of midline incision (Active)   Status Open to gravity drainage 09/28/23 1636   Number of days: 0      Time of void PreOp Time of Void Prior to Procedure: 0930 (09/28/23 1040)    PostOp  Due to void- Bladder scanned  for 220mL    Diapered? No   Bladder Scan Bladder Scan Volume (mL): 220 ml (09/28/23 1742)Complete- 220 mL, no urgency to void at this time   PO   Water and apple sauce water and apple sauce     Vitals    B/P: 110/48  T: 98.1  F (36.7  C)    Temp src: Oral  P:  Pulse: 62 (09/28/23 1745)          R: 10  O2:  SpO2: 100 %    O2 Device: Nasal cannula (09/28/23 1705)    Oxygen Delivery: 2 LPM (09/28/23 1705)         Family/support present significant other   Patient belongings     Patient transported on cart and air mat   DC meds/scripts (obs/outpt) Not applicable   Inpatient Pain Meds Released? Yes       Special needs/considerations None   Tasks needing completion None       Daisy Johnson RN  ASCOM [id number]

## 2023-09-28 NOTE — ANESTHESIA PROCEDURE NOTES
Erector spinae Procedure Note    Pre-Procedure   Staff -        Anesthesiologist:  Ez Robles MD       Resident/Fellow: Tomas Buckner MD       Other Anesthesia Staff: Dontae Barr MD       Performed By: resident       Location: OR       Procedure Start/Stop Times: 9/28/2023 1:11 PM and 9/28/2023 1:18 PM       Pre-Anesthestic Checklist: patient identified, IV checked, site marked, risks and benefits discussed, informed consent, monitors and equipment checked, pre-op evaluation, at physician/surgeon's request and post-op pain management  Timeout:       Correct Patient: Yes        Correct Procedure: Yes        Correct Site: Yes        Correct Position: Yes        Correct Laterality: Yes        Site Marked: Yes  Procedure Documentation  Procedure: Erector spinae       Laterality: bilateral       Patient Position: prone       Patient Prep/Sterile Barriers: sterile gloves, mask       Skin prep: Chloraprep       Needle Type: short bevel       Needle Gauge: 21.        Needle Length (millimeters): 110        Ultrasound guided       1. Ultrasound was used to identify targeted nerve, plexus, vascular marker, or fascial plane and place a needle adjacent to it in real-time.       2. Ultrasound was used to visualize the spread of anesthetic in close proximity to the above referenced structure.       3. A permanent image is entered into the patient's record.    Assessment/Narrative         The placement was negative for: blood aspirated and site bleeding       Bolus given via needle. no blood aspirated via catheter.        Secured via.        Insertion/Infusion Method: Single Shot       Complications: none    Medication(s) Administered   Bupivacaine 0.25% PF (Infiltration) - Infiltration   60 mL - 9/28/2023 1:11:00 PM  Dexmedetomidine 4 mcg/mL (Perineural) - Perineural   40 mcg - 9/28/2023 1:11:00 PM  Dexamethasone 10 mg/mL PF (Perineural) - Perineural   2 mg - 9/28/2023 1:11:00 PM  Medication Administration Time:  "9/28/2023 1:11 PM      FOR [facility] ([facility]) ONLY:   Pain Team Contact information: please page the Pain Team Via [employer]. Search \"Pain\". During daytime hours, please page the attending first. At night please page the resident first.      "

## 2023-09-28 NOTE — ANESTHESIA CARE TRANSFER NOTE
Patient: Kelly Vegas    Procedure: Procedure(s):  LAMINECTOMY, SPINE, LUMBAR, 3 OR MORE LEVELS, POSTERIOR APPROACH, USING MICROSCOPE LUMBAR 3-4, 4-5, LUMBAR 5 SACRAL 1 and Dura Repair       Diagnosis: Spinal stenosis of lumbar region with neurogenic claudication [M48.062]  Lumbar radiculopathy [M54.16]  Diagnosis Additional Information: No value filed.    Anesthesia Type:   General     Note:    Oropharynx: oropharynx clear of all foreign objects and spontaneously breathing  Level of Consciousness: drowsy  Oxygen Supplementation: face mask  Level of Supplemental Oxygen (L/min / FiO2): 6  Independent Airway: airway patency satisfactory and stable  Dentition: dentition unchanged  Vital Signs Stable: post-procedure vital signs reviewed and stable  Report to RN Given: handoff report given  Patient transferred to: PACU    Handoff Report: Identifed the Patient, Identified the Reponsible Provider, Reviewed the pertinent medical history, Discussed the surgical course, Reviewed Intra-OP anesthesia mangement and issues during anesthesia, Set expectations for post-procedure period and Allowed opportunity for questions and acknowledgement of understanding  Vitals:  Vitals Value Taken Time   /76 09/28/23 1637   Temp     Pulse 82 09/28/23 1646   Resp 12 09/28/23 1646   SpO2 100 % 09/28/23 1646   Vitals shown include unvalidated device data.    Electronically Signed By: BIA Alejandra CRNA  September 28, 2023  4:47 PM

## 2023-09-28 NOTE — ANESTHESIA PREPROCEDURE EVALUATION
Anesthesia Pre-Procedure Evaluation    Patient: Kelly Vegas   MRN: [id number] : 1950        Procedure : Procedure(s):  LAMINECTOMY, SPINE, LUMBAR, 3 OR MORE LEVELS, POSTERIOR APPROACH, USING MICROSCOPE LUMBAR 3-4, 4-5, LUMBAR 5 SACRAL 1          Past Medical History:   Diagnosis Date    AVM (arteriovenous malformation) of small bowel, acquired     Background diabetic retinopathy (H)     Cervical cancer (H) 2006    [facility], [facility] with gland involvement    CKD (chronic kidney disease) stage 4, GFR 15-29 ml/min (H)     CLL (chronic lymphocytic leukemia) (H)     Colon adenoma     DDD (degenerative disc disease), lumbar     Degenerative joint disease of hand     Diabetes mellitus type II     w/ polyneuropathy, retinopathy    Diabetic polyneuropathy associated with type 2 diabetes mellitus (H) 2017    Gastritis     HTN (hypertension)     Hyperlipidemia LDL goal < 100     Hypothyroid 2004    s/p thyroidectomy    Lumbar disc herniation     Osteopenia     Recurrent major depression (H)     Sensorineural hearing loss, bilateral 2021    Spinal stenosis of lumbar region with neurogenic claudication       Past Surgical History:   Procedure Laterality Date    BIOPSY      COLONOSCOPY  2016    COLONOSCOPY N/A 3/1/2019    Procedure: COLONOSCOPY;  Surgeon: Romulo Hayes MD;  Location: [facility]    COLONOSCOPY WITH CO2 INSUFFLATION N/A 2016    Procedure: COLONOSCOPY WITH CO2 INSUFFLATION;  Surgeon: Migue Giraldo MD;  Location: [facility]    CYSTECTOMY OVARIAN BENIGN  1974    ESOPHAGOSCOPY, GASTROSCOPY, DUODENOSCOPY (EGD), COMBINED N/A 3/1/2019    Procedure: COMBINED ESOPHAGOSCOPY, GASTROSCOPY, DUODENOSCOPY (EGD), BIOPSY SINGLE OR MULTIPLE;  Surgeon: Romulo Hayes MD;  Location: [facility]    EYE SURGERY  15    HC THYROIDECTOMY  2004    goiter    LAPAROSCOPIC CHOLECYSTECTOMY N/A 2019    Procedure: CHOLECYSTECTOMY, LAPAROSCOPIC;  Surgeon: Jorge Martinez DO;  Location: [facility]     Z BSO, OMENTECTOMY W/JUAN DANIEL  2006    cervical cancer      Allergies   Allergen Reactions    Glipizide      tremulous    Ibuprofen Hives    Pcn [Penicillins] Itching    Percocet [Acetaminophen] Nausea and Vomiting    Vicodin [Hydrocodone-Acetaminophen] Nausea      Social History     Tobacco Use    Smoking status: Former     Packs/day: 0.50     Years: 20.00     Pack years: 10.00     Types: Cigarettes     Start date: 1967     Quit date: 1989     Years since quittin.7    Smokeless tobacco: Never   Substance Use Topics    Alcohol use: No      Wt Readings from Last 1 Encounters:   23 75.7 kg (166 lb 14.2 oz)        Anesthesia Evaluation   Pt has had prior anesthetic. Type: General.    No history of anesthetic complications       ROS/MED HX  ENT/Pulmonary:     (+)                tobacco use, Past use,                   (-) recent URI   Neurologic: Comment: Memory problems, lumbar stenosis    Left buttock pain, low back pain     Slightly limited right shoulder ROM due to a prior rotator cuff tear      (+)    peripheral neuropathy (lumbar stenosis), - bilateral legs.                           Cardiovascular:     (+) Dyslipidemia hypertension- -   -  - -                                   (-) taking anticoagulants/antiplatelets, CHF, syncope, arrhythmias and murmur   METS/Exercise Tolerance: 3 - Able to walk 1-2 blocks without stopping Comment: One flight of stairs   Hematologic:     (+) History of blood clots (several years ago, provoked DVT associated with OCPs),    pt is not anticoagulated,           Musculoskeletal: Comment: Osteopenia, arthritis, DDD  (+)  arthritis,             GI/Hepatic: Comment: AVM of the small bowel   (-) GERD   Renal/Genitourinary:     (+) renal disease (stage 3a), type: CRI, Pt does not require dialysis,           Endo:     (+)  type II DM (insulin + metformin), Last HgA1c: 7.3, date: 23, Using insulin, - not using insulin pump.   Diabetic complications: nephropathy  retinopathy. thyroid problem, hypothyroidism,           Psychiatric/Substance Use:     (+) psychiatric history depression       Infectious Disease:  - neg infectious disease ROS     Malignancy:   (+) Malignancy (CLL), History of Lymphoma/Leukemia.    Other:  - neg other ROS          Physical Exam    Airway        Mallampati: III   TM distance: > 3 FB   Neck ROM: full   Mouth opening: > 3 cm    Respiratory Devices and Support         Dental     Comment: Upper dentures, crowns bottom right, a few teeth remaining front lower middle, denies any loose    (+) Multiple crowns, permanant bridges    B=Bridge, C=Chipped, L=Loose, M=Missing    Cardiovascular          Rhythm and rate: regular and normal (-) no murmur    Pulmonary   pulmonary exam normal        breath sounds clear to auscultation           OUTSIDE LABS:  CBC:   Lab Results   Component Value Date    WBC 15.5 (H) 09/06/2023    WBC 15.2 (H) 06/19/2023    HGB 12.1 09/06/2023    HGB 11.3 (L) 06/19/2023    HCT 38.4 09/06/2023    HCT 36.6 06/19/2023     09/06/2023     06/19/2023     BMP:   Lab Results   Component Value Date     09/06/2023     07/06/2023    POTASSIUM 5.0 09/06/2023    POTASSIUM 5.0 07/06/2023    CHLORIDE 109 (H) 09/06/2023    CHLORIDE 107 07/06/2023    CO2 24 09/06/2023    CO2 24 07/06/2023    BUN 46.1 (H) 09/06/2023    BUN 24.6 (H) 07/06/2023    CR 1.23 (H) 09/28/2023    CR 2.01 (H) 09/06/2023    GLC 94 09/28/2023     (H) 09/06/2023     COAGS:   Lab Results   Component Value Date    PTT 25 01/31/2006    INR 1.04 01/31/2006     POC:   Lab Results   Component Value Date     (H) 09/09/2019     HEPATIC:   Lab Results   Component Value Date    ALBUMIN 3.9 06/19/2023    PROTTOTAL 6.1 (L) 06/19/2023    ALT 12 06/19/2023    AST 30 06/19/2023    ALKPHOS 86 06/19/2023    BILITOTAL 0.3 06/19/2023     OTHER:   Lab Results   Component Value Date    A1C 7.3 (H) 09/06/2023    KARTHIK 10.1 09/06/2023    PHOS 2.5 12/29/2022    TSH  0.58 01/16/2023    T4 1.32 11/30/2022    T3 175 01/31/2006    SED 7 04/08/2021       Anesthesia Plan    ASA Status:  3    NPO Status:  NPO Appropriate    Anesthesia Type: General.     - Airway: ETT   Induction: Intravenous, Propofol.   Maintenance: Balanced.   Techniques and Equipment:     - Lines/Monitors: 2nd IV     Consents    Anesthesia Plan(s) and associated risks, benefits, and realistic alternatives discussed. Questions answered and patient/representative(s) expressed understanding.     - Discussed: Risks, Benefits and Alternatives for BOTH SEDATION and the PROCEDURE were discussed     - Discussed with:  Patient       - Patient is DNR/DNI Status: No     Use of blood products discussed: Yes.     - Discussed with: Patient.     - Consented: consented to blood products            Reason for refusal: other.     Postoperative Care    Pain management: IV analgesics, Oral pain medications, Peripheral nerve block (Single Shot), Multi-modal analgesia.   PONV prophylaxis: Ondansetron (or other 5HT-3), Dexamethasone or Solumedrol, Background Propofol Infusion     Comments:    Other Comments: 74 yo female PMHx HTN, stage 3 CKD, T2DM on insulin (most recent A1c 7.3) w/ diabetic retinopathy, hypothyroid, depression, hx of CLL and lumbar stenosis presents for L3-S1 laminectomies. NPO appropriate, plan GETA with standard ASA monitors + 2nd PIV and bilateral MARANDA nerve blocks.             Vanesa Pickard MD

## 2023-09-28 NOTE — INTERVAL H&P NOTE
"I have reviewed the surgical (or preoperative) H&P that is linked to this encounter, and examined the patient. There are no significant changes    Clinical Conditions Present on Arrival:  Clinically Significant Risk Factors Present on Admission                  # DMII: A1C = 7.3 % (Ref range: <5.7 %) within past 6 months  # Overweight: Estimated body mass index is 28.46 kg/m  as calculated from the following:    Height as of this encounter: 1.631 m (5' 4.21\").    Weight as of this encounter: 75.7 kg (166 lb 14.2 oz).       "

## 2023-09-28 NOTE — ANESTHESIA PROCEDURE NOTES
Airway       Patient location during procedure: OR       Procedure Start/Stop Times: 9/28/2023 1:02 PM  Staff -        Anesthesiologist:  Vanesa Pickard MD       CRNA: Mulvihill, Ashley M, APRN CRNA       Performed By: anesthesiologistIndications and Patient Condition       Indications for airway management: zaida-procedural       Induction type:intravenous       Mask difficulty assessment: 2 - vent by mask + OA or adjuvant +/- NMBA    Final Airway Details       Final airway type: endotracheal airway       Successful airway: ETT - single  Endotracheal Airway Details        ETT size (mm): 6.5       Cuffed: yes       Cuff volume (mL): 10       Successful intubation technique: video laryngoscopy       VL Blade Size: Glidescope 3       Grade View of Cords: 1       Adjucts: stylet       Position: Right       Measured from: gums/teeth       Secured at (cm): 22       Bite block used: Soft    Post intubation assessment        Placement verified by: capnometry, equal breath sounds and chest rise        Number of attempts at approach: 2       Secured with: silk tape       Ease of procedure: easy       Dentition: Intact and Unchanged    Medication(s) Administered   Medication Administration Time: 9/28/2023 1:02 PM    Additional Comments       Initial attempt medical student - visualized ETT through the cords, tube dislodged while attaching circuit. Tube removed and easy mask ventilation between attempts with OA. Suctioned and successful placement with video laryngoscope on 2nd attempt with grade 1 view.

## 2023-09-29 ENCOUNTER — APPOINTMENT (OUTPATIENT)
Dept: PHYSICAL THERAPY | Facility: CLINIC | Age: 73
End: 2023-09-29
Attending: STUDENT IN AN ORGANIZED HEALTH CARE EDUCATION/TRAINING PROGRAM
Payer: COMMERCIAL

## 2023-09-29 VITALS
HEART RATE: 79 BPM | DIASTOLIC BLOOD PRESSURE: 69 MMHG | BODY MASS INDEX: 28.49 KG/M2 | OXYGEN SATURATION: 99 % | TEMPERATURE: 98.6 F | HEIGHT: 64 IN | SYSTOLIC BLOOD PRESSURE: 108 MMHG | RESPIRATION RATE: 16 BRPM | WEIGHT: 166.89 LBS

## 2023-09-29 LAB
ALBUMIN SERPL BCG-MCNC: 3.4 G/DL (ref 3.5–5.2)
ALP SERPL-CCNC: 79 U/L (ref 35–104)
ALT SERPL W P-5'-P-CCNC: 8 U/L (ref 0–50)
ANION GAP SERPL CALCULATED.3IONS-SCNC: 8 MMOL/L (ref 7–15)
AST SERPL W P-5'-P-CCNC: 24 U/L (ref 0–45)
BASOPHILS # BLD MANUAL: 0 10E3/UL (ref 0–0.2)
BASOPHILS NFR BLD MANUAL: 0 %
BILIRUB SERPL-MCNC: 0.4 MG/DL
BUN SERPL-MCNC: 21.3 MG/DL (ref 8–23)
BURR CELLS BLD QL SMEAR: SLIGHT
CALCIUM SERPL-MCNC: 8.5 MG/DL (ref 8.8–10.2)
CHLORIDE SERPL-SCNC: 104 MMOL/L (ref 98–107)
CREAT SERPL-MCNC: 1.23 MG/DL (ref 0.51–0.95)
DEPRECATED HCO3 PLAS-SCNC: 22 MMOL/L (ref 22–29)
EGFRCR SERPLBLD CKD-EPI 2021: 46 ML/MIN/1.73M2
ELLIPTOCYTES BLD QL SMEAR: SLIGHT
EOSINOPHIL # BLD MANUAL: 0 10E3/UL (ref 0–0.7)
EOSINOPHIL NFR BLD MANUAL: 0 %
ERYTHROCYTE [DISTWIDTH] IN BLOOD BY AUTOMATED COUNT: 12.9 % (ref 10–15)
GLUCOSE BLDC GLUCOMTR-MCNC: 147 MG/DL (ref 70–99)
GLUCOSE BLDC GLUCOMTR-MCNC: 154 MG/DL (ref 70–99)
GLUCOSE BLDC GLUCOMTR-MCNC: 161 MG/DL (ref 70–99)
GLUCOSE BLDC GLUCOMTR-MCNC: 179 MG/DL (ref 70–99)
GLUCOSE SERPL-MCNC: 154 MG/DL (ref 70–99)
HCT VFR BLD AUTO: 31.2 % (ref 35–47)
HGB BLD-MCNC: 9.9 G/DL (ref 11.7–15.7)
LYMPHOCYTES # BLD MANUAL: 8.7 10E3/UL (ref 0.8–5.3)
LYMPHOCYTES NFR BLD MANUAL: 39 %
MCH RBC QN AUTO: 29.7 PG (ref 26.5–33)
MCHC RBC AUTO-ENTMCNC: 31.7 G/DL (ref 31.5–36.5)
MCV RBC AUTO: 94 FL (ref 78–100)
MONOCYTES # BLD MANUAL: 0.7 10E3/UL (ref 0–1.3)
MONOCYTES NFR BLD MANUAL: 3 %
NEUTROPHILS # BLD MANUAL: 12.9 10E3/UL (ref 1.6–8.3)
NEUTROPHILS NFR BLD MANUAL: 58 %
NRBC # BLD AUTO: 0.2 10E3/UL
NRBC BLD MANUAL-RTO: 1 %
PLAT MORPH BLD: ABNORMAL
PLATELET # BLD AUTO: 218 10E3/UL (ref 150–450)
POTASSIUM SERPL-SCNC: 4.9 MMOL/L (ref 3.4–5.3)
PROT SERPL-MCNC: 5.3 G/DL (ref 6.4–8.3)
RBC # BLD AUTO: 3.33 10E6/UL (ref 3.8–5.2)
RBC MORPH BLD: ABNORMAL
SODIUM SERPL-SCNC: 134 MMOL/L (ref 135–145)
WBC # BLD AUTO: 22.3 10E3/UL (ref 4–11)

## 2023-09-29 PROCEDURE — 250N000011 HC RX IP 250 OP 636: Mod: JZ | Performed by: ORTHOPAEDIC SURGERY

## 2023-09-29 PROCEDURE — 99233 SBSQ HOSP IP/OBS HIGH 50: CPT | Performed by: STUDENT IN AN ORGANIZED HEALTH CARE EDUCATION/TRAINING PROGRAM

## 2023-09-29 PROCEDURE — 250N000013 HC RX MED GY IP 250 OP 250 PS 637: Performed by: INTERNAL MEDICINE

## 2023-09-29 PROCEDURE — 96375 TX/PRO/DX INJ NEW DRUG ADDON: CPT

## 2023-09-29 PROCEDURE — 85027 COMPLETE CBC AUTOMATED: CPT | Performed by: INTERNAL MEDICINE

## 2023-09-29 PROCEDURE — 97116 GAIT TRAINING THERAPY: CPT | Mod: GP | Performed by: PHYSICAL THERAPIST

## 2023-09-29 PROCEDURE — G0378 HOSPITAL OBSERVATION PER HR: HCPCS

## 2023-09-29 PROCEDURE — 96374 THER/PROPH/DIAG INJ IV PUSH: CPT

## 2023-09-29 PROCEDURE — 82962 GLUCOSE BLOOD TEST: CPT

## 2023-09-29 PROCEDURE — 80053 COMPREHEN METABOLIC PANEL: CPT | Performed by: INTERNAL MEDICINE

## 2023-09-29 PROCEDURE — 250N000013 HC RX MED GY IP 250 OP 250 PS 637

## 2023-09-29 PROCEDURE — C9113 INJ PANTOPRAZOLE SODIUM, VIA: HCPCS | Mod: JZ | Performed by: INTERNAL MEDICINE

## 2023-09-29 PROCEDURE — 250N000012 HC RX MED GY IP 250 OP 636 PS 637: Performed by: INTERNAL MEDICINE

## 2023-09-29 PROCEDURE — 999N000111 HC STATISTIC OT IP EVAL DEFER

## 2023-09-29 PROCEDURE — 250N000013 HC RX MED GY IP 250 OP 250 PS 637: Performed by: STUDENT IN AN ORGANIZED HEALTH CARE EDUCATION/TRAINING PROGRAM

## 2023-09-29 PROCEDURE — 250N000011 HC RX IP 250 OP 636: Performed by: PHYSICIAN ASSISTANT

## 2023-09-29 PROCEDURE — 85007 BL SMEAR W/DIFF WBC COUNT: CPT | Performed by: INTERNAL MEDICINE

## 2023-09-29 PROCEDURE — 97161 PT EVAL LOW COMPLEX 20 MIN: CPT | Mod: GP | Performed by: PHYSICAL THERAPIST

## 2023-09-29 PROCEDURE — 36415 COLL VENOUS BLD VENIPUNCTURE: CPT | Performed by: INTERNAL MEDICINE

## 2023-09-29 PROCEDURE — 250N000011 HC RX IP 250 OP 636: Mod: JZ | Performed by: INTERNAL MEDICINE

## 2023-09-29 PROCEDURE — 96376 TX/PRO/DX INJ SAME DRUG ADON: CPT

## 2023-09-29 PROCEDURE — 250N000011 HC RX IP 250 OP 636: Performed by: STUDENT IN AN ORGANIZED HEALTH CARE EDUCATION/TRAINING PROGRAM

## 2023-09-29 PROCEDURE — 97530 THERAPEUTIC ACTIVITIES: CPT | Mod: GP | Performed by: PHYSICAL THERAPIST

## 2023-09-29 RX ORDER — LEVOTHYROXINE SODIUM 88 UG/1
88 TABLET ORAL DAILY
Status: DISCONTINUED | OUTPATIENT
Start: 2023-09-29 | End: 2023-09-30 | Stop reason: HOSPADM

## 2023-09-29 RX ORDER — CITALOPRAM HYDROBROMIDE 20 MG/1
20 TABLET ORAL DAILY
Status: DISCONTINUED | OUTPATIENT
Start: 2023-09-29 | End: 2023-09-30 | Stop reason: HOSPADM

## 2023-09-29 RX ORDER — HYDROMORPHONE HYDROCHLORIDE 2 MG/1
2-4 TABLET ORAL EVERY 6 HOURS PRN
Qty: 28 TABLET | Refills: 0 | Status: SHIPPED | OUTPATIENT
Start: 2023-09-29 | End: 2024-01-22

## 2023-09-29 RX ORDER — LISINOPRIL AND HYDROCHLOROTHIAZIDE 12.5; 2 MG/1; MG/1
2 TABLET ORAL DAILY
Status: DISCONTINUED | OUTPATIENT
Start: 2023-09-29 | End: 2023-09-30 | Stop reason: HOSPADM

## 2023-09-29 RX ORDER — CEFAZOLIN SODIUM 1 G/3ML
1 INJECTION, POWDER, FOR SOLUTION INTRAMUSCULAR; INTRAVENOUS EVERY 8 HOURS
Status: DISCONTINUED | OUTPATIENT
Start: 2023-09-29 | End: 2023-09-29

## 2023-09-29 RX ORDER — ACETAMINOPHEN 325 MG/1
650 TABLET ORAL EVERY 4 HOURS PRN
Qty: 100 TABLET | Refills: 0 | Status: SHIPPED | OUTPATIENT
Start: 2023-09-29

## 2023-09-29 RX ORDER — FERROUS SULFATE 325(65) MG
325 TABLET ORAL EVERY OTHER DAY
Status: DISCONTINUED | OUTPATIENT
Start: 2023-09-29 | End: 2023-09-30 | Stop reason: HOSPADM

## 2023-09-29 RX ORDER — ATORVASTATIN CALCIUM 20 MG/1
20 TABLET, FILM COATED ORAL DAILY
Status: DISCONTINUED | OUTPATIENT
Start: 2023-09-29 | End: 2023-09-30 | Stop reason: HOSPADM

## 2023-09-29 RX ORDER — POLYETHYLENE GLYCOL 3350 17 G/17G
1 POWDER, FOR SOLUTION ORAL DAILY
Qty: 7 PACKET | Refills: 0 | Status: SHIPPED | OUTPATIENT
Start: 2023-09-29 | End: 2024-01-22

## 2023-09-29 RX ORDER — MULTIPLE VITAMINS W/ MINERALS TAB 9MG-400MCG
1 TAB ORAL EVERY MORNING
Status: DISCONTINUED | OUTPATIENT
Start: 2023-09-29 | End: 2023-09-30 | Stop reason: HOSPADM

## 2023-09-29 RX ORDER — AMOXICILLIN 250 MG
1-2 CAPSULE ORAL 2 TIMES DAILY
Qty: 30 TABLET | Refills: 0 | Status: SHIPPED | OUTPATIENT
Start: 2023-09-29 | End: 2024-01-22

## 2023-09-29 RX ADMIN — PREGABALIN 25 MG: 25 CAPSULE ORAL at 08:41

## 2023-09-29 RX ADMIN — INSULIN ASPART 1 UNITS: 100 INJECTION, SOLUTION INTRAVENOUS; SUBCUTANEOUS at 08:45

## 2023-09-29 RX ADMIN — PREGABALIN 25 MG: 25 CAPSULE ORAL at 20:24

## 2023-09-29 RX ADMIN — CITALOPRAM HYDROBROMIDE 20 MG: 20 TABLET, FILM COATED ORAL at 10:37

## 2023-09-29 RX ADMIN — INSULIN GLARGINE 20 UNITS: 100 INJECTION, SOLUTION SUBCUTANEOUS at 22:12

## 2023-09-29 RX ADMIN — CEFAZOLIN 1 G: 1 INJECTION, POWDER, FOR SOLUTION INTRAMUSCULAR; INTRAVENOUS at 12:53

## 2023-09-29 RX ADMIN — ACETAMINOPHEN 975 MG: 325 TABLET, FILM COATED ORAL at 03:16

## 2023-09-29 RX ADMIN — SENNOSIDES AND DOCUSATE SODIUM 1 TABLET: 50; 8.6 TABLET ORAL at 20:24

## 2023-09-29 RX ADMIN — HYDROMORPHONE HYDROCHLORIDE 0.2 MG: 0.2 INJECTION, SOLUTION INTRAMUSCULAR; INTRAVENOUS; SUBCUTANEOUS at 00:46

## 2023-09-29 RX ADMIN — CEFAZOLIN 1 G: 1 INJECTION, POWDER, FOR SOLUTION INTRAMUSCULAR; INTRAVENOUS at 03:56

## 2023-09-29 RX ADMIN — HYDROMORPHONE HYDROCHLORIDE 0.2 MG: 0.2 INJECTION, SOLUTION INTRAMUSCULAR; INTRAVENOUS; SUBCUTANEOUS at 06:07

## 2023-09-29 RX ADMIN — ACETAMINOPHEN 975 MG: 325 TABLET, FILM COATED ORAL at 10:38

## 2023-09-29 RX ADMIN — SENNOSIDES AND DOCUSATE SODIUM 1 TABLET: 50; 8.6 TABLET ORAL at 08:41

## 2023-09-29 RX ADMIN — B-COMPLEX W/ C & FOLIC ACID TAB 1 TABLET: TAB at 21:43

## 2023-09-29 RX ADMIN — LEVOTHYROXINE SODIUM 88 MCG: 0.09 TABLET ORAL at 14:10

## 2023-09-29 RX ADMIN — ATORVASTATIN CALCIUM 20 MG: 20 TABLET, FILM COATED ORAL at 10:38

## 2023-09-29 RX ADMIN — ACETAMINOPHEN 975 MG: 325 TABLET, FILM COATED ORAL at 18:54

## 2023-09-29 RX ADMIN — PANTOPRAZOLE SODIUM 40 MG: 40 INJECTION, POWDER, FOR SOLUTION INTRAVENOUS at 08:41

## 2023-09-29 RX ADMIN — FERROUS SULFATE TAB 325 MG (65 MG ELEMENTAL FE) 325 MG: 325 (65 FE) TAB at 10:38

## 2023-09-29 RX ADMIN — HYDROMORPHONE HYDROCHLORIDE 2 MG: 2 TABLET ORAL at 04:04

## 2023-09-29 RX ADMIN — MULTIPLE VITAMINS W/ MINERALS TAB 1 TABLET: TAB at 10:38

## 2023-09-29 RX ADMIN — POLYETHYLENE GLYCOL 3350 17 G: 17 POWDER, FOR SOLUTION ORAL at 08:42

## 2023-09-29 ASSESSMENT — ACTIVITIES OF DAILY LIVING (ADL)
ADLS_ACUITY_SCORE: 37

## 2023-09-29 NOTE — PROGRESS NOTES
Alert and oriented times four. [place]. Continent of bowel and bladder. Last BM 9/28. PIV in left hand. Cefazolin administered successfully. . Uses walker to ambulate in room. Back dressing CDI. Takes pills whole. Pain rated 2-3/10. Prefers meals later in the day. Calm and cooperative.     Orthostatic BP:    Lying BP: 111/48  Sitting BP: 113/73  Standing BP: 105/42    Patient's most recent vital signs are:     Vital signs:  BP: 93/58  Temp: 98.8  HR: 61  RR: 12  SpO2: 100 %     Patient does not have new respiratory symptoms.  Patient does not have new sore throat.  Patient does not have a fever greater than 99.5.

## 2023-09-29 NOTE — PROGRESS NOTES
09/29/23 0835   Appointment Info   Signing Clinician's Name / Credentials (PT) ROBY Shi   Student Supervision Therapy services provided with the co-signing licensed therapist guiding and directing the services, and providing the skilled judgement and assessment throughout the session;Direct Patient Contact Provided;Direct supervision provided       Present no   Language English   Living Environment   People in Home child(starr), adult  (son is not at home during the day, back home in evening)   Current Living Arrangements house   Home Accessibility stairs to enter home   Number of Stairs, Main Entrance greater than 10 stairs   Stair Railings, Main Entrance railings safe and in good condition;railing on left side (ascending)  (On R side, able to push against wall)   Transportation Anticipated family or friend will provide   Self-Care   Usual Activity Tolerance good   Current Activity Tolerance moderate   Regular Exercise No   Equipment Currently Used at Home none   Fall history within last six months no   General Information   Onset of Illness/Injury or Date of Surgery 09/28/23   Referring Physician Bryson Valdez MD   Patient/Family Therapy Goals Statement (PT) Return home safely   Pertinent History of Current Problem (include personal factors and/or comorbidities that impact the POC) Kelly Vegas is a 73 year old female with lumbar degenerative disc disease and lumbar spinal stenosis with radiculopathy and neurogenic claudication  now s/p L3-4, L4-5 and L5-S1 laminectomy on 9/28/23 with Dr. Valdez   Existing Precautions/Restrictions fall;spinal   Weight-Bearing Status - LUE full weight-bearing   Weight-Bearing Status - RUE full weight-bearing   Weight-Bearing Status - LLE weight-bearing as tolerated   Weight-Bearing Status - RLE weight-bearing as tolerated   Heart Disease Risk Factors Age;Overweight   General Observations Supine in bed, agreeable and pleasant   Cognition    Affect/Mental Status (Cognition) WNL   Follows Commands (Cognition) WNL   Pain Assessment   Patient Currently in Pain Yes, see Vital Sign flowsheet   Integumentary/Edema   Integumentary/Edema other (describe)   Integumentary/Edema Comments Incision not visible due to dressing   Posture    Posture Kyphosis;Forward head position   Range of Motion (ROM)   Range of Motion ROM deficits secondary to surgical procedure;ROM deficits secondary to pain   Strength (Manual Muscle Testing)   Strength (Manual Muscle Testing) strength is WFL   Bed Mobility   Comment, (Bed Mobility) IND supine to EOB with flat bed   Transfers   Comment, (Transfers) FWW and SBA for STS   Gait/Stairs (Locomotion)   Comment, (Gait/Stairs) amb 50' with FWW and CGA   Balance   Balance no deficits were identified   Sensory Examination   Sensory Perception WNL   Sensory Perception Comments no new changes. At baseline, some finger and feet numbness   Clinical Impression   Criteria for Skilled Therapeutic Intervention Yes, treatment indicated   PT Diagnosis (PT) functional mobility deficit   Influenced by the following impairments bed mobility, transfer, gait, stairs   Functional limitations due to impairments s/p L3-4, L4-5 and L5-S1 laminectomy; pain   Clinical Presentation (PT Evaluation Complexity) Stable/Uncomplicated   Clinical Presentation Rationale per clinical judgement   Clinical Decision Making (Complexity) low complexity   Planned Therapy Interventions (PT) bed mobility training;stair training;gait training;transfer training   Anticipated Equipment Needs at Discharge (PT) walker, standard   Risk & Benefits of therapy have been explained evaluation/treatment results reviewed;care plan/treatment goals reviewed;risks/benefits reviewed   PT Total Evaluation Time   PT Eval, Low Complexity Minutes (54224) 16   Plan of Care Review   Plan of Care Reviewed With patient   Therapy Certification   Start of care date 09/29/23   Certification date from  09/29/23   Certification date to 10/01/23   Medical Diagnosis L3-4, L4-5 and L5-S1 laminectomy   Physical Therapy Goals   PT Frequency Daily   PT Predicted Duration/Target Date for Goal Attainment 10/01/23   PT Goals Bed Mobility;Transfers;Gait;Stairs   PT: Bed Mobility Independent;Supine to/from sit;Within precautions   PT: Transfers Modified independent;Sit to/from stand;Assistive device;Within precautions   PT: Gait Modified independent;Assistive device;Standard walker;150 feet;Within precautions   PT: Stairs Modified independent;Greater than 10 stairs;Rail on left;Within precautions  (wall on the R)   Interventions   Interventions Quick Adds Therapeutic Activity;Gait Training   Therapeutic Activity   Therapeutic Activities: dynamic activities to improve functional performance Minutes (81234) 15   Symptoms Noted During/After Treatment Fatigue   Treatment Detail/Skilled Intervention Pt supine in bed on PT arrival and agreeable to PT. Transferred IND from flat bed to EOB using log rolling technique and no use of railings. Tranferred from EOB to standing with FWW and SBA and verbal cue of hand placement. Pt requested to use the bathroom, cued to use grab bar, good sitting balance and safely lowering self on toilet. Educated on spinal precaution and reporting RLE weakness to ortho. Edu on don/doff socks technique and screened for no OT needs. After ambulation, transferred to recliner with FWW and SBA. Pt left in recliner at end of PT session with all needs met.   Gait Training   Gait Training Minutes (03054) 15   Symptoms Noted During/After Treatment (Gait Training) fatigue   Treatment Detail/Skilled Intervention Pt WC'ed to stairs. Completed ascending and descending 3 stairs x 2 with bilateral railings and verbal cue of up with good (L), down with bad (R), pt reported new weakness in R after surgery, but no visible knee buckle or LOB observed. Amb 100' with FWW and CGA with WC follow, steady throughout. Requested to  sit down and be transported back to room due to fatigue.   PT Discharge Planning   PT Plan Trial stairs with L railing; amb distance   PT Discharge Recommendation (DC Rec) home with assist;home with outpatient physical therapy   PT Rationale for DC Rec Pt will benefit from one more PT session to improve functional mobility due to RLE weakness and no help available during the day at home.   PT Brief overview of current status IND bed mobility; SBA-CGA w/ FWW for all mobility   Total Session Time   Timed Code Treatment Minutes 30   Total Session Time (sum of timed and untimed services) 46     M [facility]  OUTPATIENT PHYSICAL THERAPY EVALUATION  PLAN OF TREATMENT FOR OUTPATIENT REHABILITATION  (COMPLETE FOR INITIAL CLAIMS ONLY)  Patient's Last Name, First Name, M.I.  YOB: 1950  AscencionKelly  FEDERICO                        Provider's Name  [facility] Medical Record No.  [id number]                             Onset Date:  09/28/23   Start of Care Date:  (P) 09/29/23   Type:     _X_PT   ___OT   ___SLP Medical Diagnosis:  (P) L3-4, L4-5 and L5-S1 laminectomy              PT Diagnosis:  functional mobility deficit Visits from SOC:  1     See note for plan of treatment, functional goals and certification details    I CERTIFY THE NEED FOR THESE SERVICES FURNISHED UNDER        THIS PLAN OF TREATMENT AND WHILE UNDER MY CARE     (Physician co-signature of this document indicates review and certification of the therapy plan).

## 2023-09-29 NOTE — PLAN OF CARE
Occupational Therapy: Orders received. Chart reviewed and discussed with care team.? Occupational Therapy not indicated due to near ADL baseline. OT deferred by PT.? Defer discharge recommendations to PT and care team.? Will complete orders.

## 2023-09-29 NOTE — PLAN OF CARE
Goal Outcome Evaluation:      Plan of Care Reviewed With: (P) patient    Overall Patient Progress: (P) improving       Pt. A&O x4, VSS, able to make things known.   Ax1 w/ walker and GB. LR infusion done. L PIV SL. Pain 6-8, managed w/ PRN Dilaudid. CHILO drain removed this AM. Urine retention overnight, straight cath x1. Voided x2 afterwards. Will continue w/ POC.

## 2023-09-29 NOTE — PROGRESS NOTES
"Orthopaedic Surgery Progress Note   September 29, 2023     Subjective:   OR yesteday. No acute events overnight. Pain well controlled. OOB overnight without issue. Denies any radicular pain. POC reviewed.    Objective:   /55 (BP Location: Left arm, Patient Position: Supine, Cuff Size: Adult Regular)   Pulse 61   Temp 98.7  F (37.1  C) (Oral)   Resp 12   Ht 1.631 m (5' 4.21\")   Wt 75.7 kg (166 lb 14.2 oz)   SpO2 100%   BMI 28.46 kg/m    No intake/output data recorded.  Gen: NAD. Resting comfortably in bed  Resp: Breathing comfortably on RA  Drain:   Drain output of 20cc overnight - dc'ed today  Musculoskeletal:   BLE  - Dressing CDI  - 5/5 quad, TA, gastroc/soleus, EHL, FHL  - SILT to L3-S1 nerve distributions  - 2+ DP and PT, foot warm and well perfused      Lab Results   Component Value Date    WBC 15.5 (H) 09/06/2023    HGB 12.1 09/06/2023     09/06/2023    INR 0.99 09/28/2023          Assessment & Plan:   Kelly Vegas is a 73 year old female with lumbar degenerative disc disease and lumbar spinal stenosis with radiculopathy and neurogenic claudication  now s/p L3-4, L4-5 and L5-S1 laminectomy on 9/28/23 with Dr. Valdez. Could dc today vs tomorrow if progresses well with therapies.     Admit to obs - Ortho Primary   Activity: Up with assist until independent. No excessive bending or twisting. No lifting >10 lbs x 6 weeks. No Rachele lift for transfers.   Weight bearing status: WBAT.  Pain management: Transition from IV to PO as tolerated. N  Antibiotics: Ancef q8h while drains remain  Diet: Begin with clear fluids and progress diet as tolerated with Mata nutritional supplement   DVT prophylaxis: SCDs only. No chemical DVT ppx needed.   Imaging: None  Labs: labs PRN  Bracing/Splinting: None  Dressings: Keep c/d/i x 7 days.  Drains: Document output per shift, will be discontinued at Orthopedic Surgery discretion.  Waldron catheter: Per protocol  Physical Therapy/Occupational Therapy: Eval " and treat.  Consults: Hospitalist.  Follow-up: Clinic with Dr. Valdez in 6 weeks with repeat x-rays.   Disposition: Pending progress with therapies, pain control on orals and medical stability.     Baldemar Venegas MD  Orthopaedic Surgery, PGY-4  Pager: [phone]

## 2023-09-29 NOTE — PROGRESS NOTES
[facility]    Medicine Progress Note - Hospitalist Service, GOLD TEAM 22    Date of Admission:  9/28/2023    Assessment & Plan    Kelly Vegas is a 73 year old female admitted on 9/28/2023.  Patient underwent posterior approach lumbosacral laminectomy on 9/28/2023 with Dr. Bryson Valdez.  Patient's extensive pertinent chronic past medical history is positive for AV malformation of the small bowel, diabetic retinopathy, history of cervical cancer, chronic kidney disease stage IV, chronic lymphocytic leukemia, history colon adenoma, degenerative disc disease, type 2 diabetes mellitus, gastritis, hypertension, hyperlipidemia, hypothyroidism, lumbar disc herniation, major depression, and spinal stenosis.  Patient feels quite well postoperatively.  Patient reports some soreness at the lumbar spine postoperative site.  Patient denies chest pain, shortness of breath.  The internal medicine hospital service was consulted for comedical management.    Today's plan   -Patient was doing well  -Noted further rise in white count, Could be 2/2 post surgical stress will continue to monitor      Status post lumbosacral laminectomy  Degenerative disc disease of the lumbar spine  Lumbar disc herniation  Lumbar spinal stenosis  - Orthopedic surgery primary service  - Pneumatic compression devices for postop DVT prophylaxis  - As needed pain control  - Physical, occupational therapy evaluations     Type 2 diabetes mellitus  Diabetic polyneuropathy  Diabetic retinopathy  - Modify diet to consistent carbohydrate diet  - Continue PTA Lantus  - Medium dose correction scale NovoLog  - Hold PTA Jardiance  - Hold PTA metformin     Major depression  - Continue PTA Celexa     Hypothyroidism  - Continue PTA Synthroid     Hypertension  Hyperlipidemia  - Blood pressure appears at goal  - Can restart PTA regimen incrementally as blood pressure tolerates  - Continue PTA statin therapy    "  Gastritis  - Protonix 40 mg IV daily     Chronic lymphocytic leukemia  History chronic lymphocytic leukemia  History of colon adenoma  - No acute medical intervention at present       Diet: Snacks/Supplements Adult: Mata; Between Meals  Moderate Consistent Carb (60 g CHO per Meal) Diet    DVT Prophylaxis: scd boots   Waldron Catheter: Not present  Lines: None     Cardiac Monitoring: None  Code Status: Full Code      Clinically Significant Risk Factors Present on Admission              # Hypoalbuminemia: Lowest albumin = 3.4 g/dL at 9/29/2023  7:45 AM, will monitor as appropriate     # Hypertension: Noted on problem list     # DMII: A1C = 7.3 % (Ref range: <5.7 %) within past 6 months    # Overweight: Estimated body mass index is 28.46 kg/m  as calculated from the following:    Height as of this encounter: 1.631 m (5' 4.21\").    Weight as of this encounter: 75.7 kg (166 lb 14.2 oz).              Disposition Plan     Expected Discharge Date: 09/29/2023                  Mark Anthony Mckeon MD  Hospitalist Service, GOLD TEAM 22  [facility]  Securely message with [employer] (more info)  Text page via [facility] Paging/Directory   See signed in provider for up to date coverage information  ______________________________________________________________________    Interval History   Patient was comfortable and denies any abdominal pain, difficulty breathing, fevers or malaise     Physical Exam   Vital Signs: Temp: 98.8  F (37.1  C) Temp src: Oral BP: 93/58 Pulse: 61   Resp: 12 SpO2: 100 % O2 Device: None (Room air) Oxygen Delivery: 2 LPM  Weight: 166 lbs 14.21 oz    Constitutional: awake, alert, cooperative, no apparent distress, and appears stated age  Eyes: Lids and lashes normal, pupils equal, round and reactive to light, extra ocular muscles intact, sclera clear, conjunctiva normal  ENT: Normocephalic, without obvious abnormality, atraumatic, sinuses nontender on palpation, external ears " without lesions, oral pharynx with moist mucous membranes, tonsils without erythema or exudates, gums normal and good dentition.  Hematologic / Lymphatic: no cervical lymphadenopathy  Respiratory: No increased work of breathing, good air exchange, clear to auscultation bilaterally, no crackles or wheezing  Cardiovascular: Normal apical impulse, regular rate and rhythm, normal S1 and S2, no S3 or S4, and no murmur noted  GI: No scars, normal bowel sounds, soft, non-distended, non-tender, no masses palpated, no hepatosplenomegally  Genitounirinary:   Skin: no bruising or bleeding  Musculoskeletal: There is no redness, warmth, or swelling of the joints.  Full range of motion noted.  Motor strength is 5 out of 5 all extremities bilaterally.  Tone is normal.  Neurologic: Awake, alert, oriented to name, place and time.  Cranial nerves II-XII are grossly intact.  Motor is 5 out of 5 bilaterally.  Cerebellar finger to nose, heel to shin intact.  Sensory is intact.  Babinski down going, Romberg negative, and gait is normal.  Neuropsychiatric: General: normal, calm, and normal eye contact    Medical Decision Making       42 MINUTES SPENT BY ME on the date of service doing chart review, history, exam, documentation & further activities per the note.      Data     I have personally reviewed the following data over the past 24 hrs:    22.3 (H)  \   9.9 (L)   / 218     134 (L) 104 21.3 /  154 (H)   4.9 22 1.23 (H) \     ALT: 8 AST: 24 AP: 79 TBILI: 0.4   ALB: 3.4 (L) TOT PROTEIN: 5.3 (L) LIPASE: N/A

## 2023-09-29 NOTE — DISCHARGE INSTRUCTIONS
Postoperative Instructions - Spinal fusion        Dr. Bryson Valdez  [address] [address]     You have had a spinal fusion. You have a dressing called Acticoat on your incision which can be worn for up to 14 days, and it can be worn in the shower. 14 days after your surgery you can remove the Acticoat dressing. Under the Acticoat dressing you have a skin glue and mesh product called Prineo. The Prineo can be peeled off of the incision. If you prefer you can apply a small amount of vaseline or petroleum jelly to the Prineo which will help deactivate the skin glue and make it easier to remove. If there are clear suture tails at the top and bottom of your incision these can be clipped with a clean scissors at the skin.  It is okay to shower and wash gently with soap and water. Do not soak in the bath. No pools, hot tubs, or lakes for 6 weeks.     For postoperative pain control, I have prescribed a narcotic medication.  This should be taken for the first few weeks following surgery, but as soon as you are able, transition to an over-the-counter type medication such as Tylenol.  You may not take non-steroidal anti-inflammatory medications (eg: Advil, Ibuprofen, Aleve, others) for the first 3 months after surgery as it interferes with bone healing. While taking the narcotic medication, there are several precautions that you must adhere to. These medications have numerous side effects including nausea, constipation, and drowsiness. If you experience nausea, this may be relieved by taking the medication with food or a light meal. To avoid constipation, please use an over-the-counter stool softener or drink lots of water and eat fruits and vegetables. Avoid operating heavy machinery or driving an automobile while on narcotic medications.      You will be seen in the clinic at 6 weeks following surgery. You will not need to attend physical therapy during this time.  You can focus on cardiovascular fitness by walking as much as you can tolerate. Avoid bending, lifting, and twisting. Your weight lifting restriction is 10 pounds until your first follow-up appointment.      When you get home, you may resume your normal diet as tolerated. You may not be very hungry but try to eat small healthy meals to help you heal. Remember to drink plenty of water/fluids to help keep you hydrated.    After surgery, you may have a sensitive scar.  When the dressing has been removed, you may massage the scar to decrease sensitivity and help break down scar tissue. Do this up to 4 times per day.    Please call or return if you experience the following:  Fevers (temperature greater than 100.4 degrees Fahrenheit)  Pain that is getting worse or does not respond to pain medications  Drainage from your wound  Increasing redness about the wound  Any other worrisome symptoms    You may reach the clinic by dialing [phone].  After hours, you may reach the resident on call by dialing [phone].

## 2023-09-29 NOTE — ANESTHESIA POSTPROCEDURE EVALUATION
Patient: Kelly Vegas    Procedure: Procedure(s):  LAMINECTOMY, SPINE, LUMBAR, 3 OR MORE LEVELS, POSTERIOR APPROACH, USING MICROSCOPE LUMBAR 3-4, 4-5, LUMBAR 5 SACRAL 1 and Dura Repair       Anesthesia Type:  General    Note:  Disposition: Inpatient   Postop Pain Control: Uneventful            Sign Out: Well controlled pain   PONV: No   Neuro/Psych: Uneventful            Sign Out: Acceptable/Baseline neuro status   Airway/Respiratory: Uneventful            Sign Out: Acceptable/Baseline resp. status   CV/Hemodynamics: Uneventful            Sign Out: Acceptable CV status; No obvious hypovolemia; No obvious fluid overload   Other NRE:    DID A NON-ROUTINE EVENT OCCUR?            Last vitals:  Vitals Value Taken Time   /53 09/28/23 1845   Temp 36.5  C (97.7  F) 09/28/23 1830   Pulse 81 09/28/23 1852   Resp 0 09/28/23 1856   SpO2 95 % 09/28/23 1853   Vitals shown include unvalidated device data.    Electronically Signed By: Surya Self MD  September 28, 2023  7:03 PM

## 2023-09-29 NOTE — CONSULTS
[facility]  Consult Note - Hospitalist Service, GOLD TEAM   Date of Admission:  9/28/2023  Consult Requested by: Baldemar Venegas MD  Reason for Consult: Co-medical management    Assessment & Plan   Kelly Vegas is a 73 year old female admitted on 9/28/2023.  Patient underwent posterior approach lumbosacral laminectomy on 9/28/2023 with Dr. Bryson Valdez.  Patient's extensive pertinent chronic past medical history is positive for AV malformation of the small bowel, diabetic retinopathy, history of cervical cancer, chronic kidney disease stage IV, chronic lymphocytic leukemia, history colon adenoma, degenerative disc disease, type 2 diabetes mellitus, gastritis, hypertension, hyperlipidemia, hypothyroidism, lumbar disc herniation, major depression, and spinal stenosis.  Patient feels quite well postoperatively.  Patient reports some soreness at the lumbar spine postoperative site.  Patient denies chest pain, shortness of breath.  The internal medicine hospital service was consulted for comedical management.    #Status post lumbosacral laminectomy  #Degenerative disc disease of the lumbar spine  #Lumbar disc herniation  #Lumbar spinal stenosis  - Orthopedic surgery primary service  - Pneumatic compression devices for postop DVT prophylaxis  - As needed pain control  - Physical, occupational therapy evaluations    #Type 2 diabetes mellitus  #Diabetic polyneuropathy  #Diabetic retinopathy  - Modify diet to consistent carbohydrate diet  - Continue PTA Lantus  - Medium dose correction scale NovoLog  - Hold PTA Jardiance  - Hold PTA metformin    #Major depression  - Continue PTA Celexa    #Hypothyroidism  - Continue PTA Synthroid    #Hypertension  #Hyperlipidemia  - Blood pressure appears at goal  - Can restart PTA regimen incrementally as blood pressure tolerates  - Continue PTA statin therapy    #Gastritis  - Protonix 40 mg IV daily    #Chronic lymphocytic  "leukemia  #History chronic lymphocytic leukemia  #History of colon adenoma  - No acute medical intervention at present       Clinically Significant Risk Factors Present on Admission                  # Hypertension: Noted on problem list     # DMII: A1C = 7.3 % (Ref range: <5.7 %) within past 6 months    # Overweight: Estimated body mass index is 28.46 kg/m  as calculated from the following:    Height as of this encounter: 1.631 m (5' 4.21\").    Weight as of this encounter: 75.7 kg (166 lb 14.2 oz).              Rod Fuller DO, S  Hospitalist Service, GOLD TEAM   Securely message with [employer] (more info)  Text page via [facility] Paging/Directory   See signed in provider for up to date coverage information  ______________________________________________________________________    Chief Complaint   Status post lumbosacral laminectomy.    History is obtained from the patient & electronic medical record.    History of Present Illness   Kelly Vegas is a 73 year old female admitted on 9/28/2023.  Patient underwent posterior approach lumbosacral laminectomy on 9/28/2023 with Dr. Bryson Valdez.  Patient's extensive pertinent chronic past medical history is positive for AV malformation of the small bowel, diabetic retinopathy, history of cervical cancer, chronic kidney disease stage IV, chronic lymphocytic leukemia, history colon adenoma, degenerative disc disease, type 2 diabetes mellitus, gastritis, hypertension, hyperlipidemia, hypothyroidism, lumbar disc herniation, major depression, and spinal stenosis.  Patient feels quite well postoperatively.  Patient reports some soreness at the lumbar spine postoperative site.  Patient denies chest pain, shortness of breath.  The internal medicine hospital service was consulted for comedical management.    Past Medical History    Past Medical History:   Diagnosis Date    AVM (arteriovenous malformation) of small bowel, acquired     Background diabetic retinopathy (H)     " Cervical cancer (H) 03/2006    [facility], [facility] with gland involvement    CKD (chronic kidney disease) stage 4, GFR 15-29 ml/min (H)     CLL (chronic lymphocytic leukemia) (H)     Colon adenoma     DDD (degenerative disc disease), lumbar     Degenerative joint disease of hand     Diabetes mellitus type II 2009    w/ polyneuropathy, retinopathy    Diabetic polyneuropathy associated with type 2 diabetes mellitus (H) 09/26/2017    Gastritis     HTN (hypertension)     Hyperlipidemia LDL goal < 100     Hypothyroid 12/2004    s/p thyroidectomy    Lumbar disc herniation     Osteopenia     Recurrent major depression (H)     Sensorineural hearing loss, bilateral 12/14/2021    Spinal stenosis of lumbar region with neurogenic claudication        Past Surgical History   Past Surgical History:   Procedure Laterality Date    BIOPSY      COLONOSCOPY  nov 2016    COLONOSCOPY N/A 3/1/2019    Procedure: COLONOSCOPY;  Surgeon: Romulo Hayes MD;  Location: [facility]    COLONOSCOPY WITH CO2 INSUFFLATION N/A 11/28/2016    Procedure: COLONOSCOPY WITH CO2 INSUFFLATION;  Surgeon: Migue Giraldo MD;  Location:  OR    CYSTECTOMY OVARIAN BENIGN  1974    ESOPHAGOSCOPY, GASTROSCOPY, DUODENOSCOPY (EGD), COMBINED N/A 3/1/2019    Procedure: COMBINED ESOPHAGOSCOPY, GASTROSCOPY, DUODENOSCOPY (EGD), BIOPSY SINGLE OR MULTIPLE;  Surgeon: Romulo Hayes MD;  Location: [facility]    EYE SURGERY  12-14-16    HC THYROIDECTOMY  12/2004    goiter    LAPAROSCOPIC CHOLECYSTECTOMY N/A 9/9/2019    Procedure: CHOLECYSTECTOMY, LAPAROSCOPIC;  Surgeon: Jorge Martinez DO;  Location: [facility], OMENTECTOMY W/JUAN DANIEL  2/2006    cervical cancer       Medications   Facility-Administered Medications Prior to Admission   Medication Dose Route Frequency Provider Last Rate Last Admin    bupivacaine (MARCAINE) 0.25 % injection 4 mL  4 mL   Adonay Mata MD   4 mL at 11/02/22 0954    triamcinolone (KENALOG-40) injection 80 mg  80 mg   Adonay Mata MD    80 mg at 11/02/22 0954     Medications Prior to Admission   Medication Sig Dispense Refill Last Dose    atorvastatin (LIPITOR) 20 MG tablet Take 1 tablet (20 mg) by mouth daily (Patient taking differently: Take 20 mg by mouth every morning) 90 tablet 3 9/28/2023 at 0730    Cholecalciferol (VITAMIN D3) 1000 units CAPS Take by mouth every morning   Past Week    citalopram (CELEXA) 20 MG tablet Take 1 tablet (20 mg) by mouth daily (Patient taking differently: Take 20 mg by mouth every morning) 90 tablet 3 9/28/2023 at 0730    empagliflozin (JARDIANCE) 10 MG TABS tablet Take 1 tablet (10 mg) by mouth daily (Patient taking differently: Take 10 mg by mouth every morning) 90 tablet 3 9/27/2023 at 0800    ferrous sulfate (FEROSUL) 325 (65 Fe) MG tablet Take 1 tablet (325 mg) by mouth every other day for 360 days 36 tablet 3 Past Week    insulin glargine (LANTUS SOLOSTAR) 100 UNIT/ML pen INJECT 20 UNITS AT BEDTIME DAILY 30 mL 0 9/27/2023 at 2000    insulin lispro (HUMALOG KWIKPEN) 100 UNIT/ML (1 unit dial) KWIKPEN Inject 8 Units Subcutaneous 2 times daily (before meals) 45 mL 3 9/27/2023 at 1700    levothyroxine (SYNTHROID/LEVOTHROID) 88 MCG tablet Take 1 tablet (88 mcg) by mouth daily (Patient taking differently: Take 88 mcg by mouth every morning) 90 tablet 3 9/28/2023 at 0730    lisinopril-hydrochlorothiazide (ZESTORETIC) 20-12.5 MG tablet Take 2 tablets by mouth daily (Patient taking differently: Take 2 tablets by mouth every morning) 180 tablet 3 9/28/2023 at 0730    metFORMIN (GLUCOPHAGE XR) 500 MG 24 hr tablet Take 1 tablet (500 mg) by mouth daily (with dinner) 90 tablet 3 9/27/2023 at 2100    multivitamin w/minerals (THERA-VIT-M) tablet Take 1 tablet by mouth every morning   Past Week    pregabalin (LYRICA) 25 MG capsule Take 1 capsule (25 mg) by mouth 2 times daily 60 capsule 1 9/28/2023 at 0730    vitamin C with B complex (B COMPLEX-C) tablet Take 1 tablet by mouth every morning   Past Week    blood glucose (NO  BRAND SPECIFIED) test strip Use to test blood sugar 3 times daily or as directed. To accompany: Blood Glucose Monitor Brands: per insurance. 300 strip 3     thin (NO BRAND SPECIFIED) lancets Use to test blood sugar 3 times daily or as directed. To accompany: Blood Glucose Monitor Brands: per insurance. 300 each 3     ULTICARE MINI 31G X 6 MM insulin pen needle Use three times daily or as directed. 300 each 0         Physical Exam   Vital Signs: Temp: 97.7  F (36.5  C) Temp src: Oral BP: 122/53 Pulse: 56   Resp: 15 SpO2: 99 % O2 Device: Nasal cannula Oxygen Delivery: 2 LPM  Weight: 166 lbs 14.21 oz    GENERAL: Alert and oriented x 3; no acute distress; well-nourished.  HEENT: Normocephalic; atraumatic; PERRLA; MMM.  CV: RRR; normal S1, S2; no rubs, murmurs, or gallops.  RESP: Lung fields clear to aucultation B/L; no wheezing or crepitations.  GI: Abdomen is soft, nontender, nondistended; no organomegaly; normal bowel sounds.  : Deferred genital examination.   MSK: No clubbing, cyanosis, or edema.  DERM: Skin is intact; no rash, lesions, or skin breakdown.  NEURO: No focal deficits appreciated; strength & sensorium are grossly intact.  PSYCH: No active hallucinations; affect, insight appear within normal limits.    Medical Decision Making       55 MINUTES SPENT BY ME on the date of service doing chart review, history, exam, documentation & further activities per the note.      Data     I have personally reviewed the following data over the past 24 hrs:    N/A  \   N/A   / N/A     N/A N/A N/A /  142 (H)   N/A N/A 1.23 (H) \     INR:  0.99 PTT:  24   D-dimer:  N/A Fibrinogen:  N/A       Imaging results reviewed over the past 24 hrs:   Recent Results (from the past 24 hour(s))   XR Surgery SUSAN L/T 5 Min Fluoro    Narrative    This exam was marked as non-reportable because it will not be read by a   radiologist or a [place] non-radiologist provider.

## 2023-09-29 NOTE — PLAN OF CARE
Pt arrived on the unit around 1900. Pt has been able to make needs known. Pt has utilized ice packs for pain management. Pt has been resting comfortably since arrive on the unit. CHILO is to remain to no suction (not collapsed). Pt states she has numbness in her feet up to the ankle that goes away when she moved her feet. Pt states the pain and numbness has improved since the surgery.    Pt received PRN PO dilaudid for 8/10 pain once this shift

## 2023-09-30 ENCOUNTER — APPOINTMENT (OUTPATIENT)
Dept: PHYSICAL THERAPY | Facility: CLINIC | Age: 73
End: 2023-09-30
Attending: ORTHOPAEDIC SURGERY
Payer: COMMERCIAL

## 2023-09-30 ENCOUNTER — APPOINTMENT (OUTPATIENT)
Dept: ULTRASOUND IMAGING | Facility: CLINIC | Age: 73
End: 2023-09-30
Attending: PHYSICIAN ASSISTANT
Payer: COMMERCIAL

## 2023-09-30 LAB
ALBUMIN SERPL BCG-MCNC: 3.5 G/DL (ref 3.5–5.2)
ALP SERPL-CCNC: 89 U/L (ref 35–104)
ALT SERPL W P-5'-P-CCNC: 14 U/L (ref 0–50)
ANION GAP SERPL CALCULATED.3IONS-SCNC: 7 MMOL/L (ref 7–15)
AST SERPL W P-5'-P-CCNC: 45 U/L (ref 0–45)
BASOPHILS # BLD MANUAL: 0 10E3/UL (ref 0–0.2)
BASOPHILS NFR BLD MANUAL: 0 %
BILIRUB SERPL-MCNC: 0.2 MG/DL
BUN SERPL-MCNC: 22.5 MG/DL (ref 8–23)
BURR CELLS BLD QL SMEAR: SLIGHT
CALCIUM SERPL-MCNC: 9.1 MG/DL (ref 8.8–10.2)
CHLORIDE SERPL-SCNC: 106 MMOL/L (ref 98–107)
CREAT SERPL-MCNC: 1.17 MG/DL (ref 0.51–0.95)
DEPRECATED HCO3 PLAS-SCNC: 25 MMOL/L (ref 22–29)
EGFRCR SERPLBLD CKD-EPI 2021: 49 ML/MIN/1.73M2
ELLIPTOCYTES BLD QL SMEAR: SLIGHT
EOSINOPHIL # BLD MANUAL: 0 10E3/UL (ref 0–0.7)
EOSINOPHIL NFR BLD MANUAL: 0 %
ERYTHROCYTE [DISTWIDTH] IN BLOOD BY AUTOMATED COUNT: 13.1 % (ref 10–15)
GLUCOSE BLDC GLUCOMTR-MCNC: 157 MG/DL (ref 70–99)
GLUCOSE BLDC GLUCOMTR-MCNC: 172 MG/DL (ref 70–99)
GLUCOSE BLDC GLUCOMTR-MCNC: 174 MG/DL (ref 70–99)
GLUCOSE SERPL-MCNC: 187 MG/DL (ref 70–99)
HCT VFR BLD AUTO: 33.6 % (ref 35–47)
HGB BLD-MCNC: 10.6 G/DL (ref 11.7–15.7)
LYMPHOCYTES # BLD MANUAL: 8.2 10E3/UL (ref 0.8–5.3)
LYMPHOCYTES NFR BLD MANUAL: 43 %
MCH RBC QN AUTO: 30 PG (ref 26.5–33)
MCHC RBC AUTO-ENTMCNC: 31.5 G/DL (ref 31.5–36.5)
MCV RBC AUTO: 95 FL (ref 78–100)
MONOCYTES # BLD MANUAL: 0.8 10E3/UL (ref 0–1.3)
MONOCYTES NFR BLD MANUAL: 4 %
NEUTROPHILS # BLD MANUAL: 10.1 10E3/UL (ref 1.6–8.3)
NEUTROPHILS NFR BLD MANUAL: 53 %
PLAT MORPH BLD: ABNORMAL
PLATELET # BLD AUTO: 224 10E3/UL (ref 150–450)
POLYCHROMASIA BLD QL SMEAR: SLIGHT
POTASSIUM SERPL-SCNC: 5.1 MMOL/L (ref 3.4–5.3)
PROT SERPL-MCNC: 6 G/DL (ref 6.4–8.3)
RBC # BLD AUTO: 3.53 10E6/UL (ref 3.8–5.2)
RBC MORPH BLD: ABNORMAL
SODIUM SERPL-SCNC: 138 MMOL/L (ref 135–145)
WBC # BLD AUTO: 19 10E3/UL (ref 4–11)

## 2023-09-30 PROCEDURE — 99233 SBSQ HOSP IP/OBS HIGH 50: CPT | Performed by: STUDENT IN AN ORGANIZED HEALTH CARE EDUCATION/TRAINING PROGRAM

## 2023-09-30 PROCEDURE — 85027 COMPLETE CBC AUTOMATED: CPT | Performed by: STUDENT IN AN ORGANIZED HEALTH CARE EDUCATION/TRAINING PROGRAM

## 2023-09-30 PROCEDURE — G0378 HOSPITAL OBSERVATION PER HR: HCPCS

## 2023-09-30 PROCEDURE — 82962 GLUCOSE BLOOD TEST: CPT

## 2023-09-30 PROCEDURE — 250N000013 HC RX MED GY IP 250 OP 250 PS 637: Performed by: INTERNAL MEDICINE

## 2023-09-30 PROCEDURE — 36415 COLL VENOUS BLD VENIPUNCTURE: CPT | Performed by: STUDENT IN AN ORGANIZED HEALTH CARE EDUCATION/TRAINING PROGRAM

## 2023-09-30 PROCEDURE — 250N000013 HC RX MED GY IP 250 OP 250 PS 637

## 2023-09-30 PROCEDURE — 97116 GAIT TRAINING THERAPY: CPT | Mod: GP | Performed by: PHYSICAL THERAPIST

## 2023-09-30 PROCEDURE — 250N000013 HC RX MED GY IP 250 OP 250 PS 637: Performed by: STUDENT IN AN ORGANIZED HEALTH CARE EDUCATION/TRAINING PROGRAM

## 2023-09-30 PROCEDURE — 97530 THERAPEUTIC ACTIVITIES: CPT | Mod: GP | Performed by: PHYSICAL THERAPIST

## 2023-09-30 PROCEDURE — 80053 COMPREHEN METABOLIC PANEL: CPT | Performed by: STUDENT IN AN ORGANIZED HEALTH CARE EDUCATION/TRAINING PROGRAM

## 2023-09-30 PROCEDURE — 93971 EXTREMITY STUDY: CPT | Mod: LT

## 2023-09-30 PROCEDURE — 93971 EXTREMITY STUDY: CPT | Mod: 26 | Performed by: RADIOLOGY

## 2023-09-30 PROCEDURE — 85007 BL SMEAR W/DIFF WBC COUNT: CPT | Performed by: STUDENT IN AN ORGANIZED HEALTH CARE EDUCATION/TRAINING PROGRAM

## 2023-09-30 RX ADMIN — PREGABALIN 25 MG: 25 CAPSULE ORAL at 08:10

## 2023-09-30 RX ADMIN — ATORVASTATIN CALCIUM 20 MG: 20 TABLET, FILM COATED ORAL at 08:13

## 2023-09-30 RX ADMIN — MULTIPLE VITAMINS W/ MINERALS TAB 1 TABLET: TAB at 08:13

## 2023-09-30 RX ADMIN — SENNOSIDES AND DOCUSATE SODIUM 1 TABLET: 50; 8.6 TABLET ORAL at 08:12

## 2023-09-30 RX ADMIN — ACETAMINOPHEN 975 MG: 325 TABLET, FILM COATED ORAL at 13:11

## 2023-09-30 RX ADMIN — B-COMPLEX W/ C & FOLIC ACID TAB 1 TABLET: TAB at 08:11

## 2023-09-30 RX ADMIN — INSULIN ASPART 1 UNITS: 100 INJECTION, SOLUTION INTRAVENOUS; SUBCUTANEOUS at 13:50

## 2023-09-30 RX ADMIN — POLYETHYLENE GLYCOL 3350 17 G: 17 POWDER, FOR SOLUTION ORAL at 08:13

## 2023-09-30 RX ADMIN — CITALOPRAM HYDROBROMIDE 20 MG: 20 TABLET, FILM COATED ORAL at 08:10

## 2023-09-30 RX ADMIN — ACETAMINOPHEN 975 MG: 325 TABLET, FILM COATED ORAL at 02:04

## 2023-09-30 RX ADMIN — LEVOTHYROXINE SODIUM 88 MCG: 0.09 TABLET ORAL at 08:10

## 2023-09-30 ASSESSMENT — ACTIVITIES OF DAILY LIVING (ADL)
ADLS_ACUITY_SCORE: 37

## 2023-09-30 NOTE — DISCHARGE SUMMARY
Pt discharged home with family. AVS printed and sent with patient. No lines or drains. Surgical dressing clean dry and intact. Discharge meds sent with patient. Signed receipts for med's and AVS placed in pt binder.

## 2023-09-30 NOTE — PROGRESS NOTES
"Orthopaedic Surgery Progress Note   September 29, 2023     Subjective:   Reported some left leg swelling and pain yesterday, DVT US negative. Sxs have improved this AM. Pain overall controlled this AM but back is quite sore. Denies any radicular pain or weakness in legs. Hopeful she will be able to dc home today if PT goes well. POC reviewed.    Objective:   /69 (BP Location: Right arm, Patient Position: Supine, Cuff Size: Adult Regular)   Pulse 79   Temp 98.6  F (37  C) (Oral)   Resp 16   Ht 1.631 m (5' 4.21\")   Wt 75.7 kg (166 lb 14.2 oz)   SpO2 99%   BMI 28.46 kg/m    No intake/output data recorded.  Gen: NAD. Resting comfortably in bed  Resp: Breathing comfortably on RA  Drain:   dc'ed 9/29  Musculoskeletal:   BLE  - Dressing CDI  - 5/5 quad, TA, gastroc/soleus, EHL, FHL  - SILT to L3-S1 nerve distributions  - 2+ DP and PT, foot warm and well perfused      Lab Results   Component Value Date    WBC 22.3 (H) 09/29/2023    HGB 9.9 (L) 09/29/2023     09/29/2023    INR 0.99 09/28/2023          Assessment & Plan:   Kelly Vegas is a 73 year old female with lumbar degenerative disc disease and lumbar spinal stenosis with radiculopathy and neurogenic claudication  now s/p L3-4, L4-5 and L5-S1 laminectomy on 9/28/23 with Dr. Valdez. Could dc today vs tomorrow if progresses well with therapies.     Admit to obs - Ortho Primary   Activity: Up with assist until independent. No excessive bending or twisting. No lifting >10 lbs x 6 weeks. No Rachele lift for transfers.   Weight bearing status: WBAT.  Pain management: Transition from IV to PO as tolerated. N  Antibiotics: Ancef q8h while drains remain  Diet: Begin with clear fluids and progress diet as tolerated with Mata nutritional supplement   DVT prophylaxis: SCDs only. No chemical DVT ppx needed.   Imaging: None  Labs: labs PRN  Bracing/Splinting: None  Dressings: Keep c/d/i x 7 days.  Drains: dc'ed 9/29  Waldron catheter: Per protocol  Physical " Therapy/Occupational Therapy: Eval and treat.  Consults: Hospitalist.  Follow-up: Clinic with Dr. Valdez in 6 weeks with repeat x-rays.   Disposition: Pending progress with therapies, pain control on orals and medical stability.     Baldemar Venegas MD  Orthopaedic Surgery, PGY-4  Pager: [phone]

## 2023-09-30 NOTE — PLAN OF CARE
Patient is A/O x4. Makes needs known to staff. VSS. No reported CP, SOB, n/v, fever or chills. Back and left pain managed with Tylenol. Patient refused to take other pain meds. Independent-SBA.Continent of B/B. Last BM 23. Patient reported of left leg aching pain. Doctor KYLIE Martínez notified, Ultrasound ordered result is negative DVT. Appears to be sleeping during rounds. Bed alarm on for safety. Call light within reach. Possibly discharge today. Cont. With POC.   AM B. No coverage needed.

## 2023-09-30 NOTE — PROGRESS NOTES
I received a call that Ms. Vegas is having some left leg pain and swelling post operatively. Will check a lower extremity US to rule out DVT.     DEBORAH Jeter

## 2023-09-30 NOTE — PROGRESS NOTES
[facility]    Medicine Progress Note - Hospitalist Service, GOLD TEAM 22    Date of Admission:  9/28/2023    Assessment & Plan   Kelly Vegas is a 73 year old female admitted on 9/28/2023.  Patient underwent posterior approach lumbosacral laminectomy on 9/28/2023 with Dr. Bryson Valdez.  Patient's extensive pertinent chronic past medical history is positive for AV malformation of the small bowel, diabetic retinopathy, history of cervical cancer, chronic kidney disease stage IV, chronic lymphocytic leukemia, history colon adenoma, degenerative disc disease, type 2 diabetes mellitus, gastritis, hypertension, hyperlipidemia, hypothyroidism, lumbar disc herniation, major depression, and spinal stenosis.  Patient feels quite well postoperatively.  Patient reports some soreness at the lumbar spine postoperative site.  Patient denies chest pain, shortness of breath.  The internal medicine hospital service was consulted for comedical management.    Today's plan   -Per orthopedics team patient is slated for discharge today  -Could consider monitoring labs if appears clinically relevant for white count, It would be expected to improve with time      Status post lumbosacral laminectomy  Degenerative disc disease of the lumbar spine  Lumbar disc herniation  Lumbar spinal stenosis  - Orthopedic surgery primary service  - Pneumatic compression devices for postop DVT prophylaxis  - As needed pain control  - Physical, occupational therapy evaluations     Type 2 diabetes mellitus  Diabetic polyneuropathy  Diabetic retinopathy  - Modify diet to consistent carbohydrate diet  - Continue PTA Lantus  - Medium dose correction scale NovoLog  - Hold PTA Jardiance  - Hold PTA metformin     Major depression  - Continue PTA Celexa     Hypothyroidism  - Continue PTA Synthroid     Hypertension  Hyperlipidemia  - Blood pressure appears at goal  - Can restart PTA regimen incrementally as blood  "pressure tolerates  - Continue PTA statin therapy     Gastritis  - Protonix 40 mg IV daily     Chronic lymphocytic leukemia  History chronic lymphocytic leukemia  History of colon adenoma  - No acute medical intervention at present       Diet: Snacks/Supplements Adult: Mata; Between Meals  Moderate Consistent Carb (60 g CHO per Meal) Diet  Discharge Instruction - Regular Diet Adult    DVT Prophylaxis: Pneumatic Compression Devices  Waldron Catheter: Not present  Lines: None     Cardiac Monitoring: None  Code Status: Full Code      Clinically Significant Risk Factors Present on Admission              # Hypoalbuminemia: Lowest albumin = 3.4 g/dL at 9/29/2023  7:45 AM, will monitor as appropriate     # Hypertension: Noted on problem list     # DMII: A1C = 7.3 % (Ref range: <5.7 %) within past 6 months    # Overweight: Estimated body mass index is 28.46 kg/m  as calculated from the following:    Height as of this encounter: 1.631 m (5' 4.21\").    Weight as of this encounter: 75.7 kg (166 lb 14.2 oz).              Disposition Plan      Expected Discharge Date: 09/30/2023,  3:00 PM                Mark Anthony Mckeon MD  Hospitalist Service, GOLD TEAM [address] [place] [address]  Securely message with [employer] (more info)  Text page via [facility] Paging/Directory   See signed in provider for up to date coverage information  ______________________________________________________________________    Interval History   Patient was comfortable participating in therapy, No new abdominal pain, difficulty breathing, fevers or malaise     Physical Exam   Vital Signs: Temp: 98.6  F (37  C) Temp src: Oral BP: 108/69 Pulse: 79   Resp: 16 SpO2: 99 % O2 Device: None (Room air)    Weight: 166 lbs 14.21 oz  Patient was comfortable not in distress  Lungs are clear   S1 and s2 heard   Abdomen was soft and non distended     Medical Decision Making       41 MINUTES SPENT BY ME on the date of service doing chart review, " history, exam, documentation & further activities per the note.      Data

## 2023-09-30 NOTE — PLAN OF CARE
Problem: Plan of Care - These are the overarching goals to be used throughout the patient stay.    Goal: Plan of Care Review  Description: The Plan of Care Review/Shift note should be completed every shift.  The Outcome Evaluation is a brief statement about your assessment that the patient is improving, declining, or no change.  This information will be displayed automatically on your shift note.  Outcome: Progressing  Flowsheets (Taken 9/29/2023 1918)  Outcome Evaluation: Pt alert and oriented x4, drain out, ancef stopped, PIV SL, VSS, on RA, dressing cdi, eating well. Reported left calf pain paged on call provider, gave tylenol, PCDs removed.  Plan of Care Reviewed With: patient  Overall Patient Progress: improving   Goal Outcome Evaluation:      Plan of Care Reviewed With: patient    Overall Patient Progress: improvingOverall Patient Progress: improving    Outcome Evaluation: Pt alert and oriented x4, drain out, ancef stopped, PIV SL, VSS, on RA, dressing cdi, eating well. Reported left calf pain paged on call provider, gave tylenol, PCDs removed.       (2) more than 100 beats/min

## 2023-09-30 NOTE — PLAN OF CARE
Physical Therapy Discharge Summary    Reason for therapy discharge:    Discharged to home with outpatient therapy.  All goals and outcomes met, no further needs identified.    Progress towards therapy goal(s). See goals on Care Plan in [facility] electronic health record for goal details.  Goals met    Therapy recommendation(s):    Continued therapy is recommended.  Rationale/Recommendations:  outpatient PT to improve functional mobility and independence.

## 2023-10-02 NOTE — DISCHARGE SUMMARY
"ORTHOPAEDIC SURGERY DISCHARGE SUMMARY     Date of Admission: 9/28/2023  Date of Discharge: 9/30/2023  5:02 PM  Disposition: Home  Staff Physician: No att. providers found  Primary Care Provider: Sejal Rutledge    DISCHARGE DIAGNOSIS:  Spinal stenosis of lumbar region with neurogenic claudication [M48.062]  Lumbar radiculopathy [M54.16]    PROCEDURES: Procedure(s):  LAMINECTOMY, SPINE, LUMBAR, 3 OR MORE LEVELS, POSTERIOR APPROACH, USING MICROSCOPE LUMBAR 3-4, 4-5, LUMBAR 5 SACRAL 1 and Dura Repair  on 9/28/2023    BRIEF HISTORY:  Per op report/recent clinic note:  \"Patient is a 73 year old female who has had extensive nonoperative management without adequate symptomatic relief. Patient remains severely disabled due to these symptoms. After appropriate discussion of risks, alternatives and benefits of surgery the patient elected to proceed with surgery.  We discussed the risk of cardiac, pulmonary, and embolic complications which can be life threatening. We also discussed risk of incomplete symptom relief, injury to the nerve roots or spinal cord which can result in permanent sensory or motor function loss, dural injury with spinal fluid leak which can result in headaches and require lumbar drain or additional surgery to address, nonunion or hardware failure which can result in persistent pain and require additional surgery to address. No guarantees were given. Patient voluntarily signed written informed consent. \"    HOSPITAL COURSE:    The patient was admitted following the above listed procedures for pain control and rehabilitation. Kelly Vegas did well post-operatively. Medicine was consulted post operatively to aid in management of medical co-morbidities. The patient received routine nursing cares and at the time of discharge was medically stable. Vital signs were stable throughout admission. The patient is tolerating a regular diet and is voiding spontaneously. All PT/OT goals have been met for " safe mobility. Pain is now controlled on oral medications which will be available on discharge. Stool softeners have been used while taking pain medications to help prevent constipation. Kelly Vegas is deemed medically safe to discharge.     Antibiotics:  Periop  DVT prophylaxis:  See below  PT Progress:  Has met PT/OT goals for safe mobility.  Pain Meds:  Weaned off all IV pain meds by discharge.  Inpatient Events: No significant events or complications.     PHYSICAL EXAM:    See progress note day of discharge    FOLLOWUP:    Future Appointments   Date Time Provider Department Center   11/13/2023 11:00 AM Bryson Valdez MD [facility]   6/21/2024 10:15 AM [facility]   6/25/2024 10:00 AM Asia Guzman MD [facility]       Orthopaedic Surgery appointments are at the [facility] ([address] [place] [address]). Call [phone] to schedule a follow-up appointment at this location with your provider.     PLANNED DISCHARGE ORDERS:      Discharge Medication List as of 9/30/2023  1:15 PM        START taking these medications    Details   acetaminophen (TYLENOL) 325 MG tablet Take 2 tablets (650 mg) by mouth every 4 hours as needed for other (mild pain), Disp-100 tablet, R-0, E-Prescribe      HYDROmorphone (DILAUDID) 2 MG tablet Take 1-2 tablets (2-4 mg) by mouth every 6 hours as needed for severe pain, Disp-28 tablet, R-0, E-Prescribe      polyethylene glycol (MIRALAX) 17 g packet Take 17 g by mouth daily, Disp-7 packet, R-0, E-Prescribe      senna-docusate (SENOKOT-S/PERICOLACE) 8.6-50 MG tablet Take 1-2 tablets by mouth 2 times daily Take while on oral narcotics to prevent or treat constipation., Disp-30 tablet, R-0, E-PrescribeWhile taking narcotics           CONTINUE these medications which have NOT CHANGED    Details   atorvastatin (LIPITOR) 20 MG tablet Take 1 tablet (20 mg) by mouth daily, Disp-90 tablet, R-3, E-Prescribe       Cholecalciferol (VITAMIN D3) 1000 units CAPS Take by mouth every morning, Historical      citalopram (CELEXA) 20 MG tablet Take 1 tablet (20 mg) by mouth daily, Disp-90 tablet, R-3, E-Prescribe      empagliflozin (JARDIANCE) 10 MG TABS tablet Take 1 tablet (10 mg) by mouth daily, Disp-90 tablet, R-3, E-Prescribe      ferrous sulfate (FEROSUL) 325 (65 Fe) MG tablet Take 1 tablet (325 mg) by mouth every other day for 360 days, Disp-36 tablet, R-3, No Print Out      insulin glargine (LANTUS SOLOSTAR) 100 UNIT/ML pen INJECT 20 UNITS AT BEDTIME DAILY, Disp-30 mL, R-0, E-PrescribeIf Lantus is not covered by insurance, may substitute Basaglar or Semglee or other insulin glargine product per insurance preference at same dose and frequency.++APPOINTMENT NEEDED++      insulin lispro (HUMALOG KWIKPEN) 100 UNIT/ML (1 unit dial) KWIKPEN Inject 8 Units Subcutaneous 2 times daily (before meals), Disp-45 mL, R-3, E-Prescribe(pt will request refill when due)      levothyroxine (SYNTHROID/LEVOTHROID) 88 MCG tablet Take 1 tablet (88 mcg) by mouth daily, Disp-90 tablet, R-3, E-Prescribe      lisinopril-hydrochlorothiazide (ZESTORETIC) 20-12.5 MG tablet Take 2 tablets by mouth daily, Disp-180 tablet, R-3, E-Prescribe      metFORMIN (GLUCOPHAGE XR) 500 MG 24 hr tablet Take 1 tablet (500 mg) by mouth daily (with dinner), Disp-90 tablet, R-3, E-Prescribe      multivitamin w/minerals (THERA-VIT-M) tablet Take 1 tablet by mouth every morning, Historical      pregabalin (LYRICA) 25 MG capsule Take 1 capsule (25 mg) by mouth 2 times daily, Disp-60 capsule, R-1, E-Prescribe      vitamin C with B complex (B COMPLEX-C) tablet Take 1 tablet by mouth every morning, Historical      blood glucose (NO BRAND SPECIFIED) test strip Use to test blood sugar 3 times daily or as directed. To accompany: Blood Glucose Monitor Brands: per insurance., Disp-300 strip, R-3, E-Prescribe      thin (NO BRAND SPECIFIED) lancets Use to test blood sugar 3 times daily  "or as directed. To accompany: Blood Glucose Monitor Brands: per insurance., Disp-300 each, R-3, E-Prescribe      ULTICARE MINI 31G X 6 MM insulin pen needle Use three times daily or as directed.Disp-300 each, [id number]               Discharge Procedure Orders   Reason for your hospital stay   Order Comments: Lumbar decompression     When to call - Contact Surgeon Team   Order Comments: You may experience symptoms that require follow-up before your scheduled appointment. Refer to the \"Stoplight Tool\" for instructions on when to contact your Surgeon Team if you are concerned about pain control, blood clots, constipation, or if you are unable to urinate.     When to call - Reach out to Urgent Care   Order Comments: If you are not able to reach your Surgeon Team and you need immediate care, go to the Orthopedic Walk-in Clinic or Urgent Care at your Surgeon's office.  Do NOT go to the Emergency Room unless you have shortness of breath, chest pain, or other signs of a medical emergency.     When to call - Reasons to Call 911   Order Comments: Call 911 immediately if you experience sudden-onset chest pain, arm weakness/numbness, slurred speech, or shortness of breath     Discharge Instruction - Breathing exercises   Order Comments: Perform breathing exercises using your Incentive Spirometer 10 times per hour while awake for 2 weeks.     Symptoms - Fever Management   Order Comments: A low grade fever can be expected after surgery.  Use acetaminophen (TYLENOL) as needed for fever management.  Contact your Surgeon Team if you have a fever greater than 101.5 F, chills, and/or night sweats.     Symptoms - Constipation management   Order Comments: Constipation (hard, dry bowel movements) is expected after surgery due to the combination of being less active, the anesthetic, and the opioid pain medication.  You can do the following to help reduce constipation:  ~  FLUIDS:  Drink clear liquids (water or Gatorade), or juice " (apple/prune).  ~  DIET:  Eat a fiber rich diet.    ~  ACTIVITY:  Get up and move around several times a day.  Increase your activity as you are able.  MEDICATIONS:  Reduce the risk of constipation by starting medications before you are constipated.  You can take Miralax   (1 packet as directed) and/or a stool softener (Senokot 1-2 tablets 1-2 times a day).  If you already have constipation and these medications are not working, you can get magnesium citrate and use as directed.  If you continue to have constipation you can try an over the counter suppository or enema.  Call your Surgeon Team if it has been greater than 3 days since your last bowel movement.     Symptoms - Reduced Urine Output   Order Comments: Changes in the amount of fluids you drank before and after surgery may result in problems urinating.  It is important to stay well-hydrated after surgery and drink plenty of water. If it has been greater than 8 hours since you have urinated despite drinking plenty of water, call your Surgeon Team.     Activity - Exercises to prevent blood clots   Order Comments: Unless otherwise directed by your Surgeon team, perform the following exercises at least three times per day for the first four weeks after surgery to prevent blood clots in your legs: 1) Point and flex your feet (Ankle Pumps), 2) Move your ankle around in big circles, 3) Wiggle your toes, 4) Walk, even for short distances, several times a day, will help decrease the risk of blood clots.     Order Specific Question Answer Comments   Is discharge order? Yes      Comfort and Pain Management - Pain after Surgery   Order Comments: Pain after surgery is normal and expected.  You will have some amount of pain for several weeks after surgery.  Your pain will improve with time.  There are several things you can do to help reduce your pain including: rest, ice, elevation, and using pain medications as needed. Contact your Surgeon Team if you have pain that  persists or worsens after surgery despite rest, ice, elevation, and taking your medication(s) as prescribed. Contact your Surgeon Team if you have new numbness, tingling, or weakness in your operative extremity.     Comfort and Pain Management - Swelling after Surgery   Order Comments: Swelling and/or bruising of the surgical extremity is common and may persist for several months after surgery. In addition to frequent icing and elevation, gentle compressive support with an ACE wrap or tubigrip may help with swelling. Apply compression regularly, removing at least twice daily to perform skin checks. Contact your Surgeon Team if your swelling increases and is NOT associated with an increase in your activity level, or if your swelling increases and is associated with redness and pain.     Comfort and Pain Management - Cold therapy   Order Comments: Ice can be used to control swelling and discomfort after surgery. Place a thin towel over your operative site and apply the ice pack overtop. Leave ice pack in place for 20 minutes, then remove for 20 minutes. Repeat this 20 minutes on/20 minutes off routine as often as tolerated.     Medication Instructions - Acetaminophen (TYLENOL) Instructions   Order Comments: You were discharged with acetaminophen (TYLENOL) for pain management after surgery. Acetaminophen most effectively manages pain symptoms when it is taken on a schedule without missing doses (every four, six, or eight hours). Your Provider will prescribe a safe daily dose between 3000 - 4000 mg.  Do NOT exceed this daily dose. Most patients use acetaminophen for pain control for the first four weeks after surgery.  You can wean from this medication as your pain decreases.     Medication Instructions - NSAID Instructions   Order Comments: You were discharged with an anti-inflammatory medication for pain management to use in combination with acetaminophen (TYLENOL) and the narcotic pain medication.  Take this  medication exactly as directed.  You should only take one anti-inflammatory at a time.  Some common anti-inflammatories include: ibuprofen (ADVIL, MOTRIN), naproxen (ALEVE, NAPROSYN), celecoxib (CELEBREX), meloxicam (MOBIC), ketorolac (TORADOL).  Take this medication with food and water.     Medication Instructions - Opioids - Tapering Instructions   Order Comments: In the first three days following surgery, your symptoms may warrant use of the narcotic pain medication every four to six hours as prescribed. This is normal. As your pain symptoms improve, focus your efforts on decreasing (tapering) use of narcotic medications. The most successful tapering strategy is to first, decrease the number of tablets you take every 4-6 hours to the minimum prescribed. Then, increase the amount of time between doses.  For example:  First, taper to   or 1 tablet every 4-6 hours.  Then, taper to   or 1 tablet every 6-8 hours.  Then, taper to   or 1 tablet every 8-10 hours.  Then, taper to   or 1 tablet every 10-12 hours.  Then, taper to   or 1 tablet at bedtime.  The bedtime dose can help with comfort during sleep and is typically the last dose to be discontinued after surgery.     Follow Up Care   Order Comments: Follow-up with your Surgeon Team in 6 weeks     Brief Discharge Instructions   Order Comments: Postoperative Instructions - Spinal fusion        Dr. Bryson Valdez  [address] [address]     You have had a spinal fusion. You have a dressing called Acticoat on your incision which can be worn for up to 14 days, and it can be worn in the shower. 14 days after your surgery you can remove the Acticoat dressing. Under the Acticoat dressing you have a skin glue and mesh product called Prineo. The Prineo can be peeled off of the incision. If you prefer you can apply a small amount of vaseline or petroleum jelly to the Prineo which will help deactivate the skin glue and  make it easier to remove. If there are clear suture tails at the top and bottom of your incision these can be clipped with a clean scissors at the skin.  It is okay to shower and wash gently with soap and water. Do not soak in the bath. No pools, hot tubs, or lakes for 6 weeks.     For postoperative pain control, I have prescribed a narcotic medication.  This should be taken for the first few weeks following surgery, but as soon as you are able, transition to an over-the-counter type medication such as Tylenol.  You may not take non-steroidal anti-inflammatory medications (eg: Advil, Ibuprofen, Aleve, others) for the first 3 months after surgery as it interferes with bone healing. While taking the narcotic medication, there are several precautions that you must adhere to. These medications have numerous side effects including nausea, constipation, and drowsiness. If you experience nausea, this may be relieved by taking the medication with food or a light meal. To avoid constipation, please use an over-the-counter stool softener or drink lots of water and eat fruits and vegetables. Avoid operating heavy machinery or driving an automobile while on narcotic medications.      You will be seen in the clinic at 6 weeks following surgery. You will not need to attend physical therapy during this time. You can focus on cardiovascular fitness by walking as much as you can tolerate. Avoid bending, lifting, and twisting. Your weight lifting restriction is 10 pounds until your first follow-up appointment.      When you get home, you may resume your normal diet as tolerated. You may not be very hungry but try to eat small healthy meals to help you heal. Remember to drink plenty of water/fluids to help keep you hydrated.    After surgery, you may have a sensitive scar.  When the dressing has been removed, you may massage the scar to decrease sensitivity and help break down scar tissue. Do this up to 4 times per day.    Please call  or return if you experience the following:  Fevers (temperature greater than 100.4 degrees Fahrenheit)  Pain that is getting worse or does not respond to pain medications  Drainage from your wound  Increasing redness about the wound  Any other worrisome symptoms    You may reach the clinic by dialing [phone].  After hours, you may reach the resident on call by dialing [phone].     Medication Instructions - Opioid Instructions (1 - 2 tablets Q 4-6 hours, MAX 6 tablets)   Order Comments: You were discharged with an opioid medication (hydromorphone, oxycodone, hydrocodone, or tramadol). This medication should only be taken for breakthrough pain that is not controlled with acetaminophen (TYLENOL). If you rate your pain less than 3 you do not need this medication.  Pain rating 0-3:  You do not need this medication.  Pain rating 4-6:  Take 1 tablet every 4-6 hours as needed  Pain rating 7-10:  Take 2 tablets every 4-6 hours as needed.  Do not exceed 6 tablets per day     Discharge Instruction - Regular Diet Adult   Order Comments: Return to your pre-surgery diet unless instructed otherwise     Order Specific Question Answer Comments   Is discharge order? Yes        Baldemar Venegas MD  Orthopaedic Surgery, PGY-4

## 2023-10-03 ENCOUNTER — PATIENT OUTREACH (OUTPATIENT)
Dept: CARE COORDINATION | Facility: CLINIC | Age: 73
End: 2023-10-03

## 2023-10-03 NOTE — PROGRESS NOTES
[facility] Resource Center Contact  [facility]/Voicemail     Clinical Data: Post-Discharge Outreach     Outreach attempted x 2.  Left message on patient's voicemail, providing [facility]'s 24/7 scheduling and nurse triage phone number [phone] ([phone]) for questions/concerns and/or to schedule an appt with an [facility] provider, if they do not have a PCP.      Plan:  [facility] will do no further outreaches at this time.       Maria Fernanda Perez  Community Health Worker  [facility], [facility]  [phone]      *Connected Care Resource Team does NOT follow patient ongoing. Referrals are identified based on internal discharge reports and the outreach is to ensure patient has an understanding of their discharge instructions.

## 2023-10-16 ENCOUNTER — TELEPHONE (OUTPATIENT)
Dept: ORTHOPEDICS | Facility: CLINIC | Age: 73
End: 2023-10-16
Payer: COMMERCIAL

## 2023-10-16 DIAGNOSIS — M48.062 SPINAL STENOSIS OF LUMBAR REGION WITH NEUROGENIC CLAUDICATION: ICD-10-CM

## 2023-10-16 DIAGNOSIS — M54.16 LUMBAR RADICULOPATHY: Primary | ICD-10-CM

## 2023-10-16 DIAGNOSIS — M51.26 LUMBAR DISC HERNIATION: ICD-10-CM

## 2023-10-16 DIAGNOSIS — M62.830 MUSCLE SPASM OF BACK: ICD-10-CM

## 2023-10-16 RX ORDER — METHYLPREDNISOLONE 4 MG
TABLET, DOSE PACK ORAL
Qty: 21 TABLET | Refills: 0 | Status: SHIPPED | OUTPATIENT
Start: 2023-10-16 | End: 2024-01-22

## 2023-10-16 RX ORDER — PREGABALIN 25 MG/1
25 CAPSULE ORAL 2 TIMES DAILY
Qty: 60 CAPSULE | Refills: 1 | Status: SHIPPED | OUTPATIENT
Start: 2023-10-16 | End: 2024-01-22

## 2023-10-16 NOTE — TELEPHONE ENCOUNTER
[facility] Call Center    Phone Message    May a detailed message be left on voicemail: yes     Reason for Call: Other: Sept 28th pt had surgery. She says she cannot sleep because pain and tingling in her leg.   She would rate the pain as 8/10 and is constant. She has tried cold pack, heat, rubs - nothing is helping. This is from knee down all the way to the foot.         Action Taken: Other: message sent to team     Travel Screening: Not Applicable

## 2023-10-16 NOTE — TELEPHONE ENCOUNTER
Spoke to kenna, she will utlizing 2 tabs of Lyrica at bedtime and 1 in the morning. I also sent a medrol dos olga.    Bishop ARTIE Rubio PA-C

## 2023-10-16 NOTE — TELEPHONE ENCOUNTER
[facility] Call Center    Phone Message    May a detailed message be left on voicemail: yes     Reason for Call: Other: Patient missed call from clinic. Please call back at [phone].     Action Taken: Message routed to:  [facility] ([facility]): [phone]    Travel Screening: Not Applicable

## 2023-10-18 ENCOUNTER — TELEPHONE (OUTPATIENT)
Dept: ORTHOPEDICS | Facility: CLINIC | Age: 73
End: 2023-10-18
Payer: COMMERCIAL

## 2023-10-18 NOTE — TELEPHONE ENCOUNTER
[facility] Call Center    Phone Message    May a detailed message be left on voicemail: yes     Reason for Call: Medication Question or concern regarding medication   Prescription Clarification  Name of Medication: Lyrica 25 mg   Prescribing Provider: Bishop Joanne PA-C   Pharmacy: [facility]   What on the order needs clarification? Pharmacy calling to clarify directions, there are 2 sets on it       Action Taken: Message routed to:  [facility] ([facility]): Courtney and Other:      Travel Screening: Not Applicable

## 2023-10-19 NOTE — TELEPHONE ENCOUNTER
RN called patient and went over instructions on how to take script of Lyrica. Patient had no further instructions.    Asha Menard RN

## 2023-10-23 DIAGNOSIS — Z98.1 S/P LUMBAR FUSION: Primary | ICD-10-CM

## 2023-11-05 DIAGNOSIS — F33.0 MAJOR DEPRESSIVE DISORDER, RECURRENT, MILD (H): ICD-10-CM

## 2023-11-06 RX ORDER — CITALOPRAM HYDROBROMIDE 20 MG/1
20 TABLET ORAL DAILY
Qty: 90 TABLET | Refills: 0 | Status: SHIPPED | OUTPATIENT
Start: 2023-11-06 | End: 2024-01-22

## 2023-11-13 ENCOUNTER — ANCILLARY PROCEDURE (OUTPATIENT)
Dept: GENERAL RADIOLOGY | Facility: CLINIC | Age: 73
End: 2023-11-13
Attending: ORTHOPAEDIC SURGERY
Payer: COMMERCIAL

## 2023-11-13 ENCOUNTER — OFFICE VISIT (OUTPATIENT)
Dept: ORTHOPEDICS | Facility: CLINIC | Age: 73
End: 2023-11-13
Payer: COMMERCIAL

## 2023-11-13 DIAGNOSIS — M51.26 LUMBAR DISC HERNIATION: ICD-10-CM

## 2023-11-13 DIAGNOSIS — M48.062 SPINAL STENOSIS OF LUMBAR REGION WITH NEUROGENIC CLAUDICATION: ICD-10-CM

## 2023-11-13 DIAGNOSIS — Z98.890 HISTORY OF LUMBAR LAMINECTOMY: Primary | ICD-10-CM

## 2023-11-13 PROCEDURE — 72100 X-RAY EXAM L-S SPINE 2/3 VWS: CPT | Mod: TC | Performed by: RADIOLOGY

## 2023-11-13 PROCEDURE — 99024 POSTOP FOLLOW-UP VISIT: CPT | Performed by: ORTHOPAEDIC SURGERY

## 2023-11-13 NOTE — PROGRESS NOTES
Chief concern:  6-week postoperative follow-up for laminectomy L3-4, L4-5, L5-S1, repair of iatrogenic durotomy     History of present illness:  Kelly Vegas returns today now 6 weeks s/p above procedure.    Improving in terms of pain and leg strength. Still has some difficulty with steps and is taking them one step at a time. Occasional left sided muscle discomfort left low back but this is minor nuisance for her; resolves with taking a short break.  Some occasional tingling in BLE which sounds most like RLS which resolves with stretching her legs. The left leg swelling she had before surgery is completely gone. She feels quite well and has been working more and more around the house.    Objective:  Examination patient is alert and oriented with no acute distress  Nonlabored respiration with no audible wheeze  Examination of the surgical incision reveals well-healed surgical incision without surrounding erythema or fluctuance  Patient is able to stand and walk without assistive device  Resisted strength testing reveals 5/5 strength throughout the lower extremities  Sensations intact light light touch in L3-S1 distributions  There is no pathologic reflexes for patellar Achilles tendon    AP and lateral views of the lumbar spine reviewed which demonstrate stable hardware alignment without evidence of loosening or fracture    Impression plan:  6-week status post above surgery.  Doing well overall.  We will plan to gradually advance lifting restrictions to 25 pounds as tolerated.  May begin increasing daily flexion extension rotation.  Referral placed for formal physical therapy to focus on neutral spine stabilization and strengthening exercises, gait training.  Would like to see patient back in clinic in 6 weeks with repeat radiographs.    Bryson Valdez MD  Orthopedic Spine Surgeon  , Department of Orthopedic Surgery       F10.239

## 2023-11-13 NOTE — NURSING NOTE
Reason For Visit:   Chief Complaint   Patient presents with    RECHECK     DOS: 10/12/23 // LAMINECTOMY, SPINE, LUMBAR, 3 OR MORE LEVELS, POSTERIOR APPROACH, USING MICROSCOPE       Primary MD: Sejal Rutledge  Ref. MD: EST    ?  No  Occupation retired.     Date of injury: No  Type of injury: No.     Date of surgery: 9/28/23  Type of surgery:   LAMINECTOMY, SPINE, LUMBAR, 3 OR MORE LEVELS, POSTERIOR APPROACH, USING MICROSCOPE LUMBAR 3-4, 4-5, LUMBAR 5 SACRAL 1 and Dura Repair          Smoker: No  Request smoking cessation information: No    There were no vitals taken for this visit.    Pain Assessment  Patient Currently in Pain: Yes  0-10 Pain Scale: 3  Primary Pain Location: Back    Oswestry (JOHN) Questionnaire        11/13/2023    11:15 AM   OSWESTRY DISABILITY INDEX   Count 9   Sum 21   Oswestry Score (%) 46.67 %          Visual Analog Pain Scale  Back Pain Scale 0-10: 3  Right leg pain: 0  Left leg pain: 0  Neck Pain Scale 0-10: 0  Right arm pain: 0  Left arm pain: 0    Promis 10 Assessment        2/1/2023     9:39 AM   PROMIS 10   In general, would you say your health is: Good   In general, would you say your quality of life is: Good   In general, how would you rate your physical health? Fair   In general, how would you rate your mental health, including your mood and your ability to think? Good   In general, how would you rate your satisfaction with your social activities and relationships? Good   In general, please rate how well you carry out your usual social activities and roles Good   To what extent are you able to carry out your everyday physical activities such as walking, climbing stairs, carrying groceries, or moving a chair? Moderately   In the past 7 days, how often have you been bothered by emotional problems such as feeling anxious, depressed, or irritable? Sometimes   In the past 7 days, how would you rate your fatigue on average? Moderate   In the past 7 days, how would you  rate your pain on average, where 0 means no pain, and 10 means worst imaginable pain? 7   In general, would you say your health is: 3   In general, would you say your quality of life is: 3   In general, how would you rate your physical health? 2   In general, how would you rate your mental health, including your mood and your ability to think? 3   In general, how would you rate your satisfaction with your social activities and relationships? 3   In general, please rate how well you carry out your usual social activities and roles. (This includes activities at home, at work and in your community, and responsibilities as a parent, child, spouse, employee, friend, etc.) 3   To what extent are you able to carry out your everyday physical activities such as walking, climbing stairs, carrying groceries, or moving a chair? 3   In the past 7 days, how often have you been bothered by emotional problems such as feeling anxious, depressed, or irritable? 3   In the past 7 days, how would you rate your fatigue on average? 3   In the past 7 days, how would you rate your pain on average, where 0 means no pain, and 10 means worst imaginable pain? 7   Global Mental Health Score 12   Global Physical Health Score 10   PROMIS TOTAL - SUBSCORES 22                Junie Clements LPN

## 2023-11-13 NOTE — LETTER
11/13/2023         RE: Kelly Vegas  [address] [place] [id number]        Dear Colleague,    Thank you for referring your patient, Kelly Vegas, to the [facility]. Please see a copy of my visit note below.    Chief concern:  6-week postoperative follow-up for laminectomy L3-4, L4-5, L5-S1, repair of iatrogenic durotomy     History of present illness:  Kelly Vegas returns today now 6 weeks s/p above procedure.    Improving in terms of pain and leg strength. Still has some difficulty with steps and is taking them one step at a time. Occasional left sided muscle discomfort left low back but this is minor nuisance for her; resolves with taking a short break.  Some occasional tingling in BLE which sounds most like RLS which resolves with stretching her legs. The left leg swelling she had before surgery is completely gone. She feels quite well and has been working more and more around the house.    Objective:  Examination patient is alert and oriented with no acute distress  Nonlabored respiration with no audible wheeze  Examination of the surgical incision reveals well-healed surgical incision without surrounding erythema or fluctuance  Patient is able to stand and walk without assistive device  Resisted strength testing reveals 5/5 strength throughout the lower extremities  Sensations intact light light touch in L3-S1 distributions  There is no pathologic reflexes for patellar Achilles tendon    AP and lateral views of the lumbar spine reviewed which demonstrate stable hardware alignment without evidence of loosening or fracture    Impression plan:  6-week status post above surgery.  Doing well overall.  We will plan to gradually advance lifting restrictions to 25 pounds as tolerated.  May begin increasing daily flexion extension rotation.  Referral placed for formal physical therapy to focus on neutral spine stabilization and strengthening exercises, gait training.  Would like to  see patient back in clinic in 6 weeks with repeat radiographs.    Bryson Valdez MD  Orthopedic Spine Surgeon  , Department of Orthopedic Surgery

## 2023-11-15 PROBLEM — Z98.890 HISTORY OF LUMBAR LAMINECTOMY: Status: ACTIVE | Noted: 2023-11-15

## 2023-11-28 DIAGNOSIS — E11.42 TYPE 2 DIABETES MELLITUS WITH DIABETIC POLYNEUROPATHY, WITH LONG-TERM CURRENT USE OF INSULIN (H): ICD-10-CM

## 2023-11-28 DIAGNOSIS — Z79.4 TYPE 2 DIABETES MELLITUS WITH DIABETIC POLYNEUROPATHY, WITH LONG-TERM CURRENT USE OF INSULIN (H): ICD-10-CM

## 2023-11-28 RX ORDER — FLURBIPROFEN SODIUM 0.3 MG/ML
SOLUTION/ DROPS OPHTHALMIC
Qty: 300 EACH | Refills: 0 | Status: SHIPPED | OUTPATIENT
Start: 2023-11-28 | End: 2024-01-22

## 2023-12-20 DIAGNOSIS — E03.9 ACQUIRED HYPOTHYROIDISM: ICD-10-CM

## 2023-12-20 RX ORDER — LEVOTHYROXINE SODIUM 88 UG/1
88 TABLET ORAL DAILY
Qty: 30 TABLET | Refills: 0 | Status: SHIPPED | OUTPATIENT
Start: 2023-12-20 | End: 2024-01-22

## 2023-12-20 NOTE — TELEPHONE ENCOUNTER
Prescription approved per [facility] Refill Protocol.  Irena Mendez, RN  [facility] Triage Nurse

## 2023-12-29 DIAGNOSIS — I10 HYPERTENSION GOAL BP (BLOOD PRESSURE) < 140/90: ICD-10-CM

## 2023-12-29 DIAGNOSIS — N18.31 STAGE 3A CHRONIC KIDNEY DISEASE (H): ICD-10-CM

## 2023-12-29 RX ORDER — LISINOPRIL AND HYDROCHLOROTHIAZIDE 12.5; 2 MG/1; MG/1
2 TABLET ORAL EVERY MORNING
Qty: 60 TABLET | Refills: 0 | Status: SHIPPED | OUTPATIENT
Start: 2023-12-29 | End: 2024-01-22

## 2024-01-02 DIAGNOSIS — Z98.1 S/P LUMBAR FUSION: Primary | ICD-10-CM

## 2024-01-04 ENCOUNTER — PATIENT OUTREACH (OUTPATIENT)
Dept: CARE COORDINATION | Facility: CLINIC | Age: 74
End: 2024-01-04
Payer: COMMERCIAL

## 2024-01-08 ENCOUNTER — ANCILLARY PROCEDURE (OUTPATIENT)
Dept: GENERAL RADIOLOGY | Facility: CLINIC | Age: 74
End: 2024-01-08
Attending: ORTHOPAEDIC SURGERY
Payer: COMMERCIAL

## 2024-01-08 ENCOUNTER — OFFICE VISIT (OUTPATIENT)
Dept: ORTHOPEDICS | Facility: CLINIC | Age: 74
End: 2024-01-08
Payer: COMMERCIAL

## 2024-01-08 DIAGNOSIS — Z98.890 HISTORY OF LUMBAR LAMINECTOMY: Primary | ICD-10-CM

## 2024-01-08 DIAGNOSIS — M47.819 FACET ARTHROPATHY: ICD-10-CM

## 2024-01-08 PROCEDURE — 99024 POSTOP FOLLOW-UP VISIT: CPT | Performed by: ORTHOPAEDIC SURGERY

## 2024-01-08 PROCEDURE — 72100 X-RAY EXAM L-S SPINE 2/3 VWS: CPT | Mod: TC | Performed by: PREVENTIVE MEDICINE

## 2024-01-08 NOTE — LETTER
1/8/2024         RE: Kelly Vegas  [address] [facility] [address]        Dear Colleague,    Thank you for referring your patient, Kelly Vegas, to the [facility]. Please see a copy of my visit note below.      Chief concern:  3-month lumbar spine surgery postoperative follow-up    History of present illness:  Kelly Vegas returns today now 3 months s/p L3-4, L4-5, L5-S1 decompressive laminectomy and dural repair.  Patient has been performing physical therapy rehabilitation exercises since 6-week postoperative follow up visit. Patient reports improvement in pain, strength and activity tolerance.  She has been able to increase her ADLs.  Has band-like pain across the low back in morning and as day progresses. Denies any new focal strength or sensory deficits.  No bowel or bladder dysfunction. Today she reports her back pain is 5/10 compared to preop 7/10. Leg pain is 0/10. Her JOHN today is 46.67.      Walks without assistive device.   Tender over lower lumbar paraspinal muscles.   Resisted strength is 5/5 for bilateral hip flexion, knee extension, ankle dorsiflexion, great toe extension, plantarflexion   Sensation intact to gliding light touch L3-S1  Negative SLR      AP and lateral views of the lumbar spine reviewed which demonstrate laminectomy defect. No evidence of new spondylolisthesis.     Impression plan:  3-months status post above surgery.  Patient continues to improve since date of surgery.  No longer has any formal weight lifting restrictions. I recommended voltaren topical for what sounds like myofascial pain. Can advance activities as tolerated.  Continued physical therapy will be at discretion of the treating physical therapist.  May use oral NSAIDs and tylenol as needed for discomfort unless contraindicated. I would like to see patient back in clinic in 3 months with repeat radiographs.     Bryson Valdez MD  Orthopedic Spine Surgeon  ,  Department of Orthopedic Surgery

## 2024-01-08 NOTE — NURSING NOTE
Reason For Visit:   Chief Complaint   Patient presents with    RECHECK     DOS: 10/12/23 // LAMINECTOMY, SPINE, LUMBAR, 3 OR MORE LEVELS, POSTERIOR APPROACH,        Primary MD: Sejal Rutledge  Ref. MD: EST    ?  No  Occupation retired.     Date of injury: No  Type of injury: No.     Date of surgery: 9/28/23  Type of surgery:   LAMINECTOMY, SPINE, LUMBAR, 3 OR MORE LEVELS, POSTERIOR APPROACH, USING MICROSCOPE LUMBAR 3-4, 4-5, LUMBAR 5 SACRAL 1 and Dura Repair        Smoker: No  Request smoking cessation information: No    There were no vitals taken for this visit.    Pain Assessment  Patient Currently in Pain: Yes  0-10 Pain Scale: 5 (when standing and or movement)  Primary Pain Location: Back    Oswestry (JOHN) Questionnaire        11/13/2023    11:15 AM   OSWESTRY DISABILITY INDEX   Count 9   Sum 21   Oswestry Score (%) 46.67 %            Neck Disability Index (NDI) Questionnaire         No data to display                       Visual Analog Pain Scale  Back Pain Scale 0-10: 5  Right leg pain: 0  Left leg pain: 0  Neck Pain Scale 0-10: 0  Right arm pain: 0  Left arm pain: 0    Promis 10 Assessment        2/1/2023     9:39 AM   PROMIS 10   In general, would you say your health is: Good   In general, would you say your quality of life is: Good   In general, how would you rate your physical health? Fair   In general, how would you rate your mental health, including your mood and your ability to think? Good   In general, how would you rate your satisfaction with your social activities and relationships? Good   In general, please rate how well you carry out your usual social activities and roles Good   To what extent are you able to carry out your everyday physical activities such as walking, climbing stairs, carrying groceries, or moving a chair? Moderately   In the past 7 days, how often have you been bothered by emotional problems such as feeling anxious, depressed, or irritable? Sometimes   In  the past 7 days, how would you rate your fatigue on average? Moderate   In the past 7 days, how would you rate your pain on average, where 0 means no pain, and 10 means worst imaginable pain? 7   In general, would you say your health is: 3   In general, would you say your quality of life is: 3   In general, how would you rate your physical health? 2   In general, how would you rate your mental health, including your mood and your ability to think? 3   In general, how would you rate your satisfaction with your social activities and relationships? 3   In general, please rate how well you carry out your usual social activities and roles. (This includes activities at home, at work and in your community, and responsibilities as a parent, child, spouse, employee, friend, etc.) 3   To what extent are you able to carry out your everyday physical activities such as walking, climbing stairs, carrying groceries, or moving a chair? 3   In the past 7 days, how often have you been bothered by emotional problems such as feeling anxious, depressed, or irritable? 3   In the past 7 days, how would you rate your fatigue on average? 3   In the past 7 days, how would you rate your pain on average, where 0 means no pain, and 10 means worst imaginable pain? 7   Global Mental Health Score 12   Global Physical Health Score 10   PROMIS TOTAL - SUBSCORES 22                Junie Clements LPN

## 2024-01-10 ENCOUNTER — ANCILLARY PROCEDURE (OUTPATIENT)
Dept: MAMMOGRAPHY | Facility: CLINIC | Age: 74
End: 2024-01-10
Payer: COMMERCIAL

## 2024-01-10 DIAGNOSIS — Z12.31 VISIT FOR SCREENING MAMMOGRAM: ICD-10-CM

## 2024-01-10 PROCEDURE — 77067 SCR MAMMO BI INCL CAD: CPT | Mod: TC | Performed by: RADIOLOGY

## 2024-01-11 NOTE — PROGRESS NOTES
Chief concern:  3-month lumbar spine surgery postoperative follow-up    History of present illness:  Kelly Vegas returns today now 3 months s/p L3-4, L4-5, L5-S1 decompressive laminectomy and dural repair.  Patient has been performing physical therapy rehabilitation exercises since 6-week postoperative follow up visit. Patient reports improvement in pain, strength and activity tolerance.  She has been able to increase her ADLs.  Has band-like pain across the low back in morning and as day progresses. Denies any new focal strength or sensory deficits.  No bowel or bladder dysfunction. Today she reports her back pain is 5/10 compared to preop 7/10. Leg pain is 0/10. Her JOHN today is 46.67.      Walks without assistive device.   Tender over lower lumbar paraspinal muscles.   Resisted strength is 5/5 for bilateral hip flexion, knee extension, ankle dorsiflexion, great toe extension, plantarflexion   Sensation intact to gliding light touch L3-S1  Negative SLR      AP and lateral views of the lumbar spine reviewed which demonstrate laminectomy defect. No evidence of new spondylolisthesis.     Impression plan:  3-months status post above surgery.  Patient continues to improve since date of surgery.  No longer has any formal weight lifting restrictions. I recommended voltaren topical for what sounds like myofascial pain. Can advance activities as tolerated.  Continued physical therapy will be at discretion of the treating physical therapist.  May use oral NSAIDs and tylenol as needed for discomfort unless contraindicated. I would like to see patient back in clinic in 3 months with repeat radiographs.     Bryson Valdez MD  Orthopedic Spine Surgeon  , Department of Orthopedic Surgery

## 2024-01-14 DIAGNOSIS — Z79.4 TYPE 2 DIABETES MELLITUS WITH DIABETIC POLYNEUROPATHY, WITH LONG-TERM CURRENT USE OF INSULIN (H): ICD-10-CM

## 2024-01-14 DIAGNOSIS — E11.42 TYPE 2 DIABETES MELLITUS WITH DIABETIC POLYNEUROPATHY, WITH LONG-TERM CURRENT USE OF INSULIN (H): ICD-10-CM

## 2024-01-15 RX ORDER — METFORMIN HCL 500 MG
500 TABLET, EXTENDED RELEASE 24 HR ORAL
Qty: 30 TABLET | Refills: 0 | Status: SHIPPED | OUTPATIENT
Start: 2024-01-15 | End: 2024-01-22

## 2024-01-18 ENCOUNTER — PATIENT OUTREACH (OUTPATIENT)
Dept: CARE COORDINATION | Facility: CLINIC | Age: 74
End: 2024-01-18
Payer: COMMERCIAL

## 2024-01-22 ENCOUNTER — OFFICE VISIT (OUTPATIENT)
Dept: FAMILY MEDICINE | Facility: CLINIC | Age: 74
End: 2024-01-22
Payer: COMMERCIAL

## 2024-01-22 VITALS
HEART RATE: 76 BPM | RESPIRATION RATE: 16 BRPM | SYSTOLIC BLOOD PRESSURE: 131 MMHG | DIASTOLIC BLOOD PRESSURE: 68 MMHG | OXYGEN SATURATION: 97 % | TEMPERATURE: 98.7 F | WEIGHT: 166 LBS | BODY MASS INDEX: 28.34 KG/M2 | HEIGHT: 64 IN

## 2024-01-22 DIAGNOSIS — Z00.00 ENCOUNTER FOR MEDICARE ANNUAL WELLNESS EXAM: Primary | ICD-10-CM

## 2024-01-22 DIAGNOSIS — C91.10 CLL (CHRONIC LYMPHOCYTIC LEUKEMIA) (H): ICD-10-CM

## 2024-01-22 DIAGNOSIS — M85.80 LOW BONE MASS: ICD-10-CM

## 2024-01-22 DIAGNOSIS — L91.8 SKIN TAG: ICD-10-CM

## 2024-01-22 DIAGNOSIS — E11.42 TYPE 2 DIABETES MELLITUS WITH DIABETIC POLYNEUROPATHY, WITH LONG-TERM CURRENT USE OF INSULIN (H): ICD-10-CM

## 2024-01-22 DIAGNOSIS — Z12.4 CERVICAL CANCER SCREENING: ICD-10-CM

## 2024-01-22 DIAGNOSIS — L98.9 SKIN LESION: ICD-10-CM

## 2024-01-22 DIAGNOSIS — Z79.4 TYPE 2 DIABETES MELLITUS WITH DIABETIC POLYNEUROPATHY, WITH LONG-TERM CURRENT USE OF INSULIN (H): ICD-10-CM

## 2024-01-22 DIAGNOSIS — E03.9 ACQUIRED HYPOTHYROIDISM: ICD-10-CM

## 2024-01-22 DIAGNOSIS — F33.0 MAJOR DEPRESSIVE DISORDER, RECURRENT, MILD (H): ICD-10-CM

## 2024-01-22 DIAGNOSIS — L82.1 SEBORRHEIC KERATOSES: ICD-10-CM

## 2024-01-22 DIAGNOSIS — N18.31 STAGE 3A CHRONIC KIDNEY DISEASE (H): ICD-10-CM

## 2024-01-22 DIAGNOSIS — E78.5 HYPERLIPIDEMIA WITH TARGET LDL LESS THAN 100: ICD-10-CM

## 2024-01-22 DIAGNOSIS — I10 HYPERTENSION GOAL BP (BLOOD PRESSURE) < 140/90: ICD-10-CM

## 2024-01-22 DIAGNOSIS — E11.3299 BACKGROUND DIABETIC RETINOPATHY (H): ICD-10-CM

## 2024-01-22 DIAGNOSIS — Z29.11 NEED FOR VACCINATION AGAINST RESPIRATORY SYNCYTIAL VIRUS: ICD-10-CM

## 2024-01-22 PROBLEM — M54.16 LUMBAR RADICULOPATHY: Status: RESOLVED | Noted: 2023-02-06 | Resolved: 2024-01-22

## 2024-01-22 PROBLEM — R20.8 DYSESTHESIA: Status: RESOLVED | Noted: 2019-03-26 | Resolved: 2024-01-22

## 2024-01-22 PROBLEM — M62.830 MUSCLE SPASM OF BACK: Status: RESOLVED | Noted: 2023-05-08 | Resolved: 2024-01-22

## 2024-01-22 PROBLEM — M51.26 LUMBAR DISC HERNIATION: Status: RESOLVED | Noted: 2023-01-27 | Resolved: 2024-01-22

## 2024-01-22 PROBLEM — R41.3 MEMORY PROBLEM: Status: RESOLVED | Noted: 2018-09-24 | Resolved: 2024-01-22

## 2024-01-22 LAB — HBA1C MFR BLD: 6.6 % (ref 0–5.6)

## 2024-01-22 PROCEDURE — G0145 SCR C/V CYTO,THINLAYER,RESCR: HCPCS | Performed by: FAMILY MEDICINE

## 2024-01-22 PROCEDURE — 80048 BASIC METABOLIC PNL TOTAL CA: CPT | Performed by: FAMILY MEDICINE

## 2024-01-22 PROCEDURE — 99214 OFFICE O/P EST MOD 30 MIN: CPT | Mod: 25 | Performed by: FAMILY MEDICINE

## 2024-01-22 PROCEDURE — 36415 COLL VENOUS BLD VENIPUNCTURE: CPT | Performed by: FAMILY MEDICINE

## 2024-01-22 PROCEDURE — G0439 PPPS, SUBSEQ VISIT: HCPCS | Performed by: FAMILY MEDICINE

## 2024-01-22 PROCEDURE — 83036 HEMOGLOBIN GLYCOSYLATED A1C: CPT | Performed by: FAMILY MEDICINE

## 2024-01-22 PROCEDURE — 87624 HPV HI-RISK TYP POOLED RSLT: CPT | Performed by: FAMILY MEDICINE

## 2024-01-22 PROCEDURE — 83970 ASSAY OF PARATHORMONE: CPT | Performed by: FAMILY MEDICINE

## 2024-01-22 PROCEDURE — 84443 ASSAY THYROID STIM HORMONE: CPT | Performed by: FAMILY MEDICINE

## 2024-01-22 PROCEDURE — 82570 ASSAY OF URINE CREATININE: CPT | Performed by: FAMILY MEDICINE

## 2024-01-22 PROCEDURE — 80061 LIPID PANEL: CPT | Performed by: FAMILY MEDICINE

## 2024-01-22 PROCEDURE — 82043 UR ALBUMIN QUANTITATIVE: CPT | Performed by: FAMILY MEDICINE

## 2024-01-22 RX ORDER — LEVOTHYROXINE SODIUM 88 UG/1
88 TABLET ORAL DAILY
Qty: 30 TABLET | Refills: 3 | Status: SHIPPED | OUTPATIENT
Start: 2024-01-22 | End: 2024-05-09

## 2024-01-22 RX ORDER — INSULIN LISPRO 100 [IU]/ML
8 INJECTION, SOLUTION INTRAVENOUS; SUBCUTANEOUS
Qty: 45 ML | Refills: 3 | Status: SHIPPED | OUTPATIENT
Start: 2024-01-22

## 2024-01-22 RX ORDER — RESPIRATORY SYNCYTIAL VIRUS VACCINE 120MCG/0.5
0.5 KIT INTRAMUSCULAR ONCE
Qty: 1 EACH | Refills: 0 | Status: SHIPPED | OUTPATIENT
Start: 2024-01-22 | End: 2024-01-22

## 2024-01-22 RX ORDER — ALENDRONATE SODIUM 70 MG/1
70 TABLET ORAL
Qty: 12 TABLET | Refills: 3 | Status: SHIPPED | OUTPATIENT
Start: 2024-01-22

## 2024-01-22 RX ORDER — INSULIN GLARGINE 100 [IU]/ML
INJECTION, SOLUTION SUBCUTANEOUS
Qty: 30 ML | Refills: 1 | Status: SHIPPED | OUTPATIENT
Start: 2024-01-22

## 2024-01-22 RX ORDER — ATORVASTATIN CALCIUM 20 MG/1
20 TABLET, FILM COATED ORAL EVERY MORNING
Qty: 90 TABLET | Refills: 3 | Status: SHIPPED | OUTPATIENT
Start: 2024-01-22

## 2024-01-22 RX ORDER — LISINOPRIL AND HYDROCHLOROTHIAZIDE 12.5; 2 MG/1; MG/1
2 TABLET ORAL EVERY MORNING
Qty: 180 TABLET | Refills: 3 | Status: SHIPPED | OUTPATIENT
Start: 2024-01-22

## 2024-01-22 RX ORDER — FLURBIPROFEN SODIUM 0.3 MG/ML
SOLUTION/ DROPS OPHTHALMIC
Qty: 300 EACH | Refills: 3 | Status: SHIPPED | OUTPATIENT
Start: 2024-01-22

## 2024-01-22 RX ORDER — CITALOPRAM HYDROBROMIDE 20 MG/1
20 TABLET ORAL DAILY
Qty: 90 TABLET | Refills: 1 | Status: SHIPPED | OUTPATIENT
Start: 2024-01-22 | End: 2024-08-02

## 2024-01-22 RX ORDER — METFORMIN HCL 500 MG
500 TABLET, EXTENDED RELEASE 24 HR ORAL
Qty: 90 TABLET | Refills: 3 | Status: SHIPPED | OUTPATIENT
Start: 2024-01-22

## 2024-01-22 ASSESSMENT — ENCOUNTER SYMPTOMS
SORE THROAT: 0
MYALGIAS: 1
NAUSEA: 0
COUGH: 0
HEMATURIA: 0
PALPITATIONS: 0
DIARRHEA: 0
CONSTIPATION: 0
FEVER: 0
HEADACHES: 0
FREQUENCY: 0
EYE PAIN: 0
NERVOUS/ANXIOUS: 0
WEAKNESS: 0
ABDOMINAL PAIN: 0
DYSURIA: 0
PARESTHESIAS: 1
SHORTNESS OF BREATH: 0
CHILLS: 0
BREAST MASS: 0
HEMATOCHEZIA: 0
DIZZINESS: 0

## 2024-01-22 ASSESSMENT — ACTIVITIES OF DAILY LIVING (ADL): CURRENT_FUNCTION: NO ASSISTANCE NEEDED

## 2024-01-22 ASSESSMENT — PATIENT HEALTH QUESTIONNAIRE - PHQ9
SUM OF ALL RESPONSES TO PHQ QUESTIONS 1-9: 2
SUM OF ALL RESPONSES TO PHQ QUESTIONS 1-9: 2
10. IF YOU CHECKED OFF ANY PROBLEMS, HOW DIFFICULT HAVE THESE PROBLEMS MADE IT FOR YOU TO DO YOUR WORK, TAKE CARE OF THINGS AT HOME, OR GET ALONG WITH OTHER PEOPLE: NOT DIFFICULT AT ALL

## 2024-01-22 NOTE — PROGRESS NOTES
"Preventive Care Visit  [facility] WILBERTO Rutledge MD, Family Medicine  Jan 22, 2024      SUBJECTIVE:   Kelly is a 73 year old, presenting for the following:  Physical        1/22/2024     2:56 PM   Additional Questions   Roomed by Kathy SAHNI CMA   Accompanied by Self     Are you in the first 12 months of your Medicare coverage?  No    Patient also presents to follow-up diabetes. Diabetic Review of Systems - Medication compliance:  compliant all of the time, Diabetic diet compliance:  compliant all of the time, Home glucose monitoring:  blood glucose record WAS NOT brought in today, Diabetic ROS: no polyuria or polydipsia, no chest pain, dyspnea or TIA's, no numbness, tingling or pain in extremities, no unusual visual symptoms, no medication side effects noted.     Hypertension well controlled on current medications without side effects, chest pain, or dyspnea. Hypercholesterolemia well controlled with current treatment plan without side effects.     Patient also presents for follow-up of hypothyroidism, controlled on current medication.  Denies symptoms of hypo- or hyperthyroidism.     Patient has depression, mild recurrent, with no medication side effects and no anhedonia or low mood, without suicidal ideation.      She is followed by oncology for CLL.     She has some skin lesions.     Healthy Habits:     In general, how would you rate your overall health?  Good    Frequency of exercise:  2-3 days/week    Duration of exercise:  30-45 minutes    Do you usually eat at least 4 servings of fruit and vegetables a day, include whole grains    & fiber and avoid regularly eating high fat or \"junk\" foods?  No    Taking medications regularly:  Yes    Medication side effects:  Not applicable    Ability to successfully perform activities of daily living:  No assistance needed    Home Safety:  No safety concerns identified    Hearing Impairment:  Difficulty following a conversation in a noisy restaurant or " crowded room, feel that people are mumbling or not speaking clearly, need to ask people to speak up or repeat themselves and find that men's voices are easier to understand than woman's    In the past 6 months, have you been bothered by leaking of urine?  No    In general, how would you rate your overall mental or emotional health?  Good    Additional concerns today:  Yes (Itching, Dry patches, arthritis in fingers right hand, Spot on left great toe she would like ckecked. Veins. Feet tingling that doesn't go away. Discuss medications.)    Today's PHQ-9 Score:       1/22/2024     2:45 PM   PHQ-9 SCORE   PHQ-9 Total Score MyChart 2 (Minimal depression)   PHQ-9 Total Score 2         Have you ever done Advance Care Planning? (For example, a Health Directive, POLST, or a discussion with a medical provider or your loved ones about your wishes): No, advance care planning information given to patient to review.  Advanced care planning was discussed at today's visit.       Fall risk  Fallen 2 or more times in the past year?: No  Any fall with injury in the past year?: No    Cognitive Screening   1) Repeat 3 items (Leader, Season, Table)    2) Clock draw: NORMAL  3) 3 item recall: Recalls 1 object   Results: NORMAL clock, 1-2 items recalled: COGNITIVE IMPAIRMENT LESS LIKELY    Mini-CogTM Copyright KAT [employer]. Licensed by the author for use in [facility]; reprinted with permission ([email or web address]). All rights reserved.      Do you have sleep apnea, excessive snoring or daytime drowsiness? : no    Reviewed and updated as needed this visit by clinical staff   Tobacco  Allergies  Meds  Problems  Med Hx  Surg Hx  Fam Hx          Reviewed and updated as needed this visit by Provider   Tobacco  Allergies  Meds  Problems  Med Hx  Surg Hx  Fam Hx          Social History     Tobacco Use    Smoking status: Former     Packs/day: 0.50     Years: 20.00     Additional pack years: 0.00     Total pack years: 10.00      Types: Cigarettes     Start date: 1967     Quit date: 1989     Years since quittin.0    Smokeless tobacco: Never   Substance Use Topics    Alcohol use: No             2024     2:50 PM   Alcohol Use   Prescreen: >3 drinks/day or >7 drinks/week? Not Applicable     Do you have a current opioid prescription? No  Do you use any other controlled substances or medications that are not prescribed by a provider? None     Current providers sharing in care for this patient include:   Patient Care Team:  Sejal Rutledge MD as PCP - General  Sejal Rutledge MD as Assigned PCP  Chantell Chamberlain RN as Specialty Care Coordinator (Oncology)  Andrzej Sparrow OD as Assigned Surgical Provider  Migue Calvillo MD as MD (Nephrology)  Migue Calvillo MD as Assigned Nephrology Provider  Dino Bravo MD as Assigned OBGYN Provider  Asia Guzman MD as Assigned Cancer Care Provider  Bryson Valdez MD as Assigned Musculoskeletal Provider    The following health maintenance items are reviewed in Epic and correct as of today:  Health Maintenance   Topic Date Due    RSV VACCINE (Pregnancy & 60+) (1 - 1-dose 60+ series) Never done    DIABETIC FOOT EXAM  2023    EYE EXAM  2023    LIPID  2023    MICROALBUMIN  2023    TSH W/FREE T4 REFLEX  2024    PAP FOLLOW-UP  2024    HPV FOLLOW-UP  2024    A1C  2024    PHQ-9  2024    BMP  2024    HEMOGLOBIN  2024    COLORECTAL CANCER SCREENING  2025    MEDICARE ANNUAL WELLNESS VISIT  2025    ANNUAL REVIEW OF HM ORDERS  2025    FALL RISK ASSESSMENT  2025    DEXA  2025    MAMMO SCREENING  01/10/2026    ADVANCE CARE PLANNING  2029    DTAP/TDAP/TD IMMUNIZATION (3 - Td or Tdap) 2033    HEPATITIS C SCREENING  Completed    DEPRESSION ACTION PLAN  Completed    INFLUENZA VACCINE  Completed    Pneumococcal Vaccine: 65+ Years  Completed    URINALYSIS   Completed    ZOSTER IMMUNIZATION  Completed    COVID-19 Vaccine  Completed    IPV IMMUNIZATION  Aged Out    HPV IMMUNIZATION  Aged Out    MENINGITIS IMMUNIZATION  Aged Out    RSV MONOCLONAL ANTIBODY  Aged Out    PAP  Discontinued     Patient Active Problem List   Diagnosis    Hyperlipidemia with target LDL less than 70    Hypertension goal BP (blood pressure) < 140/90    History of cervical dysplasia    Major depressive disorder, recurrent, mild (H24)    Acquired hypothyroidism    Type 2 diabetes mellitus with diabetic polyneuropathy, with long-term current use of insulin (H)    Degenerative joint disease of hand    Low bone mass    CLL (chronic lymphocytic leukemia) (H)    Ganglion cyst of joint of finger of right hand    Background diabetic retinopathy (H)    AVM (arteriovenous malformation) of small bowel, acquired    Iron deficiency    Sensorineural hearing loss, bilateral    Nontraumatic tear of right rotator cuff, unspecified tear extent    Stage 3a chronic kidney disease (H)    Spinal stenosis of lumbar region with neurogenic claudication    History of lumbar laminectomy     Past Surgical History:   Procedure Laterality Date    BIOPSY      COLONOSCOPY  nov 2016    COLONOSCOPY N/A 3/1/2019    Procedure: COLONOSCOPY;  Surgeon: Romulo Hayes MD;  Location: [facility]    COLONOSCOPY WITH CO2 INSUFFLATION N/A 11/28/2016    Procedure: COLONOSCOPY WITH CO2 INSUFFLATION;  Surgeon: Migue Giraldo MD;  Location: [facility]    CYSTECTOMY OVARIAN BENIGN  1974    ESOPHAGOSCOPY, GASTROSCOPY, DUODENOSCOPY (EGD), COMBINED N/A 3/1/2019    Procedure: COMBINED ESOPHAGOSCOPY, GASTROSCOPY, DUODENOSCOPY (EGD), BIOPSY SINGLE OR MULTIPLE;  Surgeon: Romulo Hayes MD;  Location: [facility]    EYE SURGERY  12-14-16    HC THYROIDECTOMY  12/2004    goiter    LAMINECTOMY LUMBAR POSTERIOR MICROSCOPIC THREE+ LEVELS N/A 9/28/2023    Procedure: LAMINECTOMY, SPINE, LUMBAR, 3 OR MORE LEVELS, POSTERIOR APPROACH, USING MICROSCOPE LUMBAR 3-4, 4-5,  LUMBAR 5 SACRAL 1 and Dura Repair;  Surgeon: Bryson Valdez MD;  Location: [facility]    LAPAROSCOPIC CHOLECYSTECTOMY N/A 2019    Procedure: CHOLECYSTECTOMY, LAPAROSCOPIC;  Surgeon: Jorge Martinez DO;  Location: [facility]    [facility], OMENTECTOMY W/JUAN DANIEL  2006    cervical cancer       Social History     Tobacco Use    Smoking status: Former     Packs/day: 0.50     Years: 20.00     Additional pack years: 0.00     Total pack years: 10.00     Types: Cigarettes     Start date: 1967     Quit date: 1989     Years since quittin.0    Smokeless tobacco: Never   Substance Use Topics    Alcohol use: No     Family History   Problem Relation Age of Onset    Thyroid Disease Mother     Dementia Mother     Osteoporosis Mother     Alcohol/Drug Father     C.A.D. Father         CABG     Diabetes Father     Hypertension Father     Hyperlipidemia Father     Cerebrovascular Disease Father     Alcohol/Drug Brother     Prostate Cancer Brother     Depression Brother     Depression Brother         d. suicide    Cancer Maternal Grandmother         stomach, colon    Breast Cancer Maternal Grandmother     Thyroid Disease Maternal Grandmother     Alzheimer Disease Maternal Grandfather     Breast Cancer Paternal Grandmother     Alcohol/Drug Son     Rheumatoid Arthritis Son     Breast Cancer Cousin     Breast Cancer Other     Anesthesia Reaction No family hx of     Thrombosis No family hx of          Current Outpatient Medications   Medication Sig Dispense Refill    acetaminophen (TYLENOL) 325 MG tablet Take 2 tablets (650 mg) by mouth every 4 hours as needed for other (mild pain) 100 tablet 0    alendronate (FOSAMAX) 70 MG tablet Take 1 tablet (70 mg) by mouth every 7 days 12 tablet 3    atorvastatin (LIPITOR) 20 MG tablet Take 1 tablet (20 mg) by mouth every morning 90 tablet 3    blood glucose (NO BRAND SPECIFIED) test strip Use to test blood sugar 3 times daily or as directed. To accompany: Blood Glucose Monitor Brands:  per insurance. 300 strip 3    Cholecalciferol (VITAMIN D3) 1000 units CAPS Take by mouth every morning      citalopram (CELEXA) 20 MG tablet Take 1 tablet (20 mg) by mouth daily 90 tablet 1    ferrous sulfate (FEROSUL) 325 (65 Fe) MG tablet Take 1 tablet (325 mg) by mouth every other day for 360 days 36 tablet 3    insulin glargine (LANTUS SOLOSTAR) 100 UNIT/ML pen INJECT 20 UNITS AT BEDTIME DAILY 30 mL 1    insulin lispro (HUMALOG KWIKPEN) 100 UNIT/ML (1 unit dial) KWIKPEN Inject 8 Units Subcutaneous 2 times daily (before meals) 45 mL 3    insulin pen needle (ULTICARE MINI) 31G X 6 MM miscellaneous Use three times daily or as directed. 300 each 3    levothyroxine (SYNTHROID/LEVOTHROID) 88 MCG tablet Take 1 tablet (88 mcg) by mouth daily 30 tablet 3    lisinopril-hydrochlorothiazide (ZESTORETIC) 20-12.5 MG tablet Take 2 tablets by mouth every morning 180 tablet 3    metFORMIN (GLUCOPHAGE XR) 500 MG 24 hr tablet Take 1 tablet (500 mg) by mouth daily (with dinner) 90 tablet 3    multivitamin w/minerals (THERA-VIT-M) tablet Take 1 tablet by mouth every morning      respiratory syncytial virus vaccine, bivalent (ABRYSVO) injection Inject 0.5 mLs into the muscle once for 1 dose 1 each 0    thin (NO BRAND SPECIFIED) lancets Use to test blood sugar 3 times daily or as directed. To accompany: Blood Glucose Monitor Brands: per insurance. 300 each 3    vitamin C with B complex (B COMPLEX-C) tablet Take 1 tablet by mouth every morning      diclofenac (VOLTAREN) 1 % topical gel Apply 4 g topically 4 times daily (Patient not taking: Reported on 1/22/2024) 120 g 3     Allergies   Allergen Reactions    Glipizide      tremulous    Ibuprofen Hives    Pcn [Penicillins] Hives and Itching    Percocet [Acetaminophen] Nausea and Vomiting    Vicodin [Hydrocodone-Acetaminophen] Nausea     Did not like how she felt           Review of Systems   Constitutional:  Negative for chills and fever.   HENT:  Positive for hearing loss. Negative for  "congestion, ear pain and sore throat.    Eyes:  Positive for visual disturbance. Negative for pain.   Respiratory:  Negative for cough and shortness of breath.    Cardiovascular:  Negative for chest pain and palpitations.   Gastrointestinal:  Negative for abdominal pain, constipation, diarrhea and nausea.   Genitourinary:  Negative for dysuria, frequency, genital sores, hematuria, pelvic pain, urgency, vaginal bleeding and vaginal discharge.   Musculoskeletal:  Positive for myalgias.   Skin:  Negative for rash.   Neurological:  Negative for dizziness, weakness and headaches.   Psychiatric/Behavioral:  The patient is not nervous/anxious.           OBJECTIVE:   /68 (BP Location: Right arm, Patient Position: Sitting, Cuff Size: Adult Regular)   Pulse 76   Temp 98.7  F (37.1  C) (Oral)   Resp 16   Ht 1.615 m (5' 3.58\")   Wt 75.3 kg (166 lb)   SpO2 97%   BMI 28.87 kg/m     Estimated body mass index is 28.87 kg/m  as calculated from the following:    Height as of this encounter: 1.615 m (5' 3.58\").    Weight as of this encounter: 75.3 kg (166 lb).  Physical Exam  GENERAL: alert, no distress, and over weight  EYES: Eyes grossly normal to inspection, PERRL and conjunctivae and sclerae normal  HENT: ear canals and TM's normal, nose and mouth without ulcers or lesions  NECK: no adenopathy, no asymmetry, masses, or scars  RESP: lungs clear to auscultation - no rales, rhonchi or wheezes  CV: regular rate and rhythm, normal S1 S2, no S3 or S4, no murmur, click or rub, no peripheral edema  ABDOMEN: soft, nontender, no hepatosplenomegaly, no masses and bowel sounds normal   (female): normal female external genitalia, normal urethral meatus, vaginal mucosa; pap of vaginal cuff is obtained   MS: no gross musculoskeletal defects noted, no edema  SKIN: scattered cherry hemangiomas and seborrheic keratoses; there is a small scaly papule on her left great toe   NEURO: Normal strength and tone, mentation intact and speech " normal  PSYCH: mentation appears normal, affect normal/bright    Diagnostic Test Results:  Labs reviewed in Epic    ASSESSMENT / PLAN:   Encounter for Medicare annual wellness exam  Cervical cancer screening  - Pap Screen with HPV - recommended age 30 - 65 years    Type 2 diabetes mellitus with diabetic polyneuropathy, with long-term current use of insulin (H)  Background diabetic retinopathy (H)  -Well controlled with medications without side effects.   - Adult Eye  Referral; Future  - Lipid panel reflex to direct LDL Non-fasting; Future  - HEMOGLOBIN A1C; Future  - OFFICE/OUTPT VISIT,EST,LEVL IV  - Basic metabolic panel  (Ca, Cl, CO2, Creat, Gluc, K, Na, BUN); Future  - Lipid panel reflex to direct LDL Non-fasting  - HEMOGLOBIN A1C  - Basic metabolic panel  (Ca, Cl, CO2, Creat, Gluc, K, Na, BUN)    Stage 3a chronic kidney disease (H)  Hypertension goal BP (blood pressure) < 140/90  -Well controlled with medications without side effects.   - lisinopril-hydrochlorothiazide (ZESTORETIC) 20-12.5 MG tablet; Take 2 tablets by mouth every morning  - OFFICE/OUTPT VISIT,EST,LEVL IV  - Basic metabolic panel  (Ca, Cl, CO2, Creat, Gluc, K, Na, BUN); Future  - Basic metabolic panel  (Ca, Cl, CO2, Creat, Gluc, K, Na, BUN)    Hyperlipidemia with target LDL less than 70  -Well controlled with medications without side effects.   - atorvastatin (LIPITOR) 20 MG tablet; Take 1 tablet (20 mg) by mouth every morning  - OFFICE/OUTPT VISIT,EST,LEVL IV    Acquired hypothyroidism  -euthyroid on replacement   - TSH WITH FREE T4 REFLEX; Future  - levothyroxine (SYNTHROID/LEVOTHROID) 88 MCG tablet; Take 1 tablet (88 mcg) by mouth daily  - OFFICE/OUTPT VISIT,EST,LEVL IV  - TSH WITH FREE T4 REFLEX    Low bone mass  - alendronate (FOSAMAX) 70 MG tablet; Take 1 tablet (70 mg) by mouth every 7 days  -Discussed risks and benefits of this medication.   -Continue vitamin D supplement, regular exercise, falls precautions and DEXA bone scan  "every 2 - 3 years.     Major depressive disorder, recurrent, mild (H24)  - citalopram (CELEXA) 20 MG tablet; Take 1 tablet (20 mg) by mouth daily  - OFFICE/OUTPT VISIT,ESTLEVL IV    CLL (chronic lymphocytic leukemia) (H)  -surveillance per oncology     Skin lesion  -toe; Differential diagnoses would include: actinic keratoses   - Adult Dermatology  Referral; Future    Seborrheic keratoses  Skin tag  -The patient is reassured that these symptoms do not appear to represent a serious or threatening condition.       Counseling  Reviewed preventive health counseling, as reflected in patient instructions  Special attention given to:       Regular exercise       Healthy diet/nutrition       Vision screening       Osteoporosis prevention/bone health       Colon cancer screening       The ASCVD Risk score (Sabrina THAKUR, et al., 2019) failed to calculate for the following reasons:    The valid total cholesterol range is 130 to 320 mg/dL      BMI  Estimated body mass index is 28.87 kg/m  as calculated from the following:    Height as of this encounter: 1.615 m (5' 3.58\").    Weight as of this encounter: 75.3 kg (166 lb).   Weight management plan: Discussed healthy diet and exercise guidelines      She reports that she quit smoking about 35 years ago. Her smoking use included cigarettes. She started smoking about 56 years ago. She has a 10 pack-year smoking history. She has never used smokeless tobacco.      Appropriate preventive services were discussed with this patient, including applicable screening as appropriate for fall prevention, nutrition, physical activity, Tobacco-use cessation, weight loss and cognition.  Checklist reviewing preventive services available has been given to the patient.    Reviewed patients plan of care and provided an AVS. The Basic Care Plan (routine screening as documented in Health Maintenance) for Kelly meets the Care Plan requirement. This Care Plan has been established and reviewed with " the Patient.          Signed Electronically by: Sejal Rutledge MD    Identified Health Risks  I have reviewed Opioid Use Disorder and Substance Use Disorder risk factors and made any needed referrals.   Answers submitted by the patient for this visit:  Patient Health Questionnaire (Submitted on 1/22/2024)  If you checked off any problems, how difficult have these problems made it for you to do your work, take care of things at home, or get along with other people?: Not difficult at all  PHQ9 TOTAL SCORE: 2  Annual Preventive Visit (Submitted on 1/22/2024)  Chief Complaint: Annual Exam:  Blood in stool: No  Skin sensation changes: Yes  tenderness: No  breast mass: No  breast discharge: No  The patient was counseled and encouraged to consider modifying their diet and eating habits. She was provided with information on recommended healthy diet options.  The patient was provided with written information regarding signs of hearing loss.

## 2024-01-22 NOTE — PATIENT INSTRUCTIONS
Patient Education   Personalized Prevention Plan  You are due for the preventive services outlined below.  Your care team is available to assist you in scheduling these services.  If you have already completed any of these items, please share that information with your care team to update in your medical record.  Health Maintenance Due   Topic Date Due     RSV VACCINE (Pregnancy & 60+) (1 - 1-dose 60+ series) Never done     Diabetic Foot Exam  07/11/2023     Eye Exam  08/31/2023     Cholesterol Lab  11/30/2023     A1C Lab  12/06/2023     Kidney Microalbumin Urine Test  12/29/2023     ANNUAL REVIEW OF HM ORDERS  01/16/2024     Thyroid Function Lab  01/16/2024     PAP  03/17/2024     HPV Follow Up  03/17/2024     Learning About Dietary Guidelines  What are the Dietary Guidelines for Americans?     Dietary Guidelines for Americans provide tips for eating well and staying healthy. This helps reduce the risk for long-term (chronic) diseases.  These guidelines recommend that you:  Eat and drink the right amount for you. The U.S. government's food guide is called MyPlate. It can help you make your own well-balanced eating plan.  Try to balance your eating with your activity. This helps you stay at a healthy weight.  Drink alcohol in moderation, if at all.  Limit foods high in salt, saturated fat, trans fat, and added sugar.  These guidelines are from the U.S. Department of Agriculture and the U.S. Department of Health and Human Services. They are updated every 5 years.  What is MyPlate?  MyPlate is the U.S. government's food guide. It can help you make your own well-balanced eating plan. A balanced eating plan means that you eat enough, but not too much, and that your food gives you the nutrients you need to stay healthy.  MyPlate focuses on eating plenty of whole grains, fruits, and vegetables, and on limiting fat and sugar. It is available online at www.ChooseMyPlate.gov.  How can you get started?  If you're trying to  "eat healthier, you can slowly change your eating habits over time. You don't have to make big changes all at once. Start by adding one or two healthy foods to your meals each day.  Grains  Choose whole-grain breads and cereals and whole-wheat pasta and whole-grain crackers.  Vegetables  Eat a variety of vegetables every day. They have lots of nutrients and are part of a heart-healthy diet.  Fruits  Eat a variety of fruits every day. Fruits contain lots of nutrients. Choose fresh fruit instead of fruit juice.  Protein foods  Choose fish and lean poultry more often. Eat red meat and fried meats less often. Dried beans, tofu, and nuts are also good sources of protein.  Dairy  Choose low-fat or fat-free products from this food group. If you have problems digesting milk, try eating cheese or yogurt instead.  Fats and oils  Limit fats and oils if you're trying to cut calories. Choose healthy fats when you cook. These include canola oil and olive oil.  Where can you learn more?  Go to https://www.[employer].net/patiented  Enter D676 in the search box to learn more about \"Learning About Dietary Guidelines.\"  Current as of: February 28, 2023               Content Version: 13.8    1821-2204 [employer].   Care instructions adapted under license by your healthcare professional. If you have questions about a medical condition or this instruction, always ask your healthcare professional. [employer] disclaims any warranty or liability for your use of this information.      Hearing Loss: Care Instructions  Overview     Hearing loss is a sudden or slow decrease in how well you hear. It can range from slight to profound. Permanent hearing loss can occur with aging. It also can happen when you are exposed long-term to loud noise. Examples include listening to loud music, riding motorcycles, or being around other loud machines.  Hearing loss can affect your work and home life. It can make you feel lonely or " depressed. You may feel that you have lost your independence. But hearing aids and other devices can help you hear better and feel connected to others.  Follow-up care is a key part of your treatment and safety. Be sure to make and go to all appointments, and call your doctor if you are having problems. It's also a good idea to know your test results and keep a list of the medicines you take.  How can you care for yourself at home?  Avoid loud noises whenever possible. This helps keep your hearing from getting worse.  Always wear hearing protection around loud noises.  Wear a hearing aid as directed.  A professional can help you pick a hearing aid that will work best for you.  You can also get hearing aids over the counter for mild to moderate hearing loss.  Have hearing tests as your doctor suggests. They can show whether your hearing has changed. Your hearing aid may need to be adjusted.  Use other devices as needed. These may include:  Telephone amplifiers and hearing aids that can connect to a television, stereo, radio, or microphone.  Devices that use lights or vibrations. These alert you to the doorbell, a ringing telephone, or a baby monitor.  Television closed-captioning. This shows the words at the bottom of the screen. Most new TVs can do this.  TTY (text telephone). This lets you type messages back and forth on the telephone instead of talking or listening. These devices are also called TDD. When messages are typed on the keyboard, they are sent over the phone line to a receiving TTY. The message is shown on a monitor.  Use text messaging, social media, and email if it is hard for you to communicate by telephone.  Try to learn a listening technique called speechreading. It is not lipreading. You pay attention to people's gestures, expressions, posture, and tone of voice. These clues can help you understand what a person is saying. Face the person you are talking to, and have them face you. Make sure the  "lighting is good. You need to see the other person's face clearly.  Think about counseling if you need help to adjust to your hearing loss.  When should you call for help?  Watch closely for changes in your health, and be sure to contact your doctor if:    You think your hearing is getting worse.     You have new symptoms, such as dizziness or nausea.   Where can you learn more?  Go to https://www.[employer].net/patiented  Enter R798 in the search box to learn more about \"Hearing Loss: Care Instructions.\"  Current as of: February 28, 2023               Content Version: 13.8    5197-8758 [employer].   Care instructions adapted under license by your healthcare professional. If you have questions about a medical condition or this instruction, always ask your healthcare professional. [employer] disclaims any warranty or liability for your use of this information.         "

## 2024-01-23 LAB
ANION GAP SERPL CALCULATED.3IONS-SCNC: 11 MMOL/L (ref 7–15)
BUN SERPL-MCNC: 24.4 MG/DL (ref 8–23)
CALCIUM SERPL-MCNC: 9.4 MG/DL (ref 8.8–10.2)
CHLORIDE SERPL-SCNC: 105 MMOL/L (ref 98–107)
CHOLEST SERPL-MCNC: 112 MG/DL
CREAT SERPL-MCNC: 1.32 MG/DL (ref 0.51–0.95)
CREAT UR-MCNC: 117 MG/DL
DEPRECATED HCO3 PLAS-SCNC: 23 MMOL/L (ref 22–29)
EGFRCR SERPLBLD CKD-EPI 2021: 42 ML/MIN/1.73M2
FASTING STATUS PATIENT QL REPORTED: ABNORMAL
GLUCOSE SERPL-MCNC: 81 MG/DL (ref 70–99)
HDLC SERPL-MCNC: 39 MG/DL
LDLC SERPL CALC-MCNC: 49 MG/DL
MICROALBUMIN UR-MCNC: <12 MG/L
MICROALBUMIN/CREAT UR: NORMAL MG/G{CREAT}
NONHDLC SERPL-MCNC: 73 MG/DL
POTASSIUM SERPL-SCNC: 4.3 MMOL/L (ref 3.4–5.3)
PTH-INTACT SERPL-MCNC: 48 PG/ML (ref 15–65)
SODIUM SERPL-SCNC: 139 MMOL/L (ref 135–145)
TRIGL SERPL-MCNC: 120 MG/DL
TSH SERPL DL<=0.005 MIU/L-ACNC: 0.58 UIU/ML (ref 0.3–4.2)

## 2024-01-23 NOTE — RESULT ENCOUNTER NOTE
Kelly,    Your thyroid and parathyroid levels are normal. Your blood glucose and cholesterol are controlled.     Your kidney tests are stable.     Sejal Rutledge MD

## 2024-01-29 LAB
HUMAN PAPILLOMA VIRUS 16 DNA: NEGATIVE
HUMAN PAPILLOMA VIRUS 18 DNA: NEGATIVE
HUMAN PAPILLOMA VIRUS FINAL DIAGNOSIS: NORMAL
HUMAN PAPILLOMA VIRUS OTHER HR: NEGATIVE

## 2024-01-31 ENCOUNTER — TRANSFERRED RECORDS (OUTPATIENT)
Dept: MULTI SPECIALTY CLINIC | Facility: CLINIC | Age: 74
End: 2024-01-31

## 2024-03-26 DIAGNOSIS — Z98.1 S/P LUMBAR FUSION: Primary | ICD-10-CM

## 2024-04-08 ENCOUNTER — OFFICE VISIT (OUTPATIENT)
Dept: ORTHOPEDICS | Facility: CLINIC | Age: 74
End: 2024-04-08
Payer: COMMERCIAL

## 2024-04-08 ENCOUNTER — ANCILLARY PROCEDURE (OUTPATIENT)
Dept: GENERAL RADIOLOGY | Facility: CLINIC | Age: 74
End: 2024-04-08
Attending: ORTHOPAEDIC SURGERY
Payer: COMMERCIAL

## 2024-04-08 DIAGNOSIS — Z98.890 HISTORY OF LUMBAR LAMINECTOMY: Primary | ICD-10-CM

## 2024-04-08 PROBLEM — M48.062 SPINAL STENOSIS OF LUMBAR REGION WITH NEUROGENIC CLAUDICATION: Status: RESOLVED | Noted: 2023-01-27 | Resolved: 2024-04-08

## 2024-04-08 PROCEDURE — 99212 OFFICE O/P EST SF 10 MIN: CPT | Performed by: ORTHOPAEDIC SURGERY

## 2024-04-08 PROCEDURE — 72100 X-RAY EXAM L-S SPINE 2/3 VWS: CPT | Mod: TC | Performed by: RADIOLOGY

## 2024-04-08 NOTE — LETTER
4/8/2024         RE: Kelly Vegas  [address] [place] [id number]        Dear Colleague,    Thank you for referring your patient, Kelly Vegas, to the [facility]. Please see a copy of my visit note below.    Chief concern:  6-month postoperative follow-up    History of present illness:  Kelly Vegas returns today now 6 months s/p lumbar laminectomy.  Patient reports improvement in pain, strength and activity tolerance.  Has intermittent muscle soreness which seems like QL and is responsive to topical voltaren.  Denies any new focal strength or sensory deficits.  No bowel or bladder dysfunction    Objective:  Examination patient is alert and oriented with no acute distress  Nonlabored respiration with no audible wheeze  Examination of the surgical incision reveals well-healed surgical incision without surrounding erythema or fluctuance  Patient is able to stand and walk without assistive device  Resisted strength testing reveals 5/5 strength throughout the lower extremities  Sensations intact light light touch in L3-S1 distributions  There is no pathologic reflexes for patellar Achilles tendon    AP and lateral views of the lumbar spine reviewed which demonstrate stable alignment without fracture or olisthesis    Impression plan:  6-month status post above surgery.  Doing well overall.  Will continue with activities as tolerated. No restrictions. Topical managements can help with muscle soreness.  Can provide refill for voltaren if that continues to be helpful.     Time spent on this clinical encounter including previsit chart review, history and physical examination, patient counseling and documentation was 15 minutes on the date of encounter.    Bryson Valdez MD  , Department of Orthopedic Surgery  [facility]

## 2024-04-08 NOTE — NURSING NOTE
Reason For Visit:   Chief Complaint   Patient presents with    RECHECK     DOS: 10/12/23 // LAMINECTOMY, SPINE, LUMBAR, 3 OR MORE LEVELS, POSTERIOR APPROACH,       Primary MD: Sejal Rutledge  Ref. MD: EST    ?  No  Occupation retired.     Date of injury: No  Type of injury: No.     Date of surgery: 9/28/23  Type of surgery:   LAMINECTOMY, SPINE, LUMBAR, 3 OR MORE LEVELS, POSTERIOR APPROACH, USING MICROSCOPE LUMBAR 3-4, 4-5, LUMBAR 5 SACRAL 1 and Dura Repair        Smoker: No  Request smoking cessation information: No    There were no vitals taken for this visit.    Pain Assessment  Patient Currently in Pain: Denies    Oswestry (JOHN) Questionnaire        4/8/2024    12:02 PM   OSWESTRY DISABILITY INDEX   Count 9   Sum 12   Oswestry Score (%) 26.67 %          Visual Analog Pain Scale  Back Pain Scale 0-10: 0  Right leg pain: 0 (right foot tingling)  Left leg pain: 0  Neck Pain Scale 0-10: 0  Right arm pain: 0  Left arm pain: 0    Promis 10 Assessment        2/1/2023     9:39 AM   PROMIS 10   In general, would you say your health is: Good   In general, would you say your quality of life is: Good   In general, how would you rate your physical health? Fair   In general, how would you rate your mental health, including your mood and your ability to think? Good   In general, how would you rate your satisfaction with your social activities and relationships? Good   In general, please rate how well you carry out your usual social activities and roles Good   To what extent are you able to carry out your everyday physical activities such as walking, climbing stairs, carrying groceries, or moving a chair? Moderately   In the past 7 days, how often have you been bothered by emotional problems such as feeling anxious, depressed, or irritable? Sometimes   In the past 7 days, how would you rate your fatigue on average? Moderate   In the past 7 days, how would you rate your pain on average, where 0 means no  pain, and 10 means worst imaginable pain? 7   In general, would you say your health is: 3   In general, would you say your quality of life is: 3   In general, how would you rate your physical health? 2   In general, how would you rate your mental health, including your mood and your ability to think? 3   In general, how would you rate your satisfaction with your social activities and relationships? 3   In general, please rate how well you carry out your usual social activities and roles. (This includes activities at home, at work and in your community, and responsibilities as a parent, child, spouse, employee, friend, etc.) 3   To what extent are you able to carry out your everyday physical activities such as walking, climbing stairs, carrying groceries, or moving a chair? 3   In the past 7 days, how often have you been bothered by emotional problems such as feeling anxious, depressed, or irritable? 3   In the past 7 days, how would you rate your fatigue on average? 3   In the past 7 days, how would you rate your pain on average, where 0 means no pain, and 10 means worst imaginable pain? 7   Global Mental Health Score 12   Global Physical Health Score 10   PROMIS TOTAL - SUBSCORES 22                Junie Clements LPN

## 2024-04-08 NOTE — PROGRESS NOTES
Chief concern:  6-month postoperative follow-up    History of present illness:  Kelly Vegas returns today now 6 months s/p lumbar laminectomy.  Patient reports improvement in pain, strength and activity tolerance.  Has intermittent muscle soreness which seems like QL and is responsive to topical voltaren.  Denies any new focal strength or sensory deficits.  No bowel or bladder dysfunction    Objective:  Examination patient is alert and oriented with no acute distress  Nonlabored respiration with no audible wheeze  Examination of the surgical incision reveals well-healed surgical incision without surrounding erythema or fluctuance  Patient is able to stand and walk without assistive device  Resisted strength testing reveals 5/5 strength throughout the lower extremities  Sensations intact light light touch in L3-S1 distributions  There is no pathologic reflexes for patellar Achilles tendon    AP and lateral views of the lumbar spine reviewed which demonstrate stable alignment without fracture or olisthesis    Impression plan:  6-month status post above surgery.  Doing well overall.  Will continue with activities as tolerated. No restrictions. Topical managements can help with muscle soreness.  Can provide refill for voltaren if that continues to be helpful.     Time spent on this clinical encounter including previsit chart review, history and physical examination, patient counseling and documentation was 15 minutes on the date of encounter.    Bryson Valdez MD  , Department of Orthopedic Surgery  [facility]      Bryson aVldez MD  Orthopedic Spine Surgeon  , Department of Orthopedic Surgery

## 2024-05-09 DIAGNOSIS — E03.9 ACQUIRED HYPOTHYROIDISM: ICD-10-CM

## 2024-05-09 RX ORDER — LEVOTHYROXINE SODIUM 88 UG/1
88 TABLET ORAL DAILY
Qty: 90 TABLET | Refills: 0 | Status: SHIPPED | OUTPATIENT
Start: 2024-05-09 | End: 2024-07-31

## 2024-05-09 NOTE — TELEPHONE ENCOUNTER
Patient requesting 90 day supply of medication    Per protocol ok for RN to refill    Rx sent     Junie Arriaza RN  [facility]

## 2024-05-22 DIAGNOSIS — C91.10 CLL (CHRONIC LYMPHOCYTIC LEUKEMIA) (H): Primary | ICD-10-CM

## 2024-05-22 DIAGNOSIS — E61.1 IRON DEFICIENCY: ICD-10-CM

## 2024-06-21 ENCOUNTER — LAB (OUTPATIENT)
Dept: LAB | Facility: CLINIC | Age: 74
End: 2024-06-21
Payer: COMMERCIAL

## 2024-06-21 DIAGNOSIS — E61.1 IRON DEFICIENCY: ICD-10-CM

## 2024-06-21 DIAGNOSIS — C91.10 CLL (CHRONIC LYMPHOCYTIC LEUKEMIA) (H): ICD-10-CM

## 2024-06-21 LAB
ACANTHOCYTES BLD QL SMEAR: ABNORMAL
AUER BODIES BLD QL SMEAR: ABNORMAL
BASO STIPL BLD QL SMEAR: ABNORMAL
BASOPHILS # BLD AUTO: 0 10E3/UL (ref 0–0.2)
BASOPHILS NFR BLD AUTO: 0 %
BITE CELLS BLD QL SMEAR: ABNORMAL
BLISTER CELLS BLD QL SMEAR: ABNORMAL
BURR CELLS BLD QL SMEAR: ABNORMAL
DACRYOCYTES BLD QL SMEAR: ABNORMAL
ELLIPTOCYTES BLD QL SMEAR: SLIGHT
EOSINOPHIL # BLD AUTO: 0.2 10E3/UL (ref 0–0.7)
EOSINOPHIL NFR BLD AUTO: 2 %
ERYTHROCYTE [DISTWIDTH] IN BLOOD BY AUTOMATED COUNT: 12.4 % (ref 10–15)
FRAGMENTS BLD QL SMEAR: ABNORMAL
GIANT PLATELETS BLD QL SMEAR: ABNORMAL
HCT VFR BLD AUTO: 38.4 % (ref 35–47)
HGB BLD-MCNC: 12.2 G/DL (ref 11.7–15.7)
HGB C CRYSTALS: ABNORMAL
HOWELL-JOLLY BOD BLD QL SMEAR: ABNORMAL
IMM GRANULOCYTES # BLD: 0 10E3/UL
IMM GRANULOCYTES NFR BLD: 0 %
LYMPHOCYTES # BLD AUTO: 6.3 10E3/UL (ref 0.8–5.3)
LYMPHOCYTES NFR BLD AUTO: 59 %
MCH RBC QN AUTO: 29.9 PG (ref 26.5–33)
MCHC RBC AUTO-ENTMCNC: 31.8 G/DL (ref 31.5–36.5)
MCV RBC AUTO: 94 FL (ref 78–100)
MONOCYTES # BLD AUTO: 0.4 10E3/UL (ref 0–1.3)
MONOCYTES NFR BLD AUTO: 4 %
NEUTROPHILS # BLD AUTO: 3.8 10E3/UL (ref 1.6–8.3)
NEUTROPHILS NFR BLD AUTO: 35 %
NEUTS HYPERSEG BLD QL SMEAR: ABNORMAL
NRBC # BLD AUTO: 0 10E3/UL
NRBC BLD AUTO-RTO: 0 /100
PATH REV: ABNORMAL
PLAT MORPH BLD: ABNORMAL
PLATELET # BLD AUTO: 246 10E3/UL (ref 150–450)
POLYCHROMASIA BLD QL SMEAR: ABNORMAL
RBC # BLD AUTO: 4.08 10E6/UL (ref 3.8–5.2)
RBC AGGLUT BLD QL: ABNORMAL
RBC MORPH BLD: ABNORMAL
ROULEAUX BLD QL SMEAR: ABNORMAL
SICKLE CELLS BLD QL SMEAR: ABNORMAL
SMUDGE CELLS BLD QL SMEAR: ABNORMAL
SPHEROCYTES BLD QL SMEAR: ABNORMAL
STOMATOCYTES BLD QL SMEAR: ABNORMAL
TARGETS BLD QL SMEAR: ABNORMAL
TOXIC GRANULES BLD QL SMEAR: ABNORMAL
VARIANT LYMPHS BLD QL SMEAR: ABNORMAL
WBC # BLD AUTO: 10.7 10E3/UL (ref 4–11)

## 2024-06-21 PROCEDURE — 85025 COMPLETE CBC W/AUTO DIFF WBC: CPT

## 2024-06-21 PROCEDURE — 36415 COLL VENOUS BLD VENIPUNCTURE: CPT

## 2024-06-21 PROCEDURE — 80053 COMPREHEN METABOLIC PANEL: CPT

## 2024-06-21 PROCEDURE — 83540 ASSAY OF IRON: CPT

## 2024-06-21 PROCEDURE — 83615 LACTATE (LD) (LDH) ENZYME: CPT

## 2024-06-21 PROCEDURE — 83550 IRON BINDING TEST: CPT

## 2024-06-21 PROCEDURE — 82728 ASSAY OF FERRITIN: CPT

## 2024-06-22 LAB
ALBUMIN SERPL BCG-MCNC: 4 G/DL (ref 3.5–5.2)
ALP SERPL-CCNC: 76 U/L (ref 40–150)
ALT SERPL W P-5'-P-CCNC: 11 U/L (ref 0–50)
ANION GAP SERPL CALCULATED.3IONS-SCNC: 9 MMOL/L (ref 7–15)
AST SERPL W P-5'-P-CCNC: 24 U/L (ref 0–45)
BILIRUB SERPL-MCNC: 0.3 MG/DL
BUN SERPL-MCNC: 29.7 MG/DL (ref 8–23)
CALCIUM SERPL-MCNC: 9.5 MG/DL (ref 8.8–10.2)
CHLORIDE SERPL-SCNC: 107 MMOL/L (ref 98–107)
CREAT SERPL-MCNC: 1.23 MG/DL (ref 0.51–0.95)
DEPRECATED HCO3 PLAS-SCNC: 23 MMOL/L (ref 22–29)
EGFRCR SERPLBLD CKD-EPI 2021: 46 ML/MIN/1.73M2
FERRITIN SERPL-MCNC: 33 NG/ML (ref 11–328)
GLUCOSE SERPL-MCNC: 130 MG/DL (ref 70–99)
IRON BINDING CAPACITY (ROCHE): 278 UG/DL (ref 240–430)
IRON SATN MFR SERPL: 37 % (ref 15–46)
IRON SERPL-MCNC: 103 UG/DL (ref 37–145)
LDH SERPL L TO P-CCNC: 210 U/L (ref 0–250)
POTASSIUM SERPL-SCNC: 4.8 MMOL/L (ref 3.4–5.3)
PROT SERPL-MCNC: 6.3 G/DL (ref 6.4–8.3)
SODIUM SERPL-SCNC: 139 MMOL/L (ref 135–145)

## 2024-06-23 ENCOUNTER — HEALTH MAINTENANCE LETTER (OUTPATIENT)
Age: 74
End: 2024-06-23

## 2024-06-25 ENCOUNTER — ONCOLOGY VISIT (OUTPATIENT)
Dept: ONCOLOGY | Facility: CLINIC | Age: 74
End: 2024-06-25
Attending: NURSE PRACTITIONER
Payer: COMMERCIAL

## 2024-06-25 VITALS
OXYGEN SATURATION: 98 % | HEIGHT: 64 IN | HEART RATE: 76 BPM | BODY MASS INDEX: 28.38 KG/M2 | SYSTOLIC BLOOD PRESSURE: 149 MMHG | DIASTOLIC BLOOD PRESSURE: 57 MMHG | TEMPERATURE: 98.8 F | WEIGHT: 166.2 LBS

## 2024-06-25 DIAGNOSIS — C91.10 CLL (CHRONIC LYMPHOCYTIC LEUKEMIA) (H): Primary | ICD-10-CM

## 2024-06-25 DIAGNOSIS — E61.1 IRON DEFICIENCY: ICD-10-CM

## 2024-06-25 PROCEDURE — G2211 COMPLEX E/M VISIT ADD ON: HCPCS | Performed by: NURSE PRACTITIONER

## 2024-06-25 PROCEDURE — 99214 OFFICE O/P EST MOD 30 MIN: CPT | Performed by: NURSE PRACTITIONER

## 2024-06-25 PROCEDURE — G0463 HOSPITAL OUTPT CLINIC VISIT: HCPCS | Performed by: NURSE PRACTITIONER

## 2024-06-25 NOTE — LETTER
6/25/2024      Kelly Vegas  [address] [place] [id number]      Dear Colleague,    Thank you for referring your patient, Kelly Vegas, to the [facility]. Please see a copy of my visit note below.    [facility] Progress Note    Patient: Kelly Vegas  MRN: [id number]  Date of Service: Jun 25, 2024          Reason for Visit    CLL    Primary Hematologist: Dr. Guzman      Assessment and Plan  #. 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  WBC has improved to WNL and ALC minimally elevated 6.3. Hgb and platelets WNL. CMP and LDH WNL. Overall good risk CLL.      Plan:  -Annual labs and provider exam   -B symptoms requiring reassessment sooner were reviewed.    #. Anemia, hx of DEBORA and CKD  #. Hx small bowel AVMs   Secondary to GI blood loss in the past.  Iron studies are within normal limits.  Hgb WNL.   Continue orals iron every other day.    Plan:  -Continue oral iron every other day  -Monitor ferritin/iron panel annually    #.  Varicose veins  Asymptomatic    Plan:  -Recommended elevation and compression stockings  -Manage with PCP prn     Cancer Staging   No matching staging information was found for the patient.      ECOG Performance    1 - Can't do physically strenuous work, but fully ambyulatory and can do light sedentary work      Oncology history  10/2018: CLL, Boyer 0. 13 q. Deletion. (good risk) + iron def anemia (related to GI bleeding)  -noted to have new moderate leukocytosis/lymphocytosis and moderate normocytic normochromic anemia.  -Low ferritin and iron saturation, suggestive of iron-deficiency anemia.   -Hematology work up most consistent with CLL and DEBORA.  -FISH 12/2018 found Loss of 13q14 and rearrangement of IGH.   -1/2020: Upper endo found non-bleeding erosive gastropathy, with negative colonoscopy. Multiple AVM in proximal small bowel and colon small bowel capsule  studies.    Treatment  CLL:  -observation    DEBORA:  -12/2018 - 5/2019: oral iron with repletion.   -recurrent anemia 10/2019 off oral iron.     10/2019 - present: oral iron every other day    Interval History   Kelly Vegas is a 73 year old female with history of CLL and iron deficiency anemia secondary to bleeding from multiple GI AVMs. Returns for 1-yr surveillance visit.     Has not had treatment for the CLL to date. No b symptoms . Stable weight.     Continues on oral iron every other day, tolerates well. No signs of GI bleeding. Stools are a bit darker on oral iron.     Had steroid injection for right rotator cuff tendonopathy with relief. Had back surgery this past year with improvement. Has some tingling in feet overnight.    She has asymptomatic LE varicose veins she has questions regarding management.    Physical Exam    General: alert and cooperative  HEENT: Head: Normal, normocephalic, atraumatic.  Eye: Normal external eye, conjunctiva, lids cornea, LURDES.  Chest: Clear to auscultation bilaterally  Cardiac: RRR  Abdomen: Soft, nontender, no organomegaly  Extremities: atraumatic, no peripheral edema. Varicose veins.   CNS: Alert and oriented x3, neurologic exam grossly normal.  Lymphatics: No clinically significant peripheral adenopathy noted    Lab Results    Recent Results (from the past 168 hour(s))   Ferritin   Result Value Ref Range    Ferritin 33 11 - 328 ng/mL   Iron and iron binding capacity   Result Value Ref Range    Iron 103 37 - 145 ug/dL    Iron Binding Capacity 278 240 - 430 ug/dL    Iron Sat Index 37 15 - 46 %   Lactate Dehydrogenase   Result Value Ref Range    Lactate Dehydrogenase 210 0 - 250 U/L   Comprehensive metabolic panel (BMP + Alb, Alk Phos, ALT, AST, Total. Bili, TP)   Result Value Ref Range    Sodium 139 135 - 145 mmol/L    Potassium 4.8 3.4 - 5.3 mmol/L    Carbon Dioxide (CO2) 23 22 - 29 mmol/L    Anion Gap 9 7 - 15 mmol/L    Urea Nitrogen 29.7 (H) 8.0 - 23.0 mg/dL     Creatinine 1.23 (H) 0.51 - 0.95 mg/dL    GFR Estimate 46 (L) >60 mL/min/1.73m2    Calcium 9.5 8.8 - 10.2 mg/dL    Chloride 107 98 - 107 mmol/L    Glucose 130 (H) 70 - 99 mg/dL    Alkaline Phosphatase 76 40 - 150 U/L    AST 24 0 - 45 U/L    ALT 11 0 - 50 U/L    Protein Total 6.3 (L) 6.4 - 8.3 g/dL    Albumin 4.0 3.5 - 5.2 g/dL    Bilirubin Total 0.3 <=1.2 mg/dL   CBC with platelets and differential   Result Value Ref Range    WBC Count 10.7 4.0 - 11.0 10e3/uL    RBC Count 4.08 3.80 - 5.20 10e6/uL    Hemoglobin 12.2 11.7 - 15.7 g/dL    Hematocrit 38.4 35.0 - 47.0 %    MCV 94 78 - 100 fL    MCH 29.9 26.5 - 33.0 pg    MCHC 31.8 31.5 - 36.5 g/dL    RDW 12.4 10.0 - 15.0 %    Platelet Count 246 150 - 450 10e3/uL    % Neutrophils 35 %    % Lymphocytes 59 %    % Monocytes 4 %    % Eosinophils 2 %    % Basophils 0 %    % Immature Granulocytes 0 %    NRBCs per 100 WBC 0 <1 /100    Absolute Neutrophils 3.8 1.6 - 8.3 10e3/uL    Absolute Lymphocytes 6.3 (H) 0.8 - 5.3 10e3/uL    Absolute Monocytes 0.4 0.0 - 1.3 10e3/uL    Absolute Eosinophils 0.2 0.0 - 0.7 10e3/uL    Absolute Basophils 0.0 0.0 - 0.2 10e3/uL    Absolute Immature Granulocytes 0.0 <=0.4 10e3/uL    Absolute NRBCs 0.0 10e3/uL   RBC and Platelet Morphology   Result Value Ref Range    RBC Morphology Confirmed RBC Indices     Platelet Assessment  Automated Count Confirmed. Platelet morphology is normal.     Automated Count Confirmed. Platelet morphology is normal.    Giant Platelets      Acanthocytes      Shy Rods      Basophilic Stippling      Bite Cells      Blister Cells      Ironwood Cells      Elliptocytes Slight (A) None Seen    Hgb C Crystals      Davis-Jolly Bodies      Hypersegmented Neutrophils      Polychromasia      RBC agglutination      RBC Fragments      Reactive Lymphocytes      Rouleaux      Sickle Cells      Smudge Cells      Spherocytes      Stomatocytes      Target Cells      Teardrop Cells      Toxic Neutrophils      Pathologist Review Comments (Blood)   "       Imaging    No results found.    Total time 30 minutes, to include face to face visit, review of EMR, ordering, documentation and coordination of care on date of service.    complexity modifier for longitudinal care.         Signed by: Lucinda Fine NP      Oncology Rooming Note    June 25, 2024 2:40 PM   Kelly Vegas is a 73 year old female who presents for:    Chief Complaint   Patient presents with     Oncology Clinic Visit     CLL (chronic lymphocytic leukemia) - provider visit only     Initial Vitals: BP (!) 149/57 (BP Location: Right arm, Patient Position: Sitting, Cuff Size: Adult Regular)   Pulse 76   Temp 98.8  F (37.1  C) (Tympanic)   Ht 1.631 m (5' 4.2\")   Wt 75.4 kg (166 lb 3.2 oz)   SpO2 98%   BMI 28.35 kg/m   Estimated body mass index is 28.35 kg/m  as calculated from the following:    Height as of this encounter: 1.631 m (5' 4.2\").    Weight as of this encounter: 75.4 kg (166 lb 3.2 oz). Body surface area is 1.85 meters squared.  Data Unavailable Comment: Data Unavailable   No LMP recorded. Patient has had a hysterectomy.  Allergies reviewed: Yes  Medications reviewed: Yes    Medications: Medication refills not needed today.  Pharmacy name entered into [place]: [facility] [address] [place], [place] - [address] [place]    Frailty Screening:   Is the patient here for a new oncology consult visit in cancer care? 2. No      Clinical concerns: New numbness and tingling in legs. Recent visit with dermatologist, no concerns from dermatologist.        Gwendolyn Syed MA                Again, thank you for allowing me to participate in the care of your patient.        Sincerely,        Lucinda Fine NP  "

## 2024-06-25 NOTE — PROGRESS NOTES
"Oncology Rooming Note    June 25, 2024 2:40 PM   Kelly Vegas is a 73 year old female who presents for:    Chief Complaint   Patient presents with    Oncology Clinic Visit     CLL (chronic lymphocytic leukemia) - provider visit only     Initial Vitals: BP (!) 149/57 (BP Location: Right arm, Patient Position: Sitting, Cuff Size: Adult Regular)   Pulse 76   Temp 98.8  F (37.1  C) (Tympanic)   Ht 1.631 m (5' 4.2\")   Wt 75.4 kg (166 lb 3.2 oz)   SpO2 98%   BMI 28.35 kg/m   Estimated body mass index is 28.35 kg/m  as calculated from the following:    Height as of this encounter: 1.631 m (5' 4.2\").    Weight as of this encounter: 75.4 kg (166 lb 3.2 oz). Body surface area is 1.85 meters squared.  Data Unavailable Comment: Data Unavailable   No LMP recorded. Patient has had a hysterectomy.  Allergies reviewed: Yes  Medications reviewed: Yes    Medications: Medication refills not needed today.  Pharmacy name entered into [place]: [facility] [address] [facility], [place] - [address] [place]    Frailty Screening:   Is the patient here for a new oncology consult visit in cancer care? 2. No      Clinical concerns: New numbness and tingling in legs. Recent visit with dermatologist, no concerns from dermatologist.        Gwendolyn Syed MA              "

## 2024-06-25 NOTE — PROGRESS NOTES
[facility] Progress Note    Patient: Kelly Vegas  MRN: [id number]  Date of Service: Jun 25, 2024          Reason for Visit    CLL    Primary Hematologist: Dr. Guzman      Assessment and Plan  #. 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  WBC has improved to WNL and ALC minimally elevated 6.3. Hgb and platelets WNL. CMP and LDH WNL. Overall good risk CLL.      Plan:  -Annual labs and provider exam   -B symptoms requiring reassessment sooner were reviewed.    #. Anemia, hx of DEBORA and CKD  #. Hx small bowel AVMs   Secondary to GI blood loss in the past.  Iron studies are within normal limits.  Hgb WNL.   Continue orals iron every other day.    Plan:  -Continue oral iron every other day  -Monitor ferritin/iron panel annually    #.  Varicose veins  Asymptomatic    Plan:  -Recommended elevation and compression stockings  -Manage with PCP prn     Cancer Staging   No matching staging information was found for the patient.      ECOG Performance    1 - Can't do physically strenuous work, but fully ambyulatory and can do light sedentary work      Oncology history  10/2018: CLL, Boyer 0. 13 q. Deletion. (good risk) + iron def anemia (related to GI bleeding)  -noted to have new moderate leukocytosis/lymphocytosis and moderate normocytic normochromic anemia.  -Low ferritin and iron saturation, suggestive of iron-deficiency anemia.   -Hematology work up most consistent with CLL and DEBORA.  -FISH 12/2018 found Loss of 13q14 and rearrangement of IGH.   -1/2020: Upper endo found non-bleeding erosive gastropathy, with negative colonoscopy. Multiple AVM in proximal small bowel and colon small bowel capsule studies.    Treatment  CLL:  -observation    DEBORA:  -12/2018 - 5/2019: oral iron with repletion.   -recurrent anemia 10/2019 off oral iron.     10/2019 - present: oral iron every other day    Interval History   Kelly Vegas is a 73 year old female with history of CLL and  iron deficiency anemia secondary to bleeding from multiple GI AVMs. Returns for 1-yr surveillance visit.     Has not had treatment for the CLL to date. No b symptoms . Stable weight.     Continues on oral iron every other day, tolerates well. No signs of GI bleeding. Stools are a bit darker on oral iron.     Had steroid injection for right rotator cuff tendonopathy with relief. Had back surgery this past year with improvement. Has some tingling in feet overnight.    She has asymptomatic LE varicose veins she has questions regarding management.    Physical Exam    General: alert and cooperative  HEENT: Head: Normal, normocephalic, atraumatic.  Eye: Normal external eye, conjunctiva, lids cornea, LURDES.  Chest: Clear to auscultation bilaterally  Cardiac: RRR  Abdomen: Soft, nontender, no organomegaly  Extremities: atraumatic, no peripheral edema. Varicose veins.   CNS: Alert and oriented x3, neurologic exam grossly normal.  Lymphatics: No clinically significant peripheral adenopathy noted    Lab Results    Recent Results (from the past 168 hour(s))   Ferritin   Result Value Ref Range    Ferritin 33 11 - 328 ng/mL   Iron and iron binding capacity   Result Value Ref Range    Iron 103 37 - 145 ug/dL    Iron Binding Capacity 278 240 - 430 ug/dL    Iron Sat Index 37 15 - 46 %   Lactate Dehydrogenase   Result Value Ref Range    Lactate Dehydrogenase 210 0 - 250 U/L   Comprehensive metabolic panel (BMP + Alb, Alk Phos, ALT, AST, Total. Bili, TP)   Result Value Ref Range    Sodium 139 135 - 145 mmol/L    Potassium 4.8 3.4 - 5.3 mmol/L    Carbon Dioxide (CO2) 23 22 - 29 mmol/L    Anion Gap 9 7 - 15 mmol/L    Urea Nitrogen 29.7 (H) 8.0 - 23.0 mg/dL    Creatinine 1.23 (H) 0.51 - 0.95 mg/dL    GFR Estimate 46 (L) >60 mL/min/1.73m2    Calcium 9.5 8.8 - 10.2 mg/dL    Chloride 107 98 - 107 mmol/L    Glucose 130 (H) 70 - 99 mg/dL    Alkaline Phosphatase 76 40 - 150 U/L    AST 24 0 - 45 U/L    ALT 11 0 - 50 U/L    Protein Total 6.3 (L)  6.4 - 8.3 g/dL    Albumin 4.0 3.5 - 5.2 g/dL    Bilirubin Total 0.3 <=1.2 mg/dL   CBC with platelets and differential   Result Value Ref Range    WBC Count 10.7 4.0 - 11.0 10e3/uL    RBC Count 4.08 3.80 - 5.20 10e6/uL    Hemoglobin 12.2 11.7 - 15.7 g/dL    Hematocrit 38.4 35.0 - 47.0 %    MCV 94 78 - 100 fL    MCH 29.9 26.5 - 33.0 pg    MCHC 31.8 31.5 - 36.5 g/dL    RDW 12.4 10.0 - 15.0 %    Platelet Count 246 150 - 450 10e3/uL    % Neutrophils 35 %    % Lymphocytes 59 %    % Monocytes 4 %    % Eosinophils 2 %    % Basophils 0 %    % Immature Granulocytes 0 %    NRBCs per 100 WBC 0 <1 /100    Absolute Neutrophils 3.8 1.6 - 8.3 10e3/uL    Absolute Lymphocytes 6.3 (H) 0.8 - 5.3 10e3/uL    Absolute Monocytes 0.4 0.0 - 1.3 10e3/uL    Absolute Eosinophils 0.2 0.0 - 0.7 10e3/uL    Absolute Basophils 0.0 0.0 - 0.2 10e3/uL    Absolute Immature Granulocytes 0.0 <=0.4 10e3/uL    Absolute NRBCs 0.0 10e3/uL   RBC and Platelet Morphology   Result Value Ref Range    RBC Morphology Confirmed RBC Indices     Platelet Assessment  Automated Count Confirmed. Platelet morphology is normal.     Automated Count Confirmed. Platelet morphology is normal.    Giant Platelets      Acanthocytes      Shy Rods      Basophilic Stippling      Bite Cells      Blister Cells      Blayne Cells      Elliptocytes Slight (A) None Seen    Hgb C Crystals      Davis-Jolly Bodies      Hypersegmented Neutrophils      Polychromasia      RBC agglutination      RBC Fragments      Reactive Lymphocytes      Rouleaux      Sickle Cells      Smudge Cells      Spherocytes      Stomatocytes      Target Cells      Teardrop Cells      Toxic Neutrophils      Pathologist Review Comments (Blood)         Imaging    No results found.    Total time 30 minutes, to include face to face visit, review of EMR, ordering, documentation and coordination of care on date of service.    complexity modifier for longitudinal care.         Signed by: Lucinda Fine NP

## 2024-07-09 ENCOUNTER — TELEPHONE (OUTPATIENT)
Dept: FAMILY MEDICINE | Facility: CLINIC | Age: 74
End: 2024-07-09
Payer: COMMERCIAL

## 2024-07-09 NOTE — LETTER
July 9, 2024      Kelly Vegas  [address] [place] [id number]      Your team at [facility] cares about your health. We have reviewed your chart and based on our findings; we are making the following recommendations to better manage your health.     You are in particular need of attention regarding the following:     Schedule a DIABETIC FOLLOW UP appointment for Office Visit. Patients with diabetes should see their provider regularly.  Schedule a DIABETIC EYE EXAM.  This exam is done with an optometrist, annually. You can schedule this appointment with your eye doctor.  If you need a referral, please let us know.  Schedule an office visit with your PRIMARY CARE PHYSICIAN for mental health follow-up.    If you have already completed these items, please contact the clinic via phone or   MyChart so your care team can review and update your records. Thank you for   choosing [facility] for your healthcare needs. For any questions,   concerns, or to schedule an appointment please contact our clinic.    Healthy Regards,      Your [facility] Care Team

## 2024-07-09 NOTE — TELEPHONE ENCOUNTER
Patient Quality Outreach    Patient is due for the following:   Diabetes -  A1C, Eye Exam, and Foot Exam  Depression  -  PHQ-9 needed    Next Steps:   Schedule a office visit for diabetes and blood pressure.    Type of outreach:    Sent letter.    Next Steps:  Reach out within 90 days via Letter.    Max number of attempts reached: Yes. Will try again in 90 days if patient still on fail list.    Questions for provider review:    None           Gisela Self, [facility]  Chart routed to Care Team.

## 2024-07-31 DIAGNOSIS — E03.9 ACQUIRED HYPOTHYROIDISM: ICD-10-CM

## 2024-07-31 RX ORDER — LEVOTHYROXINE SODIUM 88 UG/1
88 TABLET ORAL DAILY
Qty: 90 TABLET | Refills: 0 | Status: SHIPPED | OUTPATIENT
Start: 2024-07-31

## 2024-08-02 DIAGNOSIS — F33.0 MAJOR DEPRESSIVE DISORDER, RECURRENT, MILD (H): ICD-10-CM

## 2024-08-02 RX ORDER — CITALOPRAM HYDROBROMIDE 20 MG/1
20 TABLET ORAL DAILY
Qty: 90 TABLET | Refills: 0 | Status: SHIPPED | OUTPATIENT
Start: 2024-08-02

## 2024-09-09 ENCOUNTER — PATIENT OUTREACH (OUTPATIENT)
Dept: ONCOLOGY | Facility: CLINIC | Age: 74
End: 2024-09-09
Payer: COMMERCIAL

## 2024-10-30 DIAGNOSIS — F33.0 MAJOR DEPRESSIVE DISORDER, RECURRENT, MILD (H): ICD-10-CM

## 2024-10-30 RX ORDER — CITALOPRAM HYDROBROMIDE 20 MG/1
20 TABLET ORAL DAILY
Qty: 90 TABLET | Refills: 0 | Status: SHIPPED | OUTPATIENT
Start: 2024-10-30

## 2024-11-04 DIAGNOSIS — E03.9 ACQUIRED HYPOTHYROIDISM: ICD-10-CM

## 2024-11-04 RX ORDER — LEVOTHYROXINE SODIUM 88 UG/1
88 TABLET ORAL DAILY
Qty: 90 TABLET | Refills: 0 | Status: SHIPPED | OUTPATIENT
Start: 2024-11-04

## 2024-11-10 ENCOUNTER — HEALTH MAINTENANCE LETTER (OUTPATIENT)
Age: 74
End: 2024-11-10

## 2024-12-16 ENCOUNTER — OFFICE VISIT (OUTPATIENT)
Dept: FAMILY MEDICINE | Facility: CLINIC | Age: 74
End: 2024-12-16
Payer: COMMERCIAL

## 2024-12-16 VITALS
DIASTOLIC BLOOD PRESSURE: 67 MMHG | OXYGEN SATURATION: 99 % | WEIGHT: 161 LBS | TEMPERATURE: 97.4 F | BODY MASS INDEX: 27.49 KG/M2 | HEART RATE: 75 BPM | RESPIRATION RATE: 18 BRPM | HEIGHT: 64 IN | SYSTOLIC BLOOD PRESSURE: 107 MMHG

## 2024-12-16 DIAGNOSIS — M51.361 DEGENERATION OF INTERVERTEBRAL DISC OF LUMBAR REGION WITH LOWER EXTREMITY PAIN: ICD-10-CM

## 2024-12-16 DIAGNOSIS — F33.0 MAJOR DEPRESSIVE DISORDER, RECURRENT, MILD (H): ICD-10-CM

## 2024-12-16 DIAGNOSIS — E78.5 HYPERLIPIDEMIA WITH TARGET LDL LESS THAN 100: ICD-10-CM

## 2024-12-16 DIAGNOSIS — E11.42 TYPE 2 DIABETES MELLITUS WITH DIABETIC POLYNEUROPATHY, WITH LONG-TERM CURRENT USE OF INSULIN (H): ICD-10-CM

## 2024-12-16 DIAGNOSIS — Z98.1 HISTORY OF FUSION OF LUMBAR SPINE: ICD-10-CM

## 2024-12-16 DIAGNOSIS — Z29.11 NEED FOR VACCINATION AGAINST RESPIRATORY SYNCYTIAL VIRUS: ICD-10-CM

## 2024-12-16 DIAGNOSIS — Z78.0 ASYMPTOMATIC POSTMENOPAUSAL STATUS: ICD-10-CM

## 2024-12-16 DIAGNOSIS — L72.0 MILIA: ICD-10-CM

## 2024-12-16 DIAGNOSIS — Z00.00 ENCOUNTER FOR MEDICARE ANNUAL WELLNESS EXAM: Primary | ICD-10-CM

## 2024-12-16 DIAGNOSIS — N18.31 STAGE 3A CHRONIC KIDNEY DISEASE (H): ICD-10-CM

## 2024-12-16 DIAGNOSIS — Z79.4 TYPE 2 DIABETES MELLITUS WITH DIABETIC POLYNEUROPATHY, WITH LONG-TERM CURRENT USE OF INSULIN (H): ICD-10-CM

## 2024-12-16 DIAGNOSIS — E03.9 ACQUIRED HYPOTHYROIDISM: ICD-10-CM

## 2024-12-16 DIAGNOSIS — E11.3299 BACKGROUND DIABETIC RETINOPATHY (H): ICD-10-CM

## 2024-12-16 DIAGNOSIS — I10 HYPERTENSION GOAL BP (BLOOD PRESSURE) < 140/90: ICD-10-CM

## 2024-12-16 LAB
EST. AVERAGE GLUCOSE BLD GHB EST-MCNC: 140 MG/DL
HBA1C MFR BLD: 6.5 % (ref 0–5.6)

## 2024-12-16 PROCEDURE — 80048 BASIC METABOLIC PNL TOTAL CA: CPT | Performed by: FAMILY MEDICINE

## 2024-12-16 PROCEDURE — 90480 ADMN SARSCOV2 VAC 1/ONLY CMP: CPT | Performed by: FAMILY MEDICINE

## 2024-12-16 PROCEDURE — 99214 OFFICE O/P EST MOD 30 MIN: CPT | Mod: 25 | Performed by: FAMILY MEDICINE

## 2024-12-16 PROCEDURE — 82570 ASSAY OF URINE CREATININE: CPT | Performed by: FAMILY MEDICINE

## 2024-12-16 PROCEDURE — 80061 LIPID PANEL: CPT | Performed by: FAMILY MEDICINE

## 2024-12-16 PROCEDURE — 82043 UR ALBUMIN QUANTITATIVE: CPT | Performed by: FAMILY MEDICINE

## 2024-12-16 PROCEDURE — 83036 HEMOGLOBIN GLYCOSYLATED A1C: CPT | Performed by: FAMILY MEDICINE

## 2024-12-16 PROCEDURE — G0008 ADMIN INFLUENZA VIRUS VAC: HCPCS | Performed by: FAMILY MEDICINE

## 2024-12-16 PROCEDURE — 84443 ASSAY THYROID STIM HORMONE: CPT | Performed by: FAMILY MEDICINE

## 2024-12-16 PROCEDURE — G0439 PPPS, SUBSEQ VISIT: HCPCS | Performed by: FAMILY MEDICINE

## 2024-12-16 PROCEDURE — 36415 COLL VENOUS BLD VENIPUNCTURE: CPT | Performed by: FAMILY MEDICINE

## 2024-12-16 PROCEDURE — 90662 IIV NO PRSV INCREASED AG IM: CPT | Performed by: FAMILY MEDICINE

## 2024-12-16 PROCEDURE — 91320 SARSCV2 VAC 30MCG TRS-SUC IM: CPT | Performed by: FAMILY MEDICINE

## 2024-12-16 RX ORDER — CITALOPRAM HYDROBROMIDE 20 MG/1
20 TABLET ORAL DAILY
Qty: 90 TABLET | Refills: 4 | Status: SHIPPED | OUTPATIENT
Start: 2024-12-16

## 2024-12-16 RX ORDER — LISINOPRIL AND HYDROCHLOROTHIAZIDE 12.5; 2 MG/1; MG/1
2 TABLET ORAL EVERY MORNING
Qty: 180 TABLET | Refills: 4 | Status: SHIPPED | OUTPATIENT
Start: 2024-12-16

## 2024-12-16 RX ORDER — METFORMIN HYDROCHLORIDE 500 MG/1
500 TABLET, EXTENDED RELEASE ORAL
Qty: 90 TABLET | Refills: 4 | Status: CANCELLED | OUTPATIENT
Start: 2024-12-16

## 2024-12-16 RX ORDER — ATORVASTATIN CALCIUM 20 MG/1
20 TABLET, FILM COATED ORAL EVERY MORNING
Qty: 90 TABLET | Refills: 4 | Status: SHIPPED | OUTPATIENT
Start: 2024-12-16

## 2024-12-16 RX ORDER — LEVOTHYROXINE SODIUM 88 UG/1
88 TABLET ORAL DAILY
Qty: 90 TABLET | Refills: 4 | Status: SHIPPED | OUTPATIENT
Start: 2024-12-16

## 2024-12-16 SDOH — HEALTH STABILITY: PHYSICAL HEALTH: ON AVERAGE, HOW MANY DAYS PER WEEK DO YOU ENGAGE IN MODERATE TO STRENUOUS EXERCISE (LIKE A BRISK WALK)?: 3 DAYS

## 2024-12-16 ASSESSMENT — SOCIAL DETERMINANTS OF HEALTH (SDOH): HOW OFTEN DO YOU GET TOGETHER WITH FRIENDS OR RELATIVES?: TWICE A WEEK

## 2024-12-16 ASSESSMENT — PATIENT HEALTH QUESTIONNAIRE - PHQ9
10. IF YOU CHECKED OFF ANY PROBLEMS, HOW DIFFICULT HAVE THESE PROBLEMS MADE IT FOR YOU TO DO YOUR WORK, TAKE CARE OF THINGS AT HOME, OR GET ALONG WITH OTHER PEOPLE: NOT DIFFICULT AT ALL
SUM OF ALL RESPONSES TO PHQ QUESTIONS 1-9: 4
SUM OF ALL RESPONSES TO PHQ QUESTIONS 1-9: 4

## 2024-12-16 NOTE — PATIENT INSTRUCTIONS
Patient Education   Preventive Care Advice   This is general advice given by our system to help you stay healthy. However, your care team may have specific advice just for you. Please talk to your care team about your preventive care needs.  Nutrition  Eat 5 or more servings of fruits and vegetables each day.  Try wheat bread, brown rice and whole grain pasta (instead of white bread, rice, and pasta).  Get enough calcium and vitamin D. Check the label on foods and aim for 100% of the RDA (recommended daily allowance).  Lifestyle  Exercise at least 150 minutes each week  (30 minutes a day, 5 days a week).  Do muscle strengthening activities 2 days a week. These help control your weight and prevent disease.  No smoking.  Wear sunscreen to prevent skin cancer.  Have a dental exam and cleaning every 6 months.  Yearly exams  See your health care team every year to talk about:  Any changes in your health.  Any medicines your care team has prescribed.  Preventive care, family planning, and ways to prevent chronic diseases.  Shots (vaccines)   HPV shots (up to age 26), if you've never had them before.  Hepatitis B shots (up to age 59), if you've never had them before.  COVID-19 shot: Get this shot when it's due.  Flu shot: Get a flu shot every year.  Tetanus shot: Get a tetanus shot every 10 years.  Pneumococcal, hepatitis A, and RSV shots: Ask your care team if you need these based on your risk.  Shingles shot (for age 50 and up)  General health tests  Diabetes screening:  Starting at age 35, Get screened for diabetes at least every 3 years.  If you are younger than age 35, ask your care team if you should be screened for diabetes.  Cholesterol test: At age 39, start having a cholesterol test every 5 years, or more often if advised.  Bone density scan (DEXA): At age 50, ask your care team if you should have this scan for osteoporosis (brittle bones).  Hepatitis C: Get tested at least once in your life.  STIs (sexually  transmitted infections)  Before age 24: Ask your care team if you should be screened for STIs.  After age 24: Get screened for STIs if you're at risk. You are at risk for STIs (including HIV) if:  You are sexually active with more than one person.  You don't use condoms every time.  You or a partner was diagnosed with a sexually transmitted infection.  If you are at risk for HIV, ask about PrEP medicine to prevent HIV.  Get tested for HIV at least once in your life, whether you are at risk for HIV or not.  Cancer screening tests  Cervical cancer screening: If you have a cervix, begin getting regular cervical cancer screening tests starting at age 21.  Breast cancer scan (mammogram): If you've ever had breasts, begin having regular mammograms starting at age 40. This is a scan to check for breast cancer.  Colon cancer screening: It is important to start screening for colon cancer at age 45.  Have a colonoscopy test every 10 years (or more often if you're at risk) Or, ask your provider about stool tests like a FIT test every year or Cologuard test every 3 years.  To learn more about your testing options, visit:   .  For help making a decision, visit:   https://bit.ly/dq31510.  Prostate cancer screening test: If you have a prostate, ask your care team if a prostate cancer screening test (PSA) at age 55 is right for you.  Lung cancer screening: If you are a current or former smoker ages 50 to 80, ask your care team if ongoing lung cancer screenings are right for you.  For informational purposes only. Not to replace the advice of your health care provider. Copyright   2023 [facility]. All rights reserved. Clinically reviewed by the [facility]. [place] IT [id number] - REV 01/24.  Preventing Falls: Care Instructions  Injuries and health problems such as trouble walking or poor eyesight can increase your risk of falling. So can some medicines. But there are things you can do to help  "prevent falls. You can exercise to get stronger. You can also arrange your home to make it safer.    Talk to your doctor about the medicines you take. Ask if any of them increase the risk of falls and whether they can be changed or stopped.   Try to exercise regularly. It can help improve your strength and balance. This can help lower your risk of falling.         Practice fall safety and prevention.   Wear low-heeled shoes that fit well and give your feet good support. Talk to your doctor if you have foot problems that make this hard.  Carry a cellphone or wear a medical alert device that you can use to call for help.  Use stepladders instead of chairs to reach high objects. Don't climb if you're at risk for falls. Ask for help, if needed.  Wear the correct eyeglasses, if you need them.        Make your home safer.   Remove rugs, cords, clutter, and furniture from walkways.  Keep your house well lit. Use night-lights in hallways and bathrooms.  Install and use sturdy handrails on stairways.  Wear nonskid footwear, even inside. Don't walk barefoot or in socks without shoes.        Be safe outside.   Use handrails, curb cuts, and ramps whenever possible.  Keep your hands free by using a shoulder bag or backpack.  Try to walk in well-lit areas. Watch out for uneven ground, changes in pavement, and debris.  Be careful in the winter. Walk on the grass or gravel when sidewalks are slippery. Use de-icer on steps and walkways. Add non-slip devices to shoes.    Put grab bars and nonskid mats in your shower or tub and near the toilet. Try to use a shower chair or bath bench when bathing.   Get into a tub or shower by putting in your weaker leg first. Get out with your strong side first. Have a phone or medical alert device in the bathroom with you.   Where can you learn more?  Go to https://www.[employer].net/patiented  Enter G117 in the search box to learn more about \"Preventing Falls: Care Instructions.\"  Current as of: " July 17, 2023  Content Version: 14.2 2024 [facility] [employer].   Care instructions adapted under license by your healthcare professional. If you have questions about a medical condition or this instruction, always ask your healthcare professional. [employer] disclaims any warranty or liability for your use of this information.    Hearing Loss: Care Instructions  Overview     Hearing loss is a sudden or slow decrease in how well you hear. It can range from slight to profound. Permanent hearing loss can occur with aging. It also can happen when you are exposed long-term to loud noise. Examples include listening to loud music, riding motorcycles, or being around other loud machines.  Hearing loss can affect your work and home life. It can make you feel lonely or depressed. You may feel that you have lost your independence. But hearing aids and other devices can help you hear better and feel connected to others.  Follow-up care is a key part of your treatment and safety. Be sure to make and go to all appointments, and call your doctor if you are having problems. It's also a good idea to know your test results and keep a list of the medicines you take.  How can you care for yourself at home?  Avoid loud noises whenever possible. This helps keep your hearing from getting worse.  Always wear hearing protection around loud noises.  Wear a hearing aid as directed.  A professional can help you pick a hearing aid that will work best for you.  You can also get hearing aids over the counter for mild to moderate hearing loss.  Have hearing tests as your doctor suggests. They can show whether your hearing has changed. Your hearing aid may need to be adjusted.  Use other devices as needed. These may include:  Telephone amplifiers and hearing aids that can connect to a television, stereo, radio, or microphone.  Devices that use lights or vibrations. These alert you to the doorbell, a ringing telephone, or a baby  "monitor.  Television closed-captioning. This shows the words at the bottom of the screen. Most new TVs can do this.  TTY (text telephone). This lets you type messages back and forth on the telephone instead of talking or listening. These devices are also called TDD. When messages are typed on the keyboard, they are sent over the phone line to a receiving TTY. The message is shown on a monitor.  Use text messaging, social media, and email if it is hard for you to communicate by telephone.  Try to learn a listening technique called speechreading. It is not lipreading. You pay attention to people's gestures, expressions, posture, and tone of voice. These clues can help you understand what a person is saying. Face the person you are talking to, and have them face you. Make sure the lighting is good. You need to see the other person's face clearly.  Think about counseling if you need help to adjust to your hearing loss.  When should you call for help?  Watch closely for changes in your health, and be sure to contact your doctor if:    You think your hearing is getting worse.     You have new symptoms, such as dizziness or nausea.   Where can you learn more?  Go to https://www.[employer].net/patiented  Enter R798 in the search box to learn more about \"Hearing Loss: Care Instructions.\"  Current as of: September 27, 2023  Content Version: 14.2 2024 [facility] [employer].   Care instructions adapted under license by your healthcare professional. If you have questions about a medical condition or this instruction, always ask your healthcare professional. Healthwise, Incorporated disclaims any warranty or liability for your use of this information.    Learning About Sleeping Well  What does sleeping well mean?     Sleeping well means getting enough sleep to feel good and stay healthy. How much sleep is enough varies among people.  The number of hours you sleep and how you feel when you wake up are both important. If you do " not feel refreshed, you probably need more sleep. Another sign of not getting enough sleep is feeling tired during the day.  Experts recommend that adults get at least 7 or more hours of sleep per day. Children and older adults need more sleep.  Why is getting enough sleep important?  Getting enough quality sleep is a basic part of good health. When your sleep suffers, your physical health, mood, and your thoughts can suffer too. You may find yourself feeling more grumpy or stressed. Not getting enough sleep also can lead to serious problems, including injury, accidents, anxiety, and depression.  What might cause poor sleeping?  Many things can cause sleep problems, including:  Changes to your sleep schedule.  Stress. Stress can be caused by fear about a single event, such as giving a speech. Or you may have ongoing stress, such as worry about work or school.  Depression, anxiety, and other mental or emotional conditions.  Changes in your sleep habits or surroundings. This includes changes that happen where you sleep, such as noise, light, or sleeping in a different bed. It also includes changes in your sleep pattern, such as having jet lag or working a late shift.  Health problems, such as pain, breathing problems, and restless legs syndrome.  Lack of regular exercise.  Using alcohol, nicotine, or caffeine before bed.  How can you help yourself?  Here are some tips that may help you sleep more soundly and wake up feeling more refreshed.  Your sleeping area   Use your bedroom only for sleeping and sex. A bit of light reading may help you fall asleep. But if it doesn't, do your reading elsewhere in the house. Try not to use your TV, computer, smartphone, or tablet while you are in bed.  Be sure your bed is big enough to stretch out comfortably, especially if you have a sleep partner.  Keep your bedroom quiet, dark, and cool. Use curtains, blinds, or a sleep mask to block out light. To block out noise, use earplugs,  "soothing music, or a \"white noise\" machine.  Your evening and bedtime routine   Create a relaxing bedtime routine. You might want to take a warm shower or bath, or listen to soothing music.  Go to bed at the same time every night. And get up at the same time every morning, even if you feel tired.  What to avoid   Limit caffeine (coffee, tea, caffeinated sodas) during the day, and don't have any for at least 6 hours before bedtime.  Avoid drinking alcohol before bedtime. Alcohol can cause you to wake up more often during the night.  Try not to smoke or use tobacco, especially in the evening. Nicotine can keep you awake.  Limit naps during the day, especially close to bedtime.  Avoid lying in bed awake for too long. If you can't fall asleep or if you wake up in the middle of the night and can't get back to sleep within about 20 minutes, get out of bed and go to another room until you feel sleepy.  Avoid taking medicine right before bed that may keep you awake or make you feel hyper or energized. Your doctor can tell you if your medicine may do this and if you can take it earlier in the day.  If you can't sleep   Imagine yourself in a peaceful, pleasant scene. Focus on the details and feelings of being in a place that is relaxing.  Get up and do a quiet or boring activity until you feel sleepy.  Avoid drinking any liquids before going to bed to help prevent waking up often to use the bathroom.  Where can you learn more?  Go to https://www.[employer]fgame.net/patiented  Enter J942 in the search box to learn more about \"Learning About Sleeping Well.\"  Current as of: July 10, 2023  Content Version: 14.2 2024 [facility] [employer].   Care instructions adapted under license by your healthcare professional. If you have questions about a medical condition or this instruction, always ask your healthcare professional. Healthwise, Incorporated disclaims any warranty or liability for your use of this information.       "

## 2024-12-16 NOTE — PROGRESS NOTES
Preventive Care Visit  [facility] WILBERTO Rutledge MD, Family Medicine  Dec 16, 2024      Assessment & Plan     Encounter for Medicare annual wellness exam    Type 2 diabetes mellitus with diabetic polyneuropathy, with long-term current use of insulin (H)  Background diabetic retinopathy (H)  Well controlled; GI side effects to metformin. Discontinue metformin. Follow-up q 3 months.   - HEMOGLOBIN A1C; Future  - OFFICE/OUTPT VISIT,ESTLEVL IV  - Lipid panel reflex to direct LDL Non-fasting; Future  - Basic metabolic panel  (Ca, Cl, CO2, Creat, Gluc, K, Na, BUN); Future  - HEMOGLOBIN A1C  - Lipid panel reflex to direct LDL Non-fasting  - Basic metabolic panel  (Ca, Cl, CO2, Creat, Gluc, K, Na, BUN)    Stage 3a chronic kidney disease (H)  Hypertension goal BP (blood pressure) < 140/90  Well controlled with medications without side effects.   - lisinopril-hydrochlorothiazide (ZESTORETIC) 20-12.5 MG tablet; Take 2 tablets by mouth every morning.  - OFFICE/OUTPT VISIT,ESTLEVL IV  - Basic metabolic panel  (Ca, Cl, CO2, Creat, Gluc, K, Na, BUN); Future  - Basic metabolic panel  (Ca, Cl, CO2, Creat, Gluc, K, Na, BUN)    Hyperlipidemia with target LDL less than 70  Well controlled with medications without side effects.   - atorvastatin (LIPITOR) 20 MG tablet; Take 1 tablet (20 mg) by mouth every morning.  - OFFICE/OUTPT VISIT,ESTLEVL IV    Acquired hypothyroidism  Euthyroid on replacement   - levothyroxine (SYNTHROID/LEVOTHROID) 88 MCG tablet; Take 1 tablet (88 mcg) by mouth daily.  - OFFICE/OUTPT VISIT,ESTLEVL IV  - TSH WITH FREE T4 REFLEX; Future  - TSH WITH FREE T4 REFLEX    Major depressive disorder, recurrent, mild (H)  - citalopram (CELEXA) 20 MG tablet; Take 1 tablet (20 mg) by mouth daily.  - OFFICE/OUTPT VISIT,ESTLEVL IV    Analisa  - Adult Dermatology  Referral; Future    Degeneration of intervertebral disc of lumbar region with lower extremity pain  History of fusion of lumbar  "spine  Declines physical therapy   - MR Lumbar Spine w/o & w Contrast; Future    Need for vaccination against respiratory syncytial virus  - RSV vaccine, bivalent, ABRYSVO, injection; Inject 0.5 mLs into the muscle once for 1 dose. Pharmacist administered    Asymptomatic postmenopausal status  - DX Bone Density; Future            BMI  Estimated body mass index is 27.46 kg/m  as calculated from the following:    Height as of this encounter: 1.631 m (5' 4.2\").    Weight as of this encounter: 73 kg (161 lb).   Weight management plan: Discussed healthy diet and exercise guidelines    Counseling  Appropriate preventive services were addressed with this patient via screening, questionnaire, or discussion as appropriate for fall prevention, nutrition, physical activity, Tobacco-use cessation, social engagement, weight loss and cognition.  Checklist reviewing preventive services available has been given to the patient.  Reviewed patient's diet, addressing concerns and/or questions.   She is at risk for lack of exercise and has been provided with information to increase physical activity for the benefit of her well-being.   Discussed possible causes of fatigue. The patient was provided with written information regarding signs of hearing loss.           Subjective   Kelly is a 74 year old, presenting for the following:  Physical        12/16/2024    11:41 AM   Additional Questions   Roomed by Shelli CUNNINGHAM CMA           HPI  Patient also presents to follow-up diabetes. Diabetic Review of Systems - Medication compliance:  compliant all of the time, Diabetic diet compliance:  compliant most of the time, Home glucose monitoring:  blood glucose record WAS NOT brought in today, Diabetic ROS: no polyuria or polydipsia, no chest pain, dyspnea or TIA's, no unusual visual symptoms, no hypoglycemia, has dysesthesias in the feet.      Hypertension well controlled on current medications without side effects, chest pain, or dyspnea. "     Hypercholesterolemia well controlled with current treatment plan without side effects.     Patient also presents for follow-up of hypothyroidism, controlled on current medication.  Denies symptoms of hypo- or hyperthyroidism.     Patient has depression, recurrent, with no medication side effects and no anhedonia or low mood, without suicidal ideation.            Health Care Directive  Patient does not have a Health Care Directive: Patient states has Advance Directive and will bring in a copy to clinic.      12/16/2024   General Health   How would you rate your overall physical health? (!) FAIR   Feel stress (tense, anxious, or unable to sleep) Only a little      (!) STRESS CONCERN      12/16/2024   Nutrition   Diet: Low salt    Low fat/cholesterol    Diabetic       Multiple values from one day are sorted in reverse-chronological order         12/16/2024   Exercise   Days per week of moderate/strenous exercise 3 days            12/16/2024   Social Factors   Frequency of gathering with friends or relatives Twice a week   Worry food won't last until get money to buy more No   Food not last or not have enough money for food? No   Do you have housing? (Housing is defined as stable permanent housing and does not include staying ouside in a car, in a tent, in an abandoned building, in an overnight shelter, or couch-surfing.) Yes   Are you worried about losing your housing? No   Lack of transportation? No   Unable to get utilities (heat,electricity)? No            12/16/2024   Fall Risk   Fallen 2 or more times in the past year? No    Trouble with walking or balance? No    Gait Speed Test (Document in seconds) 4   Gait Speed Test Interpretation Less than or equal to 5.00 seconds - PASS       Patient-reported          12/16/2024   Activities of Daily Living- Home Safety   Needs help with the following daily activites None of the above   Safety concerns in the home None of the above            12/16/2024   Dental    Dentist two times every year? Yes            2024   Hearing Screening   Hearing concerns? (!) I NEED TO ASK PEOPLE TO SPEAK UP OR REPEAT THEMSELVES.    (!) TROUBLE UNDERSTANDING SOFT OR WHISPERED SPEECH.       Multiple values from one day are sorted in reverse-chronological order         2024   Driving Risk Screening   Patient/family members have concerns about driving No            2024   General Alertness/Fatigue Screening   Have you been more tired than usual lately? (!) YES            2024   Urinary Incontinence Screening   Bothered by leaking urine in past 6 months No            2024   TB Screening   Were you born outside of the [place]? Yes          Today's PHQ-9 Score:       2024    11:30 AM   PHQ-9 SCORE   PHQ-9 Total Score MyChart 4 (Minimal depression)   PHQ-9 Total Score 4        Patient-reported         2024   Substance Use   Alcohol more than 3/day or more than 7/wk Not Applicable   Do you have a current opioid prescription? No   How severe/bad is pain from 1 to 10? 3/10   Do you use any other substances recreationally? No        Social History     Tobacco Use    Smoking status: Former     Current packs/day: 0.00     Average packs/day: 0.5 packs/day for 21.5 years (10.7 ttl pk-yrs)     Types: Cigarettes     Start date: 1967     Quit date: 1989     Years since quittin.9     Passive exposure: Past    Smokeless tobacco: Never   Vaping Use    Vaping status: Never Used   Substance Use Topics    Alcohol use: No    Drug use: No           1/10/2024   LAST FHS-7 RESULTS   1st degree relative breast or ovarian cancer No   Any relative bilateral breast cancer No   Any male have breast cancer No   Any ONE woman have BOTH breast AND ovarian cancer No   Any woman with breast cancer before 50yrs No   2 or more relatives with breast AND/OR ovarian cancer No   2 or more relatives with breast AND/OR bowel cancer No           Mammogram Screening - After age 74- determine  frequency with patient based on health status, life expectancy and patient goals    ASCVD Risk   The ASCVD Risk score (Sabrina THAKUR, et al., 2019) failed to calculate for the following reasons:    The valid total cholesterol range is 130 to 320 mg/dL            Reviewed and updated as needed this visit by Provider   Tobacco  Allergies  Meds  Problems  Med Hx  Surg Hx  Fam Hx     Sexual Activity          Patient Active Problem List   Diagnosis    Hyperlipidemia with target LDL less than 70    Hypertension goal BP (blood pressure) < 140/90    History of cervical dysplasia    Major depressive disorder, recurrent, mild (H)    Acquired hypothyroidism    Type 2 diabetes mellitus with diabetic polyneuropathy, with long-term current use of insulin (H)    Degenerative joint disease of hand    Low bone mass    CLL (chronic lymphocytic leukemia) (H)    Ganglion cyst of joint of finger of right hand    Background diabetic retinopathy (H)    AVM (arteriovenous malformation) of small bowel, acquired    Iron deficiency    Sensorineural hearing loss, bilateral    Nontraumatic tear of right rotator cuff, unspecified tear extent    Stage 3a chronic kidney disease (H)    History of lumbar laminectomy     Past Surgical History:   Procedure Laterality Date    BIOPSY      COLONOSCOPY  nov 2016    COLONOSCOPY N/A 3/1/2019    Procedure: COLONOSCOPY;  Surgeon: Romulo Hayes MD;  Location: [facility]    COLONOSCOPY WITH CO2 INSUFFLATION N/A 11/28/2016    Procedure: COLONOSCOPY WITH CO2 INSUFFLATION;  Surgeon: Migue Giraldo MD;  Location: [facility]    CYSTECTOMY OVARIAN BENIGN  1974    ESOPHAGOSCOPY, GASTROSCOPY, DUODENOSCOPY (EGD), COMBINED N/A 3/1/2019    Procedure: COMBINED ESOPHAGOSCOPY, GASTROSCOPY, DUODENOSCOPY (EGD), BIOPSY SINGLE OR MULTIPLE;  Surgeon: Romulo Hayes MD;  Location: [facility]    EYE SURGERY  12-14-16    HC THYROIDECTOMY  12/2004    goiter    LAMINECTOMY LUMBAR POSTERIOR MICROSCOPIC THREE+ LEVELS N/A  2023    Procedure: LAMINECTOMY, SPINE, LUMBAR, 3 OR MORE LEVELS, POSTERIOR APPROACH, USING MICROSCOPE LUMBAR 3-4, 4-5, LUMBAR 5 SACRAL 1 and Dura Repair;  Surgeon: Bryson Vladez MD;  Location: [facility]    LAPAROSCOPIC CHOLECYSTECTOMY N/A 2019    Procedure: CHOLECYSTECTOMY, LAPAROSCOPIC;  Surgeon: Jorge Martinez DO;  Location: [facility]    [facility] BSO, OMENTECTOMY W/JUAN DANIEL  2006    cervical cancer       Social History     Tobacco Use    Smoking status: Former     Current packs/day: 0.00     Average packs/day: 0.5 packs/day for 21.5 years (10.7 ttl pk-yrs)     Types: Cigarettes     Start date: 1967     Quit date: 1989     Years since quittin.9     Passive exposure: Past    Smokeless tobacco: Never   Substance Use Topics    Alcohol use: No     Family History   Problem Relation Age of Onset    Thyroid Disease Mother     Dementia Mother     Osteoporosis Mother     Alcohol/Drug Father     C.A.D. Father         CABG     Diabetes Father     Hypertension Father     Hyperlipidemia Father     Cerebrovascular Disease Father     Alcohol/Drug Brother     Prostate Cancer Brother     Depression Brother     Depression Brother         d. suicide    Cancer Maternal Grandmother         stomach, colon    Breast Cancer Maternal Grandmother     Thyroid Disease Maternal Grandmother     Alzheimer Disease Maternal Grandfather     Breast Cancer Paternal Grandmother     Alcohol/Drug Son     Rheumatoid Arthritis Son     Breast Cancer Cousin     Breast Cancer Other     Dementia Maternal Aunt     Anesthesia Reaction No family hx of     Thrombosis No family hx of          Current providers sharing in care for this patient include:  Patient Care Team:  Sejal Rutledge MD as PCP - General  Sejal Rutledge MD as Assigned PCP  Chantell Chamberlain RN as Specialty Care Coordinator (Oncology)  Migue Calvillo MD as MD (Nephrology)  Migue Calvillo MD as Assigned Nephrology Provider  Bryson Valdez MD as  "Assigned Musculoskeletal Provider  Lucinda Fine NP as Assigned Cancer Care Provider    The following health maintenance items are reviewed in Epic and correct as of today:  Health Maintenance   Topic Date Due    DIABETIC FOOT EXAM  07/11/2023    EYE EXAM  08/31/2023    RSV VACCINE (1 - 1-dose 75+ series) Never done    COLORECTAL CANCER SCREENING  01/17/2025    LIPID  01/22/2025    MICROALBUMIN  01/22/2025    TSH W/FREE T4 REFLEX  01/22/2025    COVID-19 Vaccine (9 - 2024-25 season) 02/10/2025    DEXA  02/21/2025    A1C  03/16/2025    PHQ-9  06/16/2025    BMP  06/21/2025    HEMOGLOBIN  06/21/2025    MEDICARE ANNUAL WELLNESS VISIT  12/16/2025    ANNUAL REVIEW OF HM ORDERS  12/16/2025    FALL RISK ASSESSMENT  12/16/2025    PAP FOLLOW-UP  01/22/2027    HPV FOLLOW-UP  01/22/2027    ADVANCE CARE PLANNING  12/16/2029    DTAP/TDAP/TD IMMUNIZATION (3 - Td or Tdap) 01/31/2033    HEPATITIS C SCREENING  Completed    DEPRESSION ACTION PLAN  Completed    INFLUENZA VACCINE  Completed    Pneumococcal Vaccine: 65+ Years  Completed    URINALYSIS  Completed    ZOSTER IMMUNIZATION  Completed    HPV IMMUNIZATION  Aged Out    MENINGITIS IMMUNIZATION  Aged Out    RSV MONOCLONAL ANTIBODY  Aged Out    MAMMO SCREENING  Discontinued    PAP  Discontinued         Review of Systems  CONSTITUTIONAL: NEGATIVE for fever, chills, change in weight  INTEGUMENTARY/SKIN: white dots on face   EYES: NEGATIVE for vision changes or irritation  RESP: NEGATIVE for significant cough or SOB  CV: NEGATIVE for chest pain, palpitations or peripheral edema  GI: NEGATIVE for nausea, abdominal pain, heartburn, or change in bowel habits  : NEGATIVE for frequency, dysuria, or hematuria  MUSCULOSKELETAL:radicular pain left lower extremity   NEURO: dysesthesias     Objective    Exam  /67 (BP Location: Left arm, Patient Position: Chair, Cuff Size: Adult Regular)   Pulse 75   Temp 97.4  F (36.3  C) (Temporal)   Resp 18   Ht 1.631 m (5' 4.2\")   Wt 73 kg (161 lb) " "  SpO2 99%   BMI 27.46 kg/m     Estimated body mass index is 27.46 kg/m  as calculated from the following:    Height as of this encounter: 1.631 m (5' 4.2\").    Weight as of this encounter: 73 kg (161 lb).    Physical Exam  GENERAL: alert and no distress  EYES: Eyes grossly normal to inspection, PERRL and conjunctivae and sclerae normal  NECK: no adenopathy, no asymmetry, masses, or scars  RESP: lungs clear to auscultation - no rales, rhonchi or wheezes  CV: regular rate and rhythm, normal S1 S2, no S3 or S4, no murmur, click or rub, no peripheral edema  MS: extremities normal- no gross deformities noted  PSYCH: mentation appears normal, affect normal/bright         12/16/2024   Mini Cog   Clock Draw Score 2 Normal   3 Item Recall 2 objects recalled   Mini Cog Total Score 4                 Signed Electronically by: Sejal Rutledge MD    Answers submitted by the patient for this visit:  Patient Health Questionnaire (Submitted on 12/16/2024)  If you checked off any problems, how difficult have these problems made it for you to do your work, take care of things at home, or get along with other people?: Not difficult at all  PHQ9 TOTAL SCORE: 4    "

## 2024-12-17 PROBLEM — N18.32 STAGE 3B CHRONIC KIDNEY DISEASE (H): Status: ACTIVE | Noted: 2023-01-16

## 2024-12-17 LAB
ANION GAP SERPL CALCULATED.3IONS-SCNC: 8 MMOL/L (ref 7–15)
BUN SERPL-MCNC: 26.8 MG/DL (ref 8–23)
CALCIUM SERPL-MCNC: 9.8 MG/DL (ref 8.8–10.4)
CHLORIDE SERPL-SCNC: 108 MMOL/L (ref 98–107)
CHOLEST SERPL-MCNC: 138 MG/DL
CREAT SERPL-MCNC: 1.36 MG/DL (ref 0.51–0.95)
CREAT UR-MCNC: 143 MG/DL
EGFRCR SERPLBLD CKD-EPI 2021: 40 ML/MIN/1.73M2
FASTING STATUS PATIENT QL REPORTED: NO
FASTING STATUS PATIENT QL REPORTED: NO
GLUCOSE SERPL-MCNC: 78 MG/DL (ref 70–99)
HCO3 SERPL-SCNC: 26 MMOL/L (ref 22–29)
HDLC SERPL-MCNC: 44 MG/DL
LDLC SERPL CALC-MCNC: 65 MG/DL
MICROALBUMIN UR-MCNC: <12 MG/L
MICROALBUMIN/CREAT UR: NORMAL MG/G{CREAT}
NONHDLC SERPL-MCNC: 94 MG/DL
POTASSIUM SERPL-SCNC: 4.9 MMOL/L (ref 3.4–5.3)
SODIUM SERPL-SCNC: 142 MMOL/L (ref 135–145)
TRIGL SERPL-MCNC: 144 MG/DL
TSH SERPL DL<=0.005 MIU/L-ACNC: 1.88 UIU/ML (ref 0.3–4.2)

## 2024-12-17 NOTE — RESULT ENCOUNTER NOTE
Kelly,    Your thyroid and cholesterol levels are normal. Your blood glucose is controlled.     You have stage 4 Chronic Kidney Disease. See your nephrologist regularly.     Sejal Rutledge MD

## 2024-12-18 ENCOUNTER — PATIENT OUTREACH (OUTPATIENT)
Dept: CARE COORDINATION | Facility: CLINIC | Age: 74
End: 2024-12-18
Payer: COMMERCIAL

## 2025-02-04 DIAGNOSIS — E11.42 TYPE 2 DIABETES MELLITUS WITH DIABETIC POLYNEUROPATHY, WITH LONG-TERM CURRENT USE OF INSULIN (H): ICD-10-CM

## 2025-02-04 DIAGNOSIS — Z79.4 TYPE 2 DIABETES MELLITUS WITH DIABETIC POLYNEUROPATHY, WITH LONG-TERM CURRENT USE OF INSULIN (H): ICD-10-CM

## 2025-02-04 RX ORDER — INSULIN GLARGINE 100 [IU]/ML
INJECTION, SOLUTION SUBCUTANEOUS
Qty: 30 ML | Refills: 0 | Status: SHIPPED | OUTPATIENT
Start: 2025-02-04

## 2025-02-04 NOTE — TELEPHONE ENCOUNTER
Future Appointments   Date Time Provider Department Center   6/17/2025  2:30 PM [facility]   6/25/2025 10:45 AM Lucinda Fine, [facility]

## 2025-02-06 DIAGNOSIS — Z79.4 TYPE 2 DIABETES MELLITUS WITH DIABETIC POLYNEUROPATHY, WITH LONG-TERM CURRENT USE OF INSULIN (H): ICD-10-CM

## 2025-02-06 DIAGNOSIS — E11.42 TYPE 2 DIABETES MELLITUS WITH DIABETIC POLYNEUROPATHY, WITH LONG-TERM CURRENT USE OF INSULIN (H): ICD-10-CM

## 2025-02-06 RX ORDER — INSULIN LISPRO 100 [IU]/ML
INJECTION, SOLUTION INTRAVENOUS; SUBCUTANEOUS
Qty: 45 ML | Refills: 0 | Status: SHIPPED | OUTPATIENT
Start: 2025-02-06

## 2025-03-29 ENCOUNTER — HEALTH MAINTENANCE LETTER (OUTPATIENT)
Age: 75
End: 2025-03-29

## 2025-04-22 ENCOUNTER — OFFICE VISIT (OUTPATIENT)
Dept: OPTOMETRY | Facility: CLINIC | Age: 75
End: 2025-04-22
Payer: COMMERCIAL

## 2025-04-22 DIAGNOSIS — H25.13 NUCLEAR AGE-RELATED CATARACT, BOTH EYES: ICD-10-CM

## 2025-04-22 DIAGNOSIS — H52.223 REGULAR ASTIGMATISM OF BOTH EYES: ICD-10-CM

## 2025-04-22 DIAGNOSIS — H52.4 PRESBYOPIA: ICD-10-CM

## 2025-04-22 DIAGNOSIS — E11.9 TYPE 2 DIABETES MELLITUS WITHOUT RETINOPATHY (H): ICD-10-CM

## 2025-04-22 DIAGNOSIS — H52.03 HYPERMETROPIA, BILATERAL: ICD-10-CM

## 2025-04-22 DIAGNOSIS — Z01.01 ENCOUNTER FOR EXAMINATION OF EYES AND VISION WITH ABNORMAL FINDINGS: Primary | ICD-10-CM

## 2025-04-22 PROCEDURE — 2023F DILAT RTA XM W/O RTNOPTHY: CPT | Performed by: OPTOMETRIST

## 2025-04-22 PROCEDURE — 92015 DETERMINE REFRACTIVE STATE: CPT | Performed by: OPTOMETRIST

## 2025-04-22 PROCEDURE — 92014 COMPRE OPH EXAM EST PT 1/>: CPT | Performed by: OPTOMETRIST

## 2025-04-22 ASSESSMENT — REFRACTION_WEARINGRX
OS_AXIS: 042
SPECS_TYPE: PAL
OD_AXIS: 106
OS_CYLINDER: +0.75
OS_SPHERE: +1.00
OD_SPHERE: +0.75
OS_ADD: +2.75
OD_ADD: +2.75
OD_CYLINDER: +0.75

## 2025-04-22 ASSESSMENT — VISUAL ACUITY
OD_CC+: +2
OS_CC+: -2
OD_CC: 20/40
OS_SC: 20/40
OS_CC: 20/25
OD_SC: 20/50
OD_SC: 20/200
OS_SC: 20/200
OD_CC: 20/20-2
OS_CC: 20/20-1
METHOD: SNELLEN - LINEAR
OS_SC+: -2
CORRECTION_TYPE: GLASSES
OD_SC+: -1

## 2025-04-22 ASSESSMENT — CONF VISUAL FIELD
OD_SUPERIOR_TEMPORAL_RESTRICTION: 0
OS_INFERIOR_NASAL_RESTRICTION: 0
OS_INFERIOR_TEMPORAL_RESTRICTION: 0
OD_NORMAL: 1
METHOD: COUNTING FINGERS
OS_SUPERIOR_TEMPORAL_RESTRICTION: 0
OD_INFERIOR_TEMPORAL_RESTRICTION: 0
OD_SUPERIOR_NASAL_RESTRICTION: 0
OS_SUPERIOR_NASAL_RESTRICTION: 0
OS_NORMAL: 1
OD_INFERIOR_NASAL_RESTRICTION: 0

## 2025-04-22 ASSESSMENT — REFRACTION_MANIFEST
OD_SPHERE: +0.50
OS_CYLINDER: +0.50
OS_SPHERE: +1.00
OS_ADD: +2.75
OD_CYLINDER: +0.50
OD_AXIS: 010
OD_ADD: +2.75
OS_AXIS: 063

## 2025-04-22 ASSESSMENT — TONOMETRY
OD_IOP_MMHG: 17
OS_IOP_MMHG: 19
IOP_METHOD: APPLANATION

## 2025-04-22 ASSESSMENT — EXTERNAL EXAM - LEFT EYE: OS_EXAM: NORMAL

## 2025-04-22 ASSESSMENT — CUP TO DISC RATIO
OD_RATIO: 0.35
OS_RATIO: 0.3

## 2025-04-22 ASSESSMENT — EXTERNAL EXAM - RIGHT EYE: OD_EXAM: NORMAL

## 2025-04-22 NOTE — LETTER
4/22/2025      Kelly Vegas  [address] [place] [address]      Dear Colleague,    Thank you for referring your patient, Kelly Vegas, to the [facility]. Please see a copy of my visit note below.    Chief Complaint   Patient presents with     Diabetic Eye Exam     Cataract        Chief Complaint(s) and History of Present Illness(es)       Diabetic Eye Exam              Diabetes Type: Type 2 and taking oral medications    Duration: years    Blood Sugars: is controlled                   Lab Results   Component Value Date    A1C 6.5 12/16/2024    A1C 6.6 01/22/2024    A1C 7.3 09/06/2023    A1C 7.1 04/05/2023    A1C 6.8 11/30/2022    A1C 8.4 01/04/2021    A1C 7.5 02/11/2020    A1C 7.3 10/30/2019    A1C 7.4 03/26/2019    A1C 8.2 12/13/2018     -Cataracts each eye    Last Eye Exam: 8/31/2022 Dr. Sparrow   Dilated Previously: Yes, side effects of dilation explained today     What are you currently using to see?  Glasses - PAL's - wears full time     Distance Vision Acuity: Satisfied with vision    Near Vision Acuity: Satisfied with vision while reading and using computer with glasses    Eye Comfort: watery and mattery in AM - L>R - ongoing for a few months - notices mainly at night on iPad   Do you use eye drops? : No - would like some recommendations   Occupation or Hobbies: Retired     Natalya Spring  Optometry Assistant     Medical, surgical and family histories reviewed and updated 4/22/2025.       OBJECTIVE: See Ophthalmology exam    ASSESSMENT:    ICD-10-CM    1. Encounter for examination of eyes and vision with abnormal findings  Z01.01       2. Type 2 diabetes mellitus without retinopathy (H)  E11.9       3. Nuclear age-related cataract, both eyes  H25.13       4. Hypermetropia, bilateral  H52.03       5. Regular astigmatism of both eyes  H52.223       6. Presbyopia  H52.4           PLAN:    Kelly Vegas aware  eye exam results will be sent to Sejal Rutledge.  Patient  Instructions   Patient educated on importance of good blood sugar control.  Letter sent to primary care provider with diabetic eye exam report.     You have the start of mild cataracts.  You may notice some blurred vision or glare with night driving.  It is important that you wear good sunglasses to protect your eyes from the ultraviolet light from the sun.     Updated glasses prescription provided today.   Allow 2 weeks to adapt to the new glasses.        DRY EYE TREATMENT    I recommend using artificial tears for your dry eye. There are over the counter drops that work well and may be used up to 4x daily. (Systane Balance, Refresh Optive, Soothe XP)   If you need more than 4 drops daily, use a preservative free product which come in individual vials which may be used for 24 hours and discarded.     Artificial tears work best as a preventative and not as well after your eyes are starting to bother you.  It may take 4- 6 weeks of using the drops before you notice improvement.  If after that time you are still having problems schedule an appointment for an evaluation and discussion of different treatments.  Dry eyes are a chronic condition and you may have more symptoms at certain times of the year.      Additional recommended treatment:  Warm compresses once to twice daily for 5-10 minutes    Directions for warm compresses:  Moisten a washcloth with hot water or microwave for 10 seconds, being careful to not get the cloth too hot. Then put the washcloth onto your eyelids for 5 minutes. It may cool rather quickly so a rice pack or eyemask that can be heated and laid on top of the washcloth will help retain the heat for longer periods.     Return in 1 year for a comprehensive eye exam, or sooner if needed.      The effects of the dilating drops last for 4- 6 hours.  You will be more sensitive to light and vision will be blurry up close.  Mydriatic sunglasses were given if needed.     Andrzej Sparrow, ROHAN   [facility]  [facility] [place]  [address]  [place]  [address]    [phone]             Again, thank you for allowing me to participate in the care of your patient.        Sincerely,        Andrzej Sparrow OD    Electronically signed

## 2025-04-22 NOTE — PATIENT INSTRUCTIONS
Patient educated on importance of good blood sugar control.  Letter sent to primary care provider with diabetic eye exam report.     You have the start of mild cataracts.  You may notice some blurred vision or glare with night driving.  It is important that you wear good sunglasses to protect your eyes from the ultraviolet light from the sun.     Updated glasses prescription provided today.   Allow 2 weeks to adapt to the new glasses.        DRY EYE TREATMENT    I recommend using artificial tears for your dry eye. There are over the counter drops that work well and may be used up to 4x daily. (Systane Balance, Refresh Optive, Soothe XP)   If you need more than 4 drops daily, use a preservative free product which come in individual vials which may be used for 24 hours and discarded.     Artificial tears work best as a preventative and not as well after your eyes are starting to bother you.  It may take 4- 6 weeks of using the drops before you notice improvement.  If after that time you are still having problems schedule an appointment for an evaluation and discussion of different treatments.  Dry eyes are a chronic condition and you may have more symptoms at certain times of the year.      Additional recommended treatment:  Warm compresses once to twice daily for 5-10 minutes    Directions for warm compresses:  Moisten a washcloth with hot water or microwave for 10 seconds, being careful to not get the cloth too hot. Then put the washcloth onto your eyelids for 5 minutes. It may cool rather quickly so a rice pack or eyemask that can be heated and laid on top of the washcloth will help retain the heat for longer periods.     Return in 1 year for a comprehensive eye exam, or sooner if needed.      The effects of the dilating drops last for 4- 6 hours.  You will be more sensitive to light and vision will be blurry up close.  Mydriatic sunglasses were given if needed.     Andrzej Sparrow, [facility]  [place]  [address]  [place]  [address]    [phone]

## 2025-04-22 NOTE — PROGRESS NOTES
Chief Complaint   Patient presents with    Diabetic Eye Exam    Cataract        Chief Complaint(s) and History of Present Illness(es)       Diabetic Eye Exam              Diabetes Type: Type 2 and taking oral medications    Duration: years    Blood Sugars: is controlled                   Lab Results   Component Value Date    A1C 6.5 12/16/2024    A1C 6.6 01/22/2024    A1C 7.3 09/06/2023    A1C 7.1 04/05/2023    A1C 6.8 11/30/2022    A1C 8.4 01/04/2021    A1C 7.5 02/11/2020    A1C 7.3 10/30/2019    A1C 7.4 03/26/2019    A1C 8.2 12/13/2018     -Cataracts each eye    Last Eye Exam: 8/31/2022 Dr. Sparrow   Dilated Previously: Yes, side effects of dilation explained today     What are you currently using to see?  Glasses - PAL's - wears full time     Distance Vision Acuity: Satisfied with vision    Near Vision Acuity: Satisfied with vision while reading and using computer with glasses    Eye Comfort: watery and mattery in AM - L>R - ongoing for a few months - notices mainly at night on iPad   Do you use eye drops? : No - would like some recommendations   Occupation or Hobbies: Retired     Natalya Spring  Optometry Assistant     Medical, surgical and family histories reviewed and updated 4/22/2025.       OBJECTIVE: See Ophthalmology exam    ASSESSMENT:    ICD-10-CM    1. Encounter for examination of eyes and vision with abnormal findings  Z01.01       2. Type 2 diabetes mellitus without retinopathy (H)  E11.9       3. Nuclear age-related cataract, both eyes  H25.13       4. Hypermetropia, bilateral  H52.03       5. Regular astigmatism of both eyes  H52.223       6. Presbyopia  H52.4           PLAN:    Kelly Vegas aware  eye exam results will be sent to Sejal Rutledge.  Patient Instructions   Patient educated on importance of good blood sugar control.  Letter sent to primary care provider with diabetic eye exam report.     You have the start of mild cataracts.  You may notice some blurred vision or glare with  night driving.  It is important that you wear good sunglasses to protect your eyes from the ultraviolet light from the sun.     Updated glasses prescription provided today.   Allow 2 weeks to adapt to the new glasses.        DRY EYE TREATMENT    I recommend using artificial tears for your dry eye. There are over the counter drops that work well and may be used up to 4x daily. (Systane Balance, Refresh Optive, Soothe XP)   If you need more than 4 drops daily, use a preservative free product which come in individual vials which may be used for 24 hours and discarded.     Artificial tears work best as a preventative and not as well after your eyes are starting to bother you.  It may take 4- 6 weeks of using the drops before you notice improvement.  If after that time you are still having problems schedule an appointment for an evaluation and discussion of different treatments.  Dry eyes are a chronic condition and you may have more symptoms at certain times of the year.      Additional recommended treatment:  Warm compresses once to twice daily for 5-10 minutes    Directions for warm compresses:  Moisten a washcloth with hot water or microwave for 10 seconds, being careful to not get the cloth too hot. Then put the washcloth onto your eyelids for 5 minutes. It may cool rather quickly so a rice pack or eyemask that can be heated and laid on top of the washcloth will help retain the heat for longer periods.     Return in 1 year for a comprehensive eye exam, or sooner if needed.      The effects of the dilating drops last for 4- 6 hours.  You will be more sensitive to light and vision will be blurry up close.  Mydriatic sunglasses were given if needed.     Andrzej Sparrow, ROHAN  [facility]  [address] [place] [address]. [place]  [address]    [phone]

## 2025-06-16 ENCOUNTER — OFFICE VISIT (OUTPATIENT)
Dept: FAMILY MEDICINE | Facility: CLINIC | Age: 75
End: 2025-06-16
Payer: COMMERCIAL

## 2025-06-16 ENCOUNTER — RESULTS FOLLOW-UP (OUTPATIENT)
Dept: FAMILY MEDICINE | Facility: CLINIC | Age: 75
End: 2025-06-16

## 2025-06-16 VITALS
OXYGEN SATURATION: 96 % | WEIGHT: 170 LBS | DIASTOLIC BLOOD PRESSURE: 53 MMHG | SYSTOLIC BLOOD PRESSURE: 108 MMHG | HEIGHT: 63 IN | BODY MASS INDEX: 30.12 KG/M2 | RESPIRATION RATE: 16 BRPM | HEART RATE: 82 BPM | TEMPERATURE: 98.4 F

## 2025-06-16 DIAGNOSIS — Z12.11 SCREEN FOR COLON CANCER: ICD-10-CM

## 2025-06-16 DIAGNOSIS — N18.32 STAGE 3B CHRONIC KIDNEY DISEASE (H): Primary | ICD-10-CM

## 2025-06-16 DIAGNOSIS — Z79.4 TYPE 2 DIABETES MELLITUS WITH DIABETIC POLYNEUROPATHY, WITH LONG-TERM CURRENT USE OF INSULIN (H): ICD-10-CM

## 2025-06-16 DIAGNOSIS — Z23 NEED FOR VACCINATION: ICD-10-CM

## 2025-06-16 DIAGNOSIS — C91.10 CLL (CHRONIC LYMPHOCYTIC LEUKEMIA) (H): ICD-10-CM

## 2025-06-16 DIAGNOSIS — Z78.0 ASYMPTOMATIC POSTMENOPAUSAL STATUS: ICD-10-CM

## 2025-06-16 DIAGNOSIS — E11.3299 BACKGROUND DIABETIC RETINOPATHY (H): ICD-10-CM

## 2025-06-16 DIAGNOSIS — E61.1 IRON DEFICIENCY: ICD-10-CM

## 2025-06-16 DIAGNOSIS — Z79.4 TYPE 2 DIABETES MELLITUS WITH DIABETIC POLYNEUROPATHY, WITH LONG-TERM CURRENT USE OF INSULIN (H): Primary | ICD-10-CM

## 2025-06-16 DIAGNOSIS — E11.42 TYPE 2 DIABETES MELLITUS WITH DIABETIC POLYNEUROPATHY, WITH LONG-TERM CURRENT USE OF INSULIN (H): Primary | ICD-10-CM

## 2025-06-16 DIAGNOSIS — N18.32 STAGE 3B CHRONIC KIDNEY DISEASE (H): ICD-10-CM

## 2025-06-16 DIAGNOSIS — M85.80 LOW BONE MASS: ICD-10-CM

## 2025-06-16 DIAGNOSIS — E11.42 TYPE 2 DIABETES MELLITUS WITH DIABETIC POLYNEUROPATHY, WITH LONG-TERM CURRENT USE OF INSULIN (H): ICD-10-CM

## 2025-06-16 LAB
EST. AVERAGE GLUCOSE BLD GHB EST-MCNC: 151 MG/DL
HBA1C MFR BLD: 6.9 % (ref 0–5.6)

## 2025-06-16 PROCEDURE — 83550 IRON BINDING TEST: CPT | Performed by: FAMILY MEDICINE

## 2025-06-16 PROCEDURE — 3078F DIAST BP <80 MM HG: CPT | Performed by: FAMILY MEDICINE

## 2025-06-16 PROCEDURE — 83036 HEMOGLOBIN GLYCOSYLATED A1C: CPT | Performed by: FAMILY MEDICINE

## 2025-06-16 PROCEDURE — 80053 COMPREHEN METABOLIC PANEL: CPT | Performed by: FAMILY MEDICINE

## 2025-06-16 PROCEDURE — 85025 COMPLETE CBC W/AUTO DIFF WBC: CPT | Performed by: FAMILY MEDICINE

## 2025-06-16 PROCEDURE — 83615 LACTATE (LD) (LDH) ENZYME: CPT | Performed by: FAMILY MEDICINE

## 2025-06-16 PROCEDURE — 82043 UR ALBUMIN QUANTITATIVE: CPT | Performed by: FAMILY MEDICINE

## 2025-06-16 PROCEDURE — 99214 OFFICE O/P EST MOD 30 MIN: CPT | Mod: 25 | Performed by: FAMILY MEDICINE

## 2025-06-16 PROCEDURE — 91320 SARSCV2 VAC 30MCG TRS-SUC IM: CPT | Performed by: FAMILY MEDICINE

## 2025-06-16 PROCEDURE — 3044F HG A1C LEVEL LT 7.0%: CPT | Performed by: FAMILY MEDICINE

## 2025-06-16 PROCEDURE — 3074F SYST BP LT 130 MM HG: CPT | Performed by: FAMILY MEDICINE

## 2025-06-16 PROCEDURE — 83540 ASSAY OF IRON: CPT | Performed by: FAMILY MEDICINE

## 2025-06-16 PROCEDURE — 82570 ASSAY OF URINE CREATININE: CPT | Performed by: FAMILY MEDICINE

## 2025-06-16 PROCEDURE — G2211 COMPLEX E/M VISIT ADD ON: HCPCS | Performed by: FAMILY MEDICINE

## 2025-06-16 PROCEDURE — 82728 ASSAY OF FERRITIN: CPT | Performed by: FAMILY MEDICINE

## 2025-06-16 PROCEDURE — 90480 ADMN SARSCOV2 VAC 1/ONLY CMP: CPT | Performed by: FAMILY MEDICINE

## 2025-06-16 PROCEDURE — 36415 COLL VENOUS BLD VENIPUNCTURE: CPT | Performed by: FAMILY MEDICINE

## 2025-06-16 ASSESSMENT — PATIENT HEALTH QUESTIONNAIRE - PHQ9
SUM OF ALL RESPONSES TO PHQ QUESTIONS 1-9: 10
SUM OF ALL RESPONSES TO PHQ QUESTIONS 1-9: 10
10. IF YOU CHECKED OFF ANY PROBLEMS, HOW DIFFICULT HAVE THESE PROBLEMS MADE IT FOR YOU TO DO YOUR WORK, TAKE CARE OF THINGS AT HOME, OR GET ALONG WITH OTHER PEOPLE: SOMEWHAT DIFFICULT

## 2025-06-16 NOTE — PROGRESS NOTES
"  Assessment & Plan     Type 2 diabetes mellitus with diabetic polyneuropathy, with long-term current use of insulin (H)  Background diabetic retinopathy (H)  Stage 3b chronic kidney disease (H)  - HEMOGLOBIN A1C; Future  - Albumin Random Urine Quantitative with Creat Ratio; Future  - Hemoglobin; Future  - Basic metabolic panel  (Ca, Cl, CO2, Creat, Gluc, K, Na, BUN); Future  - HEMOGLOBIN A1C  - Albumin Random Urine Quantitative with Creat Ratio    CLL (chronic lymphocytic leukemia) (H)  Follow-up with oncology as planned   - CBC with platelets and differential  - Comprehensive metabolic panel (BMP + Alb, Alk Phos, ALT, AST, Total. Bili, TP)  - Lactate Dehydrogenase    Low bone mass  Offered Prolia; Discussed risks and benefits of this medication.   - Adult Endocrinology  Referral; Future    Screen for colon cancer  - Colonoscopy Screening  Referral; Future    The longitudinal plan of care for the diagnosis(es)/condition(s) as documented were addressed during this visit. Due to the added complexity in care, I will continue to support Kelly in the subsequent management and with ongoing continuity of care.    BMI  Estimated body mass index is 29.67 kg/m  as calculated from the following:    Height as of this encounter: 1.612 m (5' 3.47\").    Weight as of this encounter: 77.1 kg (170 lb).         Follow-up  Return in about 3 months (around 9/16/2025) for diabetes (previsit labs up to one week prior).    Subjective   Kelly is a 74 year old, presenting for the following health issues:  Diabetes        6/16/2025     3:51 PM   Additional Questions   Roomed by Kathy SAHNI CMA   Accompanied by Self         6/16/2025     3:51 PM   Patient Reported Additional Medications   Patient reports taking the following new medications None     Via the Health Maintenance questionnaire, the patient has reported the following services have been completed DEXA: dont know 2024-01-31, this information has been sent to the " "abstraction team.  History of Present Illness       Diabetes:   She presents for follow up of diabetes.  She is checking home blood glucose a few times a week.       She is aware of hypoglycemia symptoms including weakness and blurred vision.                  Hyperlipidemia:  She presents for follow up of hyperlipidemia.   She is taking medication to lower cholesterol. She is not having myalgia or other side effects to statin medications.    Hypertension: She presents for follow up of hypertension.  She does not check blood pressure  regularly outside of the clinic. Outpatient blood pressures have not been over 140/90. She does not follow a low salt diet.     Reason for visit:  Diabetes   She is taking medications regularly.                      Objective    /53 (BP Location: Left arm, Patient Position: Sitting, Cuff Size: Adult Regular)   Pulse 82   Temp 98.4  F (36.9  C) (Oral)   Resp 16   Ht 1.612 m (5' 3.47\")   Wt 77.1 kg (170 lb)   SpO2 96%   BMI 29.67 kg/m    Body mass index is 29.67 kg/m .  Physical Exam   GENERAL: alert and no distress  NECK: no adenopathy, no asymmetry, masses, or scars  RESP: lungs clear to auscultation - no rales, rhonchi or wheezes  CV: regular rate and rhythm, normal S1 S2, no S3 or S4, no murmur, click or rub, no peripheral edema  MS: no gross musculoskeletal defects noted, no edema  PSYCH: mentation appears normal, affect normal/bright  Diabetic foot exam: no trophic changes or ulcerative lesions and normal monofilament exam    Office Visit on 12/16/2024   Component Date Value Ref Range Status    Estimated Average Glucose 12/16/2024 140 (H)  <117 mg/dL Final    Hemoglobin A1C 12/16/2024 6.5 (H)  0.0 - 5.6 % Final    Normal <5.7%   Prediabetes 5.7-6.4%    Diabetes 6.5% or higher     Note: Adopted from ADA consensus guidelines.    Cholesterol 12/16/2024 138  <200 mg/dL Final    Triglycerides 12/16/2024 144  <150 mg/dL Final    Direct Measure HDL 12/16/2024 44 (L)  >=50 " mg/dL Final    LDL Cholesterol Calculated 12/16/2024 65  <100 mg/dL Final    Non HDL Cholesterol 12/16/2024 94  <130 mg/dL Final    Patient Fasting > 8hrs? 12/16/2024 No   Final    Creatinine Urine mg/dL 12/16/2024 143.0  mg/dL Final    The reference ranges have not been established in urine creatinine. The results should be integrated into the clinical context for interpretation.    Albumin Urine mg/L 12/16/2024 <12.0  mg/L Final    The reference ranges have not been established in urine albumin. The results should be integrated into the clinical context for interpretation.    Albumin Urine mg/g Cr 12/16/2024    Final    Unable to calculate, urine albumin and/or urine creatinine is outside detectable limits.  Microalbuminuria is defined as an albumin:creatinine ratio of 17 to 299 for males and 25 to 299 for females. A ratio of albumin:creatinine of 300 or higher is indicative of overt proteinuria.  Due to biologic variability, positive results should be confirmed by a second, first-morning random or 24-hour timed urine specimen. If there is discrepancy, a third specimen is recommended. When 2 out of 3 results are in the microalbuminuria range, this is evidence for incipient nephropathy and warrants increased efforts at glucose control, blood pressure control, and institution of therapy with an angiotensin-converting-enzyme (ACE) inhibitor (if the patient can tolerate it).      TSH 12/16/2024 1.88  0.30 - 4.20 uIU/mL Final    Sodium 12/16/2024 142  135 - 145 mmol/L Final    Potassium 12/16/2024 4.9  3.4 - 5.3 mmol/L Final    Chloride 12/16/2024 108 (H)  98 - 107 mmol/L Final    Carbon Dioxide (CO2) 12/16/2024 26  22 - 29 mmol/L Final    Anion Gap 12/16/2024 8  7 - 15 mmol/L Final    Urea Nitrogen 12/16/2024 26.8 (H)  8.0 - 23.0 mg/dL Final    Creatinine 12/16/2024 1.36 (H)  0.51 - 0.95 mg/dL Final    GFR Estimate 12/16/2024 40 (L)  >60 mL/min/1.73m2 Final    eGFR calculated using 2021 CKD-EPI equation.    Calcium  12/16/2024 9.8  8.8 - 10.4 mg/dL Final    Reference intervals for this test were updated on 7/16/2024 to reflect our healthy population more accurately. There may be differences in the flagging of prior results with similar values performed with this method. Those prior results can be interpreted in the context of the updated reference intervals.    Glucose 12/16/2024 78  70 - 99 mg/dL Final    Patient Fasting > 8hrs? 12/16/2024 No   Final     Results for orders placed or performed in visit on 06/16/25 (from the past 24 hours)   CBC with platelets and differential    Narrative    The following orders were created for panel order CBC with platelets and differential.  Procedure                               Abnormality         Status                     ---------                               -----------         ------                     CBC with platelets and ...[[id number]]                      In process                   Please view results for these tests on the individual orders.   HEMOGLOBIN A1C   Result Value Ref Range    Estimated Average Glucose 151 (H) <117 mg/dL    Hemoglobin A1C 6.9 (H) 0.0 - 5.6 %           Signed Electronically by: Sejal Rutledge MD    Answers submitted by the patient for this visit:  Combined Diabetes / Lipid/ Hypertension Visit (Submitted on 6/16/2025)  Dietary sodium intake:: No  Questionnaire about: Chronic problems general questions HPI Form (Submitted on 6/16/2025)  Chief Complaint: Chronic problems general questions HPI Form

## 2025-06-17 ENCOUNTER — PATIENT OUTREACH (OUTPATIENT)
Dept: CARE COORDINATION | Facility: CLINIC | Age: 75
End: 2025-06-17

## 2025-06-17 LAB
ALBUMIN SERPL BCG-MCNC: 4.1 G/DL (ref 3.5–5.2)
ALP SERPL-CCNC: 83 U/L (ref 40–150)
ALT SERPL W P-5'-P-CCNC: 13 U/L (ref 0–50)
ANION GAP SERPL CALCULATED.3IONS-SCNC: 7 MMOL/L (ref 7–15)
AST SERPL W P-5'-P-CCNC: 23 U/L (ref 0–45)
BASOPHILS # BLD AUTO: 0.1 10E3/UL (ref 0–0.2)
BASOPHILS NFR BLD AUTO: 0 %
BILIRUB SERPL-MCNC: 0.2 MG/DL
BITE CELLS BLD QL SMEAR: SLIGHT
BUN SERPL-MCNC: 20 MG/DL (ref 8–23)
BURR CELLS BLD QL SMEAR: SLIGHT
CALCIUM SERPL-MCNC: 9.4 MG/DL (ref 8.8–10.4)
CHLORIDE SERPL-SCNC: 105 MMOL/L (ref 98–107)
CREAT SERPL-MCNC: 1.32 MG/DL (ref 0.51–0.95)
CREAT UR-MCNC: 95.8 MG/DL
EGFRCR SERPLBLD CKD-EPI 2021: 42 ML/MIN/1.73M2
ELLIPTOCYTES BLD QL SMEAR: SLIGHT
EOSINOPHIL # BLD AUTO: 0.2 10E3/UL (ref 0–0.7)
EOSINOPHIL NFR BLD AUTO: 2 %
ERYTHROCYTE [DISTWIDTH] IN BLOOD BY AUTOMATED COUNT: 12.5 % (ref 10–15)
FERRITIN SERPL-MCNC: 52 NG/ML (ref 11–328)
GLUCOSE SERPL-MCNC: 276 MG/DL (ref 70–99)
HCO3 SERPL-SCNC: 26 MMOL/L (ref 22–29)
HCT VFR BLD AUTO: 35.4 % (ref 35–47)
HGB BLD-MCNC: 11.7 G/DL (ref 11.7–15.7)
IMM GRANULOCYTES # BLD: 0.1 10E3/UL
IMM GRANULOCYTES NFR BLD: 0 %
IRON BINDING CAPACITY (ROCHE): 268 UG/DL (ref 240–430)
IRON SATN MFR SERPL: 21 % (ref 15–46)
IRON SERPL-MCNC: 55 UG/DL (ref 37–145)
LDH SERPL L TO P-CCNC: 235 U/L (ref 0–250)
LYMPHOCYTES # BLD AUTO: 7.5 10E3/UL (ref 0.8–5.3)
LYMPHOCYTES NFR BLD AUTO: 52 %
MCH RBC QN AUTO: 30.6 PG (ref 26.5–33)
MCHC RBC AUTO-ENTMCNC: 33.1 G/DL (ref 31.5–36.5)
MCV RBC AUTO: 93 FL (ref 78–100)
MICROALBUMIN UR-MCNC: <12 MG/L
MICROALBUMIN/CREAT UR: NORMAL MG/G{CREAT}
MONOCYTES # BLD AUTO: 0.5 10E3/UL (ref 0–1.3)
MONOCYTES NFR BLD AUTO: 3 %
NEUTROPHILS # BLD AUTO: 6 10E3/UL (ref 1.6–8.3)
NEUTROPHILS NFR BLD AUTO: 42 %
NRBC # BLD AUTO: 0 10E3/UL
NRBC BLD AUTO-RTO: 0 /100
PLAT MORPH BLD: ABNORMAL
PLATELET # BLD AUTO: 246 10E3/UL (ref 150–450)
POTASSIUM SERPL-SCNC: 4.7 MMOL/L (ref 3.4–5.3)
PROT SERPL-MCNC: 6.3 G/DL (ref 6.4–8.3)
RBC # BLD AUTO: 3.82 10E6/UL (ref 3.8–5.2)
RBC MORPH BLD: ABNORMAL
SMUDGE CELLS BLD QL SMEAR: PRESENT
SODIUM SERPL-SCNC: 138 MMOL/L (ref 135–145)
VARIANT LYMPHS BLD QL SMEAR: PRESENT
WBC # BLD AUTO: 14.4 10E3/UL (ref 4–11)

## 2025-06-18 ENCOUNTER — PATIENT OUTREACH (OUTPATIENT)
Dept: CARE COORDINATION | Facility: CLINIC | Age: 75
End: 2025-06-18
Payer: COMMERCIAL

## 2025-06-25 ENCOUNTER — ONCOLOGY VISIT (OUTPATIENT)
Dept: ONCOLOGY | Facility: CLINIC | Age: 75
End: 2025-06-25
Attending: INTERNAL MEDICINE
Payer: COMMERCIAL

## 2025-06-25 VITALS
RESPIRATION RATE: 16 BRPM | SYSTOLIC BLOOD PRESSURE: 143 MMHG | WEIGHT: 167 LBS | OXYGEN SATURATION: 98 % | TEMPERATURE: 99 F | HEIGHT: 64 IN | DIASTOLIC BLOOD PRESSURE: 47 MMHG | BODY MASS INDEX: 28.51 KG/M2 | HEART RATE: 71 BPM

## 2025-06-25 DIAGNOSIS — E61.1 IRON DEFICIENCY: ICD-10-CM

## 2025-06-25 DIAGNOSIS — C91.10 CLL (CHRONIC LYMPHOCYTIC LEUKEMIA) (H): Primary | ICD-10-CM

## 2025-06-25 PROCEDURE — G0463 HOSPITAL OUTPT CLINIC VISIT: HCPCS | Performed by: NURSE PRACTITIONER

## 2025-06-25 PROCEDURE — G2211 COMPLEX E/M VISIT ADD ON: HCPCS | Performed by: NURSE PRACTITIONER

## 2025-06-25 PROCEDURE — 99214 OFFICE O/P EST MOD 30 MIN: CPT | Performed by: NURSE PRACTITIONER

## 2025-06-25 ASSESSMENT — PAIN SCALES - GENERAL: PAINLEVEL_OUTOF10: NO PAIN (0)

## 2025-06-25 NOTE — PROGRESS NOTES
"Oncology Rooming Note    June 25, 2025 10:48 AM   Kelly Vegas is a 74 year old female who presents for:    Chief Complaint   Patient presents with    Oncology Clinic Visit     CLL (chronic lymphocytic leukemia) - Provider visit only     Initial Vitals: BP (!) 143/47 (BP Location: Right arm, Patient Position: Sitting, Cuff Size: Adult Regular)   Pulse 71   Temp 99  F (37.2  C) (Tympanic)   Resp 16   Ht 1.631 m (5' 4.2\")   Wt 75.8 kg (167 lb)   SpO2 98%   BMI 28.49 kg/m   Estimated body mass index is 28.49 kg/m  as calculated from the following:    Height as of this encounter: 1.631 m (5' 4.2\").    Weight as of this encounter: 75.8 kg (167 lb). Body surface area is 1.85 meters squared.  No Pain (0) Comment: Data Unavailable   No LMP recorded. Patient has had a hysterectomy.  Allergies reviewed: Yes  Medications reviewed: Yes    Medications: Medication refills not needed today.  Pharmacy name entered into i2we: [facility] [facility] [place], [place] - [address] [place]    Frailty Screening:   Is the patient here for a new oncology consult visit in cancer care? 2. No    PHQ9:  Did this patient require a PHQ9?: No      Clinical concerns:  one year follow up    Chelsey Mary CMA              "

## 2025-06-25 NOTE — PROGRESS NOTES
[facility] Hematology and Oncology Progress Note    Patient: Kelly Vegas  MRN: [id number]  Date of Service: Jun 25, 2025          Reason for Visit    CLL    Primary Hematologist: Dr. Guzman      Assessment and Plan  #. 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  WBC stable 14.4 and ALC minimally elevated 7.5. Hgb and platelets WNL. CMP WNL outside of stable creatinine 1.32 and LDH WNL. Overall good risk CLL.      Plan:  -Annual labs and provider exam   -B symptoms requiring reassessment sooner were reviewed.    #. Anemia, hx of DEBORA and CKD  #. Hx small bowel AVMs   Secondary to GI blood loss in the past.  Ferritin and iron studies are within normal limits.  Hgb WNL. MCV WNL.    Continue orals iron every other day.    Plan:  -Continue oral iron every other day  -Monitor ferritin/iron panel annually    #.  Chronic diarrhea  Chronic but recently worse after starting metformin. Since stopped metformin with some persistence. Colonoscopy planned next month.     Plan:  -Colonoscopy and follow-up with PCP planned     Cancer Staging   No matching staging information was found for the patient.      ECOG Performance    1 - Can't do physically strenuous work, but fully ambyulatory and can do light sedentary work      Oncology history  10/2018: CLL, Boyer 0. 13 q. Deletion. (good risk) + iron def anemia (related to GI bleeding)  -noted to have new moderate leukocytosis/lymphocytosis and moderate normocytic normochromic anemia.  -Low ferritin and iron saturation, suggestive of iron-deficiency anemia.   -Hematology work up most consistent with CLL and DEBORA.  -FISH 12/2018 found Loss of 13q14 and rearrangement of IGH.   -1/2020: Upper endo found non-bleeding erosive gastropathy, with negative colonoscopy. Multiple AVM in proximal small bowel and colon small bowel capsule studies.    Treatment  CLL:  -observation    DEBORA:  -12/2018 - 5/2019: oral iron with repletion.   -recurrent anemia 10/2019 off  oral iron.     10/2019 - present: oral iron every other day    Interval History   Kelly Vegas is a 74 year old female with history of CLL and iron deficiency anemia secondary to bleeding from multiple GI AVMs. Returns for 1-yr surveillance visit.     Has not had treatment for the CLL to date. Chronic fatigue, no changes and is active. No b symptoms. Stable weight.     Continues on oral iron every other day, tolerates well. No signs of GI bleeding. Stools are a bit darker on oral iron. Chronic diarrhea, recently worse after starting Metformin which has since been discontinued. Planning colonoscopy next month.     Underwent back surgery just over a year ago with improved back pain, but now has some neuropathy in LE and some radicular pain in left thigh. No new sites of pain.      Physical Exam    General: alert and cooperative  HEENT: Head: Normal, normocephalic, atraumatic.  Eye: Normal external eye, conjunctiva, lids cornea, LURDES.  Chest: Clear to auscultation bilaterally  Cardiac: RRR  Abdomen: Soft, nontender, no organomegaly  Extremities: atraumatic, no peripheral edema. Varicose veins.   CNS: Alert and oriented x3, neurologic exam grossly normal.  Lymphatics: No clinically significant peripheral adenopathy noted    Lab Results    Recent Results (from the past 240 hours)   Comprehensive metabolic panel (BMP + Alb, Alk Phos, ALT, AST, Total. Bili, TP)    Collection Time: 06/16/25  4:25 PM   Result Value Ref Range    Sodium 138 135 - 145 mmol/L    Potassium 4.7 3.4 - 5.3 mmol/L    Carbon Dioxide (CO2) 26 22 - 29 mmol/L    Anion Gap 7 7 - 15 mmol/L    Urea Nitrogen 20.0 8.0 - 23.0 mg/dL    Creatinine 1.32 (H) 0.51 - 0.95 mg/dL    GFR Estimate 42 (L) >60 mL/min/1.73m2    Calcium 9.4 8.8 - 10.4 mg/dL    Chloride 105 98 - 107 mmol/L    Glucose 276 (H) 70 - 99 mg/dL    Alkaline Phosphatase 83 40 - 150 U/L    AST 23 0 - 45 U/L    ALT 13 0 - 50 U/L    Protein Total 6.3 (L) 6.4 - 8.3 g/dL    Albumin 4.1 3.5 - 5.2 g/dL     Bilirubin Total 0.2 <=1.2 mg/dL   Lactate Dehydrogenase    Collection Time: 06/16/25  4:25 PM   Result Value Ref Range    Lactate Dehydrogenase 235 0 - 250 U/L   Iron & Iron Binding Capacity    Collection Time: 06/16/25  4:25 PM   Result Value Ref Range    Iron 55 37 - 145 ug/dL    Iron Binding Capacity 268 240 - 430 ug/dL    Iron Sat Index 21 15 - 46 %   Ferritin    Collection Time: 06/16/25  4:25 PM   Result Value Ref Range    Ferritin 52 11 - 328 ng/mL   HEMOGLOBIN A1C    Collection Time: 06/16/25  4:25 PM   Result Value Ref Range    Estimated Average Glucose 151 (H) <117 mg/dL    Hemoglobin A1C 6.9 (H) 0.0 - 5.6 %   CBC with platelets and differential    Collection Time: 06/16/25  4:25 PM   Result Value Ref Range    WBC Count 14.4 (H) 4.0 - 11.0 10e3/uL    RBC Count 3.82 3.80 - 5.20 10e6/uL    Hemoglobin 11.7 11.7 - 15.7 g/dL    Hematocrit 35.4 35.0 - 47.0 %    MCV 93 78 - 100 fL    MCH 30.6 26.5 - 33.0 pg    MCHC 33.1 31.5 - 36.5 g/dL    RDW 12.5 10.0 - 15.0 %    Platelet Count 246 150 - 450 10e3/uL    % Neutrophils 42 %    % Lymphocytes 52 %    % Monocytes 3 %    % Eosinophils 2 %    % Basophils 0 %    % Immature Granulocytes 0 %    NRBCs per 100 WBC 0 <1 /100    Absolute Neutrophils 6.0 1.6 - 8.3 10e3/uL    Absolute Lymphocytes 7.5 (H) 0.8 - 5.3 10e3/uL    Absolute Monocytes 0.5 0.0 - 1.3 10e3/uL    Absolute Eosinophils 0.2 0.0 - 0.7 10e3/uL    Absolute Basophils 0.1 0.0 - 0.2 10e3/uL    Absolute Immature Granulocytes 0.1 <=0.4 10e3/uL    Absolute NRBCs 0.0 10e3/uL   RBC and Platelet Morphology    Collection Time: 06/16/25  4:25 PM   Result Value Ref Range    RBC Morphology Confirmed RBC Indices     Platelet Assessment  Automated Count Confirmed. Platelet morphology is normal.     Automated Count Confirmed. Platelet morphology is normal.    Bite Cells Slight (A) None Seen    [place] Cells Slight (A) None Seen    Elliptocytes Slight (A) None Seen    Reactive Lymphocytes Present (A) None Seen    Smudge Cells  Present (A) None Seen   Albumin Random Urine Quantitative with Creat Ratio    Collection Time: 06/16/25  4:35 PM   Result Value Ref Range    Creatinine Urine mg/dL 95.8 mg/dL    Albumin Urine mg/L <12.0 mg/L    Albumin Urine mg/g Cr         Imaging    No results found.    Total time 30 minutes, to include face to face visit, review of EMR, ordering, documentation and coordination of care on date of service.    complexity modifier for longitudinal care.       Signed by: Lucinda Fine NP

## 2025-06-25 NOTE — LETTER
6/25/2025      Kelly Vegas  [address] [place] [id number]      Dear Colleague,    Thank you for referring your patient, Kelly Vegas, to the [facility]. Please see a copy of my visit note below.    [facility] Hematology and Oncology Progress Note    Patient: Kelly Vegas  MRN: [id number]  Date of Service: Jun 25, 2025          Reason for Visit    CLL    Primary Hematologist: Dr. Guzman      Assessment and Plan  #. 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  WBC stable 14.4 and ALC minimally elevated 7.5. Hgb and platelets WNL. CMP WNL outside of stable creatinine 1.32 and LDH WNL. Overall good risk CLL.      Plan:  -Annual labs and provider exam   -B symptoms requiring reassessment sooner were reviewed.    #. Anemia, hx of DEBORA and CKD  #. Hx small bowel AVMs   Secondary to GI blood loss in the past.  Ferritin and iron studies are within normal limits.  Hgb WNL. MCV WNL.    Continue orals iron every other day.    Plan:  -Continue oral iron every other day  -Monitor ferritin/iron panel annually    #.  Chronic diarrhea  Chronic but recently worse after starting metformin. Since stopped metformin with some persistence. Colonoscopy planned next month.     Plan:  -Colonoscopy and follow-up with PCP planned     Cancer Staging   No matching staging information was found for the patient.      ECOG Performance    1 - Can't do physically strenuous work, but fully ambyulatory and can do light sedentary work      Oncology history  10/2018: CLL, Boyer 0. 13 q. Deletion. (good risk) + iron def anemia (related to GI bleeding)  -noted to have new moderate leukocytosis/lymphocytosis and moderate normocytic normochromic anemia.  -Low ferritin and iron saturation, suggestive of iron-deficiency anemia.   -Hematology work up most consistent with CLL and DEBORA.  -FISH 12/2018 found Loss of 13q14 and rearrangement of IGH.   -1/2020: Upper endo found non-bleeding  erosive gastropathy, with negative colonoscopy. Multiple AVM in proximal small bowel and colon small bowel capsule studies.    Treatment  CLL:  -observation    DEBORA:  -12/2018 - 5/2019: oral iron with repletion.   -recurrent anemia 10/2019 off oral iron.     10/2019 - present: oral iron every other day    Interval History   Kelly Vegas is a 74 year old female with history of CLL and iron deficiency anemia secondary to bleeding from multiple GI AVMs. Returns for 1-yr surveillance visit.     Has not had treatment for the CLL to date. Chronic fatigue, no changes and is active. No b symptoms. Stable weight.     Continues on oral iron every other day, tolerates well. No signs of GI bleeding. Stools are a bit darker on oral iron. Chronic diarrhea, recently worse after starting Metformin which has since been discontinued. Planning colonoscopy next month.     Underwent back surgery just over a year ago with improved back pain, but now has some neuropathy in LE and some radicular pain in left thigh. No new sites of pain.      Physical Exam    General: alert and cooperative  HEENT: Head: Normal, normocephalic, atraumatic.  Eye: Normal external eye, conjunctiva, lids cornea, LURDES.  Chest: Clear to auscultation bilaterally  Cardiac: RRR  Abdomen: Soft, nontender, no organomegaly  Extremities: atraumatic, no peripheral edema. Varicose veins.   CNS: Alert and oriented x3, neurologic exam grossly normal.  Lymphatics: No clinically significant peripheral adenopathy noted    Lab Results    Recent Results (from the past 240 hours)   Comprehensive metabolic panel (BMP + Alb, Alk Phos, ALT, AST, Total. Bili, TP)    Collection Time: 06/16/25  4:25 PM   Result Value Ref Range    Sodium 138 135 - 145 mmol/L    Potassium 4.7 3.4 - 5.3 mmol/L    Carbon Dioxide (CO2) 26 22 - 29 mmol/L    Anion Gap 7 7 - 15 mmol/L    Urea Nitrogen 20.0 8.0 - 23.0 mg/dL    Creatinine 1.32 (H) 0.51 - 0.95 mg/dL    GFR Estimate 42 (L) >60 mL/min/1.73m2     Calcium 9.4 8.8 - 10.4 mg/dL    Chloride 105 98 - 107 mmol/L    Glucose 276 (H) 70 - 99 mg/dL    Alkaline Phosphatase 83 40 - 150 U/L    AST 23 0 - 45 U/L    ALT 13 0 - 50 U/L    Protein Total 6.3 (L) 6.4 - 8.3 g/dL    Albumin 4.1 3.5 - 5.2 g/dL    Bilirubin Total 0.2 <=1.2 mg/dL   Lactate Dehydrogenase    Collection Time: 06/16/25  4:25 PM   Result Value Ref Range    Lactate Dehydrogenase 235 0 - 250 U/L   Iron & Iron Binding Capacity    Collection Time: 06/16/25  4:25 PM   Result Value Ref Range    Iron 55 37 - 145 ug/dL    Iron Binding Capacity 268 240 - 430 ug/dL    Iron Sat Index 21 15 - 46 %   Ferritin    Collection Time: 06/16/25  4:25 PM   Result Value Ref Range    Ferritin 52 11 - 328 ng/mL   HEMOGLOBIN A1C    Collection Time: 06/16/25  4:25 PM   Result Value Ref Range    Estimated Average Glucose 151 (H) <117 mg/dL    Hemoglobin A1C 6.9 (H) 0.0 - 5.6 %   CBC with platelets and differential    Collection Time: 06/16/25  4:25 PM   Result Value Ref Range    WBC Count 14.4 (H) 4.0 - 11.0 10e3/uL    RBC Count 3.82 3.80 - 5.20 10e6/uL    Hemoglobin 11.7 11.7 - 15.7 g/dL    Hematocrit 35.4 35.0 - 47.0 %    MCV 93 78 - 100 fL    MCH 30.6 26.5 - 33.0 pg    MCHC 33.1 31.5 - 36.5 g/dL    RDW 12.5 10.0 - 15.0 %    Platelet Count 246 150 - 450 10e3/uL    % Neutrophils 42 %    % Lymphocytes 52 %    % Monocytes 3 %    % Eosinophils 2 %    % Basophils 0 %    % Immature Granulocytes 0 %    NRBCs per 100 WBC 0 <1 /100    Absolute Neutrophils 6.0 1.6 - 8.3 10e3/uL    Absolute Lymphocytes 7.5 (H) 0.8 - 5.3 10e3/uL    Absolute Monocytes 0.5 0.0 - 1.3 10e3/uL    Absolute Eosinophils 0.2 0.0 - 0.7 10e3/uL    Absolute Basophils 0.1 0.0 - 0.2 10e3/uL    Absolute Immature Granulocytes 0.1 <=0.4 10e3/uL    Absolute NRBCs 0.0 10e3/uL   RBC and Platelet Morphology    Collection Time: 06/16/25  4:25 PM   Result Value Ref Range    RBC Morphology Confirmed RBC Indices     Platelet Assessment  Automated Count Confirmed. Platelet  "morphology is normal.     Automated Count Confirmed. Platelet morphology is normal.    Bite Cells Slight (A) None Seen    [place] Cells Slight (A) None Seen    Elliptocytes Slight (A) None Seen    Reactive Lymphocytes Present (A) None Seen    Smudge Cells Present (A) None Seen   Albumin Random Urine Quantitative with Creat Ratio    Collection Time: 06/16/25  4:35 PM   Result Value Ref Range    Creatinine Urine mg/dL 95.8 mg/dL    Albumin Urine mg/L <12.0 mg/L    Albumin Urine mg/g Cr         Imaging    No results found.    Total time 30 minutes, to include face to face visit, review of EMR, ordering, documentation and coordination of care on date of service.    complexity modifier for longitudinal care.       Signed by: Lucinda Fine NP      Oncology Rooming Note    June 25, 2025 10:48 AM   Kelly Vegas is a 74 year old female who presents for:    Chief Complaint   Patient presents with     Oncology Clinic Visit     CLL (chronic lymphocytic leukemia) - Provider visit only     Initial Vitals: BP (!) 143/47 (BP Location: Right arm, Patient Position: Sitting, Cuff Size: Adult Regular)   Pulse 71   Temp 99  F (37.2  C) (Tympanic)   Resp 16   Ht 1.631 m (5' 4.2\")   Wt 75.8 kg (167 lb)   SpO2 98%   BMI 28.49 kg/m   Estimated body mass index is 28.49 kg/m  as calculated from the following:    Height as of this encounter: 1.631 m (5' 4.2\").    Weight as of this encounter: 75.8 kg (167 lb). Body surface area is 1.85 meters squared.  No Pain (0) Comment: Data Unavailable   No LMP recorded. Patient has had a hysterectomy.  Allergies reviewed: Yes  Medications reviewed: Yes    Medications: Medication refills not needed today.  Pharmacy name entered into [employer]: [id number]  [place] - [id number] [place]    Frailty Screening:   Is the patient here for a new oncology consult visit in cancer care? 2. No    PHQ9:  Did this patient require a PHQ9?: No      Clinical concerns:  one year follow up    Chelsey ORTA" SUKHDEEP Mary                Again, thank you for allowing me to participate in the care of your patient.        Sincerely,        Lucinda Fine NP    Electronically signed

## 2025-07-17 ENCOUNTER — TRANSFERRED RECORDS (OUTPATIENT)
Dept: ADMINISTRATIVE | Facility: CLINIC | Age: 75
End: 2025-07-17
Payer: COMMERCIAL

## 2025-07-23 PROBLEM — D12.6 ADENOMATOUS POLYP OF COLON: Status: ACTIVE | Noted: 2025-07-23

## 2025-07-23 NOTE — PROCEDURES
[facility]   [address], [address], [place] [address]     Patient Name: Kelly Vegas  Gender:  Female  Exam Date: 07/17/2025 Visit Number:  [id number]  Age: 75 Years YOB: 1950  Attending MD: Karen Redding MD Medical Record#:  [id number]    Procedure: Colonoscopy   Indications: Previous adenomatous polyp(s)       Father with frequent colonoscopies due to polyps      Referring MD: Sejal Rutledge MD  Primary MD:      Sejal Rutledge MD  Medications: Intra Procedure Medications:   Patient received monitored anesthesia care.     Complications: No immediate complications  ______________________________________________________________________________  Procedure:   An examination of the heart and lungs was performed and found to be within acceptable limits.  .  The patient was therefore deemed a reasonable candidate for endoscopy and sedation.   The risks and benefits of the procedure were explained to the patient.After obtaining informed consent, the patient received monitored anesthesia care and I passed the scope   without difficulty via the rectum to the cecum.  The appendiceal orifice and ic valve were identified.  The quality of the prep was good  (Chris/Gat/4 Bis/MgCit).    This was a complete examination throughout the entire colon.    Findings:    Polyp location: transverse colon.  Quantity: 3.  Size: 5-8.  Polyp shape:  sessile.         Maneuver: polypectomy was performed with a cold snare.       Removal:  complete.  Retrieval: complete.  Bleeding: none.    Polyp location: descending colon.  Quantity: 1.  Size: 5 mm.  Polyp shape: sessile.         Maneuver: polypectomy was performed with a  cold snare.       Removal: complete.  Retrieval: complete.  Bleeding: none.    Polyp location: sigmoid.  Quantity: 1.  Size: 5 mm.   Polyp shape: sessile.         Maneuver: polypectomy was performed with a cold snare.       Removal: complete.  Retrieval: complete.   Bleeding: none.      Diverticulosis.  Location: - sigmoid.    Description:  moderate.      No inflammation present.    Hemorrhoids.  Internal hemorrhoids without bleeding.    Hypertrophied anal papilla  Location.    Impression:  Diverticulosis of colon without diverticulitis  Colorectal polyp detected on colonoscopy  Hemorrhoids, internal    Preliminary Plan:  The patient and their physician will receive a copy of the pathology report as well as pathology-based recommendations for future screening or surveillance.  Pathology Results:  A: COLON, TRANSVERSE, POLYPS:           1. Tubular adenomas (3)           2. Negative for high grade dysplasia           3. Per the colonoscopy report:               a. Polyp sizes: 5 mm - 8 mm               b. Resection: Complete               c. Retrieval: Complete      B: COLON, DESCENDING, POLYP:           1. Tubular adenoma           2. Negative for high grade dysplasia           3. Per the colonoscopy report:               a. Polyp size: 5 mm               b. Resection: Complete               c. Retrieval: Complete      C: COLON, SIGMOID, POLYP:           1. Tubular adenoma           2. Negative for high grade dysplasia           3. Per the colonoscopy report:               a. Polyp size: 5 mm               b. Resection: Complete               c. Retrieval: Complete      MICROSCOPIC  A: Performed   B: Performed   C: Performed     Electronically signed by: Michael Felder MD    Interpreted at [facility], [address] [place] [id number]    Orders    Instruction(s)/Education:  Instruction/Education Timeframe Assessment   Colon Cancer Prevention  K57.30   Colon Polyps  K57.30   Diverticulosis/Diverticulitis  K57.30   Hemorrhoids (Internal)  K57.30   High Fiber Diet  K57.30       Final Plan:  Repeat colonoscopy in 3 years.    We will attempt to contact you at appropriate intervals via [place] mail.  We may not be able to find you or contact you at  that time, therefore you should know that the responsibility for following our recommendation rests with you.  If you don't hear from us at the time your procedure is due, please contact our office to schedule an appointment.  If your contact information should change, please contact our office so that we can update your record.      _Electronically signed by:___________________  Karen Redding MD                 07/17/2025    cc: Sejal Rutledge MD  cc: Sejal Rutledge MD

## 2025-08-20 DIAGNOSIS — E11.42 TYPE 2 DIABETES MELLITUS WITH DIABETIC POLYNEUROPATHY, WITH LONG-TERM CURRENT USE OF INSULIN (H): ICD-10-CM

## 2025-08-20 DIAGNOSIS — Z79.4 TYPE 2 DIABETES MELLITUS WITH DIABETIC POLYNEUROPATHY, WITH LONG-TERM CURRENT USE OF INSULIN (H): ICD-10-CM

## 2025-08-20 RX ORDER — INSULIN GLARGINE 100 [IU]/ML
INJECTION, SOLUTION SUBCUTANEOUS
Qty: 30 ML | Refills: 0 | Status: SHIPPED | OUTPATIENT
Start: 2025-08-20

## (undated) DEVICE — PREP CHLORAPREP 26ML TINTED HI-LITE ORANGE 930815

## (undated) DEVICE — Device

## (undated) DEVICE — TOOL DISSECT MIDAS MR8 14CM MATCH HEAD 3MM MR8-14MH30

## (undated) DEVICE — DECANTER TRANSFER DEVICE 2008S

## (undated) DEVICE — SU VICRYL 0 UR-6 27" J603H

## (undated) DEVICE — ESU ELEC BLADE 2.75" COATED/INSULATED E1455

## (undated) DEVICE — SOL NACL 0.9% IRRIG 1000ML BOTTLE 2F7124

## (undated) DEVICE — SOL NACL 0.9% IRRIG 3000ML BAG 07972-08

## (undated) DEVICE — CLIP APPLIER ENDO ROTATING 10MM MED/LG ER320

## (undated) DEVICE — ESU PENCIL W/COATED BLADE E2450H

## (undated) DEVICE — DECANTER VIAL 2006S

## (undated) DEVICE — ENDO TROCAR SLEEVE KII ADV FIXATION 05X100MM CFS02

## (undated) DEVICE — ESU ENDO SCISSORS 5MM CVD 5DCS

## (undated) DEVICE — SUCTION MANIFOLD NEPTUNE 2 SYS 4 PORT 0702-020-000

## (undated) DEVICE — SOL WATER IRRIG 1000ML BOTTLE 2F7114

## (undated) DEVICE — DRAIN JACKSON PRATT 10FR ROUND SU130-1321

## (undated) DEVICE — SUTURE VICRYL+ 1 CT-1 CR 8X18" VIO VCP741D

## (undated) DEVICE — ESU PENCIL W/HOLSTER E2350H

## (undated) DEVICE — GOWN XLG DISP 9545

## (undated) DEVICE — SYR 10ML FINGER CONTROL W/O NDL 309695

## (undated) DEVICE — POSITIONER ARMBOARD FOAM 1PAIR LF FP-ARMB1

## (undated) DEVICE — STOCKING SLEEVE COMPRESSION CALF LG

## (undated) DEVICE — SUCTION IRRIGATION STRYKFLOW II W/TIP DISP 250-070-520

## (undated) DEVICE — GLOVE BIOGEL PI MICRO INDICATOR UNDERGLOVE SZ 8.0 48980

## (undated) DEVICE — GLOVE PROTEXIS BLUE W/NEU-THERA 8.0  2D73EB80

## (undated) DEVICE — SUCTION TIP YANKAUER STR K87

## (undated) DEVICE — SOL NACL 0.9% IRRIG 1000ML BOTTLE 07138-09

## (undated) DEVICE — SU MONOCRYL 3-0 PS-2 27" Y427H

## (undated) DEVICE — ENDO POUCH UNIV RETRIEVAL SYSTEM INZII 10MM CD001

## (undated) DEVICE — DRAPE MICROSCOPE MICRO-KOVER LEICA 48"X120" 09-MK651

## (undated) DEVICE — PREP CHLORAPREP 26ML TINTED ORANGE  260815

## (undated) DEVICE — KIT PROCEDURE PINPOINT PP9036

## (undated) DEVICE — SU ETHILON 3-0 PS-1 18" 1663H

## (undated) DEVICE — GLOVE PROTEXIS W/NEU-THERA 7.5  2D73TE75

## (undated) DEVICE — SOL ISOPROPYL RUBBING ALCOHOL USP 70% 4OZ HDX-20 I0020

## (undated) DEVICE — SOL WATER IRRIG 1000ML BOTTLE 07139-09

## (undated) DEVICE — RX SURGIFLO HEMOSTATIC MATRIX W/THROMBIN 8ML 2994

## (undated) DEVICE — TUBING SUCTION MED-VAC 7MMX20' N720A

## (undated) DEVICE — LINEN BACK PACK 5440

## (undated) DEVICE — ADH FLOSEAL W/HUMAN THROMBIN 5ML

## (undated) DEVICE — ESU HOLSTER PLASTIC DISP E2400

## (undated) DEVICE — COVER CAMERA IN-LIGHT DISP LT-C02

## (undated) DEVICE — ENDO FLOSEAL APPLICATOR 1500181

## (undated) DEVICE — ENDO TROCAR FIRST ENTRY KII FIOS ADV FIX 11X100MM CFF33

## (undated) DEVICE — ENDO TROCAR FIRST ENTRY KII FIOS ADV FIX 05X100MM CFF03

## (undated) DEVICE — ADH SKIN CLOSURE PREMIERPRO EXOFIN 1.0ML 3470

## (undated) DEVICE — DRSG ACTICOAT 4X8" 66021771

## (undated) DEVICE — DRAPE POUCH INSTRUMENT 3 POCKET 1018L

## (undated) DEVICE — SURGICEL HEMOSTAT 4X8" 1952

## (undated) DEVICE — SU MONOCRYL 4-0 PS-2 18" UND Y496G

## (undated) DEVICE — DRAPE C-ARM W/STRAPS 42X72" 07-CA104

## (undated) DEVICE — DRSG KERLIX FLUFFS X5

## (undated) DEVICE — ESU ELEC CLEANCOAT LAP FLAT L-HOOK 36CM E3774-36C

## (undated) DEVICE — GLOVE BIOGEL PI ULTRATOUCH G SZ 8.0 42180

## (undated) DEVICE — SU DERMABOND PRINEO 42CM CLR422US

## (undated) DEVICE — DRAIN JACKSON PRATT RESERVOIR 100ML SU130-1305

## (undated) RX ORDER — GLYCOPYRROLATE 0.2 MG/ML
INJECTION, SOLUTION INTRAMUSCULAR; INTRAVENOUS
Status: DISPENSED
Start: 2023-09-28

## (undated) RX ORDER — EPHEDRINE SULFATE 50 MG/ML
INJECTION, SOLUTION INTRAMUSCULAR; INTRAVENOUS; SUBCUTANEOUS
Status: DISPENSED
Start: 2023-09-28

## (undated) RX ORDER — HYDROMORPHONE HYDROCHLORIDE 1 MG/ML
INJECTION, SOLUTION INTRAMUSCULAR; INTRAVENOUS; SUBCUTANEOUS
Status: DISPENSED
Start: 2023-09-28

## (undated) RX ORDER — DEXAMETHASONE SODIUM PHOSPHATE 4 MG/ML
INJECTION, SOLUTION INTRA-ARTICULAR; INTRALESIONAL; INTRAMUSCULAR; INTRAVENOUS; SOFT TISSUE
Status: DISPENSED
Start: 2023-09-28

## (undated) RX ORDER — HYDROMORPHONE HYDROCHLORIDE 2 MG/1
TABLET ORAL
Status: DISPENSED
Start: 2023-09-28

## (undated) RX ORDER — PROPOFOL 10 MG/ML
INJECTION, EMULSION INTRAVENOUS
Status: DISPENSED
Start: 2019-09-09

## (undated) RX ORDER — FENTANYL CITRATE 50 UG/ML
INJECTION, SOLUTION INTRAMUSCULAR; INTRAVENOUS
Status: DISPENSED
Start: 2019-09-09

## (undated) RX ORDER — GABAPENTIN 300 MG/1
CAPSULE ORAL
Status: DISPENSED
Start: 2019-09-09

## (undated) RX ORDER — DEXAMETHASONE SODIUM PHOSPHATE 4 MG/ML
INJECTION, SOLUTION INTRA-ARTICULAR; INTRALESIONAL; INTRAMUSCULAR; INTRAVENOUS; SOFT TISSUE
Status: DISPENSED
Start: 2019-09-09

## (undated) RX ORDER — EPHEDRINE SULFATE 50 MG/ML
INJECTION, SOLUTION INTRAMUSCULAR; INTRAVENOUS; SUBCUTANEOUS
Status: DISPENSED
Start: 2019-09-09

## (undated) RX ORDER — FENTANYL CITRATE 50 UG/ML
INJECTION, SOLUTION INTRAMUSCULAR; INTRAVENOUS
Status: DISPENSED
Start: 2023-09-28

## (undated) RX ORDER — CLINDAMYCIN PHOSPHATE 900 MG/50ML
INJECTION, SOLUTION INTRAVENOUS
Status: DISPENSED
Start: 2019-09-09

## (undated) RX ORDER — ACETAMINOPHEN 325 MG/1
TABLET ORAL
Status: DISPENSED
Start: 2023-09-28

## (undated) RX ORDER — ONDANSETRON 2 MG/ML
INJECTION INTRAMUSCULAR; INTRAVENOUS
Status: DISPENSED
Start: 2019-09-09

## (undated) RX ORDER — NEOSTIGMINE METHYLSULFATE 1 MG/ML
VIAL (ML) INJECTION
Status: DISPENSED
Start: 2019-09-09

## (undated) RX ORDER — ONDANSETRON 2 MG/ML
INJECTION INTRAMUSCULAR; INTRAVENOUS
Status: DISPENSED
Start: 2023-09-28

## (undated) RX ORDER — ALBUTEROL SULFATE 90 UG/1
AEROSOL, METERED RESPIRATORY (INHALATION)
Status: DISPENSED
Start: 2023-09-28

## (undated) RX ORDER — KETAMINE HCL IN 0.9 % NACL 50 MG/5 ML
SYRINGE (ML) INTRAVENOUS
Status: DISPENSED
Start: 2019-09-09

## (undated) RX ORDER — ACETAMINOPHEN 325 MG/1
TABLET ORAL
Status: DISPENSED
Start: 2019-09-09

## (undated) RX ORDER — LIDOCAINE HYDROCHLORIDE 10 MG/ML
INJECTION, SOLUTION EPIDURAL; INFILTRATION; INTRACAUDAL; PERINEURAL
Status: DISPENSED
Start: 2019-09-09

## (undated) RX ORDER — NEOSTIGMINE METHYLSULFATE 1 MG/ML
VIAL (ML) INJECTION
Status: DISPENSED
Start: 2023-09-28

## (undated) RX ORDER — BUPIVACAINE HYDROCHLORIDE 5 MG/ML
INJECTION, SOLUTION PERINEURAL
Status: DISPENSED
Start: 2019-09-09

## (undated) RX ORDER — LIDOCAINE HYDROCHLORIDE AND EPINEPHRINE 10; 10 MG/ML; UG/ML
INJECTION, SOLUTION INFILTRATION; PERINEURAL
Status: DISPENSED
Start: 2019-09-09

## (undated) RX ORDER — CEFAZOLIN SODIUM/WATER 2 G/20 ML
SYRINGE (ML) INTRAVENOUS
Status: DISPENSED
Start: 2023-09-28